# Patient Record
Sex: FEMALE | Race: BLACK OR AFRICAN AMERICAN | NOT HISPANIC OR LATINO
[De-identification: names, ages, dates, MRNs, and addresses within clinical notes are randomized per-mention and may not be internally consistent; named-entity substitution may affect disease eponyms.]

---

## 2017-06-27 ENCOUNTER — RESULT REVIEW (OUTPATIENT)
Age: 57
End: 2017-06-27

## 2018-10-24 ENCOUNTER — RESULT REVIEW (OUTPATIENT)
Age: 58
End: 2018-10-24

## 2018-12-16 ENCOUNTER — EMERGENCY (EMERGENCY)
Facility: HOSPITAL | Age: 58
LOS: 1 days | Discharge: ANOTHER HOSPITAL | End: 2018-12-16
Attending: EMERGENCY MEDICINE | Admitting: EMERGENCY MEDICINE
Payer: COMMERCIAL

## 2018-12-16 ENCOUNTER — TRANSCRIPTION ENCOUNTER (OUTPATIENT)
Age: 58
End: 2018-12-16

## 2018-12-16 ENCOUNTER — INPATIENT (INPATIENT)
Facility: HOSPITAL | Age: 58
LOS: 39 days | Discharge: NURSING, REHAB OR LONG-TERM CARE | DRG: 3 | End: 2019-01-25
Attending: INTERNAL MEDICINE | Admitting: NEUROLOGICAL SURGERY
Payer: COMMERCIAL

## 2018-12-16 VITALS
OXYGEN SATURATION: 100 % | TEMPERATURE: 98 F | DIASTOLIC BLOOD PRESSURE: 85 MMHG | HEART RATE: 56 BPM | RESPIRATION RATE: 16 BRPM | SYSTOLIC BLOOD PRESSURE: 143 MMHG

## 2018-12-16 VITALS
SYSTOLIC BLOOD PRESSURE: 116 MMHG | HEART RATE: 60 BPM | RESPIRATION RATE: 15 BRPM | DIASTOLIC BLOOD PRESSURE: 66 MMHG | OXYGEN SATURATION: 100 % | TEMPERATURE: 98 F

## 2018-12-16 VITALS
HEIGHT: 63 IN | RESPIRATION RATE: 18 BRPM | SYSTOLIC BLOOD PRESSURE: 108 MMHG | HEART RATE: 85 BPM | OXYGEN SATURATION: 98 % | DIASTOLIC BLOOD PRESSURE: 73 MMHG | WEIGHT: 175.93 LBS

## 2018-12-16 DIAGNOSIS — I61.5 NONTRAUMATIC INTRACEREBRAL HEMORRHAGE, INTRAVENTRICULAR: ICD-10-CM

## 2018-12-16 DIAGNOSIS — I60.9 NONTRAUMATIC SUBARACHNOID HEMORRHAGE, UNSPECIFIED: ICD-10-CM

## 2018-12-16 LAB
ALBUMIN SERPL ELPH-MCNC: 4 G/DL — SIGNIFICANT CHANGE UP (ref 3.3–5)
ALP SERPL-CCNC: 88 U/L — SIGNIFICANT CHANGE UP (ref 40–120)
ALT FLD-CCNC: 21 U/L — SIGNIFICANT CHANGE UP (ref 4–33)
APTT BLD: 25.3 SEC — LOW (ref 27.5–36.3)
AST SERPL-CCNC: 24 U/L — SIGNIFICANT CHANGE UP (ref 4–32)
BASOPHILS # BLD AUTO: 0.03 K/UL — SIGNIFICANT CHANGE UP (ref 0–0.2)
BASOPHILS NFR BLD AUTO: 0.3 % — SIGNIFICANT CHANGE UP (ref 0–2)
BILIRUB SERPL-MCNC: 0.3 MG/DL — SIGNIFICANT CHANGE UP (ref 0.2–1.2)
BLD GP AB SCN SERPL QL: NEGATIVE — SIGNIFICANT CHANGE UP
BUN SERPL-MCNC: 11 MG/DL — SIGNIFICANT CHANGE UP (ref 7–23)
CALCIUM SERPL-MCNC: 9.1 MG/DL — SIGNIFICANT CHANGE UP (ref 8.4–10.5)
CHLORIDE SERPL-SCNC: 102 MMOL/L — SIGNIFICANT CHANGE UP (ref 98–107)
CO2 SERPL-SCNC: 24 MMOL/L — SIGNIFICANT CHANGE UP (ref 22–31)
CREAT SERPL-MCNC: 0.89 MG/DL — SIGNIFICANT CHANGE UP (ref 0.5–1.3)
EOSINOPHIL # BLD AUTO: 0.01 K/UL — SIGNIFICANT CHANGE UP (ref 0–0.5)
EOSINOPHIL NFR BLD AUTO: 0.1 % — SIGNIFICANT CHANGE UP (ref 0–6)
GLUCOSE SERPL-MCNC: 135 MG/DL — HIGH (ref 70–99)
HCT VFR BLD CALC: 38.3 % — SIGNIFICANT CHANGE UP (ref 34.5–45)
HGB BLD-MCNC: 13.3 G/DL — SIGNIFICANT CHANGE UP (ref 11.5–15.5)
IMM GRANULOCYTES # BLD AUTO: 0.12 # — SIGNIFICANT CHANGE UP
IMM GRANULOCYTES NFR BLD AUTO: 1 % — SIGNIFICANT CHANGE UP (ref 0–1.5)
INR BLD: 1.07 — SIGNIFICANT CHANGE UP (ref 0.88–1.17)
LYMPHOCYTES # BLD AUTO: 1.39 K/UL — SIGNIFICANT CHANGE UP (ref 1–3.3)
LYMPHOCYTES # BLD AUTO: 12 % — LOW (ref 13–44)
MCHC RBC-ENTMCNC: 28.9 PG — SIGNIFICANT CHANGE UP (ref 27–34)
MCHC RBC-ENTMCNC: 34.7 % — SIGNIFICANT CHANGE UP (ref 32–36)
MCV RBC AUTO: 83.1 FL — SIGNIFICANT CHANGE UP (ref 80–100)
MONOCYTES # BLD AUTO: 0.77 K/UL — SIGNIFICANT CHANGE UP (ref 0–0.9)
MONOCYTES NFR BLD AUTO: 6.6 % — SIGNIFICANT CHANGE UP (ref 2–14)
NEUTROPHILS # BLD AUTO: 9.27 K/UL — HIGH (ref 1.8–7.4)
NEUTROPHILS NFR BLD AUTO: 80 % — HIGH (ref 43–77)
NRBC # FLD: 0 — SIGNIFICANT CHANGE UP
PLATELET # BLD AUTO: 204 K/UL — SIGNIFICANT CHANGE UP (ref 150–400)
PMV BLD: 10.9 FL — SIGNIFICANT CHANGE UP (ref 7–13)
POTASSIUM SERPL-MCNC: 3.5 MMOL/L — SIGNIFICANT CHANGE UP (ref 3.5–5.3)
POTASSIUM SERPL-SCNC: 3.5 MMOL/L — SIGNIFICANT CHANGE UP (ref 3.5–5.3)
PROT SERPL-MCNC: 7.9 G/DL — SIGNIFICANT CHANGE UP (ref 6–8.3)
PROTHROM AB SERPL-ACNC: 11.9 SEC — SIGNIFICANT CHANGE UP (ref 9.8–13.1)
RBC # BLD: 4.61 M/UL — SIGNIFICANT CHANGE UP (ref 3.8–5.2)
RBC # FLD: 13.9 % — SIGNIFICANT CHANGE UP (ref 10.3–14.5)
RH IG SCN BLD-IMP: POSITIVE — SIGNIFICANT CHANGE UP
SODIUM SERPL-SCNC: 142 MMOL/L — SIGNIFICANT CHANGE UP (ref 135–145)
WBC # BLD: 11.59 K/UL — HIGH (ref 3.8–10.5)
WBC # FLD AUTO: 11.59 K/UL — HIGH (ref 3.8–10.5)

## 2018-12-16 PROCEDURE — 99285 EMERGENCY DEPT VISIT HI MDM: CPT | Mod: 25

## 2018-12-16 PROCEDURE — 70450 CT HEAD/BRAIN W/O DYE: CPT | Mod: 26,59

## 2018-12-16 PROCEDURE — 70496 CT ANGIOGRAPHY HEAD: CPT | Mod: 26

## 2018-12-16 PROCEDURE — 70498 CT ANGIOGRAPHY NECK: CPT | Mod: 26

## 2018-12-16 PROCEDURE — 71045 X-RAY EXAM CHEST 1 VIEW: CPT | Mod: 26

## 2018-12-16 PROCEDURE — 99291 CRITICAL CARE FIRST HOUR: CPT | Mod: 25

## 2018-12-16 PROCEDURE — 31500 INSERT EMERGENCY AIRWAY: CPT

## 2018-12-16 RX ORDER — METOCLOPRAMIDE HCL 10 MG
10 TABLET ORAL ONCE
Qty: 0 | Refills: 0 | Status: DISCONTINUED | OUTPATIENT
Start: 2018-12-16 | End: 2018-12-16

## 2018-12-16 RX ORDER — ONDANSETRON 8 MG/1
4 TABLET, FILM COATED ORAL ONCE
Qty: 0 | Refills: 0 | Status: COMPLETED | OUTPATIENT
Start: 2018-12-16 | End: 2018-12-16

## 2018-12-16 RX ORDER — ETOMIDATE 2 MG/ML
30 INJECTION INTRAVENOUS ONCE
Qty: 0 | Refills: 0 | Status: DISCONTINUED | OUTPATIENT
Start: 2018-12-16 | End: 2018-12-20

## 2018-12-16 RX ORDER — FENTANYL CITRATE 50 UG/ML
5 INJECTION INTRAVENOUS
Qty: 5000 | Refills: 0 | Status: DISCONTINUED | OUTPATIENT
Start: 2018-12-16 | End: 2018-12-16

## 2018-12-16 RX ORDER — SUCCINYLCHOLINE CHLORIDE 100 MG/5ML
100 SYRINGE (ML) INTRAVENOUS ONCE
Qty: 0 | Refills: 0 | Status: DISCONTINUED | OUTPATIENT
Start: 2018-12-16 | End: 2018-12-20

## 2018-12-16 RX ORDER — ACETAMINOPHEN 500 MG
1000 TABLET ORAL ONCE
Qty: 0 | Refills: 0 | Status: COMPLETED | OUTPATIENT
Start: 2018-12-16 | End: 2018-12-16

## 2018-12-16 RX ORDER — FENTANYL CITRATE 50 UG/ML
5 INJECTION INTRAVENOUS
Qty: 2500 | Refills: 0 | Status: DISCONTINUED | OUTPATIENT
Start: 2018-12-16 | End: 2018-12-17

## 2018-12-16 RX ORDER — FENTANYL CITRATE 50 UG/ML
5 INJECTION INTRAVENOUS
Qty: 2500 | Refills: 0 | Status: DISCONTINUED | OUTPATIENT
Start: 2018-12-16 | End: 2018-12-16

## 2018-12-16 RX ORDER — SODIUM CHLORIDE 9 MG/ML
1000 INJECTION INTRAMUSCULAR; INTRAVENOUS; SUBCUTANEOUS ONCE
Qty: 0 | Refills: 0 | Status: DISCONTINUED | OUTPATIENT
Start: 2018-12-16 | End: 2018-12-16

## 2018-12-16 RX ORDER — FENTANYL CITRATE 50 UG/ML
100 INJECTION INTRAVENOUS ONCE
Qty: 0 | Refills: 0 | Status: DISCONTINUED | OUTPATIENT
Start: 2018-12-16 | End: 2018-12-16

## 2018-12-16 RX ORDER — FENTANYL CITRATE 50 UG/ML
0.5 INJECTION INTRAVENOUS
Qty: 2500 | Refills: 0 | Status: DISCONTINUED | OUTPATIENT
Start: 2018-12-16 | End: 2018-12-16

## 2018-12-16 RX ADMIN — FENTANYL CITRATE 100 MICROGRAM(S): 50 INJECTION INTRAVENOUS at 21:20

## 2018-12-16 RX ADMIN — Medication 400 MILLIGRAM(S): at 21:15

## 2018-12-16 RX ADMIN — FENTANYL CITRATE 4 MICROGRAM(S)/KG/HR: 50 INJECTION INTRAVENOUS at 21:50

## 2018-12-16 RX ADMIN — ONDANSETRON 4 MILLIGRAM(S): 8 TABLET, FILM COATED ORAL at 21:15

## 2018-12-16 NOTE — ED PROVIDER NOTE - ATTENDING CONTRIBUTION TO CARE
I have seen and evaluated this patient with the resident.   I agree with the findings  unless other wise stated.  I have made appropriate changes in documentations where needed, After my face to face bedside evaluation, I am further  notinyo F transferred from [facility] with AMS and a large ICH with intraventricular extension requiring a ventriculostomy and and an endotracheal intubation at [facility] On high dose of fentanyl for pain control and sedation endotracheal tube in place,  Lungs clear on mechanical vent, heart regular will admit to NICU --Garrett

## 2018-12-16 NOTE — PROCEDURE NOTE - GENERAL PROCEDURE DETAILS
Right frontal area prepped and draped in sterile fashion. Right frontal incision made, twist drill charito hole placed, and ventricular catheter advanced resulting in the free flow of bloody CSF under high pressure. The catheter was tunnelled posertomedially, secured and attached to external drain, incision closed with staples, and a sterile dressing applied

## 2018-12-16 NOTE — ED PROVIDER NOTE - PMH
No pertinent past medical history Systemic lupus erythematosus, unspecified SLE type, unspecified organ involvement status

## 2018-12-16 NOTE — CONSULT NOTE ADULT - PROBLEM SELECTOR RECOMMENDATION 9
Intubate for airway control  Place ventriculostomy  Head and neck CTA  Transfer to [facility] for further treatment

## 2018-12-16 NOTE — ED PROVIDER NOTE - CRITICAL CARE PROVIDED
conducted a detailed discussion of DNR status/interpretation of diagnostic studies/additional history taking/documentation/consultation with other physicians/consult w/ pt's family directly relating to pts condition/direct patient care (not related to procedure)

## 2018-12-16 NOTE — ED ADULT TRIAGE NOTE - CHIEF COMPLAINT QUOTE
pt comes to ED for HA x 1 day by EMS pt denies taking anything for the HA all day. pt has hx of lupus. VSS

## 2018-12-16 NOTE — ED PROVIDER NOTE - OBJECTIVE STATEMENT
58yF w/pmhx Lupus presenting with headache. Pt states the headache started suddenly while decorating. Pt described the headache as severe, constant, throbbing between the eyes. Pts family at bedside 58yF w/pmhx Lupus presenting with headache. Pt states the headache started suddenly while decorating. Pt described the headache as severe, constant, throbbing between the eyes. Pts family at bedside    No AC or ASA. Takes NSAIDS 58yF w/pmhx Lupus presenting with headache. Pt states the headache started suddenly while decorating. Pt described the headache as severe, constant, throbbing between the eyes. Pts family at bedside states they received a call from a relative that their mother had been talking with on the phone who called them and said to check in on her as she was describing a severe headache on the phone. Pt endorses nausea, vomiting, chills. Pt denies numbness/tingling, abd pain, fever, back pain, neck pain. Pt traveled to \[facility]\"" last month.     No AC or ASA. Takes NSAIDS 58yF w/pmhx Lupus presenting with headache. Pt states the headache started suddenly while decorating at 630pm. Pt described the headache as severe, constant, throbbing between the eyes. Pts family at bedside states they received a call from a relative that their mother had been talking with on the phone who called them and said to check in on her as she was describing a severe headache on the phone. Pt endorses nausea, vomiting, chills. Pt denies numbness/tingling, abd pain, fever, back pain, neck pain. Pt traveled to [facility] last month.     No AC or ASA. Takes NSAIDS

## 2018-12-16 NOTE — CONSULT NOTE ADULT - SUBJECTIVE AND OBJECTIVE BOX
59 YO right handed female with PMH of Lupus, on no anticoagulants or antiplatelet agents developed sudden onset of WHOL this afternoon.  On arrival to ED patient C/O severe headache and neck pain, nausea, and became drowsy    WDWN female, in moderate distress due to pain and nausea  Vital Signs Last 24 Hrs  T(C): 36.4 (16 Dec 2018 20:05), Max: 36.4 (16 Dec 2018 20:05)  T(F): 97.6 (16 Dec 2018 20:05), Max: 97.6 (16 Dec 2018 20:05)  HR: 63 (16 Dec 2018 22:20) (56 - 83)  BP: 120/68 (16 Dec 2018 22:20) (108/61 - 147/79)  BP(mean): --  RR: 15 (16 Dec 2018 22:20) (15 - 18)  SpO2: 100% (16 Dec 2018 22:20) (99% - 100%)    Drowsy, arousable, oriented X 3  PERRLA, EOMI  CN 2-12 grossly intact  SANCHEZ strength 5/5    +Nucchal rigidity  +Meningismus    < from: CT Head No Cont (12.16.18 @ 21:04) >  FINDINGS:  Intraparenchymal hemorrhage with surrounding vasogenic edema   within the left cerebellum measuringapproximately 2.5 x 2.2 cm. There is   evidence of some subadjacent subarachnoid hemorrhage with   intraventricular extension. Hemorrhage is seen layering within the   posterior horn of the right lateral ventricle as well as within the   fourth ventricle. Hemorrhage is noted within the basal cisterns.   Hemorrhage layers the bilateral tentorii.    There is mass effect on the cerebral aqueduct and fourth ventricle with   secondary hydrocephalus.    Paranasal sinuses and mastoid air cells are clear. Calvarium is intact.     IMPRESSION:     There is a 2.5 cm intraparenchymal hemorrhage within the left cerebellar   hemisphere with subadjacent subarachnoid hemorrhage with intraventricular   extension with secondary hydrocephalus as described.    < end of copied text >

## 2018-12-16 NOTE — ED PROVIDER NOTE - ATTENDING CONTRIBUTION TO CARE
Dr. Albright: I performed a face to face bedside interview with patient regarding history of present illness, review of symptoms and past medical history. I completed an independent physical exam.  I have discussed patient's plan of care with PA.   I agree with note as stated above, having amended the EMR as needed to reflect my findings.   This includes HISTORY OF PRESENT ILLNESS, HIV, PAST MEDICAL/SURGICAL/FAMILY/SOCIAL HISTORY, ALLERGIES AND HOME MEDICATIONS, REVIEW OF SYSTEMS, PHYSICAL EXAM, and any PROGRESS NOTES during the time I functioned as the attending physician for this patient.      58F with pmh of SLE here with severe headache that started at around 6pm. No head trauma, focal numbness/weakness. +vomiting.  Pt in severe pain and not able to provide much history.    On exam very uncomfortable  neck supple  NC/AT Dr. Albright: I performed a face to face bedside interview with patient regarding history of present illness, review of symptoms and past medical history. I completed an independent physical exam.  I have discussed patient's plan of care with PA.   I agree with note as stated above, having amended the EMR as needed to reflect my findings.   This includes HISTORY OF PRESENT ILLNESS, HIV, PAST MEDICAL/SURGICAL/FAMILY/SOCIAL HISTORY, ALLERGIES AND HOME MEDICATIONS, REVIEW OF SYSTEMS, PHYSICAL EXAM, and any PROGRESS NOTES during the time I functioned as the attending physician for this patient.      58F with pmh of SLE here with severe headache that started at around 6pm. No head trauma, focal numbness/weakness. +vomiting.  Pt in severe pain and not able to provide much history.    On exam very uncomfortable  BP 140s, afebrile  protecting airway  neck supple  NC/AT  CTa b/l  moving all extremities    concerning for SAH/ICH. vs intracranial mass vs migraine headache. Pt taken emergently to CT head. will provide pain control, monitor BP closely. Do not suspect CNS infection.

## 2018-12-16 NOTE — ED PROVIDER NOTE - OBJECTIVE STATEMENT
59yo F transferred from [facility] for Neurosurgical services after initially presenting with headache and findings of intraventricular hemorrhage on CT scan. Pt. was intubated, sedated and ventriculostomy was performed prior to arrival.

## 2018-12-16 NOTE — ED PROVIDER NOTE - PROGRESS NOTE DETAILS
large SAH on CT on wet read. Pt moved to main in ED, NS called, signed out to Dr. Gaona JUSTICE Parker: Called pts pharmacy [facility] to confirm meds, [phone]. Plaquenil, ventolin, baclofen?. Family with pictures of bottles for cyclobenzaprine and ibuprofen Zvi Dubin, MD (pgy-[id number]) note: pt intubated without incident.  NSURG at bedside placing drain.  TXRF center to be called now.  CT CTA ordered as per NSURG. Vidya:  Received sign out from Intake.  pt diagnosed with SAH and is becoming less responsive.  Neurosurgery was at bedside of patient when I received her in the critical care area.  Pt was intubated by ED team.  Dr. Pritchett from neurosurgery was contacted and requested transfer to [facility] for continuing care.  Before transfer, neurosurgery team here will place intraventricular drain and pt will get CTA head and neck.  Currently BP is 113/71 and pt is intubated on fentanyl drip.      Transfer center was called.  Spoke with Dr. Garrett in the ED who accepted the patient for transfer. Jose Ramon: large SAH on CT on wet read. Pt moved to main in ED, NS called, signed out to Dr. Dasilva.

## 2018-12-16 NOTE — ED ADULT NURSE NOTE - NS ED NURSE TRANSPORT WITH
drains/IV pump/oxygen/ventriculostomy/Cardiac Monitor/Defib/ACLS/Rescue Kit/O2/BVM/pulse ox/ventilator

## 2018-12-16 NOTE — ED PROVIDER NOTE - MEDICAL DECISION MAKING DETAILS
57yo F transferred from [facility] for spontaneous ICH - will contact neurosurg for recs.

## 2018-12-16 NOTE — ED ADULT NURSE NOTE - OBJECTIVE STATEMENT
57 y/o female presented to the ED via EMS, intubated prior to arrival 7.6 catheter used. 26 at the lip line, pt is vented. Respiratory next to bedside. reported by EMS pt was hanging annette headaches and began to have severe headache with nausea and vomiting. pt went to [facility], was found to have intraparenchymal hemorrhage, with edema and SAH. pt was intubated and had ventriculostomy placed. ems stated 30ml were removed, in drainage bag 40cc noted. pt is on Fetanyl drip from receiving hospital at 5mcg/kg. was given bolus prior to arrival for some agitation. pt has fuentes in place and 2 large bore IV from prior facility. pt VSS. cardiac monitor in place. Dr. PATEL next to bedside. pt has no agitation. Nephew next to bedside. awaiting bed on Neuro Sx floor.

## 2018-12-16 NOTE — ED PROCEDURE NOTE - CPROC ED TRACHE INTUB DETAIL1
Patient was pre-oxygenated. An endotracheal tube (ETT) was placed through the vocal cords into the trachea.  ETT position was confirmed by auscultation of bilateral breath sounds to all lung fields. ETCO2 level was appropriate./During intubation, applied gentle pressure to the cricoid cartilage./Patient connected to ventilator with settings as ordered.

## 2018-12-16 NOTE — ED PROVIDER NOTE - PHYSICAL EXAMINATION
pt appears to be in distress due to pain, pt clutching head and stating she has a pounding headache  Neuro: A&Ox3, EOMs intact, PERRL  unable to obtain complete neuro exam due to pt discomfort

## 2018-12-16 NOTE — ED PROVIDER NOTE - MEDICAL DECISION MAKING DETAILS
58yF w/pmhx Lupus presenting with severe headache, appears uncomfortable on exam. Will bring to CT urgently, labs, pain control 58yF w/pmhx Lupus presenting with severe headache, appears uncomfortable on exam, unable to perform complete neuro exam. Will bring pt directly to CT scan, labs, pain control. Concern for ICH.

## 2018-12-16 NOTE — ED ADULT NURSE REASSESSMENT NOTE - NS ED NURSE REASSESS COMMENT FT1
pt VS as noted, report given to Transfer Center RN Radha pt awaiting EMS for transfer to [facility] ED. Neuro Surgery at bedside for ventriculostomy placement. Facilitator RN remains at bedside. will continue to monitor pt.

## 2018-12-16 NOTE — ED ADULT NURSE NOTE - OBJECTIVE STATEMENT
pt brought to Int5, A&ox3, amb, skin w/d/i, c/o sudden onset HA @ approx 630, described as "severe", constant, throbbing between the eyes, as per family @ bedside family states they received a call from a relative that their mother had been talking with on the phone who called them and said to check in on her as she was describing a severe headache, pt c/o nausea, vomiting, chills, no neuro deficits noted, pt unable to open eyes 2/2 pain @ this time, SL x 2 placed, pt taken directly to CT for exam.  (fac)

## 2018-12-17 ENCOUNTER — TRANSCRIPTION ENCOUNTER (OUTPATIENT)
Age: 58
End: 2018-12-17

## 2018-12-17 ENCOUNTER — RESULT REVIEW (OUTPATIENT)
Age: 58
End: 2018-12-17

## 2018-12-17 ENCOUNTER — APPOINTMENT (OUTPATIENT)
Dept: NEUROSURGERY | Facility: HOSPITAL | Age: 58
End: 2018-12-17
Payer: COMMERCIAL

## 2018-12-17 ENCOUNTER — APPOINTMENT (OUTPATIENT)
Age: 58
End: 2018-12-17
Payer: COMMERCIAL

## 2018-12-17 LAB
ALBUMIN SERPL ELPH-MCNC: 3.9 G/DL — SIGNIFICANT CHANGE UP (ref 3.3–5)
ALP SERPL-CCNC: 83 U/L — SIGNIFICANT CHANGE UP (ref 40–120)
ALT FLD-CCNC: 35 U/L — SIGNIFICANT CHANGE UP (ref 10–45)
ANION GAP SERPL CALC-SCNC: 14 MMOL/L — SIGNIFICANT CHANGE UP (ref 5–17)
APTT BLD: 23.1 SEC — LOW (ref 27.5–36.3)
AST SERPL-CCNC: 52 U/L — HIGH (ref 10–40)
BASE EXCESS BLDA CALC-SCNC: 1 MMOL/L — SIGNIFICANT CHANGE UP (ref -2–2)
BILIRUB DIRECT SERPL-MCNC: <0.1 MG/DL — SIGNIFICANT CHANGE UP (ref 0–0.2)
BILIRUB INDIRECT FLD-MCNC: >0.2 MG/DL — SIGNIFICANT CHANGE UP (ref 0.2–1)
BILIRUB SERPL-MCNC: 0.3 MG/DL — SIGNIFICANT CHANGE UP (ref 0.2–1.2)
BLD GP AB SCN SERPL QL: NEGATIVE — SIGNIFICANT CHANGE UP
BUN SERPL-MCNC: 8 MG/DL — SIGNIFICANT CHANGE UP (ref 7–23)
CALCIUM SERPL-MCNC: 8.6 MG/DL — SIGNIFICANT CHANGE UP (ref 8.4–10.5)
CHLORIDE SERPL-SCNC: 102 MMOL/L — SIGNIFICANT CHANGE UP (ref 96–108)
CHOLEST SERPL-MCNC: 143 MG/DL — SIGNIFICANT CHANGE UP (ref 10–199)
CO2 BLDA-SCNC: 28 MMOL/L — SIGNIFICANT CHANGE UP (ref 22–30)
CO2 SERPL-SCNC: 21 MMOL/L — LOW (ref 22–31)
CREAT SERPL-MCNC: 0.62 MG/DL — SIGNIFICANT CHANGE UP (ref 0.5–1.3)
GAS PNL BLDA: SIGNIFICANT CHANGE UP
GLUCOSE SERPL-MCNC: 158 MG/DL — HIGH (ref 70–99)
HBA1C BLD-MCNC: 5.4 % — SIGNIFICANT CHANGE UP (ref 4–5.6)
HCO3 BLDA-SCNC: 27 MMOL/L — SIGNIFICANT CHANGE UP (ref 21–29)
HCT VFR BLD CALC: 37.4 % — SIGNIFICANT CHANGE UP (ref 34.5–45)
HDLC SERPL-MCNC: 49 MG/DL — LOW
HGB BLD-MCNC: 13.3 G/DL — SIGNIFICANT CHANGE UP (ref 11.5–15.5)
HOROWITZ INDEX BLDA+IHG-RTO: 40 — SIGNIFICANT CHANGE UP
INR BLD: 1.04 RATIO — SIGNIFICANT CHANGE UP (ref 0.88–1.16)
LIPID PNL WITH DIRECT LDL SERPL: 80 MG/DL — SIGNIFICANT CHANGE UP
MAGNESIUM SERPL-MCNC: 1.6 MG/DL — SIGNIFICANT CHANGE UP (ref 1.6–2.6)
MCHC RBC-ENTMCNC: 30.3 PG — SIGNIFICANT CHANGE UP (ref 27–34)
MCHC RBC-ENTMCNC: 35.7 GM/DL — SIGNIFICANT CHANGE UP (ref 32–36)
MCV RBC AUTO: 84.9 FL — SIGNIFICANT CHANGE UP (ref 80–100)
PCO2 BLDA: 51 MMHG — HIGH (ref 32–46)
PH BLDA: 7.34 — LOW (ref 7.35–7.45)
PHOSPHATE SERPL-MCNC: 2.5 MG/DL — SIGNIFICANT CHANGE UP (ref 2.5–4.5)
PLATELET # BLD AUTO: 194 K/UL — SIGNIFICANT CHANGE UP (ref 150–400)
PO2 BLDA: 174 MMHG — HIGH (ref 74–108)
POTASSIUM SERPL-MCNC: 4 MMOL/L — SIGNIFICANT CHANGE UP (ref 3.5–5.3)
POTASSIUM SERPL-SCNC: 4 MMOL/L — SIGNIFICANT CHANGE UP (ref 3.5–5.3)
PROT SERPL-MCNC: 7.4 G/DL — SIGNIFICANT CHANGE UP (ref 6–8.3)
PROTHROM AB SERPL-ACNC: 12 SEC — SIGNIFICANT CHANGE UP (ref 10–12.9)
RBC # BLD: 4.4 M/UL — SIGNIFICANT CHANGE UP (ref 3.8–5.2)
RBC # FLD: 13.4 % — SIGNIFICANT CHANGE UP (ref 10.3–14.5)
RH IG SCN BLD-IMP: POSITIVE — SIGNIFICANT CHANGE UP
RH IG SCN BLD-IMP: POSITIVE — SIGNIFICANT CHANGE UP
SAO2 % BLDA: 99 % — HIGH (ref 92–96)
SODIUM SERPL-SCNC: 137 MMOL/L — SIGNIFICANT CHANGE UP (ref 135–145)
T3 SERPL-MCNC: 101 NG/DL — SIGNIFICANT CHANGE UP (ref 80–200)
T4 AB SER-ACNC: 6.6 UG/DL — SIGNIFICANT CHANGE UP (ref 4.6–12)
TOTAL CHOLESTEROL/HDL RATIO MEASUREMENT: 2.9 RATIO — LOW (ref 3.3–7.1)
TRIGL SERPL-MCNC: 72 MG/DL — SIGNIFICANT CHANGE UP (ref 10–149)
TSH SERPL-MCNC: 2.2 UIU/ML — SIGNIFICANT CHANGE UP (ref 0.27–4.2)
WBC # BLD: 9.2 K/UL — SIGNIFICANT CHANGE UP (ref 3.8–10.5)
WBC # FLD AUTO: 9.2 K/UL — SIGNIFICANT CHANGE UP (ref 3.8–10.5)

## 2018-12-17 PROCEDURE — 99291 CRITICAL CARE FIRST HOUR: CPT | Mod: 24

## 2018-12-17 PROCEDURE — 36226 PLACE CATH VERTEBRAL ART: CPT | Mod: LT

## 2018-12-17 PROCEDURE — 88307 TISSUE EXAM BY PATHOLOGIST: CPT | Mod: 26

## 2018-12-17 PROCEDURE — 61781 SCAN PROC CRANIAL INTRA: CPT

## 2018-12-17 PROCEDURE — 99292 CRITICAL CARE ADDL 30 MIN: CPT

## 2018-12-17 PROCEDURE — 75894 X-RAYS TRANSCATH THERAPY: CPT | Mod: 26

## 2018-12-17 PROCEDURE — 70450 CT HEAD/BRAIN W/O DYE: CPT | Mod: 26

## 2018-12-17 PROCEDURE — 71045 X-RAY EXAM CHEST 1 VIEW: CPT | Mod: 26

## 2018-12-17 PROCEDURE — 70496 CT ANGIOGRAPHY HEAD: CPT | Mod: 26

## 2018-12-17 PROCEDURE — 69990 MICROSURGERY ADD-ON: CPT | Mod: 59

## 2018-12-17 PROCEDURE — 36228 PLACE CATH INTRACRANIAL ART: CPT | Mod: 59,LT

## 2018-12-17 PROCEDURE — 36224 PLACE CATH CAROTD ART: CPT | Mod: 50

## 2018-12-17 PROCEDURE — 75898 FOLLOW-UP ANGIOGRAPHY: CPT | Mod: 26

## 2018-12-17 PROCEDURE — 61624 TCAT PERM OCCLS/EMBOLJ CNS: CPT

## 2018-12-17 PROCEDURE — 36227 PLACE CATH XTRNL CAROTID: CPT | Mod: 50

## 2018-12-17 PROCEDURE — 99223 1ST HOSP IP/OBS HIGH 75: CPT | Mod: 57

## 2018-12-17 PROCEDURE — 36226 PLACE CATH VERTEBRAL ART: CPT | Mod: 50

## 2018-12-17 PROCEDURE — 61686 INTRACRANIAL VESSEL SURGERY: CPT | Mod: 22

## 2018-12-17 PROCEDURE — 70450 CT HEAD/BRAIN W/O DYE: CPT | Mod: 26,77

## 2018-12-17 RX ORDER — PROPOFOL 10 MG/ML
75 INJECTION, EMULSION INTRAVENOUS
Qty: 1000 | Refills: 0 | Status: DISCONTINUED | OUTPATIENT
Start: 2018-12-17 | End: 2018-12-18

## 2018-12-17 RX ORDER — ONDANSETRON 8 MG/1
4 TABLET, FILM COATED ORAL EVERY 6 HOURS
Qty: 0 | Refills: 0 | Status: DISCONTINUED | OUTPATIENT
Start: 2018-12-17 | End: 2018-12-17

## 2018-12-17 RX ORDER — FENTANYL CITRATE 50 UG/ML
25 INJECTION INTRAVENOUS ONCE
Qty: 0 | Refills: 0 | Status: DISCONTINUED | OUTPATIENT
Start: 2018-12-17 | End: 2018-12-17

## 2018-12-17 RX ORDER — PANTOPRAZOLE SODIUM 20 MG/1
40 TABLET, DELAYED RELEASE ORAL DAILY
Qty: 0 | Refills: 0 | Status: DISCONTINUED | OUTPATIENT
Start: 2018-12-17 | End: 2018-12-18

## 2018-12-17 RX ORDER — SODIUM CHLORIDE 9 MG/ML
1000 INJECTION INTRAMUSCULAR; INTRAVENOUS; SUBCUTANEOUS
Qty: 0 | Refills: 0 | Status: DISCONTINUED | OUTPATIENT
Start: 2018-12-17 | End: 2018-12-17

## 2018-12-17 RX ORDER — ONDANSETRON 8 MG/1
4 TABLET, FILM COATED ORAL EVERY 6 HOURS
Qty: 0 | Refills: 0 | Status: DISCONTINUED | OUTPATIENT
Start: 2018-12-17 | End: 2018-12-18

## 2018-12-17 RX ORDER — PROPOFOL 10 MG/ML
10 INJECTION, EMULSION INTRAVENOUS
Qty: 1000 | Refills: 0 | Status: DISCONTINUED | OUTPATIENT
Start: 2018-12-17 | End: 2018-12-17

## 2018-12-17 RX ORDER — DEXMEDETOMIDINE HYDROCHLORIDE IN 0.9% SODIUM CHLORIDE 4 UG/ML
0.2 INJECTION INTRAVENOUS
Qty: 200 | Refills: 0 | Status: DISCONTINUED | OUTPATIENT
Start: 2018-12-17 | End: 2018-12-17

## 2018-12-17 RX ORDER — PANTOPRAZOLE SODIUM 20 MG/1
40 TABLET, DELAYED RELEASE ORAL DAILY
Qty: 0 | Refills: 0 | Status: DISCONTINUED | OUTPATIENT
Start: 2018-12-17 | End: 2018-12-17

## 2018-12-17 RX ORDER — NICARDIPINE HYDROCHLORIDE 30 MG/1
2.4 CAPSULE, EXTENDED RELEASE ORAL
Qty: 40 | Refills: 0 | Status: DISCONTINUED | OUTPATIENT
Start: 2018-12-17 | End: 2018-12-17

## 2018-12-17 RX ORDER — CHLORHEXIDINE GLUCONATE 213 G/1000ML
15 SOLUTION TOPICAL
Qty: 0 | Refills: 0 | Status: DISCONTINUED | OUTPATIENT
Start: 2018-12-17 | End: 2018-12-17

## 2018-12-17 RX ORDER — DOCUSATE SODIUM 100 MG
100 CAPSULE ORAL
Qty: 0 | Refills: 0 | Status: DISCONTINUED | OUTPATIENT
Start: 2018-12-17 | End: 2018-12-17

## 2018-12-17 RX ORDER — NICARDIPINE HYDROCHLORIDE 30 MG/1
5 CAPSULE, EXTENDED RELEASE ORAL
Qty: 40 | Refills: 0 | Status: DISCONTINUED | OUTPATIENT
Start: 2018-12-17 | End: 2018-12-17

## 2018-12-17 RX ORDER — SENNA PLUS 8.6 MG/1
5 TABLET ORAL AT BEDTIME
Qty: 0 | Refills: 0 | Status: DISCONTINUED | OUTPATIENT
Start: 2018-12-17 | End: 2018-12-17

## 2018-12-17 RX ORDER — PROPOFOL 10 MG/ML
75 INJECTION, EMULSION INTRAVENOUS
Qty: 1000 | Refills: 0 | Status: DISCONTINUED | OUTPATIENT
Start: 2018-12-17 | End: 2018-12-17

## 2018-12-17 RX ORDER — DOCUSATE SODIUM 100 MG
100 CAPSULE ORAL
Qty: 0 | Refills: 0 | Status: DISCONTINUED | OUTPATIENT
Start: 2018-12-17 | End: 2018-12-18

## 2018-12-17 RX ORDER — NICARDIPINE HYDROCHLORIDE 30 MG/1
2.4 CAPSULE, EXTENDED RELEASE ORAL
Qty: 40 | Refills: 0 | Status: DISCONTINUED | OUTPATIENT
Start: 2018-12-17 | End: 2018-12-18

## 2018-12-17 RX ORDER — SENNA PLUS 8.6 MG/1
5 TABLET ORAL AT BEDTIME
Qty: 0 | Refills: 0 | Status: DISCONTINUED | OUTPATIENT
Start: 2018-12-17 | End: 2018-12-18

## 2018-12-17 RX ORDER — DEXTROSE MONOHYDRATE, SODIUM CHLORIDE, AND POTASSIUM CHLORIDE 50; .745; 4.5 G/1000ML; G/1000ML; G/1000ML
1000 INJECTION, SOLUTION INTRAVENOUS
Qty: 0 | Refills: 0 | Status: DISCONTINUED | OUTPATIENT
Start: 2018-12-17 | End: 2018-12-18

## 2018-12-17 RX ORDER — DEXMEDETOMIDINE HYDROCHLORIDE IN 0.9% SODIUM CHLORIDE 4 UG/ML
0.2 INJECTION INTRAVENOUS
Qty: 200 | Refills: 0 | Status: DISCONTINUED | OUTPATIENT
Start: 2018-12-17 | End: 2018-12-18

## 2018-12-17 RX ADMIN — ONDANSETRON 4 MILLIGRAM(S): 8 TABLET, FILM COATED ORAL at 02:25

## 2018-12-17 RX ADMIN — FENTANYL CITRATE 25 MICROGRAM(S): 50 INJECTION INTRAVENOUS at 07:30

## 2018-12-17 RX ADMIN — PANTOPRAZOLE SODIUM 40 MILLIGRAM(S): 20 TABLET, DELAYED RELEASE ORAL at 12:04

## 2018-12-17 RX ADMIN — NICARDIPINE HYDROCHLORIDE 12 MG/HR: 30 CAPSULE, EXTENDED RELEASE ORAL at 12:24

## 2018-12-17 RX ADMIN — CHLORHEXIDINE GLUCONATE 15 MILLILITER(S): 213 SOLUTION TOPICAL at 05:16

## 2018-12-17 RX ADMIN — FENTANYL CITRATE 7.98 MICROGRAM(S)/KG/HR: 50 INJECTION INTRAVENOUS at 00:19

## 2018-12-17 RX ADMIN — SODIUM CHLORIDE 60 MILLILITER(S): 9 INJECTION INTRAMUSCULAR; INTRAVENOUS; SUBCUTANEOUS at 12:24

## 2018-12-17 RX ADMIN — PROPOFOL 37.84 MICROGRAM(S)/KG/MIN: 10 INJECTION, EMULSION INTRAVENOUS at 12:24

## 2018-12-17 RX ADMIN — FENTANYL CITRATE 25 MICROGRAM(S): 50 INJECTION INTRAVENOUS at 07:45

## 2018-12-17 NOTE — DISCHARGE NOTE ADULT - CARE PROVIDERS DIRECT ADDRESSES
,[email or web address]

## 2018-12-17 NOTE — DISCHARGE NOTE ADULT - NS AS DC FOLLOWUP STROKE INST
Stroke (includes: TIA/SAH/ICH/Ischemic Stroke) Influenza vaccination (VIS Pub Date: August 7, 2015)/Stroke (includes: TIA/SAH/ICH/Ischemic Stroke)

## 2018-12-17 NOTE — H&P ADULT - HISTORY OF PRESENT ILLNESS
59yo woman PMH lupus p/w sudden onset severe HA a/w nausea and vomiting. 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 Patient was intact on presentation however intubated for airway protection and EVD placed. Transferred to [facility] for further care.

## 2018-12-17 NOTE — DISCHARGE NOTE ADULT - SECONDARY DIAGNOSIS.
Acute respiratory failure with hypoxia Autonomic hyperactivity Anemia Fever, unspecified fever cause

## 2018-12-17 NOTE — CHART NOTE - NSCHARTNOTEFT_GEN_A_CORE
Interventional Neuro Radiology  Pre-Procedure Note PA-C    This is a 58y ____ hand dominant Female      HPI:  57yo woman PMH lupus p/w sudden onset severe HA a/w nausea and vomiting. 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 Patient was intact on presentation however intubated for airway protection and EVD placed. Transferred to [facility] for further care. (17 Dec 2018 01:18)      Allergies: No Known Allergies      PAST MEDICAL & SURGICAL HISTORY:  Systemic lupus erythematosus, unspecified SLE type, unspecified organ involvement status  No significant past surgical history      Social History:   FAMILY HISTORY: no pertinent family history in first degree relatives      Current Medications: chlorhexidine 0.12% Liquid 15 milliLiter(s) Oral Mucosa two times a day  dexmedetomidine Infusion 0.2 MICROgram(s)/kG/Hr IV  docusate sodium Liquid 100 milliGRAM(s) Oral two times a day  fentaNYL    Injectable 25 MICROGram(s) IV Push once  ondansetron Injectable 4 milliGRAM(s) IV Push every 6 hours PRN  pantoprazole  Injectable 40 milliGRAM(s) IV Push daily  senna Syrup 5 milliLiter(s) Oral at bedtime  sodium chloride 0.9%. 1000 milliLiter(s) IV      CBC                        13.3   9.2   )-----------( 194                  37.4     BMP    137  |  102  |  8   ----------------------------<  158  4.0   |  21<L>  |  0.62      Blood Bank: O positive      Assessment/Plan:   This is a 58 year old right hand dominant female   Procedure, goals, risks, benefits and alternatives were discussed with patient and patient's family.  All questions were answered.  Risks include but are not limited to stroke, vessel injury, hemorrhage, and or right  groin hematoma.  Patient demonstrates understanding  of all risks involved with this procedure and wishes to continue.  Appropriate content was obtained from patient and consent is in the patient's chart. Interventional Neuro Radiology  Pre-Procedure Note PA-C    This is a 58 year old right hand dominant female woman with a PMH significant for lupus p/w sudden onset severe headache with nausea and vomiting. 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 Patient was intact on presentation however intubated for airway protection and EVD placed. Patient is transported to Neuro IR for a selective cerebral angiography    Allergies: No Known Allergies  PMHX: systemic lupus erythematosus   PSHX: no significant past surgical history  Social History:   FAMILY HISTORY: no pertinent family history in first degree relatives    Current Medications:   chlorhexidine 0.12% Liquid 15 milliLiter(s) Oral Mucosa two times a day  dexmedetomidine Infusion 0.2 MICROgram(s)/kG/Hr IV  docusate sodium Liquid 100 milliGRAM(s) Oral two times a day  fentaNYL    Injectable 25 MICROGram(s) IV Push once  ondansetron Injectable 4 milliGRAM(s) IV Push every 6 hours PRN  pantoprazole  Injectable 40 milliGRAM(s) IV Push   senna Syrup 5 milliLiter(s) Oral at bedtime  sodium chloride 0.9%. 1000 milliLiter(s) IV    CBC                        13.3   9.2   )-----------( 194                  37.4     BMP    137  |  102  |  8   ----------------------------<  158  4.0   |  21<L>  |  0.62      Blood Bank: O positive available     Assessment/Plan:   This is a 58 year old right hand dominant female with a cerebellar hemorrhage. Patient is transported to Neuro IR intubated, for a selective cerebral angiography. Procedure, goals, risks, benefits and alternatives were discussed with patient's sister, via phone. All questions were answered. Risks include but are not limited to stroke, vessel injury, hemorrhage, and or right groin hematoma. Patient's sister demonstrates understanding of all risks involved with this procedure and wishes to continue. Appropriate content was obtained from patient's sister and consent is in the patient's chart.

## 2018-12-17 NOTE — DISCHARGE NOTE ADULT - MEDICATION SUMMARY - MEDICATIONS TO TAKE
I will START or STAY ON the medications listed below when I get home from the hospital:    acetaminophen 160 mg/5 mL oral suspension  -- 20.31 milliliter(s) by gastrostomy tube every 6 hours, As Needed - 3)  -- Indication: For Pain / Fever    fentaNYL 12 mcg/hr transdermal film, extended release  -- 1 patch by transdermal patch every 48 hours  -- Indication: For Autonomic hyperactivity    enoxaparin  -- 40 milligram(s) subcutaneous once a day  -- Indication: For DVT Prophylaxsis     gabapentin 250 mg/5 mL oral solution  -- 6 milliliter(s) by gastrostomy tube every 8 hours  -- Indication: For Autonomic hyperactivity    clonazePAM 1 mg oral tablet  -- 1 tab(s) by gastrostomy tube every 12 hours  -- Indication: For Myoclonus / Autonomic Hyperactivity     clonazePAM 0.25 mg oral tablet  -- 0.25 milligram(s) by gastrostomy tube every 8 hours, As Needed to be given for breakthrough myoclonus not treated by standing Clonazepam   -- Indication: For Myoclonus / Autonomic Hyperactivity     labetalol  -- 50 milligram(s) by gastrostomy tube every 12 hours Hold for SBP< 100 or   HR< 60   -- Indication: For Autonomic hyperactivity    ipratropium-albuterol 0.5 mg-2.5 mg/3 mLinhalation solution  -- 3 milliliter(s) inhaled every 6 hours, As needed, Shortness of Breath and/or Wheezing  -- Indication: For Respiratory failure    docusate sodium 10 mg/mL oral liquid  -- 10 milliliter(s) by gastrostomy tube 2 times a day  -- Indication: For Constipation     senna oral tablet  -- 1 tab(s) by gastrostomy tube once a day (at bedtime)  -- Indication: For Constipation     potassium phosphate-sodium phosphate 250 mg-280 mg-160 mg oral powder for reconstitution  -- 1 packet(s) by gastrostomy tube 4 times a day  -- Indication: For Nutritional Supplement     pantoprazole 40 mg oral granule, delayed release  -- 40 milligram(s) by gastrostomy tube once a day  -- Indication: For GERD I will START or STAY ON the medications listed below when I get home from the hospital:    acetaminophen 160 mg/5 mL oral suspension  -- 20.31 milliliter(s) by gastrostomy tube every 6 hours, As Needed - 3)  -- Indication: For Pain / Low grade Fever     fentaNYL 12 mcg/hr transdermal film, extended release  -- 1 patch by transdermal patch every 48 hours  -- Indication: For Autonomic hyperactivity    enoxaparin  -- 40 milligram(s) subcutaneous once a day  -- Indication: For DVT Prophylaxsis    gabapentin 250 mg/5 mL oral solution  -- 6 milliliter(s) by gastrostomy tube every 8 hours  -- Indication: For Autonomic hyperactivity    clonazePAM 1 mg oral tablet  -- 1 tab(s) by gastrostomy tube every 12 hours  -- Indication: For Autonomic hyperactivity / Myoclonus     clonazePAM 0.25 mg oral tablet  -- 0.25 milligram(s) by gastrostomy tube every 8 hours, As Needed to be given for breakthrough myoclonus not treated by standing Clonazepam   -- Indication: For Autonomic hyperactivity / Myoclonus     labetalol  -- 50 milligram(s) by gastrostomy tube every 12 hours Hold for SBP< 100 or   HR< 60   -- Indication: For Autonomic hyperactivity    ipratropium-albuterol 0.5 mg-2.5 mg/3 mLinhalation solution  -- 3 milliliter(s) inhaled every 6 hours, As needed, Shortness of Breath and/or Wheezing  -- Indication: For Respiratory failure    docusate sodium 10 mg/mL oral liquid  -- 10 milliliter(s) by gastrostomy tube 2 times a day  -- Indication: For constipation     senna oral tablet  -- 1 tab(s) by gastrostomy tube once a day (at bedtime)  -- Indication: For constipation     potassium phosphate-sodium phosphate 250 mg-280 mg-160 mg oral powder for reconstitution  -- 1 packet(s) by gastrostomy tube 4 times a day  -- Indication: For Nutritional supplement     pantoprazole 40 mg oral granule, delayed release  -- 40 milligram(s) by gastrostomy tube once a day  -- Indication: For Prophylactic measure

## 2018-12-17 NOTE — PROGRESS NOTE ADULT - ASSESSMENT
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

## 2018-12-17 NOTE — H&P ADULT - ASSESSMENT
58F here with vermina hemorrhage likely 2/2 cerebellar AVM s/p palcement of EVD 2/2 symptomatic hydrocephalus  - Preop for angio in AM  - Q1hr neurochecks  - SBP <140  - EVD at 10

## 2018-12-17 NOTE — DISCHARGE NOTE ADULT - PLAN OF CARE
Supportive Care / Physical Therapy / Occupational Therapy 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  Patient S/p EVD-> Failed Clamping Trial -> S/p VPS 1/7  Head CT 1/16: Unchanged Pericatheter edema , No Hydrocephalus, No Midline shift , No new intracranial hemorrhage or infarct   Continue Physical therapy / OT Maintain Spo2 > 92% Patient S/p Tracheostomy on 12/24 ( # 8 Cuffed Ember ), FIO2 21%  Patient tolerating TC ATC since 1/13  Trach downsized to # 7 Cuffless Portex on 1/16   Continue Chest PT / Nebulizers and Suctioning PRN  Attempt PMV when deemed medically appropriate Supportive Care Patient with Autonomic Storming and Myoclonus   Klonopin changed to 1 mg q 12 hrs with breakthrough dose of 0.25 mg q 8 hrs prn   breakthrough myoclonus   Continue Neurontin, Low dose Fentanyl patch  Low dose Labetalol 50 mg q 12 hr added prior to discharge Patient required PRBCS during admission, cbc stable prior to dc   Hgb / Hct on 1/22: 10.8/ 33.0 Continue supportive care Patients with intermittent low grade fevers likely central in nature as per ID   Patient previously txd with Vanco and Cefepime for MRSA / E.Coli PNA  Bld CX 1/19: No growth , UA 1/8: Negative, CXR 1/8: Clear Lungs   Continue to monitor off abx at present time. Patients with intermittent low grade fevers likely central in nature as per ID   Patient previously treated with Vanco and Cefepime for MRSA / E.Coli PNA  Bld CX 1/19: No growth , UA 1/8: Negative, CXR 1/8: Clear Lungs   Continue to monitor off abx at present time.

## 2018-12-17 NOTE — PROGRESS NOTE ADULT - ASSESSMENT
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

## 2018-12-17 NOTE — DISCHARGE NOTE ADULT - PATIENT PORTAL LINK FT
You can access the [facility] Patient Portal, offered by [facility], by registering with the following website: [email or web address]

## 2018-12-17 NOTE — PROGRESS NOTE ADULT - SUBJECTIVE AND OBJECTIVE BOX
night attending note    59yo woman PMH lupus p/w sudden onset severe HA a/w nausea and vomiting. 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 Patient was intact on presentation however intubated for airway protection and EVD placed. Transferred to [facility] for further care.     admission ICH score 2    EVENTS: ANGIO today - embolization with [place] of bilateral SCAs    VITALS:  Reviewed     LABS:  Reviewed    MEDICATIONS: Reviewed    IMAGING:  Recent imaging studies were reviewed.    EXAM:  intubated  EO to noxious  PERRL  2 fingers on RUE, wiggles toes b/l  LUE antigravity/localizes  rrr no m/r/g  clear lungs  soft abd/nd  wwp ext

## 2018-12-17 NOTE — DISCHARGE NOTE ADULT - CARE PROVIDER_API CALL
Delia Murdock), Neurosurgery  General  [address]  [address] [place] [address]  Phone: [phone]  Fax: [phone]    [place]), Otolaryngology Facial Plastics  [address] [place] [id number]  Phone: [phone]  Fax: [phone]

## 2018-12-17 NOTE — PROGRESS NOTE ADULT - SUBJECTIVE AND OBJECTIVE BOX
night attending note    59yo woman PMH lupus p/w sudden onset severe HA a/w nausea and vomiting. 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 Patient was intact on presentation however intubated for airway protection and EVD placed. Transferred to [facility] for further care.     vitals/labs/meds/imaging reviewed  intubated  EO to noxious  PERRL  2 fingers on RUE, wiggles toes b/l  LUE antigravity/localizes  rrr no m/r/g  clear lungs  soft abd/nd  wwp ext night attending note    57yo woman PMH lupus p/w sudden onset severe HA a/w nausea and vomiting. 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 Patient was intact on presentation however intubated for airway protection and EVD placed. Transferred to [facility] for further care.     admission ICH score 2    vitals/labs/meds/imaging reviewed  intubated  EO to noxious  PERRL  2 fingers on RUE, wiggles toes b/l  LUE antigravity/localizes  rrr no m/r/g  clear lungs  soft abd/nd  wwp ext

## 2018-12-17 NOTE — PROGRESS NOTE ADULT - SUBJECTIVE AND OBJECTIVE BOX
58 year-old woman with lupus presented with left cerebellar hemorrhage secondary to AVM status post angio/embo with [place] and OR for resection today.    Examination: 58 year-old woman with lupus presented with left cerebellar hemorrhage secondary to AVM status post angio/embo with [place] and OR for resection today. Intra-op angio without residual AVM.    Examination: on propofol 50  PERRL, withdraws arms, moves legs, no groin hematoma, sheath in place

## 2018-12-17 NOTE — PROGRESS NOTE ADULT - ASSESSMENT
Cerebellar AVM status post embo and resection  - Wean sedation to obtain exam; switch to dexmedetomidine once groin precautions lifted  - Sheath being removed by NSGY resident  - -120mmHg, nicardipine as needed  - CT Head in AM  - Wean vent to extubation as tolerated    45 minutes critical care time

## 2018-12-17 NOTE — CHART NOTE - NSCHARTNOTEFT_GEN_A_CORE
Interventional Neuro- Radiology   Procedure Note SHANTA    Procedure: Selective Cerebral Angiography   Pre- Procedure Diagnosis:  Post- Procedure Diagnosis:    : Dr Cory Valderrama    Physician Assistant: Radha Simeon PA-C    RN:    Anesthesia: (MAC)   (general anesthesia)    Sheath:    I/Os:  Estimated blood loss less than 10cc  IV fluids:     cc     Urine output     cc        Contrast Omnipaque 240      cc         Antibiotics:    Vitals: BP         HR      Spo2      Preliminary Report:    Using a 4 Fr short/long sheath to the right groin under MAC sedation via   left vertebral artery,  left common carotid artery, left external carotid artey, right vertebral arter,  right common carotid artery, right external carotid artery  a selective cerebral angiography was performed and  demonstrates ________. ( Official note to follow).  Patient tolerated procedure well, hemodynamically stable, no change in neurological status compared to baseline.  Results discussed with neurosurgery, patient and patient's  family.  Groin sheath was removed,  manual compression held to hemostasis  for  21 minutes, no active bleeding, no hematoma, avitene applied,  quick clot and safeguard balloon dressing applied at _____h. Interventional Neuro- Radiology   Procedure Note PA-C    Procedure: Selective Cerebral Angiography   Pre- Procedure Diagnosis:  Post- Procedure Diagnosis:    : Dr Cory Valderrama  Fellow:    Dr Elizabeth Mariscal  Resident: Dr Peter Quintero   Physician Assistant: Radha Simeon PA-C    Nurse:                      Pineda Mcdonald   Radiologic Tech:      Catherine [place] LRT     Anesthesiologist:      Dr Domenico Aaron   Sheath:    I/Os: EBL less than 10cc  IV fluids:     cc   Urine output     cc   Contrast Omnipaque 240              Antibiotics:    Vitals: BP         HR      Spo2      Preliminary Report:  Using a 6 Hungarian sheath to the right groin under MAC sedation via left vertebral artery,  left internal carotid artery, left external carotid artery, right vertebral artery, right internal carotid artery, right external carotid artery a selective cerebral angiography was performed and  demonstrated   Patient tolerated procedure well, hemodynamically stable, no change in neurological status compared to baseline.  Results discussed with neurosurgery, patient and patient's  family.  Groin sheath was removed,  manual compression held to hemostasis  for  21 minutes, no active bleeding, no hematoma, quick clot and safeguard balloon dressing applied at _____h. Interventional Neuro- Radiology   Procedure Note PA-C    Procedure: Selective Cerebral Angiography   Pre- Procedure Diagnosis: AVM  Post- Procedure Diagnosis: vermian AVM     : Dr Cory Valderrama  Fellow:    Dr Elizabeth Mariscal  Resident: Dr Peter Quintero   Physician Assistant: Radha Simeon PA-C    Nurse:                      Pineda Mcdonald   Radiologic Tech:      Catherine [place] LRT     Anesthesiologist:      Dr Domenico Aaron   Sheath:                    6 Hebrew arrow     I/Os: EBL less than 10cc  IV fluids:     cc   Urine output     cc   Contrast Omnipaque 240        baseline           Antibiotics: Ancef 2 grams    Vitals: /67        HR 70     Spo2      Preliminary Report:  Using a 6 Hebrew sheath to the right groin under MAC sedation via left vertebral artery, left internal carotid artery, left external carotid artery, right vertebral artery, right internal carotid artery, right external carotid artery a selective cerebral angiography was performed and demonstrated   Patient tolerated procedure well, hemodynamically stable, no change in neurological status compared to baseline.  Results discussed with neuro ICU, patient and patient's sister. Right groin sheath was removed, manual compression held to hemostasis for  20 minutes, no active bleeding, no hematoma, quick clot and safeguard balloon dressing applied at _____h. Interventional Neuro- Radiology   Procedure Note PA-C    Procedure: Selective Cerebral Angiography   Pre- Procedure Diagnosis: AVM  Post- Procedure Diagnosis: vermian AVM     : Dr Cory Valderrama  Fellow:    Dr Elizabeth Mariscal  Resident: Dr Peter Quintero   Physician Assistant: Radha Simeon PA-C    Nurse:                      Pineda Mcdonald   Radiologic Tech:      Catherien [place] LRT     Anesthesiologist:      Dr Domenico Aaron   Sheath:                    6 Tanzanian arrow     I/Os: EBL less than 10cc  IV fluids:     cc   Urine output     cc   Contrast Omnipaque 240        baseline           Antibiotics: Ancef 2 grams    Vitals: /67        HR 70     Spo2 100%    Preliminary Report:  Using a 6 Tanzanian sheath to the right groin under general sedation via left vertebral artery, left internal carotid artery, left external carotid artery, right vertebral artery, right internal carotid artery, right external carotid artery a selective cerebral angiography was performed and demonstrated   Patient tolerated procedure well, hemodynamically stable, no change in neurological status compared to baseline.  Results discussed with neuro ICU, patient and patient's sister. Right groin sheath was removed, manual compression held to hemostasis for  20 minutes, no active bleeding, no hematoma, quick clot and safeguard balloon dressing applied at _____h. Interventional Neuro- Radiology   Procedure Note PA-C    Procedure: Selective Cerebral Angiography and embolization of AVM with [place] #18  Pre- Procedure Diagnosis: AVM  Post- Procedure Diagnosis: vermian AVM     : Dr Cory Valderrama  Fellow:    Dr Elizabeth Mariscal  Resident: Dr Peter Quintero   Physician Assistant: Radha Simeon PA-C    Nurse:                      Pineda Mcdonald   Radiologic Tech:      Catherine Colorado LRT     Anesthesiologist:      Dr Domenico Aaron   Sheath:                    6 Turkmen arrow     I/Os: EBL less than 10cc  IV fluids:     cc  Urine output     cc   Contrast Omnipaque 240        baseline           Antibiotics: Ancef 2 grams           Nicardipine 3mg left vertebral 0934am     Jermaine#18 1.0cc 1st pedicle           Jermaine#18 1.0cc 2nd pedicle     Vitals: /67        HR 70        Spo2 100%    Preliminary Report:  Using a 6 Turkmen sheath to the right groin under general sedation via left vertebral artery, left internal carotid artery, left external carotid artery, right vertebral artery, right internal carotid artery, right external carotid artery a selective cerebral angiography was performed and demonstrated a vermian AVM.    Patient tolerated procedure well, hemodynamically stable, no change in neurological status compared to baseline. Results discussed with neuro ICU, patient and patient's sister. Right groin sheath was exchanged for 5 Turkmen arrow 45cm sheath, which was sutured in right groin. No active bleeding, no hematoma, quick clot and safeguard balloon dressing applied at 10:30am. Interventional Neuro- Radiology   Procedure Note PA-C    Procedure: Selective Cerebral Angiography and embolization of AVM with [place] #18  Pre- Procedure Diagnosis: AVM  Post- Procedure Diagnosis: vermian AVM     : Dr Cory Valderrama  Fellow:    Dr Elizabeth Mariscal  Resident: Dr Peter Quintero   Physician Assistant: JUSTICE Medel-C    Nurse:                      Pineda Mcdonald   Radiologic Tech:      Catherine Colorado LRT     Anesthesiologist:      Dr Domenico Aaron   Sheath:                    6 Belizean arrow     I/Os: EBL less than 10cc  IV fluids:900cc  Urine output 1300cc   Contrast 240 120cc   EVD 25cc     baseline           Antibiotics: Ancef 2 grams           Nicardipine 3mg left vertebral 0934am     Jermaine#18 1.0cc 1st pedicle           [place]#18 1.0cc 2nd pedicle     Vitals: /67        HR 70         Spo2 100%    Preliminary Report:  Using a 6 Belizean sheath to the right groin under general sedation via left vertebral artery, left internal carotid artery, left external carotid artery, right vertebral artery, right internal carotid artery, right external carotid artery a selective cerebral angiography was performed and demonstrated a vermian AVM. Successful embolization with [place], patient will go to OR today.  Patient tolerated procedure well, hemodynamically stable, no change in neurological status compared to baseline. Results discussed with neuro ICU, patient and patient's sister. Right groin sheath was exchanged for 5 Belizean arrow 45cm sheath, which was sutured in right groin. No active bleeding, no hematoma, quick clot and safeguard balloon dressing applied at 10:30am. Interventional Neuro- Radiology   Procedure Note PA-C    Procedure: Selective Cerebral Angiography and embolization of AVM with [place] #18  Pre- Procedure Diagnosis: AVM  Post- Procedure Diagnosis: vermian AVM     : Dr Cory Valderrama  Fellow:    Dr Elizabeth Mariscal  Resident: Dr Peter Quintero   Physician Assistant: Radha Simeon PA-C    Nurse:                      Pineda Mcdonald   Radiologic Tech:      Catherine Colorado LRT     Anesthesiologist:      Dr Domenico Aaron   Sheath:                    6 Zimbabwean arrow     I/Os: EBL less than 10cc  IV fluids:900cc  Urine output 1300cc  Contrast 240 120cc   EVD 25cc     baseline                         Final           Antibiotics: Ancef 2 grams           Nicardipine 3mg left vertebral 0934am     [place]#18 1.0cc 1st pedicle           Jermaine#18 1.0cc 2nd pedicle     Vitals: /67        HR 70         Spo2 100%    Preliminary Report:  Using a 6 Zimbabwean sheath to the right groin under general sedation via left vertebral artery, left internal carotid artery, left external carotid artery, right vertebral artery, right internal carotid artery, right external carotid artery a selective cerebral angiography was performed and demonstrated a vermian AVM. Successful embolization with [place], patient will go to OR today, and an intra-op angiography is planned.  Patient tolerated procedure well, hemodynamically stable, no change in neurological status compared to baseline. Results discussed with neuro ICU PA, patient and patient's sister. Right groin sheath was exchanged for 5 Zimbabwean arrow 45cm sheath, which was sutured in right groin. No active bleeding, no hematoma, quick clot and safeguard balloon dressing applied at 10:30am. Interventional Neuro- Radiology   Procedure Note PA-C    Procedure: Selective Cerebral Angiography and embolization of AVM with [place] #18  Pre- Procedure Diagnosis: AVM  Post- Procedure Diagnosis: near total embolization of vermian AVM     : Dr Cory Valderrama  Fellow:    Dr Elizabeth Mariscal  Resident: Dr Peter Quintero   Physician Assistant: WEI MedelC    Nurse:                      Pineda Mcdonald   Radiologic Tech:      Catherine [place] LRT     Anesthesiologist:      Dr Domenico Aaron   Sheath:                    6 Mongolian arrow     I/Os: EBL less than 10cc  IV fluids:900cc  Urine output 1300cc  Contrast 240 120cc   EVD 25cc     baseline                         Final           Antibiotics: Ancef 2 grams           Nicardipine 3mg left vertebral 0934am     [place]#18 1.0cc 1st pedicle           [place]#18 1.0cc 2nd pedicle     Vitals: /67        HR 70         Spo2 100%    Preliminary Report:  Using a 6 Mongolian sheath to the right groin under general sedation via left vertebral artery, left internal carotid artery, left external carotid artery, right vertebral artery, right internal carotid artery, right external carotid artery a selective cerebral angiography was performed and demonstrated a vermian AVM. Successful embolization with [place], patient will go to OR today, and an intra-op angiography is planned.  Patient tolerated procedure well, hemodynamically stable, no change in neurological status compared to baseline. Results discussed with neuro ICU PA, patient and patient's sister. Right groin sheath was exchanged for 5 Mongolian arrow 45cm sheath, which was sutured in right groin. No active bleeding, no hematoma, quick clot and safeguard balloon dressing applied at 10:30am. Interventional Neuro- Radiology   Procedure Note PA-C    Procedure: Selective Cerebral Angiography and embolization of AVM with [place] #18  Pre- Procedure Diagnosis: AVM  Post- Procedure Diagnosis: near total embolization of vermian AVM     : Dr Cory Valderrama  Fellow:    Dr Elizabeth Mariscal  Resident: Dr Peter Quintero   Physician Assistant: Radha Simeon PA-C    Nurse:                      Pineda Mcdonald   Radiologic Tech:      Catherine [place] LRT     Anesthesiologist:      Dr Domenico Aaron   Sheath:                    6 Bulgarian arrow     I/Os: EBL less than 10cc  IV fluids:900cc  Urine output 1300cc  Contrast 240 120cc   EVD 25cc     baseline                         Final           Antibiotics: Ancef 2 grams           Nicardipine 3mg left vertebral 0934am     [place]#18 1.0cc 1st pedicle           [place]#18 1.0cc 2nd pedicle     Vitals: /67        HR 70         Spo2 100%    Preliminary Report:  Using a 6 Bulgarian sheath to the right groin under general sedation via left vertebral artery, left internal carotid artery, left external carotid artery, right vertebral artery, right internal carotid artery, right external carotid artery a selective cerebral angiography was performed and demonstrated a vermian AVM. Successful embolization with [place] of bilateral SCAs, patient will go to OR today, and an intra-op angiography is planned.  Patient tolerated procedure well, hemodynamically stable, no change in neurological status compared to baseline. Results discussed with neuro ICU PA, patient and patient's sister. Right groin sheath was exchanged for 5 Bulgarian arrow 45cm sheath, which was sutured in right groin. No active bleeding, no hematoma, quick clot and safeguard balloon dressing applied at 10:30am.

## 2018-12-17 NOTE — BRIEF OPERATIVE NOTE - PROCEDURE
<<-----Click on this checkbox to enter Procedure Craniotomy for AVM  12/17/2018    Active  [id number]

## 2018-12-17 NOTE — DISCHARGE NOTE ADULT - CARE PLAN
Principal Discharge DX:	AVM (arteriovenous malformation) brain  Goal:	Supportive Care / Physical Therapy / Occupational Therapy  Assessment and plan of treatment:	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  Patient S/p EVD-> Failed Clamping Trial -> S/p VPS 1/7  Head CT 1/16: Unchanged Pericatheter edema , No Hydrocephalus, No Midline shift , No new intracranial hemorrhage or infarct   Continue Physical therapy / OT  Secondary Diagnosis:	Acute respiratory failure with hypoxia  Goal:	Maintain Spo2 > 92%  Assessment and plan of treatment:	Patient S/p Tracheostomy on 12/24 ( # 8 Cuffed Shiley ), FIO2 21%  Patient tolerating TC ATC since 1/13  Trach downsized to # 7 Cuffless Portex on 1/16   Continue Chest PT / Nebulizers and Suctioning PRN  Attempt PMV when deemed medically appropriate  Secondary Diagnosis:	Autonomic hyperactivity  Secondary Diagnosis:	Anemia Principal Discharge DX:	AVM (arteriovenous malformation) brain  Goal:	Supportive Care / Physical Therapy / Occupational Therapy  Assessment and plan of treatment:	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  Patient S/p EVD-> Failed Clamping Trial -> S/p VPS 1/7  Head CT 1/16: Unchanged Pericatheter edema , No Hydrocephalus, No Midline shift , No new intracranial hemorrhage or infarct   Continue Physical therapy / OT  Secondary Diagnosis:	Acute respiratory failure with hypoxia  Goal:	Maintain Spo2 > 92%  Assessment and plan of treatment:	Patient S/p Tracheostomy on 12/24 ( # 8 Cuffed [place] ), FIO2 21%  Patient tolerating TC ATC since 1/13  Trach downsized to # 7 Cuffless Portex on 1/16   Continue Chest PT / Nebulizers and Suctioning PRN  Attempt PMV when deemed medically appropriate  Secondary Diagnosis:	Autonomic hyperactivity  Goal:	Supportive Care  Assessment and plan of treatment:	Patient with Autonomic Storming and Myoclonus   Klonopin changed to 1 mg q 12 hrs with breakthrough dose of 0.25 mg q 8 hrs prn   breakthrough myoclonus   Continue Neurontin, Low dose Fentanyl patch  Low dose Labetalol 50 mg q 12 hr added prior to discharge  Secondary Diagnosis:	Anemia  Assessment and plan of treatment:	Patient required PRBCS during admission, cbc stable prior to dc   Hgb / Hct on 1/22: 10.8/ 33.0  Secondary Diagnosis:	Fever, unspecified fever cause  Goal:	Continue supportive care  Assessment and plan of treatment:	Patients with intermittent low grade fevers likely central in nature as per ID   Patient previously txd with Vanco and Cefepime for MRSA / E.Coli PNA  Bld CX 1/19: No growth , UA 1/8: Negative, CXR 1/8: Clear Lungs   Continue to monitor off abx at present time. Principal Discharge DX:	AVM (arteriovenous malformation) brain  Goal:	Supportive Care / Physical Therapy / Occupational Therapy  Assessment and plan of treatment:	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  Patient S/p EVD-> Failed Clamping Trial -> S/p VPS 1/7  Head CT 1/16: Unchanged Pericatheter edema , No Hydrocephalus, No Midline shift , No new intracranial hemorrhage or infarct   Continue Physical therapy / OT  Secondary Diagnosis:	Acute respiratory failure with hypoxia  Goal:	Maintain Spo2 > 92%  Assessment and plan of treatment:	Patient S/p Tracheostomy on 12/24 ( # 8 Cuffed [place] ), FIO2 21%  Patient tolerating TC ATC since 1/13  Trach downsized to # 7 Cuffless Portex on 1/16   Continue Chest PT / Nebulizers and Suctioning PRN  Attempt PMV when deemed medically appropriate  Secondary Diagnosis:	Autonomic hyperactivity  Goal:	Supportive Care  Assessment and plan of treatment:	Patient with Autonomic Storming and Myoclonus   Klonopin changed to 1 mg q 12 hrs with breakthrough dose of 0.25 mg q 8 hrs prn   breakthrough myoclonus   Continue Neurontin, Low dose Fentanyl patch  Low dose Labetalol 50 mg q 12 hr added prior to discharge  Secondary Diagnosis:	Anemia  Assessment and plan of treatment:	Patient required PRBCS during admission, cbc stable prior to dc   Hgb / Hct on 1/22: 10.8/ 33.0  Secondary Diagnosis:	Fever, unspecified fever cause  Goal:	Continue supportive care  Assessment and plan of treatment:	Patients with intermittent low grade fevers likely central in nature as per ID   Patient previously treated with Vanco and Cefepime for MRSA / E.Coli PNA  Bld CX 1/19: No growth , UA 1/8: Negative, CXR 1/8: Clear Lungs   Continue to monitor off abx at present time.

## 2018-12-17 NOTE — DISCHARGE NOTE ADULT - HOSPITAL COURSE
58 Year Old woman PMH Lupus Presented with Sudden onset of Severe Headache with associated nausea and vomiting. 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 Patient was intact on presentation however intubated for airway protection and EVD placed for hydrocephalus. Patient was Transferred to [facility] for selective IR cerebral angiography. 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 Patient returned to the OR on 12/18 for sub occipital decompressive craniectomy. Patient was initially on Vimpat for suspected Seizure like activity, but VEEG while in the NSCU, without evidence of Seizures. Vimpat was discontinued, patient placed on Clonazepam for myoclonus. Patient was noted to have moderate diffuse encephalopathy and dysfxn in frontal regions bilaterally on VEEG. Patient underwent Trach Placement on 12/24 ( # 8 Cuffed Shiley), and PEG Placement on 12/26. While in the NSCU Patient with Persistent Fevers and Autonomic Storming, Patient treated with medical management and Artic Sun Protocol. Patient found to MRSA/ E.coli growing in the sputum and was treated with Vanco and Cefepime. 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 Patient remained with persistent fevers despite course of broad spectrum abx lD felt fevers Central in nature. Patient required blood transfusion while in NSCU. Patient failed clamping trial and required VPS, which was Placed on 1/7. Patient weaned to TC ATC, Trach downsized to # 7 Cuffless Portex on 1/16. 58 Year Old woman PMH Lupus Presented with Sudden onset of Severe Headache with associated nausea and vomiting. 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 Patient was intact on presentation however intubated for airway protection and EVD placed for hydrocephalus. Patient was Transferred to [facility] for selective IR cerebral angiography. 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 Patient returned to the OR on 12/18 for sub occipital decompressive craniectomy. Patient was initially on Vimpat for suspected Seizure like activity, but VEEG while in the NSCU, without evidence of Seizures. Vimpat was discontinued, patient placed on Clonazepam for myoclonus. Patient was noted to have moderate diffuse encephalopathy and dysfxn in frontal regions bilaterally on VEEG. Patient underwent Trach Placement on 12/24 ( # 8 Cuffed Shiley), and PEG Placement on 12/26. While in the NSCU Patient with Persistent Fevers and Autonomic Storming, Patient treated with medical management and Artic Sun Protocol. Patient found to MRSA/ E.coli growing in the sputum and was treated with Vanco and Cefepime. 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 Patient remained with persistent fevers despite course of broad spectrum abx lD felt fevers Central in nature. Patient required blood transfusion while in NSCU. Patient failed clamping trial and required VPS, which was Placed on 1/7. Patient weaned to TC ATC, Trach downsized to # 7 Cuffless Portex on 1/16.  She remains medically stable for discharge to rehab facility.

## 2018-12-18 LAB
ANION GAP SERPL CALC-SCNC: 13 MMOL/L — SIGNIFICANT CHANGE UP (ref 5–17)
ANION GAP SERPL CALC-SCNC: 14 MMOL/L — SIGNIFICANT CHANGE UP (ref 5–17)
ANION GAP SERPL CALC-SCNC: 16 MMOL/L — SIGNIFICANT CHANGE UP (ref 5–17)
APPEARANCE CSF: ABNORMAL
APPEARANCE UR: CLEAR — SIGNIFICANT CHANGE UP
BASE EXCESS BLDA CALC-SCNC: -1.2 MMOL/L — SIGNIFICANT CHANGE UP (ref -2–2)
BILIRUB UR-MCNC: NEGATIVE — SIGNIFICANT CHANGE UP
BUN SERPL-MCNC: 12 MG/DL — SIGNIFICANT CHANGE UP (ref 7–23)
BUN SERPL-MCNC: 7 MG/DL — SIGNIFICANT CHANGE UP (ref 7–23)
BUN SERPL-MCNC: 8 MG/DL — SIGNIFICANT CHANGE UP (ref 7–23)
CALCIUM SERPL-MCNC: 8 MG/DL — LOW (ref 8.4–10.5)
CALCIUM SERPL-MCNC: 8.2 MG/DL — LOW (ref 8.4–10.5)
CALCIUM SERPL-MCNC: 8.6 MG/DL — SIGNIFICANT CHANGE UP (ref 8.4–10.5)
CHLORIDE SERPL-SCNC: 115 MMOL/L — HIGH (ref 96–108)
CHLORIDE SERPL-SCNC: 115 MMOL/L — HIGH (ref 96–108)
CHLORIDE SERPL-SCNC: 117 MMOL/L — HIGH (ref 96–108)
CO2 BLDA-SCNC: 23 MMOL/L — SIGNIFICANT CHANGE UP (ref 22–30)
CO2 SERPL-SCNC: 19 MMOL/L — LOW (ref 22–31)
CO2 SERPL-SCNC: 20 MMOL/L — LOW (ref 22–31)
CO2 SERPL-SCNC: 21 MMOL/L — LOW (ref 22–31)
COLOR CSF: ABNORMAL
COLOR SPEC: SIGNIFICANT CHANGE UP
CREAT SERPL-MCNC: 0.78 MG/DL — SIGNIFICANT CHANGE UP (ref 0.5–1.3)
CREAT SERPL-MCNC: 0.78 MG/DL — SIGNIFICANT CHANGE UP (ref 0.5–1.3)
CREAT SERPL-MCNC: 0.85 MG/DL — SIGNIFICANT CHANGE UP (ref 0.5–1.3)
DIFF PNL FLD: ABNORMAL
GAS PNL BLDA: SIGNIFICANT CHANGE UP
GLUCOSE CSF-MCNC: 99 MG/DL — HIGH (ref 40–70)
GLUCOSE SERPL-MCNC: 174 MG/DL — HIGH (ref 70–99)
GLUCOSE SERPL-MCNC: 180 MG/DL — HIGH (ref 70–99)
GLUCOSE SERPL-MCNC: 190 MG/DL — HIGH (ref 70–99)
GLUCOSE UR QL: ABNORMAL
GRAM STN FLD: SIGNIFICANT CHANGE UP
HCO3 BLDA-SCNC: 22 MMOL/L — SIGNIFICANT CHANGE UP (ref 21–29)
HCT VFR BLD CALC: 39.1 % — SIGNIFICANT CHANGE UP (ref 34.5–45)
HGB BLD-MCNC: 14 G/DL — SIGNIFICANT CHANGE UP (ref 11.5–15.5)
HOROWITZ INDEX BLDA+IHG-RTO: 40 — SIGNIFICANT CHANGE UP
KETONES UR-MCNC: NEGATIVE — SIGNIFICANT CHANGE UP
LACTATE CSF-MCNC: 5.5 MMOL/L — HIGH (ref 1.1–2.4)
LEUKOCYTE ESTERASE UR-ACNC: NEGATIVE — SIGNIFICANT CHANGE UP
LYMPHOCYTES # CSF: 15 % — LOW (ref 40–80)
MAGNESIUM SERPL-MCNC: 2 MG/DL — SIGNIFICANT CHANGE UP (ref 1.6–2.6)
MAGNESIUM SERPL-MCNC: 2 MG/DL — SIGNIFICANT CHANGE UP (ref 1.6–2.6)
MCHC RBC-ENTMCNC: 30.3 PG — SIGNIFICANT CHANGE UP (ref 27–34)
MCHC RBC-ENTMCNC: 35.8 GM/DL — SIGNIFICANT CHANGE UP (ref 32–36)
MCV RBC AUTO: 84.7 FL — SIGNIFICANT CHANGE UP (ref 80–100)
NEUTROPHILS # CSF: 85 % — HIGH (ref 0–6)
NITRITE UR-MCNC: NEGATIVE — SIGNIFICANT CHANGE UP
NRBC NFR CSF: 97 /UL — HIGH (ref 0–5)
PCO2 BLDA: 34 MMHG — SIGNIFICANT CHANGE UP (ref 32–46)
PH BLDA: 7.42 — SIGNIFICANT CHANGE UP (ref 7.35–7.45)
PH UR: 5.5 — SIGNIFICANT CHANGE UP (ref 5–8)
PHOSPHATE SERPL-MCNC: 1.9 MG/DL — LOW (ref 2.5–4.5)
PHOSPHATE SERPL-MCNC: 2.2 MG/DL — LOW (ref 2.5–4.5)
PLATELET # BLD AUTO: 185 K/UL — SIGNIFICANT CHANGE UP (ref 150–400)
PO2 BLDA: 135 MMHG — HIGH (ref 74–108)
POTASSIUM SERPL-MCNC: 3.2 MMOL/L — LOW (ref 3.5–5.3)
POTASSIUM SERPL-MCNC: 3.3 MMOL/L — LOW (ref 3.5–5.3)
POTASSIUM SERPL-MCNC: 3.4 MMOL/L — LOW (ref 3.5–5.3)
POTASSIUM SERPL-SCNC: 3.2 MMOL/L — LOW (ref 3.5–5.3)
POTASSIUM SERPL-SCNC: 3.3 MMOL/L — LOW (ref 3.5–5.3)
POTASSIUM SERPL-SCNC: 3.4 MMOL/L — LOW (ref 3.5–5.3)
PROT CSF-MCNC: 155 MG/DL — HIGH (ref 15–45)
PROT UR-MCNC: SIGNIFICANT CHANGE UP
RBC # BLD: 4.62 M/UL — SIGNIFICANT CHANGE UP (ref 3.8–5.2)
RBC # CSF: HIGH /UL (ref 0–0)
RBC # FLD: 12.8 % — SIGNIFICANT CHANGE UP (ref 10.3–14.5)
SAO2 % BLDA: 99 % — HIGH (ref 92–96)
SODIUM SERPL-SCNC: 148 MMOL/L — HIGH (ref 135–145)
SODIUM SERPL-SCNC: 150 MMOL/L — HIGH (ref 135–145)
SODIUM SERPL-SCNC: 152 MMOL/L — HIGH (ref 135–145)
SP GR SPEC: 1.03 — HIGH (ref 1.01–1.02)
SPECIMEN SOURCE: SIGNIFICANT CHANGE UP
TUBE TYPE: SIGNIFICANT CHANGE UP
UROBILINOGEN FLD QL: NEGATIVE — SIGNIFICANT CHANGE UP
WBC # BLD: 10.6 K/UL — HIGH (ref 3.8–10.5)
WBC # FLD AUTO: 10.6 K/UL — HIGH (ref 3.8–10.5)

## 2018-12-18 PROCEDURE — 61070 BRAIN CANAL SHUNT PROCEDURE: CPT

## 2018-12-18 PROCEDURE — 71045 X-RAY EXAM CHEST 1 VIEW: CPT | Mod: 26

## 2018-12-18 PROCEDURE — 70450 CT HEAD/BRAIN W/O DYE: CPT | Mod: 26,77

## 2018-12-18 PROCEDURE — 99292 CRITICAL CARE ADDL 30 MIN: CPT

## 2018-12-18 PROCEDURE — 95951: CPT | Mod: 26,52

## 2018-12-18 PROCEDURE — 99291 CRITICAL CARE FIRST HOUR: CPT

## 2018-12-18 PROCEDURE — 70450 CT HEAD/BRAIN W/O DYE: CPT | Mod: 26

## 2018-12-18 PROCEDURE — 95819 EEG AWAKE AND ASLEEP: CPT | Mod: 26

## 2018-12-18 PROCEDURE — 61345 OTH CRANIAL DCMPRN PST FOSSA: CPT | Mod: 79

## 2018-12-18 RX ORDER — CHLORHEXIDINE GLUCONATE 213 G/1000ML
1 SOLUTION TOPICAL
Qty: 0 | Refills: 0 | Status: DISCONTINUED | OUTPATIENT
Start: 2018-12-18 | End: 2018-12-18

## 2018-12-18 RX ORDER — POTASSIUM CHLORIDE 20 MEQ
10 PACKET (EA) ORAL
Qty: 0 | Refills: 0 | Status: COMPLETED | OUTPATIENT
Start: 2018-12-18 | End: 2018-12-18

## 2018-12-18 RX ORDER — DEXAMETHASONE 0.5 MG/5ML
4 ELIXIR ORAL EVERY 6 HOURS
Qty: 0 | Refills: 0 | Status: DISCONTINUED | OUTPATIENT
Start: 2018-12-18 | End: 2018-12-21

## 2018-12-18 RX ORDER — ACETAMINOPHEN 500 MG
1000 TABLET ORAL ONCE
Qty: 0 | Refills: 0 | Status: COMPLETED | OUTPATIENT
Start: 2018-12-18 | End: 2018-12-18

## 2018-12-18 RX ORDER — NICARDIPINE HYDROCHLORIDE 30 MG/1
5 CAPSULE, EXTENDED RELEASE ORAL
Qty: 40 | Refills: 0 | Status: DISCONTINUED | OUTPATIENT
Start: 2018-12-18 | End: 2018-12-20

## 2018-12-18 RX ORDER — LACOSAMIDE 50 MG/1
100 TABLET ORAL EVERY 12 HOURS
Qty: 0 | Refills: 0 | Status: DISCONTINUED | OUTPATIENT
Start: 2018-12-18 | End: 2018-12-21

## 2018-12-18 RX ORDER — SODIUM CHLORIDE 5 G/100ML
1000 INJECTION, SOLUTION INTRAVENOUS
Qty: 0 | Refills: 0 | Status: DISCONTINUED | OUTPATIENT
Start: 2018-12-18 | End: 2018-12-18

## 2018-12-18 RX ORDER — SENNA PLUS 8.6 MG/1
5 TABLET ORAL AT BEDTIME
Qty: 0 | Refills: 0 | Status: DISCONTINUED | OUTPATIENT
Start: 2018-12-18 | End: 2019-01-03

## 2018-12-18 RX ORDER — PROPOFOL 10 MG/ML
5 INJECTION, EMULSION INTRAVENOUS
Qty: 500 | Refills: 0 | Status: DISCONTINUED | OUTPATIENT
Start: 2018-12-18 | End: 2018-12-18

## 2018-12-18 RX ORDER — POTASSIUM CHLORIDE 20 MEQ
10 PACKET (EA) ORAL
Qty: 0 | Refills: 0 | Status: DISCONTINUED | OUTPATIENT
Start: 2018-12-18 | End: 2018-12-18

## 2018-12-18 RX ORDER — PANTOPRAZOLE SODIUM 20 MG/1
40 TABLET, DELAYED RELEASE ORAL DAILY
Qty: 0 | Refills: 0 | Status: DISCONTINUED | OUTPATIENT
Start: 2018-12-18 | End: 2018-12-21

## 2018-12-18 RX ORDER — DEXAMETHASONE 0.5 MG/5ML
4 ELIXIR ORAL EVERY 6 HOURS
Qty: 0 | Refills: 0 | Status: DISCONTINUED | OUTPATIENT
Start: 2018-12-18 | End: 2018-12-18

## 2018-12-18 RX ORDER — PROPOFOL 10 MG/ML
5 INJECTION, EMULSION INTRAVENOUS
Qty: 500 | Refills: 0 | Status: DISCONTINUED | OUTPATIENT
Start: 2018-12-18 | End: 2018-12-19

## 2018-12-18 RX ORDER — CEFAZOLIN SODIUM 1 G
2000 VIAL (EA) INJECTION EVERY 8 HOURS
Qty: 0 | Refills: 0 | Status: COMPLETED | OUTPATIENT
Start: 2018-12-18 | End: 2018-12-19

## 2018-12-18 RX ORDER — DEXTROSE MONOHYDRATE, SODIUM CHLORIDE, AND POTASSIUM CHLORIDE 50; .745; 4.5 G/1000ML; G/1000ML; G/1000ML
1000 INJECTION, SOLUTION INTRAVENOUS
Qty: 0 | Refills: 0 | Status: DISCONTINUED | OUTPATIENT
Start: 2018-12-18 | End: 2018-12-20

## 2018-12-18 RX ORDER — LACOSAMIDE 50 MG/1
100 TABLET ORAL
Qty: 0 | Refills: 0 | Status: DISCONTINUED | OUTPATIENT
Start: 2018-12-18 | End: 2018-12-18

## 2018-12-18 RX ORDER — POTASSIUM PHOSPHATE, MONOBASIC POTASSIUM PHOSPHATE, DIBASIC 236; 224 MG/ML; MG/ML
15 INJECTION, SOLUTION INTRAVENOUS ONCE
Qty: 0 | Refills: 0 | Status: COMPLETED | OUTPATIENT
Start: 2018-12-18 | End: 2018-12-18

## 2018-12-18 RX ORDER — ENOXAPARIN SODIUM 100 MG/ML
40 INJECTION SUBCUTANEOUS
Qty: 0 | Refills: 0 | Status: DISCONTINUED | OUTPATIENT
Start: 2018-12-18 | End: 2018-12-18

## 2018-12-18 RX ORDER — ACETAMINOPHEN 500 MG
650 TABLET ORAL EVERY 6 HOURS
Qty: 0 | Refills: 0 | Status: DISCONTINUED | OUTPATIENT
Start: 2018-12-18 | End: 2018-12-18

## 2018-12-18 RX ORDER — LACOSAMIDE 50 MG/1
100 TABLET ORAL EVERY 12 HOURS
Qty: 0 | Refills: 0 | Status: DISCONTINUED | OUTPATIENT
Start: 2018-12-18 | End: 2018-12-18

## 2018-12-18 RX ORDER — CHLORHEXIDINE GLUCONATE 213 G/1000ML
15 SOLUTION TOPICAL
Qty: 0 | Refills: 0 | Status: DISCONTINUED | OUTPATIENT
Start: 2018-12-18 | End: 2018-12-18

## 2018-12-18 RX ORDER — ACETAMINOPHEN 500 MG
650 TABLET ORAL EVERY 6 HOURS
Qty: 0 | Refills: 0 | Status: DISCONTINUED | OUTPATIENT
Start: 2018-12-18 | End: 2018-12-20

## 2018-12-18 RX ORDER — SODIUM CHLORIDE 9 MG/ML
1000 INJECTION, SOLUTION INTRAVENOUS
Qty: 0 | Refills: 0 | Status: DISCONTINUED | OUTPATIENT
Start: 2018-12-18 | End: 2018-12-18

## 2018-12-18 RX ORDER — POTASSIUM CHLORIDE 20 MEQ
40 PACKET (EA) ORAL ONCE
Qty: 0 | Refills: 0 | Status: COMPLETED | OUTPATIENT
Start: 2018-12-18 | End: 2018-12-18

## 2018-12-18 RX ADMIN — Medication 100 MILLIGRAM(S): at 17:42

## 2018-12-18 RX ADMIN — LACOSAMIDE 120 MILLIGRAM(S): 50 TABLET ORAL at 11:52

## 2018-12-18 RX ADMIN — Medication 2 MILLIGRAM(S): at 11:28

## 2018-12-18 RX ADMIN — Medication 650 MILLIGRAM(S): at 17:00

## 2018-12-18 RX ADMIN — Medication 40 MILLIEQUIVALENT(S): at 17:47

## 2018-12-18 RX ADMIN — POTASSIUM PHOSPHATE, MONOBASIC POTASSIUM PHOSPHATE, DIBASIC 62.5 MILLIMOLE(S): 236; 224 INJECTION, SOLUTION INTRAVENOUS at 04:23

## 2018-12-18 RX ADMIN — Medication 650 MILLIGRAM(S): at 16:45

## 2018-12-18 RX ADMIN — SODIUM CHLORIDE 75 MILLILITER(S): 5 INJECTION, SOLUTION INTRAVENOUS at 11:05

## 2018-12-18 RX ADMIN — Medication 1000 MILLIGRAM(S): at 13:46

## 2018-12-18 RX ADMIN — POTASSIUM PHOSPHATE, MONOBASIC POTASSIUM PHOSPHATE, DIBASIC 62.5 MILLIMOLE(S): 236; 224 INJECTION, SOLUTION INTRAVENOUS at 17:47

## 2018-12-18 RX ADMIN — Medication 100 MILLIEQUIVALENT(S): at 07:12

## 2018-12-18 RX ADMIN — FENTANYL CITRATE 25 MICROGRAM(S): 50 INJECTION INTRAVENOUS at 21:40

## 2018-12-18 RX ADMIN — Medication 100 MILLIEQUIVALENT(S): at 08:00

## 2018-12-18 RX ADMIN — PANTOPRAZOLE SODIUM 40 MILLIGRAM(S): 20 TABLET, DELAYED RELEASE ORAL at 11:09

## 2018-12-18 RX ADMIN — Medication 100 MILLIEQUIVALENT(S): at 06:00

## 2018-12-18 RX ADMIN — FENTANYL CITRATE 25 MICROGRAM(S): 50 INJECTION INTRAVENOUS at 22:10

## 2018-12-18 RX ADMIN — NICARDIPINE HYDROCHLORIDE 12 MG/HR: 30 CAPSULE, EXTENDED RELEASE ORAL at 07:00

## 2018-12-18 RX ADMIN — ENOXAPARIN SODIUM 40 MILLIGRAM(S): 100 INJECTION SUBCUTANEOUS at 17:42

## 2018-12-18 RX ADMIN — Medication 400 MILLIGRAM(S): at 13:31

## 2018-12-18 RX ADMIN — Medication 4 MILLIGRAM(S): at 18:08

## 2018-12-18 NOTE — DIETITIAN INITIAL EVALUATION ADULT. - OTHER INFO
Pt seen for length of stay on NSCU. Per sister, unsure of UBW; however, no drastic changes in weight/appearance noted. Pt seen for length of stay on NSCU. Per sister, unsure of UBW; however, no drastic changes in weight/appearance noted. Noted admit weight 185.4 pounds. Per rounds, plan to initiate EN (see recommendations below). No BM thus far. No GI distress of note. Per sister, no history of chewing/swallowing difficulties PTA

## 2018-12-18 NOTE — BRIEF OPERATIVE NOTE - PROCEDURE
<<-----Click on this checkbox to enter Procedure Cranial decompression of posterior fossa  12/18/2018    Active  [id number]

## 2018-12-18 NOTE — PROGRESS NOTE ADULT - SUBJECTIVE AND OBJECTIVE BOX
No command following throughout day. CT during day revealed posterior fossa edema so started on hypertonic saline. CT this evening with tight posterior fossa, so plan for OR for SOC craniectomy.    INtubated, eye opening with noxious stimulus, no command following, No command following throughout day. CT during day revealed posterior fossa edema so started on hypertonic saline. CT this evening with tight posterior fossa, so plan for OR for SOC craniectomy.    Intubated, eye opening with noxious stimulus, no command following, trace movement in all limbs to noxious stimulus.

## 2018-12-18 NOTE — PROGRESS NOTE ADULT - SUBJECTIVE AND OBJECTIVE BOX
Visit Summary: Patient seen and evaluated at bedside.    Overnight Events: none    Exam:  T(C): 38.6 (12-18-18 @ 03:00), Max: 38.6 (12-18-18 @ 03:00)  HR: 90 (12-18-18 @ 04:10) (52 - 103)  BP: 129/74 (12-17-18 @ 07:00) (124/77 - 130/73)  RR: 23 (12-18-18 @ 04:00) (7 - 24)  SpO2: 100% (12-18-18 @ 04:10) (94% - 100%)  Wt(kg): --    Intuabted, on prop, weaning prop  PERRL, +corneals / cough / gag  Trace movemetn x4                        14.0   10.6  )-----------( 185      ( 18 Dec 2018 01:58 )             39.1     12-18    150<H>  |  115<H>  |  7   ----------------------------<  174<H>  3.3<L>   |  19<L>  |  0.78    Ca    8.6      18 Dec 2018 01:58  Phos  1.9     12-18  Mg     2.0     12-18    TPro  7.4  /  Alb  3.9  /  TBili  0.3  /  DBili  <0.1  /  AST  52<H>  /  ALT  35  /  AlkPhos  83  12-17  PT/INR - ( 17 Dec 2018 01:07 )   PT: 12.0 sec;   INR: 1.04 ratio         PTT - ( 17 Dec 2018 01:07 )  PTT:23.1 sec

## 2018-12-18 NOTE — PROGRESS NOTE ADULT - ASSESSMENT
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

## 2018-12-18 NOTE — DIETITIAN INITIAL EVALUATION ADULT. - PT NOT SOURCE
intubated/Sister at bedside able to provide some subjective information; however, sister lives in [place] and sees pt infrequently

## 2018-12-18 NOTE — DIETITIAN INITIAL EVALUATION ADULT. - PERTINENT LABORATORY DATA
(12/18) Na 148H, K 3.2L (S/P repletion), Cl 115H, CO2 20L, BG 190H, Ca 8.0L, Phos 1.8L;(12/17) HgbA1c 5.4%, HDL 49L

## 2018-12-18 NOTE — DIETITIAN INITIAL EVALUATION ADULT. - ADHERENCE
Per sister, pt was trying to lose weight PTA but unable to confirm any dietary changes. Reports NKFA. Unaware of micronutrient supplementation PTA, none noted per chart PTA

## 2018-12-18 NOTE — PROGRESS NOTE ADULT - SUBJECTIVE AND OBJECTIVE BOX
57yo woman PMH lupus p/w sudden onset severe HA a/w nausea and vomiting. 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 Patient was intact on presentation however intubated for airway protection and EVD placed. Transferred to [facility] for further care.     Admission ICH score 2    EVENTS: possible siezure - given 2 mg Ativan     VITALS:  Reviewed   LABS:  Reviewed  MEDICATIONS: Reviewed  IMAGING:  Recent imaging studies were reviewed.    EXAM:  intubated  EO to noxious  PERRL  2 fingers on RUE, wiggles toes b/l  LUE antigravity/localizes  rrr no m/r/g  clear lungs  soft abd/nd  wwp ext

## 2018-12-18 NOTE — DIETITIAN INITIAL EVALUATION ADULT. - NS AS NUTRI INTERV ENTERAL NUTRITION
If patient should require alternate means of nutrition support, recommend Pivot 1.5 @ 70 cc/hr x 18 hrs to provide 1260cc fluid, 1890 hank/d (22 hank/Kg), and 118 gm prot/d (1.4 gm prot/Kg) based upon dosing weight 84.1 Kg

## 2018-12-18 NOTE — PROGRESS NOTE ADULT - ASSESSMENT
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 - s/p successful ANGIO/EMBO  ICH score 2    Neuro:  EVD at [id number]  CT Head today   s/p 2 mg Ativan, start Vimpat and VEEG monitoring   Safe guard - remove after 6 hrs   Na 140-150  sedation for RASS 0 to -2 on precedex  bedrest    Cards:   -160, Cardene PRN   TTE    Pulm:  PRVC    GI: start TF     Renal:  IVF  Na 140-150    Endo:  goal euglycemia    Heme:  SCDs  start DVT ppx tonight     ID:  monitor for fevers    ICU  full code    at risk for deterioration and death due to AVM rebleed, hydrocephalus, cerebral edema, brain compression  critical care time 45min

## 2018-12-18 NOTE — PROGRESS NOTE ADULT - ASSESSMENT
58F here with cerebellar hemorrhage found to have cerebellar AVM s.p angio embolization and resection  - wean sedation, q1hr neurochecks  - CTH in AM  - EVD at 10 Normal for race

## 2018-12-18 NOTE — DIETITIAN INITIAL EVALUATION ADULT. - ENERGY NEEDS
Ht 63 inches Wt 185.4 pounds BMI 32.8 Kg/m^2   pounds +/- 10%; 161% IBW  Edema: none Skin: no pressure injuries per nursing flow sheet    Other pertinent information: 59 yo female with PMH of lupus admitted with headache, N/V and found with cerebellar IPH with hydrocephalus S/P EVD placement. Now S/P angio + embolization and S/P craniotomy for resection of AVM on 12/17. Currently intubated, concern for seizures per rounds

## 2018-12-19 LAB
ALBUMIN SERPL ELPH-MCNC: 3.4 G/DL — SIGNIFICANT CHANGE UP (ref 3.3–5)
ALP SERPL-CCNC: 66 U/L — SIGNIFICANT CHANGE UP (ref 40–120)
ALT FLD-CCNC: 26 U/L — SIGNIFICANT CHANGE UP (ref 10–45)
ANION GAP SERPL CALC-SCNC: 11 MMOL/L — SIGNIFICANT CHANGE UP (ref 5–17)
ANION GAP SERPL CALC-SCNC: 12 MMOL/L — SIGNIFICANT CHANGE UP (ref 5–17)
ANION GAP SERPL CALC-SCNC: 12 MMOL/L — SIGNIFICANT CHANGE UP (ref 5–17)
AST SERPL-CCNC: 30 U/L — SIGNIFICANT CHANGE UP (ref 10–40)
BASE EXCESS BLDA CALC-SCNC: -0.9 MMOL/L — SIGNIFICANT CHANGE UP (ref -2–2)
BASE EXCESS BLDA CALC-SCNC: 3.5 MMOL/L — HIGH (ref -2–2)
BILIRUB SERPL-MCNC: 0.4 MG/DL — SIGNIFICANT CHANGE UP (ref 0.2–1.2)
BUN SERPL-MCNC: 11 MG/DL — SIGNIFICANT CHANGE UP (ref 7–23)
BUN SERPL-MCNC: 13 MG/DL — SIGNIFICANT CHANGE UP (ref 7–23)
BUN SERPL-MCNC: 9 MG/DL — SIGNIFICANT CHANGE UP (ref 7–23)
CALCIUM SERPL-MCNC: 7.8 MG/DL — LOW (ref 8.4–10.5)
CALCIUM SERPL-MCNC: 8.3 MG/DL — LOW (ref 8.4–10.5)
CALCIUM SERPL-MCNC: 8.7 MG/DL — SIGNIFICANT CHANGE UP (ref 8.4–10.5)
CHLORIDE SERPL-SCNC: 120 MMOL/L — HIGH (ref 96–108)
CO2 BLDA-SCNC: 26 MMOL/L — SIGNIFICANT CHANGE UP (ref 22–30)
CO2 BLDA-SCNC: 28 MMOL/L — SIGNIFICANT CHANGE UP (ref 22–30)
CO2 SERPL-SCNC: 21 MMOL/L — LOW (ref 22–31)
CO2 SERPL-SCNC: 23 MMOL/L — SIGNIFICANT CHANGE UP (ref 22–31)
CO2 SERPL-SCNC: 24 MMOL/L — SIGNIFICANT CHANGE UP (ref 22–31)
CREAT SERPL-MCNC: 0.62 MG/DL — SIGNIFICANT CHANGE UP (ref 0.5–1.3)
CREAT SERPL-MCNC: 0.64 MG/DL — SIGNIFICANT CHANGE UP (ref 0.5–1.3)
CREAT SERPL-MCNC: 0.66 MG/DL — SIGNIFICANT CHANGE UP (ref 0.5–1.3)
GAS PNL BLDA: SIGNIFICANT CHANGE UP
GAS PNL BLDA: SIGNIFICANT CHANGE UP
GLUCOSE BLDC GLUCOMTR-MCNC: 134 MG/DL — HIGH (ref 70–99)
GLUCOSE BLDC GLUCOMTR-MCNC: 142 MG/DL — HIGH (ref 70–99)
GLUCOSE BLDC GLUCOMTR-MCNC: 148 MG/DL — HIGH (ref 70–99)
GLUCOSE SERPL-MCNC: 156 MG/DL — HIGH (ref 70–99)
GLUCOSE SERPL-MCNC: 159 MG/DL — HIGH (ref 70–99)
GLUCOSE SERPL-MCNC: 206 MG/DL — HIGH (ref 70–99)
HCO3 BLDA-SCNC: 24 MMOL/L — SIGNIFICANT CHANGE UP (ref 21–29)
HCO3 BLDA-SCNC: 27 MMOL/L — SIGNIFICANT CHANGE UP (ref 21–29)
HCT VFR BLD CALC: 30.7 % — LOW (ref 34.5–45)
HCT VFR BLD CALC: 31.5 % — LOW (ref 34.5–45)
HCT VFR BLD CALC: 34 % — LOW (ref 34.5–45)
HGB BLD-MCNC: 11.1 G/DL — LOW (ref 11.5–15.5)
HGB BLD-MCNC: 11.3 G/DL — LOW (ref 11.5–15.5)
HGB BLD-MCNC: 11.6 G/DL — SIGNIFICANT CHANGE UP (ref 11.5–15.5)
HOROWITZ INDEX BLDA+IHG-RTO: 40 — SIGNIFICANT CHANGE UP
HOROWITZ INDEX BLDA+IHG-RTO: 40 — SIGNIFICANT CHANGE UP
MAGNESIUM SERPL-MCNC: 2.2 MG/DL — SIGNIFICANT CHANGE UP (ref 1.6–2.6)
MAGNESIUM SERPL-MCNC: 2.3 MG/DL — SIGNIFICANT CHANGE UP (ref 1.6–2.6)
MAGNESIUM SERPL-MCNC: 2.4 MG/DL — SIGNIFICANT CHANGE UP (ref 1.6–2.6)
MCHC RBC-ENTMCNC: 29.4 PG — SIGNIFICANT CHANGE UP (ref 27–34)
MCHC RBC-ENTMCNC: 30.8 PG — SIGNIFICANT CHANGE UP (ref 27–34)
MCHC RBC-ENTMCNC: 30.9 PG — SIGNIFICANT CHANGE UP (ref 27–34)
MCHC RBC-ENTMCNC: 34.2 GM/DL — SIGNIFICANT CHANGE UP (ref 32–36)
MCHC RBC-ENTMCNC: 35.9 GM/DL — SIGNIFICANT CHANGE UP (ref 32–36)
MCHC RBC-ENTMCNC: 36 GM/DL — SIGNIFICANT CHANGE UP (ref 32–36)
MCV RBC AUTO: 85.6 FL — SIGNIFICANT CHANGE UP (ref 80–100)
MCV RBC AUTO: 85.9 FL — SIGNIFICANT CHANGE UP (ref 80–100)
MCV RBC AUTO: 86.1 FL — SIGNIFICANT CHANGE UP (ref 80–100)
PCO2 BLDA: 39 MMHG — SIGNIFICANT CHANGE UP (ref 32–46)
PCO2 BLDA: 46 MMHG — SIGNIFICANT CHANGE UP (ref 32–46)
PH BLDA: 7.34 — LOW (ref 7.35–7.45)
PH BLDA: 7.46 — HIGH (ref 7.35–7.45)
PHOSPHATE SERPL-MCNC: 2.1 MG/DL — LOW (ref 2.5–4.5)
PHOSPHATE SERPL-MCNC: 2.2 MG/DL — LOW (ref 2.5–4.5)
PHOSPHATE SERPL-MCNC: 2.8 MG/DL — SIGNIFICANT CHANGE UP (ref 2.5–4.5)
PLATELET # BLD AUTO: 156 K/UL — SIGNIFICANT CHANGE UP (ref 150–400)
PLATELET # BLD AUTO: 156 K/UL — SIGNIFICANT CHANGE UP (ref 150–400)
PLATELET # BLD AUTO: 169 K/UL — SIGNIFICANT CHANGE UP (ref 150–400)
PO2 BLDA: 101 MMHG — SIGNIFICANT CHANGE UP (ref 74–108)
PO2 BLDA: 157 MMHG — HIGH (ref 74–108)
POTASSIUM SERPL-MCNC: 3.6 MMOL/L — SIGNIFICANT CHANGE UP (ref 3.5–5.3)
POTASSIUM SERPL-MCNC: 3.6 MMOL/L — SIGNIFICANT CHANGE UP (ref 3.5–5.3)
POTASSIUM SERPL-MCNC: 3.9 MMOL/L — SIGNIFICANT CHANGE UP (ref 3.5–5.3)
POTASSIUM SERPL-SCNC: 3.6 MMOL/L — SIGNIFICANT CHANGE UP (ref 3.5–5.3)
POTASSIUM SERPL-SCNC: 3.6 MMOL/L — SIGNIFICANT CHANGE UP (ref 3.5–5.3)
POTASSIUM SERPL-SCNC: 3.9 MMOL/L — SIGNIFICANT CHANGE UP (ref 3.5–5.3)
PROT SERPL-MCNC: 7 G/DL — SIGNIFICANT CHANGE UP (ref 6–8.3)
RBC # BLD: 3.59 M/UL — LOW (ref 3.8–5.2)
RBC # BLD: 3.66 M/UL — LOW (ref 3.8–5.2)
RBC # BLD: 3.96 M/UL — SIGNIFICANT CHANGE UP (ref 3.8–5.2)
RBC # FLD: 12.9 % — SIGNIFICANT CHANGE UP (ref 10.3–14.5)
RBC # FLD: 13 % — SIGNIFICANT CHANGE UP (ref 10.3–14.5)
RBC # FLD: 13.2 % — SIGNIFICANT CHANGE UP (ref 10.3–14.5)
SAO2 % BLDA: 98 % — HIGH (ref 92–96)
SAO2 % BLDA: 99 % — HIGH (ref 92–96)
SODIUM SERPL-SCNC: 153 MMOL/L — HIGH (ref 135–145)
SODIUM SERPL-SCNC: 154 MMOL/L — HIGH (ref 135–145)
SODIUM SERPL-SCNC: 156 MMOL/L — HIGH (ref 135–145)
SURGICAL PATHOLOGY STUDY: SIGNIFICANT CHANGE UP
WBC # BLD: 12.6 K/UL — HIGH (ref 3.8–10.5)
WBC # BLD: 12.9 K/UL — HIGH (ref 3.8–10.5)
WBC # BLD: 17.3 K/UL — HIGH (ref 3.8–10.5)
WBC # FLD AUTO: 12.6 K/UL — HIGH (ref 3.8–10.5)
WBC # FLD AUTO: 12.9 K/UL — HIGH (ref 3.8–10.5)
WBC # FLD AUTO: 17.3 K/UL — HIGH (ref 3.8–10.5)

## 2018-12-19 PROCEDURE — 95951: CPT | Mod: 26

## 2018-12-19 PROCEDURE — 70450 CT HEAD/BRAIN W/O DYE: CPT | Mod: 26

## 2018-12-19 PROCEDURE — 71045 X-RAY EXAM CHEST 1 VIEW: CPT | Mod: 26

## 2018-12-19 PROCEDURE — 99291 CRITICAL CARE FIRST HOUR: CPT

## 2018-12-19 PROCEDURE — 99292 CRITICAL CARE ADDL 30 MIN: CPT

## 2018-12-19 RX ORDER — FENTANYL CITRATE 50 UG/ML
25 INJECTION INTRAVENOUS ONCE
Qty: 0 | Refills: 0 | Status: DISCONTINUED | OUTPATIENT
Start: 2018-12-19 | End: 2018-12-18

## 2018-12-19 RX ORDER — PROPOFOL 10 MG/ML
25 INJECTION, EMULSION INTRAVENOUS
Qty: 500 | Refills: 0 | Status: DISCONTINUED | OUTPATIENT
Start: 2018-12-19 | End: 2018-12-19

## 2018-12-19 RX ORDER — FENTANYL CITRATE 50 UG/ML
50 INJECTION INTRAVENOUS ONCE
Qty: 0 | Refills: 0 | Status: DISCONTINUED | OUTPATIENT
Start: 2018-12-19 | End: 2018-12-19

## 2018-12-19 RX ORDER — AMLODIPINE BESYLATE 2.5 MG/1
5 TABLET ORAL DAILY
Qty: 0 | Refills: 0 | Status: DISCONTINUED | OUTPATIENT
Start: 2018-12-19 | End: 2018-12-20

## 2018-12-19 RX ORDER — CHLORHEXIDINE GLUCONATE 213 G/1000ML
1 SOLUTION TOPICAL
Qty: 0 | Refills: 0 | Status: DISCONTINUED | OUTPATIENT
Start: 2018-12-19 | End: 2019-01-02

## 2018-12-19 RX ORDER — FENTANYL CITRATE 50 UG/ML
50 INJECTION INTRAVENOUS
Qty: 0 | Refills: 0 | Status: DISCONTINUED | OUTPATIENT
Start: 2018-12-19 | End: 2018-12-24

## 2018-12-19 RX ORDER — ACETAMINOPHEN 500 MG
1000 TABLET ORAL ONCE
Qty: 0 | Refills: 0 | Status: COMPLETED | OUTPATIENT
Start: 2018-12-19 | End: 2018-12-19

## 2018-12-19 RX ORDER — INSULIN LISPRO 100/ML
VIAL (ML) SUBCUTANEOUS EVERY 6 HOURS
Qty: 0 | Refills: 0 | Status: DISCONTINUED | OUTPATIENT
Start: 2018-12-19 | End: 2018-12-27

## 2018-12-19 RX ORDER — FENTANYL CITRATE 50 UG/ML
25 INJECTION INTRAVENOUS
Qty: 0 | Refills: 0 | Status: DISCONTINUED | OUTPATIENT
Start: 2018-12-19 | End: 2018-12-24

## 2018-12-19 RX ORDER — POTASSIUM CHLORIDE 20 MEQ
20 PACKET (EA) ORAL
Qty: 0 | Refills: 0 | Status: COMPLETED | OUTPATIENT
Start: 2018-12-19 | End: 2018-12-19

## 2018-12-19 RX ORDER — DEXMEDETOMIDINE HYDROCHLORIDE IN 0.9% SODIUM CHLORIDE 4 UG/ML
0.2 INJECTION INTRAVENOUS
Qty: 200 | Refills: 0 | Status: DISCONTINUED | OUTPATIENT
Start: 2018-12-19 | End: 2018-12-20

## 2018-12-19 RX ADMIN — LACOSAMIDE 120 MILLIGRAM(S): 50 TABLET ORAL at 17:22

## 2018-12-19 RX ADMIN — Medication 100 MILLIGRAM(S): at 13:51

## 2018-12-19 RX ADMIN — PANTOPRAZOLE SODIUM 40 MILLIGRAM(S): 20 TABLET, DELAYED RELEASE ORAL at 11:00

## 2018-12-19 RX ADMIN — Medication 4 MILLIGRAM(S): at 00:49

## 2018-12-19 RX ADMIN — Medication 1000 MILLIGRAM(S): at 13:00

## 2018-12-19 RX ADMIN — CHLORHEXIDINE GLUCONATE 1 APPLICATION(S): 213 SOLUTION TOPICAL at 22:27

## 2018-12-19 RX ADMIN — Medication 100 MILLIGRAM(S): at 05:28

## 2018-12-19 RX ADMIN — Medication 100 MILLIEQUIVALENT(S): at 06:07

## 2018-12-19 RX ADMIN — LACOSAMIDE 120 MILLIGRAM(S): 50 TABLET ORAL at 06:08

## 2018-12-19 RX ADMIN — Medication 100 MILLIEQUIVALENT(S): at 04:09

## 2018-12-19 RX ADMIN — Medication 4 MILLIGRAM(S): at 05:28

## 2018-12-19 RX ADMIN — FENTANYL CITRATE 25 MICROGRAM(S): 50 INJECTION INTRAVENOUS at 11:15

## 2018-12-19 RX ADMIN — Medication 4 MILLIGRAM(S): at 11:00

## 2018-12-19 RX ADMIN — Medication 4 MILLIGRAM(S): at 17:22

## 2018-12-19 RX ADMIN — Medication 1000 MILLIGRAM(S): at 12:00

## 2018-12-19 RX ADMIN — LACOSAMIDE 120 MILLIGRAM(S): 50 TABLET ORAL at 00:37

## 2018-12-19 RX ADMIN — Medication 4 MILLIGRAM(S): at 23:46

## 2018-12-19 RX ADMIN — FENTANYL CITRATE 25 MICROGRAM(S): 50 INJECTION INTRAVENOUS at 10:59

## 2018-12-19 RX ADMIN — Medication 62.5 MILLIMOLE(S): at 04:04

## 2018-12-19 RX ADMIN — Medication 100 MILLIGRAM(S): at 22:27

## 2018-12-19 RX ADMIN — AMLODIPINE BESYLATE 5 MILLIGRAM(S): 2.5 TABLET ORAL at 23:46

## 2018-12-19 NOTE — PROGRESS NOTE ADULT - SUBJECTIVE AND OBJECTIVE BOX
s/p RTOR for SOC, febrile today, on propofol for shivering with cooling blanket    Intubated, no EO, pupils 2mm sluggish, BUE ext, BLE TF

## 2018-12-19 NOTE — PROGRESS NOTE ADULT - ASSESSMENT
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 - s/p successful ANGIO/EMBO  ICH score 2    Neuro:  EVD at [id number]  CT Head today - stable   VEEG monitoring for possible seizure   Safe guard - remove after 6 hrs   Na 140-150  sedation for RASS 0 to -2 on precedex  bedrest    Cards:   -160, Cardene PRN   TTE    Pulm:  PRVC    GI: start TF     Renal:  IVF  Na 140-150    Endo:  goal euglycemia    Heme:  SCDs  start DVT ppx tonight     ID:  monitor for fevers    ICU  full code    at risk for deterioration and death due to AVM rebleed, hydrocephalus, cerebral edema, brain compression  critical care time 45min

## 2018-12-19 NOTE — PROGRESS NOTE ADULT - ASSESSMENT
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

## 2018-12-19 NOTE — PROGRESS NOTE ADULT - SUBJECTIVE AND OBJECTIVE BOX
59yo woman PMH lupus p/w sudden onset severe HA a/w nausea and vomiting. 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 Patient was intact on presentation however intubated for airway protection and EVD placed. Transferred to [facility] for further care.     Admission ICH score 2    EVENTS: s/p SOC decompressive craniectomy     VITALS:  Reviewed   LABS:  Reviewed  MEDICATIONS: Reviewed  IMAGING:  Recent imaging studies were reviewed.    EXAM:  intubated  EO to noxious  PERRL  2 fingers on RUE, wiggles toes b/l  LUE antigravity/localizes  rrr no m/r/g  clear lungs  soft abd/nd  wwp ext

## 2018-12-19 NOTE — PROGRESS NOTE ADULT - SUBJECTIVE AND OBJECTIVE BOX
Visit Summary: Patient seen and evaluated at bedside.    Overnight Events: Back to OR overnight for SOC    Exam:  T(C): 36.5 (12-18-18 @ 23:35), Max: 38.6 (12-18-18 @ 03:00)  HR: 105 (12-18-18 @ 23:50) (88 - 125)  BP: --  RR: 15 (12-18-18 @ 23:50) (7 - 27)  SpO2: 100% (12-18-18 @ 23:50) (100% - 100%)  Wt(kg): --    Pupils 2mm and sluggish                          14.0   10.6  )-----------( 185      ( 18 Dec 2018 01:58 )             39.1     12-18    152<H>  |  117<H>  |  12  ----------------------------<  180<H>  3.4<L>   |  21<L>  |  0.78    Ca    8.2<L>      18 Dec 2018 16:28  Phos  2.2     12-18  Mg     2.0     12-18    PT/INR - ( 17 Dec 2018 01:07 )   PT: 12.0 sec;   INR: 1.04 ratio         PTT - ( 17 Dec 2018 01:07 )  PTT:23.1 sec

## 2018-12-19 NOTE — PROGRESS NOTE ADULT - ASSESSMENT
58F here with cerebellar hemorrhage found to have cerebellar AVM s.p angio embolization and resection s/p return to OR today to subocc craniectomy  - pupil checks  - CTH in AM  - EVD at 10  - vent management per ICU

## 2018-12-19 NOTE — EEG REPORT - NS EEG TEXT BOX
[facility]   COMPREHENSIVE EPILEPSY CENTER   REPORT OF ROUTINE VIDEO EEG     [facility]: [address] [place] [address] , [address], [place] [address], Ph#: [phone]  [facility]: [id number], [place] [address], Ph#: [phone]  Office: [address] [place] [address], [facility] [address], [place] [address] Ph#: [phone]    Patient Name: BRIANNA AGUILA  Age and : 58y (64)  MRN #: [id number]  Location: [place] [address]  Referring Physician: Delia Murdock    Study Date: 18    _____________________________________________________________  TECHNICAL INFORMATION    Placement and Labeling of Electrodes:  The EEG was performed utilizing 20 channels referential EEG connections (coronal over temporal over parasagittal montage) using all standard 10-20 electrode placements with EKG.  Recording was at a sampling rate of 256 samples per second per channel.  Time synchronized digital video recording was done simultaneously with EEG recording.  A low light infrared camera was used for low light recording.  Dakota and seizure detection algorithms were utilized.    _____________________________________________________________  HISTORY    Patient is a 58y old  Female who presents with a chief complaint of Cerebellar IPH (19 Dec 2018 01:06)      PERTINENT MEDICATION:  MEDICATIONS  (STANDING):  ceFAZolin   IVPB 2000 milliGRAM(s) IV Intermittent every 8 hours  chlorhexidine 4% Liquid 1 Application(s) Topical <User Schedule>  dexamethasone  Injectable 4 milliGRAM(s) IV Push every 6 hours  insulin lispro (HumaLOG) corrective regimen sliding scale   SubCutaneous every 6 hours  lacosamide IVPB 100 milliGRAM(s) IV Intermittent every 12 hours  niCARdipine Infusion 5 mG/Hr (25 mL/Hr) IV Continuous <Continuous>  pantoprazole  Injectable 40 milliGRAM(s) IV Push daily  senna Syrup 5 milliLiter(s) Oral at bedtime  sodium chloride 0.9% with potassium chloride 20 mEq/L 1000 milliLiter(s) (75 mL/Hr) IV Continuous <Continuous>    _____________________________________________________________  STUDY INTERPRETATION    Findings: The background was continuous, delta and theta activity, reactive, with higher amplitude over the right hemisphere.  No PDR was seen.    Generalized Slowing:  -Continuous Slowing, Generalized (delta and theta)  -Intermittent Rhythmic Slowing, Regional, Bi-Frontal (delta)    Focal Slowing:   None was present.    Sleep Background:  Stage II sleep transients were not recorded.  Drowsiness and stage II sleep transients were not recorded.    Other Non-Epileptiform Findings:  -Asymmetry, Increased Background Amplitude, Right hemisphere    Interictal Epileptiform Activity:   None were present.    Events:  Clinical events: None recorded.  Seizures: None recorded.    Activation Procedures:   Hyperventilation was not performed.    Photic stimulation was not performed.     Artifacts:  Intermittent myogenic and movement artifacts were noted.    ECG:  The heart rate on single channel ECG was predominantly between 60-80 BPM.    _____________________________________________________________  EEG SUMMARY/CLASSIFICATION  Abnormal EEG in an encephalopathic patient.  -Asymmetry, Increased Background Amplitude, Right hemisphere  -Continuous Slowing, Generalized (delta and theta)  -Intermittent Rhythmic Slowing, Regional, Bi-Frontal (delta)  _____________________________________________________________  EEG IMPRESSION/CLINICAL CORRELATE  There is evidence of a moderate diffuse encephalopathy, with dysfunction in the frontal regions bilaterally.  There is also evidence of a skull defect on the right.  No epileptiform discharges or EEG seizures were recorded.    Francisco Gibson MD  EEG / Epilepsy Attending Physician

## 2018-12-20 PROBLEM — L93.0 DISCOID LUPUS ERYTHEMATOSUS: Chronic | Status: ACTIVE | Noted: 2018-12-16

## 2018-12-20 PROBLEM — M32.9 SYSTEMIC LUPUS ERYTHEMATOSUS, UNSPECIFIED: Chronic | Status: ACTIVE | Noted: 2018-12-17

## 2018-12-20 LAB
ANION GAP SERPL CALC-SCNC: 7 MMOL/L — SIGNIFICANT CHANGE UP (ref 5–17)
APPEARANCE CSF: ABNORMAL
APPEARANCE SPUN FLD: SIGNIFICANT CHANGE UP
BUN SERPL-MCNC: 20 MG/DL — SIGNIFICANT CHANGE UP (ref 7–23)
CALCIUM SERPL-MCNC: 7.9 MG/DL — LOW (ref 8.4–10.5)
CHLORIDE SERPL-SCNC: 117 MMOL/L — HIGH (ref 96–108)
CO2 SERPL-SCNC: 28 MMOL/L — SIGNIFICANT CHANGE UP (ref 22–31)
COLOR CSF: ABNORMAL
CREAT SERPL-MCNC: 0.69 MG/DL — SIGNIFICANT CHANGE UP (ref 0.5–1.3)
GLUCOSE BLDC GLUCOMTR-MCNC: 139 MG/DL — HIGH (ref 70–99)
GLUCOSE BLDC GLUCOMTR-MCNC: 149 MG/DL — HIGH (ref 70–99)
GLUCOSE BLDC GLUCOMTR-MCNC: 156 MG/DL — HIGH (ref 70–99)
GLUCOSE BLDC GLUCOMTR-MCNC: 157 MG/DL — HIGH (ref 70–99)
GLUCOSE CSF-MCNC: 103 MG/DL — HIGH (ref 40–70)
GLUCOSE SERPL-MCNC: 184 MG/DL — HIGH (ref 70–99)
GRAM STN FLD: SIGNIFICANT CHANGE UP
LACTATE CSF-MCNC: 3.1 MMOL/L — HIGH (ref 1.1–2.4)
LYMPHOCYTES # CSF: 3 % — LOW (ref 40–80)
MAGNESIUM SERPL-MCNC: 2.4 MG/DL — SIGNIFICANT CHANGE UP (ref 1.6–2.6)
MONOS+MACROS NFR CSF: 44 % — SIGNIFICANT CHANGE UP (ref 15–45)
NEUTROPHILS # CSF: 53 % — HIGH (ref 0–6)
NRBC NFR CSF: 16 /UL — HIGH (ref 0–5)
PHOSPHATE SERPL-MCNC: 2 MG/DL — LOW (ref 2.5–4.5)
POTASSIUM SERPL-MCNC: 3.6 MMOL/L — SIGNIFICANT CHANGE UP (ref 3.5–5.3)
POTASSIUM SERPL-SCNC: 3.6 MMOL/L — SIGNIFICANT CHANGE UP (ref 3.5–5.3)
PROT CSF-MCNC: 16 MG/DL — SIGNIFICANT CHANGE UP (ref 15–45)
RBC # CSF: HIGH /UL (ref 0–0)
SODIUM SERPL-SCNC: 152 MMOL/L — HIGH (ref 135–145)
SPECIMEN SOURCE: SIGNIFICANT CHANGE UP
TUBE TYPE: SIGNIFICANT CHANGE UP

## 2018-12-20 PROCEDURE — 95951: CPT | Mod: 26

## 2018-12-20 PROCEDURE — 70551 MRI BRAIN STEM W/O DYE: CPT | Mod: 26

## 2018-12-20 PROCEDURE — 99292 CRITICAL CARE ADDL 30 MIN: CPT

## 2018-12-20 PROCEDURE — 99291 CRITICAL CARE FIRST HOUR: CPT

## 2018-12-20 PROCEDURE — 71045 X-RAY EXAM CHEST 1 VIEW: CPT | Mod: 26

## 2018-12-20 PROCEDURE — 61070 BRAIN CANAL SHUNT PROCEDURE: CPT | Mod: 78

## 2018-12-20 RX ORDER — ACETAMINOPHEN 500 MG
975 TABLET ORAL EVERY 6 HOURS
Qty: 0 | Refills: 0 | Status: DISCONTINUED | OUTPATIENT
Start: 2018-12-20 | End: 2018-12-23

## 2018-12-20 RX ORDER — POLYETHYLENE GLYCOL 3350 17 G/17G
17 POWDER, FOR SOLUTION ORAL
Qty: 0 | Refills: 0 | Status: DISCONTINUED | OUTPATIENT
Start: 2018-12-20 | End: 2018-12-29

## 2018-12-20 RX ORDER — PROPOFOL 10 MG/ML
10 INJECTION, EMULSION INTRAVENOUS
Qty: 500 | Refills: 0 | Status: DISCONTINUED | OUTPATIENT
Start: 2018-12-20 | End: 2018-12-21

## 2018-12-20 RX ORDER — ACETAMINOPHEN 500 MG
1000 TABLET ORAL ONCE
Qty: 0 | Refills: 0 | Status: COMPLETED | OUTPATIENT
Start: 2018-12-20 | End: 2018-12-20

## 2018-12-20 RX ORDER — CALCIUM GLUCONATE 100 MG/ML
2 VIAL (ML) INTRAVENOUS ONCE
Qty: 0 | Refills: 0 | Status: COMPLETED | OUTPATIENT
Start: 2018-12-20 | End: 2018-12-20

## 2018-12-20 RX ORDER — CALCIUM GLUCONATE 100 MG/ML
1 VIAL (ML) INTRAVENOUS ONCE
Qty: 0 | Refills: 0 | Status: COMPLETED | OUTPATIENT
Start: 2018-12-20 | End: 2018-12-20

## 2018-12-20 RX ORDER — ENOXAPARIN SODIUM 100 MG/ML
40 INJECTION SUBCUTANEOUS
Qty: 0 | Refills: 0 | Status: DISCONTINUED | OUTPATIENT
Start: 2018-12-20 | End: 2018-12-30

## 2018-12-20 RX ORDER — SODIUM CHLORIDE 9 MG/ML
1000 INJECTION, SOLUTION INTRAVENOUS ONCE
Qty: 0 | Refills: 0 | Status: COMPLETED | OUTPATIENT
Start: 2018-12-20 | End: 2018-12-20

## 2018-12-20 RX ORDER — POTASSIUM CHLORIDE 20 MEQ
30 PACKET (EA) ORAL ONCE
Qty: 0 | Refills: 0 | Status: COMPLETED | OUTPATIENT
Start: 2018-12-20 | End: 2018-12-20

## 2018-12-20 RX ORDER — POTASSIUM PHOSPHATE, MONOBASIC POTASSIUM PHOSPHATE, DIBASIC 236; 224 MG/ML; MG/ML
15 INJECTION, SOLUTION INTRAVENOUS ONCE
Qty: 0 | Refills: 0 | Status: COMPLETED | OUTPATIENT
Start: 2018-12-20 | End: 2018-12-20

## 2018-12-20 RX ORDER — DOCUSATE SODIUM 100 MG
100 CAPSULE ORAL THREE TIMES A DAY
Qty: 0 | Refills: 0 | Status: DISCONTINUED | OUTPATIENT
Start: 2018-12-20 | End: 2019-01-03

## 2018-12-20 RX ORDER — POTASSIUM PHOSPHATE, MONOBASIC POTASSIUM PHOSPHATE, DIBASIC 236; 224 MG/ML; MG/ML
15 INJECTION, SOLUTION INTRAVENOUS ONCE
Qty: 0 | Refills: 0 | Status: COMPLETED | OUTPATIENT
Start: 2018-12-20 | End: 2018-12-21

## 2018-12-20 RX ORDER — LACTULOSE 10 G/15ML
20 SOLUTION ORAL ONCE
Qty: 0 | Refills: 0 | Status: COMPLETED | OUTPATIENT
Start: 2018-12-20 | End: 2018-12-20

## 2018-12-20 RX ADMIN — PANTOPRAZOLE SODIUM 40 MILLIGRAM(S): 20 TABLET, DELAYED RELEASE ORAL at 12:50

## 2018-12-20 RX ADMIN — POLYETHYLENE GLYCOL 3350 17 GRAM(S): 17 POWDER, FOR SOLUTION ORAL at 18:37

## 2018-12-20 RX ADMIN — LACOSAMIDE 120 MILLIGRAM(S): 50 TABLET ORAL at 06:35

## 2018-12-20 RX ADMIN — POTASSIUM PHOSPHATE, MONOBASIC POTASSIUM PHOSPHATE, DIBASIC 62.5 MILLIMOLE(S): 236; 224 INJECTION, SOLUTION INTRAVENOUS at 03:00

## 2018-12-20 RX ADMIN — FENTANYL CITRATE 25 MICROGRAM(S): 50 INJECTION INTRAVENOUS at 03:00

## 2018-12-20 RX ADMIN — FENTANYL CITRATE 50 MICROGRAM(S): 50 INJECTION INTRAVENOUS at 22:45

## 2018-12-20 RX ADMIN — FENTANYL CITRATE 25 MICROGRAM(S): 50 INJECTION INTRAVENOUS at 03:30

## 2018-12-20 RX ADMIN — Medication 4 MILLIGRAM(S): at 23:48

## 2018-12-20 RX ADMIN — Medication 4 MILLIGRAM(S): at 18:37

## 2018-12-20 RX ADMIN — FENTANYL CITRATE 50 MICROGRAM(S): 50 INJECTION INTRAVENOUS at 22:30

## 2018-12-20 RX ADMIN — Medication 975 MILLIGRAM(S): at 12:50

## 2018-12-20 RX ADMIN — SENNA PLUS 5 MILLILITER(S): 8.6 TABLET ORAL at 21:16

## 2018-12-20 RX ADMIN — PROPOFOL 5.05 MICROGRAM(S)/KG/MIN: 10 INJECTION, EMULSION INTRAVENOUS at 12:49

## 2018-12-20 RX ADMIN — ENOXAPARIN SODIUM 40 MILLIGRAM(S): 100 INJECTION SUBCUTANEOUS at 18:37

## 2018-12-20 RX ADMIN — CHLORHEXIDINE GLUCONATE 1 APPLICATION(S): 213 SOLUTION TOPICAL at 21:16

## 2018-12-20 RX ADMIN — SODIUM CHLORIDE 1000 MILLILITER(S): 9 INJECTION, SOLUTION INTRAVENOUS at 10:00

## 2018-12-20 RX ADMIN — FENTANYL CITRATE 50 MICROGRAM(S): 50 INJECTION INTRAVENOUS at 09:00

## 2018-12-20 RX ADMIN — FENTANYL CITRATE 50 MICROGRAM(S): 50 INJECTION INTRAVENOUS at 14:00

## 2018-12-20 RX ADMIN — Medication 4 MILLIGRAM(S): at 12:50

## 2018-12-20 RX ADMIN — Medication 100 MILLIGRAM(S): at 21:16

## 2018-12-20 RX ADMIN — Medication 400 MILLIGRAM(S): at 07:00

## 2018-12-20 RX ADMIN — Medication 975 MILLIGRAM(S): at 18:38

## 2018-12-20 RX ADMIN — DEXTROSE MONOHYDRATE, SODIUM CHLORIDE, AND POTASSIUM CHLORIDE 75 MILLILITER(S): 50; .745; 4.5 INJECTION, SOLUTION INTRAVENOUS at 10:21

## 2018-12-20 RX ADMIN — Medication 4 MILLIGRAM(S): at 06:00

## 2018-12-20 RX ADMIN — Medication 1: at 23:48

## 2018-12-20 RX ADMIN — LACOSAMIDE 120 MILLIGRAM(S): 50 TABLET ORAL at 18:37

## 2018-12-20 RX ADMIN — Medication 200 GRAM(S): at 02:55

## 2018-12-20 RX ADMIN — Medication 30 MILLIGRAM(S): at 13:51

## 2018-12-20 RX ADMIN — Medication 200 GRAM(S): at 23:47

## 2018-12-20 RX ADMIN — Medication 1000 MILLIGRAM(S): at 08:30

## 2018-12-20 RX ADMIN — LACTULOSE 20 GRAM(S): 10 SOLUTION ORAL at 18:39

## 2018-12-20 RX ADMIN — Medication 975 MILLIGRAM(S): at 23:47

## 2018-12-20 RX ADMIN — Medication 1: at 06:26

## 2018-12-20 RX ADMIN — Medication 30 MILLIGRAM(S): at 21:16

## 2018-12-20 RX ADMIN — Medication 30 MILLIEQUIVALENT(S): at 23:47

## 2018-12-20 NOTE — PROGRESS NOTE ADULT - SUBJECTIVE AND OBJECTIVE BOX
59yo woman PMH lupus p/w sudden onset severe HA a/w nausea and vomiting. 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 Patient was intact on presentation however intubated for airway protection and EVD placed. Transferred to [facility] for further care.     Admission ICH score 2    EVENTS: s/p SOC decompressive craniectomy     VITALS:  Reviewed   LABS:  Reviewed  MEDICATIONS: Reviewed  IMAGING:  Recent imaging studies were reviewed.    EXAM:  intubated  EO to noxious  PERRL  2 fingers on RUE, wiggles toes b/l  LUE antigravity/localizes  rrr no m/r/g  clear lungs  soft abd/nd  wwp ext

## 2018-12-20 NOTE — CHART NOTE - NSCHARTNOTEFT_GEN_A_CORE
Using sterile technique, Rt. External Ventricular device was accessed for collection of CSF sample.  EVD was clamped distally and approximately 1cc of CSF was sterilely withdrawn and discarded.  Another 3cc of CSF was withdrawn and sent to lab in 2 separate vials for culture and analysis.  Access port was closed with disinfecting cap then EVD was unclamped.  ICPs monitored and patient tolerated procedure well. No complications.

## 2018-12-20 NOTE — PROGRESS NOTE ADULT - ASSESSMENT
58F here with cerebellar hemorrhage found to have cerebellar AVM s.p angio embolization and resection s/p return to OR today to subocc craniectomy  - wean sedation  - CTH in AM  - EVD at 10  - vent management per ICU  - MRI when able

## 2018-12-20 NOTE — CHART NOTE - NSCHARTNOTEFT_GEN_A_CORE
Nutrition Follow Up Note  Patient seen for: nutrition follow up    59 yo female with PMH of lupus admitted with headache, N/V and found with cerebellar IPH with hydrocephalus S/P EVD placement. Now S/P angio + embolization and S/P craniotomy for resection of AVM on . RTOR on  for craniectomy. Remains intubated, plan for MRI today    Source: medical record; rounds    Diet : NPO with tube feeds  Enteral Nutrition: Pivot 1.5 @ 70 cc/hr x 18 hrs to provide 1260cc fluid, 1890 hank/d (22 hank/Kg), and 118 gm prot/d (1.4 gm prot/Kg) based upon dosing weight 84.1 Kg  Pt seen during rounds. Feeds started  and began to titrate up as per 18-hr feed schedule; however, only reached 40cc/hr. Total intake x24 hrs: 200ml. Per rounds, plan to make pt NPO for MRI today (in flat position). No BM thus far (on senna), discussed with team      Daily Weight in k.2 (-), Weight in k.1 (-)  Weight Change: weight appears stable    Pertinent Medications: MEDICATIONS  (STANDING):  acetaminophen   Tablet .. 975 milliGRAM(s) Oral every 6 hours  busPIRone 30 milliGRAM(s) Oral every 8 hours  chlorhexidine 4% Liquid 1 Application(s) Topical <User Schedule>  dexamethasone  Injectable 4 milliGRAM(s) IV Push every 6 hours  enoxaparin Injectable 40 milliGRAM(s) SubCutaneous <User Schedule>  insulin lispro (HumaLOG) corrective regimen sliding scale   SubCutaneous every 6 hours  lacosamide IVPB 100 milliGRAM(s) IV Intermittent every 12 hours  pantoprazole  Injectable 40 milliGRAM(s) IV Push daily  propofol Infusion 10 MICROgram(s)/kG/Min (5.046 mL/Hr) IV Continuous <Continuous>  senna Syrup 5 milliLiter(s) Oral at bedtime  sodium chloride 0.9% with potassium chloride 20 mEq/L 1000 milliLiter(s) (75 mL/Hr) IV Continuous <Continuous>    MEDICATIONS  (PRN):  fentaNYL    Injectable 25 MICROGram(s) IV Push every 2 hours PRN Moderate Pain (4 - 6)  fentaNYL    Injectable 50 MICROGram(s) IV Push every 2 hours PRN Severe Pain (7 - 10)    Pertinent Labs: () POCT ; () POCT -148, Na 156H, Cl 120H, BG 156H, Ca 8.3L, Phos 2.1L (Ca+Phos repleted)    Skin per nursing documentation: no pressure injuries  Edema: none    Estimated Needs:   [x] no change since previous assessment  [ ] recalculated:     Previous Nutrition Diagnosis: Increased Nutrient Needs  Nutrition Diagnosis is: ongoing, address with provision of nutrition via tolerated route as feasible    New Nutrition Diagnosis: N/A    Recommend    1) As medically appropriate, resume EN of Pivot 1.5 @ 70 cc/hr x 18 hrs to provide 1260cc fluid, 1890 hank/d (22 hank/Kg), and 118 gm prot/d (1.4 gm prot/Kg) based upon dosing weight 84.1 Kg  2) Defer total fluids to medical team  3) Suggest additional bowel regimen to promote BM    Monitoring and Evaluation:     Continue to monitor Nutritional intake, Tolerance to diet prescription, weights, labs, skin integrity    RD remains available upon request and will follow up per protocol  Hanh Pompa RD CDN Pager #[phone]

## 2018-12-20 NOTE — PROGRESS NOTE ADULT - SUBJECTIVE AND OBJECTIVE BOX
Visit Summary: Patient seen and evaluated at bedside.    Overnight Events: none    Exam:  T(C): 37 (12-20-18 @ 01:00), Max: 38.7 (12-19-18 @ 14:00)  HR: 82 (12-20-18 @ 01:30) (82 - 138)  BP: --  RR: 19 (12-20-18 @ 01:30) (14 - 29)  SpO2: 100% (12-20-18 @ 01:30) (99% - 100%)  Wt(kg): --    PERRL, +corneals / cough / gag  NothingBUE  BLE TF                        11.1   12.9  )-----------( 156      ( 19 Dec 2018 21:49 )             30.7     12-19    156<H>  |  120<H>  |  13  ----------------------------<  156<H>  3.6   |  24  |  0.64    Ca    8.3<L>      19 Dec 2018 21:49  Phos  2.1     12-19  Mg     2.3     12-19    TPro  7.0  /  Alb  3.4  /  TBili  0.4  /  DBili  x   /  AST  30  /  ALT  26  /  AlkPhos  66  12-19

## 2018-12-20 NOTE — PROGRESS NOTE ADULT - ASSESSMENT
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 - s/p successful ANGIO/EMBO  ICH score 2    Neuro:  EVD at [id number]  MRI to evaluate for ischemia of brainstem/cerebellum   VEEG monitoring negative seizure   Na 140-150  Fentanyl PRN   bedrest    Cards:   -160, Cardene PRN   TTE    Pulm:  PRVC    GI: TF     Renal:  IVF  Na 140-150    Endo:  goal euglycemia    Heme:  SCDs  start DVT ppx tonight     ID:  monitor for fevers    ICU  full code    at risk for deterioration and death due to AVM rebleed, hydrocephalus, cerebral edema, brain compression  critical care time 45min

## 2018-12-21 DIAGNOSIS — J96.90 RESPIRATORY FAILURE, UNSPECIFIED, UNSPECIFIED WHETHER WITH HYPOXIA OR HYPERCAPNIA: ICD-10-CM

## 2018-12-21 LAB
ALBUMIN SERPL ELPH-MCNC: 2.7 G/DL — LOW (ref 3.3–5)
ALBUMIN SERPL ELPH-MCNC: 3.3 G/DL — SIGNIFICANT CHANGE UP (ref 3.3–5)
ALP SERPL-CCNC: 54 U/L — SIGNIFICANT CHANGE UP (ref 40–120)
ALP SERPL-CCNC: 57 U/L — SIGNIFICANT CHANGE UP (ref 40–120)
ALT FLD-CCNC: 29 U/L — SIGNIFICANT CHANGE UP (ref 10–45)
ALT FLD-CCNC: 34 U/L — SIGNIFICANT CHANGE UP (ref 10–45)
ANION GAP SERPL CALC-SCNC: 9 MMOL/L — SIGNIFICANT CHANGE UP (ref 5–17)
APPEARANCE UR: CLEAR — SIGNIFICANT CHANGE UP
AST SERPL-CCNC: 36 U/L — SIGNIFICANT CHANGE UP (ref 10–40)
AST SERPL-CCNC: 47 U/L — HIGH (ref 10–40)
BACTERIA # UR AUTO: NEGATIVE — SIGNIFICANT CHANGE UP
BILIRUB DIRECT SERPL-MCNC: <0.1 MG/DL — SIGNIFICANT CHANGE UP (ref 0–0.2)
BILIRUB DIRECT SERPL-MCNC: <0.1 MG/DL — SIGNIFICANT CHANGE UP (ref 0–0.2)
BILIRUB INDIRECT FLD-MCNC: >0.2 MG/DL — SIGNIFICANT CHANGE UP (ref 0.2–1)
BILIRUB INDIRECT FLD-MCNC: >0.3 MG/DL — SIGNIFICANT CHANGE UP (ref 0.2–1)
BILIRUB SERPL-MCNC: 0.3 MG/DL — SIGNIFICANT CHANGE UP (ref 0.2–1.2)
BILIRUB SERPL-MCNC: 0.4 MG/DL — SIGNIFICANT CHANGE UP (ref 0.2–1.2)
BILIRUB UR-MCNC: NEGATIVE — SIGNIFICANT CHANGE UP
BUN SERPL-MCNC: 25 MG/DL — HIGH (ref 7–23)
CALCIUM SERPL-MCNC: 8.4 MG/DL — SIGNIFICANT CHANGE UP (ref 8.4–10.5)
CHLORIDE SERPL-SCNC: 109 MMOL/L — HIGH (ref 96–108)
CO2 SERPL-SCNC: 29 MMOL/L — SIGNIFICANT CHANGE UP (ref 22–31)
COLOR SPEC: SIGNIFICANT CHANGE UP
CREAT SERPL-MCNC: 0.57 MG/DL — SIGNIFICANT CHANGE UP (ref 0.5–1.3)
DIFF PNL FLD: ABNORMAL
EPI CELLS # UR: 1 /HPF — SIGNIFICANT CHANGE UP
GLUCOSE BLDC GLUCOMTR-MCNC: 120 MG/DL — HIGH (ref 70–99)
GLUCOSE BLDC GLUCOMTR-MCNC: 134 MG/DL — HIGH (ref 70–99)
GLUCOSE BLDC GLUCOMTR-MCNC: 139 MG/DL — HIGH (ref 70–99)
GLUCOSE BLDC GLUCOMTR-MCNC: 177 MG/DL — HIGH (ref 70–99)
GLUCOSE SERPL-MCNC: 189 MG/DL — HIGH (ref 70–99)
GLUCOSE UR QL: ABNORMAL
GRAM STN FLD: SIGNIFICANT CHANGE UP
HYALINE CASTS # UR AUTO: 0 /LPF — SIGNIFICANT CHANGE UP (ref 0–2)
KETONES UR-MCNC: NEGATIVE — SIGNIFICANT CHANGE UP
LEUKOCYTE ESTERASE UR-ACNC: NEGATIVE — SIGNIFICANT CHANGE UP
MAGNESIUM SERPL-MCNC: 2.3 MG/DL — SIGNIFICANT CHANGE UP (ref 1.6–2.6)
NITRITE UR-MCNC: NEGATIVE — SIGNIFICANT CHANGE UP
PH UR: 6.5 — SIGNIFICANT CHANGE UP (ref 5–8)
PHOSPHATE SERPL-MCNC: 2.1 MG/DL — LOW (ref 2.5–4.5)
POTASSIUM SERPL-MCNC: 4 MMOL/L — SIGNIFICANT CHANGE UP (ref 3.5–5.3)
POTASSIUM SERPL-SCNC: 4 MMOL/L — SIGNIFICANT CHANGE UP (ref 3.5–5.3)
PROT SERPL-MCNC: 5.9 G/DL — LOW (ref 6–8.3)
PROT SERPL-MCNC: 6.5 G/DL — SIGNIFICANT CHANGE UP (ref 6–8.3)
PROT UR-MCNC: SIGNIFICANT CHANGE UP
RBC CASTS # UR COMP ASSIST: 5 /HPF — HIGH (ref 0–4)
SODIUM SERPL-SCNC: 147 MMOL/L — HIGH (ref 135–145)
SP GR SPEC: 1.02 — SIGNIFICANT CHANGE UP (ref 1.01–1.02)
SPECIMEN SOURCE: SIGNIFICANT CHANGE UP
UROBILINOGEN FLD QL: NEGATIVE — SIGNIFICANT CHANGE UP
WBC UR QL: 1 /HPF — SIGNIFICANT CHANGE UP (ref 0–5)

## 2018-12-21 PROCEDURE — 71045 X-RAY EXAM CHEST 1 VIEW: CPT | Mod: 26

## 2018-12-21 PROCEDURE — 99292 CRITICAL CARE ADDL 30 MIN: CPT

## 2018-12-21 PROCEDURE — 99222 1ST HOSP IP/OBS MODERATE 55: CPT

## 2018-12-21 PROCEDURE — 99291 CRITICAL CARE FIRST HOUR: CPT

## 2018-12-21 RX ORDER — DEXAMETHASONE 0.5 MG/5ML
4 ELIXIR ORAL EVERY 12 HOURS
Qty: 0 | Refills: 0 | Status: DISCONTINUED | OUTPATIENT
Start: 2018-12-21 | End: 2018-12-22

## 2018-12-21 RX ORDER — PANTOPRAZOLE SODIUM 20 MG/1
40 TABLET, DELAYED RELEASE ORAL DAILY
Qty: 0 | Refills: 0 | Status: DISCONTINUED | OUTPATIENT
Start: 2018-12-21 | End: 2018-12-22

## 2018-12-21 RX ORDER — LABETALOL HCL 100 MG
10 TABLET ORAL ONCE
Qty: 0 | Refills: 0 | Status: COMPLETED | OUTPATIENT
Start: 2018-12-21 | End: 2018-12-21

## 2018-12-21 RX ORDER — DEXMEDETOMIDINE HYDROCHLORIDE IN 0.9% SODIUM CHLORIDE 4 UG/ML
0.24 INJECTION INTRAVENOUS
Qty: 200 | Refills: 0 | Status: DISCONTINUED | OUTPATIENT
Start: 2018-12-21 | End: 2018-12-21

## 2018-12-21 RX ORDER — LACOSAMIDE 50 MG/1
100 TABLET ORAL EVERY 12 HOURS
Qty: 0 | Refills: 0 | Status: DISCONTINUED | OUTPATIENT
Start: 2018-12-21 | End: 2018-12-24

## 2018-12-21 RX ADMIN — Medication 30 MILLIGRAM(S): at 05:46

## 2018-12-21 RX ADMIN — POLYETHYLENE GLYCOL 3350 17 GRAM(S): 17 POWDER, FOR SOLUTION ORAL at 05:46

## 2018-12-21 RX ADMIN — Medication 975 MILLIGRAM(S): at 17:31

## 2018-12-21 RX ADMIN — Medication 975 MILLIGRAM(S): at 05:46

## 2018-12-21 RX ADMIN — Medication 100 MILLIGRAM(S): at 21:10

## 2018-12-21 RX ADMIN — FENTANYL CITRATE 50 MICROGRAM(S): 50 INJECTION INTRAVENOUS at 14:50

## 2018-12-21 RX ADMIN — CHLORHEXIDINE GLUCONATE 1 APPLICATION(S): 213 SOLUTION TOPICAL at 21:10

## 2018-12-21 RX ADMIN — Medication 0.1 MILLIGRAM(S): at 10:53

## 2018-12-21 RX ADMIN — LACOSAMIDE 120 MILLIGRAM(S): 50 TABLET ORAL at 18:01

## 2018-12-21 RX ADMIN — FENTANYL CITRATE 50 MICROGRAM(S): 50 INJECTION INTRAVENOUS at 01:30

## 2018-12-21 RX ADMIN — PANTOPRAZOLE SODIUM 40 MILLIGRAM(S): 20 TABLET, DELAYED RELEASE ORAL at 17:48

## 2018-12-21 RX ADMIN — Medication 975 MILLIGRAM(S): at 13:25

## 2018-12-21 RX ADMIN — Medication 30 MILLIGRAM(S): at 14:34

## 2018-12-21 RX ADMIN — Medication 30 MILLIGRAM(S): at 21:11

## 2018-12-21 RX ADMIN — Medication 4 MILLIGRAM(S): at 17:30

## 2018-12-21 RX ADMIN — Medication 100 MILLIGRAM(S): at 05:46

## 2018-12-21 RX ADMIN — PROPOFOL 5.05 MICROGRAM(S)/KG/MIN: 10 INJECTION, EMULSION INTRAVENOUS at 06:57

## 2018-12-21 RX ADMIN — SENNA PLUS 5 MILLILITER(S): 8.6 TABLET ORAL at 21:10

## 2018-12-21 RX ADMIN — FENTANYL CITRATE 50 MICROGRAM(S): 50 INJECTION INTRAVENOUS at 15:10

## 2018-12-21 RX ADMIN — FENTANYL CITRATE 50 MICROGRAM(S): 50 INJECTION INTRAVENOUS at 10:10

## 2018-12-21 RX ADMIN — Medication 10 MILLIGRAM(S): at 19:48

## 2018-12-21 RX ADMIN — PANTOPRAZOLE SODIUM 40 MILLIGRAM(S): 20 TABLET, DELAYED RELEASE ORAL at 12:24

## 2018-12-21 RX ADMIN — Medication 0.1 MILLIGRAM(S): at 21:11

## 2018-12-21 RX ADMIN — Medication 975 MILLIGRAM(S): at 18:00

## 2018-12-21 RX ADMIN — LACOSAMIDE 120 MILLIGRAM(S): 50 TABLET ORAL at 05:54

## 2018-12-21 RX ADMIN — FENTANYL CITRATE 50 MICROGRAM(S): 50 INJECTION INTRAVENOUS at 10:25

## 2018-12-21 RX ADMIN — Medication 4 MILLIGRAM(S): at 05:46

## 2018-12-21 RX ADMIN — FENTANYL CITRATE 50 MICROGRAM(S): 50 INJECTION INTRAVENOUS at 01:05

## 2018-12-21 RX ADMIN — POTASSIUM PHOSPHATE, MONOBASIC POTASSIUM PHOSPHATE, DIBASIC 41.67 MILLIMOLE(S): 236; 224 INJECTION, SOLUTION INTRAVENOUS at 02:52

## 2018-12-21 RX ADMIN — ENOXAPARIN SODIUM 40 MILLIGRAM(S): 100 INJECTION SUBCUTANEOUS at 17:32

## 2018-12-21 RX ADMIN — Medication 0.1 MILLIGRAM(S): at 14:34

## 2018-12-21 RX ADMIN — Medication 975 MILLIGRAM(S): at 12:25

## 2018-12-21 NOTE — PROGRESS NOTE ADULT - ASSESSMENT
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

## 2018-12-21 NOTE — PROGRESS NOTE ADULT - ASSESSMENT
58F here with cerebellar hemorrhage found to have cerebellar AVM s.p angio embolization and resection s/p return to OR for subocc craniectomy  - wean sedation  - CTH in AM  - EVD at 10  - vent management per ICU  - MRI when able

## 2018-12-21 NOTE — PROGRESS NOTE ADULT - SUBJECTIVE AND OBJECTIVE BOX
Febrile on cooling blanket, requiring propofol for shivering bradycardic/hypotensive on precedex    Intubated, no EO, pupils 2mm brisk, no R corneal, +L corneal, BUE ext, BLE TF

## 2018-12-21 NOTE — PROGRESS NOTE ADULT - SUBJECTIVE AND OBJECTIVE BOX
NSCU ATTENDING -- ADDITIONAL PROGRESS NOTE    Nighttime rounds were performed -- please refer to earlier Progress Note for HPI details.    T(C): 38.1 (12-21-18 @ 19:00), Max: 38.4 (12-21-18 @ 07:00)  HR: 54 (12-21-18 @ 21:14) (52 - 137)  BP: 147/73 (12-21-18 @ 21:00) (113/68 - 156/65)  RR: 15 (12-21-18 @ 21:00) (12 - 23)  SpO2: 100% (12-21-18 @ 21:14) (100% - 100%)  Wt(kg): --    Relevant labwork and imaging reviewed.    Patient remains critically ill.    Still not following commands and remains intubated.  Family will likely opt for trach/PEG per discussion with day team.  On cooling blanket currently, temp hovering below febrile.  Maintain current vent settings.  EVD open @ 10, ICPs within normal limits, drain patent.    Additional 30 minutes of critical care time.

## 2018-12-21 NOTE — CONSULT NOTE ADULT - SUBJECTIVE AND OBJECTIVE BOX
CC: Trach    HPI: 57yo woman PMH lupus p/w sudden onset severe HA a/w nausea and vomiting. 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 Patient was intact on presentation however intubated for airway protection and EVD placed. Transferred to [facility] for further care. ENT was consulted for tracheostomy. As per NSCU, pt is not a candidate for extubation. No modifying factors. No fevrs.        PAST MEDICAL & SURGICAL HISTORY:  Systemic lupus erythematosus, unspecified SLE type, unspecified organ involvement status  Lupus  Back pain, chronic  Childhood asthma  Ovarian cyst  DVT (deep venous thrombosis), bilateral: 1993( treated with Heparin and coumadin)  RA (rheumatoid arthritis)  No significant past surgical history  No significant past surgical history  Plantar fasciitis of right foot: h/o surgery  History of laparoscopic cholecystectomy: 1993    Allergies    No Known Allergies    Intolerances      MEDICATIONS  (STANDING):  acetaminophen   Tablet .. 975 milliGRAM(s) Oral every 6 hours  busPIRone 30 milliGRAM(s) Oral every 8 hours  chlorhexidine 4% Liquid 1 Application(s) Topical <User Schedule>  cloNIDine 0.1 milliGRAM(s) Oral every 8 hours  dexamethasone  Injectable 4 milliGRAM(s) IV Push every 12 hours  dexmedetomidine Infusion 0.238 MICROgram(s)/kG/Hr (5 mL/Hr) IV Continuous <Continuous>  docusate sodium Liquid 100 milliGRAM(s) Oral three times a day  enoxaparin Injectable 40 milliGRAM(s) SubCutaneous <User Schedule>  insulin lispro (HumaLOG) corrective regimen sliding scale   SubCutaneous every 6 hours  lacosamide IVPB 100 milliGRAM(s) IV Intermittent every 12 hours  pantoprazole   Suspension 40 milliGRAM(s) Oral daily  polyethylene glycol 3350 17 Gram(s) Oral two times a day  senna Syrup 5 milliLiter(s) Oral at bedtime    MEDICATIONS  (PRN):  fentaNYL    Injectable 25 MICROGram(s) IV Push every 2 hours PRN Moderate Pain (4 - 6)  fentaNYL    Injectable 50 MICROGram(s) IV Push every 2 hours PRN Severe Pain (7 - 10)      Social History: no tobacco use    Family history: non contributory    ROS:   ENT: all negative except as noted in HPI   CV: denies palpitations  Pulm: denies SOB, cough, hemoptysis  GI: denies change in apetite, indigestion, n/v  : denies pertinent urinary symptoms, urgency  Neuro: denies numbness/tingling, loss of sensation  Psych: denies anxiety  MS: denies muscle weakness, instability  Heme: denies easy bruising or bleeding  Endo: denies heat/cold intolerance, excessive sweating  Vascular: denies LE edema    Vital Signs Last 24 Hrs  T(C): 38.1 (21 Dec 2018 15:00), Max: 38.4 (21 Dec 2018 07:00)  T(F): 100.6 (21 Dec 2018 15:00), Max: 101.1 (21 Dec 2018 07:00)  HR: 76 (21 Dec 2018 17:00) (52 - 137)  BP: 135/82 (21 Dec 2018 17:00) (113/68 - 156/65)  BP(mean): 98 (21 Dec 2018 17:00) (81 - 98)  RR: 17 (21 Dec 2018 17:00) (12 - 23)  SpO2: 100% (21 Dec 2018 17:00) (100% - 100%)                          11.1   12.9  )-----------( 156      ( 19 Dec 2018 21:49 )             30.7    12-20    152<H>  |  117<H>  |  20  ----------------------------<  184<H>  3.6   |  28  |  0.69    Ca    7.9<L>      20 Dec 2018 19:57  Phos  2.0     12-20  Mg     2.4     12-20    TPro  5.9<L>  /  Alb  2.7<L>  /  TBili  0.3  /  DBili  <0.1  /  AST  47<H>  /  ALT  29  /  AlkPhos  54  12-20       PHYSICAL EXAM:  Gen: NAD  Skin: No rashes, bruises, or lesions  Head: Normocephalic, Atraumatic  Face: no edema, erythema, or fluctuance. Parotid glands soft without mass  Eyes: no scleral injection  Nose: Nares bilaterally patent, no discharge  Mouth: Et tube in place, no tongue swelling, No Stridor / Drooling / Trismus.  Mucosa moist, tongue/uvula midline, oropharynx clear  Neck: Flat, supple, no lymphadenopathy, trachea midline, no masses  Lymphatic: No lymphadenopathy  Resp:  no stridor  CV: no peripheral edema/cyanosis  GI: nondistended   Peripheral vascular: no JVD or edema  Neuro: facial nerve intact, no facial droop

## 2018-12-21 NOTE — PROGRESS NOTE ADULT - SUBJECTIVE AND OBJECTIVE BOX
57yo woman PMH lupus p/w sudden onset severe HA a/w nausea and vomiting. 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 Patient was intact on presentation however intubated for airway protection and EVD placed. Transferred to [facility] for further care.     Admission ICH score 2    EVENTS: s/p SOC decompressive craniectomy and EVD    VITALS:  Reviewed   LABS:  Reviewed  MEDICATIONS: Reviewed  IMAGING:  Recent imaging studies were reviewed.    EXAM:  intubated  EO to noxious  PERRL  2 fingers on RUE, wiggles toes b/l  LUE antigravity/localizes  rrr no m/r/g  clear lungs  soft abd/nd  wwp ext

## 2018-12-21 NOTE — EEG REPORT - NS EEG TEXT BOX
[facility]   COMPREHENSIVE EPILEPSY CENTER   REPORT OF CONTINUOUS VIDEO EEG     [facility]: [address] [place] [address] , [address], [place] [address], Ph#: [phone]  [facility]: [id number], [place] [address], Ph#: [phone]  Office: [address] [place] [address], [facility] [address], [place] [address] Ph#: [phone]    Patient Name: BRIANNA AGUILA  Age and : 58y ([birth year])  MRN #: [id number]  Location: [place] [address]  Referring Physician: Delia Murdock    Study Date: 18 (9 hour 51 minute study)    _____________________________________________________________  TECHNICAL INFORMATION    Placement and Labeling of Electrodes:  The EEG was performed utilizing 20 channels referential EEG connections (coronal over temporal over parasagittal montage) using all standard 10-20 electrode placements with EKG.  Recording was at a sampling rate of 256 samples per second per channel.  Time synchronized digital video recording was done simultaneously with EEG recording.  A low light infrared camera was used for low light recording.  Dakota and seizure detection algorithms were utilized.    _____________________________________________________________  HISTORY    Patient is a 58y old  Female who presents with a chief complaint of Cerebellar IPH (19 Dec 2018 01:06)      PERTINENT MEDICATION:  MEDICATIONS  (STANDING):  ceFAZolin   IVPB 2000 milliGRAM(s) IV Intermittent every 8 hours  chlorhexidine 4% Liquid 1 Application(s) Topical <User Schedule>  dexamethasone  Injectable 4 milliGRAM(s) IV Push every 6 hours  insulin lispro (HumaLOG) corrective regimen sliding scale   SubCutaneous every 6 hours  lacosamide IVPB 100 milliGRAM(s) IV Intermittent every 12 hours  niCARdipine Infusion 5 mG/Hr (25 mL/Hr) IV Continuous <Continuous>  pantoprazole  Injectable 40 milliGRAM(s) IV Push daily  senna Syrup 5 milliLiter(s) Oral at bedtime  sodium chloride 0.9% with potassium chloride 20 mEq/L 1000 milliLiter(s) (75 mL/Hr) IV Continuous <Continuous>    _____________________________________________________________  STUDY INTERPRETATION    Findings: The background was continuous, delta and theta activity, reactive, with higher amplitude over the right hemisphere.  No PDR was seen.    Generalized Slowing:  -Continuous Slowing, Generalized (delta and theta)  -Intermittent Rhythmic Slowing, Regional, Bi-Frontal (delta)    Focal Slowing:   None was present.    Sleep Background:  Stage II sleep transients were not recorded.  Drowsiness and stage II sleep transients were not recorded.    Other Non-Epileptiform Findings:  -Asymmetry, Increased Background Amplitude, Right hemisphere    Interictal Epileptiform Activity:   None were present.    Events:  Clinical events: None recorded.  Seizures: None recorded.    Activation Procedures:   Hyperventilation was not performed.    Photic stimulation was not performed.     Artifacts:  Intermittent myogenic and movement artifacts were noted.    ECG:  The heart rate on single channel ECG was predominantly between 60-80 BPM.    _____________________________________________________________  EEG SUMMARY/CLASSIFICATION  Abnormal EEG in an encephalopathic patient.  -Asymmetry, Increased Background Amplitude, Right hemisphere  -Continuous Slowing, Generalized (delta and theta)  -Intermittent Rhythmic Slowing, Regional, Bi-Frontal (delta)  _____________________________________________________________  EEG IMPRESSION/CLINICAL CORRELATE  There is evidence of a moderate diffuse encephalopathy, with dysfunction in the frontal regions bilaterally.  There is also evidence of a skull defect on the right.  No epileptiform discharges or EEG seizures were recorded.    Francisco Gibson MD  EEG / Epilepsy Attending Physician

## 2018-12-21 NOTE — PROGRESS NOTE ADULT - SUBJECTIVE AND OBJECTIVE BOX
Visit Summary: Patient seen and evaluated at bedside.    Overnight Events: none    Exam:  T(C): 37.7 (12-21-18 @ 03:00), Max: 38.2 (12-20-18 @ 18:15)  HR: 85 (12-21-18 @ 06:00) (53 - 145)  BP: --  RR: 15 (12-21-18 @ 06:00) (12 - 20)  SpO2: 100% (12-21-18 @ 06:00) (100% - 100%)  Wt(kg): --    Intubated, no EO, pupils 2mm brisk, no R corneal, +L corneal, BUE ext, BLE TF                          11.1   12.9  )-----------( 156      ( 19 Dec 2018 21:49 )             30.7     12-20    152<H>  |  117<H>  |  20  ----------------------------<  184<H>  3.6   |  28  |  0.69    Ca    7.9<L>      20 Dec 2018 19:57  Phos  2.0     12-20  Mg     2.4     12-20    TPro  5.9<L>  /  Alb  2.7<L>  /  TBili  0.3  /  DBili  <0.1  /  AST  47<H>  /  ALT  29  /  AlkPhos  54  12-20

## 2018-12-21 NOTE — PROGRESS NOTE ADULT - ASSESSMENT
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

## 2018-12-22 LAB
ALBUMIN SERPL ELPH-MCNC: 3.5 G/DL — SIGNIFICANT CHANGE UP (ref 3.3–5)
ALP SERPL-CCNC: 62 U/L — SIGNIFICANT CHANGE UP (ref 40–120)
ALT FLD-CCNC: 33 U/L — SIGNIFICANT CHANGE UP (ref 10–45)
ANION GAP SERPL CALC-SCNC: 10 MMOL/L — SIGNIFICANT CHANGE UP (ref 5–17)
AST SERPL-CCNC: 33 U/L — SIGNIFICANT CHANGE UP (ref 10–40)
BILIRUB DIRECT SERPL-MCNC: <0.1 MG/DL — SIGNIFICANT CHANGE UP (ref 0–0.2)
BILIRUB INDIRECT FLD-MCNC: >0.2 MG/DL — SIGNIFICANT CHANGE UP (ref 0.2–1)
BILIRUB SERPL-MCNC: 0.3 MG/DL — SIGNIFICANT CHANGE UP (ref 0.2–1.2)
BUN SERPL-MCNC: 21 MG/DL — SIGNIFICANT CHANGE UP (ref 7–23)
CALCIUM SERPL-MCNC: 8.8 MG/DL — SIGNIFICANT CHANGE UP (ref 8.4–10.5)
CHLORIDE SERPL-SCNC: 105 MMOL/L — SIGNIFICANT CHANGE UP (ref 96–108)
CO2 SERPL-SCNC: 31 MMOL/L — SIGNIFICANT CHANGE UP (ref 22–31)
CREAT SERPL-MCNC: 0.46 MG/DL — LOW (ref 0.5–1.3)
GLUCOSE BLDC GLUCOMTR-MCNC: 117 MG/DL — HIGH (ref 70–99)
GLUCOSE BLDC GLUCOMTR-MCNC: 135 MG/DL — HIGH (ref 70–99)
GLUCOSE BLDC GLUCOMTR-MCNC: 164 MG/DL — HIGH (ref 70–99)
GLUCOSE BLDC GLUCOMTR-MCNC: 174 MG/DL — HIGH (ref 70–99)
GLUCOSE SERPL-MCNC: 164 MG/DL — HIGH (ref 70–99)
MAGNESIUM SERPL-MCNC: 2 MG/DL — SIGNIFICANT CHANGE UP (ref 1.6–2.6)
PHOSPHATE SERPL-MCNC: 2.5 MG/DL — SIGNIFICANT CHANGE UP (ref 2.5–4.5)
POTASSIUM SERPL-MCNC: 3.5 MMOL/L — SIGNIFICANT CHANGE UP (ref 3.5–5.3)
POTASSIUM SERPL-SCNC: 3.5 MMOL/L — SIGNIFICANT CHANGE UP (ref 3.5–5.3)
PROT SERPL-MCNC: 6.9 G/DL — SIGNIFICANT CHANGE UP (ref 6–8.3)
SODIUM SERPL-SCNC: 146 MMOL/L — HIGH (ref 135–145)

## 2018-12-22 PROCEDURE — 99291 CRITICAL CARE FIRST HOUR: CPT | Mod: 24

## 2018-12-22 PROCEDURE — 99291 CRITICAL CARE FIRST HOUR: CPT

## 2018-12-22 PROCEDURE — 71045 X-RAY EXAM CHEST 1 VIEW: CPT | Mod: 26

## 2018-12-22 PROCEDURE — 99292 CRITICAL CARE ADDL 30 MIN: CPT

## 2018-12-22 RX ORDER — LACTULOSE 10 G/15ML
20 SOLUTION ORAL EVERY 8 HOURS
Qty: 0 | Refills: 0 | Status: COMPLETED | OUTPATIENT
Start: 2018-12-22 | End: 2018-12-23

## 2018-12-22 RX ORDER — IPRATROPIUM/ALBUTEROL SULFATE 18-103MCG
3 AEROSOL WITH ADAPTER (GRAM) INHALATION EVERY 6 HOURS
Qty: 0 | Refills: 0 | Status: DISCONTINUED | OUTPATIENT
Start: 2018-12-22 | End: 2019-01-07

## 2018-12-22 RX ORDER — VANCOMYCIN HCL 1 G
1000 VIAL (EA) INTRAVENOUS EVERY 12 HOURS
Qty: 0 | Refills: 0 | Status: DISCONTINUED | OUTPATIENT
Start: 2018-12-23 | End: 2018-12-24

## 2018-12-22 RX ORDER — VANCOMYCIN HCL 1 G
1000 VIAL (EA) INTRAVENOUS ONCE
Qty: 0 | Refills: 0 | Status: COMPLETED | OUTPATIENT
Start: 2018-12-22 | End: 2018-12-22

## 2018-12-22 RX ORDER — VANCOMYCIN HCL 1 G
VIAL (EA) INTRAVENOUS
Qty: 0 | Refills: 0 | Status: DISCONTINUED | OUTPATIENT
Start: 2018-12-22 | End: 2018-12-24

## 2018-12-22 RX ORDER — DEXAMETHASONE 0.5 MG/5ML
2 ELIXIR ORAL EVERY 12 HOURS
Qty: 0 | Refills: 0 | Status: DISCONTINUED | OUTPATIENT
Start: 2018-12-22 | End: 2018-12-24

## 2018-12-22 RX ORDER — POTASSIUM CHLORIDE 20 MEQ
40 PACKET (EA) ORAL ONCE
Qty: 0 | Refills: 0 | Status: COMPLETED | OUTPATIENT
Start: 2018-12-22 | End: 2018-12-22

## 2018-12-22 RX ORDER — POTASSIUM PHOSPHATE, MONOBASIC POTASSIUM PHOSPHATE, DIBASIC 236; 224 MG/ML; MG/ML
15 INJECTION, SOLUTION INTRAVENOUS ONCE
Qty: 0 | Refills: 0 | Status: COMPLETED | OUTPATIENT
Start: 2018-12-22 | End: 2018-12-22

## 2018-12-22 RX ADMIN — Medication 0.1 MILLIGRAM(S): at 05:24

## 2018-12-22 RX ADMIN — SENNA PLUS 5 MILLILITER(S): 8.6 TABLET ORAL at 22:04

## 2018-12-22 RX ADMIN — Medication 30 MILLIGRAM(S): at 05:22

## 2018-12-22 RX ADMIN — FENTANYL CITRATE 50 MICROGRAM(S): 50 INJECTION INTRAVENOUS at 19:15

## 2018-12-22 RX ADMIN — ENOXAPARIN SODIUM 40 MILLIGRAM(S): 100 INJECTION SUBCUTANEOUS at 17:32

## 2018-12-22 RX ADMIN — POLYETHYLENE GLYCOL 3350 17 GRAM(S): 17 POWDER, FOR SOLUTION ORAL at 17:33

## 2018-12-22 RX ADMIN — Medication 4 MILLIGRAM(S): at 05:22

## 2018-12-22 RX ADMIN — Medication 100 MILLIGRAM(S): at 14:07

## 2018-12-22 RX ADMIN — FENTANYL CITRATE 50 MICROGRAM(S): 50 INJECTION INTRAVENOUS at 19:30

## 2018-12-22 RX ADMIN — Medication 0.1 MILLIGRAM(S): at 22:04

## 2018-12-22 RX ADMIN — FENTANYL CITRATE 50 MICROGRAM(S): 50 INJECTION INTRAVENOUS at 22:50

## 2018-12-22 RX ADMIN — Medication 975 MILLIGRAM(S): at 23:49

## 2018-12-22 RX ADMIN — Medication 40 MILLIEQUIVALENT(S): at 23:48

## 2018-12-22 RX ADMIN — Medication 0.1 MILLIGRAM(S): at 13:58

## 2018-12-22 RX ADMIN — LACOSAMIDE 120 MILLIGRAM(S): 50 TABLET ORAL at 18:01

## 2018-12-22 RX ADMIN — CHLORHEXIDINE GLUCONATE 1 APPLICATION(S): 213 SOLUTION TOPICAL at 22:05

## 2018-12-22 RX ADMIN — LACOSAMIDE 120 MILLIGRAM(S): 50 TABLET ORAL at 05:23

## 2018-12-22 RX ADMIN — Medication 975 MILLIGRAM(S): at 11:32

## 2018-12-22 RX ADMIN — Medication 2 MILLIGRAM(S): at 17:33

## 2018-12-22 RX ADMIN — Medication 250 MILLIGRAM(S): at 19:46

## 2018-12-22 RX ADMIN — Medication 30 MILLIGRAM(S): at 14:07

## 2018-12-22 RX ADMIN — Medication 3 MILLILITER(S): at 23:42

## 2018-12-22 RX ADMIN — Medication 62.5 MILLIMOLE(S): at 00:04

## 2018-12-22 RX ADMIN — Medication 1: at 00:02

## 2018-12-22 RX ADMIN — Medication 975 MILLIGRAM(S): at 05:55

## 2018-12-22 RX ADMIN — POLYETHYLENE GLYCOL 3350 17 GRAM(S): 17 POWDER, FOR SOLUTION ORAL at 05:22

## 2018-12-22 RX ADMIN — Medication 975 MILLIGRAM(S): at 05:22

## 2018-12-22 RX ADMIN — LACTULOSE 20 GRAM(S): 10 SOLUTION ORAL at 22:04

## 2018-12-22 RX ADMIN — POTASSIUM PHOSPHATE, MONOBASIC POTASSIUM PHOSPHATE, DIBASIC 62.5 MILLIMOLE(S): 236; 224 INJECTION, SOLUTION INTRAVENOUS at 23:48

## 2018-12-22 RX ADMIN — Medication 975 MILLIGRAM(S): at 00:01

## 2018-12-22 RX ADMIN — FENTANYL CITRATE 50 MICROGRAM(S): 50 INJECTION INTRAVENOUS at 22:17

## 2018-12-22 RX ADMIN — Medication 30 MILLIGRAM(S): at 22:04

## 2018-12-22 RX ADMIN — Medication 1: at 17:32

## 2018-12-22 RX ADMIN — Medication 1: at 11:32

## 2018-12-22 RX ADMIN — Medication 100 MILLIGRAM(S): at 05:22

## 2018-12-22 RX ADMIN — Medication 100 MILLIGRAM(S): at 22:04

## 2018-12-22 RX ADMIN — LACTULOSE 20 GRAM(S): 10 SOLUTION ORAL at 14:07

## 2018-12-22 RX ADMIN — PANTOPRAZOLE SODIUM 40 MILLIGRAM(S): 20 TABLET, DELAYED RELEASE ORAL at 11:32

## 2018-12-22 RX ADMIN — Medication 975 MILLIGRAM(S): at 17:33

## 2018-12-22 NOTE — PROGRESS NOTE ADULT - ASSESSMENT
58F here with cerebellar hemorrhage found to have cerebellar AVM s.p angio embolization and resection s/p return to OR for subocc craniectomy    - CTH in AM  - EVD at 10  - vent management per ICU  - MRI when able

## 2018-12-22 NOTE — PROGRESS NOTE ADULT - SUBJECTIVE AND OBJECTIVE BOX
59yo woman PMH lupus p/w sudden onset severe HA a/w nausea and vomiting. 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 Patient was intact on presentation however intubated for airway protection and EVD placed. Transferred to [facility] for further care.     Admission ICH score 2    EVENTS: s/p SOC decompressive craniectomy and EVD    VITALS:  Reviewed   LABS:  Reviewed  MEDICATIONS: Reviewed  IMAGING:  Recent imaging studies were reviewed.    EXAM:  intubated  EO to noxious  PERRL  2 fingers on RUE, wiggles toes b/l  LUE antigravity/localizes  rrr no m/r/g  clear lungs  soft abd/nd  wwp ext 57yo woman PMH lupus p/w sudden onset severe HA a/w nausea and vomiting. 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 Patient was intact on presentation however intubated for airway protection and EVD placed. Transferred to [facility] for further care.     Admission ICH score 2    EVENTS: s/p SOC decompressive craniectomy and EVD    VITALS:  Reviewed   LABS:  Reviewed  MEDICATIONS: Reviewed  IMAGING:  Recent imaging studies were reviewed.    EXAM:  intubated  EO to stimuli, regards to threat, intermittent FC  PERRL  LUE spont, RUE nothing, BLE wiggles toes  rrr no m/r/g  scattered ronchi  soft abd/nd  wwp ext

## 2018-12-22 NOTE — PROGRESS NOTE ADULT - SUBJECTIVE AND OBJECTIVE BOX
NSCU ATTENDING -- ADDITIONAL PROGRESS NOTE    Nighttime rounds were performed -- please refer to earlier Progress Note for HPI details.    T(C): 38.1 (12-22-18 @ 20:00), Max: 38.4 (12-22-18 @ 19:00)  HR: 74 (12-22-18 @ 21:00) (58 - 111)  BP: 150/88 (12-22-18 @ 21:00) (101/57 - 183/86)  RR: 17 (12-22-18 @ 21:00) (13 - 22)  SpO2: 100% (12-22-18 @ 21:00) (97% - 100%)  Wt(kg): --    Relevant labwork and imaging reviewed.    Patient remains critically ill.    Now more consistently following commands in the lower extremities.  CT in AM per NSG.  Still scheduled for trach on Monday.  Started on vancomycin today.  D/c protonix (on full enteric feeds, no GI bleed, not a home med).    Additional 30 minutes of critical care time.

## 2018-12-22 NOTE — PROGRESS NOTE ADULT - SUBJECTIVE AND OBJECTIVE BOX
Visit Summary: Patient seen and evaluated at bedside.    Overnight Events: none    Exam:  T(C): 37.8 (12-21-18 @ 23:00), Max: 38.4 (12-21-18 @ 07:00)  HR: 85 (12-22-18 @ 00:00) (52 - 137)  BP: 129/75 (12-22-18 @ 00:00) (113/68 - 156/65)  RR: 13 (12-22-18 @ 00:00) (12 - 23)  SpO2: 100% (12-22-18 @ 00:00) (100% - 100%)  Wt(kg): --    Intubated, no EO, pupils 2mm brisk, no R corneal, +L corneal, BUE ext, BLE TF    12-21    147<H>  |  109<H>  |  25<H>  ----------------------------<  189<H>  4.0   |  29  |  0.57    Ca    8.4      21 Dec 2018 20:47  Phos  2.1     12-21  Mg     2.3     12-21    TPro  6.5  /  Alb  3.3  /  TBili  0.4  /  DBili  <0.1  /  AST  36  /  ALT  34  /  AlkPhos  57  12-21

## 2018-12-22 NOTE — PROGRESS NOTE ADULT - ASSESSMENT
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 - s/p successful ANGIO/EMBO  ICH score 2    Neuro:  EVD at 10 cmH2O  MRI to evaluate for ischemia of brainstem/cerebellum   VEEG monitoring negative seizure   Na 140-150  Fentanyl PRN   bedrest    Cards:   -160, Cardene PRN   TTE    Pulm:  PRVC, consulted ENT for TRACH     GI: TF     Renal:  IVL  Na 140-150    Endo:  goal euglycemia    Heme:  SCDs  DVT ppx Lovenox     ID:  monitor for fevers    ICU  full code    at risk for deterioration and death due to AVM rebleed, hydrocephalus, cerebral edema, brain compression  critical care time 45min 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

## 2018-12-23 ENCOUNTER — TRANSCRIPTION ENCOUNTER (OUTPATIENT)
Age: 58
End: 2018-12-23

## 2018-12-23 LAB
-  AMPICILLIN/SULBACTAM: SIGNIFICANT CHANGE UP
-  CEFAZOLIN: SIGNIFICANT CHANGE UP
-  CLINDAMYCIN: SIGNIFICANT CHANGE UP
-  ERYTHROMYCIN: SIGNIFICANT CHANGE UP
-  GENTAMICIN: SIGNIFICANT CHANGE UP
-  LINEZOLID: SIGNIFICANT CHANGE UP
-  OXACILLIN: SIGNIFICANT CHANGE UP
-  PENICILLIN: SIGNIFICANT CHANGE UP
-  RIFAMPIN: SIGNIFICANT CHANGE UP
-  TETRACYCLINE: SIGNIFICANT CHANGE UP
-  TRIMETHOPRIM/SULFAMETHOXAZOLE: SIGNIFICANT CHANGE UP
-  VANCOMYCIN: SIGNIFICANT CHANGE UP
APPEARANCE UR: CLEAR — SIGNIFICANT CHANGE UP
BACTERIA # UR AUTO: NEGATIVE — SIGNIFICANT CHANGE UP
BILIRUB UR-MCNC: NEGATIVE — SIGNIFICANT CHANGE UP
COLOR SPEC: SIGNIFICANT CHANGE UP
CULTURE RESULTS: SIGNIFICANT CHANGE UP
DIFF PNL FLD: ABNORMAL
EPI CELLS # UR: 1 /HPF — SIGNIFICANT CHANGE UP
GLUCOSE BLDC GLUCOMTR-MCNC: 115 MG/DL — HIGH (ref 70–99)
GLUCOSE BLDC GLUCOMTR-MCNC: 118 MG/DL — HIGH (ref 70–99)
GLUCOSE BLDC GLUCOMTR-MCNC: 128 MG/DL — HIGH (ref 70–99)
GLUCOSE UR QL: ABNORMAL
GRAM STN FLD: SIGNIFICANT CHANGE UP
HYALINE CASTS # UR AUTO: 0 /LPF — SIGNIFICANT CHANGE UP (ref 0–2)
KETONES UR-MCNC: NEGATIVE — SIGNIFICANT CHANGE UP
LEUKOCYTE ESTERASE UR-ACNC: NEGATIVE — SIGNIFICANT CHANGE UP
METHOD TYPE: SIGNIFICANT CHANGE UP
NITRITE UR-MCNC: NEGATIVE — SIGNIFICANT CHANGE UP
ORGANISM # SPEC MICROSCOPIC CNT: SIGNIFICANT CHANGE UP
ORGANISM # SPEC MICROSCOPIC CNT: SIGNIFICANT CHANGE UP
PH UR: 6.5 — SIGNIFICANT CHANGE UP (ref 5–8)
PROT UR-MCNC: ABNORMAL
RBC CASTS # UR COMP ASSIST: 28 /HPF — HIGH (ref 0–4)
SP GR SPEC: 1.03 — HIGH (ref 1.01–1.02)
SPECIMEN SOURCE: SIGNIFICANT CHANGE UP
UROBILINOGEN FLD QL: ABNORMAL
WBC UR QL: 1 /HPF — SIGNIFICANT CHANGE UP (ref 0–5)

## 2018-12-23 PROCEDURE — 99292 CRITICAL CARE ADDL 30 MIN: CPT

## 2018-12-23 PROCEDURE — 93010 ELECTROCARDIOGRAM REPORT: CPT

## 2018-12-23 PROCEDURE — 70450 CT HEAD/BRAIN W/O DYE: CPT | Mod: 26

## 2018-12-23 PROCEDURE — 71045 X-RAY EXAM CHEST 1 VIEW: CPT | Mod: 26,76

## 2018-12-23 PROCEDURE — 99291 CRITICAL CARE FIRST HOUR: CPT

## 2018-12-23 PROCEDURE — 99291 CRITICAL CARE FIRST HOUR: CPT | Mod: 24

## 2018-12-23 RX ORDER — ACETAMINOPHEN 500 MG
1000 TABLET ORAL ONCE
Qty: 0 | Refills: 0 | Status: COMPLETED | OUTPATIENT
Start: 2018-12-23 | End: 2018-12-23

## 2018-12-23 RX ORDER — FENTANYL CITRATE 50 UG/ML
12.5 INJECTION INTRAVENOUS ONCE
Qty: 0 | Refills: 0 | Status: DISCONTINUED | OUTPATIENT
Start: 2018-12-23 | End: 2018-12-23

## 2018-12-23 RX ORDER — SODIUM CHLORIDE 9 MG/ML
1000 INJECTION INTRAMUSCULAR; INTRAVENOUS; SUBCUTANEOUS
Qty: 0 | Refills: 0 | Status: DISCONTINUED | OUTPATIENT
Start: 2018-12-23 | End: 2018-12-25

## 2018-12-23 RX ORDER — CHLORHEXIDINE GLUCONATE 213 G/1000ML
15 SOLUTION TOPICAL
Qty: 0 | Refills: 0 | Status: DISCONTINUED | OUTPATIENT
Start: 2018-12-23 | End: 2019-01-07

## 2018-12-23 RX ORDER — AMLODIPINE BESYLATE 2.5 MG/1
10 TABLET ORAL DAILY
Qty: 0 | Refills: 0 | Status: DISCONTINUED | OUTPATIENT
Start: 2018-12-23 | End: 2018-12-30

## 2018-12-23 RX ORDER — LACTULOSE 10 G/15ML
20 SOLUTION ORAL EVERY 6 HOURS
Qty: 0 | Refills: 0 | Status: DISCONTINUED | OUTPATIENT
Start: 2018-12-23 | End: 2018-12-24

## 2018-12-23 RX ADMIN — FENTANYL CITRATE 50 MICROGRAM(S): 50 INJECTION INTRAVENOUS at 22:15

## 2018-12-23 RX ADMIN — Medication 100 MILLIGRAM(S): at 23:03

## 2018-12-23 RX ADMIN — Medication 100 MILLIGRAM(S): at 13:03

## 2018-12-23 RX ADMIN — CHLORHEXIDINE GLUCONATE 15 MILLILITER(S): 213 SOLUTION TOPICAL at 17:09

## 2018-12-23 RX ADMIN — FENTANYL CITRATE 50 MICROGRAM(S): 50 INJECTION INTRAVENOUS at 04:00

## 2018-12-23 RX ADMIN — Medication 2 MILLIGRAM(S): at 05:32

## 2018-12-23 RX ADMIN — FENTANYL CITRATE 50 MICROGRAM(S): 50 INJECTION INTRAVENOUS at 10:30

## 2018-12-23 RX ADMIN — LACOSAMIDE 120 MILLIGRAM(S): 50 TABLET ORAL at 05:00

## 2018-12-23 RX ADMIN — LACTULOSE 20 GRAM(S): 10 SOLUTION ORAL at 10:33

## 2018-12-23 RX ADMIN — FENTANYL CITRATE 12.5 MICROGRAM(S): 50 INJECTION INTRAVENOUS at 20:45

## 2018-12-23 RX ADMIN — FENTANYL CITRATE 50 MICROGRAM(S): 50 INJECTION INTRAVENOUS at 10:45

## 2018-12-23 RX ADMIN — LACTULOSE 20 GRAM(S): 10 SOLUTION ORAL at 13:03

## 2018-12-23 RX ADMIN — Medication 1000 MILLIGRAM(S): at 16:00

## 2018-12-23 RX ADMIN — Medication 400 MILLIGRAM(S): at 20:30

## 2018-12-23 RX ADMIN — FENTANYL CITRATE 50 MICROGRAM(S): 50 INJECTION INTRAVENOUS at 03:20

## 2018-12-23 RX ADMIN — CHLORHEXIDINE GLUCONATE 15 MILLILITER(S): 213 SOLUTION TOPICAL at 05:34

## 2018-12-23 RX ADMIN — POLYETHYLENE GLYCOL 3350 17 GRAM(S): 17 POWDER, FOR SOLUTION ORAL at 05:31

## 2018-12-23 RX ADMIN — Medication 975 MILLIGRAM(S): at 05:33

## 2018-12-23 RX ADMIN — FENTANYL CITRATE 50 MICROGRAM(S): 50 INJECTION INTRAVENOUS at 22:00

## 2018-12-23 RX ADMIN — Medication 2 MILLIGRAM(S): at 17:10

## 2018-12-23 RX ADMIN — Medication 250 MILLIGRAM(S): at 05:31

## 2018-12-23 RX ADMIN — Medication 30 MILLIGRAM(S): at 05:32

## 2018-12-23 RX ADMIN — Medication 250 MILLIGRAM(S): at 17:16

## 2018-12-23 RX ADMIN — Medication 3 MILLILITER(S): at 11:47

## 2018-12-23 RX ADMIN — FENTANYL CITRATE 50 MICROGRAM(S): 50 INJECTION INTRAVENOUS at 01:20

## 2018-12-23 RX ADMIN — Medication 3 MILLILITER(S): at 06:03

## 2018-12-23 RX ADMIN — POLYETHYLENE GLYCOL 3350 17 GRAM(S): 17 POWDER, FOR SOLUTION ORAL at 17:09

## 2018-12-23 RX ADMIN — Medication 3 MILLILITER(S): at 17:14

## 2018-12-23 RX ADMIN — LACTULOSE 20 GRAM(S): 10 SOLUTION ORAL at 05:32

## 2018-12-23 RX ADMIN — Medication 100 MILLIGRAM(S): at 05:32

## 2018-12-23 RX ADMIN — FENTANYL CITRATE 12.5 MICROGRAM(S): 50 INJECTION INTRAVENOUS at 21:00

## 2018-12-23 RX ADMIN — Medication 0.1 MILLIGRAM(S): at 23:04

## 2018-12-23 RX ADMIN — LACOSAMIDE 120 MILLIGRAM(S): 50 TABLET ORAL at 17:11

## 2018-12-23 RX ADMIN — Medication 1000 MILLIGRAM(S): at 20:45

## 2018-12-23 RX ADMIN — Medication 0.1 MILLIGRAM(S): at 05:33

## 2018-12-23 RX ADMIN — Medication 975 MILLIGRAM(S): at 12:07

## 2018-12-23 RX ADMIN — LACTULOSE 20 GRAM(S): 10 SOLUTION ORAL at 23:03

## 2018-12-23 RX ADMIN — Medication 400 MILLIGRAM(S): at 15:26

## 2018-12-23 RX ADMIN — CHLORHEXIDINE GLUCONATE 1 APPLICATION(S): 213 SOLUTION TOPICAL at 23:06

## 2018-12-23 RX ADMIN — Medication 0.1 MILLIGRAM(S): at 13:03

## 2018-12-23 RX ADMIN — SENNA PLUS 5 MILLILITER(S): 8.6 TABLET ORAL at 23:04

## 2018-12-23 RX ADMIN — FENTANYL CITRATE 50 MICROGRAM(S): 50 INJECTION INTRAVENOUS at 01:50

## 2018-12-23 RX ADMIN — AMLODIPINE BESYLATE 10 MILLIGRAM(S): 2.5 TABLET ORAL at 13:03

## 2018-12-23 RX ADMIN — ENOXAPARIN SODIUM 40 MILLIGRAM(S): 100 INJECTION SUBCUTANEOUS at 17:09

## 2018-12-23 NOTE — PROGRESS NOTE ADULT - ASSESSMENT
58F here with cerebellar hemorrhage found to have cerebellar AVM s.p angio embolization and resection s/p return to OR for subocc craniectomy    - CTH in AM  - continue EVD at 10  - vent management per ICU  - MRI when able

## 2018-12-23 NOTE — PROGRESS NOTE ADULT - SUBJECTIVE AND OBJECTIVE BOX
NSCU ATTENDING -- ADDITIONAL PROGRESS NOTE    Nighttime rounds were performed -- please refer to earlier Progress Note for HPI details.    T(C): 38 (12-23-18 @ 21:00), Max: 38.6 (12-23-18 @ 15:00)  HR: 81 (12-23-18 @ 21:00) (63 - 136)  BP: 113/66 (12-23-18 @ 21:00) (113/66 - 170/90)  RR: 16 (12-23-18 @ 21:00) (14 - 27)  SpO2: 100% (12-23-18 @ 21:00) (100% - 100%)  Wt(kg): --    Relevant labwork and imaging reviewed.    Patient remains critically ill.    NPO after midnight/pre-op for trach in AM.  Nighttime labs pending.  UA sent this afternoon, no evidence of infection.  EVD raised to 20, ICPs within normal limits, drain output 5-10 cc/hr but more bright red blood.  Repeat CT this AM stable compared to prior.    Additional 30 minutes of critical care time.

## 2018-12-23 NOTE — PROGRESS NOTE ADULT - SUBJECTIVE AND OBJECTIVE BOX
Preop Dx: Respiratory Failure	  Surgeon: Jaya   Procedure: Tracheostomy     Vital Signs Last 24 Hrs  T(C): 37.9 (23 Dec 2018 22:00), Max: 38.6 (23 Dec 2018 15:00)  T(F): 100.2 (23 Dec 2018 22:00), Max: 101.5 (23 Dec 2018 15:00)  HR: 118 (23 Dec 2018 22:00) (66 - 136)  BP: 113/66 (23 Dec 2018 21:00) (113/66 - 169/88)  BP(mean): 80 (23 Dec 2018 21:00) (80 - 117)  RR: 16 (23 Dec 2018 22:00) (14 - 27)  SpO2: 100% (23 Dec 2018 22:00) (100% - 100%)    12-22    146<H>  |  105  |  21  ----------------------------<  164<H>  3.5   |  31  |  0.46<L>    Ca    8.8      22 Dec 2018 22:30  Phos  2.5     12-22  Mg     2.0     12-22    TPro  6.9  /  Alb  3.5  /  TBili  0.3  /  DBili  <0.1  /  AST  33  /  ALT  33  /  AlkPhos  62  12-22      Daily     Daily     EKG: PENDING spoke with Maddison RENDON         CXR:       EXAM:  XR CHEST PORTABLE URGENT 1V                            PROCEDURE DATE:  12/23/2018            INTERPRETATION:  A single chest x-ray was obtained on December 23, 2018.    Indication: PICC line placement.    Impression:    The heart is normal in size. The lungs are clear. A PICC line was placed   on the right and the tip is in the superior vena cava. No pneumothorax.   Endotracheal tube and NG tube are in good position.    Type and Screen: PENDING- spoke with Maddison RENDON     Plan:  - OR 12/24/18 for tracheostomy with Dr. Lake   - NPO after midnight except meds  - IVF while NPO  - Consent done/pending  - Medical clearance for OR

## 2018-12-23 NOTE — PROGRESS NOTE ADULT - SUBJECTIVE AND OBJECTIVE BOX
Visit Summary: Patient seen and evaluated at bedside.    Overnight Events: none    ICU Vital Signs Last 24 Hrs  T(C): 37.8 (22 Dec 2018 23:00), Max: 38.4 (22 Dec 2018 19:00)  T(F): 100 (22 Dec 2018 23:00), Max: 101.1 (22 Dec 2018 19:00)  HR: 69 (23 Dec 2018 00:17) (58 - 111)  BP: 161/76 (22 Dec 2018 23:00) (101/57 - 183/86)  BP(mean): 99 (22 Dec 2018 23:00) (70 - 122)  ABP: --  ABP(mean): --  RR: 17 (22 Dec 2018 23:00) (14 - 22)  SpO2: 100% (23 Dec 2018 00:17) (97% - 100%)      Intubated, no EO, pupils 2mm brisk, no R corneal, +L corneal, BUE ext, BLE TF    12-21    147<H>  |  109<H>  |  25<H>  ----------------------------<  189<H>  4.0   |  29  |  0.57    Ca    8.4      21 Dec 2018 20:47  Phos  2.1     12-21  Mg     2.3     12-21    TPro  6.5  /  Alb  3.3  /  TBili  0.4  /  DBili  <0.1  /  AST  36  /  ALT  34  /  AlkPhos  57  12-21

## 2018-12-23 NOTE — PROGRESS NOTE ADULT - ASSESSMENT
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

## 2018-12-23 NOTE — PROGRESS NOTE ADULT - SUBJECTIVE AND OBJECTIVE BOX
59yo woman PMH lupus p/w sudden onset severe HA a/w nausea and vomiting. 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 Patient was intact on presentation however intubated for airway protection and EVD placed. Transferred to [facility] for further care.     Admission ICH score 2    s/p SOC decompressive craniectomy and EVD    VITALS:  Reviewed   LABS:  Reviewed  MEDICATIONS: Reviewed  IMAGING:  Recent imaging studies were reviewed.    EXAM:  intubated  EO to stimuli, regards to threat, intermittent FC  PERRL  LUE spont, RUE nothing, BLE wiggles toes  rrr no m/r/g  scattered ronchi  soft abd/nd  wwp ext

## 2018-12-24 LAB
ALBUMIN SERPL ELPH-MCNC: 3 G/DL — LOW (ref 3.3–5)
ALP SERPL-CCNC: 56 U/L — SIGNIFICANT CHANGE UP (ref 40–120)
ALT FLD-CCNC: 34 U/L — SIGNIFICANT CHANGE UP (ref 10–45)
ANION GAP SERPL CALC-SCNC: 10 MMOL/L — SIGNIFICANT CHANGE UP (ref 5–17)
ANION GAP SERPL CALC-SCNC: 11 MMOL/L — SIGNIFICANT CHANGE UP (ref 5–17)
APTT BLD: 25.3 SEC — LOW (ref 27.5–36.3)
AST SERPL-CCNC: 33 U/L — SIGNIFICANT CHANGE UP (ref 10–40)
BILIRUB DIRECT SERPL-MCNC: <0.1 MG/DL — SIGNIFICANT CHANGE UP (ref 0–0.2)
BILIRUB INDIRECT FLD-MCNC: >0.3 MG/DL — SIGNIFICANT CHANGE UP (ref 0.2–1)
BILIRUB SERPL-MCNC: 0.4 MG/DL — SIGNIFICANT CHANGE UP (ref 0.2–1.2)
BLD GP AB SCN SERPL QL: NEGATIVE — SIGNIFICANT CHANGE UP
BUN SERPL-MCNC: 13 MG/DL — SIGNIFICANT CHANGE UP (ref 7–23)
BUN SERPL-MCNC: 16 MG/DL — SIGNIFICANT CHANGE UP (ref 7–23)
CALCIUM SERPL-MCNC: 8.2 MG/DL — LOW (ref 8.4–10.5)
CALCIUM SERPL-MCNC: 8.9 MG/DL — SIGNIFICANT CHANGE UP (ref 8.4–10.5)
CHLORIDE SERPL-SCNC: 101 MMOL/L — SIGNIFICANT CHANGE UP (ref 96–108)
CHLORIDE SERPL-SCNC: 102 MMOL/L — SIGNIFICANT CHANGE UP (ref 96–108)
CO2 SERPL-SCNC: 27 MMOL/L — SIGNIFICANT CHANGE UP (ref 22–31)
CO2 SERPL-SCNC: 29 MMOL/L — SIGNIFICANT CHANGE UP (ref 22–31)
CREAT SERPL-MCNC: 0.39 MG/DL — LOW (ref 0.5–1.3)
CREAT SERPL-MCNC: 0.5 MG/DL — SIGNIFICANT CHANGE UP (ref 0.5–1.3)
GAS PNL BLDA: SIGNIFICANT CHANGE UP
GLUCOSE BLDC GLUCOMTR-MCNC: 130 MG/DL — HIGH (ref 70–99)
GLUCOSE BLDC GLUCOMTR-MCNC: 131 MG/DL — HIGH (ref 70–99)
GLUCOSE BLDC GLUCOMTR-MCNC: 136 MG/DL — HIGH (ref 70–99)
GLUCOSE BLDC GLUCOMTR-MCNC: 146 MG/DL — HIGH (ref 70–99)
GLUCOSE SERPL-MCNC: 128 MG/DL — HIGH (ref 70–99)
GLUCOSE SERPL-MCNC: 143 MG/DL — HIGH (ref 70–99)
HCT VFR BLD CALC: 27.1 % — LOW (ref 34.5–45)
HCT VFR BLD CALC: 30.8 % — LOW (ref 34.5–45)
HGB BLD-MCNC: 10.9 G/DL — LOW (ref 11.5–15.5)
HGB BLD-MCNC: 9.9 G/DL — LOW (ref 11.5–15.5)
INR BLD: 1.09 RATIO — SIGNIFICANT CHANGE UP (ref 0.88–1.16)
MAGNESIUM SERPL-MCNC: 2 MG/DL — SIGNIFICANT CHANGE UP (ref 1.6–2.6)
MAGNESIUM SERPL-MCNC: 2.1 MG/DL — SIGNIFICANT CHANGE UP (ref 1.6–2.6)
MCHC RBC-ENTMCNC: 30.5 PG — SIGNIFICANT CHANGE UP (ref 27–34)
MCHC RBC-ENTMCNC: 31.3 PG — SIGNIFICANT CHANGE UP (ref 27–34)
MCHC RBC-ENTMCNC: 35.3 GM/DL — SIGNIFICANT CHANGE UP (ref 32–36)
MCHC RBC-ENTMCNC: 36.4 GM/DL — HIGH (ref 32–36)
MCV RBC AUTO: 85.9 FL — SIGNIFICANT CHANGE UP (ref 80–100)
MCV RBC AUTO: 86.4 FL — SIGNIFICANT CHANGE UP (ref 80–100)
PHOSPHATE SERPL-MCNC: 2.6 MG/DL — SIGNIFICANT CHANGE UP (ref 2.5–4.5)
PHOSPHATE SERPL-MCNC: 3.2 MG/DL — SIGNIFICANT CHANGE UP (ref 2.5–4.5)
PLATELET # BLD AUTO: 188 K/UL — SIGNIFICANT CHANGE UP (ref 150–400)
PLATELET # BLD AUTO: 198 K/UL — SIGNIFICANT CHANGE UP (ref 150–400)
POTASSIUM SERPL-MCNC: 3.7 MMOL/L — SIGNIFICANT CHANGE UP (ref 3.5–5.3)
POTASSIUM SERPL-MCNC: 3.9 MMOL/L — SIGNIFICANT CHANGE UP (ref 3.5–5.3)
POTASSIUM SERPL-SCNC: 3.7 MMOL/L — SIGNIFICANT CHANGE UP (ref 3.5–5.3)
POTASSIUM SERPL-SCNC: 3.9 MMOL/L — SIGNIFICANT CHANGE UP (ref 3.5–5.3)
PROT SERPL-MCNC: 6.4 G/DL — SIGNIFICANT CHANGE UP (ref 6–8.3)
PROTHROM AB SERPL-ACNC: 12.5 SEC — SIGNIFICANT CHANGE UP (ref 10–12.9)
RBC # BLD: 3.16 M/UL — LOW (ref 3.8–5.2)
RBC # BLD: 3.56 M/UL — LOW (ref 3.8–5.2)
RBC # FLD: 12.9 % — SIGNIFICANT CHANGE UP (ref 10.3–14.5)
RBC # FLD: 13.3 % — SIGNIFICANT CHANGE UP (ref 10.3–14.5)
RH IG SCN BLD-IMP: POSITIVE — SIGNIFICANT CHANGE UP
SODIUM SERPL-SCNC: 140 MMOL/L — SIGNIFICANT CHANGE UP (ref 135–145)
SODIUM SERPL-SCNC: 140 MMOL/L — SIGNIFICANT CHANGE UP (ref 135–145)
VANCOMYCIN TROUGH SERPL-MCNC: <4 UG/ML — LOW (ref 10–20)
WBC # BLD: 14.5 K/UL — HIGH (ref 3.8–10.5)
WBC # BLD: 14.8 K/UL — HIGH (ref 3.8–10.5)
WBC # FLD AUTO: 14.5 K/UL — HIGH (ref 3.8–10.5)
WBC # FLD AUTO: 14.8 K/UL — HIGH (ref 3.8–10.5)

## 2018-12-24 PROCEDURE — 71045 X-RAY EXAM CHEST 1 VIEW: CPT | Mod: 26

## 2018-12-24 PROCEDURE — 99291 CRITICAL CARE FIRST HOUR: CPT

## 2018-12-24 PROCEDURE — 31600 PLANNED TRACHEOSTOMY: CPT

## 2018-12-24 RX ORDER — VANCOMYCIN HCL 1 G
1250 VIAL (EA) INTRAVENOUS ONCE
Qty: 0 | Refills: 0 | Status: COMPLETED | OUTPATIENT
Start: 2018-12-24 | End: 2018-12-24

## 2018-12-24 RX ORDER — ACETAMINOPHEN 500 MG
1000 TABLET ORAL ONCE
Qty: 0 | Refills: 0 | Status: COMPLETED | OUTPATIENT
Start: 2018-12-24 | End: 2018-12-24

## 2018-12-24 RX ORDER — VANCOMYCIN HCL 1 G
1000 VIAL (EA) INTRAVENOUS EVERY 8 HOURS
Qty: 0 | Refills: 0 | Status: DISCONTINUED | OUTPATIENT
Start: 2018-12-24 | End: 2018-12-25

## 2018-12-24 RX ORDER — LEVOTHYROXINE SODIUM 125 MCG
75 TABLET ORAL DAILY
Qty: 0 | Refills: 0 | Status: DISCONTINUED | OUTPATIENT
Start: 2018-12-24 | End: 2018-12-24

## 2018-12-24 RX ORDER — OXYCODONE HYDROCHLORIDE 5 MG/1
5 TABLET ORAL EVERY 4 HOURS
Qty: 0 | Refills: 0 | Status: DISCONTINUED | OUTPATIENT
Start: 2018-12-24 | End: 2018-12-28

## 2018-12-24 RX ORDER — FENTANYL CITRATE 50 UG/ML
25 INJECTION INTRAVENOUS ONCE
Qty: 0 | Refills: 0 | Status: DISCONTINUED | OUTPATIENT
Start: 2018-12-24 | End: 2018-12-25

## 2018-12-24 RX ORDER — VANCOMYCIN HCL 1 G
1250 VIAL (EA) INTRAVENOUS EVERY 12 HOURS
Qty: 0 | Refills: 0 | Status: DISCONTINUED | OUTPATIENT
Start: 2018-12-24 | End: 2018-12-24

## 2018-12-24 RX ORDER — POTASSIUM CHLORIDE 20 MEQ
40 PACKET (EA) ORAL ONCE
Qty: 0 | Refills: 0 | Status: COMPLETED | OUTPATIENT
Start: 2018-12-24 | End: 2018-12-24

## 2018-12-24 RX ORDER — VANCOMYCIN HCL 1 G
1000 VIAL (EA) INTRAVENOUS ONCE
Qty: 0 | Refills: 0 | Status: COMPLETED | OUTPATIENT
Start: 2018-12-24 | End: 2018-12-24

## 2018-12-24 RX ORDER — LACOSAMIDE 50 MG/1
100 TABLET ORAL
Qty: 0 | Refills: 0 | Status: DISCONTINUED | OUTPATIENT
Start: 2018-12-24 | End: 2018-12-30

## 2018-12-24 RX ORDER — AMLODIPINE BESYLATE 2.5 MG/1
10 TABLET ORAL DAILY
Qty: 0 | Refills: 0 | Status: DISCONTINUED | OUTPATIENT
Start: 2018-12-24 | End: 2018-12-24

## 2018-12-24 RX ORDER — ACETAMINOPHEN 500 MG
650 TABLET ORAL EVERY 6 HOURS
Qty: 0 | Refills: 0 | Status: DISCONTINUED | OUTPATIENT
Start: 2018-12-24 | End: 2018-12-26

## 2018-12-24 RX ORDER — VANCOMYCIN HCL 1 G
VIAL (EA) INTRAVENOUS
Qty: 0 | Refills: 0 | Status: DISCONTINUED | OUTPATIENT
Start: 2018-12-24 | End: 2018-12-24

## 2018-12-24 RX ORDER — DEXAMETHASONE 0.5 MG/5ML
2 ELIXIR ORAL EVERY 24 HOURS
Qty: 0 | Refills: 0 | Status: COMPLETED | OUTPATIENT
Start: 2018-12-24 | End: 2018-12-25

## 2018-12-24 RX ADMIN — LACTULOSE 20 GRAM(S): 10 SOLUTION ORAL at 06:18

## 2018-12-24 RX ADMIN — LACOSAMIDE 100 MILLIGRAM(S): 50 TABLET ORAL at 17:05

## 2018-12-24 RX ADMIN — Medication 100 MILLIGRAM(S): at 06:20

## 2018-12-24 RX ADMIN — Medication 250 MILLIGRAM(S): at 10:00

## 2018-12-24 RX ADMIN — Medication 400 MILLIGRAM(S): at 06:00

## 2018-12-24 RX ADMIN — Medication 2 MILLIGRAM(S): at 09:46

## 2018-12-24 RX ADMIN — FENTANYL CITRATE 50 MICROGRAM(S): 50 INJECTION INTRAVENOUS at 06:00

## 2018-12-24 RX ADMIN — AMLODIPINE BESYLATE 10 MILLIGRAM(S): 2.5 TABLET ORAL at 06:18

## 2018-12-24 RX ADMIN — Medication 3 MILLILITER(S): at 12:46

## 2018-12-24 RX ADMIN — Medication 0.1 MILLIGRAM(S): at 06:18

## 2018-12-24 RX ADMIN — FENTANYL CITRATE 50 MICROGRAM(S): 50 INJECTION INTRAVENOUS at 02:45

## 2018-12-24 RX ADMIN — Medication 3 MILLILITER(S): at 23:24

## 2018-12-24 RX ADMIN — Medication 0.1 MILLIGRAM(S): at 15:00

## 2018-12-24 RX ADMIN — Medication 3 MILLILITER(S): at 00:21

## 2018-12-24 RX ADMIN — CHLORHEXIDINE GLUCONATE 15 MILLILITER(S): 213 SOLUTION TOPICAL at 06:17

## 2018-12-24 RX ADMIN — Medication 0.1 MILLIGRAM(S): at 22:01

## 2018-12-24 RX ADMIN — Medication 2 MILLIGRAM(S): at 06:18

## 2018-12-24 RX ADMIN — Medication 40 MILLIEQUIVALENT(S): at 02:37

## 2018-12-24 RX ADMIN — Medication 250 MILLIGRAM(S): at 15:01

## 2018-12-24 RX ADMIN — FENTANYL CITRATE 50 MICROGRAM(S): 50 INJECTION INTRAVENOUS at 02:30

## 2018-12-24 RX ADMIN — FENTANYL CITRATE 50 MICROGRAM(S): 50 INJECTION INTRAVENOUS at 06:15

## 2018-12-24 RX ADMIN — Medication 1000 MILLIGRAM(S): at 06:30

## 2018-12-24 RX ADMIN — Medication 166.67 MILLIGRAM(S): at 08:33

## 2018-12-24 RX ADMIN — Medication 3 MILLILITER(S): at 05:14

## 2018-12-24 RX ADMIN — Medication 3 MILLILITER(S): at 17:48

## 2018-12-24 RX ADMIN — Medication 250 MILLIGRAM(S): at 22:01

## 2018-12-24 RX ADMIN — Medication 650 MILLIGRAM(S): at 20:00

## 2018-12-24 RX ADMIN — LACOSAMIDE 120 MILLIGRAM(S): 50 TABLET ORAL at 06:17

## 2018-12-24 RX ADMIN — ENOXAPARIN SODIUM 40 MILLIGRAM(S): 100 INJECTION SUBCUTANEOUS at 17:05

## 2018-12-24 RX ADMIN — Medication 650 MILLIGRAM(S): at 19:25

## 2018-12-24 RX ADMIN — POLYETHYLENE GLYCOL 3350 17 GRAM(S): 17 POWDER, FOR SOLUTION ORAL at 06:18

## 2018-12-24 RX ADMIN — CHLORHEXIDINE GLUCONATE 15 MILLILITER(S): 213 SOLUTION TOPICAL at 17:05

## 2018-12-24 NOTE — PROGRESS NOTE ADULT - SUBJECTIVE AND OBJECTIVE BOX
POD/STATUS POST/ENT ISSUE: POD #0 for trach     INTERVAL HPI: 57 y/o POD #0 for tracheostomy #8 shiley cuffed with surgicel in place 2/2 respiratory failure. Pt is doing well on the vent. no complaints or issues.     PHYSICAL EXAM:  Gen: NAD, well-developed  Head: Normocephalic, Atraumatic  Face: no edema/erythema/fluctuance  Eyes:  no scleral injection  Nose: Nares bilaterally patent, no discharge  Mouth: No Stridor / Drooling / Trismus.  Mucosa moist, tongue/uvula midline, oropharynx clear  Neck: #8 shiley inflated cuff and surgicel in place, with minimal serosanguinous oozing from stoma, no active bleeding, sutures x4 and umbilical tie in place.  No lymphadenopathy, trachea midline, no masses  Resp: ventilating well, satting   CV: no peripheral edema/cyanosis      57 y/o POD #0 for tracheostomy 2/2 respiratory failure. Pt is doing well, cuffed with surgicel in place, on the vent, with minimal serosanguinous oozing from stoma, no active bleed. sutures x4 and umbilical tie in place.       - continue vent   - aggressive suction prn   - continue with trach site care, keep dry and clean   - call ENT with any issues

## 2018-12-24 NOTE — PROGRESS NOTE ADULT - ASSESSMENT
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

## 2018-12-24 NOTE — CONSULT NOTE ADULT - SUBJECTIVE AND OBJECTIVE BOX
Patient is a 58y old  Female who presents with a chief complaint of Cerebellar IPH (24 Dec 2018 07:29)      HPI:  59yo woman PMH lupus p/w sudden onset severe HA a/w nausea and vomiting. 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 Patient was intact on presentation however intubated for airway protection and EVD placed.   Pt s/p craniotomy and external ventricular drain placement.  Pt now s/p trach.  Asked to evaluate for PEG placement.  No melena or BRBPR.    PAST MEDICAL & SURGICAL HISTORY:  Systemic lupus erythematosus, unspecified SLE type, unspecified organ involvement status  Lupus  Back pain, chronic  Childhood asthma  Ovarian cyst  DVT (deep venous thrombosis), bilateral: ( treated with Heparin and coumadin)  RA (rheumatoid arthritis)  No significant past surgical history  No significant past surgical history  Plantar fasciitis of right foot: h/o surgery  History of laparoscopic cholecystectomy:       Allergies    No Known Allergies    Intolerances      MEDICATIONS  (STANDING):  ALBUTerol/ipratropium for Nebulization 3 milliLiter(s) Nebulizer every 6 hours  amLODIPine   Tablet 10 milliGRAM(s) Oral daily  chlorhexidine 0.12% Liquid 15 milliLiter(s) Swish and Spit two times a day  chlorhexidine 4% Liquid 1 Application(s) Topical <User Schedule>  cloNIDine 0.1 milliGRAM(s) Oral every 8 hours  dexamethasone     Tablet 2 milliGRAM(s) Oral every 24 hours  docusate sodium Liquid 100 milliGRAM(s) Oral three times a day  enoxaparin Injectable 40 milliGRAM(s) SubCutaneous <User Schedule>  insulin lispro (HumaLOG) corrective regimen sliding scale   SubCutaneous every 6 hours  lacosamide IVPB 100 milliGRAM(s) IV Intermittent every 12 hours  lactulose Syrup 20 Gram(s) Oral every 6 hours  polyethylene glycol 3350 17 Gram(s) Oral two times a day  senna Syrup 5 milliLiter(s) Oral at bedtime  sodium chloride 0.9%. 1000 milliLiter(s) (75 mL/Hr) IV Continuous <Continuous>  vancomycin  IVPB 1000 milliGRAM(s) IV Intermittent every 8 hours    MEDICATIONS  (PRN):  oxyCODONE    Solution 5 milliGRAM(s) Oral every 4 hours PRN Moderate Pain (4 - 6)      SOCIAL HX:  Substance Use (street drugs): (  ) never used  (  ) other:  Tobacco Usage:  (   ) never smoked   (   ) former smoker   (   ) current smoker    Alcohol Usage:    FAMILY HISTORY:  No pertinent family history in first degree relatives  Family history of kidney disease in father (Father)  Family history of heart disease (Father)  Family history of osteoarthritis (Mother)  Family history of hypertension in mother (Mother)      Review of Systems:    unable to obtain    PHYSICAL EXAM:  Vital Signs Last 24 Hrs  T(C): 38.1 (24 Dec 2018 11:33), Max: 38.6 (23 Dec 2018 15:00)  T(F): 100.6 (24 Dec 2018 10:00), Max: 101.5 (23 Dec 2018 15:00)  HR: 93 (24 Dec 2018 11:33) (71 - 136)  BP: 135/72 (24 Dec 2018 11:33) (112/69 - 164/96)  BP(mean): 88 (24 Dec 2018 11:33) (80 - 118)  RR: 16 (24 Dec 2018 11:33) (15 - 25)  SpO2: 100% (24 Dec 2018 11:33) (99% - 100%)    Constitutional: NAD, s/p trach  Neck: No LAD, supple  Respiratory: CTA and P  Cardiovascular: S1 and S2, RRR, no M  Gastrointestinal: BS+, soft, NT/ND, neg HSM,  Extremities: No peripheral edema,  cyanosis  Vascular: 2+ peripheral pulses  Skin: No rashes      LABS:                        10.9   14.8  )-----------( 188      ( 24 Dec 2018 00:28 )             30.8     12-24    140  |  101  |  16  ----------------------------<  143<H>  3.7   |  29  |  0.39<L>    Ca    8.9      24 Dec 2018 00:28  Phos  3.2     12-24  Mg     2.1     12-24    TPro  6.4  /  Alb  3.0<L>  /  TBili  0.4  /  DBili  <0.1  /  AST  33  /  ALT  34  /  AlkPhos  56  12-24    PT/INR - ( 24 Dec 2018 00:28 )   PT: 12.5 sec;   INR: 1.09 ratio         PTT - ( 24 Dec 2018 00:28 )  PTT:25.3 sec  Urinalysis Basic - ( 23 Dec 2018 17:23 )    Color: Light Yellow / Appearance: Clear / S.027 / pH: x  Gluc: x / Ketone: Negative  / Bili: Negative / Urobili: 2 mg/dL   Blood: x / Protein: 30 mg/dL / Nitrite: Negative   Leuk Esterase: Negative / RBC: 28 /hpf / WBC 1 /hpf   Sq Epi: x / Non Sq Epi: 1 /hpf / Bacteria: Negative      LIVER FUNCTIONS - ( 24 Dec 2018 00:28 )  Alb: 3.0 g/dL / Pro: 6.4 g/dL / ALK PHOS: 56 U/L / ALT: 34 U/L / AST: 33 U/L / GGT: x             RADIOLOGY & ADDITIONAL TESTS:

## 2018-12-24 NOTE — CHART NOTE - NSCHARTNOTEFT_GEN_A_CORE
Nutrition Follow Up Note  Patient seen for: nutrition follow up    59 yo female with PMH of lupus admitted with headache, N/V and found with cerebellar IPH with hydrocephalus S/P EVD placement. Now S/P angio + embolization and S/P craniotomy for resection of AVM on . RTOR on  for craniectomy. Chart reviewed, EVD currently clamped & plan for tracheostomy today     Source: medical record; rounds    Diet : NPO After Midnight; NPO with tube feeds + Danactive 2x daily  Enteral Nutrition: Pivot 1.5 @ 80cc/hr x 18 hrs to provide 1440cc fluid, 2160cal/d (26cal/Kg), and 135gm prot/d (1.6gm prot/Kg) based upon dosing wt 84.1Kg  Pt seen during rounds. EN currently held for tracheostomy today. Total EN intake x24 hrs: 1280cc fluid (89% of goal)- held at midnight as per order. No BM thus far- team aware & pt receiving lactulose (1 dose yesterday, plan for additional dose today per rounds).      Daily Weight in k.2 (12-19), Weight in k.1 (-)  Weight Change: weights appear stable, continue to monitor/trend    Pertinent Medications: MEDICATIONS  (STANDING):  ALBUTerol/ipratropium for Nebulization 3 milliLiter(s) Nebulizer every 6 hours  amLODIPine   Tablet 10 milliGRAM(s) Oral daily  chlorhexidine 0.12% Liquid 15 milliLiter(s) Swish and Spit two times a day  chlorhexidine 4% Liquid 1 Application(s) Topical <User Schedule>  cloNIDine 0.1 milliGRAM(s) Oral every 8 hours  dexamethasone     Tablet 2 milliGRAM(s) Oral every 24 hours  docusate sodium Liquid 100 milliGRAM(s) Oral three times a day  enoxaparin Injectable 40 milliGRAM(s) SubCutaneous <User Schedule>  insulin lispro (HumaLOG) corrective regimen sliding scale   SubCutaneous every 6 hours  lacosamide IVPB 100 milliGRAM(s) IV Intermittent every 12 hours  lactulose Syrup 20 Gram(s) Oral every 6 hours  polyethylene glycol 3350 17 Gram(s) Oral two times a day  senna Syrup 5 milliLiter(s) Oral at bedtime  sodium chloride 0.9%. 1000 milliLiter(s) (75 mL/Hr) IV Continuous <Continuous>  vancomycin  IVPB 1000 milliGRAM(s) IV Intermittent every 8 hours    MEDICATIONS  (PRN):  oxyCODONE    Solution 5 milliGRAM(s) Oral every 4 hours PRN Moderate Pain (4 - 6)    Pertinent Labs: () POCT , K 3.0L; () POCT -128      Skin per nursing documentation: no pressure injuries  Edema:  1+ generalized edema    Estimated Needs:   [x] no change since previous assessment  [ ] recalculated:     Previous Nutrition Diagnosis: Increased Nutrient Needs  Nutrition Diagnosis is: ongoing, plan to address via EN as medically feasible    New Nutrition Diagnosis: N/A      Recommend    1) As medically feasible, resume Pivot 1.5 @ 80cc/hr x 18 hrs to provide 1440cc fluid, 2160cal/d (26cal/Kg), and 135gm prot/d (1.6gm prot/Kg) based upon dosing wt 84.1Kg  2) As medically feasible, resume Danactive 2x daily as per NSCU protocol  3) Defer total fluids to medical team    Monitoring and Evaluation:     Continue to monitor Nutritional intake, Tolerance to diet prescription, weights, labs, skin integrity    RD remains available upon request and will follow up per protocol  Hanh Pompa RD CDN Pager #[phone]

## 2018-12-24 NOTE — PROGRESS NOTE ADULT - SUBJECTIVE AND OBJECTIVE BOX
Visit Summary: Patient seen and evaluated at bedside.    Overnight Events: none    Exam:  T(C): 37.9 (12-23-18 @ 22:00), Max: 38.6 (12-23-18 @ 15:00)  HR: 118 (12-23-18 @ 22:00) (66 - 136)  BP: 113/66 (12-23-18 @ 21:00) (113/66 - 169/88)  RR: 16 (12-23-18 @ 22:00) (14 - 27)  SpO2: 100% (12-23-18 @ 22:00) (100% - 100%)  Wt(kg): --    EO to stim, regards when EO, intermittenly FC although not tonight, PERRL  LUE spont  RUE no movement   BLE moves toes spont      12-22    146<H>  |  105  |  21  ----------------------------<  164<H>  3.5   |  31  |  0.46<L>    Ca    8.8      22 Dec 2018 22:30  Phos  2.5     12-22  Mg     2.0     12-22    TPro  6.9  /  Alb  3.5  /  TBili  0.3  /  DBili  <0.1  /  AST  33  /  ALT  33  /  AlkPhos  62  12-22

## 2018-12-24 NOTE — PROGRESS NOTE ADULT - ASSESSMENT
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

## 2018-12-24 NOTE — CHART NOTE - NSCHARTNOTEFT_GEN_A_CORE
Patient was lying flat and Left subclavian central line was removed under standard fashion. Direct pressure was applied to stop bleeding and tegaderm dressing was applied after hemostasis achieved. Patient tolerated procedure well. Vitals stable during the procedure. Patient's RN was instructed to check dressing frequently. No complications. CXR was ordered to confirm Rt PICC line remains in good position after removal of Lt subclavian TLC.

## 2018-12-24 NOTE — PRE-ANESTHESIA EVALUATION ADULT - NSANTHPMHFT_GEN_ALL_CORE
s/p AVM embolization and crani on 12/17 w deteriorating neurologic exam
s/p cerebellat IPH, min responsivve, PNA post intubation

## 2018-12-25 LAB
-  AMPICILLIN/SULBACTAM: SIGNIFICANT CHANGE UP
-  CEFAZOLIN: SIGNIFICANT CHANGE UP
-  CLINDAMYCIN: SIGNIFICANT CHANGE UP
-  ERYTHROMYCIN: SIGNIFICANT CHANGE UP
-  GENTAMICIN: SIGNIFICANT CHANGE UP
-  LINEZOLID: SIGNIFICANT CHANGE UP
-  OXACILLIN: SIGNIFICANT CHANGE UP
-  PENICILLIN: SIGNIFICANT CHANGE UP
-  RIFAMPIN: SIGNIFICANT CHANGE UP
-  TETRACYCLINE: SIGNIFICANT CHANGE UP
-  TRIMETHOPRIM/SULFAMETHOXAZOLE: SIGNIFICANT CHANGE UP
-  VANCOMYCIN: SIGNIFICANT CHANGE UP
ANION GAP SERPL CALC-SCNC: 11 MMOL/L — SIGNIFICANT CHANGE UP (ref 5–17)
ANION GAP SERPL CALC-SCNC: 9 MMOL/L — SIGNIFICANT CHANGE UP (ref 5–17)
APTT BLD: 23 SEC — LOW (ref 27.5–36.3)
BUN SERPL-MCNC: 13 MG/DL — SIGNIFICANT CHANGE UP (ref 7–23)
BUN SERPL-MCNC: 15 MG/DL — SIGNIFICANT CHANGE UP (ref 7–23)
CALCIUM SERPL-MCNC: 8.3 MG/DL — LOW (ref 8.4–10.5)
CALCIUM SERPL-MCNC: 8.5 MG/DL — SIGNIFICANT CHANGE UP (ref 8.4–10.5)
CHLORIDE SERPL-SCNC: 100 MMOL/L — SIGNIFICANT CHANGE UP (ref 96–108)
CHLORIDE SERPL-SCNC: 99 MMOL/L — SIGNIFICANT CHANGE UP (ref 96–108)
CO2 SERPL-SCNC: 26 MMOL/L — SIGNIFICANT CHANGE UP (ref 22–31)
CO2 SERPL-SCNC: 29 MMOL/L — SIGNIFICANT CHANGE UP (ref 22–31)
CREAT SERPL-MCNC: 0.41 MG/DL — LOW (ref 0.5–1.3)
CREAT SERPL-MCNC: 0.5 MG/DL — SIGNIFICANT CHANGE UP (ref 0.5–1.3)
CULTURE RESULTS: NO GROWTH — SIGNIFICANT CHANGE UP
CULTURE RESULTS: SIGNIFICANT CHANGE UP
GAS PNL BLDA: SIGNIFICANT CHANGE UP
GAS PNL BLDA: SIGNIFICANT CHANGE UP
GLUCOSE BLDC GLUCOMTR-MCNC: 103 MG/DL — HIGH (ref 70–99)
GLUCOSE BLDC GLUCOMTR-MCNC: 145 MG/DL — HIGH (ref 70–99)
GLUCOSE BLDC GLUCOMTR-MCNC: 200 MG/DL — HIGH (ref 70–99)
GLUCOSE SERPL-MCNC: 142 MG/DL — HIGH (ref 70–99)
GLUCOSE SERPL-MCNC: 173 MG/DL — HIGH (ref 70–99)
HCT VFR BLD CALC: 25.7 % — LOW (ref 34.5–45)
HGB BLD-MCNC: 9.3 G/DL — LOW (ref 11.5–15.5)
INR BLD: 1.13 RATIO — SIGNIFICANT CHANGE UP (ref 0.88–1.16)
LDH SERPL L TO P-CCNC: 337 U/L — HIGH (ref 50–242)
MAGNESIUM SERPL-MCNC: 2 MG/DL — SIGNIFICANT CHANGE UP (ref 1.6–2.6)
MCHC RBC-ENTMCNC: 31.1 PG — SIGNIFICANT CHANGE UP (ref 27–34)
MCHC RBC-ENTMCNC: 36.1 GM/DL — HIGH (ref 32–36)
MCV RBC AUTO: 86.1 FL — SIGNIFICANT CHANGE UP (ref 80–100)
METHOD TYPE: SIGNIFICANT CHANGE UP
ORGANISM # SPEC MICROSCOPIC CNT: SIGNIFICANT CHANGE UP
ORGANISM # SPEC MICROSCOPIC CNT: SIGNIFICANT CHANGE UP
PHOSPHATE SERPL-MCNC: 2.7 MG/DL — SIGNIFICANT CHANGE UP (ref 2.5–4.5)
PLATELET # BLD AUTO: 190 K/UL — SIGNIFICANT CHANGE UP (ref 150–400)
POTASSIUM SERPL-MCNC: 3.8 MMOL/L — SIGNIFICANT CHANGE UP (ref 3.5–5.3)
POTASSIUM SERPL-MCNC: 3.9 MMOL/L — SIGNIFICANT CHANGE UP (ref 3.5–5.3)
POTASSIUM SERPL-SCNC: 3.8 MMOL/L — SIGNIFICANT CHANGE UP (ref 3.5–5.3)
POTASSIUM SERPL-SCNC: 3.9 MMOL/L — SIGNIFICANT CHANGE UP (ref 3.5–5.3)
PROTHROM AB SERPL-ACNC: 12.9 SEC — SIGNIFICANT CHANGE UP (ref 10–12.9)
RBC # BLD: 2.98 M/UL — LOW (ref 3.8–5.2)
RBC # FLD: 13.4 % — SIGNIFICANT CHANGE UP (ref 10.3–14.5)
SODIUM SERPL-SCNC: 136 MMOL/L — SIGNIFICANT CHANGE UP (ref 135–145)
SODIUM SERPL-SCNC: 138 MMOL/L — SIGNIFICANT CHANGE UP (ref 135–145)
SPECIMEN SOURCE: SIGNIFICANT CHANGE UP
TROPONIN T, HIGH SENSITIVITY RESULT: 16 NG/L — SIGNIFICANT CHANGE UP (ref 0–51)
VANCOMYCIN TROUGH SERPL-MCNC: 7.6 UG/ML — LOW (ref 10–20)
WBC # BLD: 15.9 K/UL — HIGH (ref 3.8–10.5)
WBC # FLD AUTO: 15.9 K/UL — HIGH (ref 3.8–10.5)

## 2018-12-25 PROCEDURE — 93010 ELECTROCARDIOGRAM REPORT: CPT

## 2018-12-25 PROCEDURE — 71045 X-RAY EXAM CHEST 1 VIEW: CPT | Mod: 26,76

## 2018-12-25 PROCEDURE — 99291 CRITICAL CARE FIRST HOUR: CPT

## 2018-12-25 PROCEDURE — 71045 X-RAY EXAM CHEST 1 VIEW: CPT | Mod: 26

## 2018-12-25 RX ORDER — POTASSIUM CHLORIDE 20 MEQ
10 PACKET (EA) ORAL ONCE
Qty: 0 | Refills: 0 | Status: COMPLETED | OUTPATIENT
Start: 2018-12-25 | End: 2018-12-25

## 2018-12-25 RX ORDER — VANCOMYCIN HCL 1 G
1000 VIAL (EA) INTRAVENOUS ONCE
Qty: 0 | Refills: 0 | Status: COMPLETED | OUTPATIENT
Start: 2018-12-25 | End: 2018-12-25

## 2018-12-25 RX ORDER — POTASSIUM CHLORIDE 20 MEQ
20 PACKET (EA) ORAL ONCE
Qty: 0 | Refills: 0 | Status: COMPLETED | OUTPATIENT
Start: 2018-12-25 | End: 2018-12-25

## 2018-12-25 RX ORDER — FENTANYL CITRATE 50 UG/ML
25 INJECTION INTRAVENOUS
Qty: 0 | Refills: 0 | Status: DISCONTINUED | OUTPATIENT
Start: 2018-12-25 | End: 2018-12-30

## 2018-12-25 RX ORDER — DEXTROSE MONOHYDRATE, SODIUM CHLORIDE, AND POTASSIUM CHLORIDE 50; .745; 4.5 G/1000ML; G/1000ML; G/1000ML
1000 INJECTION, SOLUTION INTRAVENOUS
Qty: 0 | Refills: 0 | Status: DISCONTINUED | OUTPATIENT
Start: 2018-12-25 | End: 2018-12-28

## 2018-12-25 RX ORDER — POTASSIUM CHLORIDE 20 MEQ
20 PACKET (EA) ORAL ONCE
Qty: 0 | Refills: 0 | Status: DISCONTINUED | OUTPATIENT
Start: 2018-12-25 | End: 2018-12-25

## 2018-12-25 RX ORDER — ACETAMINOPHEN 500 MG
1000 TABLET ORAL ONCE
Qty: 0 | Refills: 0 | Status: COMPLETED | OUTPATIENT
Start: 2018-12-25 | End: 2018-12-25

## 2018-12-25 RX ORDER — VANCOMYCIN HCL 1 G
1500 VIAL (EA) INTRAVENOUS EVERY 8 HOURS
Qty: 0 | Refills: 0 | Status: DISCONTINUED | OUTPATIENT
Start: 2018-12-25 | End: 2018-12-26

## 2018-12-25 RX ADMIN — Medication 100 MILLIGRAM(S): at 14:44

## 2018-12-25 RX ADMIN — CHLORHEXIDINE GLUCONATE 1 APPLICATION(S): 213 SOLUTION TOPICAL at 02:14

## 2018-12-25 RX ADMIN — Medication 100 MILLIGRAM(S): at 05:04

## 2018-12-25 RX ADMIN — SODIUM CHLORIDE 75 MILLILITER(S): 9 INJECTION INTRAMUSCULAR; INTRAVENOUS; SUBCUTANEOUS at 08:07

## 2018-12-25 RX ADMIN — CHLORHEXIDINE GLUCONATE 15 MILLILITER(S): 213 SOLUTION TOPICAL at 05:04

## 2018-12-25 RX ADMIN — Medication 400 MILLIGRAM(S): at 17:30

## 2018-12-25 RX ADMIN — FENTANYL CITRATE 25 MICROGRAM(S): 50 INJECTION INTRAVENOUS at 10:15

## 2018-12-25 RX ADMIN — Medication 0.1 MILLIGRAM(S): at 22:08

## 2018-12-25 RX ADMIN — Medication 2 MILLIGRAM(S): at 09:26

## 2018-12-25 RX ADMIN — ENOXAPARIN SODIUM 40 MILLIGRAM(S): 100 INJECTION SUBCUTANEOUS at 17:37

## 2018-12-25 RX ADMIN — Medication 1: at 14:01

## 2018-12-25 RX ADMIN — LACOSAMIDE 100 MILLIGRAM(S): 50 TABLET ORAL at 05:05

## 2018-12-25 RX ADMIN — CHLORHEXIDINE GLUCONATE 15 MILLILITER(S): 213 SOLUTION TOPICAL at 17:37

## 2018-12-25 RX ADMIN — Medication 650 MILLIGRAM(S): at 07:00

## 2018-12-25 RX ADMIN — Medication 10 MILLIEQUIVALENT(S): at 05:04

## 2018-12-25 RX ADMIN — Medication 250 MILLIGRAM(S): at 14:42

## 2018-12-25 RX ADMIN — Medication 3 MILLILITER(S): at 11:52

## 2018-12-25 RX ADMIN — Medication 20 MILLIEQUIVALENT(S): at 22:26

## 2018-12-25 RX ADMIN — LACOSAMIDE 100 MILLIGRAM(S): 50 TABLET ORAL at 17:37

## 2018-12-25 RX ADMIN — Medication 0.1 MILLIGRAM(S): at 13:41

## 2018-12-25 RX ADMIN — Medication 650 MILLIGRAM(S): at 01:25

## 2018-12-25 RX ADMIN — AMLODIPINE BESYLATE 10 MILLIGRAM(S): 2.5 TABLET ORAL at 05:04

## 2018-12-25 RX ADMIN — FENTANYL CITRATE 25 MICROGRAM(S): 50 INJECTION INTRAVENOUS at 10:00

## 2018-12-25 RX ADMIN — Medication 0.1 MILLIGRAM(S): at 05:05

## 2018-12-25 RX ADMIN — Medication 3 MILLILITER(S): at 06:10

## 2018-12-25 RX ADMIN — CHLORHEXIDINE GLUCONATE 1 APPLICATION(S): 213 SOLUTION TOPICAL at 22:26

## 2018-12-25 RX ADMIN — Medication 1: at 17:49

## 2018-12-25 RX ADMIN — Medication 3 MILLILITER(S): at 17:19

## 2018-12-25 RX ADMIN — Medication 650 MILLIGRAM(S): at 02:00

## 2018-12-25 RX ADMIN — Medication 300 MILLIGRAM(S): at 22:08

## 2018-12-25 RX ADMIN — Medication 250 MILLIGRAM(S): at 05:05

## 2018-12-25 NOTE — OCCUPATIONAL THERAPY INITIAL EVALUATION ADULT - PERTINENT HX OF CURRENT PROBLEM, REHAB EVAL
57yo woman PMH lupus p/w sudden onset severe HA a/w nausea and vomiting. 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 Patient was intact on presentation however intubated for airway protection and EVD placed. Transferred to [facility] for further care.

## 2018-12-25 NOTE — PROGRESS NOTE ADULT - ASSESSMENT
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

## 2018-12-25 NOTE — OCCUPATIONAL THERAPY INITIAL EVALUATION ADULT - GENERAL OBSERVATIONS, REHAB EVAL
Pt received semi supine position +trach, +vent, +EVD, + IV, +tele, +BP cuff, +continuous pulse ox, +warming blanket.

## 2018-12-25 NOTE — PROGRESS NOTE ADULT - SUBJECTIVE AND OBJECTIVE BOX
Visit Summary: Patient seen and evaluated at bedside.    Overnight Events: none    Exam:  T(C): 38.2 (12-24-18 @ 23:00), Max: 38.5 (12-24-18 @ 06:00)  HR: 77 (12-24-18 @ 23:27) (75 - 126)  BP: 119/66 (12-24-18 @ 23:00) (112/69 - 156/100)  RR: 16 (12-24-18 @ 23:00) (13 - 40)  SpO2: 100% (12-24-18 @ 23:27) (97% - 100%)  Wt(kg): --    Trach, EOS, PERRL  LUE Loc  RUE WD  BLE wiggles toes to commands                        9.9    14.5  )-----------( 198      ( 24 Dec 2018 22:21 )             27.1     12-24    140  |  102  |  13  ----------------------------<  128<H>  3.9   |  27  |  0.50    Ca    8.2<L>      24 Dec 2018 22:21  Phos  2.6     12-24  Mg     2.0     12-24    TPro  6.4  /  Alb  3.0<L>  /  TBili  0.4  /  DBili  <0.1  /  AST  33  /  ALT  34  /  AlkPhos  56  12-24  PT/INR - ( 24 Dec 2018 00:28 )   PT: 12.5 sec;   INR: 1.09 ratio         PTT - ( 24 Dec 2018 00:28 )  PTT:25.3 sec

## 2018-12-25 NOTE — OCCUPATIONAL THERAPY INITIAL EVALUATION ADULT - BALANCE DISTURBANCE, IDENTIFIED IMPAIRMENT CONTRIBUTE, REHAB EVAL
decreased strength/impaired motor control/decreased sensation/impaired postural control/decreased ROM

## 2018-12-25 NOTE — OCCUPATIONAL THERAPY INITIAL EVALUATION ADULT - PLANNED THERAPY INTERVENTIONS, OT EVAL
ADL retraining/bed mobility training/transfer training/strengthening/balance training/cognitive, visual perceptual/neuromuscular re-education

## 2018-12-25 NOTE — PROGRESS NOTE ADULT - SUBJECTIVE AND OBJECTIVE BOX
HPI:  59yo woman PMH lupus p/w sudden onset severe HA a/w nausea and vomiting. 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 Patient was intact on presentation however intubated for airway protection and EVD placed. Transferred to [facility] for further care. (17 Dec 2018 01:18)    12/17: s/p angio/embo of EVM, resection of AVM  12/18: RTOR for posterior fossa decompression expansion  12/24 s/p trach, EVD clamped    OVERNIGHT EVENTS:   No acute events overnight.    VITALS:  T(C): , Max: 38.4 (12-25-18 @ 01:00)  HR:  (76 - 127)  BP:  (93/72 - 157/79)  ABP: --  RR:  (13 - 40)  SpO2:  (97% - 100%)  Wt(kg): --  Device: Avea, Mode: AC/ CMV (Assist Control/ Continuous Mandatory Ventilation), RR (machine): 16, TV (machine): 450, FiO2: 30, PEEP: 5, ITime: 1, MAP: 9, PIP: 19    12-24-18 @ 07:01  -  12-25-18 @ 07:00  --------------------------------------------------------  IN: 2885 mL / OUT: 2557 mL / NET: 328 mL    12-25-18 @ 07:01  -  12-25-18 @ 11:44  --------------------------------------------------------  IN: 395 mL / OUT: 0 mL / NET: 395 mL      LABS:  Na: 140 (12-24 @ 22:21), 140 (12-24 @ 00:28), 146 (12-22 @ 22:30)  K: 3.9 (12-24 @ 22:21), 3.7 (12-24 @ 00:28), 3.5 (12-22 @ 22:30)  Cl: 102 (12-24 @ 22:21), 101 (12-24 @ 00:28), 105 (12-22 @ 22:30)  CO2: 27 (12-24 @ 22:21), 29 (12-24 @ 00:28), 31 (12-22 @ 22:30)  BUN: 13 (12-24 @ 22:21), 16 (12-24 @ 00:28), 21 (12-22 @ 22:30)  Cr: 0.50 (12-24 @ 22:21), 0.39 (12-24 @ 00:28), 0.46 (12-22 @ 22:30)  Glu: 128(12-24 @ 22:21), 143(12-24 @ 00:28), 164(12-22 @ 22:30)    Hgb: 9.9 (12-24 @ 22:21), 10.9 (12-24 @ 00:28)  Hct: 27.1 (12-24 @ 22:21), 30.8 (12-24 @ 00:28)  WBC: 14.5 (12-24 @ 22:21), 14.8 (12-24 @ 00:28)  Plt: 198 (12-24 @ 22:21), 188 (12-24 @ 00:28)    INR: 1.09 12-24-18 @ 00:28  PTT: 25.3 12-24-18 @ 00:28    IMAGING:   Recent imaging studies were reviewed.    MEDICATIONS:  acetaminophen    Suspension .. 650 milliGRAM(s) Oral every 6 hours PRN  ALBUTerol/ipratropium for Nebulization 3 milliLiter(s) Nebulizer every 6 hours  amLODIPine   Tablet 10 milliGRAM(s) Oral daily  chlorhexidine 0.12% Liquid 15 milliLiter(s) Swish and Spit two times a day  chlorhexidine 4% Liquid 1 Application(s) Topical <User Schedule>  cloNIDine 0.1 milliGRAM(s) Oral every 8 hours  docusate sodium Liquid 100 milliGRAM(s) Oral three times a day  enoxaparin Injectable 40 milliGRAM(s) SubCutaneous <User Schedule>  insulin lispro (HumaLOG) corrective regimen sliding scale   SubCutaneous every 6 hours  lacosamide 100 milliGRAM(s) Oral two times a day  oxyCODONE    Solution 5 milliGRAM(s) Oral every 4 hours PRN  polyethylene glycol 3350 17 Gram(s) Oral two times a day  senna Syrup 5 milliLiter(s) Oral at bedtime  vancomycin  IVPB 1000 milliGRAM(s) IV Intermittent every 8 hours    EXAMINATION:  General:  calm  HEENT:  trach'd  Neuro:  opens eyes, follows commands on b/l LE (wiggles toes), does not mouth words, LUE localizes, RUE withdraws  Cards:  RRR  Respiratory:  no respiratory distress  Adomen:  soft  Extremities:  no edema  Skin:  warm/dry

## 2018-12-25 NOTE — OCCUPATIONAL THERAPY INITIAL EVALUATION ADULT - BALANCE TRAINING, PT EVAL
GOAL: Pt will increase s/d s/s balance by half a grade to facilitate performance in ADLs and functional mobility tasks within 2 weeks

## 2018-12-25 NOTE — OCCUPATIONAL THERAPY INITIAL EVALUATION ADULT - DIAGNOSIS, OT EVAL
Pt p/w decreased AROM, strength, balance, sensation, cognition, coordination, and endurance impacting her ability to perform ADLs and functional mobility tasks.

## 2018-12-25 NOTE — OCCUPATIONAL THERAPY INITIAL EVALUATION ADULT - ADDITIONAL COMMENTS
CT Head 12/23:Postop changes compatible suboccipital craniectomy/craniotomy is again seen. Aneurysm clip is well as a embolic material is again seen in posterior fossa region. Abnormal areas of high and low attenuation involving the bilateral cerebellar hemispheres are again identified. Extra-axial collection in the postop region is again seen and slightly more conspicuous. This finding measures approximately 1.0 cm in widest diameter and previously measured approximately 0.8 cm widest diameter. 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 Intraventricular hemorrhage is again seen. The overall size and configuration of the ventricles appear unchanged. Right frontal shunt catheter is again seen and unchanged in position. Small left axillary polyp versus retention cyst is seen. Extracalvarial soft tissue swelling and staples are seen involving the high right frontal and lateral left parietal occipital region.

## 2018-12-25 NOTE — CHART NOTE - NSCHARTNOTEFT_GEN_A_CORE
responded to vent alarm for profound desaturation down to 28.  vent circuit intact.  patient cyanotic.  immediately bagged patient with resistance.  saturation improved.  b/l breath sounds and b/l chest excursion noted.  sat improved with persistent manual ventilation.    multiple rounds of saline lavaging and manual ventilation done.  large mucus plug expectorated from trach.  persistent plugging resolved with more suctioning.   cxr no ptx.  abg pending.  sat post event 100.

## 2018-12-25 NOTE — OCCUPATIONAL THERAPY INITIAL EVALUATION ADULT - IMPAIRMENTS CONTRIBUTING IMPAIRED BED MOBILITY, REHAB EVAL
impaired balance/decreased strength/decreased ROM/decreased sensation/impaired motor control/impaired postural control

## 2018-12-26 DIAGNOSIS — Z93.0 TRACHEOSTOMY STATUS: ICD-10-CM

## 2018-12-26 LAB
ALBUMIN SERPL ELPH-MCNC: 2.3 G/DL — LOW (ref 3.3–5)
ALP SERPL-CCNC: 49 U/L — SIGNIFICANT CHANGE UP (ref 40–120)
ALT FLD-CCNC: 38 U/L — SIGNIFICANT CHANGE UP (ref 10–45)
ANION GAP SERPL CALC-SCNC: 8 MMOL/L — SIGNIFICANT CHANGE UP (ref 5–17)
APPEARANCE CSF: ABNORMAL
APPEARANCE UR: ABNORMAL
AST SERPL-CCNC: 41 U/L — HIGH (ref 10–40)
BILIRUB DIRECT SERPL-MCNC: 0.1 MG/DL — SIGNIFICANT CHANGE UP (ref 0–0.2)
BILIRUB INDIRECT FLD-MCNC: 0.2 MG/DL — SIGNIFICANT CHANGE UP (ref 0.2–1)
BILIRUB SERPL-MCNC: 0.3 MG/DL — SIGNIFICANT CHANGE UP (ref 0.2–1.2)
BILIRUB UR-MCNC: NEGATIVE — SIGNIFICANT CHANGE UP
BUN SERPL-MCNC: 13 MG/DL — SIGNIFICANT CHANGE UP (ref 7–23)
CALCIUM SERPL-MCNC: 8.1 MG/DL — LOW (ref 8.4–10.5)
CHLORIDE SERPL-SCNC: 103 MMOL/L — SIGNIFICANT CHANGE UP (ref 96–108)
CO2 SERPL-SCNC: 27 MMOL/L — SIGNIFICANT CHANGE UP (ref 22–31)
COLOR CSF: ABNORMAL
COLOR SPEC: SIGNIFICANT CHANGE UP
CREAT SERPL-MCNC: 0.48 MG/DL — LOW (ref 0.5–1.3)
DIFF PNL FLD: ABNORMAL
GLUCOSE BLDC GLUCOMTR-MCNC: 120 MG/DL — HIGH (ref 70–99)
GLUCOSE BLDC GLUCOMTR-MCNC: 124 MG/DL — HIGH (ref 70–99)
GLUCOSE BLDC GLUCOMTR-MCNC: 98 MG/DL — SIGNIFICANT CHANGE UP (ref 70–99)
GLUCOSE CSF-MCNC: 83 MG/DL — HIGH (ref 40–70)
GLUCOSE SERPL-MCNC: 101 MG/DL — HIGH (ref 70–99)
GLUCOSE UR QL: ABNORMAL
GRAM STN FLD: SIGNIFICANT CHANGE UP
GRAM STN FLD: SIGNIFICANT CHANGE UP
KETONES UR-MCNC: NEGATIVE — SIGNIFICANT CHANGE UP
LACTATE CSF-MCNC: 2 MMOL/L — SIGNIFICANT CHANGE UP (ref 1.1–2.4)
LEUKOCYTE ESTERASE UR-ACNC: NEGATIVE — SIGNIFICANT CHANGE UP
LYMPHOCYTES # CSF: 50 % — SIGNIFICANT CHANGE UP (ref 40–80)
MAGNESIUM SERPL-MCNC: 1.9 MG/DL — SIGNIFICANT CHANGE UP (ref 1.6–2.6)
NEUTROPHILS # CSF: 50 % — HIGH (ref 0–6)
NITRITE UR-MCNC: NEGATIVE — SIGNIFICANT CHANGE UP
NRBC NFR CSF: 1 /UL — SIGNIFICANT CHANGE UP (ref 0–5)
PH UR: 6.5 — SIGNIFICANT CHANGE UP (ref 5–8)
PHOSPHATE SERPL-MCNC: 2.8 MG/DL — SIGNIFICANT CHANGE UP (ref 2.5–4.5)
POTASSIUM SERPL-MCNC: 4.1 MMOL/L — SIGNIFICANT CHANGE UP (ref 3.5–5.3)
POTASSIUM SERPL-SCNC: 4.1 MMOL/L — SIGNIFICANT CHANGE UP (ref 3.5–5.3)
PROT CSF-MCNC: 7 MG/DL — LOW (ref 15–45)
PROT SERPL-MCNC: 5.4 G/DL — LOW (ref 6–8.3)
PROT UR-MCNC: ABNORMAL
RBC # CSF: 8750 /UL — HIGH (ref 0–0)
SODIUM SERPL-SCNC: 138 MMOL/L — SIGNIFICANT CHANGE UP (ref 135–145)
SP GR SPEC: 1.02 — SIGNIFICANT CHANGE UP (ref 1.01–1.02)
SPECIMEN SOURCE: SIGNIFICANT CHANGE UP
SPECIMEN SOURCE: SIGNIFICANT CHANGE UP
TUBE TYPE: SIGNIFICANT CHANGE UP
UROBILINOGEN FLD QL: NEGATIVE — SIGNIFICANT CHANGE UP
VANCOMYCIN TROUGH SERPL-MCNC: 8.5 UG/ML — LOW (ref 10–20)

## 2018-12-26 PROCEDURE — 93306 TTE W/DOPPLER COMPLETE: CPT | Mod: 26

## 2018-12-26 PROCEDURE — 70450 CT HEAD/BRAIN W/O DYE: CPT | Mod: 26

## 2018-12-26 PROCEDURE — 99291 CRITICAL CARE FIRST HOUR: CPT

## 2018-12-26 PROCEDURE — 99231 SBSQ HOSP IP/OBS SF/LOW 25: CPT

## 2018-12-26 PROCEDURE — 99292 CRITICAL CARE ADDL 30 MIN: CPT

## 2018-12-26 RX ORDER — ACETAMINOPHEN 500 MG
1000 TABLET ORAL THREE TIMES A DAY
Qty: 0 | Refills: 0 | Status: DISCONTINUED | OUTPATIENT
Start: 2018-12-26 | End: 2018-12-26

## 2018-12-26 RX ORDER — PROPOFOL 10 MG/ML
19.82 INJECTION, EMULSION INTRAVENOUS
Qty: 1000 | Refills: 0 | Status: DISCONTINUED | OUTPATIENT
Start: 2018-12-26 | End: 2018-12-27

## 2018-12-26 RX ORDER — FENTANYL CITRATE 50 UG/ML
25 INJECTION INTRAVENOUS ONCE
Qty: 0 | Refills: 0 | Status: DISCONTINUED | OUTPATIENT
Start: 2018-12-26 | End: 2018-12-26

## 2018-12-26 RX ORDER — MIDAZOLAM HYDROCHLORIDE 1 MG/ML
2 INJECTION, SOLUTION INTRAMUSCULAR; INTRAVENOUS ONCE
Qty: 0 | Refills: 0 | Status: DISCONTINUED | OUTPATIENT
Start: 2018-12-26 | End: 2018-12-26

## 2018-12-26 RX ORDER — MIDAZOLAM HYDROCHLORIDE 1 MG/ML
4 INJECTION, SOLUTION INTRAMUSCULAR; INTRAVENOUS ONCE
Qty: 0 | Refills: 0 | Status: DISCONTINUED | OUTPATIENT
Start: 2018-12-26 | End: 2018-12-26

## 2018-12-26 RX ORDER — FENTANYL CITRATE 50 UG/ML
50 INJECTION INTRAVENOUS ONCE
Qty: 0 | Refills: 0 | Status: DISCONTINUED | OUTPATIENT
Start: 2018-12-26 | End: 2018-12-26

## 2018-12-26 RX ORDER — IBUPROFEN 200 MG
800 TABLET ORAL ONCE
Qty: 0 | Refills: 0 | Status: COMPLETED | OUTPATIENT
Start: 2018-12-26 | End: 2018-12-26

## 2018-12-26 RX ORDER — CALCIUM GLUCONATE 100 MG/ML
1 VIAL (ML) INTRAVENOUS ONCE
Qty: 0 | Refills: 0 | Status: COMPLETED | OUTPATIENT
Start: 2018-12-26 | End: 2018-12-27

## 2018-12-26 RX ORDER — FENTANYL CITRATE 50 UG/ML
100 INJECTION INTRAVENOUS ONCE
Qty: 0 | Refills: 0 | Status: DISCONTINUED | OUTPATIENT
Start: 2018-12-26 | End: 2018-12-26

## 2018-12-26 RX ORDER — VANCOMYCIN HCL 1 G
1750 VIAL (EA) INTRAVENOUS EVERY 8 HOURS
Qty: 0 | Refills: 0 | Status: DISCONTINUED | OUTPATIENT
Start: 2018-12-26 | End: 2018-12-29

## 2018-12-26 RX ORDER — MEPERIDINE HYDROCHLORIDE 50 MG/ML
25 INJECTION INTRAMUSCULAR; INTRAVENOUS; SUBCUTANEOUS ONCE
Qty: 0 | Refills: 0 | Status: DISCONTINUED | OUTPATIENT
Start: 2018-12-26 | End: 2018-12-26

## 2018-12-26 RX ORDER — MEPERIDINE HYDROCHLORIDE 50 MG/ML
50 INJECTION INTRAMUSCULAR; INTRAVENOUS; SUBCUTANEOUS EVERY 4 HOURS
Qty: 0 | Refills: 0 | Status: DISCONTINUED | OUTPATIENT
Start: 2018-12-26 | End: 2018-12-30

## 2018-12-26 RX ORDER — ACETAMINOPHEN 500 MG
1000 TABLET ORAL EVERY 8 HOURS
Qty: 0 | Refills: 0 | Status: COMPLETED | OUTPATIENT
Start: 2018-12-26 | End: 2018-12-28

## 2018-12-26 RX ORDER — SODIUM CHLORIDE 9 MG/ML
1000 INJECTION INTRAMUSCULAR; INTRAVENOUS; SUBCUTANEOUS ONCE
Qty: 0 | Refills: 0 | Status: COMPLETED | OUTPATIENT
Start: 2018-12-26 | End: 2018-12-26

## 2018-12-26 RX ADMIN — Medication 300 MILLIGRAM(S): at 05:02

## 2018-12-26 RX ADMIN — MEPERIDINE HYDROCHLORIDE 25 MILLIGRAM(S): 50 INJECTION INTRAMUSCULAR; INTRAVENOUS; SUBCUTANEOUS at 15:30

## 2018-12-26 RX ADMIN — CHLORHEXIDINE GLUCONATE 15 MILLILITER(S): 213 SOLUTION TOPICAL at 05:02

## 2018-12-26 RX ADMIN — Medication 3 MILLILITER(S): at 17:19

## 2018-12-26 RX ADMIN — Medication 1000 MILLIGRAM(S): at 21:33

## 2018-12-26 RX ADMIN — Medication 300 MILLIGRAM(S): at 11:59

## 2018-12-26 RX ADMIN — AMLODIPINE BESYLATE 10 MILLIGRAM(S): 2.5 TABLET ORAL at 05:01

## 2018-12-26 RX ADMIN — Medication 800 MILLIGRAM(S): at 15:59

## 2018-12-26 RX ADMIN — MIDAZOLAM HYDROCHLORIDE 2 MILLIGRAM(S): 1 INJECTION, SOLUTION INTRAMUSCULAR; INTRAVENOUS at 12:10

## 2018-12-26 RX ADMIN — FENTANYL CITRATE 25 MICROGRAM(S): 50 INJECTION INTRAVENOUS at 00:18

## 2018-12-26 RX ADMIN — Medication 1000 MILLIGRAM(S): at 09:34

## 2018-12-26 RX ADMIN — Medication 1000 MILLIGRAM(S): at 10:30

## 2018-12-26 RX ADMIN — Medication 3 MILLILITER(S): at 11:41

## 2018-12-26 RX ADMIN — Medication 3 MILLILITER(S): at 00:15

## 2018-12-26 RX ADMIN — FENTANYL CITRATE 25 MICROGRAM(S): 50 INJECTION INTRAVENOUS at 14:50

## 2018-12-26 RX ADMIN — Medication 3 MILLILITER(S): at 05:15

## 2018-12-26 RX ADMIN — FENTANYL CITRATE 50 MICROGRAM(S): 50 INJECTION INTRAVENOUS at 12:10

## 2018-12-26 RX ADMIN — FENTANYL CITRATE 25 MICROGRAM(S): 50 INJECTION INTRAVENOUS at 15:05

## 2018-12-26 RX ADMIN — LACOSAMIDE 100 MILLIGRAM(S): 50 TABLET ORAL at 17:15

## 2018-12-26 RX ADMIN — CHLORHEXIDINE GLUCONATE 1 APPLICATION(S): 213 SOLUTION TOPICAL at 21:33

## 2018-12-26 RX ADMIN — PROPOFOL 10 MICROGRAM(S)/KG/MIN: 10 INJECTION, EMULSION INTRAVENOUS at 17:20

## 2018-12-26 RX ADMIN — CHLORHEXIDINE GLUCONATE 15 MILLILITER(S): 213 SOLUTION TOPICAL at 17:15

## 2018-12-26 RX ADMIN — MEPERIDINE HYDROCHLORIDE 25 MILLIGRAM(S): 50 INJECTION INTRAMUSCULAR; INTRAVENOUS; SUBCUTANEOUS at 15:52

## 2018-12-26 RX ADMIN — Medication 10 MILLIGRAM(S): at 13:38

## 2018-12-26 RX ADMIN — SODIUM CHLORIDE 4000 MILLILITER(S): 9 INJECTION INTRAMUSCULAR; INTRAVENOUS; SUBCUTANEOUS at 09:27

## 2018-12-26 RX ADMIN — Medication 0.1 MILLIGRAM(S): at 05:01

## 2018-12-26 RX ADMIN — Medication 100 MILLIGRAM(S): at 21:33

## 2018-12-26 RX ADMIN — Medication 3 MILLILITER(S): at 23:32

## 2018-12-26 RX ADMIN — SENNA PLUS 5 MILLILITER(S): 8.6 TABLET ORAL at 21:38

## 2018-12-26 RX ADMIN — PROPOFOL 10 MICROGRAM(S)/KG/MIN: 10 INJECTION, EMULSION INTRAVENOUS at 21:35

## 2018-12-26 RX ADMIN — Medication 10 MILLIGRAM(S): at 21:33

## 2018-12-26 RX ADMIN — FENTANYL CITRATE 25 MICROGRAM(S): 50 INJECTION INTRAVENOUS at 00:45

## 2018-12-26 RX ADMIN — LACOSAMIDE 100 MILLIGRAM(S): 50 TABLET ORAL at 05:01

## 2018-12-26 RX ADMIN — Medication 0.1 MILLIGRAM(S): at 09:34

## 2018-12-26 RX ADMIN — Medication 250 MILLIGRAM(S): at 22:28

## 2018-12-26 RX ADMIN — Medication 800 MILLIGRAM(S): at 16:00

## 2018-12-26 RX ADMIN — Medication 650 MILLIGRAM(S): at 00:24

## 2018-12-26 RX ADMIN — FENTANYL CITRATE 25 MICROGRAM(S): 50 INJECTION INTRAVENOUS at 08:10

## 2018-12-26 RX ADMIN — ENOXAPARIN SODIUM 40 MILLIGRAM(S): 100 INJECTION SUBCUTANEOUS at 17:15

## 2018-12-26 RX ADMIN — FENTANYL CITRATE 25 MICROGRAM(S): 50 INJECTION INTRAVENOUS at 02:55

## 2018-12-26 RX ADMIN — FENTANYL CITRATE 25 MICROGRAM(S): 50 INJECTION INTRAVENOUS at 08:25

## 2018-12-26 RX ADMIN — FENTANYL CITRATE 25 MICROGRAM(S): 50 INJECTION INTRAVENOUS at 02:40

## 2018-12-26 RX ADMIN — Medication 0.1 MILLIGRAM(S): at 21:33

## 2018-12-26 NOTE — PHYSICAL THERAPY INITIAL EVALUATION ADULT - PERTINENT HX OF CURRENT PROBLEM, REHAB EVAL
Pt is a 57yo woman PMH lupus p/w sudden onset severe HA a/w nausea and vomiting. 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 Patient was intact on presentation however intubated for airway protection and EVD placed. 12/17 s/p angio/embo of EVM, resection of AVM. 12/18 RTOR for posterior fossa decompression expansion. 12/24 s/p trach, EVD clamped. As per neurosx, EVD to remain unclamped for pre op VPS.

## 2018-12-26 NOTE — PHYSICAL THERAPY INITIAL EVALUATION ADULT - MANUAL MUSCLE TESTING RESULTS, REHAB EVAL
grossly BUE 2/5 except B hands 1-2/5 inconsistently, BLE 2+/5; pt's strength is inconsistent, uable to determine differences R vs L at this time

## 2018-12-26 NOTE — PHYSICAL THERAPY INITIAL EVALUATION ADULT - IMPAIRMENTS FOUND, PT EVAL
aerobic capacity/endurance/cognitive impairment/fine motor/arousal, attention, and cognition/gait, locomotion, and balance/decreased midline orientation/muscle strength

## 2018-12-26 NOTE — PROGRESS NOTE ADULT - PROBLEM SELECTOR PLAN 1
continue vent   - aggressive suction prn   - continue with trach site care, keep dry and clean   - call ENT with any issues.

## 2018-12-26 NOTE — PROGRESS NOTE ADULT - SUBJECTIVE AND OBJECTIVE BOX
ENT ISSUE/POD: POD #2 for trach    HPI: 59 y/o POD #2 for tracheostomy #8 shiley cuffed with surgicel in place 2/2 respiratory failure. Pt is doing well on the vent. no complaints or issues.         PAST MEDICAL & SURGICAL HISTORY:  Systemic lupus erythematosus, unspecified SLE type, unspecified organ involvement status  Lupus  Back pain, chronic  Childhood asthma  Ovarian cyst  DVT (deep venous thrombosis), bilateral: 1993( treated with Heparin and coumadin)  RA (rheumatoid arthritis)  No significant past surgical history  No significant past surgical history  Plantar fasciitis of right foot: h/o surgery  History of laparoscopic cholecystectomy: 1993    Allergies    No Known Allergies    Intolerances      MEDICATIONS  (STANDING):  ALBUTerol/ipratropium for Nebulization 3 milliLiter(s) Nebulizer every 6 hours  amLODIPine   Tablet 10 milliGRAM(s) Oral daily  chlorhexidine 0.12% Liquid 15 milliLiter(s) Swish and Spit two times a day  chlorhexidine 4% Liquid 1 Application(s) Topical <User Schedule>  docusate sodium Liquid 100 milliGRAM(s) Oral three times a day  enoxaparin Injectable 40 milliGRAM(s) SubCutaneous <User Schedule>  insulin lispro (HumaLOG) corrective regimen sliding scale   SubCutaneous every 6 hours  lacosamide 100 milliGRAM(s) Oral two times a day  polyethylene glycol 3350 17 Gram(s) Oral two times a day  senna Syrup 5 milliLiter(s) Oral at bedtime  sodium chloride 0.9% with potassium chloride 20 mEq/L 1000 milliLiter(s) (75 mL/Hr) IV Continuous <Continuous>  vancomycin  IVPB 1500 milliGRAM(s) IV Intermittent every 8 hours    MEDICATIONS  (PRN):  acetaminophen    Suspension .. 650 milliGRAM(s) Oral every 6 hours PRN Temp greater or equal to 38C (100.4F), Mild Pain (1 - 3)  fentaNYL    Injectable 25 MICROGram(s) IV Push every 2 hours PRN agitation/severe pain  oxyCODONE    Solution 5 milliGRAM(s) Oral every 4 hours PRN Moderate Pain (4 - 6)      Social History: see consult    Family history: see consult    ROS:   ENT: all negative except as noted in HPI   Pulm: denies SOB, cough, hemoptysis  Neuro: denies numbness/tingling, loss of sensation  Endo: denies heat/cold intolerance, excessive sweating      Vital Signs Last 24 Hrs  T(C): 38.1 (26 Dec 2018 06:00), Max: 39 (25 Dec 2018 17:00)  T(F): 100.6 (26 Dec 2018 06:00), Max: 102.2 (25 Dec 2018 17:00)  HR: 99 (26 Dec 2018 06:00) (78 - 131)  BP: 100/61 (26 Dec 2018 06:00) (100/61 - 157/79)  BP(mean): 71 (26 Dec 2018 06:00) (71 - 112)  RR: 16 (26 Dec 2018 06:00) (15 - 25)  SpO2: 100% (26 Dec 2018 06:00) (99% - 100%)                          9.3    15.9  )-----------( 190      ( 25 Dec 2018 19:59 )             25.7    12-25    138  |  100  |  15  ----------------------------<  142<H>  3.8   |  29  |  0.50    Ca    8.5      25 Dec 2018 19:59  Phos  2.7     12-25  Mg     2.0     12-25     PT/INR - ( 25 Dec 2018 19:59 )   PT: 12.9 sec;   INR: 1.13 ratio         PTT - ( 25 Dec 2018 19:59 )  PTT:23.0 sec    PHYSICAL EXAM:  Gen: NAD, well-developed  Head: Normocephalic, Atraumatic  Face: no edema/erythema/fluctuance  Eyes:  no scleral injection  Nose: Nares bilaterally patent, no discharge  Mouth: No Stridor / Drooling / Trismus.  Mucosa moist, tongue/uvula midline, oropharynx clear  Neck: #8 shiley inflated cuff and surgicel removed, with minimal serosanguinous oozing from stoma, no active bleeding, sutures x4 and umbilical tie in place.  No lymphadenopathy, trachea midline, no masses  Resp: ventilating well, satting   CV: no peripheral edema/cyanosis

## 2018-12-26 NOTE — PROGRESS NOTE ADULT - ASSESSMENT
57 y/o POD #3 for tracheostomy 2/2 respiratory failure. Pt is doing well, cuffed with surgicel in place, on the vent, with minimal serosanguinous oozing from stoma, no active bleed. sutures x4 and umbilical tie in place

## 2018-12-26 NOTE — PROGRESS NOTE ADULT - SUBJECTIVE AND OBJECTIVE BOX
ANESTHESIA POSTOP CHECK    58y Female POSTOP DAY 1 S/P avm repair    Vital Signs Last 24 Hrs  T(C): 38.1 (26 Dec 2018 06:00), Max: 39 (25 Dec 2018 17:00)  T(F): 100.6 (26 Dec 2018 06:00), Max: 102.2 (25 Dec 2018 17:00)  HR: 99 (26 Dec 2018 06:00) (78 - 131)  BP: 100/61 (26 Dec 2018 06:00) (100/61 - 157/79)  BP(mean): 71 (26 Dec 2018 06:00) (71 - 112)  RR: 16 (26 Dec 2018 06:00) (15 - 25)  SpO2: 100% (26 Dec 2018 06:00) (99% - 100%)  I&O's Summary    25 Dec 2018 07:01  -  26 Dec 2018 07:00  --------------------------------------------------------  IN: 2715 mL / OUT: 1375 mL / NET: 1340 mL        [x ] NO APPARENT ANESTHESIA COMPLICATIONS      Comments: stable

## 2018-12-26 NOTE — PROGRESS NOTE ADULT - ASSESSMENT
58F here with cerebellar hemorrhage found to have cerebellar AVM s.p angio embolization and resection s/p return to OR for subocc craniectomy    - CTH in AM  - continue EVD clamp trial  - vent management per ICU  - Fever w/u and abx per ICU

## 2018-12-26 NOTE — PROGRESS NOTE ADULT - SUBJECTIVE AND OBJECTIVE BOX
Patient is a 58y old  Female who presented with a chief complaint of Cerebellar IPH (26 Dec 2018 07:51)      INTERVAL HPI/OVERNIGHT EVENTS:  continues to have fevers intermittently  no new events  planned PEG today  EVD functioning  on Rx for MRSA PNA    MEDICATIONS  (STANDING):  acetaminophen   Tablet .. 1000 milliGRAM(s) Oral every 8 hours  ALBUTerol/ipratropium for Nebulization 3 milliLiter(s) Nebulizer every 6 hours  amLODIPine   Tablet 10 milliGRAM(s) Oral daily  busPIRone 10 milliGRAM(s) Oral every 8 hours  chlorhexidine 0.12% Liquid 15 milliLiter(s) Swish and Spit two times a day  chlorhexidine 4% Liquid 1 Application(s) Topical <User Schedule>  cloNIDine 0.1 milliGRAM(s) Oral every 8 hours  docusate sodium Liquid 100 milliGRAM(s) Oral three times a day  enoxaparin Injectable 40 milliGRAM(s) SubCutaneous <User Schedule>  insulin lispro (HumaLOG) corrective regimen sliding scale   SubCutaneous every 6 hours  lacosamide 100 milliGRAM(s) Oral two times a day  polyethylene glycol 3350 17 Gram(s) Oral two times a day  senna Syrup 5 milliLiter(s) Oral at bedtime  sodium chloride 0.9% with potassium chloride 20 mEq/L 1000 milliLiter(s) (75 mL/Hr) IV Continuous <Continuous>  vancomycin  IVPB 1500 milliGRAM(s) IV Intermittent every 8 hours    MEDICATIONS  (PRN):  fentaNYL    Injectable 25 MICROGram(s) IV Push every 2 hours PRN agitation/severe pain  oxyCODONE    Solution 5 milliGRAM(s) Oral every 4 hours PRN Moderate Pain (4 - 6)    Allergies  No Known Allergies    Review of Systems:  unable to obtain    Vital Signs Last 24 Hrs  T(C): 38 (26 Dec 2018 09:00), Max: 39 (25 Dec 2018 17:00)  T(F): 100.4 (26 Dec 2018 09:00), Max: 102.2 (25 Dec 2018 17:00)  HR: 128 (26 Dec 2018 09:00) (80 - 131)  BP: 131/97 (26 Dec 2018 09:00) (100/61 - 151/93)  BP(mean): 108 (26 Dec 2018 09:00) (71 - 112)  RR: 21 (26 Dec 2018 09:00) (15 - 25)  SpO2: 100% (26 Dec 2018 09:00) (99% - 100%)    PHYSICAL EXAM:  Constitutional: on vent +EVD - bloody +NGT under cooling blanket  Neck: trach site clean  Respiratory: +rhonchi b/l  Cardiovascular: S1 and S2, tachy  Gastrointestinal: BS+, soft, NT/ND, neg HSM,  Extremities: No peripheral edema,  cyanosis  Vascular: 2+ peripheral pulses  Skin: No rashes    LABS:                        9.3    15.9  )-----------( 190      ( 25 Dec 2018 19:59 )             25.7     12-25    138  |  100  |  15  ----------------------------<  142<H>  3.8   |  29  |  0.50    Ca    8.5      25 Dec 2018 19:59  Phos  2.7     12  Mg     2.0     12-      PT/INR - ( 25 Dec 2018 19:59 )   PT: 12.9 sec;   INR: 1.13 ratio         PTT - ( 25 Dec 2018 19:59 )  PTT:23.0 sec  Urinalysis Basic - ( 26 Dec 2018 00:05 )    Color: Light Yellow / Appearance: Slightly Turbid / S.017 / pH: x  Gluc: x / Ketone: Negative  / Bili: Negative / Urobili: Negative   Blood: x / Protein: Trace / Nitrite: Negative   Leuk Esterase: Negative / RBC: 10 /hpf / WBC 7 /hpf   Sq Epi: x / Non Sq Epi: 2 /hpf / Bacteria: Negative    Culture - Bronchial (18 @ 21:32)    Culture Results:   Numerous Methicillin resistant Staphylococcus aureus  Normal Respiratory Tori present    Organism Identification: Methicillin resistant Staphylococcus aureus    Organism: Methicillin resistant Staphylococcus aureus    Method Type: AKIKO        Culture - Blood (18 @ 17:17)    Specimen Source: .Blood Blood-Peripheral    Culture Results:   No growth to date.    Culture - Blood (18 @ 17:17)    Specimen Source: .Blood Blood-Peripheral    Culture Results:   No growth to date.    Culture - Blood (18 @ 22:15)    Specimen Source: .Blood Blood    Culture Results:   No growth at 5 days.    Culture - Blood (18 @ 22:15)    Specimen Source: .Blood Blood    Culture Results:   No growth at 5 days.        RADIOLOGY & ADDITIONAL TESTS:

## 2018-12-26 NOTE — PHYSICAL THERAPY INITIAL EVALUATION ADULT - ADDITIONAL COMMENTS
As per SW note, pt lives alone in pvt home, 5 steps to enter, no steps inside, fully independent without AD.

## 2018-12-26 NOTE — PRE-OP CHECKLIST - SELECT TESTS ORDERED
CBC/INR/PT/PTT/Hepatic Function/Type and Screen/BMP/CXR
Type and Screen/BMP/PT/PTT/Urinalysis/EKG/CBC/INR
CBC/POCT Blood Glucose/Hepatic Function/Type and Screen/PT/PTT/BMP/INR/EKG

## 2018-12-26 NOTE — PROGRESS NOTE ADULT - SUBJECTIVE AND OBJECTIVE BOX
Visit Summary: Patient seen and evaluated at bedside.    Overnight Events: none    Exam:  T(C): 37.8 (12-26-18 @ 00:00), Max: 39 (12-25-18 @ 17:00)  HR: 100 (12-26-18 @ 00:00) (76 - 131)  BP: 137/81 (12-26-18 @ 00:00) (93/72 - 157/79)  RR: 20 (12-26-18 @ 00:00) (15 - 24)  SpO2: 100% (12-26-18 @ 00:00) (100% - 100%)  Wt(kg): --    EOS, FC by wiggles toes BLE,   LUE localizes2/5  RUE WD                          9.3    15.9  )-----------( 190      ( 25 Dec 2018 19:59 )             25.7     12-25    138  |  100  |  15  ----------------------------<  142<H>  3.8   |  29  |  0.50    Ca    8.5      25 Dec 2018 19:59  Phos  2.7     12-25  Mg     2.0     12-25    TPro  6.4  /  Alb  3.0<L>  /  TBili  0.4  /  DBili  <0.1  /  AST  33  /  ALT  34  /  AlkPhos  56  12-24  PT/INR - ( 25 Dec 2018 19:59 )   PT: 12.9 sec;   INR: 1.13 ratio         PTT - ( 25 Dec 2018 19:59 )  PTT:23.0 sec

## 2018-12-26 NOTE — PROGRESS NOTE ADULT - SUBJECTIVE AND OBJECTIVE BOX
ENT ISSUE/POD:  POD #3 for trach    HPI: 57 y/o POD #3 for tracheostomy #8 shiley cuffed with surgicel in place 2/2 respiratory failure. Pt is doing well on the vent. no complaints or issues.         PAST MEDICAL & SURGICAL HISTORY:  Systemic lupus erythematosus, unspecified SLE type, unspecified organ involvement status  Lupus  Back pain, chronic  Childhood asthma  Ovarian cyst  DVT (deep venous thrombosis), bilateral: 1993( treated with Heparin and coumadin)  RA (rheumatoid arthritis)  No significant past surgical history  No significant past surgical history  Plantar fasciitis of right foot: h/o surgery  History of laparoscopic cholecystectomy: 1993    Allergies    No Known Allergies    Intolerances      MEDICATIONS  (STANDING):  acetaminophen   Tablet .. 1000 milliGRAM(s) Oral every 8 hours  ALBUTerol/ipratropium for Nebulization 3 milliLiter(s) Nebulizer every 6 hours  amLODIPine   Tablet 10 milliGRAM(s) Oral daily  busPIRone 10 milliGRAM(s) Oral every 8 hours  chlorhexidine 0.12% Liquid 15 milliLiter(s) Swish and Spit two times a day  chlorhexidine 4% Liquid 1 Application(s) Topical <User Schedule>  cloNIDine 0.1 milliGRAM(s) Oral every 8 hours  docusate sodium Liquid 100 milliGRAM(s) Oral three times a day  enoxaparin Injectable 40 milliGRAM(s) SubCutaneous <User Schedule>  insulin lispro (HumaLOG) corrective regimen sliding scale   SubCutaneous every 6 hours  lacosamide 100 milliGRAM(s) Oral two times a day  polyethylene glycol 3350 17 Gram(s) Oral two times a day  senna Syrup 5 milliLiter(s) Oral at bedtime  sodium chloride 0.9% with potassium chloride 20 mEq/L 1000 milliLiter(s) (75 mL/Hr) IV Continuous <Continuous>  vancomycin  IVPB 1500 milliGRAM(s) IV Intermittent every 8 hours    MEDICATIONS  (PRN):  fentaNYL    Injectable 25 MICROGram(s) IV Push every 2 hours PRN agitation/severe pain  oxyCODONE    Solution 5 milliGRAM(s) Oral every 4 hours PRN Moderate Pain (4 - 6)      Social History: see consult    Family history: see consult    ROS:   ENT: all negative except as noted in HPI   Pulm: denies SOB, cough, hemoptysis  Neuro: denies numbness/tingling, loss of sensation  Endo: denies heat/cold intolerance, excessive sweating      Vital Signs Last 24 Hrs  T(C): 38 (26 Dec 2018 09:00), Max: 39 (25 Dec 2018 17:00)  T(F): 100.4 (26 Dec 2018 09:00), Max: 102.2 (25 Dec 2018 17:00)  HR: 127 (26 Dec 2018 10:07) (80 - 131)  BP: 131/97 (26 Dec 2018 09:00) (100/61 - 151/93)  BP(mean): 108 (26 Dec 2018 09:00) (71 - 112)  RR: 21 (26 Dec 2018 09:00) (15 - 25)  SpO2: 100% (26 Dec 2018 10:07) (99% - 100%)                          9.3    15.9  )-----------( 190      ( 25 Dec 2018 19:59 )             25.7    12-25    138  |  100  |  15  ----------------------------<  142<H>  3.8   |  29  |  0.50    Ca    8.5      25 Dec 2018 19:59  Phos  2.7     12-25  Mg     2.0     12-25     PT/INR - ( 25 Dec 2018 19:59 )   PT: 12.9 sec;   INR: 1.13 ratio         PTT - ( 25 Dec 2018 19:59 )  PTT:23.0 sec    PHYSICAL EXAM:  Gen: NAD, well-developed  Head: Normocephalic, Atraumatic  Face: no edema/erythema/fluctuance  Eyes:  no scleral injection  Nose: Nares bilaterally patent, no discharge  Mouth: No Stridor / Drooling / Trismus.  Mucosa moist, tongue/uvula midline, oropharynx clear  Neck: #8 shiley inflated cuff and surgicel removed, with minimal serosanguinous oozing from stoma, no active bleeding, sutures x4 and umbilical tie in place.  No lymphadenopathy, trachea midline, no masses  Resp: ventilating well, satting   CV: no peripheral edema/cyanosis

## 2018-12-26 NOTE — PHYSICAL THERAPY INITIAL EVALUATION ADULT - DIAGNOSIS, PT EVAL
Decreased functional mobility d/t impaired cognition, motor control, coordination, balance, strength and endurance.

## 2018-12-26 NOTE — PROGRESS NOTE ADULT - ASSESSMENT
59 yo female with hx of lupus s/p ICH s/p craniotomy with respiratory failure and s/p trach.    MRSA Pneumonia  Fever - per discussion with team, likely due to PNA as well as ICH; all blood cultures negative to date  On IV VAnco for MRSA PNA    PLAN    -planned PEG today  -NSCU care  -Abx as per NSCU    Discussed with NSCU team    Jesus Lang PA-C    [facility]  [phone]

## 2018-12-26 NOTE — PROGRESS NOTE ADULT - ASSESSMENT
57 y/o POD #2 for tracheostomy 2/2 respiratory failure. Pt is doing well, cuffed with surgicel in place, on the vent, with minimal serosanguinous oozing from stoma, no active bleed. sutures x4 and umbilical tie in place.

## 2018-12-26 NOTE — PROGRESS NOTE ADULT - SUBJECTIVE AND OBJECTIVE BOX
NSCU ATTENDING -- ADDITIONAL PROGRESS NOTE    Nighttime rounds were performed -- please refer to earlier Progress Note for HPI details.    T(C): 36.5 (12-26-18 @ 21:00), Max: 38.9 (12-26-18 @ 16:00)  HR: 76 (12-26-18 @ 21:00) (76 - 130)  BP: 104/81 (12-26-18 @ 21:00) (100/61 - 157/85)  RR: 18 (12-26-18 @ 21:00) (15 - 29)  SpO2: 100% (12-26-18 @ 21:00) (97% - 100%)  Wt(kg): --    Relevant labwork and imaging reviewed.    Patient remains critically ill.    S/p PEG today.  Repeat vancomycin trough pending, sputum culture still + for GPC.  Remains febrile, arctic sun started this evening.  No significant apparent mucous plugging or hypoxia today.  EVD unclamped today; plan to re-clamp 12/28; currently EVD open @ 10, ICPs normal, patent, draining <15 cc/hr.    Additional 30 minutes of critical care time.

## 2018-12-26 NOTE — PROGRESS NOTE ADULT - SUBJECTIVE AND OBJECTIVE BOX
HPI:  59yo woman PMH lupus p/w sudden onset severe HA a/w nausea and vomiting. 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 Patient was intact on presentation however intubated for airway protection and EVD placed. Transferred to [facility] for further care. (17 Dec 2018 01:18)    12/17: s/p angio/embo of EVM, resection of AVM  12/18: RTOR for posterior fossa decompression expansion  12/24 s/p trach, EVD clamped    Tolerating EVD clamp trial  Vitals/labs/meds/imaging reviewed    EXAMINATION:  General:  calm  HEENT:  trach'd  Neuro:  opens eyes, follows commands on b/l LE (wiggles toes), does not mouth words, LUE localizes, RUE withdraws  Cards:  RRR  Respiratory:  no respiratory distress  Adomen:  soft  Extremities:  no edema  Skin:  warm/dry HPI:  57yo woman PMH lupus p/w sudden onset severe HA a/w nausea and vomiting. 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 Patient was intact on presentation however intubated for airway protection and EVD placed. Transferred to [facility] for further care. (17 Dec 2018 01:18)    12/17: s/p angio/embo of EVM, resection of AVM  12/18: RTOR for posterior fossa decompression expansion  12/24 s/p trach, EVD clamped    Per neurosurgery EVD unclamped, pre op for VPS    Vitals/labs/meds/imaging reviewed    EXAMINATION:  General:  calm  HEENT:  trach'd  Neuro:  opens eyes, follows commands on b/l LE (wiggles toes), does not mouth words, LUE localizes, RUE withdraws  Cards:  RRR  Respiratory:  no respiratory distress  Adomen:  soft  Extremities:  no edema  Skin:  warm/dry HPI:  59yo woman PMH lupus p/w sudden onset severe HA a/w nausea and vomiting. 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 Patient was intact on presentation however intubated for airway protection and EVD placed. Transferred to [facility] for further care. (17 Dec 2018 01:18)    12/17: s/p angio/embo of EVM, resection of AVM  12/18: RTOR for posterior fossa decompression expansion  12/24 s/p trach, EVD clamped    Per neurosurgery EVD unclamped, pre op for VPS  Febrile overnight    Vitals/labs/meds/imaging reviewed    EXAMINATION:  General:  calm  HEENT:  trach'd  Neuro:  opens eyes, follows commands on b/l LE (wiggles toes), does not mouth words, LUE localizes, RUE withdraws  Cards:  RRR  Respiratory:  no respiratory distress  Adomen:  soft  Extremities:  no edema  Skin:  warm/dry HPI:  59yo woman PMH lupus p/w sudden onset severe HA a/w nausea and vomiting. 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 Patient was intact on presentation however intubated for airway protection and EVD placed. Transferred to [facility] for further care. (17 Dec 2018 01:18)    12/17: s/p angio/embo of EVM, resection of AVM  12/18: RTOR for posterior fossa decompression expansion  12/24 s/p trach, EVD clamped    Per neurosurgery EVD unclamped, pre op for VPS  Febrile overnight    Vitals/labs/meds/imaging reviewed    EXAMINATION:  General:  calm  HEENT:  trach'd  Neuro:  opens eyes, FC BLE wiggles toes and bends kneew, does not mouth words, LUE prox 2/5 dist 0/5, RUE prox 3/5 dis 0/5  Cards:  RRR  Respiratory:  no respiratory distress  Adomen:  soft  Extremities:  no edema  Skin:  warm/dry Never smoker

## 2018-12-26 NOTE — PROGRESS NOTE ADULT - ASSESSMENT
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 - s/p successful ANGIO/EMBO, posterior fossa decompression  ICH score 2    Neuro:  EVD clamped-->CTH this am, possible D/C  Na 140-150  lacosamide for seizure  decadron taper  Fentanyl PRN   OT for splint    Cards:   -160 on norvasc  TTE    Pulm:  s/p trach, wean vent as tolerated  RCU consult    GI: TF   bowel regimen, add lactulose  Awaiting PEG placement    Renal:  IVL  Na 140-150    Endo:  goal euglycemia    Heme:  SCDs  DVT ppx Lovenox     ID:  monitor for fevers    ICU  full code    at risk for deterioration and death due to AVM rebleed, hydrocephalus, cerebral edema, brain compression  critical care time 45min 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 - s/p successful ANGIO/EMBO, posterior fossa decompression  ICH score 2    Neuro:  EVD clamped-->CTH this am, possible D/C  Na 140-150  lacosamide for seizure  decadron taper  Fentanyl PRN   OT for splint    Cards:   -160 on norvasc  TTE    Pulm:  s/p trach, wean vent as tolerated  RCU consult    GI: TF   bowel regimen, add lactulose  Awaiting PEG placement    Renal:  IVL    Endo:  goal euglycemia    Heme:  SCDs  DVT ppx Lovenox     ID:  monitor for fevers    ICU  full code    at risk for deterioration and death due to AVM rebleed, hydrocephalus, cerebral edema, brain compression  critical care time 45min 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 - s/p successful ANGIO/EMBO, posterior fossa decompression  ICH score 2    Neuro:  EVD unclamped--pre op for VPS  Na 140-150  lacosamide for seizure  decadron taper  Fentanyl PRN   OT for splint    Cards:   -160 on norvasc  TTE    Pulm:  s/p trach, wean vent as tolerated  RCU consult    GI: TF   bowel regimen, add lactulose  Awaiting PEG placement    Renal:  IVL    Endo:  goal euglycemia    Heme:  SCDs  DVT ppx Lovenox     ID:  monitor for fevers    ICU  full code    at risk for deterioration and death due to AVM rebleed, hydrocephalus, cerebral edema, brain compression  critical care time 45min 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 - s/p successful ANGIO/EMBO, posterior fossa decompression  ICH score 2    Neuro:  EVD unclamped--pre op for VPS  Na 140-150  lacosamide for seizure  decadron taper  Fentanyl PRN   OT for splint    Cards:   -160 on norvasc  TTE    Pulm:  s/p trach, wean vent as tolerated  RCU consult    GI: TF   bowel regimen, add lactulose  Awaiting PEG placement    Renal:  IVL    Endo:  goal euglycemia    Heme:  SCDs  DVT ppx Lovenox     ID:  Febrile  MRSA in bronch cx  subtherapeutic vanc level    ICU  full code    at risk for deterioration and death due to AVM rebleed, hydrocephalus, cerebral edema, brain compression  critical care time 45min 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 - s/p successful ANGIO/EMBO, posterior fossa decompression  ICH score 2    Neuro:  EVD unclamped--pre op for VPS  Na 140-150  lacosamide for seizure  Fentanyl PRN   buspiron   OT for splint    Cards:   -160 on norvasc/clonidine  TTE    Pulm:  s/p trach, wean vent as tolerated  RCU consult    GI: TF   bowel regimen, add lactulose  Awaiting PEG placement    Renal:  IVL    Endo:  goal euglycemia    Heme:  SCDs  DVT ppx Lovenox     ID:  Febrile  MRSA in bronch cx  subtherapeutic vanc level    ICU  full code    at risk for deterioration and death due to AVM rebleed, hydrocephalus, cerebral edema, brain compression  critical care time 45min

## 2018-12-26 NOTE — PROGRESS NOTE ADULT - SUBJECTIVE AND OBJECTIVE BOX
ANESTHESIA POSTOP CHECK    58y Female POSTOP DAY 1 S/P trach    Vital Signs Last 24 Hrs  T(C): 38.1 (26 Dec 2018 06:00), Max: 39 (25 Dec 2018 17:00)  T(F): 100.6 (26 Dec 2018 06:00), Max: 102.2 (25 Dec 2018 17:00)  HR: 99 (26 Dec 2018 06:00) (78 - 131)  BP: 100/61 (26 Dec 2018 06:00) (100/61 - 157/79)  BP(mean): 71 (26 Dec 2018 06:00) (71 - 112)  RR: 16 (26 Dec 2018 06:00) (15 - 25)  SpO2: 100% (26 Dec 2018 06:00) (99% - 100%)  I&O's Summary    25 Dec 2018 07:01  -  26 Dec 2018 07:00  --------------------------------------------------------  IN: 2715 mL / OUT: 1375 mL / NET: 1340 mL        [x ] NO APPARENT ANESTHESIA COMPLICATIONS      Comments: extubated, stable, in CT

## 2018-12-26 NOTE — PROGRESS NOTE ADULT - PROBLEM SELECTOR PLAN 1
- continue vent   - aggressive suction prn   - continue with trach site care, keep dry and clean   - call ENT with any issues

## 2018-12-26 NOTE — PHYSICAL THERAPY INITIAL EVALUATION ADULT - IMPAIRMENTS CONTRIBUTING IMPAIRED BED MOBILITY, REHAB EVAL
cognition/impaired motor control/impaired postural control/decreased strength/impaired coordination/impaired balance

## 2018-12-27 LAB
ANION GAP SERPL CALC-SCNC: 11 MMOL/L — SIGNIFICANT CHANGE UP (ref 5–17)
ANION GAP SERPL CALC-SCNC: 7 MMOL/L — SIGNIFICANT CHANGE UP (ref 5–17)
BUN SERPL-MCNC: 12 MG/DL — SIGNIFICANT CHANGE UP (ref 7–23)
BUN SERPL-MCNC: 14 MG/DL — SIGNIFICANT CHANGE UP (ref 7–23)
CALCIUM SERPL-MCNC: 7.7 MG/DL — LOW (ref 8.4–10.5)
CALCIUM SERPL-MCNC: 8.4 MG/DL — SIGNIFICANT CHANGE UP (ref 8.4–10.5)
CHLORIDE SERPL-SCNC: 103 MMOL/L — SIGNIFICANT CHANGE UP (ref 96–108)
CHLORIDE SERPL-SCNC: 108 MMOL/L — SIGNIFICANT CHANGE UP (ref 96–108)
CO2 SERPL-SCNC: 24 MMOL/L — SIGNIFICANT CHANGE UP (ref 22–31)
CO2 SERPL-SCNC: 24 MMOL/L — SIGNIFICANT CHANGE UP (ref 22–31)
CREAT SERPL-MCNC: 0.37 MG/DL — LOW (ref 0.5–1.3)
CREAT SERPL-MCNC: 0.39 MG/DL — LOW (ref 0.5–1.3)
GLUCOSE BLDC GLUCOMTR-MCNC: 88 MG/DL — SIGNIFICANT CHANGE UP (ref 70–99)
GLUCOSE BLDC GLUCOMTR-MCNC: 88 MG/DL — SIGNIFICANT CHANGE UP (ref 70–99)
GLUCOSE BLDC GLUCOMTR-MCNC: 98 MG/DL — SIGNIFICANT CHANGE UP (ref 70–99)
GLUCOSE BLDC GLUCOMTR-MCNC: 98 MG/DL — SIGNIFICANT CHANGE UP (ref 70–99)
GLUCOSE SERPL-MCNC: 100 MG/DL — HIGH (ref 70–99)
GLUCOSE SERPL-MCNC: 106 MG/DL — HIGH (ref 70–99)
MAGNESIUM SERPL-MCNC: 2.1 MG/DL — SIGNIFICANT CHANGE UP (ref 1.6–2.6)
MAGNESIUM SERPL-MCNC: 2.3 MG/DL — SIGNIFICANT CHANGE UP (ref 1.6–2.6)
PHOSPHATE SERPL-MCNC: 2.8 MG/DL — SIGNIFICANT CHANGE UP (ref 2.5–4.5)
PHOSPHATE SERPL-MCNC: 3 MG/DL — SIGNIFICANT CHANGE UP (ref 2.5–4.5)
POTASSIUM SERPL-MCNC: 4 MMOL/L — SIGNIFICANT CHANGE UP (ref 3.5–5.3)
POTASSIUM SERPL-MCNC: 5.1 MMOL/L — SIGNIFICANT CHANGE UP (ref 3.5–5.3)
POTASSIUM SERPL-SCNC: 4 MMOL/L — SIGNIFICANT CHANGE UP (ref 3.5–5.3)
POTASSIUM SERPL-SCNC: 5.1 MMOL/L — SIGNIFICANT CHANGE UP (ref 3.5–5.3)
SODIUM SERPL-SCNC: 138 MMOL/L — SIGNIFICANT CHANGE UP (ref 135–145)
SODIUM SERPL-SCNC: 139 MMOL/L — SIGNIFICANT CHANGE UP (ref 135–145)
VANCOMYCIN TROUGH SERPL-MCNC: 10.5 UG/ML — SIGNIFICANT CHANGE UP (ref 10–20)

## 2018-12-27 PROCEDURE — 99231 SBSQ HOSP IP/OBS SF/LOW 25: CPT

## 2018-12-27 PROCEDURE — 71045 X-RAY EXAM CHEST 1 VIEW: CPT | Mod: 26

## 2018-12-27 PROCEDURE — 99232 SBSQ HOSP IP/OBS MODERATE 35: CPT

## 2018-12-27 PROCEDURE — 99292 CRITICAL CARE ADDL 30 MIN: CPT

## 2018-12-27 PROCEDURE — 99291 CRITICAL CARE FIRST HOUR: CPT

## 2018-12-27 RX ORDER — PROPOFOL 10 MG/ML
10 INJECTION, EMULSION INTRAVENOUS
Qty: 1000 | Refills: 0 | Status: DISCONTINUED | OUTPATIENT
Start: 2018-12-27 | End: 2018-12-30

## 2018-12-27 RX ORDER — SODIUM CHLORIDE 9 MG/ML
500 INJECTION INTRAMUSCULAR; INTRAVENOUS; SUBCUTANEOUS ONCE
Qty: 0 | Refills: 0 | Status: COMPLETED | OUTPATIENT
Start: 2018-12-27 | End: 2018-12-27

## 2018-12-27 RX ORDER — GABAPENTIN 400 MG/1
300 CAPSULE ORAL EVERY 8 HOURS
Qty: 0 | Refills: 0 | Status: DISCONTINUED | OUTPATIENT
Start: 2018-12-27 | End: 2018-12-27

## 2018-12-27 RX ORDER — GABAPENTIN 400 MG/1
300 CAPSULE ORAL EVERY 8 HOURS
Qty: 0 | Refills: 0 | Status: DISCONTINUED | OUTPATIENT
Start: 2018-12-27 | End: 2019-01-07

## 2018-12-27 RX ORDER — MAGNESIUM SULFATE 500 MG/ML
0.4 VIAL (ML) INJECTION
Qty: 20 | Refills: 0 | Status: DISCONTINUED | OUTPATIENT
Start: 2018-12-27 | End: 2018-12-27

## 2018-12-27 RX ORDER — MAGNESIUM SULFATE 500 MG/ML
0.2 VIAL (ML) INJECTION
Qty: 20 | Refills: 0 | Status: DISCONTINUED | OUTPATIENT
Start: 2018-12-27 | End: 2018-12-27

## 2018-12-27 RX ORDER — MAGNESIUM SULFATE 500 MG/ML
0.2 VIAL (ML) INJECTION
Qty: 20 | Refills: 0 | Status: DISCONTINUED | OUTPATIENT
Start: 2018-12-27 | End: 2018-12-28

## 2018-12-27 RX ADMIN — Medication 5 GM/HR: at 11:23

## 2018-12-27 RX ADMIN — SODIUM CHLORIDE 2000 MILLILITER(S): 9 INJECTION INTRAMUSCULAR; INTRAVENOUS; SUBCUTANEOUS at 01:00

## 2018-12-27 RX ADMIN — CHLORHEXIDINE GLUCONATE 15 MILLILITER(S): 213 SOLUTION TOPICAL at 17:23

## 2018-12-27 RX ADMIN — MEPERIDINE HYDROCHLORIDE 50 MILLIGRAM(S): 50 INJECTION INTRAMUSCULAR; INTRAVENOUS; SUBCUTANEOUS at 10:14

## 2018-12-27 RX ADMIN — AMLODIPINE BESYLATE 10 MILLIGRAM(S): 2.5 TABLET ORAL at 06:25

## 2018-12-27 RX ADMIN — SENNA PLUS 5 MILLILITER(S): 8.6 TABLET ORAL at 21:54

## 2018-12-27 RX ADMIN — Medication 1000 MILLIGRAM(S): at 13:14

## 2018-12-27 RX ADMIN — Medication 3 MILLILITER(S): at 23:38

## 2018-12-27 RX ADMIN — Medication 3 MILLILITER(S): at 17:45

## 2018-12-27 RX ADMIN — Medication 250 MILLIGRAM(S): at 06:24

## 2018-12-27 RX ADMIN — ENOXAPARIN SODIUM 40 MILLIGRAM(S): 100 INJECTION SUBCUTANEOUS at 17:22

## 2018-12-27 RX ADMIN — FENTANYL CITRATE 25 MICROGRAM(S): 50 INJECTION INTRAVENOUS at 12:42

## 2018-12-27 RX ADMIN — DEXTROSE MONOHYDRATE, SODIUM CHLORIDE, AND POTASSIUM CHLORIDE 75 MILLILITER(S): 50; .745; 4.5 INJECTION, SOLUTION INTRAVENOUS at 17:23

## 2018-12-27 RX ADMIN — Medication 1000 MILLIGRAM(S): at 06:26

## 2018-12-27 RX ADMIN — FENTANYL CITRATE 25 MICROGRAM(S): 50 INJECTION INTRAVENOUS at 13:12

## 2018-12-27 RX ADMIN — MEPERIDINE HYDROCHLORIDE 50 MILLIGRAM(S): 50 INJECTION INTRAMUSCULAR; INTRAVENOUS; SUBCUTANEOUS at 17:36

## 2018-12-27 RX ADMIN — Medication 10 MILLIGRAM(S): at 13:26

## 2018-12-27 RX ADMIN — Medication 250 MILLIGRAM(S): at 13:14

## 2018-12-27 RX ADMIN — LACOSAMIDE 100 MILLIGRAM(S): 50 TABLET ORAL at 17:22

## 2018-12-27 RX ADMIN — Medication 0.1 MILLIGRAM(S): at 21:56

## 2018-12-27 RX ADMIN — Medication 3 MILLILITER(S): at 11:16

## 2018-12-27 RX ADMIN — CHLORHEXIDINE GLUCONATE 1 APPLICATION(S): 213 SOLUTION TOPICAL at 21:56

## 2018-12-27 RX ADMIN — LACOSAMIDE 100 MILLIGRAM(S): 50 TABLET ORAL at 06:25

## 2018-12-27 RX ADMIN — Medication 1000 MILLIGRAM(S): at 21:55

## 2018-12-27 RX ADMIN — Medication 10 MILLIGRAM(S): at 06:25

## 2018-12-27 RX ADMIN — GABAPENTIN 300 MILLIGRAM(S): 400 CAPSULE ORAL at 14:59

## 2018-12-27 RX ADMIN — Medication 3 MILLILITER(S): at 05:01

## 2018-12-27 RX ADMIN — Medication 250 MILLIGRAM(S): at 23:57

## 2018-12-27 RX ADMIN — PROPOFOL 10 MICROGRAM(S)/KG/MIN: 10 INJECTION, EMULSION INTRAVENOUS at 17:23

## 2018-12-27 RX ADMIN — FENTANYL CITRATE 25 MICROGRAM(S): 50 INJECTION INTRAVENOUS at 18:55

## 2018-12-27 RX ADMIN — Medication 200 GRAM(S): at 02:04

## 2018-12-27 RX ADMIN — GABAPENTIN 300 MILLIGRAM(S): 400 CAPSULE ORAL at 21:55

## 2018-12-27 RX ADMIN — OXYCODONE HYDROCHLORIDE 5 MILLIGRAM(S): 5 TABLET ORAL at 22:33

## 2018-12-27 RX ADMIN — FENTANYL CITRATE 25 MICROGRAM(S): 50 INJECTION INTRAVENOUS at 18:25

## 2018-12-27 RX ADMIN — Medication 0.1 MILLIGRAM(S): at 06:26

## 2018-12-27 RX ADMIN — Medication 10 MILLIGRAM(S): at 21:56

## 2018-12-27 RX ADMIN — MEPERIDINE HYDROCHLORIDE 50 MILLIGRAM(S): 50 INJECTION INTRAMUSCULAR; INTRAVENOUS; SUBCUTANEOUS at 22:15

## 2018-12-27 RX ADMIN — CHLORHEXIDINE GLUCONATE 15 MILLILITER(S): 213 SOLUTION TOPICAL at 06:24

## 2018-12-27 RX ADMIN — Medication 100 MILLIGRAM(S): at 21:55

## 2018-12-27 RX ADMIN — MEPERIDINE HYDROCHLORIDE 50 MILLIGRAM(S): 50 INJECTION INTRAMUSCULAR; INTRAVENOUS; SUBCUTANEOUS at 21:56

## 2018-12-27 RX ADMIN — OXYCODONE HYDROCHLORIDE 5 MILLIGRAM(S): 5 TABLET ORAL at 22:00

## 2018-12-27 RX ADMIN — Medication 100 MILLIGRAM(S): at 06:26

## 2018-12-27 RX ADMIN — Medication 0.1 MILLIGRAM(S): at 13:14

## 2018-12-27 NOTE — PROGRESS NOTE ADULT - SUBJECTIVE AND OBJECTIVE BOX
Patient is a 58y old  Female who presented with a chief complaint of Cerebellar IPH (27 Dec 2018 06:47)      INTERVAL HPI/OVERNIGHT EVENTS:  no overnight GI events reported  started PEG feeds this morning  still with fevers, under Arctic Sun and warming blanket  last BM 12/25/18    MEDICATIONS  (STANDING):  acetaminophen   Tablet .. 1000 milliGRAM(s) Oral every 8 hours  ALBUTerol/ipratropium for Nebulization 3 milliLiter(s) Nebulizer every 6 hours  amLODIPine   Tablet 10 milliGRAM(s) Oral daily  busPIRone 10 milliGRAM(s) Oral every 8 hours  chlorhexidine 0.12% Liquid 15 milliLiter(s) Swish and Spit two times a day  chlorhexidine 4% Liquid 1 Application(s) Topical <User Schedule>  cloNIDine 0.1 milliGRAM(s) Oral every 8 hours  docusate sodium Liquid 100 milliGRAM(s) Oral three times a day  enoxaparin Injectable 40 milliGRAM(s) SubCutaneous <User Schedule>  gabapentin 300 milliGRAM(s) Oral every 8 hours  insulin lispro (HumaLOG) corrective regimen sliding scale   SubCutaneous every 6 hours  lacosamide 100 milliGRAM(s) Oral two times a day  magnesium sulfate Infusion 0.2 Gm/Hr (5 mL/Hr) IV Continuous <Continuous>  polyethylene glycol 3350 17 Gram(s) Oral two times a day  propofol Infusion 19.818 MICROgram(s)/kG/Min (10 mL/Hr) IV Continuous <Continuous>  senna Syrup 5 milliLiter(s) Oral at bedtime  sodium chloride 0.9% with potassium chloride 20 mEq/L 1000 milliLiter(s) (75 mL/Hr) IV Continuous <Continuous>  vancomycin  IVPB 1750 milliGRAM(s) IV Intermittent every 8 hours    MEDICATIONS  (PRN):  fentaNYL    Injectable 25 MICROGram(s) IV Push every 2 hours PRN agitation/severe pain  meperidine     Injectable 50 milliGRAM(s) IV Push every 4 hours PRN shivering  oxyCODONE    Solution 5 milliGRAM(s) Oral every 4 hours PRN Moderate Pain (4 - 6)      Allergies  No Known Allergies      Review of Systems:  unable to obtain from pt    Vital Signs Last 24 Hrs  T(C): 37.4 (27 Dec 2018 12:00), Max: 38.9 (26 Dec 2018 16:00)  T(F): 99.3 (27 Dec 2018 12:00), Max: 102 (26 Dec 2018 16:00)  HR: 100 (27 Dec 2018 12:00) (76 - 117)  BP: 113/89 (27 Dec 2018 12:00) (79/47 - 157/85)  BP(mean): 97 (27 Dec 2018 12:00) (56 - 105)  RR: 21 (27 Dec 2018 12:00) (13 - 30)  SpO2: 100% (27 Dec 2018 12:00) (100% - 100%)    PHYSICAL EXAM:  Constitutional: on vent +EVD - bloody  Neck: trach site clean  Respiratory: b/l air entry  Cardiovascular: S1 and S2, tachy  Gastrointestinal: BS+, soft, NT/ND, neg HSM, PEg site clean and dry bumper at 4cm spins freely 360 degrees  Extremities: No peripheral edema,  cyanosis  Vascular: 2+ peripheral pulses  Skin: No rashes    LABS:                        9.3    15.9  )-----------( 190      ( 25 Dec 2018 19:59 )             25.7     12-26    138  |  103  |  13  ----------------------------<  101<H>  4.1   |  27  |  0.48<L>    Ca    8.1<L>      26 Dec 2018 21:36  Phos  2.8     12-26  Mg     1.9     12-26    TPro  5.4<L>  /  Alb  2.3<L>  /  TBili  0.3  /  DBili  0.1  /  AST  41<H>  /  ALT  38  /  AlkPhos  49  12-26    PT/INR - ( 25 Dec 2018 19:59 )   PT: 12.9 sec;   INR: 1.13 ratio    PTT - ( 25 Dec 2018 19:59 )  PTT:23.0 sec      RADIOLOGY & ADDITIONAL TESTS:

## 2018-12-27 NOTE — PROGRESS NOTE ADULT - ASSESSMENT
59 y/o POD #4 for tracheostomy 2/2 respiratory failure. Pt is doing well, on the vent, no active bleed. sutures x4 and umbilical tie in place

## 2018-12-27 NOTE — PROGRESS NOTE ADULT - SUBJECTIVE AND OBJECTIVE BOX
NSCU ATTENDING -- ADDITIONAL PROGRESS NOTE    Nighttime rounds were performed -- please refer to earlier Progress Note for HPI details.    T(C): 37.3 (12-27-18 @ 21:00), Max: 37.7 (12-27-18 @ 08:00)  HR: 104 (12-27-18 @ 21:00) (76 - 117)  BP: 149/104 (12-27-18 @ 20:00) (79/47 - 149/104)  RR: 23 (12-27-18 @ 21:00) (13 - 30)  SpO2: 100% (12-27-18 @ 21:00) (96% - 100%)  Wt(kg): --    Relevant labwork and imaging reviewed.    Patient remains critically ill.    Still on arctic sun.  Agitated off of propofol, shivering, tachypneic.  S/p trach, PEG, pending EVD re-clamp tomorrow, scan on 10/29, remove EVD vs plan for VPS.  Repeat vanco trough now.  EVD open @ 10, ICPs within normal limits, drain patent, 5-10 cc/hr.    Additional 30 minutes of critical care time.

## 2018-12-27 NOTE — PROGRESS NOTE ADULT - SUBJECTIVE AND OBJECTIVE BOX
ENT ISSUE/POD:  POD #4 for trach    HPI: 57 y/o POD #4 for tracheostomy #8 shiley cuffed 2/2 respiratory failure. Pt is doing well on the vent. no complaints or issues.           PAST MEDICAL & SURGICAL HISTORY:  Systemic lupus erythematosus, unspecified SLE type, unspecified organ involvement status  Lupus  Back pain, chronic  Childhood asthma  Ovarian cyst  DVT (deep venous thrombosis), bilateral: 1993( treated with Heparin and coumadin)  RA (rheumatoid arthritis)  No significant past surgical history  No significant past surgical history  Plantar fasciitis of right foot: h/o surgery  History of laparoscopic cholecystectomy: 1993    Allergies    No Known Allergies    Intolerances      MEDICATIONS  (STANDING):  acetaminophen   Tablet .. 1000 milliGRAM(s) Oral every 8 hours  ALBUTerol/ipratropium for Nebulization 3 milliLiter(s) Nebulizer every 6 hours  amLODIPine   Tablet 10 milliGRAM(s) Oral daily  busPIRone 10 milliGRAM(s) Oral every 8 hours  chlorhexidine 0.12% Liquid 15 milliLiter(s) Swish and Spit two times a day  chlorhexidine 4% Liquid 1 Application(s) Topical <User Schedule>  cloNIDine 0.1 milliGRAM(s) Oral every 8 hours  docusate sodium Liquid 100 milliGRAM(s) Oral three times a day  enoxaparin Injectable 40 milliGRAM(s) SubCutaneous <User Schedule>  insulin lispro (HumaLOG) corrective regimen sliding scale   SubCutaneous every 6 hours  lacosamide 100 milliGRAM(s) Oral two times a day  polyethylene glycol 3350 17 Gram(s) Oral two times a day  propofol Infusion 19.818 MICROgram(s)/kG/Min (10 mL/Hr) IV Continuous <Continuous>  senna Syrup 5 milliLiter(s) Oral at bedtime  sodium chloride 0.9% with potassium chloride 20 mEq/L 1000 milliLiter(s) (75 mL/Hr) IV Continuous <Continuous>  vancomycin  IVPB 1750 milliGRAM(s) IV Intermittent every 8 hours    MEDICATIONS  (PRN):  fentaNYL    Injectable 25 MICROGram(s) IV Push every 2 hours PRN agitation/severe pain  meperidine     Injectable 50 milliGRAM(s) IV Push every 4 hours PRN shivering  oxyCODONE    Solution 5 milliGRAM(s) Oral every 4 hours PRN Moderate Pain (4 - 6)      Social History: see consult    Family history: see consult    ROS:   ENT: all negative except as noted in HPI   Pulm: denies SOB, cough, hemoptysis  Neuro: denies numbness/tingling, loss of sensation  Endo: denies heat/cold intolerance, excessive sweating      Vital Signs Last 24 Hrs  T(C): 37.1 (27 Dec 2018 04:00), Max: 38.9 (26 Dec 2018 16:00)  T(F): 98.8 (27 Dec 2018 04:00), Max: 102 (26 Dec 2018 16:00)  HR: 83 (27 Dec 2018 05:02) (76 - 130)  BP: 121/69 (27 Dec 2018 04:00) (79/47 - 157/85)  BP(mean): 84 (27 Dec 2018 04:00) (56 - 108)  RR: 16 (27 Dec 2018 04:00) (15 - 29)  SpO2: 100% (27 Dec 2018 05:02) (97% - 100%)                          9.3    15.9  )-----------( 190      ( 25 Dec 2018 19:59 )             25.7    12-26    138  |  103  |  13  ----------------------------<  101<H>  4.1   |  27  |  0.48<L>    Ca    8.1<L>      26 Dec 2018 21:36  Phos  2.8     12-26  Mg     1.9     12-26    TPro  5.4<L>  /  Alb  2.3<L>  /  TBili  0.3  /  DBili  0.1  /  AST  41<H>  /  ALT  38  /  AlkPhos  49  12-26   PT/INR - ( 25 Dec 2018 19:59 )   PT: 12.9 sec;   INR: 1.13 ratio         PTT - ( 25 Dec 2018 19:59 )  PTT:23.0 sec    PHYSICAL EXAM:  Gen: NAD, well-developed  Head: Normocephalic, Atraumatic  Face: no edema/erythema/fluctuance  Eyes:  no scleral injection  Nose: Nares bilaterally patent, no discharge  Mouth: No Stridor / Drooling / Trismus.  Mucosa moist, tongue/uvula midline, oropharynx clear  Neck: #8 shiley inflated cuff and surgicel removed, with minimal serosanguinous oozing from stoma, no active bleeding, sutures x4 and umbilical tie in place.  No lymphadenopathy, trachea midline, no masses  Resp: ventilating well, satting   CV: no peripheral edema/cyanosis

## 2018-12-27 NOTE — PROGRESS NOTE ADULT - ASSESSMENT
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 Pt s/p Trach and peg    - CTH in AM  - EVD open at 10 will re try clamp trial  - vent management per ICU  - Fever w/u and abx per ICU

## 2018-12-27 NOTE — PROGRESS NOTE ADULT - SUBJECTIVE AND OBJECTIVE BOX
HPI:  59yo woman PMH lupus p/w sudden onset severe HA a/w nausea and vomiting. 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 Patient was intact on presentation however intubated for airway protection and EVD placed. Transferred to [facility] for further care. (17 Dec 2018 01:18)    12/17: s/p angio/embo of EVM, resection of AVM  12/18: RTOR for posterior fossa decompression expansion  12/24 s/p trach, EVD clamped    Per neurosurgery EVD remains open  Febrile overnight    Vitals/labs/meds/imaging reviewed    EXAMINATION:  General:  calm  HEENT:  trach'd  Neuro:  opens eyes, FC BLE wiggles toes and bends kneew, does not mouth words, LUE prox 2/5 dist 0/5, RUE prox 3/5 dis 0/5  Cards:  RRR  Respiratory:  no respiratory distress  Adomen:  soft  Extremities:  no edema  Skin:  warm/dry

## 2018-12-27 NOTE — PROGRESS NOTE ADULT - ASSESSMENT
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 - s/p successful ANGIO/EMBO, posterior fossa decompression  ICH score 2    Neuro:  EVD unclamped- 24 hour clamp trial on Friday, VPS if needed   Na 140-150  lacosamide for seizure  Fentanyl PRN   buspiron   OT for splint    Cards:   -160 on norvasc/clonidine  TTE    Pulm:  s/p trach, wean vent as tolerated  RCU consult    GI: TF via PEG   bowel regimen, add lactulose    Renal:  IVL    Endo:  goal euglycemia    Heme:  SCDs  DVT ppx Lovenox     ID:  Febrile  MRSA in bronch cx  subtherapeutic vanc level    ICU  full code    at risk for deterioration and death due to AVM rebleed, hydrocephalus, cerebral edema, brain compression  critical care time 45min

## 2018-12-27 NOTE — PROGRESS NOTE ADULT - ASSESSMENT
57 yo female with hx of lupus s/p ICH s/p craniotomy with respiratory failure and s/p trach.    MRSA Pneumonia  Fever - per discussion with team, likely due to PNA as well as ICH; all blood cultures negative to date  On IV VAnco for MRSA PNA  s/p PEG 12/26/18    PLAN  -Local PEG care  -Titrate feeds to goal, monitor tolerance  -bowel regimen  -NSCU care  -Abx as per NSCU    Discussed with NSCU team    Jesus Lang PA-C    [facility]  [phone]

## 2018-12-27 NOTE — PROGRESS NOTE ADULT - ATTENDING COMMENTS
s/p peg, peg site clean, fever thought due to PNA    Plan monitor peg site           increase peg feeds.

## 2018-12-27 NOTE — PROGRESS NOTE ADULT - PROBLEM SELECTOR PLAN 1
continue vent   - aggressive suction prn   - continue with trach site care, keep dry and clean   - Reconsult PRN  - call ENT with any issues. continue vent   - aggressive suction prn   - continue with trach site care, keep dry and clean   - call ENT with any issues.

## 2018-12-27 NOTE — PROGRESS NOTE ADULT - SUBJECTIVE AND OBJECTIVE BOX
Visit Summary: Patient seen and evaluated at bedside.    Overnight Events: none    Exam:  T(C): 36.8 (12-27-18 @ 00:00), Max: 38.9 (12-26-18 @ 16:00)  HR: 83 (12-27-18 @ 00:00) (76 - 130)  BP: 95/52 (12-27-18 @ 00:00) (95/52 - 157/85)  RR: 17 (12-27-18 @ 00:00) (15 - 29)  SpO2: 100% (12-27-18 @ 00:00) (97% - 100%)  Wt(kg): --    opens eyes, FC BLE wiggles toes and bends knee,, LUE prox 2/5 dist 0/5, RUE prox 3/5 dis 0/5                        9.3    15.9  )-----------( 190      ( 25 Dec 2018 19:59 )             25.7     12-26    138  |  103  |  13  ----------------------------<  101<H>  4.1   |  27  |  0.48<L>    Ca    8.1<L>      26 Dec 2018 21:36  Phos  2.8     12-26  Mg     1.9     12-26    TPro  5.4<L>  /  Alb  2.3<L>  /  TBili  0.3  /  DBili  0.1  /  AST  41<H>  /  ALT  38  /  AlkPhos  49  12-26  PT/INR - ( 25 Dec 2018 19:59 )   PT: 12.9 sec;   INR: 1.13 ratio         PTT - ( 25 Dec 2018 19:59 )  PTT:23.0 sec

## 2018-12-28 LAB
-  AMPICILLIN/SULBACTAM: SIGNIFICANT CHANGE UP
-  CEFAZOLIN: SIGNIFICANT CHANGE UP
-  CLINDAMYCIN: SIGNIFICANT CHANGE UP
-  ERYTHROMYCIN: SIGNIFICANT CHANGE UP
-  GENTAMICIN: SIGNIFICANT CHANGE UP
-  LINEZOLID: SIGNIFICANT CHANGE UP
-  OXACILLIN: SIGNIFICANT CHANGE UP
-  PENICILLIN: SIGNIFICANT CHANGE UP
-  RIFAMPIN: SIGNIFICANT CHANGE UP
-  TETRACYCLINE: SIGNIFICANT CHANGE UP
-  TRIMETHOPRIM/SULFAMETHOXAZOLE: SIGNIFICANT CHANGE UP
-  VANCOMYCIN: SIGNIFICANT CHANGE UP
ANION GAP SERPL CALC-SCNC: 8 MMOL/L — SIGNIFICANT CHANGE UP (ref 5–17)
ANION GAP SERPL CALC-SCNC: 8 MMOL/L — SIGNIFICANT CHANGE UP (ref 5–17)
APPEARANCE UR: CLEAR — SIGNIFICANT CHANGE UP
BILIRUB UR-MCNC: NEGATIVE — SIGNIFICANT CHANGE UP
BUN SERPL-MCNC: 12 MG/DL — SIGNIFICANT CHANGE UP (ref 7–23)
BUN SERPL-MCNC: 13 MG/DL — SIGNIFICANT CHANGE UP (ref 7–23)
CALCIUM SERPL-MCNC: 7.8 MG/DL — LOW (ref 8.4–10.5)
CALCIUM SERPL-MCNC: 8 MG/DL — LOW (ref 8.4–10.5)
CHLORIDE SERPL-SCNC: 105 MMOL/L — SIGNIFICANT CHANGE UP (ref 96–108)
CHLORIDE SERPL-SCNC: 105 MMOL/L — SIGNIFICANT CHANGE UP (ref 96–108)
CO2 SERPL-SCNC: 25 MMOL/L — SIGNIFICANT CHANGE UP (ref 22–31)
CO2 SERPL-SCNC: 26 MMOL/L — SIGNIFICANT CHANGE UP (ref 22–31)
COLOR SPEC: YELLOW — SIGNIFICANT CHANGE UP
CREAT SERPL-MCNC: 0.37 MG/DL — LOW (ref 0.5–1.3)
CREAT SERPL-MCNC: 0.37 MG/DL — LOW (ref 0.5–1.3)
CULTURE RESULTS: SIGNIFICANT CHANGE UP
DIFF PNL FLD: NEGATIVE — SIGNIFICANT CHANGE UP
GLUCOSE SERPL-MCNC: 132 MG/DL — HIGH (ref 70–99)
GLUCOSE SERPL-MCNC: 144 MG/DL — HIGH (ref 70–99)
GLUCOSE UR QL: NEGATIVE — SIGNIFICANT CHANGE UP
KETONES UR-MCNC: SIGNIFICANT CHANGE UP
LEUKOCYTE ESTERASE UR-ACNC: NEGATIVE — SIGNIFICANT CHANGE UP
MAGNESIUM SERPL-MCNC: 2.4 MG/DL — SIGNIFICANT CHANGE UP (ref 1.6–2.6)
MAGNESIUM SERPL-MCNC: 2.4 MG/DL — SIGNIFICANT CHANGE UP (ref 1.6–2.6)
METHOD TYPE: SIGNIFICANT CHANGE UP
NITRITE UR-MCNC: NEGATIVE — SIGNIFICANT CHANGE UP
ORGANISM # SPEC MICROSCOPIC CNT: SIGNIFICANT CHANGE UP
ORGANISM # SPEC MICROSCOPIC CNT: SIGNIFICANT CHANGE UP
PH UR: 5.5 — SIGNIFICANT CHANGE UP (ref 5–8)
PHOSPHATE SERPL-MCNC: 2.4 MG/DL — LOW (ref 2.5–4.5)
PHOSPHATE SERPL-MCNC: 2.7 MG/DL — SIGNIFICANT CHANGE UP (ref 2.5–4.5)
POTASSIUM SERPL-MCNC: 3.6 MMOL/L — SIGNIFICANT CHANGE UP (ref 3.5–5.3)
POTASSIUM SERPL-MCNC: 4.3 MMOL/L — SIGNIFICANT CHANGE UP (ref 3.5–5.3)
POTASSIUM SERPL-SCNC: 3.6 MMOL/L — SIGNIFICANT CHANGE UP (ref 3.5–5.3)
POTASSIUM SERPL-SCNC: 4.3 MMOL/L — SIGNIFICANT CHANGE UP (ref 3.5–5.3)
PROT UR-MCNC: ABNORMAL
SODIUM SERPL-SCNC: 138 MMOL/L — SIGNIFICANT CHANGE UP (ref 135–145)
SODIUM SERPL-SCNC: 139 MMOL/L — SIGNIFICANT CHANGE UP (ref 135–145)
SP GR SPEC: 1.03 — HIGH (ref 1.01–1.02)
SPECIMEN SOURCE: SIGNIFICANT CHANGE UP
UROBILINOGEN FLD QL: NEGATIVE — SIGNIFICANT CHANGE UP

## 2018-12-28 PROCEDURE — 99231 SBSQ HOSP IP/OBS SF/LOW 25: CPT

## 2018-12-28 PROCEDURE — 71045 X-RAY EXAM CHEST 1 VIEW: CPT | Mod: 26

## 2018-12-28 PROCEDURE — 99291 CRITICAL CARE FIRST HOUR: CPT

## 2018-12-28 PROCEDURE — 70450 CT HEAD/BRAIN W/O DYE: CPT | Mod: 26

## 2018-12-28 PROCEDURE — 99292 CRITICAL CARE ADDL 30 MIN: CPT

## 2018-12-28 RX ORDER — POTASSIUM PHOSPHATE, MONOBASIC POTASSIUM PHOSPHATE, DIBASIC 236; 224 MG/ML; MG/ML
15 INJECTION, SOLUTION INTRAVENOUS ONCE
Qty: 0 | Refills: 0 | Status: COMPLETED | OUTPATIENT
Start: 2018-12-28 | End: 2018-12-28

## 2018-12-28 RX ORDER — POTASSIUM CHLORIDE 20 MEQ
40 PACKET (EA) ORAL ONCE
Qty: 0 | Refills: 0 | Status: COMPLETED | OUTPATIENT
Start: 2018-12-28 | End: 2018-12-28

## 2018-12-28 RX ORDER — ACETAMINOPHEN 500 MG
1000 TABLET ORAL THREE TIMES A DAY
Qty: 0 | Refills: 0 | Status: COMPLETED | OUTPATIENT
Start: 2018-12-28 | End: 2018-12-30

## 2018-12-28 RX ADMIN — SENNA PLUS 5 MILLILITER(S): 8.6 TABLET ORAL at 22:45

## 2018-12-28 RX ADMIN — Medication 0.1 MILLIGRAM(S): at 13:16

## 2018-12-28 RX ADMIN — LACOSAMIDE 100 MILLIGRAM(S): 50 TABLET ORAL at 17:08

## 2018-12-28 RX ADMIN — OXYCODONE HYDROCHLORIDE 5 MILLIGRAM(S): 5 TABLET ORAL at 11:09

## 2018-12-28 RX ADMIN — Medication 1000 MILLIGRAM(S): at 05:37

## 2018-12-28 RX ADMIN — POLYETHYLENE GLYCOL 3350 17 GRAM(S): 17 POWDER, FOR SOLUTION ORAL at 05:28

## 2018-12-28 RX ADMIN — CHLORHEXIDINE GLUCONATE 15 MILLILITER(S): 213 SOLUTION TOPICAL at 17:08

## 2018-12-28 RX ADMIN — MEPERIDINE HYDROCHLORIDE 50 MILLIGRAM(S): 50 INJECTION INTRAMUSCULAR; INTRAVENOUS; SUBCUTANEOUS at 13:36

## 2018-12-28 RX ADMIN — Medication 100 MILLIGRAM(S): at 05:26

## 2018-12-28 RX ADMIN — FENTANYL CITRATE 25 MICROGRAM(S): 50 INJECTION INTRAVENOUS at 17:35

## 2018-12-28 RX ADMIN — POLYETHYLENE GLYCOL 3350 17 GRAM(S): 17 POWDER, FOR SOLUTION ORAL at 17:08

## 2018-12-28 RX ADMIN — Medication 100 MILLIGRAM(S): at 14:52

## 2018-12-28 RX ADMIN — Medication 3 MILLILITER(S): at 17:26

## 2018-12-28 RX ADMIN — MEPERIDINE HYDROCHLORIDE 50 MILLIGRAM(S): 50 INJECTION INTRAMUSCULAR; INTRAVENOUS; SUBCUTANEOUS at 09:17

## 2018-12-28 RX ADMIN — GABAPENTIN 300 MILLIGRAM(S): 400 CAPSULE ORAL at 05:28

## 2018-12-28 RX ADMIN — CHLORHEXIDINE GLUCONATE 15 MILLILITER(S): 213 SOLUTION TOPICAL at 05:26

## 2018-12-28 RX ADMIN — ENOXAPARIN SODIUM 40 MILLIGRAM(S): 100 INJECTION SUBCUTANEOUS at 17:08

## 2018-12-28 RX ADMIN — Medication 0.1 MILLIGRAM(S): at 05:27

## 2018-12-28 RX ADMIN — FENTANYL CITRATE 25 MICROGRAM(S): 50 INJECTION INTRAVENOUS at 17:05

## 2018-12-28 RX ADMIN — Medication 1000 MILLIGRAM(S): at 21:48

## 2018-12-28 RX ADMIN — Medication 10 MILLIGRAM(S): at 05:27

## 2018-12-28 RX ADMIN — CHLORHEXIDINE GLUCONATE 1 APPLICATION(S): 213 SOLUTION TOPICAL at 21:48

## 2018-12-28 RX ADMIN — MEPERIDINE HYDROCHLORIDE 50 MILLIGRAM(S): 50 INJECTION INTRAMUSCULAR; INTRAVENOUS; SUBCUTANEOUS at 19:45

## 2018-12-28 RX ADMIN — PROPOFOL 5.05 MICROGRAM(S)/KG/MIN: 10 INJECTION, EMULSION INTRAVENOUS at 05:27

## 2018-12-28 RX ADMIN — GABAPENTIN 300 MILLIGRAM(S): 400 CAPSULE ORAL at 22:46

## 2018-12-28 RX ADMIN — AMLODIPINE BESYLATE 10 MILLIGRAM(S): 2.5 TABLET ORAL at 13:16

## 2018-12-28 RX ADMIN — Medication 40 MILLIEQUIVALENT(S): at 10:09

## 2018-12-28 RX ADMIN — Medication 10 MILLIGRAM(S): at 21:50

## 2018-12-28 RX ADMIN — GABAPENTIN 300 MILLIGRAM(S): 400 CAPSULE ORAL at 13:18

## 2018-12-28 RX ADMIN — Medication 10 MILLIGRAM(S): at 13:16

## 2018-12-28 RX ADMIN — Medication 250 MILLIGRAM(S): at 13:16

## 2018-12-28 RX ADMIN — OXYCODONE HYDROCHLORIDE 5 MILLIGRAM(S): 5 TABLET ORAL at 11:39

## 2018-12-28 RX ADMIN — Medication 3 MILLILITER(S): at 05:07

## 2018-12-28 RX ADMIN — POTASSIUM PHOSPHATE, MONOBASIC POTASSIUM PHOSPHATE, DIBASIC 62.5 MILLIMOLE(S): 236; 224 INJECTION, SOLUTION INTRAVENOUS at 08:51

## 2018-12-28 RX ADMIN — Medication 3 MILLILITER(S): at 13:10

## 2018-12-28 RX ADMIN — Medication 250 MILLIGRAM(S): at 21:50

## 2018-12-28 RX ADMIN — PROPOFOL 5.05 MICROGRAM(S)/KG/MIN: 10 INJECTION, EMULSION INTRAVENOUS at 17:08

## 2018-12-28 RX ADMIN — LACOSAMIDE 100 MILLIGRAM(S): 50 TABLET ORAL at 05:27

## 2018-12-28 RX ADMIN — Medication 250 MILLIGRAM(S): at 06:21

## 2018-12-28 RX ADMIN — Medication 1000 MILLIGRAM(S): at 13:16

## 2018-12-28 RX ADMIN — Medication 0.1 MILLIGRAM(S): at 21:48

## 2018-12-28 NOTE — PROGRESS NOTE ADULT - ASSESSMENT
57 y/o POD#5 for tracheostomy 2/2 respiratory failure. Pt doing well, on the vent, no active bleed. Sutures x4 and umbilical tie in place

## 2018-12-28 NOTE — PROGRESS NOTE ADULT - ASSESSMENT
57 yo female with hx of lupus s/p ICH s/p craniotomy with respiratory failure and s/p trach.    MRSA Pneumonia  Fever - per discussion with team, likely due to PNA as well as ICH; all blood cultures negative to date  On IV VAnco for MRSA PNA  s/p PEG 12/26/18    PLAN  -Local PEG care  -Titrate feeds to goal, monitor tolerance  -bowel regimen  -NSCU care  -Abx as per NSCU    Discussed with NSCU team  Please call over weekend prn with GI concerns    Jesus Lang PA-C    [facility]  [phone]

## 2018-12-28 NOTE — PROGRESS NOTE ADULT - SUBJECTIVE AND OBJECTIVE BOX
Patient is a 58y old  Female who presented with a chief complaint of Cerebellar IPH (28 Dec 2018 11:40)      INTERVAL HPI/OVERNIGHT EVENTS:  tolerating PEG feeds  no GI events  +fevers persist    MEDICATIONS  (STANDING):  acetaminophen    Suspension .. 1000 milliGRAM(s) Oral three times a day  ALBUTerol/ipratropium for Nebulization 3 milliLiter(s) Nebulizer every 6 hours  amLODIPine   Tablet 10 milliGRAM(s) Oral daily  busPIRone 10 milliGRAM(s) Oral every 8 hours  chlorhexidine 0.12% Liquid 15 milliLiter(s) Swish and Spit two times a day  chlorhexidine 4% Liquid 1 Application(s) Topical <User Schedule>  cloNIDine 0.1 milliGRAM(s) Oral every 8 hours  docusate sodium Liquid 100 milliGRAM(s) Oral three times a day  enoxaparin Injectable 40 milliGRAM(s) SubCutaneous <User Schedule>  gabapentin   Solution 300 milliGRAM(s) Oral every 8 hours  lacosamide 100 milliGRAM(s) Oral two times a day  magnesium sulfate Infusion 0.2 Gm/Hr (5 mL/Hr) IV Continuous <Continuous>  polyethylene glycol 3350 17 Gram(s) Oral two times a day  propofol Infusion 10 MICROgram(s)/kG/Min (5.046 mL/Hr) IV Continuous <Continuous>  senna Syrup 5 milliLiter(s) Oral at bedtime  sodium chloride 0.9% with potassium chloride 20 mEq/L 1000 milliLiter(s) (75 mL/Hr) IV Continuous <Continuous>  vancomycin  IVPB 1750 milliGRAM(s) IV Intermittent every 8 hours    MEDICATIONS  (PRN):  fentaNYL    Injectable 25 MICROGram(s) IV Push every 2 hours PRN agitation/severe pain  meperidine     Injectable 50 milliGRAM(s) IV Push every 4 hours PRN shivering  oxyCODONE    Solution 5 milliGRAM(s) Oral every 4 hours PRN Moderate Pain (4 - 6)      Allergies  No Known Allergies    Review of Systems:  unable to obtain from pt    Vital Signs Last 24 Hrs  T(C): 37.2 (28 Dec 2018 14:00), Max: 37.7 (28 Dec 2018 13:00)  T(F): 99 (28 Dec 2018 14:00), Max: 99.9 (28 Dec 2018 13:00)  HR: 93 (28 Dec 2018 14:00) (78 - 117)  BP: 111/69 (28 Dec 2018 14:00) (104/60 - 149/104)  BP(mean): 81 (28 Dec 2018 14:00) (73 - 111)  RR: 19 (28 Dec 2018 14:00) (16 - 30)  SpO2: 100% (28 Dec 2018 14:00) (96% - 100%)    PHYSICAL EXAM:  Constitutional: on vent +EVD - blood tinged, under Arctic sun and warming blanket  Neck: trach site clean  Respiratory: b/l air entry  Cardiovascular: S1 and S2, tachy  Gastrointestinal: BS+, soft, NT/ND, neg HSM, PEG site clean and dry bumper at 4cm spins freely 360 degrees  Extremities: No peripheral edema,  cyanosis  Vascular: 2+ peripheral pulses  Skin: No rashes    LABS:    12-28    138  |  105  |  13  ----------------------------<  144<H>  3.6   |  25  |  0.37<L>    Ca    8.0<L>      28 Dec 2018 07:14  Phos  2.4     12-28  Mg     2.4     12-28    TPro  5.4<L>  /  Alb  2.3<L>  /  TBili  0.3  /  DBili  0.1  /  AST  41<H>  /  ALT  38  /  AlkPhos  49  12-26        RADIOLOGY & ADDITIONAL TESTS:

## 2018-12-28 NOTE — PROGRESS NOTE ADULT - SUBJECTIVE AND OBJECTIVE BOX
Visit Summary: Patient seen and evaluated at bedside.    Overnight Events: none    Exam:  T(C): 36.3 (12-28-18 @ 01:00), Max: 37.7 (12-27-18 @ 08:00)  HR: 82 (12-28-18 @ 01:00) (76 - 117)  BP: 111/65 (12-28-18 @ 01:00) (104/60 - 149/104)  RR: 17 (12-28-18 @ 01:00) (13 - 30)  SpO2: 100% (12-28-18 @ 01:00) (96% - 100%)  Wt(kg): --  opens eyes, FC BLE wiggles toes and bends knee,, LUE prox 2/5 dist 0/5, RUE prox 3/5 dis 0/5      12-27    138  |  103  |  14  ----------------------------<  106<H>  4.0   |  24  |  0.39<L>    Ca    8.4      27 Dec 2018 22:40  Phos  3.0     12-27  Mg     2.3     12-27    TPro  5.4<L>  /  Alb  2.3<L>  /  TBili  0.3  /  DBili  0.1  /  AST  41<H>  /  ALT  38  /  AlkPhos  49  12-26

## 2018-12-28 NOTE — PROGRESS NOTE ADULT - SUBJECTIVE AND OBJECTIVE BOX
HPI:  57yo woman PMH lupus p/w sudden onset severe HA a/w nausea and vomiting. 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 Patient was intact on presentation however intubated for airway protection and EVD placed. Transferred to [facility] for further care. (17 Dec 2018 01:18)    12/17: s/p angio/embo of EVM, resection of AVM  12/18: RTOR for posterior fossa decompression expansion  12/24 s/p trach, EVD reopened per NSGY  12/28:  CT head and clamp EVD     Vitals/labs/meds/imaging reviewed    EXAMINATION:  General:  calm  HEENT:  trach'd  Neuro:  opens eyes, FC BLE wiggles toes, does not mouth words, LUE prox 2/5 dist 0/5, RUE prox 3/5 dis 0/5  Cards:  RRR  Respiratory:  no respiratory distress  Adomen:  soft  Extremities:  no edema  Skin:  warm/dry

## 2018-12-28 NOTE — PROGRESS NOTE ADULT - SUBJECTIVE AND OBJECTIVE BOX
NSCU ATTENDING -- ADDITIONAL PROGRESS NOTE    Nighttime rounds were performed -- please refer to earlier Progress Note for HPI details.    T(C): 37.8 (12-28-18 @ 19:00), Max: 37.8 (12-28-18 @ 19:00)  HR: 100 (12-28-18 @ 20:19) (80 - 103)  BP: 127/66 (12-28-18 @ 20:00) (104/60 - 145/72)  RR: 13 (12-28-18 @ 20:00) (13 - 30)  SpO2: 100% (12-28-18 @ 20:19) (95% - 100%)  Wt(kg): --    Relevant labwork and imaging reviewed.    Patient remains critically ill.    Still febrile, remains on arctic sun though will try to switch to cooling blanket to alleviate shivering somewhat.  Cultured today.  Recalcitrant low vanco levels -- will repeat trough tonight with BMP, consider ID consult in AM for persistent fevers and apparent vancomycin resistance (last + culture with MRSA in sputum on 12/26).  EVD clamped, scan in AM and either d/c or plan on VPS.  ICPs in the teens.    Additional 30 minutes of critical care time.

## 2018-12-28 NOTE — PROGRESS NOTE ADULT - SUBJECTIVE AND OBJECTIVE BOX
ENT ISSUE/POD: POD#5 for trach    HPI:   59 y/o POD#5 for tracheostomy #8 shiley cuffed 2/2 respiratory failure. Pt seen and examined, doing well on the vent. No complaints or issues.     PAST MEDICAL & SURGICAL HISTORY:  Systemic lupus erythematosus, unspecified SLE type, unspecified organ involvement status  Lupus  Back pain, chronic  Childhood asthma  Ovarian cyst  DVT (deep venous thrombosis), bilateral: 1993( treated with Heparin and coumadin)  RA (rheumatoid arthritis)  No significant past surgical history  No significant past surgical history  Plantar fasciitis of right foot: h/o surgery  History of laparoscopic cholecystectomy: 1993    Allergies    No Known Allergies    Intolerances      MEDICATIONS  (STANDING):  ALBUTerol/ipratropium for Nebulization 3 milliLiter(s) Nebulizer every 6 hours  amLODIPine   Tablet 10 milliGRAM(s) Oral daily  busPIRone 10 milliGRAM(s) Oral every 8 hours  chlorhexidine 0.12% Liquid 15 milliLiter(s) Swish and Spit two times a day  chlorhexidine 4% Liquid 1 Application(s) Topical <User Schedule>  cloNIDine 0.1 milliGRAM(s) Oral every 8 hours  docusate sodium Liquid 100 milliGRAM(s) Oral three times a day  enoxaparin Injectable 40 milliGRAM(s) SubCutaneous <User Schedule>  gabapentin   Solution 300 milliGRAM(s) Oral every 8 hours  lacosamide 100 milliGRAM(s) Oral two times a day  magnesium sulfate Infusion 0.2 Gm/Hr (5 mL/Hr) IV Continuous <Continuous>  polyethylene glycol 3350 17 Gram(s) Oral two times a day  propofol Infusion 10 MICROgram(s)/kG/Min (5.046 mL/Hr) IV Continuous <Continuous>  senna Syrup 5 milliLiter(s) Oral at bedtime  sodium chloride 0.9% with potassium chloride 20 mEq/L 1000 milliLiter(s) (75 mL/Hr) IV Continuous <Continuous>  vancomycin  IVPB 1750 milliGRAM(s) IV Intermittent every 8 hours    MEDICATIONS  (PRN):  fentaNYL    Injectable 25 MICROGram(s) IV Push every 2 hours PRN agitation/severe pain  meperidine     Injectable 50 milliGRAM(s) IV Push every 4 hours PRN shivering  oxyCODONE    Solution 5 milliGRAM(s) Oral every 4 hours PRN Moderate Pain (4 - 6)      Social History: see consult     Family history: see consult     ROS:   ENT: all negative except as noted in HPI   Pulm: denies SOB, cough, hemoptysis  Neuro: denies numbness/tingling, loss of sensation  Endo: denies heat/cold intolerance, excessive sweating      Vital Signs Last 24 Hrs  T(C): 36.9 (28 Dec 2018 06:00), Max: 37.7 (27 Dec 2018 08:00)  T(F): 98.4 (28 Dec 2018 06:00), Max: 99.9 (27 Dec 2018 08:00)  HR: 103 (28 Dec 2018 06:00) (76 - 117)  BP: 142/87 (28 Dec 2018 06:00) (104/60 - 149/104)  BP(mean): 97 (28 Dec 2018 06:00) (73 - 111)  RR: 30 (28 Dec 2018 06:00) (13 - 30)  SpO2: 100% (28 Dec 2018 06:00) (96% - 100%)     12-27    138  |  103  |  14  ----------------------------<  106<H>  4.0   |  24  |  0.39<L>    Ca    8.4      27 Dec 2018 22:40  Phos  3.0     12-27  Mg     2.3     12-27    TPro  5.4<L>  /  Alb  2.3<L>  /  TBili  0.3  /  DBili  0.1  /  AST  41<H>  /  ALT  38  /  AlkPhos  49  12-26       PHYSICAL EXAM:  Gen: NAD  Head: Normocephalic, Atraumatic  Face: no edema/erythema/fluctuance  Eyes:  no scleral injection  Nose: Nares bilaterally patent, no discharge  Mouth: No Stridor / Drooling / Trismus.  Mucosa moist, tongue/uvula midline, oropharynx clear  Neck: #8 shiley inflated cuff and surgicel removed, no active bleeding, sutures x4 and umbilical tie in place.  No lymphadenopathy, trachea midline, no masses  Resp: ventilating well, satting   CV: no peripheral edema/cyanosis

## 2018-12-28 NOTE — PROGRESS NOTE ADULT - ASSESSMENT
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 - s/p successful ANGIO/EMBO, posterior fossa decompression  ICH score 2    Neuro:  Hydro: EVD clamped- 24 hour clamp trial repeat CT head tomorrow , VPS if needed   lacosamide for seizure  Fentanyl PRN, come off propfol as tolerated   Stroming: c/w Gabapentin, clonidine, buspirone, Mg    OT for splint    Cards:   -160 on norvasc/clonidine  TTE    Pulm:  s/p trach, wean vent as tolerated  RCU consult    GI: TF via PEG   bowel regimen, add lactulose    Renal:  IVL    Endo:  goal euglycemia    Heme:  SCDs  DVT ppx Lovenox     ID:  Febrile  MRSA in bronch cx  subtherapeutic vanc level    ICU  full code    at risk for deterioration and death due to AVM rebleed, hydrocephalus, cerebral edema, brain compression  critical care time 45min

## 2018-12-29 LAB
ANION GAP SERPL CALC-SCNC: 10 MMOL/L — SIGNIFICANT CHANGE UP (ref 5–17)
ANION GAP SERPL CALC-SCNC: 8 MMOL/L — SIGNIFICANT CHANGE UP (ref 5–17)
APPEARANCE CSF: ABNORMAL
APPEARANCE SPUN FLD: SIGNIFICANT CHANGE UP
BUN SERPL-MCNC: 13 MG/DL — SIGNIFICANT CHANGE UP (ref 7–23)
BUN SERPL-MCNC: 14 MG/DL — SIGNIFICANT CHANGE UP (ref 7–23)
CALCIUM SERPL-MCNC: 7.7 MG/DL — LOW (ref 8.4–10.5)
CALCIUM SERPL-MCNC: 8.1 MG/DL — LOW (ref 8.4–10.5)
CHLORIDE SERPL-SCNC: 101 MMOL/L — SIGNIFICANT CHANGE UP (ref 96–108)
CHLORIDE SERPL-SCNC: 102 MMOL/L — SIGNIFICANT CHANGE UP (ref 96–108)
CO2 SERPL-SCNC: 25 MMOL/L — SIGNIFICANT CHANGE UP (ref 22–31)
CO2 SERPL-SCNC: 29 MMOL/L — SIGNIFICANT CHANGE UP (ref 22–31)
COLOR CSF: ABNORMAL
CREAT SERPL-MCNC: 0.38 MG/DL — LOW (ref 0.5–1.3)
CREAT SERPL-MCNC: 0.39 MG/DL — LOW (ref 0.5–1.3)
GLUCOSE CSF-MCNC: 85 MG/DL — HIGH (ref 40–70)
GLUCOSE SERPL-MCNC: 138 MG/DL — HIGH (ref 70–99)
GLUCOSE SERPL-MCNC: 153 MG/DL — HIGH (ref 70–99)
GRAM STN FLD: SIGNIFICANT CHANGE UP
GRAM STN FLD: SIGNIFICANT CHANGE UP
HCT VFR BLD CALC: 21.7 % — LOW (ref 34.5–45)
HGB BLD-MCNC: 7.8 G/DL — LOW (ref 11.5–15.5)
LACTATE CSF-MCNC: 1.9 MMOL/L — SIGNIFICANT CHANGE UP (ref 1.1–2.4)
LYMPHOCYTES # CSF: 12 % — LOW (ref 40–80)
MAGNESIUM SERPL-MCNC: 2 MG/DL — SIGNIFICANT CHANGE UP (ref 1.6–2.6)
MAGNESIUM SERPL-MCNC: 2.1 MG/DL — SIGNIFICANT CHANGE UP (ref 1.6–2.6)
MCHC RBC-ENTMCNC: 31.5 PG — SIGNIFICANT CHANGE UP (ref 27–34)
MCHC RBC-ENTMCNC: 36.1 GM/DL — HIGH (ref 32–36)
MCV RBC AUTO: 87.1 FL — SIGNIFICANT CHANGE UP (ref 80–100)
MONOS+MACROS NFR CSF: 23 % — SIGNIFICANT CHANGE UP (ref 15–45)
NEUTROPHILS # CSF: 65 % — HIGH (ref 0–6)
NRBC NFR CSF: 2 /UL — SIGNIFICANT CHANGE UP (ref 0–5)
PHOSPHATE SERPL-MCNC: 2.2 MG/DL — LOW (ref 2.5–4.5)
PHOSPHATE SERPL-MCNC: 2.8 MG/DL — SIGNIFICANT CHANGE UP (ref 2.5–4.5)
PLATELET # BLD AUTO: 260 K/UL — SIGNIFICANT CHANGE UP (ref 150–400)
POTASSIUM SERPL-MCNC: 4.1 MMOL/L — SIGNIFICANT CHANGE UP (ref 3.5–5.3)
POTASSIUM SERPL-MCNC: 4.3 MMOL/L — SIGNIFICANT CHANGE UP (ref 3.5–5.3)
POTASSIUM SERPL-SCNC: 4.1 MMOL/L — SIGNIFICANT CHANGE UP (ref 3.5–5.3)
POTASSIUM SERPL-SCNC: 4.3 MMOL/L — SIGNIFICANT CHANGE UP (ref 3.5–5.3)
PROT CSF-MCNC: <6 MG/DL — LOW (ref 15–45)
RBC # BLD: 2.5 M/UL — LOW (ref 3.8–5.2)
RBC # CSF: HIGH /UL (ref 0–0)
RBC # FLD: 14.1 % — SIGNIFICANT CHANGE UP (ref 10.3–14.5)
SODIUM SERPL-SCNC: 136 MMOL/L — SIGNIFICANT CHANGE UP (ref 135–145)
SODIUM SERPL-SCNC: 139 MMOL/L — SIGNIFICANT CHANGE UP (ref 135–145)
SPECIMEN SOURCE: SIGNIFICANT CHANGE UP
SPECIMEN SOURCE: SIGNIFICANT CHANGE UP
TUBE TYPE: SIGNIFICANT CHANGE UP
VANCOMYCIN TROUGH SERPL-MCNC: 11.3 UG/ML — SIGNIFICANT CHANGE UP (ref 10–20)
WBC # BLD: 11.4 K/UL — HIGH (ref 3.8–10.5)
WBC # FLD AUTO: 11.4 K/UL — HIGH (ref 3.8–10.5)

## 2018-12-29 PROCEDURE — 70450 CT HEAD/BRAIN W/O DYE: CPT | Mod: 26

## 2018-12-29 PROCEDURE — 61070 BRAIN CANAL SHUNT PROCEDURE: CPT | Mod: 78

## 2018-12-29 PROCEDURE — 99291 CRITICAL CARE FIRST HOUR: CPT

## 2018-12-29 PROCEDURE — 99292 CRITICAL CARE ADDL 30 MIN: CPT

## 2018-12-29 RX ORDER — MULTIVIT WITH MIN/MFOLATE/K2 340-15/3 G
200 POWDER (GRAM) ORAL ONCE
Qty: 0 | Refills: 0 | Status: COMPLETED | OUTPATIENT
Start: 2018-12-29 | End: 2018-12-30

## 2018-12-29 RX ORDER — VANCOMYCIN HCL 1 G
1750 VIAL (EA) INTRAVENOUS EVERY 8 HOURS
Qty: 0 | Refills: 0 | Status: DISCONTINUED | OUTPATIENT
Start: 2018-12-29 | End: 2018-12-29

## 2018-12-29 RX ORDER — VANCOMYCIN HCL 1 G
1750 VIAL (EA) INTRAVENOUS EVERY 8 HOURS
Qty: 0 | Refills: 0 | Status: COMPLETED | OUTPATIENT
Start: 2018-12-29 | End: 2019-01-03

## 2018-12-29 RX ORDER — CEFEPIME 1 G/1
2000 INJECTION, POWDER, FOR SOLUTION INTRAMUSCULAR; INTRAVENOUS EVERY 8 HOURS
Qty: 0 | Refills: 0 | Status: DISCONTINUED | OUTPATIENT
Start: 2018-12-30 | End: 2019-01-03

## 2018-12-29 RX ORDER — CEFEPIME 1 G/1
2000 INJECTION, POWDER, FOR SOLUTION INTRAMUSCULAR; INTRAVENOUS ONCE
Qty: 0 | Refills: 0 | Status: COMPLETED | OUTPATIENT
Start: 2018-12-29 | End: 2018-12-29

## 2018-12-29 RX ORDER — CEFEPIME 1 G/1
INJECTION, POWDER, FOR SOLUTION INTRAMUSCULAR; INTRAVENOUS
Qty: 0 | Refills: 0 | Status: DISCONTINUED | OUTPATIENT
Start: 2018-12-29 | End: 2019-01-03

## 2018-12-29 RX ORDER — POLYETHYLENE GLYCOL 3350 17 G/17G
17 POWDER, FOR SOLUTION ORAL
Qty: 0 | Refills: 0 | Status: DISCONTINUED | OUTPATIENT
Start: 2018-12-29 | End: 2019-01-03

## 2018-12-29 RX ORDER — BROMOCRIPTINE MESYLATE 5 MG/1
5 CAPSULE ORAL EVERY 8 HOURS
Qty: 0 | Refills: 0 | Status: DISCONTINUED | OUTPATIENT
Start: 2018-12-29 | End: 2018-12-30

## 2018-12-29 RX ADMIN — BROMOCRIPTINE MESYLATE 5 MILLIGRAM(S): 5 CAPSULE ORAL at 10:43

## 2018-12-29 RX ADMIN — CHLORHEXIDINE GLUCONATE 15 MILLILITER(S): 213 SOLUTION TOPICAL at 17:08

## 2018-12-29 RX ADMIN — POLYETHYLENE GLYCOL 3350 17 GRAM(S): 17 POWDER, FOR SOLUTION ORAL at 05:47

## 2018-12-29 RX ADMIN — Medication 0.1 MILLIGRAM(S): at 22:11

## 2018-12-29 RX ADMIN — Medication 250 MILLIGRAM(S): at 05:49

## 2018-12-29 RX ADMIN — LACOSAMIDE 100 MILLIGRAM(S): 50 TABLET ORAL at 05:49

## 2018-12-29 RX ADMIN — Medication 100 MILLIGRAM(S): at 22:11

## 2018-12-29 RX ADMIN — Medication 10 MILLIGRAM(S): at 13:00

## 2018-12-29 RX ADMIN — Medication 62.5 MILLIMOLE(S): at 03:35

## 2018-12-29 RX ADMIN — GABAPENTIN 300 MILLIGRAM(S): 400 CAPSULE ORAL at 22:11

## 2018-12-29 RX ADMIN — BROMOCRIPTINE MESYLATE 5 MILLIGRAM(S): 5 CAPSULE ORAL at 22:11

## 2018-12-29 RX ADMIN — Medication 3 MILLILITER(S): at 05:04

## 2018-12-29 RX ADMIN — Medication 1000 MILLIGRAM(S): at 22:11

## 2018-12-29 RX ADMIN — SENNA PLUS 5 MILLILITER(S): 8.6 TABLET ORAL at 22:11

## 2018-12-29 RX ADMIN — CEFEPIME 100 MILLIGRAM(S): 1 INJECTION, POWDER, FOR SOLUTION INTRAMUSCULAR; INTRAVENOUS at 22:24

## 2018-12-29 RX ADMIN — CHLORHEXIDINE GLUCONATE 1 APPLICATION(S): 213 SOLUTION TOPICAL at 22:12

## 2018-12-29 RX ADMIN — MEPERIDINE HYDROCHLORIDE 50 MILLIGRAM(S): 50 INJECTION INTRAMUSCULAR; INTRAVENOUS; SUBCUTANEOUS at 04:00

## 2018-12-29 RX ADMIN — BROMOCRIPTINE MESYLATE 5 MILLIGRAM(S): 5 CAPSULE ORAL at 13:00

## 2018-12-29 RX ADMIN — ENOXAPARIN SODIUM 40 MILLIGRAM(S): 100 INJECTION SUBCUTANEOUS at 17:09

## 2018-12-29 RX ADMIN — CHLORHEXIDINE GLUCONATE 15 MILLILITER(S): 213 SOLUTION TOPICAL at 05:50

## 2018-12-29 RX ADMIN — Medication 3 MILLILITER(S): at 11:48

## 2018-12-29 RX ADMIN — Medication 10 MILLIGRAM(S): at 22:11

## 2018-12-29 RX ADMIN — POLYETHYLENE GLYCOL 3350 17 GRAM(S): 17 POWDER, FOR SOLUTION ORAL at 17:07

## 2018-12-29 RX ADMIN — Medication 1000 MILLIGRAM(S): at 14:00

## 2018-12-29 RX ADMIN — Medication 0.1 MILLIGRAM(S): at 05:48

## 2018-12-29 RX ADMIN — LACOSAMIDE 100 MILLIGRAM(S): 50 TABLET ORAL at 17:08

## 2018-12-29 RX ADMIN — Medication 250 MILLIGRAM(S): at 14:31

## 2018-12-29 RX ADMIN — PROPOFOL 5.05 MICROGRAM(S)/KG/MIN: 10 INJECTION, EMULSION INTRAVENOUS at 07:04

## 2018-12-29 RX ADMIN — Medication 10 MILLIGRAM(S): at 05:52

## 2018-12-29 RX ADMIN — FENTANYL CITRATE 25 MICROGRAM(S): 50 INJECTION INTRAVENOUS at 03:30

## 2018-12-29 RX ADMIN — Medication 1000 MILLIGRAM(S): at 05:47

## 2018-12-29 RX ADMIN — Medication 250 MILLIGRAM(S): at 22:12

## 2018-12-29 RX ADMIN — Medication 1000 MILLIGRAM(S): at 13:00

## 2018-12-29 RX ADMIN — GABAPENTIN 300 MILLIGRAM(S): 400 CAPSULE ORAL at 13:01

## 2018-12-29 RX ADMIN — Medication 3 MILLILITER(S): at 23:20

## 2018-12-29 RX ADMIN — FENTANYL CITRATE 25 MICROGRAM(S): 50 INJECTION INTRAVENOUS at 03:45

## 2018-12-29 RX ADMIN — Medication 3 MILLILITER(S): at 17:25

## 2018-12-29 RX ADMIN — GABAPENTIN 300 MILLIGRAM(S): 400 CAPSULE ORAL at 05:49

## 2018-12-29 RX ADMIN — Medication 100 MILLIGRAM(S): at 14:35

## 2018-12-29 RX ADMIN — Medication 3 MILLILITER(S): at 00:23

## 2018-12-29 NOTE — PROGRESS NOTE ADULT - ASSESSMENT
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 Pt s/p Trach and peg    - CTH in AM  - EVD clamped  - vent management per ICU  - Fever w/u and abx per ICU, consider ID eval

## 2018-12-29 NOTE — PROGRESS NOTE ADULT - SUBJECTIVE AND OBJECTIVE BOX
Visit Summary: Patient seen and evaluated at bedside.    Overnight Events: none    Exam:  T(C): 39 (12-28-18 @ 22:00), Max: 39 (12-28-18 @ 22:00)  HR: 92 (12-29-18 @ 00:22) (82 - 119)  BP: 131/71 (12-28-18 @ 21:00) (111/69 - 145/72)  RR: 29 (12-28-18 @ 22:00) (13 - 30)  SpO2: 100% (12-29-18 @ 00:22) (95% - 100%)  Wt(kg): --    opens eyes, FC BLE wiggles toes and bends knee,, LUE prox 2/5 dist 0/5, RUE prox 3/5 dis 0/5    12-28    136  |  101  |  13  ----------------------------<  153<H>  4.3   |  25  |  0.38<L>    Ca    7.7<L>      28 Dec 2018 23:39  Phos  2.2     12-28  Mg     2.1     12-28

## 2018-12-29 NOTE — PROGRESS NOTE ADULT - SUBJECTIVE AND OBJECTIVE BOX
ENT ISSUE/POD:    HPI:   **Include at least 4 modifiers (Onset? Duration? Quality? Radiation? Severity? Laterality? What makes it better or worse?)**      PAST MEDICAL & SURGICAL HISTORY:  Systemic lupus erythematosus, unspecified SLE type, unspecified organ involvement status  Lupus  Back pain, chronic  Childhood asthma  Ovarian cyst  DVT (deep venous thrombosis), bilateral: 1993( treated with Heparin and coumadin)  RA (rheumatoid arthritis)  No significant past surgical history  No significant past surgical history  Plantar fasciitis of right foot: h/o surgery  History of laparoscopic cholecystectomy: 1993    Allergies    No Known Allergies    Intolerances      MEDICATIONS  (STANDING):  acetaminophen    Suspension .. 1000 milliGRAM(s) Oral three times a day  ALBUTerol/ipratropium for Nebulization 3 milliLiter(s) Nebulizer every 6 hours  amLODIPine   Tablet 10 milliGRAM(s) Oral daily  bromocriptine Capsule 5 milliGRAM(s) Oral every 8 hours  busPIRone 10 milliGRAM(s) Oral every 8 hours  chlorhexidine 0.12% Liquid 15 milliLiter(s) Swish and Spit two times a day  chlorhexidine 4% Liquid 1 Application(s) Topical <User Schedule>  cloNIDine 0.1 milliGRAM(s) Oral every 8 hours  docusate sodium Liquid 100 milliGRAM(s) Oral three times a day  enoxaparin Injectable 40 milliGRAM(s) SubCutaneous <User Schedule>  gabapentin   Solution 300 milliGRAM(s) Oral every 8 hours  lacosamide 100 milliGRAM(s) Oral two times a day  polyethylene glycol 3350 17 Gram(s) Oral two times a day  propofol Infusion 10 MICROgram(s)/kG/Min (5.046 mL/Hr) IV Continuous <Continuous>  senna Syrup 5 milliLiter(s) Oral at bedtime  vancomycin  IVPB 1750 milliGRAM(s) IV Intermittent every 8 hours    MEDICATIONS  (PRN):  fentaNYL    Injectable 25 MICROGram(s) IV Push every 2 hours PRN agitation/severe pain  meperidine     Injectable 50 milliGRAM(s) IV Push every 4 hours PRN shivering  oxyCODONE    Solution 5 milliGRAM(s) Oral every 4 hours PRN Moderate Pain (4 - 6)      Social History: **??**    Family history: **??**    ROS:   ENT: all negative except as noted in HPI   Pulm: denies SOB, cough, hemoptysis  Neuro: denies numbness/tingling, loss of sensation  Endo: denies heat/cold intolerance, excessive sweating      Vital Signs Last 24 Hrs  T(C): 37.8 (29 Dec 2018 14:00), Max: 39 (28 Dec 2018 22:00)  T(F): 100 (29 Dec 2018 14:00), Max: 102.2 (28 Dec 2018 22:00)  HR: 88 (29 Dec 2018 14:00) (81 - 119)  BP: 101/83 (29 Dec 2018 14:00) (82/61 - 150/67)  BP(mean): 91 (29 Dec 2018 14:00) (69 - 131)  RR: 17 (29 Dec 2018 14:00) (13 - 29)  SpO2: 100% (29 Dec 2018 14:00) (95% - 100%)     12-28    136  |  101  |  13  ----------------------------<  153<H>  4.3   |  25  |  0.38<L>    Ca    7.7<L>      28 Dec 2018 23:39  Phos  2.2     12-28  Mg     2.1     12-28         PHYSICAL EXAM:  Gen: NAD  Skin: No rashes, bruises, or lesions  Head: Normocephalic, Atraumatic  Face: no edema, erythema, or fluctuance. Parotid glands soft without mass  Eyes: no scleral injection  Ears: Right - ear canal clear, TM intact without effusion or erythema. No evidence of any fluid drainage. No mastoid tenderness, erythema, or ear bulging            Left - ear canal clear, TM intact without effusion or erythema. No evidence of any fluid drainage. No mastoid tenderness, erythema, or ear bulging  Nose: Nares bilaterally patent, no discharge  Mouth: No Stridor / Drooling / Trismus.  Mucosa moist, tongue/uvula midline, oropharynx clear  Neck: Flat, supple, no lymphadenopathy, trachea midline, no masses  Lymphatic: No lymphadenopathy  Resp: breathing easily, no stridor  Neuro: facial nerve intact, no facial droop ENT ISSUE/POD: POD#6 for trach    HPI: 57 y/o POD#6 for tracheostomy #8 shiley cuffed 2/2 respiratory failure. Pt seen and examined, doing well on the vent. No complaints or issues.         PAST MEDICAL & SURGICAL HISTORY:  Systemic lupus erythematosus, unspecified SLE type, unspecified organ involvement status  Lupus  Back pain, chronic  Childhood asthma  Ovarian cyst  DVT (deep venous thrombosis), bilateral: 1993( treated with Heparin and coumadin)  RA (rheumatoid arthritis)  No significant past surgical history  No significant past surgical history  Plantar fasciitis of right foot: h/o surgery  History of laparoscopic cholecystectomy: 1993    Allergies    No Known Allergies    Intolerances      MEDICATIONS  (STANDING):  acetaminophen    Suspension .. 1000 milliGRAM(s) Oral three times a day  ALBUTerol/ipratropium for Nebulization 3 milliLiter(s) Nebulizer every 6 hours  amLODIPine   Tablet 10 milliGRAM(s) Oral daily  bromocriptine Capsule 5 milliGRAM(s) Oral every 8 hours  busPIRone 10 milliGRAM(s) Oral every 8 hours  chlorhexidine 0.12% Liquid 15 milliLiter(s) Swish and Spit two times a day  chlorhexidine 4% Liquid 1 Application(s) Topical <User Schedule>  cloNIDine 0.1 milliGRAM(s) Oral every 8 hours  docusate sodium Liquid 100 milliGRAM(s) Oral three times a day  enoxaparin Injectable 40 milliGRAM(s) SubCutaneous <User Schedule>  gabapentin   Solution 300 milliGRAM(s) Oral every 8 hours  lacosamide 100 milliGRAM(s) Oral two times a day  polyethylene glycol 3350 17 Gram(s) Oral two times a day  propofol Infusion 10 MICROgram(s)/kG/Min (5.046 mL/Hr) IV Continuous <Continuous>  senna Syrup 5 milliLiter(s) Oral at bedtime  vancomycin  IVPB 1750 milliGRAM(s) IV Intermittent every 8 hours    MEDICATIONS  (PRN):  fentaNYL    Injectable 25 MICROGram(s) IV Push every 2 hours PRN agitation/severe pain  meperidine     Injectable 50 milliGRAM(s) IV Push every 4 hours PRN shivering  oxyCODONE    Solution 5 milliGRAM(s) Oral every 4 hours PRN Moderate Pain (4 - 6)      Social History: see consult    Family history: see consult    ROS:   ENT: all negative except as noted in HPI   Pulm: denies SOB, cough, hemoptysis  Neuro: denies numbness/tingling, loss of sensation  Endo: denies heat/cold intolerance, excessive sweating      Vital Signs Last 24 Hrs  T(C): 37.8 (29 Dec 2018 14:00), Max: 39 (28 Dec 2018 22:00)  T(F): 100 (29 Dec 2018 14:00), Max: 102.2 (28 Dec 2018 22:00)  HR: 88 (29 Dec 2018 14:00) (81 - 119)  BP: 101/83 (29 Dec 2018 14:00) (82/61 - 150/67)  BP(mean): 91 (29 Dec 2018 14:00) (69 - 131)  RR: 17 (29 Dec 2018 14:00) (13 - 29)  SpO2: 100% (29 Dec 2018 14:00) (95% - 100%)     12-28    136  |  101  |  13  ----------------------------<  153<H>  4.3   |  25  |  0.38<L>    Ca    7.7<L>      28 Dec 2018 23:39  Phos  2.2     12-28  Mg     2.1     12-28         PHYSICAL EXAM:  Gen: NAD  Head: Normocephalic, Atraumatic  Face: no edema/erythema/fluctuance  Eyes:  no scleral injection  Nose: Nares bilaterally patent, no discharge  Mouth: No Stridor / Drooling / Trismus.  Mucosa moist, tongue/uvula midline, oropharynx clear  Neck: #8 shiley inflated cuff and surgicel removed, no active bleeding, sutures x4 and umbilical tie in place.  No lymphadenopathy, trachea midline, no masses  Resp: ventilating well, satting   CV: no peripheral edema/cyanosis

## 2018-12-29 NOTE — PROGRESS NOTE ADULT - PROBLEM SELECTOR PLAN 1
ontinue vent   - Aggressive suction PRN  - Continue with trach site care, keep dry and clean   - Call ENT with any issues.

## 2018-12-29 NOTE — PROGRESS NOTE ADULT - ASSESSMENT
59 y/o POD#6 for tracheostomy 2/2 respiratory failure. Pt doing well, on the vent, no active bleed. Sutures x4 and umbilical tie in place

## 2018-12-29 NOTE — PROGRESS NOTE ADULT - SUBJECTIVE AND OBJECTIVE BOX
HPI:  59yo woman PMH lupus p/w sudden onset severe HA a/w nausea and vomiting. 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 Patient was intact on presentation however intubated for airway protection and EVD placed. Transferred to [facility] for further care. (17 Dec 2018 01:18)    12/17: s/p angio/embo of EVM, resection of AVM  12/18: RTOR for posterior fossa decompression expansion  12/24 s/p trach, EVD reopened per NSGY  12/28:  CT head and EVD clamped    Vitals/labs/meds/imaging reviewed    EXAMINATION:  General:  calm  HEENT:  trach'd  Neuro:  opens eyes, FC BLE wiggles toes, does not mouth words, LUE prox 2/5 dist 0/5, RUE prox 3/5 dis 0/5  Cards:  RRR  Respiratory:  no respiratory distress  Adomen:  soft  Extremities:  no edema  Skin:  warm/dry SUMMARY:  57yo woman PMH lupus p/w sudden onset severe HA a/w nausea and vomiting. 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 Patient was intact on presentation however intubated for airway protection and EVD placed. Transferred to [facility] for further care.    24 HOUR EVENTS:  Spiked fever.    HOSPITAL COURSE: Fevers, hydrocephalus, autonomic storm  12/17: s/p angio/embo of EVM, resection of AVM  12/18: RTOR for posterior fossa decompression expansion  12/24 s/p trach, EVD reopened per NSGY  12/26: MRSA in sputum  12/28:  CT head and EVD clamped.    Vitals/labs/meds/imaging: reviewed    EXAMINATION:  General:  calm  HEENT:  trach'd, PERRL  Neuro:  opens eyes to command, BLE wiggles toes to command, no commands in arms, does not mouth words, LUE prox 2/5 dist 0/5, RUE 0/5, toe movement but no lifting of legs  Cards:  RRR  Respiratory:  no respiratory distress  Abdomen:  soft  Extremities:  no edema  Skin:  warm/dry

## 2018-12-29 NOTE — CONSULT NOTE ADULT - SUBJECTIVE AND OBJECTIVE BOX
HPI:   Patient is a 58y female with a past history of SLE who was admitted  with an acute cerebellar bleed.She was electively intubated, s/p cerebellar embolization and resection of AVM on , RTOR on  for posterior fossa decompression via Occipital craniotomy.She is s/p trach on  and peg and has been on vanco since   for MRSA in sputum.She has intermittent fever, is on cooling  blanket, and has had an EVD since  for hydrocephalus.She is tolerating enteral feeds, has minimal ET secretions.She is sedated on vent and is not on any pressers.    REVIEW OF SYSTEMS:  All other review of systems negative (Comprehensive ROS): limited by status    PAST MEDICAL & SURGICAL HISTORY:  Systemic lupus erythematosus, unspecified SLE type, unspecified organ involvement status  Lupus  Back pain, chronic  Childhood asthma  Ovarian cyst  DVT (deep venous thrombosis), bilateral: ( treated with Heparin and coumadin)  RA (rheumatoid arthritis)  No significant past surgical history  No significant past surgical history  Plantar fasciitis of right foot: h/o surgery  History of laparoscopic cholecystectomy:       Allergies    No Known Allergies    Intolerances        Antimicrobials Day #  :day 6  vancomycin  IVPB 1750 milliGRAM(s) IV Intermittent every 8 hours    Other Medications:  acetaminophen    Suspension .. 1000 milliGRAM(s) Oral three times a day  ALBUTerol/ipratropium for Nebulization 3 milliLiter(s) Nebulizer every 6 hours  amLODIPine   Tablet 10 milliGRAM(s) Oral daily  bromocriptine Capsule 5 milliGRAM(s) Oral every 8 hours  busPIRone 10 milliGRAM(s) Oral every 8 hours  chlorhexidine 0.12% Liquid 15 milliLiter(s) Swish and Spit two times a day  chlorhexidine 4% Liquid 1 Application(s) Topical <User Schedule>  cloNIDine 0.1 milliGRAM(s) Oral every 8 hours  docusate sodium Liquid 100 milliGRAM(s) Oral three times a day  enoxaparin Injectable 40 milliGRAM(s) SubCutaneous <User Schedule>  fentaNYL    Injectable 25 MICROGram(s) IV Push every 2 hours PRN  gabapentin   Solution 300 milliGRAM(s) Oral every 8 hours  lacosamide 100 milliGRAM(s) Oral two times a day  meperidine     Injectable 50 milliGRAM(s) IV Push every 4 hours PRN  oxyCODONE    Solution 5 milliGRAM(s) Oral every 4 hours PRN  polyethylene glycol 3350 17 Gram(s) Oral two times a day  propofol Infusion 10 MICROgram(s)/kG/Min IV Continuous <Continuous>  senna Syrup 5 milliLiter(s) Oral at bedtime      FAMILY HISTORY:  No pertinent family history in first degree relatives  Family history of kidney disease in father (Father)  Family history of heart disease (Father)  Family history of osteoarthritis (Mother)  Family history of hypertension in mother (Mother)      SOCIAL HISTORY:  Smoking:     ETOH:     Drug Use:     Single     T(F): 100 (18 @ 14:00), Max: 102.2 (18 @ 22:00)  HR: 88 (18 @ 14:00)  BP: 101/83 (18 @ 14:00)  RR: 17 (18 @ 14:00)  SpO2: 100% (18 @ 14:00)  Wt(kg): --    PHYSICAL EXAM:  General: alert, no acute distress  Eyes:  anicteric, no conjunctival injection, no discharge  Oropharynx: no lesions or injection 	  Neck: supple, without adenopathy  Lungs: clear to auscultation  Heart: regular rate and rhythm; no murmur, rubs or gallops  Abdomen: soft, nondistended, nontender, without mass or organomegaly  Skin: no lesions  Extremities: no clubbing, cyanosis, or edema  Neurologic: alert, oriented, moves all extremities    LAB RESULTS:        136  |  101  |  13  ----------------------------<  153<H>  4.3   |  25  |  0.38<L>    Ca    7.7<L>      28 Dec 2018 23:39  Phos  2.2       Mg     2.1             Urinalysis Basic - ( 28 Dec 2018 15:53 )    Color: Yellow / Appearance: Clear / S.029 / pH: x  Gluc: x / Ketone: Trace  / Bili: Negative / Urobili: Negative   Blood: x / Protein: Trace / Nitrite: Negative   Leuk Esterase: Negative / RBC: 3 /hpf / WBC 2 /hpf   Sq Epi: x / Non Sq Epi: 3 /hpf / Bacteria: Negative        MICROBIOLOGY:  RECENT CULTURES:   @ 07:31 .Sputum Sputum       Numerous polymorphonuclear leukocytes per low power field  Rare Squamous epithelial cells per low power field  Few Gram positive cocci in pairs per oil power field  Few Gram Negative Rods per oil power field     @ 09:52 .CSF CSF - shunt     No growth    No polymorphonuclear leukocytes seen per low power field  No organisms seen per oil power field  by cytocentrifuge     @ 08:13 .Sputum Sputum Methicillin resistant Staphylococcus aureus    Numerous Methicillin resistant Staphylococcus aureus  Normal Respiratory Tori absent    Numerous polymorphonuclear leukocytes per low power field  No Squamous epithelial cells per low power field  Moderate Gram Positive Cocci in Pairs and Chains per oil power field     @ 00:56 .Blood Blood     No growth to date.       @ 00:55 .Blood Blood     No growth to date.            RADIOLOGY REVIEWED:  < from: CT Head No Cont (18 @ 09:10) >    EXAM:  CT BRAIN                            PROCEDURE DATE:  2018            INTERPRETATION:  History: Intracranial hemorrhage    Multiple axial sections were performed base of skull to vertex without   contrast enhancement.    This exam is compared with prior noncontrast head CT performed on   2018.    Postop changes compatible with a suboccipital craniectomy is again   identified.    Right frontal shunt catheter is again seen and unchanged in position.    There is evidence of aneurysm clip and coil material identified in the   posterior fossa region which appears unchanged.    There is increase in size of the lateral and third ventricle when   compared the prior exam. Intraventricular hemorrhage is again seen.    Evaluation of the osseous structures with appropriate window setting   postop changes appear unremarkable    Extracalvarial soft tissue swelling staples seen involving the high right   frontal region.    The visualized paranasal sinuses mastoid and middle ear regions appear   clear.    Impression: Increase in size of the lateral and third ventricles seen   when compared with the prior exam.    < end of copied text >  < from: Xray Chest 1 View- PORTABLE-Routine (18 @ 05:30) >  INTERPRETATION:  A single chest x-ray was obtained on 2018.    Indication: Intracranial hemorrhage.    Impression:    The heart is normal in size. The lungs are clear. A tracheostomy tube is   in good position. A PICC line is seen on the right and the tip is in   superior vena cava. No pneumothorax.

## 2018-12-29 NOTE — PROGRESS NOTE ADULT - ASSESSMENT
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 - s/p successful ANGIO/EMBO, posterior fossa decompression  ICH score 2    Neuro:  Hydro: EVD clamped for 24 hour; CT head today, VPS if needed   lacosamide for seizure ppx   Fentanyl PRN, come off propofol as tolerated   Storming c/w Gabapentin, clonidine, buspirone, Mg    OT for splint    Cards:   -160 on norvasc/clonidine    Pulm:  s/p trach, wean vent as tolerated  RCU consult    GI: TF via PEG     Renal: IVL    Endo:  goal euglycemia    Heme:  SCDs  DVT ppx Lovenox     ID:  afebrile, send CSF before d/c-ing EVD   consult ID as Vanco trough just therapeutic yesterday     ICU  full code    at risk for deterioration and death due to AVM rebleed, hydrocephalus, cerebral edema, brain compression  critical care time 45min 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 - s/p successful ANGIO/EMBO, posterior fossa decompression  ICH score 2    Neuro:  Hydro: EVD clamped for 24 hour; CT head today, VPS if needed   lacosamide for seizure ppx   Fentanyl PRN, come off propofol as tolerated   Storming c/w Gabapentin, clonidine, add bromocriptine; consider fentanyl patch   OT for splint    Cards:   HTN: -160 on norvasc/clonidine    Pulm:  s/p trach, wean vent as tolerated  RCU consult    GI: TF via PEG     Renal: IVL    Endo:  goal euglycemia    Heme:  SCDs  DVT ppx Lovenox     ID:  afebrile, send CSF before d/c-ing EVD   MRSA PNA on Vancomycin  consult ID as difficulty getting Vancomycin therapeutic    ICU  full code    at risk for deterioration and death due to hydrocephalus, cerebral edema, brain compression  critical care time 45min 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 - s/p successful ANGIO/EMBO, posterior fossa decompression  ICH score 2    Neuro:  Hydro: EVD clamped for 24 hour; CT head today with increased ventricular sie, may need VPS   lacosamide for seizure ppx   Fentanyl PRN, come off propofol as tolerated   Storming c/w Gabapentin, clonidine, add bromocriptine; consider fentanyl patch   OT for splint    Cards:   HTN: -160 on norvasc/clonidine    Pulm:  s/p trach, wean vent as tolerated  RCU consult    GI: TF via PEG     Renal: IVL    Endo:  goal euglycemia    Heme:  SCDs  DVT ppx Lovenox     ID:  afebrile, send CSF before d/c-ing EVD   MRSA PNA on Vancomycin  Obtain CSF cultures  consult ID as difficulty getting Vancomycin therapeutic    ICU  full code    at risk for deterioration and death due to hydrocephalus, cerebral edema, brain compression  critical care time 45min

## 2018-12-29 NOTE — PROGRESS NOTE ADULT - SUBJECTIVE AND OBJECTIVE BOX
O: remains febrile   failed clamp trial due to increase in vent size     T(C): 37.8 (12-29-18 @ 23:00), Max: 38.3 (12-29-18 @ 13:00)  HR: 103 (12-29-18 @ 23:00) (81 - 104)  BP: 111/54 (12-29-18 @ 22:00) (82/61 - 150/67)  RR: 23 (12-29-18 @ 23:00) (13 - 27)  SpO2: 100% (12-29-18 @ 23:00) (100% - 100%)  12-28-18 @ 07:01  -  12-29-18 @ 07:00  --------------------------------------------------------  IN: 4731 mL / OUT: 2111 mL / NET: 2620 mL    12-29-18 @ 07:01  -  12-29-18 @ 23:19  --------------------------------------------------------  IN: 2512.4 mL / OUT: 803 mL / NET: 1709.4 mL    acetaminophen    Suspension .. 1000 milliGRAM(s) Oral three times a day  ALBUTerol/ipratropium for Nebulization 3 milliLiter(s) Nebulizer every 6 hours  amLODIPine   Tablet 10 milliGRAM(s) Oral daily  bromocriptine Capsule 5 milliGRAM(s) Oral every 8 hours  busPIRone 10 milliGRAM(s) Oral every 8 hours  cefepime   IVPB      chlorhexidine 0.12% Liquid 15 milliLiter(s) Swish and Spit two times a day  chlorhexidine 4% Liquid 1 Application(s) Topical <User Schedule>  cloNIDine 0.1 milliGRAM(s) Oral every 8 hours  docusate sodium Liquid 100 milliGRAM(s) Oral three times a day  enoxaparin Injectable 40 milliGRAM(s) SubCutaneous <User Schedule>  fentaNYL    Injectable 25 MICROGram(s) IV Push every 2 hours PRN  gabapentin   Solution 300 milliGRAM(s) Oral every 8 hours  lacosamide 100 milliGRAM(s) Oral two times a day  meperidine     Injectable 50 milliGRAM(s) IV Push every 4 hours PRN  oxyCODONE    Solution 5 milliGRAM(s) Oral every 4 hours PRN  polyethylene glycol 3350 17 Gram(s) Oral two times a day  propofol Infusion 10 MICROgram(s)/kG/Min IV Continuous <Continuous>  senna Syrup 5 milliLiter(s) Oral at bedtime  vancomycin  IVPB 1750 milliGRAM(s) IV Intermittent every 8 hours    Culture - CSF with Gram Stain (collected 12-29-18 @ 20:25)  Source: .CSF CSF  Gram Stain (12-29-18 @ 20:47):    No polymorphonuclear cells seen per low power field    No organisms seen per oil power field    Culture - Sputum (collected 12-29-18 @ 07:31)  Source: .Sputum Sputum  Gram Stain (12-29-18 @ 13:50):    Numerous polymorphonuclear leukocytes per low power field    Rare Squamous epithelial cells per low power field    Few Gram positive cocci in pairs per oil power field    Few Gram Negative Rods per oil power field    Mode: AC/ CMV (Assist Control/ Continuous Mandatory Ventilation), RR (machine): 16, TV (machine): 450, FiO2: 30, PEEP: 5, ITime: 1, MAP: 13, PIP: 27  EXAMINATION:  General:  calm  HEENT:  trach'd, PERRL  Neuro:  opens eyes to command, BLE wiggles toes to command, no commands in arms, does not mouth words, LUE prox 2/5 dist 0/5, RUE 0/5, toe movement but no lifting of legs  Cards:  RRR  Respiratory:  no respiratory distress  Abdomen:  soft  Extremities:  no edema  Skin:  warm/dry      LABS:  Na: 136 (12-28 @ 23:39), 139 (12-28 @ 15:37), 138 (12-28 @ 07:14), 138 (12-27 @ 22:40), 139 (12-27 @ 17:40)  K: 4.3 (12-28 @ 23:39), 4.3 (12-28 @ 15:37), 3.6 (12-28 @ 07:14), 4.0 (12-27 @ 22:40), 5.1 (12-27 @ 17:40)  Cl: 101 (12-28 @ 23:39), 105 (12-28 @ 15:37), 105 (12-28 @ 07:14), 103 (12-27 @ 22:40), 108 (12-27 @ 17:40)  CO2: 25 (12-28 @ 23:39), 26 (12-28 @ 15:37), 25 (12-28 @ 07:14), 24 (12-27 @ 22:40), 24 (12-27 @ 17:40)  BUN: 13 (12-28 @ 23:39), 12 (12-28 @ 15:37), 13 (12-28 @ 07:14), 14 (12-27 @ 22:40), 12 (12-27 @ 17:40)  Cr: 0.38 (12-28 @ 23:39), 0.37 (12-28 @ 15:37), 0.37 (12-28 @ 07:14), 0.39 (12-27 @ 22:40), 0.37 (12-27 @ 17:40)  Glu: 153(12-28 @ 23:39), 132(12-28 @ 15:37), 144(12-28 @ 07:14), 106(12-27 @ 22:40), 100(12-27 @ 17:40)    Hgb: 7.8 (12-29 @ 23:03)  Hct: 21.7 (12-29 @ 23:03)  WBC: 11.4 (12-29 @ 23:03)  Plt: 260 (12-29 @ 23:03)    INR:   PTT:       · Assessment	  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 - s/p successful ANGIO/EMBO, posterior fossa decompression  ICH score 2    Neuro:  Hydro: EVD clamped for 24 hour, however CT head showed worsening hydrocephalus, so it was unclamped and open at 15 cm water   lacosamide for seizure ppx   Fentanyl PRN, come off propofol as tolerated   Storming c/w Gabapentin, clonidine, consider fentanyl patch   OT for splint    Cards:   HTN: -160 on norvasc/clonidine    Pulm:  respiratory failure   s/p trach, wean vent as tolerated  RCU consult    GI: TF via PEG     Renal: IVL    Endo:  goal euglycemia    Heme:  SCDs  DVT ppx Lovenox     ID:  febrile, on cooling blanket, on vancomycin, she is growing gram neg rods in sputum, will start cefepime   MRSA PNA on Vancomycin  CSF gram stain neg  consult on ID    hem: hgb dropped, will repeat it, and get TXM, if < 7, will transfuse    ICU  she is at increased risk of neurologic deterioration including septic shock, ICH, seizures   full code O: remains febrile   failed clamp trial due to increase in vent size     T(C): 37.8 (12-29-18 @ 23:00), Max: 38.3 (12-29-18 @ 13:00)  HR: 103 (12-29-18 @ 23:00) (81 - 104)  BP: 111/54 (12-29-18 @ 22:00) (82/61 - 150/67)  RR: 23 (12-29-18 @ 23:00) (13 - 27)  SpO2: 100% (12-29-18 @ 23:00) (100% - 100%)  12-28-18 @ 07:01  -  12-29-18 @ 07:00  --------------------------------------------------------  IN: 4731 mL / OUT: 2111 mL / NET: 2620 mL    12-29-18 @ 07:01  -  12-29-18 @ 23:19  --------------------------------------------------------  IN: 2512.4 mL / OUT: 803 mL / NET: 1709.4 mL    acetaminophen    Suspension .. 1000 milliGRAM(s) Oral three times a day  ALBUTerol/ipratropium for Nebulization 3 milliLiter(s) Nebulizer every 6 hours  amLODIPine   Tablet 10 milliGRAM(s) Oral daily  bromocriptine Capsule 5 milliGRAM(s) Oral every 8 hours  busPIRone 10 milliGRAM(s) Oral every 8 hours  cefepime   IVPB      chlorhexidine 0.12% Liquid 15 milliLiter(s) Swish and Spit two times a day  chlorhexidine 4% Liquid 1 Application(s) Topical <User Schedule>  cloNIDine 0.1 milliGRAM(s) Oral every 8 hours  docusate sodium Liquid 100 milliGRAM(s) Oral three times a day  enoxaparin Injectable 40 milliGRAM(s) SubCutaneous <User Schedule>  fentaNYL    Injectable 25 MICROGram(s) IV Push every 2 hours PRN  gabapentin   Solution 300 milliGRAM(s) Oral every 8 hours  lacosamide 100 milliGRAM(s) Oral two times a day  meperidine     Injectable 50 milliGRAM(s) IV Push every 4 hours PRN  oxyCODONE    Solution 5 milliGRAM(s) Oral every 4 hours PRN  polyethylene glycol 3350 17 Gram(s) Oral two times a day  propofol Infusion 10 MICROgram(s)/kG/Min IV Continuous <Continuous>  senna Syrup 5 milliLiter(s) Oral at bedtime  vancomycin  IVPB 1750 milliGRAM(s) IV Intermittent every 8 hours    Culture - CSF with Gram Stain (collected 12-29-18 @ 20:25)  Source: .CSF CSF  Gram Stain (12-29-18 @ 20:47):    No polymorphonuclear cells seen per low power field    No organisms seen per oil power field    Culture - Sputum (collected 12-29-18 @ 07:31)  Source: .Sputum Sputum  Gram Stain (12-29-18 @ 13:50):    Numerous polymorphonuclear leukocytes per low power field    Rare Squamous epithelial cells per low power field    Few Gram positive cocci in pairs per oil power field    Few Gram Negative Rods per oil power field    Mode: AC/ CMV (Assist Control/ Continuous Mandatory Ventilation), RR (machine): 16, TV (machine): 450, FiO2: 30, PEEP: 5, ITime: 1, MAP: 13, PIP: 27  EXAMINATION:  General:  calm  HEENT:  trach'd, PERRL  Neuro:  opens eyes to command, BLE wiggles toes to command, no commands in arms, does not mouth words, LUE prox 2/5 dist 0/5, RUE 0/5, but withdraws to pain, toe movement but no lifting of legs  Cards:  RRR  Respiratory:  no respiratory distress  Abdomen:  soft  Extremities:  no edema  Skin:  warm/dry      LABS:  Na: 136 (12-28 @ 23:39), 139 (12-28 @ 15:37), 138 (12-28 @ 07:14), 138 (12-27 @ 22:40), 139 (12-27 @ 17:40)  K: 4.3 (12-28 @ 23:39), 4.3 (12-28 @ 15:37), 3.6 (12-28 @ 07:14), 4.0 (12-27 @ 22:40), 5.1 (12-27 @ 17:40)  Cl: 101 (12-28 @ 23:39), 105 (12-28 @ 15:37), 105 (12-28 @ 07:14), 103 (12-27 @ 22:40), 108 (12-27 @ 17:40)  CO2: 25 (12-28 @ 23:39), 26 (12-28 @ 15:37), 25 (12-28 @ 07:14), 24 (12-27 @ 22:40), 24 (12-27 @ 17:40)  BUN: 13 (12-28 @ 23:39), 12 (12-28 @ 15:37), 13 (12-28 @ 07:14), 14 (12-27 @ 22:40), 12 (12-27 @ 17:40)  Cr: 0.38 (12-28 @ 23:39), 0.37 (12-28 @ 15:37), 0.37 (12-28 @ 07:14), 0.39 (12-27 @ 22:40), 0.37 (12-27 @ 17:40)  Glu: 153(12-28 @ 23:39), 132(12-28 @ 15:37), 144(12-28 @ 07:14), 106(12-27 @ 22:40), 100(12-27 @ 17:40)    Hgb: 7.8 (12-29 @ 23:03)  Hct: 21.7 (12-29 @ 23:03)  WBC: 11.4 (12-29 @ 23:03)  Plt: 260 (12-29 @ 23:03)    INR:   PTT:       · Assessment	  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 - s/p successful ANGIO/EMBO, posterior fossa decompression  ICH score 2    Neuro:  Hydro: EVD clamped for 24 hour, however CT head showed worsening hydrocephalus, so it was unclamped and open at 15 cm water   lacosamide for seizure ppx   Fentanyl PRN, come off propofol as tolerated   Storming c/w Gabapentin, clonidine, consider fentanyl patch  will try precedex, instead of propofol     Cards:   HTN: -160 on norvasc/clonidine  Pulm:  respiratory failure   s/p trach, wean vent as tolerated  RCU consult    GI: TF via PEG     Renal: IVL    Endo:  goal euglycemia    Heme:  SCDs  DVT ppx Lovenox     ID:  febrile, on cooling blanket, on vancomycin, she is growing gram neg rods in sputum, will start cefepime   MRSA PNA on Vancomycin  CSF gram stain neg  consult on ID    hem: hgb dropped, will repeat it, and get TXM, if < 7, will transfuse and get stool occult blood     30 min critical care time   she is at increased risk of neurologic deterioration including septic shock, ICH, seizures   full code O: remains febrile   failed clamp trial due to increase in vent size     T(C): 37.8 (12-29-18 @ 23:00), Max: 38.3 (12-29-18 @ 13:00)  HR: 103 (12-29-18 @ 23:00) (81 - 104)  BP: 111/54 (12-29-18 @ 22:00) (82/61 - 150/67)  RR: 23 (12-29-18 @ 23:00) (13 - 27)  SpO2: 100% (12-29-18 @ 23:00) (100% - 100%)  12-28-18 @ 07:01  -  12-29-18 @ 07:00  --------------------------------------------------------  IN: 4731 mL / OUT: 2111 mL / NET: 2620 mL    12-29-18 @ 07:01  -  12-29-18 @ 23:19  --------------------------------------------------------  IN: 2512.4 mL / OUT: 803 mL / NET: 1709.4 mL    acetaminophen    Suspension .. 1000 milliGRAM(s) Oral three times a day  ALBUTerol/ipratropium for Nebulization 3 milliLiter(s) Nebulizer every 6 hours  amLODIPine   Tablet 10 milliGRAM(s) Oral daily  bromocriptine Capsule 5 milliGRAM(s) Oral every 8 hours  busPIRone 10 milliGRAM(s) Oral every 8 hours  cefepime   IVPB      chlorhexidine 0.12% Liquid 15 milliLiter(s) Swish and Spit two times a day  chlorhexidine 4% Liquid 1 Application(s) Topical <User Schedule>  cloNIDine 0.1 milliGRAM(s) Oral every 8 hours  docusate sodium Liquid 100 milliGRAM(s) Oral three times a day  enoxaparin Injectable 40 milliGRAM(s) SubCutaneous <User Schedule>  fentaNYL    Injectable 25 MICROGram(s) IV Push every 2 hours PRN  gabapentin   Solution 300 milliGRAM(s) Oral every 8 hours  lacosamide 100 milliGRAM(s) Oral two times a day  meperidine     Injectable 50 milliGRAM(s) IV Push every 4 hours PRN  oxyCODONE    Solution 5 milliGRAM(s) Oral every 4 hours PRN  polyethylene glycol 3350 17 Gram(s) Oral two times a day  propofol Infusion 10 MICROgram(s)/kG/Min IV Continuous <Continuous>  senna Syrup 5 milliLiter(s) Oral at bedtime  vancomycin  IVPB 1750 milliGRAM(s) IV Intermittent every 8 hours    Culture - CSF with Gram Stain (collected 12-29-18 @ 20:25)  Source: .CSF CSF  Gram Stain (12-29-18 @ 20:47):    No polymorphonuclear cells seen per low power field    No organisms seen per oil power field    Culture - Sputum (collected 12-29-18 @ 07:31)  Source: .Sputum Sputum  Gram Stain (12-29-18 @ 13:50):    Numerous polymorphonuclear leukocytes per low power field    Rare Squamous epithelial cells per low power field    Few Gram positive cocci in pairs per oil power field    Few Gram Negative Rods per oil power field    Mode: AC/ CMV (Assist Control/ Continuous Mandatory Ventilation), RR (machine): 16, TV (machine): 450, FiO2: 30, PEEP: 5, ITime: 1, MAP: 13, PIP: 27  EXAMINATION:  General:  calm  HEENT:  trach'd, PERRL  Neuro:  opens eyes to command, BLE wiggles toes to command, no commands in arms, does not mouth words, LUE prox 2/5 dist 0/5, RUE 0/5, but withdraws to pain, toe movement but no lifting of legs  Cards:  RRR  Respiratory:  no respiratory distress  Abdomen:  soft  Extremities:  no edema  Skin:  warm/dry      LABS:  Na: 136 (12-28 @ 23:39), 139 (12-28 @ 15:37), 138 (12-28 @ 07:14), 138 (12-27 @ 22:40), 139 (12-27 @ 17:40)  K: 4.3 (12-28 @ 23:39), 4.3 (12-28 @ 15:37), 3.6 (12-28 @ 07:14), 4.0 (12-27 @ 22:40), 5.1 (12-27 @ 17:40)  Cl: 101 (12-28 @ 23:39), 105 (12-28 @ 15:37), 105 (12-28 @ 07:14), 103 (12-27 @ 22:40), 108 (12-27 @ 17:40)  CO2: 25 (12-28 @ 23:39), 26 (12-28 @ 15:37), 25 (12-28 @ 07:14), 24 (12-27 @ 22:40), 24 (12-27 @ 17:40)  BUN: 13 (12-28 @ 23:39), 12 (12-28 @ 15:37), 13 (12-28 @ 07:14), 14 (12-27 @ 22:40), 12 (12-27 @ 17:40)  Cr: 0.38 (12-28 @ 23:39), 0.37 (12-28 @ 15:37), 0.37 (12-28 @ 07:14), 0.39 (12-27 @ 22:40), 0.37 (12-27 @ 17:40)  Glu: 153(12-28 @ 23:39), 132(12-28 @ 15:37), 144(12-28 @ 07:14), 106(12-27 @ 22:40), 100(12-27 @ 17:40)    Hgb: 7.8 (12-29 @ 23:03)  Hct: 21.7 (12-29 @ 23:03)  WBC: 11.4 (12-29 @ 23:03)  Plt: 260 (12-29 @ 23:03)    INR:   PTT:       · Assessment	  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 - s/p successful ANGIO/EMBO, posterior fossa decompression  ICH score 2    Neuro:  Hydro: EVD clamped for 24 hour, however CT head showed worsening hydrocephalus, so it was unclamped and open at 15 cm water   lacosamide for seizure ppx   Fentanyl PRN, come off propofol as tolerated   Storming c/w Gabapentin, clonidine, consider fentanyl patch  will try precedex, instead of propofol     Cards:   HTN: -160 on norvasc/clonidine  Pulm:  respiratory failure   s/p trach, wean vent as tolerated  RCU consult    GI: TF via PEG     Renal: IVL    Endo:  goal euglycemia    Heme:  SCDs  DVT ppx Lovenox     ID:  febrile, on cooling blanket, on vancomycin, she is growing gram neg rods in sputum, will start cefepime   sepsis: MRSA PNA on Vancomycin  CSF gram stain neg  consult on ID    hem: hgb dropped, will repeat it, and get TXM, if < 7, will transfuse and get stool occult blood     30 min critical care time   she is at increased risk of neurologic deterioration including septic shock, ICH, seizures   full code

## 2018-12-30 LAB
ANION GAP SERPL CALC-SCNC: 8 MMOL/L — SIGNIFICANT CHANGE UP (ref 5–17)
APPEARANCE CSF: ABNORMAL
APPEARANCE SPUN FLD: SIGNIFICANT CHANGE UP
APPEARANCE UR: ABNORMAL
APTT BLD: 24.2 SEC — LOW (ref 27.5–36.3)
BASOPHILS # BLD AUTO: 0.1 K/UL — SIGNIFICANT CHANGE UP (ref 0–0.2)
BASOPHILS NFR BLD AUTO: 0.6 % — SIGNIFICANT CHANGE UP (ref 0–2)
BILIRUB UR-MCNC: NEGATIVE — SIGNIFICANT CHANGE UP
BLD GP AB SCN SERPL QL: NEGATIVE — SIGNIFICANT CHANGE UP
BUN SERPL-MCNC: 14 MG/DL — SIGNIFICANT CHANGE UP (ref 7–23)
CALCIUM SERPL-MCNC: 7.6 MG/DL — LOW (ref 8.4–10.5)
CHLORIDE SERPL-SCNC: 104 MMOL/L — SIGNIFICANT CHANGE UP (ref 96–108)
CO2 SERPL-SCNC: 26 MMOL/L — SIGNIFICANT CHANGE UP (ref 22–31)
COLOR CSF: ABNORMAL
COLOR SPEC: YELLOW — SIGNIFICANT CHANGE UP
CREAT SERPL-MCNC: 0.42 MG/DL — LOW (ref 0.5–1.3)
D DIMER BLD IA.RAPID-MCNC: 2142 NG/ML DDU — HIGH
DIFF PNL FLD: ABNORMAL
EOSINOPHIL # BLD AUTO: 0.1 K/UL — SIGNIFICANT CHANGE UP (ref 0–0.5)
EOSINOPHIL NFR BLD AUTO: 0.6 % — SIGNIFICANT CHANGE UP (ref 0–6)
FIBRINOGEN PPP-MCNC: 1132 MG/DL — HIGH (ref 350–510)
FOLATE SERPL-MCNC: 19.7 NG/ML — SIGNIFICANT CHANGE UP
GAS PNL BLDA: SIGNIFICANT CHANGE UP
GLUCOSE CSF-MCNC: 70 MG/DL — SIGNIFICANT CHANGE UP (ref 40–70)
GLUCOSE SERPL-MCNC: 130 MG/DL — HIGH (ref 70–99)
GLUCOSE UR QL: NEGATIVE — SIGNIFICANT CHANGE UP
GRAM STN FLD: SIGNIFICANT CHANGE UP
HCT VFR BLD CALC: 19.4 % — CRITICAL LOW (ref 34.5–45)
HCT VFR BLD CALC: 21.7 % — LOW (ref 34.5–45)
HGB BLD-MCNC: 7.1 G/DL — LOW (ref 11.5–15.5)
HGB BLD-MCNC: 7.7 G/DL — LOW (ref 11.5–15.5)
INR BLD: 1.05 RATIO — SIGNIFICANT CHANGE UP (ref 0.88–1.16)
KETONES UR-MCNC: NEGATIVE — SIGNIFICANT CHANGE UP
LACTATE CSF-MCNC: 2.8 MMOL/L — HIGH (ref 1.1–2.4)
LDH SERPL L TO P-CCNC: 317 U/L — HIGH (ref 50–242)
LEUKOCYTE ESTERASE UR-ACNC: NEGATIVE — SIGNIFICANT CHANGE UP
LYMPHOCYTES # BLD AUTO: 1.2 K/UL — SIGNIFICANT CHANGE UP (ref 1–3.3)
LYMPHOCYTES # BLD AUTO: 12.2 % — LOW (ref 13–44)
LYMPHOCYTES # CSF: 13 % — LOW (ref 40–80)
MAGNESIUM SERPL-MCNC: 2.1 MG/DL — SIGNIFICANT CHANGE UP (ref 1.6–2.6)
MCHC RBC-ENTMCNC: 31.1 PG — SIGNIFICANT CHANGE UP (ref 27–34)
MCHC RBC-ENTMCNC: 31.7 PG — SIGNIFICANT CHANGE UP (ref 27–34)
MCHC RBC-ENTMCNC: 35.7 GM/DL — SIGNIFICANT CHANGE UP (ref 32–36)
MCHC RBC-ENTMCNC: 36.7 GM/DL — HIGH (ref 32–36)
MCV RBC AUTO: 86.4 FL — SIGNIFICANT CHANGE UP (ref 80–100)
MCV RBC AUTO: 87.2 FL — SIGNIFICANT CHANGE UP (ref 80–100)
MONOCYTES # BLD AUTO: 0.6 K/UL — SIGNIFICANT CHANGE UP (ref 0–0.9)
MONOCYTES NFR BLD AUTO: 5.6 % — SIGNIFICANT CHANGE UP (ref 2–14)
MONOS+MACROS NFR CSF: 26 % — SIGNIFICANT CHANGE UP (ref 15–45)
NEUTROPHILS # BLD AUTO: 8 K/UL — HIGH (ref 1.8–7.4)
NEUTROPHILS # CSF: 61 % — HIGH (ref 0–6)
NEUTROPHILS NFR BLD AUTO: 81 % — HIGH (ref 43–77)
NITRITE UR-MCNC: NEGATIVE — SIGNIFICANT CHANGE UP
NRBC NFR CSF: 2 /UL — SIGNIFICANT CHANGE UP (ref 0–5)
PH UR: 6 — SIGNIFICANT CHANGE UP (ref 5–8)
PHOSPHATE SERPL-MCNC: 2.2 MG/DL — LOW (ref 2.5–4.5)
PLATELET # BLD AUTO: 248 K/UL — SIGNIFICANT CHANGE UP (ref 150–400)
PLATELET # BLD AUTO: 260 K/UL — SIGNIFICANT CHANGE UP (ref 150–400)
POTASSIUM SERPL-MCNC: 3.6 MMOL/L — SIGNIFICANT CHANGE UP (ref 3.5–5.3)
POTASSIUM SERPL-SCNC: 3.6 MMOL/L — SIGNIFICANT CHANGE UP (ref 3.5–5.3)
PROT CSF-MCNC: 16 MG/DL — SIGNIFICANT CHANGE UP (ref 15–45)
PROT UR-MCNC: ABNORMAL
PROTHROM AB SERPL-ACNC: 12.1 SEC — SIGNIFICANT CHANGE UP (ref 10–12.9)
RBC # BLD: 2.24 M/UL — LOW (ref 3.8–5.2)
RBC # BLD: 2.48 M/UL — LOW (ref 3.8–5.2)
RBC # CSF: HIGH /UL (ref 0–0)
RBC # FLD: 13.9 % — SIGNIFICANT CHANGE UP (ref 10.3–14.5)
RBC # FLD: 13.9 % — SIGNIFICANT CHANGE UP (ref 10.3–14.5)
RH IG SCN BLD-IMP: POSITIVE — SIGNIFICANT CHANGE UP
SODIUM SERPL-SCNC: 138 MMOL/L — SIGNIFICANT CHANGE UP (ref 135–145)
SP GR SPEC: 1.03 — HIGH (ref 1.01–1.02)
SPECIMEN SOURCE: SIGNIFICANT CHANGE UP
TUBE TYPE: SIGNIFICANT CHANGE UP
UROBILINOGEN FLD QL: NEGATIVE — SIGNIFICANT CHANGE UP
VIT B12 SERPL-MCNC: 838 PG/ML — SIGNIFICANT CHANGE UP (ref 232–1245)
WBC # BLD: 11 K/UL — HIGH (ref 3.8–10.5)
WBC # BLD: 9.9 K/UL — SIGNIFICANT CHANGE UP (ref 3.8–10.5)
WBC # FLD AUTO: 11 K/UL — HIGH (ref 3.8–10.5)
WBC # FLD AUTO: 9.9 K/UL — SIGNIFICANT CHANGE UP (ref 3.8–10.5)

## 2018-12-30 PROCEDURE — 99291 CRITICAL CARE FIRST HOUR: CPT | Mod: 25

## 2018-12-30 PROCEDURE — 99292 CRITICAL CARE ADDL 30 MIN: CPT | Mod: 25

## 2018-12-30 PROCEDURE — 36620 INSERTION CATHETER ARTERY: CPT

## 2018-12-30 RX ORDER — BROMOCRIPTINE MESYLATE 5 MG/1
10 CAPSULE ORAL EVERY 8 HOURS
Qty: 0 | Refills: 0 | Status: DISCONTINUED | OUTPATIENT
Start: 2018-12-30 | End: 2019-01-06

## 2018-12-30 RX ORDER — POTASSIUM PHOSPHATE, MONOBASIC POTASSIUM PHOSPHATE, DIBASIC 236; 224 MG/ML; MG/ML
15 INJECTION, SOLUTION INTRAVENOUS ONCE
Qty: 0 | Refills: 0 | Status: COMPLETED | OUTPATIENT
Start: 2018-12-30 | End: 2018-12-31

## 2018-12-30 RX ORDER — IBUPROFEN 200 MG
800 TABLET ORAL ONCE
Qty: 0 | Refills: 0 | Status: COMPLETED | OUTPATIENT
Start: 2018-12-30 | End: 2018-12-30

## 2018-12-30 RX ORDER — NOREPINEPHRINE BITARTRATE/D5W 8 MG/250ML
0.5 PLASTIC BAG, INJECTION (ML) INTRAVENOUS
Qty: 8 | Refills: 0 | Status: DISCONTINUED | OUTPATIENT
Start: 2018-12-30 | End: 2018-12-31

## 2018-12-30 RX ORDER — POTASSIUM CHLORIDE 20 MEQ
40 PACKET (EA) ORAL ONCE
Qty: 0 | Refills: 0 | Status: COMPLETED | OUTPATIENT
Start: 2018-12-30 | End: 2018-12-31

## 2018-12-30 RX ORDER — SODIUM CHLORIDE 9 MG/ML
500 INJECTION INTRAMUSCULAR; INTRAVENOUS; SUBCUTANEOUS ONCE
Qty: 0 | Refills: 0 | Status: COMPLETED | OUTPATIENT
Start: 2018-12-30 | End: 2018-12-30

## 2018-12-30 RX ORDER — FENTANYL CITRATE 50 UG/ML
25 INJECTION INTRAVENOUS ONCE
Qty: 0 | Refills: 0 | Status: DISCONTINUED | OUTPATIENT
Start: 2018-12-30 | End: 2018-12-30

## 2018-12-30 RX ORDER — ACETAMINOPHEN 500 MG
650 TABLET ORAL EVERY 6 HOURS
Qty: 0 | Refills: 0 | Status: DISCONTINUED | OUTPATIENT
Start: 2018-12-30 | End: 2019-01-07

## 2018-12-30 RX ORDER — CALCIUM GLUCONATE 100 MG/ML
2 VIAL (ML) INTRAVENOUS ONCE
Qty: 0 | Refills: 0 | Status: COMPLETED | OUTPATIENT
Start: 2018-12-30 | End: 2018-12-30

## 2018-12-30 RX ORDER — SODIUM CHLORIDE 9 MG/ML
1000 INJECTION INTRAMUSCULAR; INTRAVENOUS; SUBCUTANEOUS
Qty: 0 | Refills: 0 | Status: DISCONTINUED | OUTPATIENT
Start: 2018-12-30 | End: 2019-01-01

## 2018-12-30 RX ORDER — FENTANYL CITRATE 50 UG/ML
1 INJECTION INTRAVENOUS
Qty: 0 | Refills: 0 | Status: DISCONTINUED | OUTPATIENT
Start: 2018-12-30 | End: 2019-01-05

## 2018-12-30 RX ORDER — CLONAZEPAM 1 MG
1 TABLET ORAL EVERY 8 HOURS
Qty: 0 | Refills: 0 | Status: DISCONTINUED | OUTPATIENT
Start: 2018-12-30 | End: 2019-01-06

## 2018-12-30 RX ORDER — HYDRALAZINE HCL 50 MG
10 TABLET ORAL ONCE
Qty: 0 | Refills: 0 | Status: COMPLETED | OUTPATIENT
Start: 2018-12-30 | End: 2018-12-30

## 2018-12-30 RX ORDER — SODIUM CHLORIDE 9 MG/ML
1000 INJECTION INTRAMUSCULAR; INTRAVENOUS; SUBCUTANEOUS ONCE
Qty: 0 | Refills: 0 | Status: COMPLETED | OUTPATIENT
Start: 2018-12-30 | End: 2018-12-30

## 2018-12-30 RX ADMIN — SODIUM CHLORIDE 500 MILLILITER(S): 9 INJECTION INTRAMUSCULAR; INTRAVENOUS; SUBCUTANEOUS at 14:00

## 2018-12-30 RX ADMIN — ENOXAPARIN SODIUM 40 MILLIGRAM(S): 100 INJECTION SUBCUTANEOUS at 17:25

## 2018-12-30 RX ADMIN — CHLORHEXIDINE GLUCONATE 15 MILLILITER(S): 213 SOLUTION TOPICAL at 17:25

## 2018-12-30 RX ADMIN — GABAPENTIN 300 MILLIGRAM(S): 400 CAPSULE ORAL at 05:08

## 2018-12-30 RX ADMIN — BROMOCRIPTINE MESYLATE 10 MILLIGRAM(S): 5 CAPSULE ORAL at 21:56

## 2018-12-30 RX ADMIN — Medication 3 MILLILITER(S): at 13:05

## 2018-12-30 RX ADMIN — POLYETHYLENE GLYCOL 3350 17 GRAM(S): 17 POWDER, FOR SOLUTION ORAL at 17:25

## 2018-12-30 RX ADMIN — Medication 100 MILLIGRAM(S): at 05:04

## 2018-12-30 RX ADMIN — BROMOCRIPTINE MESYLATE 5 MILLIGRAM(S): 5 CAPSULE ORAL at 14:15

## 2018-12-30 RX ADMIN — Medication 10 MILLIGRAM(S): at 02:45

## 2018-12-30 RX ADMIN — FENTANYL CITRATE 25 MICROGRAM(S): 50 INJECTION INTRAVENOUS at 07:30

## 2018-12-30 RX ADMIN — Medication 3 MILLILITER(S): at 17:48

## 2018-12-30 RX ADMIN — Medication 250 MILLIGRAM(S): at 05:09

## 2018-12-30 RX ADMIN — CEFEPIME 100 MILLIGRAM(S): 1 INJECTION, POWDER, FOR SOLUTION INTRAMUSCULAR; INTRAVENOUS at 06:00

## 2018-12-30 RX ADMIN — Medication 1 MILLIGRAM(S): at 10:44

## 2018-12-30 RX ADMIN — FENTANYL CITRATE 25 MICROGRAM(S): 50 INJECTION INTRAVENOUS at 07:45

## 2018-12-30 RX ADMIN — Medication 0.1 MILLIGRAM(S): at 05:04

## 2018-12-30 RX ADMIN — FENTANYL CITRATE 1 PATCH: 50 INJECTION INTRAVENOUS at 15:14

## 2018-12-30 RX ADMIN — CEFEPIME 100 MILLIGRAM(S): 1 INJECTION, POWDER, FOR SOLUTION INTRAMUSCULAR; INTRAVENOUS at 14:11

## 2018-12-30 RX ADMIN — AMLODIPINE BESYLATE 10 MILLIGRAM(S): 2.5 TABLET ORAL at 12:24

## 2018-12-30 RX ADMIN — Medication 800 MILLIGRAM(S): at 15:14

## 2018-12-30 RX ADMIN — Medication 800 MILLIGRAM(S): at 16:00

## 2018-12-30 RX ADMIN — FENTANYL CITRATE 25 MICROGRAM(S): 50 INJECTION INTRAVENOUS at 02:45

## 2018-12-30 RX ADMIN — Medication 1 MILLIGRAM(S): at 21:56

## 2018-12-30 RX ADMIN — Medication 0.1 MILLIGRAM(S): at 14:15

## 2018-12-30 RX ADMIN — FENTANYL CITRATE 25 MICROGRAM(S): 50 INJECTION INTRAVENOUS at 09:59

## 2018-12-30 RX ADMIN — Medication 3 MILLILITER(S): at 05:12

## 2018-12-30 RX ADMIN — Medication 200 GRAM(S): at 21:55

## 2018-12-30 RX ADMIN — Medication 10 MILLIGRAM(S): at 05:08

## 2018-12-30 RX ADMIN — POLYETHYLENE GLYCOL 3350 17 GRAM(S): 17 POWDER, FOR SOLUTION ORAL at 05:03

## 2018-12-30 RX ADMIN — Medication 250 MILLIGRAM(S): at 14:15

## 2018-12-30 RX ADMIN — SODIUM CHLORIDE 1000 MILLILITER(S): 9 INJECTION INTRAMUSCULAR; INTRAVENOUS; SUBCUTANEOUS at 16:05

## 2018-12-30 RX ADMIN — Medication 200 MILLILITER(S): at 04:00

## 2018-12-30 RX ADMIN — CHLORHEXIDINE GLUCONATE 15 MILLILITER(S): 213 SOLUTION TOPICAL at 05:04

## 2018-12-30 RX ADMIN — Medication 100 MILLIGRAM(S): at 14:15

## 2018-12-30 RX ADMIN — CHLORHEXIDINE GLUCONATE 1 APPLICATION(S): 213 SOLUTION TOPICAL at 21:56

## 2018-12-30 RX ADMIN — Medication 650 MILLIGRAM(S): at 17:59

## 2018-12-30 RX ADMIN — FENTANYL CITRATE 1 PATCH: 50 INJECTION INTRAVENOUS at 19:00

## 2018-12-30 RX ADMIN — GABAPENTIN 300 MILLIGRAM(S): 400 CAPSULE ORAL at 21:55

## 2018-12-30 RX ADMIN — Medication 650 MILLIGRAM(S): at 10:52

## 2018-12-30 RX ADMIN — GABAPENTIN 300 MILLIGRAM(S): 400 CAPSULE ORAL at 14:15

## 2018-12-30 RX ADMIN — FENTANYL CITRATE 25 MICROGRAM(S): 50 INJECTION INTRAVENOUS at 08:30

## 2018-12-30 RX ADMIN — Medication 1000 MILLIGRAM(S): at 05:03

## 2018-12-30 RX ADMIN — FENTANYL CITRATE 25 MICROGRAM(S): 50 INJECTION INTRAVENOUS at 09:40

## 2018-12-30 RX ADMIN — Medication 10 MILLIGRAM(S): at 22:02

## 2018-12-30 RX ADMIN — FENTANYL CITRATE 25 MICROGRAM(S): 50 INJECTION INTRAVENOUS at 09:00

## 2018-12-30 RX ADMIN — Medication 250 MILLIGRAM(S): at 21:55

## 2018-12-30 RX ADMIN — SENNA PLUS 5 MILLILITER(S): 8.6 TABLET ORAL at 21:56

## 2018-12-30 RX ADMIN — LACOSAMIDE 100 MILLIGRAM(S): 50 TABLET ORAL at 05:03

## 2018-12-30 RX ADMIN — BROMOCRIPTINE MESYLATE 5 MILLIGRAM(S): 5 CAPSULE ORAL at 07:32

## 2018-12-30 RX ADMIN — CEFEPIME 100 MILLIGRAM(S): 1 INJECTION, POWDER, FOR SOLUTION INTRAMUSCULAR; INTRAVENOUS at 21:55

## 2018-12-30 RX ADMIN — Medication 650 MILLIGRAM(S): at 12:00

## 2018-12-30 RX ADMIN — Medication 100 MILLIGRAM(S): at 21:56

## 2018-12-30 RX ADMIN — Medication 650 MILLIGRAM(S): at 18:30

## 2018-12-30 RX ADMIN — FENTANYL CITRATE 25 MICROGRAM(S): 50 INJECTION INTRAVENOUS at 02:30

## 2018-12-30 NOTE — PROGRESS NOTE ADULT - ASSESSMENT
59 yo female with SLE admitted 12/16 with cerebellar AVM and bleed.  She was electively intubated on 12/16, has an EVD for hydrocephalus.  S/P cerebellar embolization on 12/17 with AVM resection, s/p return to OR on 12/18 for posterior fossa  decompression.  Colonized with MRSA in sputum, no radiographic evidence of pneumonia.d  Fever can be central secondary to ICH, always a diagnosis of exclusion.I do not know details of SLE history but this can also be a cause of fever  With foreign body in airways such as trach or ETT , MRSA can be difficult to eradicate even with IV antibiotics.  Fever may be on a central basis, she appears reasonably stable.  All her blood and CSF cultures have been negative.  CSF submitted in past 24 hours with lactate of 1.9, radha count not suggestive of infection  Suggest:  1. continue vanco for now, perhaps 7-10 day course  2.Periodic blood and CSF cultures as you are doing  3.Follow exam, wbc, renal function, and hemodynamics  4.While I think fever may be on a central basis, I would have a low threshold for adding cefepime for GNR coverage if she deteriorates.This was started and will await outstanding cultures abd observe for a response.With stable BP and stable wbc count fever may still be non infectious.

## 2018-12-30 NOTE — PROGRESS NOTE ADULT - SUBJECTIVE AND OBJECTIVE BOX
ENT ISSUE/POD: POD#7 for trach    HPI: 57 y/o POD#7 for tracheostomy #8 shiley cuffed 2/2 respiratory failure. Pt seen and examined, doing well on the vent. No complaints or issues.       PAST MEDICAL & SURGICAL HISTORY:  Systemic lupus erythematosus, unspecified SLE type, unspecified organ involvement status  Lupus  Back pain, chronic  Childhood asthma  Ovarian cyst  DVT (deep venous thrombosis), bilateral: 1993( treated with Heparin and coumadin)  RA (rheumatoid arthritis)  No significant past surgical history  No significant past surgical history  Plantar fasciitis of right foot: h/o surgery  History of laparoscopic cholecystectomy: 1993    Allergies    No Known Allergies    Intolerances      MEDICATIONS  (STANDING):  ALBUTerol/ipratropium for Nebulization 3 milliLiter(s) Nebulizer every 6 hours  amLODIPine   Tablet 10 milliGRAM(s) Oral daily  bromocriptine Capsule 5 milliGRAM(s) Oral every 8 hours  busPIRone 10 milliGRAM(s) Oral every 8 hours  cefepime   IVPB      cefepime   IVPB 2000 milliGRAM(s) IV Intermittent every 8 hours  chlorhexidine 0.12% Liquid 15 milliLiter(s) Swish and Spit two times a day  chlorhexidine 4% Liquid 1 Application(s) Topical <User Schedule>  cloNIDine 0.1 milliGRAM(s) Oral every 8 hours  docusate sodium Liquid 100 milliGRAM(s) Oral three times a day  enoxaparin Injectable 40 milliGRAM(s) SubCutaneous <User Schedule>  gabapentin   Solution 300 milliGRAM(s) Oral every 8 hours  lacosamide 100 milliGRAM(s) Oral two times a day  polyethylene glycol 3350 17 Gram(s) Oral two times a day  propofol Infusion 10 MICROgram(s)/kG/Min (5.046 mL/Hr) IV Continuous <Continuous>  senna Syrup 5 milliLiter(s) Oral at bedtime  vancomycin  IVPB 1750 milliGRAM(s) IV Intermittent every 8 hours    MEDICATIONS  (PRN):  fentaNYL    Injectable 25 MICROGram(s) IV Push every 2 hours PRN agitation/severe pain  meperidine     Injectable 50 milliGRAM(s) IV Push every 4 hours PRN shivering  oxyCODONE    Solution 5 milliGRAM(s) Oral every 4 hours PRN Moderate Pain (4 - 6)        Vital Signs Last 24 Hrs  T(C): 37.3 (30 Dec 2018 07:00), Max: 38.3 (29 Dec 2018 13:00)  T(F): 99.1 (30 Dec 2018 07:00), Max: 100.9 (29 Dec 2018 13:00)  HR: 112 (30 Dec 2018 07:00) (77 - 112)  BP: 155/90 (30 Dec 2018 07:00) (82/61 - 201/164)  BP(mean): 107 (30 Dec 2018 07:00) (62 - 177)  RR: 20 (30 Dec 2018 07:00) (13 - 28)  SpO2: 100% (30 Dec 2018 07:00) (100% - 100%)                          7.7    11.0  )-----------( 260      ( 30 Dec 2018 05:07 )             21.7    12-29    139  |  102  |  14  ----------------------------<  138<H>  4.1   |  29  |  0.39<L>    Ca    8.1<L>      29 Dec 2018 23:03  Phos  2.8     12-29  Mg     2.0     12-29

## 2018-12-30 NOTE — PROGRESS NOTE ADULT - ASSESSMENT
59 y/o POD#7 for tracheostomy 2/2 respiratory failure. Pt doing well, on the vent, no active bleed. Sutures x4 and umbilical tie in place

## 2018-12-30 NOTE — PROGRESS NOTE ADULT - PROBLEM SELECTOR PLAN 1
Continue vent / remove sutures tomorrow   - Aggressive suction PRN  - Continue with trach site care, keep dry and clean   - Call ENT with any issues.

## 2018-12-30 NOTE — PROGRESS NOTE ADULT - SUBJECTIVE AND OBJECTIVE BOX
Patient seen and examined at bedside.    T(C): 37 (12-30-18 @ 03:00), Max: 38.3 (12-29-18 @ 13:00)  HR: 95 (12-30-18 @ 05:14) (77 - 104)  BP: 101/53 (12-30-18 @ 03:00) (82/61 - 195/161)  RR: 20 (12-30-18 @ 03:00) (13 - 27)  SpO2: 100% (12-30-18 @ 05:14) (100% - 100%)  Wt(kg): --    EXAM:    Trached, EO to voice, pupils 2mm reac, b/l UE trace WDs, B/l LE wiggles toes to command and bends knees b/l to command

## 2018-12-30 NOTE — PROGRESS NOTE ADULT - ASSESSMENT
58F here with cerebellar hemorrhage found to have cerebellar AVM s/p angio embolization and resection s/p return to OR for subocc craniectomy    - Pre-op for  shunt this week  - vent management per ICU  - Fever w/u and abx per ICU, consider ID eval

## 2018-12-30 NOTE — PROGRESS NOTE ADULT - ASSESSMENT
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 - s/p successful ANGIO/EMBO, posterior fossa decompression  ICH score 2    Neuro:  Hydro: EVD clamped for 24 hour; CT head today with increased ventricular sie, may need VPS   lacosamide for seizure ppx   Fentanyl PRN, come off propofol as tolerated   Storming c/w Gabapentin, clonidine, add bromocriptine; consider fentanyl patch   OT for splint    Cards:   HTN: -160 on norvasc/clonidine    Pulm:  s/p trach, wean vent as tolerated  RCU consult    GI: TF via PEG     Renal: IVL    Endo:  goal euglycemia    Heme:  SCDs  DVT ppx Lovenox     ID:  afebrile, send CSF before d/c-ing EVD   MRSA PNA on Vancomycin  Obtain CSF cultures  consult ID as difficulty getting Vancomycin therapeutic    ICU  full code    at risk for deterioration and death due to hydrocephalus, cerebral edema, brain compression  critical care time 45min 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 - s/p successful ANGIO/EMBO, posterior fossa decompression  ICH score 2    Neuro:  Hydro: EVD in place, awaiting VPS  EEG negative, d/c lacosamide  Fentanyl PRN, come off propofol as tolerated   Storming c/w Gabapentin, clonidine, added bromocriptine, will add clonazepam; consider fentanyl patch   OT for splint    Cards:   HTN: -160mmHg on norvasc/clonidine    Pulm:  s/p trach, wean vent as tolerated  RCU consult once status post VPS    GI: TF via PEG     Renal: IVL    Endo:  goal euglycemia    Heme:  SCDs  DVT ppx Lovenox     ID:  Febrile, possibly central but continue antibiotics for now per ID    ICU  full code    at risk for deterioration and death due to hydrocephalus, cerebral edema, brain compression  critical care time 45min

## 2018-12-30 NOTE — PROGRESS NOTE ADULT - SUBJECTIVE AND OBJECTIVE BOX
EVENTS:  hypotensive requiring NE   drop in hgb     T(C): 38.6 (12-30-18 @ 15:00), Max: 38.7 (12-30-18 @ 14:00)  HR: 101 (12-30-18 @ 17:48) (77 - 124)  BP: 104/54 (12-30-18 @ 15:00) (94/53 - 201/164)  RR: 16 (12-30-18 @ 15:00) (13 - 28)  SpO2: 100% (12-30-18 @ 17:48) (98% - 100%)  12-29-18 @ 07:01  -  12-30-18 @ 07:00  --------------------------------------------------------  IN: 3367.4 mL / OUT: 1712 mL / NET: 1655.4 mL    12-30-18 @ 07:01  -  12-30-18 @ 18:25  --------------------------------------------------------  IN: 1630.2 mL / OUT: 79 mL / NET: 1551.2 mL    acetaminophen    Suspension .. 650 milliGRAM(s) Oral every 6 hours PRN  ALBUTerol/ipratropium for Nebulization 3 milliLiter(s) Nebulizer every 6 hours  amLODIPine   Tablet 10 milliGRAM(s) Oral daily  bromocriptine Capsule 10 milliGRAM(s) Oral every 8 hours  cefepime   IVPB      cefepime   IVPB 2000 milliGRAM(s) IV Intermittent every 8 hours  chlorhexidine 0.12% Liquid 15 milliLiter(s) Swish and Spit two times a day  chlorhexidine 4% Liquid 1 Application(s) Topical <User Schedule>  clonazePAM Tablet 1 milliGRAM(s) Oral every 8 hours  cloNIDine 0.1 milliGRAM(s) Oral every 8 hours  docusate sodium Liquid 100 milliGRAM(s) Oral three times a day  enoxaparin Injectable 40 milliGRAM(s) SubCutaneous <User Schedule>  fentaNYL    Injectable 25 MICROGram(s) IV Push every 2 hours PRN  fentaNYL   Patch  50 MICROgram(s)/Hr 1 Patch Transdermal every 72 hours  gabapentin   Solution 300 milliGRAM(s) Oral every 8 hours  norepinephrine Infusion 0.5 MICROgram(s)/kG/Min IV Continuous <Continuous>  oxyCODONE    Solution 5 milliGRAM(s) Oral every 4 hours PRN  polyethylene glycol 3350 17 Gram(s) Oral two times a day  senna Syrup 5 milliLiter(s) Oral at bedtime  vancomycin  IVPB 1750 milliGRAM(s) IV Intermittent every 8 hours    Culture - CSF with Gram Stain (collected 12-29-18 @ 20:25)  Source: .CSF CSF  Gram Stain (12-29-18 @ 20:47):    No polymorphonuclear cells seen per low power field    No organisms seen per oil power field  Preliminary Report (12-30-18 @ 16:48):    No growth      EXAMINATION:  General:  calm  HEENT:  trach'd, PERRL  Neuro:  opens eyes to command, BLE wiggles toes to command, no commands in arms, does not mouth words, LUE prox 2/5 dist 0/5, RUE 0/5, toe movement but no lifting of legs  Cards:  RRR  Respiratory:  no respiratory distress  Abdomen:  soft  Extremities:  no edema  Skin:  warm/dry      LABS:  Na: 139 (12-29 @ 23:03), 136 (12-28 @ 23:39), 139 (12-28 @ 15:37), 138 (12-28 @ 07:14), 138 (12-27 @ 22:40)  K: 4.1 (12-29 @ 23:03), 4.3 (12-28 @ 23:39), 4.3 (12-28 @ 15:37), 3.6 (12-28 @ 07:14), 4.0 (12-27 @ 22:40)  Cl: 102 (12-29 @ 23:03), 101 (12-28 @ 23:39), 105 (12-28 @ 15:37), 105 (12-28 @ 07:14), 103 (12-27 @ 22:40)  CO2: 29 (12-29 @ 23:03), 25 (12-28 @ 23:39), 26 (12-28 @ 15:37), 25 (12-28 @ 07:14), 24 (12-27 @ 22:40)  BUN: 14 (12-29 @ 23:03), 13 (12-28 @ 23:39), 12 (12-28 @ 15:37), 13 (12-28 @ 07:14), 14 (12-27 @ 22:40)  Cr: 0.39 (12-29 @ 23:03), 0.38 (12-28 @ 23:39), 0.37 (12-28 @ 15:37), 0.37 (12-28 @ 07:14), 0.39 (12-27 @ 22:40)  Glu: 138(12-29 @ 23:03), 153(12-28 @ 23:39), 132(12-28 @ 15:37), 144(12-28 @ 07:14), 106(12-27 @ 22:40)    Hgb: 7.7 (12-30 @ 05:07), 7.8 (12-29 @ 23:03)  Hct: 21.7 (12-30 @ 05:07), 21.7 (12-29 @ 23:03)  WBC: 11.0 (12-30 @ 05:07), 11.4 (12-29 @ 23:03)  Plt: 260 (12-30 @ 05:07), 260 (12-29 @ 23:03)    INR:   PTT:         Assessment and Plan:   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 - s/p successful ANGIO/EMBO, posterior fossa decompression  ICH score 2    Hydro: EVD in place, awaiting VPS  Storming c/w Gabapentin, clonidine,  bromocriptine,  clonazepam;   hypotensive, most likely septic shock, she has E-coli and MRSA in sputum and requiring NE, on cefepime and vancomycin   hold anti-HTN   hgb dropped, will get DIC profile, and hemolytic anemia, FOB, TXM   s/p trach, wean vent as tolerated  TF via PEG   IV NS 75 ml/hr   goal euglycemia  SCDs  DVT ppx hold Lovenox and resume if pm hgb is stable      ICU  full code    at risk for deterioration and death due to hydrocephalus, cerebral edema, brain compression  critical care time 35min EVENTS:  hypotensive requiring NE   drop in hgb     T(C): 38.6 (12-30-18 @ 15:00), Max: 38.7 (12-30-18 @ 14:00)  HR: 101 (12-30-18 @ 17:48) (77 - 124)  BP: 104/54 (12-30-18 @ 15:00) (94/53 - 201/164)  RR: 16 (12-30-18 @ 15:00) (13 - 28)  SpO2: 100% (12-30-18 @ 17:48) (98% - 100%)  12-29-18 @ 07:01  -  12-30-18 @ 07:00  --------------------------------------------------------  IN: 3367.4 mL / OUT: 1712 mL / NET: 1655.4 mL    12-30-18 @ 07:01  -  12-30-18 @ 18:25  --------------------------------------------------------  IN: 1630.2 mL / OUT: 79 mL / NET: 1551.2 mL    acetaminophen    Suspension .. 650 milliGRAM(s) Oral every 6 hours PRN  ALBUTerol/ipratropium for Nebulization 3 milliLiter(s) Nebulizer every 6 hours  amLODIPine   Tablet 10 milliGRAM(s) Oral daily  bromocriptine Capsule 10 milliGRAM(s) Oral every 8 hours  cefepime   IVPB      cefepime   IVPB 2000 milliGRAM(s) IV Intermittent every 8 hours  chlorhexidine 0.12% Liquid 15 milliLiter(s) Swish and Spit two times a day  chlorhexidine 4% Liquid 1 Application(s) Topical <User Schedule>  clonazePAM Tablet 1 milliGRAM(s) Oral every 8 hours  cloNIDine 0.1 milliGRAM(s) Oral every 8 hours  docusate sodium Liquid 100 milliGRAM(s) Oral three times a day  enoxaparin Injectable 40 milliGRAM(s) SubCutaneous <User Schedule>  fentaNYL    Injectable 25 MICROGram(s) IV Push every 2 hours PRN  fentaNYL   Patch  50 MICROgram(s)/Hr 1 Patch Transdermal every 72 hours  gabapentin   Solution 300 milliGRAM(s) Oral every 8 hours  norepinephrine Infusion 0.5 MICROgram(s)/kG/Min IV Continuous <Continuous>  oxyCODONE    Solution 5 milliGRAM(s) Oral every 4 hours PRN  polyethylene glycol 3350 17 Gram(s) Oral two times a day  senna Syrup 5 milliLiter(s) Oral at bedtime  vancomycin  IVPB 1750 milliGRAM(s) IV Intermittent every 8 hours    Culture - CSF with Gram Stain (collected 12-29-18 @ 20:25)  Source: .CSF CSF  Gram Stain (12-29-18 @ 20:47):    No polymorphonuclear cells seen per low power field    No organisms seen per oil power field  Preliminary Report (12-30-18 @ 16:48):    No growth      EXAMINATION:  General:  calm  HEENT:  trach'd, PERRL  Neuro:  opens eyes to command, BLE wiggles toes to command, no commands in arms, does not mouth words, LUE prox 2/5 dist 0/5, RUE 0/5, toe movement but no lifting of legs  Cards:  RRR  Respiratory:  no respiratory distress  Abdomen:  soft, no hematoma   Extremities:  no edema  Skin:  warm/dry      LABS:  Na: 139 (12-29 @ 23:03), 136 (12-28 @ 23:39), 139 (12-28 @ 15:37), 138 (12-28 @ 07:14), 138 (12-27 @ 22:40)  K: 4.1 (12-29 @ 23:03), 4.3 (12-28 @ 23:39), 4.3 (12-28 @ 15:37), 3.6 (12-28 @ 07:14), 4.0 (12-27 @ 22:40)  Cl: 102 (12-29 @ 23:03), 101 (12-28 @ 23:39), 105 (12-28 @ 15:37), 105 (12-28 @ 07:14), 103 (12-27 @ 22:40)  CO2: 29 (12-29 @ 23:03), 25 (12-28 @ 23:39), 26 (12-28 @ 15:37), 25 (12-28 @ 07:14), 24 (12-27 @ 22:40)  BUN: 14 (12-29 @ 23:03), 13 (12-28 @ 23:39), 12 (12-28 @ 15:37), 13 (12-28 @ 07:14), 14 (12-27 @ 22:40)  Cr: 0.39 (12-29 @ 23:03), 0.38 (12-28 @ 23:39), 0.37 (12-28 @ 15:37), 0.37 (12-28 @ 07:14), 0.39 (12-27 @ 22:40)  Glu: 138(12-29 @ 23:03), 153(12-28 @ 23:39), 132(12-28 @ 15:37), 144(12-28 @ 07:14), 106(12-27 @ 22:40)    Hgb: 7.7 (12-30 @ 05:07), 7.8 (12-29 @ 23:03)  Hct: 21.7 (12-30 @ 05:07), 21.7 (12-29 @ 23:03)  WBC: 11.0 (12-30 @ 05:07), 11.4 (12-29 @ 23:03)  Plt: 260 (12-30 @ 05:07), 260 (12-29 @ 23:03)    INR:   PTT:         Assessment and Plan:   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 - s/p successful ANGIO/EMBO, posterior fossa decompression  ICH score 2    Hydro: EVD in place, awaiting VPS  Storming c/w Gabapentin, clonidine,  bromocriptine,  clonazepam; fentanyl PRN for pain   hypotensive, most likely septic shock, she has E-coli and MRSA in sputum and requiring NE, on cefepime and vancomycin   will give NS bolus and try to wean NE, propofol was discontinued   hold anti-HTN   hgb dropped, will get DIC profile, and hemolytic anemia, FOB, TXM, will transfuse if hgb<7   s/p trach, wean vent as tolerated  TF via PEG   IV NS 75 ml/hr   goal euglycemia  SCDs  DVT ppx hold Lovenox and resume if pm hgb is stable      ICU  full code    at risk for deterioration and death due to hydrocephalus, cerebral edema, brain compression  critical care time 35min

## 2018-12-30 NOTE — PROGRESS NOTE ADULT - SUBJECTIVE AND OBJECTIVE BOX
SUMMARY:  59yo woman PMH lupus p/w sudden onset severe HA a/w nausea and vomiting. 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 Patient was intact on presentation however intubated for airway protection and EVD placed. Transferred to [facility] for further care.    24 HOUR EVENTS:  Spiked fever.    HOSPITAL COURSE: Fevers, hydrocephalus, autonomic storm  12/17: s/p angio/embo of EVM, resection of AVM  12/18: RTOR for posterior fossa decompression expansion  12/24 s/p trach, EVD reopened per NSGY  12/26: MRSA in sputum  12/28:  CT head and EVD clamped.    Vitals/labs/meds/imaging: reviewed    EXAMINATION:  General:  calm  HEENT:  trach'd, PERRL  Neuro:  opens eyes to command, BLE wiggles toes to command, no commands in arms, does not mouth words, LUE prox 2/5 dist 0/5, RUE 0/5, toe movement but no lifting of legs  Cards:  RRR  Respiratory:  no respiratory distress  Abdomen:  soft  Extremities:  no edema  Skin:  warm/dry SUMMARY:  59yo woman PMH lupus p/w sudden onset severe HA a/w nausea and vomiting. 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 Patient was intact on presentation however intubated for airway protection and EVD placed. Transferred to [facility] for further care.    24 HOUR EVENTS:  EVD unclamped due to high ICPs yesterday. Shivering/shaking. Low grade fever. Cefepime added.     HOSPITAL COURSE: Fevers, hydrocephalus, autonomic storm  12/17: s/p angio/embo of AVM, resection of AVM  12/18: RTOR for posterior fossa decompression expansion  12/24 s/p trach, EVD reopened per NSGY  12/26: MRSA in sputum  12/28:  CT head and EVD clamped.  12/29: EVD unclamped yesterday due to high ICPs.     Vitals/labs/meds/imaging: reviewed    EXAMINATION:  General:  calm  HEENT:  trach'd, PERRL  Neuro:  opens eyes to command, BLE wiggles toes to command, no commands in arms, does not mouth words, LUE prox 2/5 dist 0/5, RUE 0/5, toe movement but no lifting of legs  Cards:  RRR  Respiratory:  no respiratory distress  Abdomen:  soft  Extremities:  no edema  Skin:  warm/dry

## 2018-12-30 NOTE — PROGRESS NOTE ADULT - SUBJECTIVE AND OBJECTIVE BOX
CC: f/u for fever    Patient reports: she is poorly interactive on vent, off and on cooling blanket    REVIEW OF SYSTEMS:  All other review of systems negative (Comprehensive ROS): moderate trach secretions,tolerating enteral feeds    Antimicrobials Day #  :day 1 and 7,  cefepime   IVPB      cefepime   IVPB 2000 milliGRAM(s) IV Intermittent every 8 hours  vancomycin  IVPB 1750 milliGRAM(s) IV Intermittent every 8 hours    Other Medications Reviewed    T(F): 99 (18 @ 06:00), Max: 100.9 (18 @ 13:00)  HR: 108 (18 @ 06:00)  BP: 112/96 (18 @ 06:00)  RR: 27 (18 @ 06:00)  SpO2: 100% (18 @ 06:00)  Wt(kg): --    PHYSICAL EXAM:  General: sedated, no acute distress, EVD in place  Eyes:  anicteric, no conjunctival injection, no discharge  Oropharynx: no lesions or injection 	  Neck: trach  Lungs: scattered ronchi  Heart: regular rate and rhythm; no murmur, rubs or gallops  Abdomen: soft, nondistended, nontender, without mass or organomegaly  Skin: no lesions  Extremities: no clubbing, cyanosis, + edema  Neurologic: sedated on vent    LAB RESULTS:                        7.7    11.0  )-----------( 260      ( 30 Dec 2018 05:07 )             21.7         139  |  102  |  14  ----------------------------<  138<H>  4.1   |  29  |  0.39<L>    Ca    8.1<L>      29 Dec 2018 23:03  Phos  2.8       Mg     2.0             Urinalysis Basic - ( 28 Dec 2018 15:53 )    Color: Yellow / Appearance: Clear / S.029 / pH: x  Gluc: x / Ketone: Trace  / Bili: Negative / Urobili: Negative   Blood: x / Protein: Trace / Nitrite: Negative   Leuk Esterase: Negative / RBC: 3 /hpf / WBC 2 /hpf   Sq Epi: x / Non Sq Epi: 3 /hpf / Bacteria: Negative      MICROBIOLOGY:  RECENT CULTURES:   @ 20:25 .CSF CSF       No polymorphonuclear cells seen per low power field  No organisms seen per oil power field     @ 07:31 .Sputum Sputum       Numerous polymorphonuclear leukocytes per low power field  Rare Squamous epithelial cells per low power field  Few Gram positive cocci in pairs per oil power field  Few Gram Negative Rods per oil power field     @ 20:03 .Blood Blood     No growth to date.       @ 09:52 .CSF CSF - shunt     No growth    No polymorphonuclear leukocytes seen per low power field  No organisms seen per oil power field  by cytocentrifuge     @ 08:13 .Sputum Sputum Methicillin resistant Staphylococcus aureus    Numerous Methicillin resistant Staphylococcus aureus  Normal Respiratory Tori absent    Numerous polymorphonuclear leukocytes per low power field  No Squamous epithelial cells per low power field  Moderate Gram Positive Cocci in Pairs and Chains per oil power field     @ 00:56 .Blood Blood     No growth to date.       @ 00:55 .Blood Blood     No growth to date.          RADIOLOGY REVIEWED:  < from: CT Head No Cont (18 @ 09:10) >  INTERPRETATION:  History: Intracranial hemorrhage    Multiple axial sections were performed base of skull to vertex without   contrast enhancement.    This exam is compared with prior noncontrast head CT performed on   2018.    Postop changes compatible with a suboccipital craniectomy is again   identified.    Right frontal shunt catheter is again seen and unchanged in position.    There is evidence of aneurysm clip and coil material identified in the   posterior fossa region which appears unchanged.    There is increase in size of the lateral and third ventricle when   compared the prior exam. Intraventricular hemorrhage is again seen.    Evaluation of the osseous structures with appropriate window setting   postop changes appear unremarkable    Extracalvarial soft tissue swelling staples seen involving the high right   frontal region.    The visualized paranasal sinuses mastoid and middle ear regions appear   clear.    Impression: Increase in size of the lateral and third ventricles seen   when compared with the prior exam.    < end of copied text >

## 2018-12-31 LAB
-  AMIKACIN: SIGNIFICANT CHANGE UP
-  AMOXICILLIN/CLAVULANIC ACID: SIGNIFICANT CHANGE UP
-  AMPICILLIN/SULBACTAM: SIGNIFICANT CHANGE UP
-  AMPICILLIN/SULBACTAM: SIGNIFICANT CHANGE UP
-  AMPICILLIN: SIGNIFICANT CHANGE UP
-  AZTREONAM: SIGNIFICANT CHANGE UP
-  CEFAZOLIN: SIGNIFICANT CHANGE UP
-  CEFAZOLIN: SIGNIFICANT CHANGE UP
-  CEFEPIME: SIGNIFICANT CHANGE UP
-  CEFOXITIN: SIGNIFICANT CHANGE UP
-  CEFTRIAXONE: SIGNIFICANT CHANGE UP
-  CIPROFLOXACIN: SIGNIFICANT CHANGE UP
-  CLINDAMYCIN: SIGNIFICANT CHANGE UP
-  ERTAPENEM: SIGNIFICANT CHANGE UP
-  ERYTHROMYCIN: SIGNIFICANT CHANGE UP
-  GENTAMICIN: SIGNIFICANT CHANGE UP
-  GENTAMICIN: SIGNIFICANT CHANGE UP
-  IMIPENEM: SIGNIFICANT CHANGE UP
-  LEVOFLOXACIN: SIGNIFICANT CHANGE UP
-  LINEZOLID: SIGNIFICANT CHANGE UP
-  MEROPENEM: SIGNIFICANT CHANGE UP
-  OXACILLIN: SIGNIFICANT CHANGE UP
-  PENICILLIN: SIGNIFICANT CHANGE UP
-  PIPERACILLIN/TAZOBACTAM: SIGNIFICANT CHANGE UP
-  RIFAMPIN: SIGNIFICANT CHANGE UP
-  TETRACYCLINE: SIGNIFICANT CHANGE UP
-  TOBRAMYCIN: SIGNIFICANT CHANGE UP
-  TRIMETHOPRIM/SULFAMETHOXAZOLE: SIGNIFICANT CHANGE UP
-  TRIMETHOPRIM/SULFAMETHOXAZOLE: SIGNIFICANT CHANGE UP
-  VANCOMYCIN: SIGNIFICANT CHANGE UP
ANION GAP SERPL CALC-SCNC: 7 MMOL/L — SIGNIFICANT CHANGE UP (ref 5–17)
BILIRUB DIRECT SERPL-MCNC: <0.1 MG/DL — SIGNIFICANT CHANGE UP (ref 0–0.2)
BILIRUB INDIRECT FLD-MCNC: >0.1 MG/DL — LOW (ref 0.2–1)
BILIRUB SERPL-MCNC: 0.2 MG/DL — SIGNIFICANT CHANGE UP (ref 0.2–1.2)
CHLORIDE SERPL-SCNC: 104 MMOL/L — SIGNIFICANT CHANGE UP (ref 96–108)
CO2 SERPL-SCNC: 28 MMOL/L — SIGNIFICANT CHANGE UP (ref 22–31)
CULTURE RESULTS: SIGNIFICANT CHANGE UP
GAS PNL BLDA: SIGNIFICANT CHANGE UP
HCT VFR BLD CALC: 21.6 % — LOW (ref 34.5–45)
HCT VFR BLD CALC: 24 % — LOW (ref 34.5–45)
HGB BLD-MCNC: 7.9 G/DL — LOW (ref 11.5–15.5)
HGB BLD-MCNC: 8.7 G/DL — LOW (ref 11.5–15.5)
MAGNESIUM SERPL-MCNC: 1.8 MG/DL — SIGNIFICANT CHANGE UP (ref 1.6–2.6)
MCHC RBC-ENTMCNC: 31.2 PG — SIGNIFICANT CHANGE UP (ref 27–34)
MCHC RBC-ENTMCNC: 31.5 PG — SIGNIFICANT CHANGE UP (ref 27–34)
MCHC RBC-ENTMCNC: 36.3 GM/DL — HIGH (ref 32–36)
MCHC RBC-ENTMCNC: 36.5 GM/DL — HIGH (ref 32–36)
MCV RBC AUTO: 85.8 FL — SIGNIFICANT CHANGE UP (ref 80–100)
MCV RBC AUTO: 86.5 FL — SIGNIFICANT CHANGE UP (ref 80–100)
METHOD TYPE: SIGNIFICANT CHANGE UP
METHOD TYPE: SIGNIFICANT CHANGE UP
OB PNL STL: NEGATIVE — SIGNIFICANT CHANGE UP
ORGANISM # SPEC MICROSCOPIC CNT: SIGNIFICANT CHANGE UP
PHOSPHATE SERPL-MCNC: 2 MG/DL — LOW (ref 2.5–4.5)
PLATELET # BLD AUTO: 243 K/UL — SIGNIFICANT CHANGE UP (ref 150–400)
PLATELET # BLD AUTO: 256 K/UL — SIGNIFICANT CHANGE UP (ref 150–400)
POTASSIUM SERPL-MCNC: 3.7 MMOL/L — SIGNIFICANT CHANGE UP (ref 3.5–5.3)
POTASSIUM SERPL-SCNC: 3.7 MMOL/L — SIGNIFICANT CHANGE UP (ref 3.5–5.3)
RBC # BLD: 2.49 M/UL — LOW (ref 3.8–5.2)
RBC # BLD: 2.49 M/UL — LOW (ref 3.8–5.2)
RBC # BLD: 2.8 M/UL — LOW (ref 3.8–5.2)
RBC # FLD: 13.9 % — SIGNIFICANT CHANGE UP (ref 10.3–14.5)
RBC # FLD: 14.1 % — SIGNIFICANT CHANGE UP (ref 10.3–14.5)
RETICS #: 87.6 K/UL — SIGNIFICANT CHANGE UP (ref 25–125)
RETICS/RBC NFR: 3.5 % — HIGH (ref 0.5–2.5)
SODIUM SERPL-SCNC: 139 MMOL/L — SIGNIFICANT CHANGE UP (ref 135–145)
SPECIMEN SOURCE: SIGNIFICANT CHANGE UP
WBC # BLD: 8.8 K/UL — SIGNIFICANT CHANGE UP (ref 3.8–10.5)
WBC # BLD: 9.8 K/UL — SIGNIFICANT CHANGE UP (ref 3.8–10.5)
WBC # FLD AUTO: 8.8 K/UL — SIGNIFICANT CHANGE UP (ref 3.8–10.5)
WBC # FLD AUTO: 9.8 K/UL — SIGNIFICANT CHANGE UP (ref 3.8–10.5)

## 2018-12-31 PROCEDURE — 99292 CRITICAL CARE ADDL 30 MIN: CPT

## 2018-12-31 PROCEDURE — 99291 CRITICAL CARE FIRST HOUR: CPT

## 2018-12-31 PROCEDURE — 99232 SBSQ HOSP IP/OBS MODERATE 35: CPT

## 2018-12-31 RX ORDER — SODIUM CHLORIDE 9 MG/ML
500 INJECTION INTRAMUSCULAR; INTRAVENOUS; SUBCUTANEOUS ONCE
Qty: 0 | Refills: 0 | Status: COMPLETED | OUTPATIENT
Start: 2018-12-31 | End: 2018-12-31

## 2018-12-31 RX ORDER — ENOXAPARIN SODIUM 100 MG/ML
40 INJECTION SUBCUTANEOUS
Qty: 0 | Refills: 0 | Status: DISCONTINUED | OUTPATIENT
Start: 2018-12-31 | End: 2019-01-06

## 2018-12-31 RX ADMIN — CHLORHEXIDINE GLUCONATE 15 MILLILITER(S): 213 SOLUTION TOPICAL at 05:28

## 2018-12-31 RX ADMIN — CHLORHEXIDINE GLUCONATE 15 MILLILITER(S): 213 SOLUTION TOPICAL at 17:08

## 2018-12-31 RX ADMIN — Medication 650 MILLIGRAM(S): at 17:30

## 2018-12-31 RX ADMIN — Medication 1 MILLIGRAM(S): at 14:30

## 2018-12-31 RX ADMIN — CEFEPIME 100 MILLIGRAM(S): 1 INJECTION, POWDER, FOR SOLUTION INTRAMUSCULAR; INTRAVENOUS at 14:27

## 2018-12-31 RX ADMIN — Medication 1 MILLIGRAM(S): at 05:28

## 2018-12-31 RX ADMIN — Medication 1 ENEMA: at 00:04

## 2018-12-31 RX ADMIN — Medication 3 MILLILITER(S): at 17:49

## 2018-12-31 RX ADMIN — FENTANYL CITRATE 1 PATCH: 50 INJECTION INTRAVENOUS at 19:00

## 2018-12-31 RX ADMIN — BROMOCRIPTINE MESYLATE 10 MILLIGRAM(S): 5 CAPSULE ORAL at 21:52

## 2018-12-31 RX ADMIN — Medication 650 MILLIGRAM(S): at 05:31

## 2018-12-31 RX ADMIN — Medication 650 MILLIGRAM(S): at 12:30

## 2018-12-31 RX ADMIN — Medication 100 MILLIGRAM(S): at 05:28

## 2018-12-31 RX ADMIN — GABAPENTIN 300 MILLIGRAM(S): 400 CAPSULE ORAL at 16:24

## 2018-12-31 RX ADMIN — Medication 250 MILLIGRAM(S): at 05:28

## 2018-12-31 RX ADMIN — BROMOCRIPTINE MESYLATE 10 MILLIGRAM(S): 5 CAPSULE ORAL at 05:28

## 2018-12-31 RX ADMIN — GABAPENTIN 300 MILLIGRAM(S): 400 CAPSULE ORAL at 05:28

## 2018-12-31 RX ADMIN — Medication 250 MILLIGRAM(S): at 21:53

## 2018-12-31 RX ADMIN — GABAPENTIN 300 MILLIGRAM(S): 400 CAPSULE ORAL at 21:53

## 2018-12-31 RX ADMIN — CEFEPIME 100 MILLIGRAM(S): 1 INJECTION, POWDER, FOR SOLUTION INTRAMUSCULAR; INTRAVENOUS at 21:53

## 2018-12-31 RX ADMIN — Medication 3 MILLILITER(S): at 05:16

## 2018-12-31 RX ADMIN — FENTANYL CITRATE 1 PATCH: 50 INJECTION INTRAVENOUS at 07:00

## 2018-12-31 RX ADMIN — BROMOCRIPTINE MESYLATE 10 MILLIGRAM(S): 5 CAPSULE ORAL at 14:30

## 2018-12-31 RX ADMIN — Medication 3 MILLILITER(S): at 23:42

## 2018-12-31 RX ADMIN — CHLORHEXIDINE GLUCONATE 1 APPLICATION(S): 213 SOLUTION TOPICAL at 22:00

## 2018-12-31 RX ADMIN — Medication 3 MILLILITER(S): at 12:07

## 2018-12-31 RX ADMIN — Medication 1 MILLIGRAM(S): at 21:52

## 2018-12-31 RX ADMIN — ENOXAPARIN SODIUM 40 MILLIGRAM(S): 100 INJECTION SUBCUTANEOUS at 17:08

## 2018-12-31 RX ADMIN — Medication 3 MILLILITER(S): at 00:11

## 2018-12-31 RX ADMIN — SODIUM CHLORIDE 75 MILLILITER(S): 9 INJECTION INTRAMUSCULAR; INTRAVENOUS; SUBCUTANEOUS at 05:29

## 2018-12-31 RX ADMIN — Medication 250 MILLIGRAM(S): at 14:27

## 2018-12-31 RX ADMIN — POLYETHYLENE GLYCOL 3350 17 GRAM(S): 17 POWDER, FOR SOLUTION ORAL at 05:28

## 2018-12-31 RX ADMIN — POTASSIUM PHOSPHATE, MONOBASIC POTASSIUM PHOSPHATE, DIBASIC 62.5 MILLIMOLE(S): 236; 224 INJECTION, SOLUTION INTRAVENOUS at 00:04

## 2018-12-31 RX ADMIN — SODIUM CHLORIDE 500 MILLILITER(S): 9 INJECTION INTRAMUSCULAR; INTRAVENOUS; SUBCUTANEOUS at 15:20

## 2018-12-31 RX ADMIN — Medication 40 MILLIEQUIVALENT(S): at 00:04

## 2018-12-31 RX ADMIN — Medication 650 MILLIGRAM(S): at 11:30

## 2018-12-31 RX ADMIN — CEFEPIME 100 MILLIGRAM(S): 1 INJECTION, POWDER, FOR SOLUTION INTRAMUSCULAR; INTRAVENOUS at 05:28

## 2018-12-31 NOTE — PROGRESS NOTE ADULT - ASSESSMENT
58F here with cerebellar hemorrhage found to have cerebellar AVM s/p angio embolization and resection s/p return to OR for subocc craniectomy    - Pre-op for  shunt this week  - vent management per ICU  - Fever w/u and abx per ICU

## 2018-12-31 NOTE — PROGRESS NOTE ADULT - ASSESSMENT
59 yo female with SLE admitted 12/16 with cerebellar AVM and bleed.  She was electively intubated on 12/16, has an EVD for hydrocephalus.  S/P cerebellar embolization on 12/17 with AVM resection, s/p return to OR on 12/18 for posterior fossa  decompression.  Colonized with MRSA in sputum, no radiographic evidence of pneumonia.  Fever can be central secondary to ICH, always a diagnosis of exclusion.I do not know details of SLE history but this can also be a cause of fever  With foreign body in airways such as trach or ETT , MRSA can be difficult to eradicate even with IV antibiotics.  Fever may be on a central basis, she appears reasonably stable.  All her blood and CSF cultures have been negative.  CSF submitted in past 24 hours with lactate of 1.9, radha count not suggestive of infection  Sputum with MRSA and E coli, likely colonizers  Suggest:  1. continue vanco for now, perhaps 7-10 day course  2.Periodic blood and CSF cultures as you are doing  3.Follow exam, wbc, renal function, and hemodynamics  4.While I think fever may be on a central basis, I would have a low threshold for adding cefepime for GNR coverage if she deteriorates.This was started and will await outstanding cultures and  observe for a response.With stable BP and stable wbc count fever may still be non infectious.  5.I see no absolute ID contraindications for shunt internalization.I would consider limiting antibiotics to 24-48 hours

## 2018-12-31 NOTE — PROGRESS NOTE ADULT - ASSESSMENT
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 - s/p successful ANGIO/EMBO, posterior fossa decompression  ICH score 2    Neuro:  Hydro: EVD in place, awaiting VPS  EEG negative, d/c lacosamide  Fentanyl PRN, come off propofol as tolerated   Storming c/w Gabapentin, clonidine, bromocriptine, clonazepam; consider fentanyl patch   OT for splint    Cards:   HTN: -160mmHg on norvasc/clonidine    Pulm:  s/p trach, wean vent as tolerated  RCU consult once status post VPS    GI: TF via PEG     Renal: IVL    Endo:  goal euglycemia    Heme:  SCDs  DVT ppx Lovenox     ID:  Febrile, possibly central but continue antibiotics for now per ID    ICU  full code    at risk for deterioration and death due to hydrocephalus, cerebral edema, brain compression  critical care time 45min 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 - s/p successful ANGIO/EMBO, posterior fossa decompression  ICH score 2    Neuro:  Hydro: EVD in place, awaiting VPS  EEG negative, d/c lacosamide  Fentanyl PRN  Storming c/w Gabapentin, bromocriptine, clonazepam, fentanyl patch   OT for splint    Cards:   HTN: -160mmHg  Relative hypotension, decrease BP meds    Pulm:  s/p trach, wean vent as tolerated  RCU consult once status post VPS    GI: TF via PEG     Renal: IVF to ensure euvolemia    Endo:  goal euglycemia    Heme:  SCDs  Anemia work-up  DVT ppx Lovenox     ID:  Febrile, possibly central but antibiotics for now per ID    ICU  full code    at risk for deterioration and death due to hydrocephalus, cerebral edema, brain compression  critical care time 45min

## 2018-12-31 NOTE — PROGRESS NOTE ADULT - SUBJECTIVE AND OBJECTIVE BOX
Visit Summary: Patient seen and evaluated at bedside.    Overnight Events: none    Exam:  T(C): 37.6 (12-30-18 @ 23:00), Max: 38.7 (12-30-18 @ 14:00)  HR: 96 (12-30-18 @ 23:30) (77 - 124)  BP: 134/72 (12-30-18 @ 17:15) (74/42 - 201/164)  RR: 15 (12-30-18 @ 23:30) (14 - 31)  SpO2: 100% (12-30-18 @ 23:30) (98% - 100%)  Wt(kg): --    opens eyes to command, BLE wiggles toes to command, BUE not FC                        7.1    9.9   )-----------( 248      ( 30 Dec 2018 20:02 )             19.4     12-30    138  |  104  |  14  ----------------------------<  130<H>  3.6   |  26  |  0.42<L>    Ca    7.6<L>      30 Dec 2018 20:03  Phos  2.2     12-30  Mg     2.1     12-30    PT/INR - ( 30 Dec 2018 20:02 )   PT: 12.1 sec;   INR: 1.05 ratio         PTT - ( 30 Dec 2018 20:02 )  PTT:24.2 sec

## 2018-12-31 NOTE — PROGRESS NOTE ADULT - SUBJECTIVE AND OBJECTIVE BOX
EVENTS:  Received 1 unit PRBC. Hypotensive requiring transient pressor.   remains febrile    T(C): 38.2 (12-31-18 @ 15:00), Max: 38.3 (12-31-18 @ 11:00)  HR: 97 (12-31-18 @ 18:00) (85 - 110)  BP: --  RR: 16 (12-31-18 @ 18:00) (14 - 31)  SpO2: 100% (12-31-18 @ 18:00) (99% - 100%)  12-30-18 @ 07:01  -  12-31-18 @ 07:00  --------------------------------------------------------  IN: 6778.8 mL / OUT: 1091 mL / NET: 5687.8 mL    12-31-18 @ 07:01  -  12-31-18 @ 19:02  --------------------------------------------------------  IN: 3121 mL / OUT: 1097 mL / NET: 2024 mL    acetaminophen    Suspension .. 650 milliGRAM(s) Oral every 6 hours PRN  ALBUTerol/ipratropium for Nebulization 3 milliLiter(s) Nebulizer every 6 hours  bisacodyl Suppository 10 milliGRAM(s) Rectal at bedtime  bromocriptine Capsule 10 milliGRAM(s) Oral every 8 hours  cefepime   IVPB      cefepime   IVPB 2000 milliGRAM(s) IV Intermittent every 8 hours  chlorhexidine 0.12% Liquid 15 milliLiter(s) Swish and Spit two times a day  chlorhexidine 4% Liquid 1 Application(s) Topical <User Schedule>  clonazePAM Tablet 1 milliGRAM(s) Oral every 8 hours  docusate sodium Liquid 100 milliGRAM(s) Oral three times a day  enoxaparin Injectable 40 milliGRAM(s) SubCutaneous <User Schedule>  fentaNYL    Injectable 25 MICROGram(s) IV Push every 2 hours PRN  fentaNYL   Patch  50 MICROgram(s)/Hr 1 Patch Transdermal every 72 hours  gabapentin   Solution 300 milliGRAM(s) Oral every 8 hours  norepinephrine Infusion 0.5 MICROgram(s)/kG/Min IV Continuous <Continuous>  oxyCODONE    Solution 5 milliGRAM(s) Oral every 4 hours PRN  polyethylene glycol 3350 17 Gram(s) Oral two times a day  senna Syrup 5 milliLiter(s) Oral at bedtime  sodium chloride 0.9%. 1000 milliLiter(s) IV Continuous <Continuous>  vancomycin  IVPB 1750 milliGRAM(s) IV Intermittent every 8 hours    Culture - CSF with Gram Stain (collected 12-30-18 @ 19:35)  Source: .CSF CSF, shunt  Gram Stain (12-30-18 @ 20:18):    No polymorphonuclear cells seen    No organisms seen    by cytocentrifuge    EXAMINATION:  General:  calm  HEENT:  trach'd, PERRL  Neuro:  opens eyes to command, BLE wiggles toes to command, no commands in arms, does not mouth words, LUE prox 2/5 dist 0/5, RUE 0/5, toe movement but no lifting of legs  Cards:  RRR  Respiratory:  no respiratory distress  Abdomen:  soft  Extremities:  no edema  Skin:  warm/dry      LABS:  Na: 138 (12-30 @ 20:03), 139 (12-29 @ 23:03), 136 (12-28 @ 23:39)  K: 3.6 (12-30 @ 20:03), 4.1 (12-29 @ 23:03), 4.3 (12-28 @ 23:39)  Cl: 104 (12-30 @ 20:03), 102 (12-29 @ 23:03), 101 (12-28 @ 23:39)  CO2: 26 (12-30 @ 20:03), 29 (12-29 @ 23:03), 25 (12-28 @ 23:39)  BUN: 14 (12-30 @ 20:03), 14 (12-29 @ 23:03), 13 (12-28 @ 23:39)  Cr: 0.42 (12-30 @ 20:03), 0.39 (12-29 @ 23:03), 0.38 (12-28 @ 23:39)  Glu: 130(12-30 @ 20:03), 138(12-29 @ 23:03), 153(12-28 @ 23:39)    Hgb: 8.7 (12-31 @ 05:33), 7.1 (12-30 @ 20:02), 7.7 (12-30 @ 05:07), 7.8 (12-29 @ 23:03)  Hct: 24.0 (12-31 @ 05:33), 19.4 (12-30 @ 20:02), 21.7 (12-30 @ 05:07), 21.7 (12-29 @ 23:03)  WBC: 9.8 (12-31 @ 05:33), 9.9 (12-30 @ 20:02), 11.0 (12-30 @ 05:07), 11.4 (12-29 @ 23:03)  Plt: 256 (12-31 @ 05:33), 248 (12-30 @ 20:02), 260 (12-30 @ 05:07), 260 (12-29 @ 23:03)    INR: 1.05 12-30-18 @ 20:02  PTT: 24.2 12-30-18 @ 20:02    Assessment	  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 - s/p successful ANGIO/EMBO, posterior fossa decompression  ICH score 2    Hydro: EVD in place, awaiting VPS  Fentanyl PRN  Storming c/w Gabapentin, bromocriptine, clonazepam, fentanyl patch   OT for splint    Cards:   HTN: -160mmHg  Relative hypotension, decrease BP meds    Pulm:  s/p trach, wean vent as tolerated  ABG and adjust vent setting accordinly     GI: TF via PEG     Renal: IVF to ensure euvolemia    Endo:  goal euglycemia    Heme:  SCDs  Anemia work-up  DVT ppx Lovenox     ID:  MRSA and E-coli in sputum, sepsis on vancomycin and cefepime  shock resolved with fluids      ICU  full code    at risk for deterioration and death due to hydrocephalus, cerebral edema, brain compression  critical care time 30 min EVENTS:  Received 1 unit PRBC. Hypotensive requiring transient pressor.   remains febrile  sympathetic storming     T(C): 38.2 (12-31-18 @ 15:00), Max: 38.3 (12-31-18 @ 11:00)  HR: 97 (12-31-18 @ 18:00) (85 - 110)  BP: --  RR: 16 (12-31-18 @ 18:00) (14 - 31)  SpO2: 100% (12-31-18 @ 18:00) (99% - 100%)  12-30-18 @ 07:01  -  12-31-18 @ 07:00  --------------------------------------------------------  IN: 6778.8 mL / OUT: 1091 mL / NET: 5687.8 mL    12-31-18 @ 07:01  -  12-31-18 @ 19:02  --------------------------------------------------------  IN: 3121 mL / OUT: 1097 mL / NET: 2024 mL    acetaminophen    Suspension .. 650 milliGRAM(s) Oral every 6 hours PRN  ALBUTerol/ipratropium for Nebulization 3 milliLiter(s) Nebulizer every 6 hours  bisacodyl Suppository 10 milliGRAM(s) Rectal at bedtime  bromocriptine Capsule 10 milliGRAM(s) Oral every 8 hours  cefepime   IVPB      cefepime   IVPB 2000 milliGRAM(s) IV Intermittent every 8 hours  chlorhexidine 0.12% Liquid 15 milliLiter(s) Swish and Spit two times a day  chlorhexidine 4% Liquid 1 Application(s) Topical <User Schedule>  clonazePAM Tablet 1 milliGRAM(s) Oral every 8 hours  docusate sodium Liquid 100 milliGRAM(s) Oral three times a day  enoxaparin Injectable 40 milliGRAM(s) SubCutaneous <User Schedule>  fentaNYL    Injectable 25 MICROGram(s) IV Push every 2 hours PRN  fentaNYL   Patch  50 MICROgram(s)/Hr 1 Patch Transdermal every 72 hours  gabapentin   Solution 300 milliGRAM(s) Oral every 8 hours  norepinephrine Infusion 0.5 MICROgram(s)/kG/Min IV Continuous <Continuous>  oxyCODONE    Solution 5 milliGRAM(s) Oral every 4 hours PRN  polyethylene glycol 3350 17 Gram(s) Oral two times a day  senna Syrup 5 milliLiter(s) Oral at bedtime  sodium chloride 0.9%. 1000 milliLiter(s) IV Continuous <Continuous>  vancomycin  IVPB 1750 milliGRAM(s) IV Intermittent every 8 hours    Culture - CSF with Gram Stain (collected 12-30-18 @ 19:35)  Source: .CSF CSF, shunt  Gram Stain (12-30-18 @ 20:18):    No polymorphonuclear cells seen    No organisms seen    by cytocentrifuge    EXAMINATION:  General:  calm  HEENT:  trach'd, PERRL  Neuro:  opens eyes to command, BLE wiggles toes to command, no commands in arms, does not mouth words, LUE prox 2/5 dist 0/5, RUE 0/5, toe movement but no lifting of legs  Cards:  RRR  Respiratory:  no respiratory distress  Abdomen:  soft  Extremities:  no edema  Skin:  warm/dry      LABS:  Na: 138 (12-30 @ 20:03), 139 (12-29 @ 23:03), 136 (12-28 @ 23:39)  K: 3.6 (12-30 @ 20:03), 4.1 (12-29 @ 23:03), 4.3 (12-28 @ 23:39)  Cl: 104 (12-30 @ 20:03), 102 (12-29 @ 23:03), 101 (12-28 @ 23:39)  CO2: 26 (12-30 @ 20:03), 29 (12-29 @ 23:03), 25 (12-28 @ 23:39)  BUN: 14 (12-30 @ 20:03), 14 (12-29 @ 23:03), 13 (12-28 @ 23:39)  Cr: 0.42 (12-30 @ 20:03), 0.39 (12-29 @ 23:03), 0.38 (12-28 @ 23:39)  Glu: 130(12-30 @ 20:03), 138(12-29 @ 23:03), 153(12-28 @ 23:39)    Hgb: 8.7 (12-31 @ 05:33), 7.1 (12-30 @ 20:02), 7.7 (12-30 @ 05:07), 7.8 (12-29 @ 23:03)  Hct: 24.0 (12-31 @ 05:33), 19.4 (12-30 @ 20:02), 21.7 (12-30 @ 05:07), 21.7 (12-29 @ 23:03)  WBC: 9.8 (12-31 @ 05:33), 9.9 (12-30 @ 20:02), 11.0 (12-30 @ 05:07), 11.4 (12-29 @ 23:03)  Plt: 256 (12-31 @ 05:33), 248 (12-30 @ 20:02), 260 (12-30 @ 05:07), 260 (12-29 @ 23:03)    INR: 1.05 12-30-18 @ 20:02  PTT: 24.2 12-30-18 @ 20:02    Assessment	  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 - s/p successful ANGIO/EMBO, posterior fossa decompression  ICH score 2    Hydro: EVD in place, awaiting VPS  Fentanyl PRN  Storming, has myoclonic jerks on Gabapentin, bromocriptine, clonazepam, fentanyl patch  will extended EEG as she has myoclonic jerks to r/o epileptic in origin      OT for splint  fentanyl patch       Cards:   HTN: -160mmHg  hypotension improved with blood transfusions and fluids     Pulm:  s/p trach, wean vent as tolerated  ABG and adjust vent setting accordingly     GI: TF via PEG, last BM 12/31/18     Renal: IVF to ensure euvolemia    Endo:  goal euglycemia    Heme:  SCDs  Anemia work-up, received 1 packed RBC yesterday, will repeat cbc tonight  retic count pending  FOB    DVT ppx Lovenox     ID:  remains febrile, tylenol and cooling blanket   MRSA and E-coli in sputum, sepsis on vancomycin and cefepime  shock resolved with fluids      ICU  full code    at risk for deterioration and death due to hydrocephalus, cerebral edema, brain compression  critical care time 30 min EVENTS:  Received 1 unit PRBC. Hypotensive requiring transient pressor.   remains febrile  sympathetic storming     T(C): 38.2 (12-31-18 @ 15:00), Max: 38.3 (12-31-18 @ 11:00)  HR: 97 (12-31-18 @ 18:00) (85 - 110)  BP: --  RR: 16 (12-31-18 @ 18:00) (14 - 31)  SpO2: 100% (12-31-18 @ 18:00) (99% - 100%)  12-30-18 @ 07:01  -  12-31-18 @ 07:00  --------------------------------------------------------  IN: 6778.8 mL / OUT: 1091 mL / NET: 5687.8 mL    12-31-18 @ 07:01  -  12-31-18 @ 19:02  --------------------------------------------------------  IN: 3121 mL / OUT: 1097 mL / NET: 2024 mL    acetaminophen    Suspension .. 650 milliGRAM(s) Oral every 6 hours PRN  ALBUTerol/ipratropium for Nebulization 3 milliLiter(s) Nebulizer every 6 hours  bisacodyl Suppository 10 milliGRAM(s) Rectal at bedtime  bromocriptine Capsule 10 milliGRAM(s) Oral every 8 hours  cefepime   IVPB      cefepime   IVPB 2000 milliGRAM(s) IV Intermittent every 8 hours  chlorhexidine 0.12% Liquid 15 milliLiter(s) Swish and Spit two times a day  chlorhexidine 4% Liquid 1 Application(s) Topical <User Schedule>  clonazePAM Tablet 1 milliGRAM(s) Oral every 8 hours  docusate sodium Liquid 100 milliGRAM(s) Oral three times a day  enoxaparin Injectable 40 milliGRAM(s) SubCutaneous <User Schedule>  fentaNYL    Injectable 25 MICROGram(s) IV Push every 2 hours PRN  fentaNYL   Patch  50 MICROgram(s)/Hr 1 Patch Transdermal every 72 hours  gabapentin   Solution 300 milliGRAM(s) Oral every 8 hours  norepinephrine Infusion 0.5 MICROgram(s)/kG/Min IV Continuous <Continuous>  oxyCODONE    Solution 5 milliGRAM(s) Oral every 4 hours PRN  polyethylene glycol 3350 17 Gram(s) Oral two times a day  senna Syrup 5 milliLiter(s) Oral at bedtime  sodium chloride 0.9%. 1000 milliLiter(s) IV Continuous <Continuous>  vancomycin  IVPB 1750 milliGRAM(s) IV Intermittent every 8 hours    Culture - CSF with Gram Stain (collected 12-30-18 @ 19:35)  Source: .CSF CSF, shunt  Gram Stain (12-30-18 @ 20:18):    No polymorphonuclear cells seen    No organisms seen    by cytocentrifuge    EXAMINATION:  General:  calm  HEENT:  trach'd, PERRL  Neuro:  opens eyes to command, BLE wiggles toes to command, no commands in arms, does not mouth words, LUE prox 2/5 dist 0/5, RUE 0/5, toe movement but no lifting of legs  Cards:  RRR  Respiratory:  no respiratory distress  Abdomen:  soft  Extremities:  no edema  Skin:  warm/dry      LABS:  Na: 138 (12-30 @ 20:03), 139 (12-29 @ 23:03), 136 (12-28 @ 23:39)  K: 3.6 (12-30 @ 20:03), 4.1 (12-29 @ 23:03), 4.3 (12-28 @ 23:39)  Cl: 104 (12-30 @ 20:03), 102 (12-29 @ 23:03), 101 (12-28 @ 23:39)  CO2: 26 (12-30 @ 20:03), 29 (12-29 @ 23:03), 25 (12-28 @ 23:39)  BUN: 14 (12-30 @ 20:03), 14 (12-29 @ 23:03), 13 (12-28 @ 23:39)  Cr: 0.42 (12-30 @ 20:03), 0.39 (12-29 @ 23:03), 0.38 (12-28 @ 23:39)  Glu: 130(12-30 @ 20:03), 138(12-29 @ 23:03), 153(12-28 @ 23:39)    Hgb: 8.7 (12-31 @ 05:33), 7.1 (12-30 @ 20:02), 7.7 (12-30 @ 05:07), 7.8 (12-29 @ 23:03)  Hct: 24.0 (12-31 @ 05:33), 19.4 (12-30 @ 20:02), 21.7 (12-30 @ 05:07), 21.7 (12-29 @ 23:03)  WBC: 9.8 (12-31 @ 05:33), 9.9 (12-30 @ 20:02), 11.0 (12-30 @ 05:07), 11.4 (12-29 @ 23:03)  Plt: 256 (12-31 @ 05:33), 248 (12-30 @ 20:02), 260 (12-30 @ 05:07), 260 (12-29 @ 23:03)    INR: 1.05 12-30-18 @ 20:02  PTT: 24.2 12-30-18 @ 20:02    Assessment	  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 - s/p successful ANGIO/EMBO, posterior fossa decompression  ICH score 2    Hydro: EVD in place, awaiting VPS  Fentanyl PRN  Storming, has myoclonic jerks on Gabapentin, bromocriptine, clonazepam, fentanyl patch  will extended EEG as she has myoclonic jerks to r/o epileptic in origin      OT for splint  fentanyl patch       Cards:   HTN: -160mmHg  hypotension improved with blood transfusions and fluids     Pulm:  respiratory failure s/p trach, wean vent as tolerated  ABG and adjust vent setting accordingly     GI: TF via PEG, last BM 12/31/18     Renal: IVF to ensure euvolemia    Endo:  goal euglycemia    Heme:  SCDs  Anemia work-up, received 1 packed RBC yesterday, will repeat cbc tonight  retic count pending  FOB    DVT ppx Lovenox     ID:  remains febrile, tylenol and cooling blanket   MRSA and E-coli in sputum, sepsis on vancomycin and cefepime  shock resolved with fluids      ICU  full code    at risk for deterioration and death due to hydrocephalus, cerebral edema, brain compression  critical care time 30 min

## 2018-12-31 NOTE — PROGRESS NOTE ADULT - SUBJECTIVE AND OBJECTIVE BOX
SUMMARY:  57yo woman PMH lupus p/w sudden onset severe HA a/w nausea and vomiting. 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 Patient was intact on presentation however intubated for airway protection and EVD placed. Transferred to [facility] for further care.    24 HOUR EVENTS:  received 1 unit PRBC     HOSPITAL COURSE: Fevers, hydrocephalus, autonomic storm  12/17: s/p angio/embo of AVM, resection of AVM  12/18: RTOR for posterior fossa decompression expansion  12/24 s/p trach, EVD reopened per NSGY  12/26: MRSA in sputum  12/28:  CT head and EVD clamped.  12/29: EVD unclamped yesterday due to high ICPs.   12/30: Cefepime added     Vitals/labs/meds/imaging: reviewed    EXAMINATION:  General:  calm  HEENT:  trach'd, PERRL  Neuro:  opens eyes to command, BLE wiggles toes to command, no commands in arms, does not mouth words, LUE prox 2/5 dist 0/5, RUE 0/5, toe movement but no lifting of legs  Cards:  RRR  Respiratory:  no respiratory distress  Abdomen:  soft  Extremities:  no edema  Skin:  warm/dry SUMMARY:  57yo woman PMH lupus p/w sudden onset severe HA a/w nausea and vomiting. 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 Patient was intact on presentation however intubated for airway protection and EVD placed. Transferred to [facility] for further care.    24 HOUR EVENTS:  Received 1 unit PRBC. Hypotensive requiring transient pressor.     HOSPITAL COURSE: Fevers, hydrocephalus, autonomic storm  12/17: s/p angio/embo of AVM, resection of AVM  12/18: RTOR for posterior fossa decompression expansion  12/24 s/p trach, EVD reopened per NSGY  12/26: MRSA in sputum  12/28:  CT head and EVD clamped.  12/29: EVD unclamped due to high ICPs.   12/30: Cefepime added     Vitals/labs/meds/imaging: reviewed    EXAMINATION:  General:  calm  HEENT:  trach'd, PERRL  Neuro:  opens eyes to command, BLE wiggles toes to command, no commands in arms, does not mouth words, LUE prox 2/5 dist 0/5, RUE 0/5, toe movement but no lifting of legs  Cards:  RRR  Respiratory:  no respiratory distress  Abdomen:  soft  Extremities:  no edema  Skin:  warm/dry

## 2018-12-31 NOTE — PROGRESS NOTE ADULT - SUBJECTIVE AND OBJECTIVE BOX
POD #7 s/p trach    INTERVAL HPI: Patient seen and examined. Low grade temp to 38 overnight.     Vital Signs Last 24 Hrs  T(C): 38 (31 Dec 2018 07:00), Max: 38.7 (30 Dec 2018 14:00)  T(F): 100.4 (31 Dec 2018 07:00), Max: 101.7 (30 Dec 2018 14:00)  HR: 99 (31 Dec 2018 08:32) (85 - 124)  BP: 134/72 (30 Dec 2018 17:15) (74/42 - 151/79)  BP(mean): 90 (30 Dec 2018 17:15) (50 - 99)  RR: 15 (31 Dec 2018 07:00) (14 - 31)  SpO2: 100% (31 Dec 2018 08:32) (98% - 100%)    PHYSICAL EXAM:  Gen: NAD, well-developed  Head: Normocephalic, Atraumatic  Eyes: no scleral injection  Nose: Nares bilaterally patent, no discharge  Mouth: Mucosa moist, tongue/uvula midline, oropharynx clear  Neck: Flat, supple. #8 shiley inflated cuff and surgicel removed, no active bleeding, sutures and umbilical tie in place.  No lymphadenopathy, trachea midline, no masses  Resp: on vent  CV: tachycardic    LABS:                        8.7    9.8   )-----------( 256      ( 31 Dec 2018 05:33 )             24.0

## 2018-12-31 NOTE — CHART NOTE - NSCHARTNOTEFT_GEN_A_CORE
Nutrition Follow Up Note  Patient seen for:    Source:     Diet :     Patient reports:     PO intake :     Source for PO intake:     Enteral /Parenteral Nutrition:       Daily   % Weight Change    Pertinent Medications: MEDICATIONS  (STANDING):  ALBUTerol/ipratropium for Nebulization 3 milliLiter(s) Nebulizer every 6 hours  bisacodyl Suppository 10 milliGRAM(s) Rectal at bedtime  bromocriptine Capsule 10 milliGRAM(s) Oral every 8 hours  cefepime   IVPB      cefepime   IVPB 2000 milliGRAM(s) IV Intermittent every 8 hours  chlorhexidine 0.12% Liquid 15 milliLiter(s) Swish and Spit two times a day  chlorhexidine 4% Liquid 1 Application(s) Topical <User Schedule>  clonazePAM Tablet 1 milliGRAM(s) Oral every 8 hours  docusate sodium Liquid 100 milliGRAM(s) Oral three times a day  fentaNYL   Patch  50 MICROgram(s)/Hr 1 Patch Transdermal every 72 hours  gabapentin   Solution 300 milliGRAM(s) Oral every 8 hours  norepinephrine Infusion 0.5 MICROgram(s)/kG/Min (78.844 mL/Hr) IV Continuous <Continuous>  polyethylene glycol 3350 17 Gram(s) Oral two times a day  senna Syrup 5 milliLiter(s) Oral at bedtime  sodium chloride 0.9%. 1000 milliLiter(s) (75 mL/Hr) IV Continuous <Continuous>  vancomycin  IVPB 1750 milliGRAM(s) IV Intermittent every 8 hours    MEDICATIONS  (PRN):  acetaminophen    Suspension .. 650 milliGRAM(s) Oral every 6 hours PRN Temp greater or equal to 38.5C (101.3F), Mild Pain (1 - 3)  fentaNYL    Injectable 25 MICROGram(s) IV Push every 2 hours PRN agitation/severe pain  oxyCODONE    Solution 5 milliGRAM(s) Oral every 4 hours PRN Moderate Pain (4 - 6)    Pertinent Labs: (12/30) Cr 0.42L, BG 130H, Ca 7.6L, Phos 2.2L      Skin per nursing documentation:   Edema:    Estimated Needs:   [ ] no change since previous assessment  [ ] recalculated:     Previous Nutrition Diagnosis:   Nutrition Diagnosis is:    New Nutrition Diagnosis:  Related to:    As evidenced by:      Interventions:     Recommend  1)    Monitoring and Evaluation:     Continue to monitor Nutritional intake, Tolerance to diet prescription, weights, labs, skin integrity    RD remains available upon request and will follow up per protocol Nutrition Follow Up Note  Patient seen for: nutrition follow up    59 yo female with PMH of lupus admitted with headache, N/V and found with cerebellar IPH with hydrocephalus S/P EVD placement. Now S/P angio + embolization and S/P craniotomy for resection of AVM on 12/17. RTOR on 12/18 for craniectomy. Chart reviewed, S/P tracheostomy 12/24, S/P PEG 12/26, failed EVD clamp trail pending possible VPS this week, +MRSA in sputum.     Source: medical record; rounds    Diet : NPO with tube feeds + Danactive 2x daily  Enteral Nutrition: Pivot 1.5 @ 80cc/hr x 18 hrs to provide 1440cc fluid, 2160cal/d (26cal/Kg), and 135gm prot/d (1.6gm prot/Kg) based upon dosing wt 84.1Kg  Pt seen during rounds, tolerating EN with no GI distress of note. Per RN, large BM this morning 12/31 & on bowel regimen    Daily Weight: no new weight to address  % Weight Change: N/A    Pertinent Medications: MEDICATIONS  (STANDING):  ALBUTerol/ipratropium for Nebulization 3 milliLiter(s) Nebulizer every 6 hours  bisacodyl Suppository 10 milliGRAM(s) Rectal at bedtime  bromocriptine Capsule 10 milliGRAM(s) Oral every 8 hours  cefepime   IVPB      cefepime   IVPB 2000 milliGRAM(s) IV Intermittent every 8 hours  chlorhexidine 0.12% Liquid 15 milliLiter(s) Swish and Spit two times a day  chlorhexidine 4% Liquid 1 Application(s) Topical <User Schedule>  clonazePAM Tablet 1 milliGRAM(s) Oral every 8 hours  docusate sodium Liquid 100 milliGRAM(s) Oral three times a day  fentaNYL   Patch  50 MICROgram(s)/Hr 1 Patch Transdermal every 72 hours  gabapentin   Solution 300 milliGRAM(s) Oral every 8 hours  norepinephrine Infusion 0.5 MICROgram(s)/kG/Min (78.844 mL/Hr) IV Continuous <Continuous>  polyethylene glycol 3350 17 Gram(s) Oral two times a day  senna Syrup 5 milliLiter(s) Oral at bedtime  sodium chloride 0.9%. 1000 milliLiter(s) (75 mL/Hr) IV Continuous <Continuous>  vancomycin  IVPB 1750 milliGRAM(s) IV Intermittent every 8 hours    MEDICATIONS  (PRN):  acetaminophen    Suspension .. 650 milliGRAM(s) Oral every 6 hours PRN Temp greater or equal to 38.5C (101.3F), Mild Pain (1 - 3)  fentaNYL    Injectable 25 MICROGram(s) IV Push every 2 hours PRN agitation/severe pain  oxyCODONE    Solution 5 milliGRAM(s) Oral every 4 hours PRN Moderate Pain (4 - 6)    Pertinent Labs: (12/30) Cr 0.42L, BG 130H, Ca 7.6L, Phos 2.2L (Ca + Phos repleted)      Skin per nursing documentation: no pressure injuries  Edema: 3+ bilateral arm/hand/leg    Estimated Needs:   [x] no change since previous assessment  [ ] recalculated:     Previous Nutrition Diagnosis: Increased Nutrient Needs  Nutrition Diagnosis is: ongoing, addressed via provision of EN     New Nutrition Diagnosis: N/A      Recommend    1) Continue Pivot 1.5 @ 80cc/hr x 18 hrs to provide 1440cc fluid, 2160cal/d (26cal/Kg), and 135gm prot/d (1.6gm prot/Kg) based upon dosing wt 84.1Kg  2) Continue Danactive 2x daily as per NSCU protocol  3) Defer total fluids to medical team    Monitoring and Evaluation:     Continue to monitor Nutritional intake, Tolerance to diet prescription, weights, labs, skin integrity    RD remains available upon request and will follow up per protocol  Hanh Pompa RD CDN Pager #[phone]

## 2018-12-31 NOTE — PROGRESS NOTE ADULT - SUBJECTIVE AND OBJECTIVE BOX
CC: f/u for fever    Patient reports; she is sedated on vent, not able to follow commands    REVIEW OF SYSTEMS:  All other review of systems negative (Comprehensive ROS); limited by condition    Antimicrobials Day #  :  cefepime   IVPB    day 2  cefepime   IVPB 2000 milliGRAM(s) IV Intermittent every 8 hours  vancomycin  IVPB 1750 milliGRAM(s) IV Intermittent every 8 hours day 8    Other Medications Reviewed    T(F): 100.9 (18 @ 11:00), Max: 100.9 (18 @ 11:00)  HR: 96 (18 @ 12:25)  BP: 134/72 (18 @ 17:15)  RR: 16 (18 @ 12:00)  SpO2: 100% (18 @ 12:25)  Wt(kg): --    PHYSICAL EXAM:  General: sedated, no acute distress, EVD site dressed  Eyes:  anicteric, no conjunctival injection, no discharge  Oropharynx: unable to visualize 	  Neck: with travh  Lungs: coarse BS  Heart: regular rate and rhythm; s  Abdomen: soft, nondistended, nontender, peg  Skin: no lesions  Extremities: no clubbing, cyanosis, + edema  Neurologic: poorly interactive    LAB RESULTS:                        8.7    9.8   )-----------( 256      ( 31 Dec 2018 05:33 )             24.0         138  |  104  |  14  ----------------------------<  130<H>  3.6   |  26  |  0.42<L>    Ca    7.6<L>      30 Dec 2018 20:03  Phos  2.2       Mg     2.1             Urinalysis Basic - ( 30 Dec 2018 18:51 )    Color: Yellow / Appearance: Slightly Turbid / S.031 / pH: x  Gluc: x / Ketone: Negative  / Bili: Negative / Urobili: Negative   Blood: x / Protein: 30 mg/dL / Nitrite: Negative   Leuk Esterase: Negative / RBC: 21 /hpf / WBC 4 /hpf   Sq Epi: x / Non Sq Epi: 6 /hpf / Bacteria: Negative      MICROBIOLOGY:  RECENT CULTURES:   @ 19:35 .CSF CSF, shunt       No polymorphonuclear cells seen  No organisms seen  by cytocentrifuge     @ 13:15 .Blood Blood-Peripheral     No growth to date.       @ 20:25 .CSF CSF     No growth    No polymorphonuclear cells seen per low power field  No organisms seen per oil power field     @ 07:31 .Sputum Sputum Escherichia coli  Methicillin resistant Staphylococcus aureus    Moderate Escherichia coli  Moderate Methicillin resistant Staphylococcus aureus  Normal Respiratory Tori absent    Numerous polymorphonuclear leukocytes per low power field  Rare Squamous epithelial cells per low power field  Few Gram positive cocci in pairs per oil power field  Few Gram Negative Rods per oil power field     @ 20:03 .Blood Blood     No growth to date.          RADIOLOGY REVIEWED:  < from: CT Head No Cont (18 @ 09:10) >  INTERPRETATION:  History: Intracranial hemorrhage    Multiple axial sections were performed base of skull to vertex without   contrast enhancement.    This exam is compared with prior noncontrast head CT performed on   2018.    Postop changes compatible with a suboccipital craniectomy is again   identified.    Right frontal shunt catheter is again seen and unchanged in position.    There is evidence of aneurysm clip and coil material identified in the   posterior fossa region which appears unchanged.    There is increase in size of the lateral and third ventricle when   compared the prior exam. Intraventricular hemorrhage is again seen.    Evaluation of the osseous structures with appropriate window setting   postop changes appear unremarkable    Extracalvarial soft tissue swelling staples seen involving the high right   frontal region.    The visualized paranasal sinuses mastoid and middle ear regions appear   clear.    Impression: Increase in size of the lateral and third ventricles seen   when compared with the prior exam.    < end of copied text >

## 2018-12-31 NOTE — PROGRESS NOTE ADULT - ASSESSMENT
59 yo female with hx of lupus s/p ICH s/p craniotomy with respiratory failure and s/p trach.    MRSA Pneumonia  Fever - per discussion with team, likely due to PNA as well as ICH; all blood cultures negative to date  On IV VAnco for MRSA PNA  s/p PEG 12/26/18    PLAN  -Local PEG care  -Monitor TF tolerance  -bowel regimen  -NSCU care  -Abx as per ID    Stable GI issues at this time  Will Sign off care. Please recall prn for GI concerns.  Thank You.    Jesus Lang PA-C    [facility]  [phone]

## 2018-12-31 NOTE — PROGRESS NOTE ADULT - SUBJECTIVE AND OBJECTIVE BOX
Patient is a 58y old  Female who presented with a chief complaint of Cerebellar IPH (31 Dec 2018 08:52)      INTERVAL HPI/OVERNIGHT EVENTS:  tolerating PEG feeds  +BMs - no diarrhea  no rectal bleed or melena    MEDICATIONS  (STANDING):  ALBUTerol/ipratropium for Nebulization 3 milliLiter(s) Nebulizer every 6 hours  bisacodyl Suppository 10 milliGRAM(s) Rectal at bedtime  bromocriptine Capsule 10 milliGRAM(s) Oral every 8 hours  cefepime   IVPB      cefepime   IVPB 2000 milliGRAM(s) IV Intermittent every 8 hours  chlorhexidine 0.12% Liquid 15 milliLiter(s) Swish and Spit two times a day  chlorhexidine 4% Liquid 1 Application(s) Topical <User Schedule>  clonazePAM Tablet 1 milliGRAM(s) Oral every 8 hours  docusate sodium Liquid 100 milliGRAM(s) Oral three times a day  enoxaparin Injectable 40 milliGRAM(s) SubCutaneous <User Schedule>  fentaNYL   Patch  50 MICROgram(s)/Hr 1 Patch Transdermal every 72 hours  gabapentin   Solution 300 milliGRAM(s) Oral every 8 hours  norepinephrine Infusion 0.5 MICROgram(s)/kG/Min (78.844 mL/Hr) IV Continuous <Continuous>  polyethylene glycol 3350 17 Gram(s) Oral two times a day  senna Syrup 5 milliLiter(s) Oral at bedtime  sodium chloride 0.9%. 1000 milliLiter(s) (75 mL/Hr) IV Continuous <Continuous>  vancomycin  IVPB 1750 milliGRAM(s) IV Intermittent every 8 hours    MEDICATIONS  (PRN):  acetaminophen    Suspension .. 650 milliGRAM(s) Oral every 6 hours PRN Temp greater or equal to 38.5C (101.3F), Mild Pain (1 - 3)  fentaNYL    Injectable 25 MICROGram(s) IV Push every 2 hours PRN agitation/severe pain  oxyCODONE    Solution 5 milliGRAM(s) Oral every 4 hours PRN Moderate Pain (4 - 6)      Allergies  No Known Allergies      Review of Systems:  unable to obtain    Vital Signs Last 24 Hrs  T(C): 38.3 (31 Dec 2018 11:00), Max: 38.7 (30 Dec 2018 14:00)  T(F): 100.9 (31 Dec 2018 11:00), Max: 101.7 (30 Dec 2018 14:00)  HR: 96 (31 Dec 2018 12:25) (85 - 120)  BP: 134/72 (30 Dec 2018 17:15) (74/42 - 134/72)  BP(mean): 90 (30 Dec 2018 17:15) (50 - 90)  RR: 16 (31 Dec 2018 12:00) (14 - 31)  SpO2: 100% (31 Dec 2018 12:25) (100% - 100%)    PHYSICAL EXAM:  Constitutional: on vent +EVD - blood tinged, opens eyes to voice  Neck: trach site clean  Respiratory: b/l air entry  Cardiovascular: S1 and S2, tachy  Gastrointestinal: BS+, soft, NT/ND, neg HSM, PEG site clean and dry bumper at 4cm spins freely 360 degrees  Extremities: No peripheral edema,  cyanosis  Vascular: 2+ peripheral pulses  Skin: No rashes    LABS:                        8.7    9.8   )-----------( 256      ( 31 Dec 2018 05:33 )             24.0     12-30    138  |  104  |  14  ----------------------------<  130<H>  3.6   |  26  |  0.42<L>    Ca    7.6<L>      30 Dec 2018 20:03  Phos  2.2     12-30  Mg     2.1     12-30      PT/INR - ( 30 Dec 2018 20:02 )   PT: 12.1 sec;   INR: 1.05 ratio         PTT - ( 30 Dec 2018 20:02 )  PTT:24.2 sec      RADIOLOGY & ADDITIONAL TESTS:

## 2018-12-31 NOTE — PROGRESS NOTE ADULT - ASSESSMENT
57 y/o M POD#7 s/p tracheostomy 2/2 respiratory failure. Pt doing well, on the vent, no active bleed. Sutures and umbilical tie in place.     -cont trach care  -suction prn  -will remove trach sutures today  -cont care per [facility]    *[phone]

## 2019-01-01 LAB
ALBUMIN SERPL ELPH-MCNC: 2.2 G/DL — LOW (ref 3.3–5)
ALP SERPL-CCNC: 65 U/L — SIGNIFICANT CHANGE UP (ref 40–120)
ALT FLD-CCNC: 49 U/L — HIGH (ref 10–45)
ANION GAP SERPL CALC-SCNC: 10 MMOL/L — SIGNIFICANT CHANGE UP (ref 5–17)
APPEARANCE CSF: CLEAR — SIGNIFICANT CHANGE UP
APPEARANCE SPUN FLD: COLORLESS — SIGNIFICANT CHANGE UP
APPEARANCE UR: CLEAR — SIGNIFICANT CHANGE UP
AST SERPL-CCNC: 45 U/L — HIGH (ref 10–40)
BILIRUB UR-MCNC: NEGATIVE — SIGNIFICANT CHANGE UP
BUN SERPL-MCNC: 12 MG/DL — SIGNIFICANT CHANGE UP (ref 7–23)
BUN SERPL-MCNC: 14 MG/DL — SIGNIFICANT CHANGE UP (ref 7–23)
CALCIUM SERPL-MCNC: 7.9 MG/DL — LOW (ref 8.4–10.5)
CALCIUM SERPL-MCNC: 8 MG/DL — LOW (ref 8.4–10.5)
CHLORIDE SERPL-SCNC: 97 MMOL/L — SIGNIFICANT CHANGE UP (ref 96–108)
CO2 SERPL-SCNC: 26 MMOL/L — SIGNIFICANT CHANGE UP (ref 22–31)
COLOR CSF: SIGNIFICANT CHANGE UP
COLOR SPEC: SIGNIFICANT CHANGE UP
CREAT SERPL-MCNC: 0.41 MG/DL — LOW (ref 0.5–1.3)
CREAT SERPL-MCNC: 0.45 MG/DL — LOW (ref 0.5–1.3)
CULTURE RESULTS: SIGNIFICANT CHANGE UP
DIFF PNL FLD: NEGATIVE — SIGNIFICANT CHANGE UP
GAS PNL BLDA: SIGNIFICANT CHANGE UP
GLUCOSE CSF-MCNC: 75 MG/DL — HIGH (ref 40–70)
GLUCOSE SERPL-MCNC: 126 MG/DL — HIGH (ref 70–99)
GLUCOSE SERPL-MCNC: 143 MG/DL — HIGH (ref 70–99)
GLUCOSE UR QL: NEGATIVE — SIGNIFICANT CHANGE UP
GRAM STN FLD: SIGNIFICANT CHANGE UP
HCT VFR BLD CALC: 21.5 % — LOW (ref 34.5–45)
HCT VFR BLD CALC: 21.8 % — LOW (ref 34.5–45)
HGB BLD-MCNC: 7.7 G/DL — LOW (ref 11.5–15.5)
HGB BLD-MCNC: 8 G/DL — LOW (ref 11.5–15.5)
IRON SATN MFR SERPL: 16 % — SIGNIFICANT CHANGE UP (ref 14–50)
IRON SATN MFR SERPL: 31 UG/DL — SIGNIFICANT CHANGE UP (ref 30–160)
KETONES UR-MCNC: NEGATIVE — SIGNIFICANT CHANGE UP
LACTATE CSF-MCNC: 1.5 MMOL/L — SIGNIFICANT CHANGE UP (ref 1.1–2.4)
LEUKOCYTE ESTERASE UR-ACNC: NEGATIVE — SIGNIFICANT CHANGE UP
LYMPHOCYTES # CSF: 14 % — LOW (ref 40–80)
MAGNESIUM SERPL-MCNC: 2 MG/DL — SIGNIFICANT CHANGE UP (ref 1.6–2.6)
MCHC RBC-ENTMCNC: 30.9 PG — SIGNIFICANT CHANGE UP (ref 27–34)
MCHC RBC-ENTMCNC: 31.5 PG — SIGNIFICANT CHANGE UP (ref 27–34)
MCHC RBC-ENTMCNC: 35.7 GM/DL — SIGNIFICANT CHANGE UP (ref 32–36)
MCHC RBC-ENTMCNC: 36.6 GM/DL — HIGH (ref 32–36)
MCV RBC AUTO: 85.9 FL — SIGNIFICANT CHANGE UP (ref 80–100)
MCV RBC AUTO: 86.6 FL — SIGNIFICANT CHANGE UP (ref 80–100)
MONOS+MACROS NFR CSF: 81 % — HIGH (ref 15–45)
NEUTROPHILS # CSF: 5 % — SIGNIFICANT CHANGE UP (ref 0–6)
NITRITE UR-MCNC: NEGATIVE — SIGNIFICANT CHANGE UP
NRBC NFR CSF: 1 /UL — SIGNIFICANT CHANGE UP (ref 0–5)
PH UR: 6.5 — SIGNIFICANT CHANGE UP (ref 5–8)
PHOSPHATE SERPL-MCNC: 2.7 MG/DL — SIGNIFICANT CHANGE UP (ref 2.5–4.5)
PLATELET # BLD AUTO: 231 K/UL — SIGNIFICANT CHANGE UP (ref 150–400)
PLATELET # BLD AUTO: 231 K/UL — SIGNIFICANT CHANGE UP (ref 150–400)
POTASSIUM SERPL-MCNC: 4.1 MMOL/L — SIGNIFICANT CHANGE UP (ref 3.5–5.3)
POTASSIUM SERPL-SCNC: 4.1 MMOL/L — SIGNIFICANT CHANGE UP (ref 3.5–5.3)
PROT CSF-MCNC: <6 MG/DL — LOW (ref 15–45)
PROT SERPL-MCNC: 5.1 G/DL — LOW (ref 6–8.3)
PROT UR-MCNC: NEGATIVE — SIGNIFICANT CHANGE UP
RBC # BLD: 2.49 M/UL — LOW (ref 3.8–5.2)
RBC # BLD: 2.54 M/UL — LOW (ref 3.8–5.2)
RBC # CSF: 520 /UL — HIGH (ref 0–0)
RBC # FLD: 14.3 % — SIGNIFICANT CHANGE UP (ref 10.3–14.5)
RBC # FLD: 14.3 % — SIGNIFICANT CHANGE UP (ref 10.3–14.5)
SODIUM SERPL-SCNC: 133 MMOL/L — LOW (ref 135–145)
SP GR SPEC: 1.01 — SIGNIFICANT CHANGE UP (ref 1.01–1.02)
SPECIMEN SOURCE: SIGNIFICANT CHANGE UP
SPECIMEN SOURCE: SIGNIFICANT CHANGE UP
TIBC SERPL-MCNC: 191 UG/DL — LOW (ref 220–430)
TUBE TYPE: SIGNIFICANT CHANGE UP
UIBC SERPL-MCNC: 160 UG/DL — SIGNIFICANT CHANGE UP (ref 110–370)
UROBILINOGEN FLD QL: NEGATIVE — SIGNIFICANT CHANGE UP
VANCOMYCIN TROUGH SERPL-MCNC: 17.6 UG/ML — SIGNIFICANT CHANGE UP (ref 10–20)
WBC # BLD: 7.9 K/UL — SIGNIFICANT CHANGE UP (ref 3.8–10.5)
WBC # BLD: 8.1 K/UL — SIGNIFICANT CHANGE UP (ref 3.8–10.5)
WBC # FLD AUTO: 7.9 K/UL — SIGNIFICANT CHANGE UP (ref 3.8–10.5)
WBC # FLD AUTO: 8.1 K/UL — SIGNIFICANT CHANGE UP (ref 3.8–10.5)

## 2019-01-01 PROCEDURE — 99291 CRITICAL CARE FIRST HOUR: CPT

## 2019-01-01 PROCEDURE — 99292 CRITICAL CARE ADDL 30 MIN: CPT

## 2019-01-01 RX ORDER — CALCIUM GLUCONATE 100 MG/ML
1 VIAL (ML) INTRAVENOUS ONCE
Qty: 0 | Refills: 0 | Status: COMPLETED | OUTPATIENT
Start: 2019-01-01 | End: 2019-01-02

## 2019-01-01 RX ORDER — SODIUM CHLORIDE 5 G/100ML
1000 INJECTION, SOLUTION INTRAVENOUS
Qty: 0 | Refills: 0 | Status: DISCONTINUED | OUTPATIENT
Start: 2019-01-01 | End: 2019-01-01

## 2019-01-01 RX ORDER — CALCIUM GLUCONATE 100 MG/ML
2 VIAL (ML) INTRAVENOUS ONCE
Qty: 0 | Refills: 0 | Status: COMPLETED | OUTPATIENT
Start: 2019-01-01 | End: 2019-01-01

## 2019-01-01 RX ORDER — POTASSIUM CHLORIDE 20 MEQ
40 PACKET (EA) ORAL ONCE
Qty: 0 | Refills: 0 | Status: COMPLETED | OUTPATIENT
Start: 2019-01-01 | End: 2019-01-01

## 2019-01-01 RX ORDER — POTASSIUM PHOSPHATE, MONOBASIC POTASSIUM PHOSPHATE, DIBASIC 236; 224 MG/ML; MG/ML
30 INJECTION, SOLUTION INTRAVENOUS ONCE
Qty: 0 | Refills: 0 | Status: COMPLETED | OUTPATIENT
Start: 2019-01-01 | End: 2019-01-01

## 2019-01-01 RX ORDER — PANTOPRAZOLE SODIUM 20 MG/1
40 TABLET, DELAYED RELEASE ORAL DAILY
Qty: 0 | Refills: 0 | Status: DISCONTINUED | OUTPATIENT
Start: 2019-01-01 | End: 2019-01-03

## 2019-01-01 RX ORDER — MAGNESIUM SULFATE 500 MG/ML
1 VIAL (ML) INJECTION ONCE
Qty: 0 | Refills: 0 | Status: COMPLETED | OUTPATIENT
Start: 2019-01-01 | End: 2019-01-01

## 2019-01-01 RX ADMIN — GABAPENTIN 300 MILLIGRAM(S): 400 CAPSULE ORAL at 22:15

## 2019-01-01 RX ADMIN — Medication 100 MILLIGRAM(S): at 21:38

## 2019-01-01 RX ADMIN — Medication 3 MILLILITER(S): at 23:44

## 2019-01-01 RX ADMIN — Medication 1 MILLIGRAM(S): at 21:38

## 2019-01-01 RX ADMIN — CEFEPIME 100 MILLIGRAM(S): 1 INJECTION, POWDER, FOR SOLUTION INTRAMUSCULAR; INTRAVENOUS at 05:09

## 2019-01-01 RX ADMIN — BROMOCRIPTINE MESYLATE 10 MILLIGRAM(S): 5 CAPSULE ORAL at 13:10

## 2019-01-01 RX ADMIN — Medication 250 MILLIGRAM(S): at 21:36

## 2019-01-01 RX ADMIN — SENNA PLUS 5 MILLILITER(S): 8.6 TABLET ORAL at 21:38

## 2019-01-01 RX ADMIN — CHLORHEXIDINE GLUCONATE 1 APPLICATION(S): 213 SOLUTION TOPICAL at 21:37

## 2019-01-01 RX ADMIN — Medication 3 MILLILITER(S): at 17:17

## 2019-01-01 RX ADMIN — Medication 250 MILLIGRAM(S): at 14:00

## 2019-01-01 RX ADMIN — Medication 250 MILLIGRAM(S): at 05:09

## 2019-01-01 RX ADMIN — Medication 3 MILLILITER(S): at 12:10

## 2019-01-01 RX ADMIN — Medication 10 MILLIGRAM(S): at 21:37

## 2019-01-01 RX ADMIN — Medication 40 MILLIEQUIVALENT(S): at 00:43

## 2019-01-01 RX ADMIN — Medication 650 MILLIGRAM(S): at 15:00

## 2019-01-01 RX ADMIN — Medication 1 MILLIGRAM(S): at 05:08

## 2019-01-01 RX ADMIN — Medication 650 MILLIGRAM(S): at 23:35

## 2019-01-01 RX ADMIN — SODIUM CHLORIDE 75 MILLILITER(S): 9 INJECTION INTRAMUSCULAR; INTRAVENOUS; SUBCUTANEOUS at 05:08

## 2019-01-01 RX ADMIN — CHLORHEXIDINE GLUCONATE 15 MILLILITER(S): 213 SOLUTION TOPICAL at 05:08

## 2019-01-01 RX ADMIN — BROMOCRIPTINE MESYLATE 10 MILLIGRAM(S): 5 CAPSULE ORAL at 05:08

## 2019-01-01 RX ADMIN — FENTANYL CITRATE 1 PATCH: 50 INJECTION INTRAVENOUS at 20:04

## 2019-01-01 RX ADMIN — Medication 650 MILLIGRAM(S): at 00:43

## 2019-01-01 RX ADMIN — CEFEPIME 100 MILLIGRAM(S): 1 INJECTION, POWDER, FOR SOLUTION INTRAMUSCULAR; INTRAVENOUS at 13:09

## 2019-01-01 RX ADMIN — GABAPENTIN 300 MILLIGRAM(S): 400 CAPSULE ORAL at 05:08

## 2019-01-01 RX ADMIN — POLYETHYLENE GLYCOL 3350 17 GRAM(S): 17 POWDER, FOR SOLUTION ORAL at 17:33

## 2019-01-01 RX ADMIN — ENOXAPARIN SODIUM 40 MILLIGRAM(S): 100 INJECTION SUBCUTANEOUS at 17:31

## 2019-01-01 RX ADMIN — GABAPENTIN 300 MILLIGRAM(S): 400 CAPSULE ORAL at 13:10

## 2019-01-01 RX ADMIN — Medication 3 MILLILITER(S): at 05:27

## 2019-01-01 RX ADMIN — CHLORHEXIDINE GLUCONATE 15 MILLILITER(S): 213 SOLUTION TOPICAL at 17:33

## 2019-01-01 RX ADMIN — Medication 100 GRAM(S): at 00:43

## 2019-01-01 RX ADMIN — Medication 100 MILLIGRAM(S): at 13:10

## 2019-01-01 RX ADMIN — PANTOPRAZOLE SODIUM 40 MILLIGRAM(S): 20 TABLET, DELAYED RELEASE ORAL at 12:15

## 2019-01-01 RX ADMIN — POTASSIUM PHOSPHATE, MONOBASIC POTASSIUM PHOSPHATE, DIBASIC 83.33 MILLIMOLE(S): 236; 224 INJECTION, SOLUTION INTRAVENOUS at 01:50

## 2019-01-01 RX ADMIN — Medication 200 GRAM(S): at 01:49

## 2019-01-01 RX ADMIN — Medication 1 MILLIGRAM(S): at 13:10

## 2019-01-01 RX ADMIN — BROMOCRIPTINE MESYLATE 10 MILLIGRAM(S): 5 CAPSULE ORAL at 21:39

## 2019-01-01 RX ADMIN — CEFEPIME 100 MILLIGRAM(S): 1 INJECTION, POWDER, FOR SOLUTION INTRAMUSCULAR; INTRAVENOUS at 21:36

## 2019-01-01 RX ADMIN — FENTANYL CITRATE 1 PATCH: 50 INJECTION INTRAVENOUS at 07:41

## 2019-01-01 NOTE — PROGRESS NOTE ADULT - SUBJECTIVE AND OBJECTIVE BOX
CC: f/u for fever    Patient reports: she is sedated on vent.she is not communicative    REVIEW OF SYSTEMS:  All other review of systems negative (Comprehensive ROS)    Antimicrobials Day #  :  cefepime   IVPB    day 3  cefepime   IVPB 2000 milliGRAM(s) IV Intermittent every 8 hours  vancomycin  IVPB 1750 milliGRAM(s) IV Intermittent every 8 hours day 9    Other Medications Reviewed    T(F): 100 (19 @ 07:00), Max: 101.3 (18 @ 19:00)  HR: 85 (19 @ 09:00)  BP: --  RR: 16 (19 @ 09:00)  SpO2: 100% (19 @ 09:00)  Wt(kg): --    PHYSICAL EXAM:  General: sedated on vent, no acute distress,EVD in place  Eyes:  anicteric, no conjunctival injection, no discharge  Oropharynx: no lesions or injection 	  Neck: trach  Lungs: coarse BS  Heart: regular rate and rhythm; no murmur, rubs or gallops  Abdomen: soft, nondistended, nontender, without mass or organomegaly  Skin: no lesions  Extremities: no clubbing, cyanosis, + edema  Neurologic: poorly interactive on vent    LAB RESULTS:                        7.7    8.1   )-----------( 231      ( 2019 05:04 )             21.5         139  |  104  |  14  ----------------------------<  143<H>  3.7   |  28  |  0.45<L>    Ca    7.9<L>      31 Dec 2018 22:40  Phos  2.0       Mg     1.8         TPro  5.1<L>  /  Alb  2.2<L>  /  TBili  0.2  /  DBili  <0.1  /  AST  45<H>  /  ALT  49<H>  /  AlkPhos  65      LIVER FUNCTIONS - ( 31 Dec 2018 22:40 )  Alb: 2.2 g/dL / Pro: 5.1 g/dL / ALK PHOS: 65 U/L / ALT: 49 U/L / AST: 45 U/L / GGT: x           Urinalysis Basic - ( 30 Dec 2018 18:51 )    Color: Yellow / Appearance: Slightly Turbid / S.031 / pH: x  Gluc: x / Ketone: Negative  / Bili: Negative / Urobili: Negative   Blood: x / Protein: 30 mg/dL / Nitrite: Negative   Leuk Esterase: Negative / RBC: 21 /hpf / WBC 4 /hpf   Sq Epi: x / Non Sq Epi: 6 /hpf / Bacteria: Negative      MICROBIOLOGY:  RECENT CULTURES:   @ 19:35 .CSF CSF, shunt     No growth    No polymorphonuclear cells seen  No organisms seen  by cytocentrifuge     @ 13:15 .Blood Blood-Peripheral     No growth to date.       @ 20:25 .CSF CSF     No growth    No polymorphonuclear cells seen per low power field  No organisms seen per oil power field     @ 07:31 .Sputum Sputum Escherichia coli  Methicillin resistant Staphylococcus aureus    Moderate Escherichia coli  Moderate Methicillin resistant Staphylococcus aureus  Normal Respiratory Tori absent    Numerous polymorphonuclear leukocytes per low power field  Rare Squamous epithelial cells per low power field  Few Gram positive cocci in pairs per oil power field  Few Gram Negative Rods per oil power field     @ 20:03 .Blood Blood     No growth to date.          RADIOLOGY REVIEWED:  < from: CT Head No Cont (18 @ 09:10) >  INTERPRETATION:  History: Intracranial hemorrhage    Multiple axial sections were performed base of skull to vertex without   contrast enhancement.    This exam is compared with prior noncontrast head CT performed on   2018.    Postop changes compatible with a suboccipital craniectomy is again   identified.    Right frontal shunt catheter is again seen and unchanged in position.    There is evidence of aneurysm clip and coil material identified in the   posterior fossa region which appears unchanged.    There is increase in size of the lateral and third ventricle when   compared the prior exam. Intraventricular hemorrhage is again seen.    Evaluation of the osseous structures with appropriate window setting   postop changes appear unremarkable    Extracalvarial soft tissue swelling staples seen involving the high right   frontal region.    The visualized paranasal sinuses mastoid and middle ear regions appear   clear.    Impression: Increase in size of the lateral and third ventricles seen   when compared with the prior exam.    < end of copied text >

## 2019-01-01 NOTE — PROGRESS NOTE ADULT - ASSESSMENT
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 - s/p successful ANGIO/EMBO, posterior fossa decompression  ICH score 2    Neuro:  Hydro: EVD in place, awaiting VPS  EEG negative, d/c lacosamide  Fentanyl PRN  Storming c/w Gabapentin, bromocriptine, clonazepam, fentanyl patch   OT for splint    Cards:   HTN: -160mmHg  Relative hypotension, decrease BP meds    Pulm:  s/p trach, wean vent as tolerated  RCU consult once status post VPS    GI: TF via PEG     Renal: IVF to ensure euvolemia    Endo:  goal euglycemia    Heme:  SCDs  Anemia work-up  DVT ppx Lovenox     ID:  Febrile, possibly central but antibiotics for now per ID    ICU  full code    at risk for deterioration and death due to hydrocephalus, cerebral edema, brain compression  critical care time 45min 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 - s/p successful ANGIO/EMBO, posterior fossa decompression  ICH score 2    Neuro:  Hydro: EVD in place, awaiting VPS  Fentanyl PRN  Autonomic storming: c/w Gabapentin, bromocriptine, clonazepam, fentanyl patch   OT for splint    Cards:   HTN: SBP 90-160mmHg  Off pressors now off clonidine    Pulm:  s/p trach, wean vent as tolerated  RCU consult once status post VPS    GI: TF via PEG     Renal: IVL    Endo:  goal euglycemia    Heme:  SCDs  DVT ppx Lovenox     ID:  Febrile, possibly central but antibiotics for now per ID (total 7 days planned)    ICU  full code    at risk for deterioration and death due to hydrocephalus, cerebral edema, brain compression  critical care time 45min

## 2019-01-01 NOTE — PROGRESS NOTE ADULT - SUBJECTIVE AND OBJECTIVE BOX
NSCU ATTENDING -- ADDITIONAL PROGRESS NOTE    Nighttime rounds were performed -- please refer to earlier Progress Note for HPI details.    T(C): 37.6 (01-01-19 @ 19:00), Max: 38.5 (12-31-18 @ 23:00)  HR: 85 (01-01-19 @ 22:00) (81 - 102)  BP: --  RR: 16 (01-01-19 @ 18:00) (7 - 17)  SpO2: 100% (01-01-19 @ 22:00) (98% - 100%)  Wt(kg): --    Relevant labwork and imaging reviewed.    Patient remains critically ill.    S/p trach, PEG.  Pending VPS.  EVD open @ 15, ICPs within normal limits, drain patent, output <10 cc/hr.  Persistently febrile, followed by ID, felt to be central fevers.  Pivot @ goal.    Additional 30 minutes of critical care time.

## 2019-01-01 NOTE — PROGRESS NOTE ADULT - ASSESSMENT
57 yo female with SLE admitted 12/16 with cerebellar AVM and bleed.  She was electively intubated on 12/16, has an EVD for hydrocephalus.  S/P cerebellar embolization on 12/17 with AVM resection, s/p return to OR on 12/18 for posterior fossa  decompression.  Colonized with MRSA in sputum, no radiographic evidence of pneumonia.  Fever can be central secondary to ICH, always a diagnosis of exclusion.I do not know details of SLE history but this can also be a cause of fever  With foreign body in airways such as trach or ETT , MRSA can be difficult to eradicate even with IV antibiotics.  Fever may be on a central basis, she appears reasonably stable.  All her blood and CSF cultures have been negative.  CSF submitted in past 24 hours with lactate of 1.9, radha count not suggestive of infection  Sputum with MRSA and E coli, likely colonizers  Suggest:  1. continue vanco for now, perhaps 7-10 day course. Cefepime use somewhat empiric, likewise I would consider d/c soon  2.Periodic blood and CSF cultures as you are doing  3.Follow exam, wbc, renal function, and hemodynamics  4.I see no absolute ID contraindications for shunt internalization.I would consider limiting antibiotics to 24-48 hours

## 2019-01-01 NOTE — PROGRESS NOTE ADULT - SUBJECTIVE AND OBJECTIVE BOX
SUMMARY:  57yo woman PMH lupus p/w sudden onset severe HA a/w nausea and vomiting. 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 Patient was intact on presentation however intubated for airway protection and EVD placed. Transferred to [facility] for further care.    24 HOUR EVENTS:  Received 1 unit PRBC. Hypotensive requiring transient pressor.     HOSPITAL COURSE: Fevers, hydrocephalus, autonomic storm  12/17: s/p angio/embo of AVM, resection of AVM  12/18: RTOR for posterior fossa decompression expansion  12/24 s/p trach, EVD reopened per NSGY  12/26: MRSA in sputum  12/28:  CT head and EVD clamped.  12/29: EVD unclamped due to high ICPs.   12/30: Cefepime added     Vitals/labs/meds/imaging: reviewed    EXAMINATION:  General:  calm  HEENT:  trach'd, PERRL  Neuro:  opens eyes to command, BLE wiggles toes to command, no commands in arms, does not mouth words, LUE prox 2/5 dist 0/5, RUE 0/5, toe movement but no lifting of legs  Cards:  RRR  Respiratory:  no respiratory distress  Abdomen:  soft  Extremities:  no edema  Skin:  warm/dry SUMMARY:  57yo woman PMH lupus p/w sudden onset severe HA a/w nausea and vomiting. 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 Patient was intact on presentation however intubated for airway protection and EVD placed. Transferred to [facility] for further care.    24 HOUR EVENTS:  Weaned off pressors. Febrile 38.5.    HOSPITAL COURSE: Fevers, hydrocephalus, autonomic storm  12/17: s/p angio/embo of AVM, resection of AVM  12/18: RTOR for posterior fossa decompression expansion  12/24 s/p trach, EVD reopened per NSGY  12/26: MRSA in sputum  12/28:  CT head and EVD clamped.  12/29: EVD unclamped due to high ICPs.   12/30: Cefepime added   12/31: Received 1 unit PRBC overnight. Hypotensive requiring transient pressor.     VITALS/DATA/ORDERS: [x] Reviewed    EXAMINATION:  General:  calm  HEENT:  trach'd, PERRL  Neuro:  opens eyes to command, BLE wiggles toes to command, no commands in arms, does not mouth words, LUE prox 2/5 dist 0/5, RUE 0/5, toe movement but no lifting of legs  Cards:  RRR  Respiratory:  no respiratory distress  Abdomen:  soft  Extremities:  no edema  Skin:  warm/dry

## 2019-01-01 NOTE — PROGRESS NOTE ADULT - SUBJECTIVE AND OBJECTIVE BOX
Visit Summary: Patient seen and evaluated at bedside.    Overnight Events: none    Exam:  T(C): 38.5 (12-31-18 @ 19:00), Max: 38.5 (12-31-18 @ 19:00)  HR: 93 (12-31-18 @ 23:41) (93 - 110)  BP: --  RR: 16 (12-31-18 @ 23:00) (15 - 25)  SpO2: 100% (12-31-18 @ 23:41) (99% - 100%)  Wt(kg): --    opens eyes to command, BLE wiggles toes to command, BUE not FC                        7.9    8.8   )-----------( 243      ( 31 Dec 2018 22:40 )             21.6     12-31    139  |  104  |  14  ----------------------------<  143<H>  3.7   |  28  |  0.45<L>    Ca    7.9<L>      31 Dec 2018 22:40  Phos  2.0     12-31  Mg     1.8     12-31    TPro  5.1<L>  /  Alb  2.2<L>  /  TBili  0.2  /  DBili  <0.1  /  AST  45<H>  /  ALT  49<H>  /  AlkPhos  65  12-31  PT/INR - ( 30 Dec 2018 20:02 )   PT: 12.1 sec;   INR: 1.05 ratio         PTT - ( 30 Dec 2018 20:02 )  PTT:24.2 sec

## 2019-01-02 DIAGNOSIS — R13.12 DYSPHAGIA, OROPHARYNGEAL PHASE: ICD-10-CM

## 2019-01-02 DIAGNOSIS — R50.9 FEVER, UNSPECIFIED: ICD-10-CM

## 2019-01-02 DIAGNOSIS — G91.9 HYDROCEPHALUS, UNSPECIFIED: ICD-10-CM

## 2019-01-02 DIAGNOSIS — J96.01 ACUTE RESPIRATORY FAILURE WITH HYPOXIA: ICD-10-CM

## 2019-01-02 DIAGNOSIS — D50.0 IRON DEFICIENCY ANEMIA SECONDARY TO BLOOD LOSS (CHRONIC): ICD-10-CM

## 2019-01-02 DIAGNOSIS — I61.5 NONTRAUMATIC INTRACEREBRAL HEMORRHAGE, INTRAVENTRICULAR: ICD-10-CM

## 2019-01-02 DIAGNOSIS — Z86.718 PERSONAL HISTORY OF OTHER VENOUS THROMBOSIS AND EMBOLISM: ICD-10-CM

## 2019-01-02 DIAGNOSIS — Q28.2 ARTERIOVENOUS MALFORMATION OF CEREBRAL VESSELS: ICD-10-CM

## 2019-01-02 DIAGNOSIS — Z29.9 ENCOUNTER FOR PROPHYLACTIC MEASURES, UNSPECIFIED: ICD-10-CM

## 2019-01-02 LAB
ANION GAP SERPL CALC-SCNC: 10 MMOL/L — SIGNIFICANT CHANGE UP (ref 5–17)
ANION GAP SERPL CALC-SCNC: 6 MMOL/L — SIGNIFICANT CHANGE UP (ref 5–17)
ANION GAP SERPL CALC-SCNC: 8 MMOL/L — SIGNIFICANT CHANGE UP (ref 5–17)
BUN SERPL-MCNC: 13 MG/DL — SIGNIFICANT CHANGE UP (ref 7–23)
BUN SERPL-MCNC: 14 MG/DL — SIGNIFICANT CHANGE UP (ref 7–23)
BUN SERPL-MCNC: 14 MG/DL — SIGNIFICANT CHANGE UP (ref 7–23)
CALCIUM SERPL-MCNC: 7.8 MG/DL — LOW (ref 8.4–10.5)
CALCIUM SERPL-MCNC: 8 MG/DL — LOW (ref 8.4–10.5)
CALCIUM SERPL-MCNC: 8.1 MG/DL — LOW (ref 8.4–10.5)
CHLORIDE SERPL-SCNC: 95 MMOL/L — LOW (ref 96–108)
CHLORIDE SERPL-SCNC: 98 MMOL/L — SIGNIFICANT CHANGE UP (ref 96–108)
CHLORIDE SERPL-SCNC: 99 MMOL/L — SIGNIFICANT CHANGE UP (ref 96–108)
CO2 SERPL-SCNC: 27 MMOL/L — SIGNIFICANT CHANGE UP (ref 22–31)
CO2 SERPL-SCNC: 27 MMOL/L — SIGNIFICANT CHANGE UP (ref 22–31)
CO2 SERPL-SCNC: 29 MMOL/L — SIGNIFICANT CHANGE UP (ref 22–31)
CREAT SERPL-MCNC: 0.39 MG/DL — LOW (ref 0.5–1.3)
CREAT SERPL-MCNC: 0.41 MG/DL — LOW (ref 0.5–1.3)
CREAT SERPL-MCNC: 0.42 MG/DL — LOW (ref 0.5–1.3)
CULTURE RESULTS: SIGNIFICANT CHANGE UP
GLUCOSE SERPL-MCNC: 131 MG/DL — HIGH (ref 70–99)
GLUCOSE SERPL-MCNC: 134 MG/DL — HIGH (ref 70–99)
GLUCOSE SERPL-MCNC: 136 MG/DL — HIGH (ref 70–99)
GRAM STN FLD: SIGNIFICANT CHANGE UP
HCT VFR BLD CALC: 24.4 % — LOW (ref 34.5–45)
HGB BLD-MCNC: 8.8 G/DL — LOW (ref 11.5–15.5)
MAGNESIUM SERPL-MCNC: 2 MG/DL — SIGNIFICANT CHANGE UP (ref 1.6–2.6)
MAGNESIUM SERPL-MCNC: 2.1 MG/DL — SIGNIFICANT CHANGE UP (ref 1.6–2.6)
MAGNESIUM SERPL-MCNC: 2.1 MG/DL — SIGNIFICANT CHANGE UP (ref 1.6–2.6)
MCHC RBC-ENTMCNC: 31.1 PG — SIGNIFICANT CHANGE UP (ref 27–34)
MCHC RBC-ENTMCNC: 35.8 GM/DL — SIGNIFICANT CHANGE UP (ref 32–36)
MCV RBC AUTO: 86.9 FL — SIGNIFICANT CHANGE UP (ref 80–100)
PHOSPHATE SERPL-MCNC: 2.3 MG/DL — LOW (ref 2.5–4.5)
PHOSPHATE SERPL-MCNC: 2.7 MG/DL — SIGNIFICANT CHANGE UP (ref 2.5–4.5)
PHOSPHATE SERPL-MCNC: 2.7 MG/DL — SIGNIFICANT CHANGE UP (ref 2.5–4.5)
PLATELET # BLD AUTO: 273 K/UL — SIGNIFICANT CHANGE UP (ref 150–400)
POTASSIUM SERPL-MCNC: 3.9 MMOL/L — SIGNIFICANT CHANGE UP (ref 3.5–5.3)
POTASSIUM SERPL-MCNC: 4.1 MMOL/L — SIGNIFICANT CHANGE UP (ref 3.5–5.3)
POTASSIUM SERPL-MCNC: 4.4 MMOL/L — SIGNIFICANT CHANGE UP (ref 3.5–5.3)
POTASSIUM SERPL-SCNC: 3.9 MMOL/L — SIGNIFICANT CHANGE UP (ref 3.5–5.3)
POTASSIUM SERPL-SCNC: 4.1 MMOL/L — SIGNIFICANT CHANGE UP (ref 3.5–5.3)
POTASSIUM SERPL-SCNC: 4.4 MMOL/L — SIGNIFICANT CHANGE UP (ref 3.5–5.3)
RBC # BLD: 2.81 M/UL — LOW (ref 3.8–5.2)
RBC # FLD: 14.8 % — HIGH (ref 10.3–14.5)
SODIUM SERPL-SCNC: 130 MMOL/L — LOW (ref 135–145)
SODIUM SERPL-SCNC: 133 MMOL/L — LOW (ref 135–145)
SODIUM SERPL-SCNC: 136 MMOL/L — SIGNIFICANT CHANGE UP (ref 135–145)
SPECIMEN SOURCE: SIGNIFICANT CHANGE UP
SPECIMEN SOURCE: SIGNIFICANT CHANGE UP
VANCOMYCIN TROUGH SERPL-MCNC: 17 UG/ML — SIGNIFICANT CHANGE UP (ref 10–20)
WBC # BLD: 8.2 K/UL — SIGNIFICANT CHANGE UP (ref 3.8–10.5)
WBC # FLD AUTO: 8.2 K/UL — SIGNIFICANT CHANGE UP (ref 3.8–10.5)

## 2019-01-02 PROCEDURE — 99292 CRITICAL CARE ADDL 30 MIN: CPT

## 2019-01-02 PROCEDURE — 99223 1ST HOSP IP/OBS HIGH 75: CPT

## 2019-01-02 PROCEDURE — 99291 CRITICAL CARE FIRST HOUR: CPT

## 2019-01-02 RX ORDER — CALCIUM GLUCONATE 100 MG/ML
2 VIAL (ML) INTRAVENOUS ONCE
Qty: 0 | Refills: 0 | Status: COMPLETED | OUTPATIENT
Start: 2019-01-02 | End: 2019-01-03

## 2019-01-02 RX ORDER — POTASSIUM PHOSPHATE, MONOBASIC POTASSIUM PHOSPHATE, DIBASIC 236; 224 MG/ML; MG/ML
15 INJECTION, SOLUTION INTRAVENOUS ONCE
Qty: 0 | Refills: 0 | Status: COMPLETED | OUTPATIENT
Start: 2019-01-02 | End: 2019-01-02

## 2019-01-02 RX ORDER — CHLORHEXIDINE GLUCONATE 213 G/1000ML
1 SOLUTION TOPICAL
Qty: 0 | Refills: 0 | Status: DISCONTINUED | OUTPATIENT
Start: 2019-01-02 | End: 2019-01-07

## 2019-01-02 RX ORDER — SODIUM CHLORIDE 5 G/100ML
1000 INJECTION, SOLUTION INTRAVENOUS
Qty: 0 | Refills: 0 | Status: DISCONTINUED | OUTPATIENT
Start: 2019-01-02 | End: 2019-01-04

## 2019-01-02 RX ADMIN — Medication 100 MILLIGRAM(S): at 05:22

## 2019-01-02 RX ADMIN — FENTANYL CITRATE 1 PATCH: 50 INJECTION INTRAVENOUS at 17:12

## 2019-01-02 RX ADMIN — BROMOCRIPTINE MESYLATE 10 MILLIGRAM(S): 5 CAPSULE ORAL at 05:23

## 2019-01-02 RX ADMIN — POLYETHYLENE GLYCOL 3350 17 GRAM(S): 17 POWDER, FOR SOLUTION ORAL at 17:12

## 2019-01-02 RX ADMIN — SODIUM CHLORIDE 50 MILLILITER(S): 5 INJECTION, SOLUTION INTRAVENOUS at 10:02

## 2019-01-02 RX ADMIN — CHLORHEXIDINE GLUCONATE 1 APPLICATION(S): 213 SOLUTION TOPICAL at 21:54

## 2019-01-02 RX ADMIN — Medication 250 MILLIGRAM(S): at 05:07

## 2019-01-02 RX ADMIN — POLYETHYLENE GLYCOL 3350 17 GRAM(S): 17 POWDER, FOR SOLUTION ORAL at 05:22

## 2019-01-02 RX ADMIN — ENOXAPARIN SODIUM 40 MILLIGRAM(S): 100 INJECTION SUBCUTANEOUS at 17:26

## 2019-01-02 RX ADMIN — GABAPENTIN 300 MILLIGRAM(S): 400 CAPSULE ORAL at 13:03

## 2019-01-02 RX ADMIN — BROMOCRIPTINE MESYLATE 10 MILLIGRAM(S): 5 CAPSULE ORAL at 13:02

## 2019-01-02 RX ADMIN — Medication 3 MILLILITER(S): at 05:19

## 2019-01-02 RX ADMIN — Medication 650 MILLIGRAM(S): at 07:16

## 2019-01-02 RX ADMIN — Medication 3 MILLILITER(S): at 11:55

## 2019-01-02 RX ADMIN — Medication 650 MILLIGRAM(S): at 06:16

## 2019-01-02 RX ADMIN — Medication 1 MILLIGRAM(S): at 13:03

## 2019-01-02 RX ADMIN — Medication 1 MILLIGRAM(S): at 21:54

## 2019-01-02 RX ADMIN — SENNA PLUS 5 MILLILITER(S): 8.6 TABLET ORAL at 21:55

## 2019-01-02 RX ADMIN — Medication 250 MILLIGRAM(S): at 22:46

## 2019-01-02 RX ADMIN — BROMOCRIPTINE MESYLATE 10 MILLIGRAM(S): 5 CAPSULE ORAL at 21:54

## 2019-01-02 RX ADMIN — Medication 10 MILLIGRAM(S): at 21:54

## 2019-01-02 RX ADMIN — Medication 1 MILLIGRAM(S): at 05:23

## 2019-01-02 RX ADMIN — POTASSIUM PHOSPHATE, MONOBASIC POTASSIUM PHOSPHATE, DIBASIC 62.5 MILLIMOLE(S): 236; 224 INJECTION, SOLUTION INTRAVENOUS at 17:16

## 2019-01-02 RX ADMIN — PANTOPRAZOLE SODIUM 40 MILLIGRAM(S): 20 TABLET, DELAYED RELEASE ORAL at 11:16

## 2019-01-02 RX ADMIN — GABAPENTIN 300 MILLIGRAM(S): 400 CAPSULE ORAL at 05:22

## 2019-01-02 RX ADMIN — CEFEPIME 100 MILLIGRAM(S): 1 INJECTION, POWDER, FOR SOLUTION INTRAMUSCULAR; INTRAVENOUS at 21:54

## 2019-01-02 RX ADMIN — CEFEPIME 100 MILLIGRAM(S): 1 INJECTION, POWDER, FOR SOLUTION INTRAMUSCULAR; INTRAVENOUS at 05:07

## 2019-01-02 RX ADMIN — Medication 250 MILLIGRAM(S): at 15:49

## 2019-01-02 RX ADMIN — CHLORHEXIDINE GLUCONATE 15 MILLILITER(S): 213 SOLUTION TOPICAL at 17:16

## 2019-01-02 RX ADMIN — CHLORHEXIDINE GLUCONATE 15 MILLILITER(S): 213 SOLUTION TOPICAL at 05:23

## 2019-01-02 RX ADMIN — Medication 650 MILLIGRAM(S): at 00:35

## 2019-01-02 RX ADMIN — Medication 3 MILLILITER(S): at 17:43

## 2019-01-02 RX ADMIN — Medication 650 MILLIGRAM(S): at 01:40

## 2019-01-02 RX ADMIN — Medication 100 MILLIGRAM(S): at 21:55

## 2019-01-02 RX ADMIN — FENTANYL CITRATE 1 PATCH: 50 INJECTION INTRAVENOUS at 20:11

## 2019-01-02 RX ADMIN — Medication 200 GRAM(S): at 00:26

## 2019-01-02 RX ADMIN — GABAPENTIN 300 MILLIGRAM(S): 400 CAPSULE ORAL at 21:55

## 2019-01-02 RX ADMIN — Medication 650 MILLIGRAM(S): at 13:02

## 2019-01-02 RX ADMIN — CEFEPIME 100 MILLIGRAM(S): 1 INJECTION, POWDER, FOR SOLUTION INTRAMUSCULAR; INTRAVENOUS at 13:19

## 2019-01-02 NOTE — PROGRESS NOTE ADULT - ASSESSMENT
58F here with cerebellar hemorrhage found to have cerebellar AVM s/p angio embolization and resection s/p return to OR for subocc craniectomy    - Pre-op for  shunt this week  - vent management per ICU  - Fever w/u and abx per ICU, concern for central fevers per OD

## 2019-01-02 NOTE — PROGRESS NOTE ADULT - SUBJECTIVE AND OBJECTIVE BOX
Visit Summary: Patient seen and evaluated at bedside.    Overnight Events: none    Exam:  T(C): 38.5 (01-02-19 @ 00:00), Max: 38.5 (01-02-19 @ 00:00)  HR: 93 (01-02-19 @ 00:00) (81 - 102)  BP: --  RR: 16 (01-01-19 @ 18:00) (7 - 17)  SpO2: 100% (01-02-19 @ 00:00) (98% - 100%)  Wt(kg): --    EOS, BLE wiggles toes to commands, not FC in BUE  PERRL  Trace spont movement BLE                        8.0    7.9   )-----------( 231      ( 01 Jan 2019 21:18 )             21.8     01-01    133<L>  |  97  |  12  ----------------------------<  126<H>  4.1   |  26  |  0.41<L>    Ca    8.0<L>      01 Jan 2019 21:18  Phos  2.7     01-01  Mg     2.0     01-01    TPro  5.1<L>  /  Alb  2.2<L>  /  TBili  0.2  /  DBili  <0.1  /  AST  45<H>  /  ALT  49<H>  /  AlkPhos  65  12-31

## 2019-01-02 NOTE — PROGRESS NOTE ADULT - SUBJECTIVE AND OBJECTIVE BOX
CC: f/u for fever    Patient remans sedated on Vent- AC 12, tube feeds 80/hr    REVIEW OF SYSTEMS:  Not provided on Vent    Antimicrobials:  cefepime   IVPB 2000 milliGRAM(s) IV Intermittent every 8 hours- Day 4  vancomycin  IVPB 1750 milliGRAM(s) IV Intermittent every 8 hours- Day 10    Other Medications Reviewed    Vital Signs Last 24 Hrs  T(F): 100 (2019 15:00), Max: 101.8 (2019 13:00)  HR: 101 (2019 18:00) (82 - 105)  BP: --  BP(mean): --  RR: 16 (2019 18:00) (16 - 31)  SpO2: 100% (2019 18:00) (100% - 100%)    PHYSICAL EXAM:  General: no acute distress  EVD in place  Eyes:  anicteric, no conjunctival injection  Oropharynx: no lesions  	  Neck: trach  Lungs: coarse BSs  Heart: regular rate and rhythm; no murmur, rubs or gallops  Abdomen: soft, nondistended, nontender, G tube site clean  Skin: no lesions  Extremities: dependent edema.  R PICC site clean  Neurologic: poorly interactive on Vent    LAB RESULTS:                        8.0    7.9   )-----------( 231      ( 2019 21:18 )             21.8   01-02    133<L>  |  98  |  14  ----------------------------<  134<H>  3.9   |  29  |  0.42<L>    Ca    8.1<L>      2019 15:55  Phos  2.3     -  Mg     2.0     -02    TPro  5.1<L>  /  Alb  2.2<L>  /  TBili  0.2  /  DBili  <0.1  /  AST  45<H>  /  ALT  49<H>  /  AlkPhos  65  12-31    Urinalysis Basic - ( 30 Dec 2018 18:51 )    Color: Yellow / Appearance: Slightly Turbid / S.031 / pH: x  Gluc: x / Ketone: Negative  / Bili: Negative / Urobili: Negative   Blood: x / Protein: 30 mg/dL / Nitrite: Negative   Leuk Esterase: Negative / RBC: 21 /hpf / WBC 4 /hpf   Sq Epi: x / Non Sq Epi: 6 /hpf / Bacteria: Negative      MICROBIOLOGY:  RECENT CULTURES:  Culture - Sputum . (19 @ 01:18)    Gram Stain:   Few polymorphonuclear leukocytes per low power field  Few Squamous epithelial cells per low power field  Few Gram Positive Rods seen per oil power field  Few Gram Variable Cocci seen per oil power field  Numerous Gram Negative Rods seen per oil power field    Specimen Source: .Sputum Sputum    Culture - CSF with Gram Stain . (19 @ 21:15)    Gram Stain:   No polymorphonuclear cells seen  No organisms seen by cytocentrifuge    Specimen Source: .CSF CSF, shunt    Culture Results:   No growth     @ 19:35 .CSF CSF, shunt     No growth     @ 13:15 .Blood Blood-Peripheral     No growth to date.     @ 20:25 .CSF CSF     No growth     @ 07:31 .Sputum Sputum Escherichia coli  Methicillin resistant Staphylococcus aureus    Moderate Escherichia coli  Moderate Methicillin resistant Staphylococcus aureus  Normal Respiratory Tori absent    RADIOLOGY REVIEWED:  CT Head No Cont (18 @ 09:10) >  Increase in size of the lateral and third ventricles seen   when compared with the prior exam.

## 2019-01-02 NOTE — CONSULT NOTE ADULT - PROBLEM SELECTOR RECOMMENDATION 5
- ID followup  - continue broad spectrum antibiotics for now  - found to have MRSA in sputum - and on contact precautions  - ? Central fevers

## 2019-01-02 NOTE — PROGRESS NOTE ADULT - ASSESSMENT
59 yo female with SLE admitted 12/16 with cerebellar AVM and bleed, electively intubated, EVD placed  S/P cerebellar [id number] with AVM resection, s/p return to OR on 12/18 for posterior fossa decompression.  Colonized with MRSA in sputum, no radiographic evidence of pneumonia.  Ongoing fever likely central secondary to ICH directly  All her blood and CSF cultures have been negative.  Sputum with MRSA and E coli, likely colonizers, but covered with Vanco (trough acceptable at 17) and Cefepime- still febrile  WBC normal  As noted, awaiting VPS    Suggest:  With continued fever, no new findings, would like to limit all abx to another 24 hrs  Follow temps and CBC/diff   No contraindication to VPS

## 2019-01-02 NOTE — PROGRESS NOTE ADULT - ASSESSMENT
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 - s/p successful ANGIO/EMBO, posterior fossa decompression  ICH score 2    Neuro:  Hydro: EVD in place, awaiting VPS per NSGY   Fentanyl PRN  Autonomic storming: c/w Gabapentin, bromocriptine, clonazepam, fentanyl patch   OT for splint    Cards:   HTN: SBP 90-160mmHg  Off pressors now off clonidine    Pulm:  s/p trach, wean vent as tolerated  RCU consult once status post VPS    GI: TF via PEG     Renal: IVL    Endo:  goal euglycemia    Heme:  SCDs  DVT ppx Lovenox     ID:  Febrile, possibly central but antibiotics for now per ID (total 7 days planned)    ICU  full code    at risk for deterioration and death due to hydrocephalus, cerebral edema, brain compression  critical care time 45min 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 - s/p successful ANGIO/EMBO, posterior fossa decompression  ICH score 2    Neuro:  Hydro: EVD in place, VPS planned for today  Fentanyl PRN  Autonomic storming: c/w Gabapentin, bromocriptine, clonazepam, fentanyl patch   OT for splint    Cards:   HTN: SBP 90-160mmHg  Off pressors now off clonidine    Pulm:  s/p trach, wean vent as tolerated  RCU consult once status post VPS  Chest PT    GI: TF via PEG     Renal: IVL    Endo:  goal euglycemia    Heme:  SCDs  DVT ppx Lovenox     ID:  Fever, possibly central but antibiotics for now per ID (total 7 days planned)    ICU  full code    at risk for deterioration and death due to hydrocephalus, cerebral edema, brain compression  critical care time 45min 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 - s/p successful ANGIO/EMBO, posterior fossa decompression  ICH score 2    Neuro:  Hydro: EVD in place, VPS planned for today  Fentanyl PRN  Autonomic storming: c/w Gabapentin, bromocriptine, clonazepam, fentanyl patch   OT for splint    Cards:   HTN: SBP 90-160mmHg  Off pressors now off clonidine    Pulm:  s/p trach, wean vent as tolerated  RCU consult once status post VPS  Chest PT    GI: TF via PEG     Renal: IVL  Hyponatremia: on 2%NaCl, goal eunatremia    Endo:  goal euglycemia    Heme:  SCDs  DVT ppx Lovenox     ID:  Fever, possibly central but antibiotics for now per ID (total 7 days planned)    ICU  full code    at risk for deterioration and death due to hydrocephalus, cerebral edema, brain compression  critical care time 45min

## 2019-01-02 NOTE — CONSULT NOTE ADULT - PROBLEM SELECTOR RECOMMENDATION 3
- s/p tracheostomy  - continue mechanical ventilation and wean as tolerated  - her poor mental status may limit her ability to wean but by report she has no underlying lung disease

## 2019-01-02 NOTE — CONSULT NOTE ADULT - SUBJECTIVE AND OBJECTIVE BOX
[facility] DIVISION OF PULMONARY, CRITICAL CARE AND SLEEP MEDICINE  PULMONARY CONSULTATION NOTE  PHONE NUMBER [phone] MONDAY-FRIDAY 8A-5P or [phone] on NIGHTS/WEEKENDS/HOLIDAYS    NAME: BRIANNA AGUILA  MEDICAL RECORD NUMBER: [id number]    CHIEF COMPLAINT: Patient is a 58y old  Female who presents with a chief complaint of Cerebellar IPH (2019 06:47)      HISTORY OF PRESENT ILLNESS:       ====================BACKGROUND INFORMATION============================    FAMILY HISTORY: FAMILY HISTORY:  No pertinent family history in first degree relatives  Family history of kidney disease in father (Father)  Family history of heart disease (Father)  Family history of osteoarthritis (Mother)  Family history of hypertension in mother (Mother)      PAST MEDICAL AND SURGICAL HISTORY: PAST MEDICAL & SURGICAL HISTORY:  Systemic lupus erythematosus, unspecified SLE type, unspecified organ involvement status  Lupus  Back pain, chronic  Childhood asthma  Ovarian cyst  DVT (deep venous thrombosis), bilateral: ( treated with Heparin and coumadin)  RA (rheumatoid arthritis)  No significant past surgical history  No significant past surgical history  Plantar fasciitis of right foot: h/o surgery  History of laparoscopic cholecystectomy:       ALLERGIES:Allergies    No Known Allergies    Intolerances        HOME MEDICATIONS:     OUTPATIENT PULMONARY DOCTOR:     SOCIAL HISTORY:  TOBACCO USE:  ALCOHOL:  DRUGS:  MARITAL STATUS:  EMPLOYMENT:  EXPOSURES:  RECENT TRAVELS:  PETS:  CODE STATUS:    ====================REVIEW OF SYSTEMS=====================================  CONSTITUTIONAL:  CARDIOVASCULAR:  PULMONARY:  GASTROINTESTINAL:  [] ALL OTHER REVIEW OF SYSTEMS ARE NEGATIVE   [] UNABLE TO OBTAIN REVIEW OF SYSTEMS DUE TO ______________      ====================PHYSICAL EXAM=========================================    VITALS: ICU Vital Signs Last 24 Hrs  T(C): 38.2 (2019 11:00), Max: 38.5 (2019 00:00)  T(F): 100.8 (2019 11:00), Max: 101.3 (2019 00:00)  HR: 94 (2019 11:00) (81 - 100)  BP: --  BP(mean): --  ABP: 137/71 (2019 11:00) (98/57 - 164/86)  ABP(mean): 96 (2019 11:00) (73 - 116)  RR: 16 (2019 09:00) (16 - 16)  SpO2: 100% (2019 11:00) (98% - 100%)      INTAKE and OUTPUT: I&O's Summary    2019 07:01  -  2019 07:00  --------------------------------------------------------  IN: 4415 mL / OUT: 4350 mL / NET: 65 mL    2019 07:01  -  2019 11:59  --------------------------------------------------------  IN: 443 mL / OUT: 1621 mL / NET: -1178 mL        WEIGHT: Daily     Daily Weight in k.4 (2019 08:00)    GLUCOSE: CAPILLARY BLOOD GLUCOSE          VENTILATOR SETTINGS: Mode: AC/ CMV (Assist Control/ Continuous Mandatory Ventilation)  RR (machine): 16  TV (machine): 450  FiO2: 30  PEEP: 5  ITime: 1  MAP: 11  PIP: 25      GENERAL: no response, NAD, EVD in place   HEENT: NC, EVD in place, PERRL, MMM, nares clear  NECK: supple, no JVD  LYMPH NODES: no palpable supraclavicular LAD  CARDIOVASCULAR: RRR, S1S2  PULMONARY:  ABDOMEN:  SKIN:  EXTREMITIES:  NEUROLOGIC:  PSYCHIATRIC:    ====================MEDICATIONS===========================================  MEDICATIONS  (STANDING):  ALBUTerol/ipratropium for Nebulization 3 milliLiter(s) Nebulizer every 6 hours  bisacodyl Suppository 10 milliGRAM(s) Rectal at bedtime  bromocriptine Capsule 10 milliGRAM(s) Oral every 8 hours  cefepime   IVPB      cefepime   IVPB 2000 milliGRAM(s) IV Intermittent every 8 hours  chlorhexidine 0.12% Liquid 15 milliLiter(s) Swish and Spit two times a day  chlorhexidine 4% Liquid 1 Application(s) Topical <User Schedule>  clonazePAM Tablet 1 milliGRAM(s) Oral every 8 hours  docusate sodium Liquid 100 milliGRAM(s) Oral three times a day  enoxaparin Injectable 40 milliGRAM(s) SubCutaneous <User Schedule>  fentaNYL   Patch  50 MICROgram(s)/Hr 1 Patch Transdermal every 72 hours  gabapentin   Solution 300 milliGRAM(s) Oral every 8 hours  pantoprazole  Injectable 40 milliGRAM(s) IV Push daily  polyethylene glycol 3350 17 Gram(s) Oral two times a day  senna Syrup 5 milliLiter(s) Oral at bedtime  sodium chloride 2% . 1000 milliLiter(s) (50 mL/Hr) IV Continuous <Continuous>  vancomycin  IVPB 1750 milliGRAM(s) IV Intermittent every 8 hours      MEDICATIONS  (PRN):  acetaminophen    Suspension .. 650 milliGRAM(s) Oral every 6 hours PRN Temp greater or equal to 38.5C (101.3F), Mild Pain (1 - 3)      ====================LABORATORY RESULTS====================================  CBC Full  -  ( 2019 21:18 )  WBC Count : 7.9 K/uL  Hemoglobin : 8.0 g/dL  Hematocrit : 21.8 %  Platelet Count - Automated : 231 K/uL  Mean Cell Volume : 85.9 fl  Mean Cell Hemoglobin : 31.5 pg  Mean Cell Hemoglobin Concentration : 36.6 gm/dL                                          130<L>  |  95<L>  |  13  ----------------------------<  131<H>  4.1   |  27  |  0.39<L>    Ca    8.0<L>      2019 06:32  Phos  2.7       Mg     2.1         TPro  5.1<L>  /  Alb  2.2<L>  /  TBili  0.2  /  DBili  <0.1  /  AST  45<H>  /  ALT  49<H>  /  AlkPhos  65        Creatinine Trend: 0.39<--, 0.41<--, 0.45<--, 0.42<--, 0.39<--, 0.38<--    ABG - ( 2019 21:11 )  pH, Arterial: 7.48  pH, Blood: x     /  pCO2: 42    /  pO2: 141   / HCO3: 31    / Base Excess: 7.4   /  SaO2: 99        ====================RADIOLOGY and ECHOCARDIOGRAPHY=======================    CXR:    CT ANGIO HEAD: < from: CT Angio Head w/ IV Cont (16.18 @ 22:46) >  Again noted is a intraparenchymal hemorrhage within the left cerebellar hemisphere with surrounding vasogenic edema with intraventricular and   subarachnoid extension. Patient is now status post right frontal approach ventriculostomy catheter with tip in the left frontal lobe. Interval   improvement of hydrocephalus status post ventriculostomy catheter placement. Pneumocephalus and subcutaneous emphysema secondary to  instrumentation is noted. Continued mass effect on cerebral aqueduct and fourth ventricle.      CTA BRAIN    INTERNAL CAROTID ARTERIES:  The intracranial segments of the ICA are patent without hemodynamically significant stenosis, occlusion, or  aneurysm. The ICA bifurcations are unremarkable.    ANTERIOR CEREBRAL ARTERIES: No flow-limiting stenosis or occlusion. Anterior communicating artery is unremarkable without aneurysm.     MIDDLE CEREBRAL ARTERIES: No flow-limiting stenosis or occlusion. MCA bifurcations are unremarkable without aneurysm.     POSTERIOR CEREBRAL ARTERIES: No flow-limiting stenosis or occlusion. Posterior communicating arteries are not well seen bilaterally.     VERTEBROBASILAR SYSTEM: No flow-limiting stenosis or occlusion. Basilar tip is unremarkable.     Vascular nidus encompassing an area of approximately 1.3 x 1.5 x 1.7 cm centered in the cerebellar vermis supplied from primarily branches of the   superior cerebellar artery with drainage into the vein of Demetrio and straight sinus consistent with an AVM.       CTA NECK    Endotracheal tube terminates above the kavya.    RIGHT CAROTID SYSTEM: Normal in course and caliber without flow-limiting   stenosis or occlusion.     LEFT CAROTID SYSTEM: Normal in course and caliber without flow-limiting   stenosis or occlusion.     VERTEBRAL SYSTEM:  Normal in course and caliber  without flow-limiting stenosis or occlusion. Origin of the vertebral arteries are unremarkable.     AORTIC ARCH: Originof the great vessels are unremarkable.     IMPRESSION:    Noncontrast CT: Intracranial hemorrhage as on prior CT. Interval improvement of hydrocephalus status post ventriculostomy catheter   placement. New right frontal approach ventriculostomy catheter with tip in the left frontal lobe. Clinical correlation for correct placement is   suggested.    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 This is consistent with a Spetzler-Dave grade of 2.    CTA NECK: Patent cervical vasculature. No flow limiting stenosis or occlusion.     < end of copied text >      ECHOCARDIOGRAPHY: < from: Transthoracic Echocardiogram (18 @ 06:07) >  Conclusions:  Technically difficult/limited study.  1. Normal left ventricular internal dimensions.  2. Hyperdynamic left ventricular systolic function with an  estimated ejection fraction of 70-75%.  3. Normal right ventricular size and systolic function.  4. Cardiac valves not well visualized.  *** No previous Echo exam.    < end of copied text > [facility] DIVISION OF PULMONARY, CRITICAL CARE AND SLEEP MEDICINE  PULMONARY CONSULTATION NOTE  PHONE NUMBER [phone] MONDAY-FRIDAY 8A-5P or [phone] on NIGHTS/WEEKENDS/HOLIDAYS    NAME: BRIANNA AGUILA  MEDICAL RECORD NUMBER: [id number]    CHIEF COMPLAINT: Patient is a 58y old  Female who presents with a chief complaint of Cerebellar IPH (2019 06:47)      HISTORY OF PRESENT ILLNESS: 58F SLE/RA who initially presented with severe onset HA with nausea and vomiting. She was found to have cerebellar IPH with hydrocephalus and transferred to [facility] for further care. She shorty initially intact but then was intubatd for airway protection.    She initially had an EVD placed and has not done well with clmapingtrials. She is now planned for a VPS.  On further workup of her cerebellar IPH with IVH and was found to have a Vermian AVM n CA and underwent successful angiogram and embolization with  She has had autonomic storm requiring treatment with gabapentin/bromocriptine/benzos/fentanyl patch.    While in the NSCU she has required trach and PEG placement. She is now pending a VPS placement later today.    She required PRBC transfusion  and was transiently on vasopressors.      ====================BACKGROUND INFORMATION============================    FAMILY HISTORY: FAMILY HISTORY:  Family history of kidney disease in father (Father)  Family history of heart disease (Father)  Family history of osteoarthritis (Mother)  Family history of hypertension in mother (Mother)      PAST MEDICAL AND SURGICAL HISTORY: PAST MEDICAL & SURGICAL HISTORY:  Systemic lupus erythematosus, unspecified SLE type, unspecified organ involvement status  Lupus  Back pain, chronic  Childhood asthma  Ovarian cyst  DVT (deep venous thrombosis), bilateral: ( treated with Heparin and coumadin)  RA (rheumatoid arthritis)  No significant past surgical history  No significant past surgical history  Plantar fasciitis of right foot: h/o surgery  History of laparoscopic cholecystectomy:       ALLERGIES:Allergies: No Known Allergies    HOME MEDICATIONS: Reviewed      OUTPATIENT PULMONARY DOCTOR: None     SOCIAL HISTORY: Unable to obtain as patient nonverbal and unable to provide history  TOBACCO USE:  ALCOHOL:  DRUGS:  MARITAL STATUS:  EMPLOYMENT:  EXPOSURES:  RECENT TRAVELS:  PETS:  CODE STATUS: Full Code    ====================REVIEW OF SYSTEMS=====================================  CONSTITUTIONAL:  CARDIOVASCULAR:  PULMONARY:  GASTROINTESTINAL:  [] ALL OTHER REVIEW OF SYSTEMS ARE NEGATIVE   [x] UNABLE TO OBTAIN REVIEW OF SYSTEMS DUE TO lack of mental status      ====================PHYSICAL EXAM=========================================    VITALS: ICU Vital Signs Last 24 Hrs  T(C): 38.2 (2019 11:00), Max: 38.5 (2019 00:00)  T(F): 100.8 (2019 11:00), Max: 101.3 (2019 00:00)  HR: 94 (2019 11:) (81 - 100)  ABP: 137/71 (2019 11:00) (98/57 - 164/86)  ABP(mean): 96 (2019 11:) (73 - 116)  RR: 16 (2019 09:00) (16 - 16)  SpO2: 100% (2019 11:00) (98% - 100%)      INTAKE and OUTPUT: I&O's Summary    2019 07:  -  2019 07:00  --------------------------------------------------------  IN: 4415 mL / OUT: 4350 mL / NET: 65 mL    2019 07:  -  2019 11:59  --------------------------------------------------------  IN: 443 mL / OUT: 1621 mL / NET: -1178 mL        WEIGHT: Daily     Daily Weight in k.4 (2019 08:00)    GLUCOSE: CAPILLARY BLOOD GLUCOSE      VENTILATOR SETTINGS: Mode: AC/ CMV (Assist Control/ Continuous Mandatory Ventilation)  RR (machine): 16  TV (machine): 450  FiO2: 30  PEEP: 5  ITime: 1  MAP: 11  PIP: 25      GENERAL: no response, NAD, EVD in place   HEENT: NC, EVD in place, PERRL, MMM, nares clear  NECK: supple, no JVD  LYMPH NODES: no palpable supraclavicular LAD  CARDIOVASCULAR: RRR, S1S2  PULMONARY: coarse BS, no wheeze or rhonchi  ABDOMEN: soft, NT, ND, +BS  SKIN: warm and dry   EXTREMITIES: no clubbing or cyanosis  NEUROLOGIC: patient wiggles toes, no other response to noxious stimuli  PSYCHIATRIC: unable to assess    ====================MEDICATIONS===========================================  MEDICATIONS  (STANDING):  ALBUTerol/ipratropium for Nebulization 3 milliLiter(s) Nebulizer every 6 hours  bisacodyl Suppository 10 milliGRAM(s) Rectal at bedtime  bromocriptine Capsule 10 milliGRAM(s) Oral every 8 hours  cefepime   IVPB      cefepime   IVPB 2000 milliGRAM(s) IV Intermittent every 8 hours  chlorhexidine 0.12% Liquid 15 milliLiter(s) Swish and Spit two times a day  chlorhexidine 4% Liquid 1 Application(s) Topical <User Schedule>  clonazePAM Tablet 1 milliGRAM(s) Oral every 8 hours  docusate sodium Liquid 100 milliGRAM(s) Oral three times a day  enoxaparin Injectable 40 milliGRAM(s) SubCutaneous <User Schedule>  fentaNYL   Patch  50 MICROgram(s)/Hr 1 Patch Transdermal every 72 hours  gabapentin   Solution 300 milliGRAM(s) Oral every 8 hours  pantoprazole  Injectable 40 milliGRAM(s) IV Push daily  polyethylene glycol 3350 17 Gram(s) Oral two times a day  senna Syrup 5 milliLiter(s) Oral at bedtime  sodium chloride 2% . 1000 milliLiter(s) (50 mL/Hr) IV Continuous <Continuous>  vancomycin  IVPB 1750 milliGRAM(s) IV Intermittent every 8 hours      MEDICATIONS  (PRN):  acetaminophen    Suspension .. 650 milliGRAM(s) Oral every 6 hours PRN Temp greater or equal to 38.5C (101.3F), Mild Pain (1 - 3)      ====================LABORATORY RESULTS====================================  CBC Full  -  ( 2019 21:18 )  WBC Count : 7.9 K/uL  Hemoglobin : 8.0 g/dL  Hematocrit : 21.8 %  Platelet Count - Automated : 231 K/uL  Mean Cell Volume : 85.9 fl  Mean Cell Hemoglobin : 31.5 pg  Mean Cell Hemoglobin Concentration : 36.6 gm/dL                                          130<L>  |  95<L>  |  13  ----------------------------<  131<H>  4.1   |  27  |  0.39<L>    Ca    8.0<L>      2019 06:32  Phos  2.7       Mg     2.1         TPro  5.1<L>  /  Alb  2.2<L>  /  TBili  0.2  /  DBili  <0.1  /  AST  45<H>  /  ALT  49<H>  /  AlkPhos  65  12-31      Creatinine Trend: 0.39<--, 0.41<--, 0.45<--, 0.42<--, 0.39<--, 0.38<--    ABG - ( 2019 21:11 )  pH, Arterial: 7.48  pH, Blood: x     /  pCO2: 42    /  pO2: 141   / HCO3: 31    / Base Excess: 7.4   /  SaO2: 99        ====================RADIOLOGY and ECHOCARDIOGRAPHY=======================    CXR:    CT ANGIO HEAD: < from: CT Angio Head w/ IV Cont (.18 @ 22:46) >  Again noted is a intraparenchymal hemorrhage within the left cerebellar hemisphere with surrounding vasogenic edema with intraventricular and   subarachnoid extension. Patient is now status post right frontal approach ventriculostomy catheter with tip in the left frontal lobe. Interval   improvement of hydrocephalus status post ventriculostomy catheter placement. Pneumocephalus and subcutaneous emphysema secondary to  instrumentation is noted. Continued mass effect on cerebral aqueduct and fourth ventricle.      CTA BRAIN    INTERNAL CAROTID ARTERIES:  The intracranial segments of the ICA are patent without hemodynamically significant stenosis, occlusion, or  aneurysm. The ICA bifurcations are unremarkable.    ANTERIOR CEREBRAL ARTERIES: No flow-limiting stenosis or occlusion. Anterior communicating artery is unremarkable without aneurysm.     MIDDLE CEREBRAL ARTERIES: No flow-limiting stenosis or occlusion. MCA bifurcations are unremarkable without aneurysm.     POSTERIOR CEREBRAL ARTERIES: No flow-limiting stenosis or occlusion. Posterior communicating arteries are not well seen bilaterally.     VERTEBROBASILAR SYSTEM: No flow-limiting stenosis or occlusion. Basilar tip is unremarkable.     Vascular nidus encompassing an area of approximately 1.3 x 1.5 x 1.7 cm centered in the cerebellar vermis supplied from primarily branches of the   superior cerebellar artery with drainage into the vein of Demetrio and straight sinus consistent with an AVM.       CTA NECK    Endotracheal tube terminates above the kavya.    RIGHT CAROTID SYSTEM: Normal in course and caliber without flow-limiting   stenosis or occlusion.     LEFT CAROTID SYSTEM: Normal in course and caliber without flow-limiting   stenosis or occlusion.     VERTEBRAL SYSTEM:  Normal in course and caliber  without flow-limiting stenosis or occlusion. Origin of the vertebral arteries are unremarkable.     AORTIC ARCH: Originof the great vessels are unremarkable.     IMPRESSION:    Noncontrast CT: Intracranial hemorrhage as on prior CT. Interval improvement of hydrocephalus status post ventriculostomy catheter   placement. New right frontal approach ventriculostomy catheter with tip in the left frontal lobe. Clinical correlation for correct placement is   suggested.    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 This is consistent with a Spetzler-Dave grade of 2.    CTA NECK: Patent cervical vasculature. No flow limiting stenosis or occlusion.     < end of copied text >      ECHOCARDIOGRAPHY: < from: Transthoracic Echocardiogram (18 @ 06:07) >  Conclusions:  Technically difficult/limited study.  1. Normal left ventricular internal dimensions.  2. Hyperdynamic left ventricular systolic function with an  estimated ejection fraction of 70-75%.  3. Normal right ventricular size and systolic function.  4. Cardiac valves not well visualized.  *** No previous Echo exam.    < end of copied text >

## 2019-01-02 NOTE — PROGRESS NOTE ADULT - SUBJECTIVE AND OBJECTIVE BOX
SUMMARY:  59yo woman PMH lupus p/w sudden onset severe HA a/w nausea and vomiting. 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 Patient was intact on presentation however intubated for airway protection and EVD placed. Transferred to [facility] for further care.    24 HOUR EVENTS:  Weaned off pressors. Febrile 38.5.    HOSPITAL COURSE: Fevers, hydrocephalus, autonomic storm  12/17: s/p angio/embo of AVM, resection of AVM  12/18: RTOR for posterior fossa decompression expansion  12/24 s/p trach, EVD reopened per NSGY  12/26: MRSA in sputum  12/28:  CT head and EVD clamped.  12/29: EVD unclamped due to high ICPs.   12/30: Cefepime added   12/31: Received 1 unit PRBC overnight. Hypotensive requiring transient pressor.     VITALS/DATA/ORDERS: [x] Reviewed    EXAMINATION:  General:  calm  HEENT:  trach'd, PERRL  Neuro:  opens eyes to command, BLE wiggles toes to command, no commands in arms, does not mouth words, LUE prox 2/5 dist 0/5, RUE 0/5, toe movement but no lifting of legs  Cards:  RRR  Respiratory:  no respiratory distress  Abdomen:  soft  Extremities:  no edema  Skin:  warm/dry SUMMARY:  57yo woman PMH lupus p/w sudden onset severe HA a/w nausea and vomiting. 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 Patient was intact on presentation however intubated for airway protection and EVD placed. Transferred to [facility] for further care.    24 HOUR EVENTS:  Cleared for VPS by ID.     HOSPITAL COURSE: Fevers, hydrocephalus, autonomic storm  12/17: s/p angio/embo of AVM, resection of AVM  12/18: RTOR for posterior fossa decompression expansion  12/24 s/p trach, EVD reopened per NSGY  12/26: MRSA in sputum  12/28:  CT head and EVD clamped.  12/29: EVD unclamped due to high ICPs.   12/30: Cefepime added   12/31: Received 1 unit PRBC overnight. Hypotensive requiring transient pressor.     VITALS/DATA/ORDERS: [x] Reviewed    EXAMINATION:  General:  calm  HEENT:  trach'd, PERRL  Neuro:  opens eyes to command, BLE wiggles toes to command, no commands in arms, does not mouth words, LUE prox 2/5 dist 0/5, RUE 0/5, toe movement but no lifting of legs  Cards:  RRR  Respiratory:  no respiratory distress  Abdomen:  soft  Extremities:  no edema  Skin:  warm/dry

## 2019-01-02 NOTE — PROGRESS NOTE ADULT - SUBJECTIVE AND OBJECTIVE BOX
NSCU ATTENDING -- ADDITIONAL PROGRESS NOTE    Nighttime rounds were performed -- please refer to earlier Progress Note for HPI details.    T(C): 38.2 (01-02-19 @ 19:00), Max: 38.8 (01-02-19 @ 13:00)  HR: 98 (01-02-19 @ 21:09) (85 - 105)  BP: --  RR: 16 (01-02-19 @ 18:00) (16 - 31)  SpO2: 99% (01-02-19 @ 21:09) (99% - 100%)  Wt(kg): --    Relevant labwork and imaging reviewed.    Patient remains critically ill.    S/p trach, PEG.  Still awaiting definitive plan for VPS.  EVD open @ 15, ICPs within normal limits, drain patent, output 4-6 cc/hr.      Additional 30 minutes of critical care time.

## 2019-01-02 NOTE — CONSULT NOTE ADULT - ATTENDING COMMENTS
NOTE IS INCOMPLETE Patient is reasonable for transfer to RCU 24 hours after VPS placement and with stable post op CTH shunt. If she is stable without need for IV sedation or IV vasopressors and has a stable CTH tomorrow (1/3) then will plan for transfer to RCU. Her A-line will need to be removed prior to RCU transfer.

## 2019-01-02 NOTE — CONSULT NOTE ADULT - PROBLEM SELECTOR RECOMMENDATION 9
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

## 2019-01-03 LAB
ALBUMIN SERPL ELPH-MCNC: 2.4 G/DL — LOW (ref 3.3–5)
ALP SERPL-CCNC: 79 U/L — SIGNIFICANT CHANGE UP (ref 40–120)
ALT FLD-CCNC: 68 U/L — HIGH (ref 10–45)
ANION GAP SERPL CALC-SCNC: 7 MMOL/L — SIGNIFICANT CHANGE UP (ref 5–17)
ANION GAP SERPL CALC-SCNC: 7 MMOL/L — SIGNIFICANT CHANGE UP (ref 5–17)
ANION GAP SERPL CALC-SCNC: 8 MMOL/L — SIGNIFICANT CHANGE UP (ref 5–17)
AST SERPL-CCNC: 41 U/L — HIGH (ref 10–40)
BILIRUB DIRECT SERPL-MCNC: <0.1 MG/DL — SIGNIFICANT CHANGE UP (ref 0–0.2)
BILIRUB INDIRECT FLD-MCNC: >0.1 MG/DL — LOW (ref 0.2–1)
BILIRUB SERPL-MCNC: 0.2 MG/DL — SIGNIFICANT CHANGE UP (ref 0.2–1.2)
BUN SERPL-MCNC: 15 MG/DL — SIGNIFICANT CHANGE UP (ref 7–23)
CALCIUM SERPL-MCNC: 7.8 MG/DL — LOW (ref 8.4–10.5)
CALCIUM SERPL-MCNC: 8 MG/DL — LOW (ref 8.4–10.5)
CALCIUM SERPL-MCNC: 8.4 MG/DL — SIGNIFICANT CHANGE UP (ref 8.4–10.5)
CHLORIDE SERPL-SCNC: 100 MMOL/L — SIGNIFICANT CHANGE UP (ref 96–108)
CHLORIDE SERPL-SCNC: 102 MMOL/L — SIGNIFICANT CHANGE UP (ref 96–108)
CHLORIDE SERPL-SCNC: 103 MMOL/L — SIGNIFICANT CHANGE UP (ref 96–108)
CO2 SERPL-SCNC: 27 MMOL/L — SIGNIFICANT CHANGE UP (ref 22–31)
CO2 SERPL-SCNC: 28 MMOL/L — SIGNIFICANT CHANGE UP (ref 22–31)
CO2 SERPL-SCNC: 28 MMOL/L — SIGNIFICANT CHANGE UP (ref 22–31)
CREAT SERPL-MCNC: 0.4 MG/DL — LOW (ref 0.5–1.3)
CREAT SERPL-MCNC: 0.4 MG/DL — LOW (ref 0.5–1.3)
CREAT SERPL-MCNC: 0.41 MG/DL — LOW (ref 0.5–1.3)
CULTURE RESULTS: SIGNIFICANT CHANGE UP
CULTURE RESULTS: SIGNIFICANT CHANGE UP
GAS PNL BLDA: SIGNIFICANT CHANGE UP
GLUCOSE SERPL-MCNC: 128 MG/DL — HIGH (ref 70–99)
GLUCOSE SERPL-MCNC: 130 MG/DL — HIGH (ref 70–99)
GLUCOSE SERPL-MCNC: 136 MG/DL — HIGH (ref 70–99)
HCT VFR BLD CALC: 23.2 % — LOW (ref 34.5–45)
HGB BLD-MCNC: 8.3 G/DL — LOW (ref 11.5–15.5)
MAGNESIUM SERPL-MCNC: 2 MG/DL — SIGNIFICANT CHANGE UP (ref 1.6–2.6)
MAGNESIUM SERPL-MCNC: 2.1 MG/DL — SIGNIFICANT CHANGE UP (ref 1.6–2.6)
MCHC RBC-ENTMCNC: 31.6 PG — SIGNIFICANT CHANGE UP (ref 27–34)
MCHC RBC-ENTMCNC: 35.8 GM/DL — SIGNIFICANT CHANGE UP (ref 32–36)
MCV RBC AUTO: 88.4 FL — SIGNIFICANT CHANGE UP (ref 80–100)
PHOSPHATE SERPL-MCNC: 1.7 MG/DL — LOW (ref 2.5–4.5)
PHOSPHATE SERPL-MCNC: 3 MG/DL — SIGNIFICANT CHANGE UP (ref 2.5–4.5)
PLATELET # BLD AUTO: 273 K/UL — SIGNIFICANT CHANGE UP (ref 150–400)
POTASSIUM SERPL-MCNC: 3.6 MMOL/L — SIGNIFICANT CHANGE UP (ref 3.5–5.3)
POTASSIUM SERPL-MCNC: 3.9 MMOL/L — SIGNIFICANT CHANGE UP (ref 3.5–5.3)
POTASSIUM SERPL-MCNC: 4.5 MMOL/L — SIGNIFICANT CHANGE UP (ref 3.5–5.3)
POTASSIUM SERPL-SCNC: 3.6 MMOL/L — SIGNIFICANT CHANGE UP (ref 3.5–5.3)
POTASSIUM SERPL-SCNC: 3.9 MMOL/L — SIGNIFICANT CHANGE UP (ref 3.5–5.3)
POTASSIUM SERPL-SCNC: 4.5 MMOL/L — SIGNIFICANT CHANGE UP (ref 3.5–5.3)
PROT SERPL-MCNC: 5.9 G/DL — LOW (ref 6–8.3)
RBC # BLD: 2.63 M/UL — LOW (ref 3.8–5.2)
RBC # FLD: 16 % — HIGH (ref 10.3–14.5)
SODIUM SERPL-SCNC: 135 MMOL/L — SIGNIFICANT CHANGE UP (ref 135–145)
SODIUM SERPL-SCNC: 137 MMOL/L — SIGNIFICANT CHANGE UP (ref 135–145)
SODIUM SERPL-SCNC: 138 MMOL/L — SIGNIFICANT CHANGE UP (ref 135–145)
SPECIMEN SOURCE: SIGNIFICANT CHANGE UP
WBC # BLD: 7.7 K/UL — SIGNIFICANT CHANGE UP (ref 3.8–10.5)
WBC # FLD AUTO: 7.7 K/UL — SIGNIFICANT CHANGE UP (ref 3.8–10.5)

## 2019-01-03 PROCEDURE — 99233 SBSQ HOSP IP/OBS HIGH 50: CPT

## 2019-01-03 PROCEDURE — 99291 CRITICAL CARE FIRST HOUR: CPT

## 2019-01-03 PROCEDURE — 99292 CRITICAL CARE ADDL 30 MIN: CPT

## 2019-01-03 RX ORDER — SODIUM CHLORIDE 9 MG/ML
2 INJECTION INTRAMUSCULAR; INTRAVENOUS; SUBCUTANEOUS EVERY 6 HOURS
Qty: 0 | Refills: 0 | Status: DISCONTINUED | OUTPATIENT
Start: 2019-01-03 | End: 2019-01-07

## 2019-01-03 RX ORDER — PANTOPRAZOLE SODIUM 20 MG/1
40 TABLET, DELAYED RELEASE ORAL DAILY
Qty: 0 | Refills: 0 | Status: DISCONTINUED | OUTPATIENT
Start: 2019-01-03 | End: 2019-01-07

## 2019-01-03 RX ORDER — SODIUM CHLORIDE 9 MG/ML
500 INJECTION INTRAMUSCULAR; INTRAVENOUS; SUBCUTANEOUS ONCE
Qty: 0 | Refills: 0 | Status: COMPLETED | OUTPATIENT
Start: 2019-01-03 | End: 2019-01-03

## 2019-01-03 RX ORDER — POTASSIUM PHOSPHATE, MONOBASIC POTASSIUM PHOSPHATE, DIBASIC 236; 224 MG/ML; MG/ML
30 INJECTION, SOLUTION INTRAVENOUS ONCE
Qty: 0 | Refills: 0 | Status: COMPLETED | OUTPATIENT
Start: 2019-01-03 | End: 2019-01-03

## 2019-01-03 RX ORDER — POTASSIUM PHOSPHATE, MONOBASIC POTASSIUM PHOSPHATE, DIBASIC 236; 224 MG/ML; MG/ML
15 INJECTION, SOLUTION INTRAVENOUS ONCE
Qty: 0 | Refills: 0 | Status: DISCONTINUED | OUTPATIENT
Start: 2019-01-03 | End: 2019-01-03

## 2019-01-03 RX ORDER — ACETAMINOPHEN 500 MG
1000 TABLET ORAL ONCE
Qty: 0 | Refills: 0 | Status: COMPLETED | OUTPATIENT
Start: 2019-01-03 | End: 2019-01-03

## 2019-01-03 RX ORDER — POTASSIUM CHLORIDE 20 MEQ
40 PACKET (EA) ORAL ONCE
Qty: 0 | Refills: 0 | Status: COMPLETED | OUTPATIENT
Start: 2019-01-03 | End: 2019-01-03

## 2019-01-03 RX ORDER — SODIUM,POTASSIUM PHOSPHATES 278-250MG
1 POWDER IN PACKET (EA) ORAL
Qty: 0 | Refills: 0 | Status: DISCONTINUED | OUTPATIENT
Start: 2019-01-03 | End: 2019-01-07

## 2019-01-03 RX ADMIN — POTASSIUM PHOSPHATE, MONOBASIC POTASSIUM PHOSPHATE, DIBASIC 83.33 MILLIMOLE(S): 236; 224 INJECTION, SOLUTION INTRAVENOUS at 14:40

## 2019-01-03 RX ADMIN — Medication 400 MILLIGRAM(S): at 02:23

## 2019-01-03 RX ADMIN — CHLORHEXIDINE GLUCONATE 1 APPLICATION(S): 213 SOLUTION TOPICAL at 21:25

## 2019-01-03 RX ADMIN — Medication 650 MILLIGRAM(S): at 10:12

## 2019-01-03 RX ADMIN — BROMOCRIPTINE MESYLATE 10 MILLIGRAM(S): 5 CAPSULE ORAL at 21:25

## 2019-01-03 RX ADMIN — Medication 20 MILLIEQUIVALENT(S): at 14:38

## 2019-01-03 RX ADMIN — CEFEPIME 100 MILLIGRAM(S): 1 INJECTION, POWDER, FOR SOLUTION INTRAMUSCULAR; INTRAVENOUS at 05:52

## 2019-01-03 RX ADMIN — Medication 3 MILLILITER(S): at 17:09

## 2019-01-03 RX ADMIN — PANTOPRAZOLE SODIUM 40 MILLIGRAM(S): 20 TABLET, DELAYED RELEASE ORAL at 10:11

## 2019-01-03 RX ADMIN — CHLORHEXIDINE GLUCONATE 15 MILLILITER(S): 213 SOLUTION TOPICAL at 05:53

## 2019-01-03 RX ADMIN — SODIUM CHLORIDE 50 MILLILITER(S): 5 INJECTION, SOLUTION INTRAVENOUS at 01:16

## 2019-01-03 RX ADMIN — SODIUM CHLORIDE 1000 MILLILITER(S): 9 INJECTION INTRAMUSCULAR; INTRAVENOUS; SUBCUTANEOUS at 04:44

## 2019-01-03 RX ADMIN — Medication 1000 MILLIGRAM(S): at 03:00

## 2019-01-03 RX ADMIN — Medication 3 MILLILITER(S): at 00:43

## 2019-01-03 RX ADMIN — Medication 250 MILLIGRAM(S): at 06:39

## 2019-01-03 RX ADMIN — FENTANYL CITRATE 1 PATCH: 50 INJECTION INTRAVENOUS at 07:56

## 2019-01-03 RX ADMIN — Medication 1 PACKET(S): at 16:44

## 2019-01-03 RX ADMIN — FENTANYL CITRATE 1 PATCH: 50 INJECTION INTRAVENOUS at 18:13

## 2019-01-03 RX ADMIN — Medication 650 MILLIGRAM(S): at 11:12

## 2019-01-03 RX ADMIN — ENOXAPARIN SODIUM 40 MILLIGRAM(S): 100 INJECTION SUBCUTANEOUS at 17:53

## 2019-01-03 RX ADMIN — BROMOCRIPTINE MESYLATE 10 MILLIGRAM(S): 5 CAPSULE ORAL at 14:38

## 2019-01-03 RX ADMIN — Medication 1 PACKET(S): at 17:53

## 2019-01-03 RX ADMIN — GABAPENTIN 300 MILLIGRAM(S): 400 CAPSULE ORAL at 21:25

## 2019-01-03 RX ADMIN — SODIUM CHLORIDE 50 MILLILITER(S): 5 INJECTION, SOLUTION INTRAVENOUS at 10:11

## 2019-01-03 RX ADMIN — Medication 1 MILLIGRAM(S): at 14:38

## 2019-01-03 RX ADMIN — SODIUM CHLORIDE 2 GRAM(S): 9 INJECTION INTRAMUSCULAR; INTRAVENOUS; SUBCUTANEOUS at 17:53

## 2019-01-03 RX ADMIN — Medication 3 MILLILITER(S): at 11:29

## 2019-01-03 RX ADMIN — Medication 200 GRAM(S): at 01:15

## 2019-01-03 RX ADMIN — Medication 3 MILLILITER(S): at 05:46

## 2019-01-03 RX ADMIN — Medication 1 MILLIGRAM(S): at 21:25

## 2019-01-03 RX ADMIN — SODIUM CHLORIDE 2 GRAM(S): 9 INJECTION INTRAMUSCULAR; INTRAVENOUS; SUBCUTANEOUS at 12:26

## 2019-01-03 RX ADMIN — CHLORHEXIDINE GLUCONATE 15 MILLILITER(S): 213 SOLUTION TOPICAL at 17:54

## 2019-01-03 RX ADMIN — GABAPENTIN 300 MILLIGRAM(S): 400 CAPSULE ORAL at 05:53

## 2019-01-03 RX ADMIN — GABAPENTIN 300 MILLIGRAM(S): 400 CAPSULE ORAL at 15:24

## 2019-01-03 RX ADMIN — BROMOCRIPTINE MESYLATE 10 MILLIGRAM(S): 5 CAPSULE ORAL at 05:52

## 2019-01-03 NOTE — PROGRESS NOTE ADULT - SUBJECTIVE AND OBJECTIVE BOX
CC: f/u for fever    Patient remans somnolent, poorly interactive on Vent, tube feeds 80/hr    REVIEW OF SYSTEMS:  Not provided on Vent    Antimicrobials off  Medications Reviewed    Vital Signs Last 24 Hrs  T(F): 101.3 (2019 11:00), Max: 101.7 (2019 02:00)  HR: 98 (2019 12:00) (85 - 105)  BP: --  BP(mean): --  RR: 18 (2019 12:00) (16 - 18)  SpO2: 100% (2019 12:00) (98% - 100%)    PHYSICAL EXAM:  General: no acute distress  EVD in place  Eyes:  anicteric, no conjunctival injection  Oropharynx: no lesions  	  Neck: trach  Lungs: coarse BSs  Heart: regular rate and rhythm; no murmur, rubs or gallops  Abdomen: soft, nondistended, nontender, G tube site clean  Skin: no lesions  Extremities: dependent edema.  R PICC site clean  Neurologic: poorly interactive on Vent    LAB RESULTS:                        8.8    8.2   )-----------( 273      ( 2019 22:41 )             24.4       137  |  102  |  15  ----------------------------<  128<H>  3.9   |  28  |  0.40<L>    Ca    7.8<L>      2019 12:41  Phos  1.7       Mg     2.1           Urinalysis Basic - ( 30 Dec 2018 18:51 )    Color: Yellow / Appearance: Slightly Turbid / S.031 / pH: x  Gluc: x / Ketone: Negative  / Bili: Negative / Urobili: Negative   Blood: x / Protein: 30 mg/dL / Nitrite: Negative   Leuk Esterase: Negative / RBC: 21 /hpf / WBC 4 /hpf   Sq Epi: x / Non Sq Epi: 6 /hpf / Bacteria: Negative      MICROBIOLOGY:  RECENT CULTURES:  Culture - Sputum . (19 @ 01:18)  Normal Respiratory Tori present    Culture - Blood (19 @ 21:20)    Specimen Source: .Blood Blood    Culture Results:   No growth to date.    Culture - CSF with Gram Stain . (19 @ 21:15)  No growth     @ 19:35 .CSF CSF, shunt     No growth     @ 13:15 .Blood Blood-Peripheral     No growth to date.     @ 20:25 .CSF CSF     No growth     @ 07:31 .Sputum Sputum Escherichia coli  Methicillin resistant Staphylococcus aureus    Moderate Escherichia coli  Moderate Methicillin resistant Staphylococcus aureus  Normal Respiratory Tori absent    RADIOLOGY REVIEWED:  CT Head No Cont (18 @ 09:10) >  Increase in size of the lateral and third ventricles seen   when compared with the prior exam.

## 2019-01-03 NOTE — PROGRESS NOTE ADULT - PROBLEM SELECTOR PLAN 1
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

## 2019-01-03 NOTE — PROGRESS NOTE ADULT - ATTENDING COMMENTS
Please call us back once patient has had VPS placed and had stable CTH post-placement. We will re-evaluate for transfer to RCU at that time.    Mohsen Mills MD  [phone]

## 2019-01-03 NOTE — PROGRESS NOTE ADULT - ASSESSMENT
58F SLE/RA who initially presented with severe onset HA with nausea and vomiting. 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 She has required trach, PEG, and is now pending VPS placement. Pulmonary evaluation called for RCU evaluation

## 2019-01-03 NOTE — PROGRESS NOTE ADULT - PROBLEM SELECTOR PLAN 4
- s/p tracheostomy  - continue mechanical ventilation and wean as tolerated  - her poor mental status may limit her ability to wean but by report she has no underlying lung disease  - has had MRSA in sputum

## 2019-01-03 NOTE — PROGRESS NOTE ADULT - SUBJECTIVE AND OBJECTIVE BOX
SUMMARY:  59yo woman PMH lupus p/w sudden onset severe HA a/w nausea and vomiting. 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 Patient was intact on presentation however intubated for airway protection and EVD placed. Transferred to [facility] for further care.    24 HOUR EVENTS:  febrile overnight     HOSPITAL COURSE: Fevers, hydrocephalus, autonomic storm  12/17: s/p angio/embo of AVM, resection of AVM  12/18: RTOR for posterior fossa decompression expansion  12/24 s/p trach, EVD reopened per NSGY  12/26: MRSA in sputum  12/28:  CT head and EVD clamped.  12/29: EVD unclamped due to high ICPs.   12/30: Cefepime added   12/31: Received 1 unit PRBC overnight. Hypotensive requiring transient pressor.     VITALS/DATA/ORDERS: [x] Reviewed    EXAMINATION:  General:  calm  HEENT:  trach'd, PERRL  Neuro:  opens eyes to command, BLE wiggles toes to command, no commands in arms, does not mouth words, LUE prox 2/5 dist 0/5, RUE 0/5, toe movement but no lifting of legs  Cards:  RRR  Respiratory:  no respiratory distress  Abdomen:  soft  Extremities:  no edema  Skin:  warm/dry

## 2019-01-03 NOTE — PROGRESS NOTE ADULT - ASSESSMENT
57 yo female with SLE admitted 12/16 with cerebellar AVM and bleed, electively intubated, EVD placed  S/P cerebellar [id number] with AVM resection, s/p return to OR on 12/18 for posterior fossa decompression.  Colonized with MRSA in sputum, no radiographic evidence of pneumonia.  Ongoing fever likely central, secondary to ICH directly  All her blood and CSF cultures negative.  Sputum with MRSA and E coli, likely colonizers, but covered with Vanco and Cefepime- still febrile despite full course  Hemodyn stable  WBC normal    Suggest:  Agree with d/c Vanco and Cefepime  Follow temps and CBC/diff   No contraindication to VPS  D/w NSCU team

## 2019-01-03 NOTE — PROGRESS NOTE ADULT - ASSESSMENT
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 - s/p successful ANGIO/EMBO, posterior fossa decompression  ICH score 2    Neuro:  Hydro: EVD in place, VPS planned for today  Fentanyl PRN  Autonomic storming: c/w Gabapentin, bromocriptine, clonazepam, fentanyl patch   OT for splint    Cards:   HTN: SBP 90-160mmHg  Off pressors now off clonidine    Pulm:  s/p trach, wean vent as tolerated  RCU consult once status post VPS  Chest PT    GI: TF via PEG     Renal: IVL  Hyponatremia: on 2%NaCl, goal eunatremia    Endo:  goal euglycemia    Heme:  SCDs  DVT ppx Lovenox     ID:  Fever, possibly central but antibiotics for now per ID (total 7 days planned)    ICU  full code    at risk for deterioration and death due to hydrocephalus, cerebral edema, brain compression  critical care time 45min 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 - s/p successful ANGIO/EMBO, posterior fossa decompression  ICH score 2    Neuro:  Hydro: EVD in place, VPS planned for Monday  Fentanyl PRN  Autonomic storming: c/w Gabapentin, bromocriptine, clonazepam, fentanyl patch   OT for splint    Cards:   HTN: SBP 90-160mmHg  Off pressors now off clonidine    Pulm:  s/p trach, wean vent as tolerated  RCU once status post VPS  Chest PT    GI: TF via PEG     Renal: IVL  Hyponatremia: on 2%NaCl - start salt tabs and wean off fluids, goal eunatremia    Endo:  goal euglycemia    Heme:  SCDs  DVT ppx Lovenox     ID:  Fever, possibly central but antibiotics for now per ID (total 7 days planned)    ICU  full code    at risk for deterioration and death due to hydrocephalus, cerebral edema, brain compression  critical care time 45min

## 2019-01-03 NOTE — PROGRESS NOTE ADULT - SUBJECTIVE AND OBJECTIVE BOX
Visit Summary: Patient seen and evaluated at bedside.    Overnight Events: none    Exam:  T(C): 38.1 (01-02-19 @ 23:00), Max: 38.8 (01-02-19 @ 13:00)  HR: 97 (01-03-19 @ 00:37) (87 - 105)  BP: --  RR: 16 (01-02-19 @ 18:00) (16 - 31)  SpO2: 100% (01-03-19 @ 00:37) (99% - 100%)  Wt(kg): --    EOS, BLE wiggles toes to commands, not FC in BUE  PERRL  Trace spont movement BLE                                   8.8    8.2   )-----------( 273      ( 02 Jan 2019 22:41 )             24.4     01-02    136  |  99  |  14  ----------------------------<  136<H>  4.4   |  27  |  0.41<L>    Ca    7.8<L>      02 Jan 2019 22:41  Phos  2.7     01-02  Mg     2.1     01-02

## 2019-01-03 NOTE — PROGRESS NOTE ADULT - SUBJECTIVE AND OBJECTIVE BOX
[facility] DIVISION OF PULMONARY, CRITICAL CARE and SLEEP MEDICINE  PULMONARY PROGRESS NOTE  FOR ANY QUESTIONS PLEASE CALL [phone] MONDAY - FRIDAY 8a-5p or [phone] on NIGHTS/WEEKENDS/HOLIDAYS    PATIENT INFORMATION:  NAME: BRIANNA AGUILA:  MRN: [id number]    CHIEF COMPLAINT: Patient is a 58y old  Female who presents with a chief complaint of Cerebellar IPH (03 Jan 2019 06:44)      [x] INITIAL CONSULT, H&P, FAMILY HISTORY and PAST MEDICAL AND SURGICAL HISTORY REVIEWED    OVERNIGHT EVENTS or CHANGES TO HPI:  Shunt not yet placed - EVD still in place - and no clear date for VPS placement yet.  Patient still with fevers     ========================REVIEW OF SYSTEMS========================  CONSTITUTIONAL:  CARDIOVASCULAR:  PULMONARY:  [] REMAINING REVIEW OF SYSTEMS NEGATIVE  [x] UNABLE TO OBTAIN REVIEW OF SYSTEMS DUE TO lack of mental status    ========================MEDICATIONS=============================  MEDICATIONS  (STANDING):  ALBUTerol/ipratropium for Nebulization 3 milliLiter(s) Nebulizer every 6 hours  bromocriptine Capsule 10 milliGRAM(s) Oral every 8 hours  chlorhexidine 0.12% Liquid 15 milliLiter(s) Swish and Spit two times a day  chlorhexidine 4% Liquid 1 Application(s) Topical <User Schedule>  clonazePAM Tablet 1 milliGRAM(s) Oral every 8 hours  enoxaparin Injectable 40 milliGRAM(s) SubCutaneous <User Schedule>  fentaNYL   Patch  50 MICROgram(s)/Hr 1 Patch Transdermal every 72 hours  gabapentin   Solution 300 milliGRAM(s) Oral every 8 hours  pantoprazole   Suspension 40 milliGRAM(s) Oral daily  sodium chloride 2 Gram(s) Oral every 6 hours  sodium chloride 2% . 1000 milliLiter(s) (50 mL/Hr) IV Continuous <Continuous>    MEDICATIONS  (PRN):  acetaminophen    Suspension .. 650 milliGRAM(s) Oral every 6 hours PRN Temp greater or equal to 38.5C (101.3F), Mild Pain (1 - 3)      ========================PHYSICAL EXAM============================    VITALS: ICU Vital Signs Last 24 Hrs  T(C): 37.6 (03 Jan 2019 07:00), Max: 38.8 (02 Jan 2019 13:00)  T(F): 99.7 (03 Jan 2019 07:00), Max: 101.8 (02 Jan 2019 13:00)  HR: 97 (03 Jan 2019 09:00) (85 - 105)  ABP: 144/78 (03 Jan 2019 09:00) (90/50 - 171/87)  ABP(mean): 103 (03 Jan 2019 09:00) (66 - 119)  RR: 18 (03 Jan 2019 09:00) (16 - 31)  SpO2: 98% (03 Jan 2019 09:00) (98% - 100%)      INTAKE and OUTPUT: I&O's Summary    02 Jan 2019 07:01  -  03 Jan 2019 07:00  --------------------------------------------------------  IN: 5153 mL / OUT: 4774 mL / NET: 379 mL    03 Jan 2019 07:01  -  03 Jan 2019 10:06  --------------------------------------------------------  IN: 260 mL / OUT: 8 mL / NET: 252 mL      VENTILATOR SETTINGS: Mode: AC/ CMV (Assist Control/ Continuous Mandatory Ventilation)  RR (machine): 16  TV (machine): 450  FiO2: 30  PEEP: 5  ITime: 1  MAP: 12  PIP: 27    GENERAL: no response, NAD, EVD in place   HEENT: NC, EVD in place, PERRL, MMM, nares clear  NECK: supple, no JVD  LYMPH NODES: no palpable supraclavicular LAD  CARDIOVASCULAR: RRR, S1S2  PULMONARY: coarse BS, no wheeze or rhonchi  ABDOMEN: soft, NT, ND, +BS  SKIN: warm and dry   EXTREMITIES: no clubbing or cyanosis  NEUROLOGIC: patient wiggles toes, no other response to noxious stimuli  PSYCHIATRIC: unable to assess      ========================LABORATORY RESULTS AND IMAGING=============                        8.8    8.2   )-----------( 273      ( 02 Jan 2019 22:41 )             24.4                                                    01-03    135  |  100  |  15  ----------------------------<  136<H>  3.6   |  27  |  0.41<L>    Ca    8.0<L>      03 Jan 2019 06:11  Phos  2.7     01-02  Mg     2.1     01-02      ABG - ( 01 Jan 2019 21:11 )  pH, Arterial: 7.48  pH, Blood: x     /  pCO2: 42    /  pO2: 141   / HCO3: 31    / Base Excess: 7.4   /  SaO2: 99          Creatinine Trend: 0.41<--, 0.41<--, 0.42<--, 0.39<--, 0.41<--, 0.45<--    CT CHEST:     [] RADIOLOGY REVIEWED AND INTERPRETED BY ME      THANK YOU FOR ALLOWING US TO PARTICIPATE IN THE CARE OF THIS PATIENT

## 2019-01-04 LAB
ANION GAP SERPL CALC-SCNC: 10 MMOL/L — SIGNIFICANT CHANGE UP (ref 5–17)
APPEARANCE CSF: ABNORMAL
APPEARANCE SPUN FLD: SIGNIFICANT CHANGE UP
APPEARANCE UR: CLEAR — SIGNIFICANT CHANGE UP
BILIRUB UR-MCNC: NEGATIVE — SIGNIFICANT CHANGE UP
BUN SERPL-MCNC: 14 MG/DL — SIGNIFICANT CHANGE UP (ref 7–23)
CALCIUM SERPL-MCNC: 8.2 MG/DL — LOW (ref 8.4–10.5)
CHLORIDE SERPL-SCNC: 95 MMOL/L — LOW (ref 96–108)
CO2 SERPL-SCNC: 28 MMOL/L — SIGNIFICANT CHANGE UP (ref 22–31)
COLOR CSF: ABNORMAL
COLOR SPEC: SIGNIFICANT CHANGE UP
CREAT SERPL-MCNC: 0.37 MG/DL — LOW (ref 0.5–1.3)
CULTURE RESULTS: SIGNIFICANT CHANGE UP
CULTURE RESULTS: SIGNIFICANT CHANGE UP
DIFF PNL FLD: NEGATIVE — SIGNIFICANT CHANGE UP
GAS PNL BLDA: SIGNIFICANT CHANGE UP
GLUCOSE CSF-MCNC: 77 MG/DL — HIGH (ref 40–70)
GLUCOSE SERPL-MCNC: 122 MG/DL — HIGH (ref 70–99)
GLUCOSE UR QL: NEGATIVE — SIGNIFICANT CHANGE UP
GRAM STN FLD: SIGNIFICANT CHANGE UP
HCT VFR BLD CALC: 24.5 % — LOW (ref 34.5–45)
HGB BLD-MCNC: 8.6 G/DL — LOW (ref 11.5–15.5)
KETONES UR-MCNC: NEGATIVE — SIGNIFICANT CHANGE UP
LACTATE CSF-MCNC: 1.8 MMOL/L — SIGNIFICANT CHANGE UP (ref 1.1–2.4)
LEUKOCYTE ESTERASE UR-ACNC: NEGATIVE — SIGNIFICANT CHANGE UP
LYMPHOCYTES # CSF: 31 % — LOW (ref 40–80)
MAGNESIUM SERPL-MCNC: 2.1 MG/DL — SIGNIFICANT CHANGE UP (ref 1.6–2.6)
MCHC RBC-ENTMCNC: 31.4 PG — SIGNIFICANT CHANGE UP (ref 27–34)
MCHC RBC-ENTMCNC: 35.3 GM/DL — SIGNIFICANT CHANGE UP (ref 32–36)
MCV RBC AUTO: 89 FL — SIGNIFICANT CHANGE UP (ref 80–100)
MONOS+MACROS NFR CSF: 38 % — SIGNIFICANT CHANGE UP (ref 15–45)
NEUTROPHILS # CSF: 31 % — HIGH (ref 0–6)
NITRITE UR-MCNC: NEGATIVE — SIGNIFICANT CHANGE UP
NRBC NFR CSF: 1 /UL — SIGNIFICANT CHANGE UP (ref 0–5)
PH UR: 7.5 — SIGNIFICANT CHANGE UP (ref 5–8)
PHOSPHATE SERPL-MCNC: 2.7 MG/DL — SIGNIFICANT CHANGE UP (ref 2.5–4.5)
PLATELET # BLD AUTO: 287 K/UL — SIGNIFICANT CHANGE UP (ref 150–400)
POTASSIUM SERPL-MCNC: 4.4 MMOL/L — SIGNIFICANT CHANGE UP (ref 3.5–5.3)
POTASSIUM SERPL-SCNC: 4.4 MMOL/L — SIGNIFICANT CHANGE UP (ref 3.5–5.3)
PROT CSF-MCNC: 44 MG/DL — SIGNIFICANT CHANGE UP (ref 15–45)
PROT UR-MCNC: NEGATIVE — SIGNIFICANT CHANGE UP
RBC # BLD: 2.75 M/UL — LOW (ref 3.8–5.2)
RBC # CSF: 940 /UL — HIGH (ref 0–0)
RBC # FLD: 15.8 % — HIGH (ref 10.3–14.5)
SODIUM SERPL-SCNC: 133 MMOL/L — LOW (ref 135–145)
SP GR SPEC: 1.01 — SIGNIFICANT CHANGE UP (ref 1.01–1.02)
SPECIMEN SOURCE: SIGNIFICANT CHANGE UP
TUBE TYPE: SIGNIFICANT CHANGE UP
UROBILINOGEN FLD QL: NEGATIVE — SIGNIFICANT CHANGE UP
WBC # BLD: 7.2 K/UL — SIGNIFICANT CHANGE UP (ref 3.8–10.5)
WBC # FLD AUTO: 7.2 K/UL — SIGNIFICANT CHANGE UP (ref 3.8–10.5)

## 2019-01-04 PROCEDURE — 99291 CRITICAL CARE FIRST HOUR: CPT

## 2019-01-04 PROCEDURE — 71045 X-RAY EXAM CHEST 1 VIEW: CPT | Mod: 26

## 2019-01-04 PROCEDURE — 99292 CRITICAL CARE ADDL 30 MIN: CPT

## 2019-01-04 PROCEDURE — 61026 INJECTION INTO BRAIN CANAL: CPT | Mod: 78

## 2019-01-04 RX ORDER — FENTANYL CITRATE 50 UG/ML
25 INJECTION INTRAVENOUS ONCE
Qty: 0 | Refills: 0 | Status: DISCONTINUED | OUTPATIENT
Start: 2019-01-04 | End: 2019-01-04

## 2019-01-04 RX ORDER — SODIUM CHLORIDE 5 G/100ML
1000 INJECTION, SOLUTION INTRAVENOUS
Qty: 0 | Refills: 0 | Status: DISCONTINUED | OUTPATIENT
Start: 2019-01-04 | End: 2019-01-05

## 2019-01-04 RX ORDER — VANCOMYCIN HCL 1 G
1750 VIAL (EA) INTRAVENOUS EVERY 8 HOURS
Qty: 0 | Refills: 0 | Status: DISCONTINUED | OUTPATIENT
Start: 2019-01-04 | End: 2019-01-05

## 2019-01-04 RX ORDER — CEFEPIME 1 G/1
2000 INJECTION, POWDER, FOR SOLUTION INTRAMUSCULAR; INTRAVENOUS EVERY 8 HOURS
Qty: 0 | Refills: 0 | Status: DISCONTINUED | OUTPATIENT
Start: 2019-01-04 | End: 2019-01-05

## 2019-01-04 RX ADMIN — Medication 1 PACKET(S): at 12:04

## 2019-01-04 RX ADMIN — SODIUM CHLORIDE 2 GRAM(S): 9 INJECTION INTRAMUSCULAR; INTRAVENOUS; SUBCUTANEOUS at 01:39

## 2019-01-04 RX ADMIN — Medication 1 MILLIGRAM(S): at 21:33

## 2019-01-04 RX ADMIN — FENTANYL CITRATE 25 MICROGRAM(S): 50 INJECTION INTRAVENOUS at 09:05

## 2019-01-04 RX ADMIN — Medication 1 MILLIGRAM(S): at 14:00

## 2019-01-04 RX ADMIN — SODIUM CHLORIDE 2 GRAM(S): 9 INJECTION INTRAMUSCULAR; INTRAVENOUS; SUBCUTANEOUS at 23:02

## 2019-01-04 RX ADMIN — BROMOCRIPTINE MESYLATE 10 MILLIGRAM(S): 5 CAPSULE ORAL at 14:00

## 2019-01-04 RX ADMIN — FENTANYL CITRATE 25 MICROGRAM(S): 50 INJECTION INTRAVENOUS at 09:20

## 2019-01-04 RX ADMIN — SODIUM CHLORIDE 2 GRAM(S): 9 INJECTION INTRAMUSCULAR; INTRAVENOUS; SUBCUTANEOUS at 12:04

## 2019-01-04 RX ADMIN — CEFEPIME 100 MILLIGRAM(S): 1 INJECTION, POWDER, FOR SOLUTION INTRAMUSCULAR; INTRAVENOUS at 22:52

## 2019-01-04 RX ADMIN — SODIUM CHLORIDE 2 GRAM(S): 9 INJECTION INTRAMUSCULAR; INTRAVENOUS; SUBCUTANEOUS at 17:21

## 2019-01-04 RX ADMIN — Medication 1 PACKET(S): at 01:39

## 2019-01-04 RX ADMIN — BROMOCRIPTINE MESYLATE 10 MILLIGRAM(S): 5 CAPSULE ORAL at 05:39

## 2019-01-04 RX ADMIN — CHLORHEXIDINE GLUCONATE 1 APPLICATION(S): 213 SOLUTION TOPICAL at 21:34

## 2019-01-04 RX ADMIN — Medication 650 MILLIGRAM(S): at 07:39

## 2019-01-04 RX ADMIN — GABAPENTIN 300 MILLIGRAM(S): 400 CAPSULE ORAL at 14:00

## 2019-01-04 RX ADMIN — Medication 1 PACKET(S): at 05:39

## 2019-01-04 RX ADMIN — FENTANYL CITRATE 1 PATCH: 50 INJECTION INTRAVENOUS at 19:00

## 2019-01-04 RX ADMIN — Medication 1 MILLIGRAM(S): at 05:38

## 2019-01-04 RX ADMIN — Medication 3 MILLILITER(S): at 11:52

## 2019-01-04 RX ADMIN — Medication 650 MILLIGRAM(S): at 15:13

## 2019-01-04 RX ADMIN — Medication 3 MILLILITER(S): at 00:48

## 2019-01-04 RX ADMIN — Medication 3 MILLILITER(S): at 17:05

## 2019-01-04 RX ADMIN — SODIUM CHLORIDE 2 GRAM(S): 9 INJECTION INTRAMUSCULAR; INTRAVENOUS; SUBCUTANEOUS at 05:38

## 2019-01-04 RX ADMIN — Medication 1 PACKET(S): at 23:02

## 2019-01-04 RX ADMIN — GABAPENTIN 300 MILLIGRAM(S): 400 CAPSULE ORAL at 22:52

## 2019-01-04 RX ADMIN — ENOXAPARIN SODIUM 40 MILLIGRAM(S): 100 INJECTION SUBCUTANEOUS at 17:21

## 2019-01-04 RX ADMIN — Medication 650 MILLIGRAM(S): at 15:33

## 2019-01-04 RX ADMIN — Medication 650 MILLIGRAM(S): at 06:59

## 2019-01-04 RX ADMIN — CHLORHEXIDINE GLUCONATE 15 MILLILITER(S): 213 SOLUTION TOPICAL at 17:21

## 2019-01-04 RX ADMIN — Medication 3 MILLILITER(S): at 05:23

## 2019-01-04 RX ADMIN — Medication 1 PACKET(S): at 17:21

## 2019-01-04 RX ADMIN — FENTANYL CITRATE 1 PATCH: 50 INJECTION INTRAVENOUS at 09:09

## 2019-01-04 RX ADMIN — GABAPENTIN 300 MILLIGRAM(S): 400 CAPSULE ORAL at 06:52

## 2019-01-04 RX ADMIN — CHLORHEXIDINE GLUCONATE 15 MILLILITER(S): 213 SOLUTION TOPICAL at 05:38

## 2019-01-04 RX ADMIN — Medication 250 MILLIGRAM(S): at 23:46

## 2019-01-04 RX ADMIN — PANTOPRAZOLE SODIUM 40 MILLIGRAM(S): 20 TABLET, DELAYED RELEASE ORAL at 12:05

## 2019-01-04 RX ADMIN — BROMOCRIPTINE MESYLATE 10 MILLIGRAM(S): 5 CAPSULE ORAL at 21:33

## 2019-01-04 NOTE — PROGRESS NOTE ADULT - SUBJECTIVE AND OBJECTIVE BOX
night attending note    12/30 CSF cultures growing [facility]  Febrile during the day    vitals/labs/meds reviewed  trach'ed, LLE wiggles toes to command, no movement in UE

## 2019-01-04 NOTE — PROGRESS NOTE ADULT - ASSESSMENT
57 yo female with SLE admitted 12/16 with cerebellar AVM and bleed, electively intubated, EVD placed  S/P cerebellar [id number] with AVM resection, s/p return to OR on 12/18 for posterior fossa decompression.  Colonized with MRSA in sputum, no radiographic evidence of pneumonia.  Ongoing fever likely central, secondary to ICH directly  All blood and CSF cultures negative.  Prior sputum with MRSA and E coli, likely colonizers, but covered with Vanco and Cefepime- still febrile despite full course  Repeat sputum normal caleb only  Hemodyn stable  WBC normal    Suggest:  Fever still best explained on central basis- febrile despite full course abx- continue to observe off abx for now  Follow temps and CBC/diff   Surveillance Cxs as per routine  No contraindication to VPS  D/w NSCU team

## 2019-01-04 NOTE — PROGRESS NOTE ADULT - ASSESSMENT
-EVD at [id number]  -start vanc/cefepime, and f/u with ID for +CSF cultures  -fever control  -pressure support as tolerated  -hyponatremia on restarted on hypertonics    critical care time 35min -EVD at [id number]  -start vanc/cefepime, and f/u with ID for +CSF cultures; f/u bcx  -fever control  -pressure support as tolerated  -hyponatremia on restarted on hypertonics    critical care time 35min

## 2019-01-04 NOTE — PROVIDER CONTACT NOTE (CRITICAL VALUE NOTIFICATION) - SITUATION
Hemoglobin = 6.3; Hematocrit = 20
Patient admitted for IPH
Sputum Culture: Moderate E.Coli, Moderate MRSA, normal respiratory caleb absent
Bronchi Culture positive for numerous MRSA and respiratory caleb absent
CSF drawn on 12/5, prelim growth in [employer], Gram positive cocci in clusters
Numerous MRSA in sputum culture from 12/26

## 2019-01-04 NOTE — PROGRESS NOTE ADULT - SUBJECTIVE AND OBJECTIVE BOX
CC: f/u for fever    Patient remans somnolent, febrile, poorly interactive on Vent, tube feeds 80/hr    REVIEW OF SYSTEMS:  Not provided on Vent    Antimicrobials off  Medications Reviewed    ICU Vital Signs Last 24 Hrs  T(F): 100.6 (2019 17:00), Max: 102.4 (2019 07:00)  HR: 85 (2019 17:05) (85 - 104)  BP: --  BP(mean): --  ABP: 124/68 (2019 17:00) (98/53 - 158/77)  ABP(mean): 87 (2019 17:00) (70 - 115)  RR: --  SpO2: 100% (2019 17:05) (97% - 100%)    PHYSICAL EXAM:  General: no acute distress  EVD in place  Eyes:  anicteric, no conjunctival injection  Oropharynx: no lesions  	  Neck: trach  Lungs: coarse BSs  Heart: regular rate and rhythm; no murmur, rubs or gallops  Abdomen: soft, nondistended, nontender, G tube site clean  Skin: no lesions  Extremities: dependent edema.  R PICC site clean  Neurologic: poorly interactive on Vent    LAB RESULTS:                        8.3    7.7   )-----------( 273      ( 2019 20:27 )             23.2   01-03    138  |  103  |  15  ----------------------------<  130<H>  4.5   |  28  |  0.40<L>    Ca    8.4      2019 20:27  Phos  3.0     01-03  Mg     2.0     -03    TPro  5.9<L>  /  Alb  2.4<L>  /  TBili  0.2  /  DBili  <0.1  /  AST  41<H>  /  ALT  68<H>  /  AlkPhos  79  01-03      Urinalysis Basic - ( 30 Dec 2018 18:51 )    Color: Yellow / Appearance: Slightly Turbid / S.031 / pH: x  Gluc: x / Ketone: Negative  / Bili: Negative / Urobili: Negative   Blood: x / Protein: 30 mg/dL / Nitrite: Negative   Leuk Esterase: Negative / RBC: 21 /hpf / WBC 4 /hpf   Sq Epi: x / Non Sq Epi: 6 /hpf / Bacteria: Negative      MICROBIOLOGY:  RECENT CULTURES:  Culture - CSF with Gram Stain . (19 @ 15:26)    Gram Stain:   polymorphonuclear leukocytes seen  No organisms seen  by cytocentrifuge    Specimen Source: .CSF CSF    Culture - Sputum . (19 @ 01:18)  Normal Respiratory Tori present    Culture - Blood (19 @ 21:20)    Specimen Source: .Blood Blood    Culture Results:   No growth to date.    Culture - CSF with Gram Stain . (19 @ 21:15)  No growth    12 @ 19:35 .CSF CSF, shunt     No growth     @ 13:15 .Blood Blood-Peripheral     No growth to date.     @ 20:25 .CSF CSF     No growth     @ 07:31 .Sputum Sputum Escherichia coli  Methicillin resistant Staphylococcus aureus    Moderate Escherichia coli  Moderate Methicillin resistant Staphylococcus aureus  Normal Respiratory Tori absent    RADIOLOGY REVIEWED:  CT Head No Cont (18 @ 09:10) >  Increase in size of the lateral and third ventricles seen   when compared with the prior exam.

## 2019-01-04 NOTE — PROVIDER CONTACT NOTE (CRITICAL VALUE NOTIFICATION) - RECOMMENDATIONS
1 unit PRBCs ordered
Continue antibiotics and current course of treatment.
Re-draw CBC ; continue to monitor.
Evaluate and treat.
Notify MD
Possible Abx

## 2019-01-04 NOTE — PROGRESS NOTE ADULT - ASSESSMENT
-EVD at 38myG3L  -pending VPS  -fever control  -pressure support as tolerated  -RCU consult    critical care time 35min

## 2019-01-04 NOTE — PROGRESS NOTE ADULT - SUBJECTIVE AND OBJECTIVE BOX
SUMMARY:  59yo woman PMH lupus p/w sudden onset severe HA a/w nausea and vomiting. 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 Patient was intact on presentation however intubated for airway protection and EVD placed. Transferred to [facility] for further care.    24 HOUR EVENTS:  Decreased mental status in setting of fever - return of exam when defervesces.    HOSPITAL COURSE: Fevers, hydrocephalus, autonomic storm  12/17: s/p angio/embo of AVM, resection of AVM  12/18: RTOR for posterior fossa decompression expansion  12/24 s/p trach, EVD reopened per NSGY  12/26: MRSA in sputum  12/28:  CT head and EVD clamped.  12/29: EVD unclamped due to high ICPs.   12/30: Cefepime added   12/31: Received 1 unit PRBC overnight. Hypotensive requiring transient pressor.     VITALS/DATA/ORDERS: [x] Reviewed    EXAMINATION:  General:  calm  HEENT:  trach'd, PERRL  Neuro:  opens eyes to command, BLE wiggles toes to command, no commands in arms, does not mouth words, LUE prox 2/5 dist 0/5, RUE 0/5, toe movement but no lifting of legs  Cards:  RRR  Respiratory:  no respiratory distress  Abdomen:  soft  Extremities:  no edema  Skin:  warm/dry

## 2019-01-04 NOTE — PROVIDER CONTACT NOTE (CRITICAL VALUE NOTIFICATION) - BACKGROUND
Left Cerebellar ICH
Left Cerebellar ICH
Patient H+H trending down
Cerebellar ICH, EVD in place
IPH with Hydro, EVD
s/p crani for decompression

## 2019-01-04 NOTE — PROVIDER CONTACT NOTE (CRITICAL VALUE NOTIFICATION) - ASSESSMENT
Assessment unchanged
H+H 7.1 and 19.4
Neurologically unchanged and hemodynamically stable.
Patient neurologically unchanged.
See flowsheet for information
VSS, no acute distress

## 2019-01-04 NOTE — PROGRESS NOTE ADULT - ASSESSMENT
58F here with cerebellar hemorrhage found to have cerebellar AVM s/p angio embolization and resection s/p return to OR for subocc craniectomy    - Pre-op for  shunt Monday  - vent management per ICU  - Fever w/u and abx per ICU, concern for central fevers per OD

## 2019-01-04 NOTE — PROGRESS NOTE ADULT - SUBJECTIVE AND OBJECTIVE BOX
Visit Summary: Patient seen and evaluated at bedside.    Overnight Events: none    Exam:  T(C): 38.1 (01-03-19 @ 23:00), Max: 38.7 (01-03-19 @ 02:00)  HR: 93 (01-04-19 @ 00:48) (85 - 105)  BP: --  RR: 16 (01-03-19 @ 18:00) (16 - 20)  SpO2: 100% (01-04-19 @ 00:48) (98% - 100%)  Wt(kg): --    EOS, BLE wiggles toes to commands, not FC in BUE  PERRL  Trace spont movement BLE                                   8.3    7.7   )-----------( 273      ( 03 Jan 2019 20:27 )             23.2     01-03    138  |  103  |  15  ----------------------------<  130<H>  4.5   |  28  |  0.40<L>    Ca    8.4      03 Jan 2019 20:27  Phos  3.0     01-03  Mg     2.0     01-03    TPro  5.9<L>  /  Alb  2.4<L>  /  TBili  0.2  /  DBili  <0.1  /  AST  41<H>  /  ALT  68<H>  /  AlkPhos  79  01-03

## 2019-01-04 NOTE — PROCEDURE NOTE - GENERAL PROCEDURE DETAILS
under sterile conditions, 3cc of CSF was aspirated and sent to lab
under sterile conditions, 3cc of CSF was aspirated and sent to lab

## 2019-01-04 NOTE — PROGRESS NOTE ADULT - ASSESSMENT
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 - s/p successful ANGIO/EMBO, posterior fossa decompression  ICH score 2    Neuro:  Hydro: EVD in place, VPS planned for Monday  Fentanyl PRN  Autonomic storming: c/w Gabapentin, bromocriptine, clonazepam, fentanyl patch   OT for splint    Cards:   HTN: SBP 90-160mmHg    Pulm:  s/p trach, wean vent as tolerated  RCU once status post VPS  Chest PT    GI: TF via PEG     Renal: IVL  Hyponatremia: on salt tabs    Endo:  goal euglycemia    Heme:  SCDs  DVT ppx Lovenox     ID:  Fever, possibly central but will f/u cultures    ICU  full code    at risk for deterioration and death due to hydrocephalus, cerebral edema, brain compression  critical care time 45min

## 2019-01-04 NOTE — PROVIDER CONTACT NOTE (CRITICAL VALUE NOTIFICATION) - ACTION/TREATMENT ORDERED:
1 unit PRBCs ordered
Continue antibiotics and current course of treatment.
Re-draw CBC with night labs; will continue to monitor
JUSTICE khan.
Providers aware patient placed on Isolation contact as ordered.
Vancomycin, Cefepime ordered. Will continue to monitor

## 2019-01-04 NOTE — PROGRESS NOTE ADULT - SUBJECTIVE AND OBJECTIVE BOX
night attending note    patient seen and examined during evening rounds on 1/3/19  Febrile earlier in the day    vitals/labs/meds reviewed  trach'ed, LLE wiggles toes to command, no movement in UE

## 2019-01-05 LAB
ANION GAP SERPL CALC-SCNC: 10 MMOL/L — SIGNIFICANT CHANGE UP (ref 5–17)
ANION GAP SERPL CALC-SCNC: 7 MMOL/L — SIGNIFICANT CHANGE UP (ref 5–17)
BUN SERPL-MCNC: 15 MG/DL — SIGNIFICANT CHANGE UP (ref 7–23)
BUN SERPL-MCNC: 15 MG/DL — SIGNIFICANT CHANGE UP (ref 7–23)
CALCIUM SERPL-MCNC: 7.9 MG/DL — LOW (ref 8.4–10.5)
CALCIUM SERPL-MCNC: 8.3 MG/DL — LOW (ref 8.4–10.5)
CHLORIDE SERPL-SCNC: 100 MMOL/L — SIGNIFICANT CHANGE UP (ref 96–108)
CHLORIDE SERPL-SCNC: 97 MMOL/L — SIGNIFICANT CHANGE UP (ref 96–108)
CO2 SERPL-SCNC: 27 MMOL/L — SIGNIFICANT CHANGE UP (ref 22–31)
CO2 SERPL-SCNC: 28 MMOL/L — SIGNIFICANT CHANGE UP (ref 22–31)
CREAT SERPL-MCNC: 0.35 MG/DL — LOW (ref 0.5–1.3)
CREAT SERPL-MCNC: 0.37 MG/DL — LOW (ref 0.5–1.3)
GLUCOSE SERPL-MCNC: 136 MG/DL — HIGH (ref 70–99)
GLUCOSE SERPL-MCNC: 137 MG/DL — HIGH (ref 70–99)
MAGNESIUM SERPL-MCNC: 1.9 MG/DL — SIGNIFICANT CHANGE UP (ref 1.6–2.6)
PHOSPHATE SERPL-MCNC: 2.6 MG/DL — SIGNIFICANT CHANGE UP (ref 2.5–4.5)
POTASSIUM SERPL-MCNC: 4.3 MMOL/L — SIGNIFICANT CHANGE UP (ref 3.5–5.3)
POTASSIUM SERPL-MCNC: 4.3 MMOL/L — SIGNIFICANT CHANGE UP (ref 3.5–5.3)
POTASSIUM SERPL-SCNC: 4.3 MMOL/L — SIGNIFICANT CHANGE UP (ref 3.5–5.3)
POTASSIUM SERPL-SCNC: 4.3 MMOL/L — SIGNIFICANT CHANGE UP (ref 3.5–5.3)
SODIUM SERPL-SCNC: 134 MMOL/L — LOW (ref 135–145)
SODIUM SERPL-SCNC: 135 MMOL/L — SIGNIFICANT CHANGE UP (ref 135–145)

## 2019-01-05 PROCEDURE — 99292 CRITICAL CARE ADDL 30 MIN: CPT

## 2019-01-05 PROCEDURE — 99291 CRITICAL CARE FIRST HOUR: CPT

## 2019-01-05 RX ORDER — DOCUSATE SODIUM 100 MG
100 CAPSULE ORAL
Qty: 0 | Refills: 0 | Status: DISCONTINUED | OUTPATIENT
Start: 2019-01-05 | End: 2019-01-05

## 2019-01-05 RX ORDER — POLYETHYLENE GLYCOL 3350 17 G/17G
17 POWDER, FOR SOLUTION ORAL
Qty: 0 | Refills: 0 | Status: DISCONTINUED | OUTPATIENT
Start: 2019-01-05 | End: 2019-01-07

## 2019-01-05 RX ORDER — DOCUSATE SODIUM 100 MG
100 CAPSULE ORAL
Qty: 0 | Refills: 0 | Status: DISCONTINUED | OUTPATIENT
Start: 2019-01-05 | End: 2019-01-07

## 2019-01-05 RX ORDER — SENNA PLUS 8.6 MG/1
2 TABLET ORAL AT BEDTIME
Qty: 0 | Refills: 0 | Status: DISCONTINUED | OUTPATIENT
Start: 2019-01-05 | End: 2019-01-07

## 2019-01-05 RX ORDER — SODIUM CHLORIDE 5 G/100ML
1000 INJECTION, SOLUTION INTRAVENOUS
Qty: 0 | Refills: 0 | Status: DISCONTINUED | OUTPATIENT
Start: 2019-01-05 | End: 2019-01-06

## 2019-01-05 RX ADMIN — Medication 650 MILLIGRAM(S): at 08:48

## 2019-01-05 RX ADMIN — Medication 1 MILLIGRAM(S): at 13:27

## 2019-01-05 RX ADMIN — Medication 3 MILLILITER(S): at 00:23

## 2019-01-05 RX ADMIN — SODIUM CHLORIDE 2 GRAM(S): 9 INJECTION INTRAMUSCULAR; INTRAVENOUS; SUBCUTANEOUS at 17:04

## 2019-01-05 RX ADMIN — Medication 3 MILLILITER(S): at 11:35

## 2019-01-05 RX ADMIN — GABAPENTIN 300 MILLIGRAM(S): 400 CAPSULE ORAL at 05:52

## 2019-01-05 RX ADMIN — SENNA PLUS 2 TABLET(S): 8.6 TABLET ORAL at 22:35

## 2019-01-05 RX ADMIN — Medication 1 PACKET(S): at 05:52

## 2019-01-05 RX ADMIN — BROMOCRIPTINE MESYLATE 10 MILLIGRAM(S): 5 CAPSULE ORAL at 05:52

## 2019-01-05 RX ADMIN — Medication 3 MILLILITER(S): at 18:06

## 2019-01-05 RX ADMIN — Medication 1 PACKET(S): at 12:25

## 2019-01-05 RX ADMIN — Medication 1 MILLIGRAM(S): at 05:26

## 2019-01-05 RX ADMIN — Medication 1 PACKET(S): at 23:09

## 2019-01-05 RX ADMIN — Medication 1 PACKET(S): at 17:05

## 2019-01-05 RX ADMIN — CEFEPIME 100 MILLIGRAM(S): 1 INJECTION, POWDER, FOR SOLUTION INTRAMUSCULAR; INTRAVENOUS at 05:26

## 2019-01-05 RX ADMIN — Medication 1 MILLIGRAM(S): at 22:36

## 2019-01-05 RX ADMIN — Medication 3 MILLILITER(S): at 05:21

## 2019-01-05 RX ADMIN — FENTANYL CITRATE 1 PATCH: 50 INJECTION INTRAVENOUS at 14:44

## 2019-01-05 RX ADMIN — CHLORHEXIDINE GLUCONATE 15 MILLILITER(S): 213 SOLUTION TOPICAL at 17:04

## 2019-01-05 RX ADMIN — Medication 250 MILLIGRAM(S): at 05:52

## 2019-01-05 RX ADMIN — PANTOPRAZOLE SODIUM 40 MILLIGRAM(S): 20 TABLET, DELAYED RELEASE ORAL at 12:25

## 2019-01-05 RX ADMIN — FENTANYL CITRATE 1 PATCH: 50 INJECTION INTRAVENOUS at 08:45

## 2019-01-05 RX ADMIN — Medication 100 MILLIGRAM(S): at 17:24

## 2019-01-05 RX ADMIN — SODIUM CHLORIDE 2 GRAM(S): 9 INJECTION INTRAMUSCULAR; INTRAVENOUS; SUBCUTANEOUS at 05:26

## 2019-01-05 RX ADMIN — POLYETHYLENE GLYCOL 3350 17 GRAM(S): 17 POWDER, FOR SOLUTION ORAL at 17:09

## 2019-01-05 RX ADMIN — SODIUM CHLORIDE 2 GRAM(S): 9 INJECTION INTRAMUSCULAR; INTRAVENOUS; SUBCUTANEOUS at 23:09

## 2019-01-05 RX ADMIN — SODIUM CHLORIDE 2 GRAM(S): 9 INJECTION INTRAMUSCULAR; INTRAVENOUS; SUBCUTANEOUS at 12:25

## 2019-01-05 RX ADMIN — GABAPENTIN 300 MILLIGRAM(S): 400 CAPSULE ORAL at 22:34

## 2019-01-05 RX ADMIN — ENOXAPARIN SODIUM 40 MILLIGRAM(S): 100 INJECTION SUBCUTANEOUS at 17:04

## 2019-01-05 RX ADMIN — CHLORHEXIDINE GLUCONATE 15 MILLILITER(S): 213 SOLUTION TOPICAL at 05:26

## 2019-01-05 RX ADMIN — BROMOCRIPTINE MESYLATE 10 MILLIGRAM(S): 5 CAPSULE ORAL at 22:35

## 2019-01-05 RX ADMIN — Medication 650 MILLIGRAM(S): at 09:18

## 2019-01-05 RX ADMIN — SODIUM CHLORIDE 75 MILLILITER(S): 5 INJECTION, SOLUTION INTRAVENOUS at 18:51

## 2019-01-05 RX ADMIN — BROMOCRIPTINE MESYLATE 10 MILLIGRAM(S): 5 CAPSULE ORAL at 13:27

## 2019-01-05 RX ADMIN — GABAPENTIN 300 MILLIGRAM(S): 400 CAPSULE ORAL at 13:27

## 2019-01-05 RX ADMIN — CHLORHEXIDINE GLUCONATE 1 APPLICATION(S): 213 SOLUTION TOPICAL at 22:36

## 2019-01-05 NOTE — PROGRESS NOTE ADULT - SUBJECTIVE AND OBJECTIVE BOX
Visit Summary: Patient seen and evaluated at bedside.    Overnight Events: none    Exam:  T(C): 37.8 (01-04-19 @ 23:00), Max: 39.1 (01-04-19 @ 07:00)  HR: 96 (01-05-19 @ 01:00) (83 - 103)  BP: --  RR: --  SpO2: 100% (01-05-19 @ 01:00) (97% - 100%)  Wt(kg): --    trach'ed, LLE wiggles toes to command, trace finger movement BUE                        8.6    7.2   )-----------( 287      ( 04 Jan 2019 21:54 )             24.5     01-04    133<L>  |  95<L>  |  14  ----------------------------<  122<H>  4.4   |  28  |  0.37<L>    Ca    8.2<L>      04 Jan 2019 21:54  Phos  2.7     01-04  Mg     2.1     01-04    TPro  5.9<L>  /  Alb  2.4<L>  /  TBili  0.2  /  DBili  <0.1  /  AST  41<H>  /  ALT  68<H>  /  AlkPhos  79  01-03

## 2019-01-05 NOTE — PROGRESS NOTE ADULT - ASSESSMENT
57 yo female with SLE admitted 12/16 with cerebellar AVM and bleed, electively intubated, EVD placed  S/P cerebellar [id number] with AVM resection, s/p return to OR on 12/18 for posterior fossa decompression.  Colonized with MRSA in sputum, no radiographic evidence of pneumonia.  Ongoing fever likely central, secondary to ICH directly  All blood cultures negative.  Prior sputum with MRSA and E coli, likely colonizers, but covered with Vanco and Cefepime- still febrile despite full course  Repeat sputum normal caleb only  CSF from 12/30 with GPC clusters in BMO is a contaminant; f/u CSF Cxs negative, Cell Count only 1 WBC- no need for further Vanco  Hemodyn stable  WBC normal    Suggest:  Fever still best explained on central basis- febrile despite full course abx- continue to observe off abx for now  Follow temps and CBC/diff   Surveillance Cxs as per routine  Still no contraindication to VPS  D/w NSCU team

## 2019-01-05 NOTE — PROGRESS NOTE ADULT - ASSESSMENT
-EVD at [id number]  -awaiting VPS   -ID following, d/c'ed antibiotics  -fever control  -pressure support as tolerated  -hyponatremia on restarted on hypertonics    critical care time 35min -EVD at [id number]  -awaiting VPS   -ID following, d/c'ed antibiotics  -fever control  -pressure support as tolerated  -hyponatremia on restarted on hypertonics  -D/C omar    critical care time 35min

## 2019-01-05 NOTE — PROGRESS NOTE ADULT - SUBJECTIVE AND OBJECTIVE BOX
CC: f/u for fever    Patient remans somnolent, poorly interactive febrile, on Vent- AC 16, tube feeds 80/hr    REVIEW OF SYSTEMS:  Not provided on Vent    Antimicrobials off- Vanco + Cefepime x 1 given overnight  Medications Reviewed    ICU Vital Signs Last 24 Hrs  T(F): 101.3 (05 Jan 2019 11:00), Max: 102 (04 Jan 2019 15:00)  HR: 97 (05 Jan 2019 12:00) (83 - 105)  BP: --  BP(mean): --  ABP: 115/60 (05 Jan 2019 12:00) (87/46 - 158/77)  ABP(mean): 81 (05 Jan 2019 12:00) (61 - 104)  RR: --  SpO2: 100% (05 Jan 2019 12:00) (98% - 100%)    PHYSICAL EXAM:  General: no acute distress  EVD in place  Eyes:  anicteric, no conjunctival injection  Oropharynx: no lesions  	  Neck: trach  Lungs: coarse BSs  Heart: regular rate and rhythm; no murmur, rubs or gallops  Abdomen: soft, nondistended, nontender, G tube site clean  Skin: no lesions  Extremities: dependent edema.  R PICC site clean  Neurologic: poorly interactive on Vent    LAB RESULTS:                        8.6    7.2   )-----------( 287      ( 04 Jan 2019 21:54 )             24.5   01-05    134<L>  |  97  |  15  ----------------------------<  136<H>  4.3   |  27  |  0.35<L>    Ca    7.9<L>      05 Jan 2019 05:31  Phos  2.7     01-04  Mg     2.1     01-04    TPro  5.9<L>  /  Alb  2.4<L>  /  TBili  0.2  /  DBili  <0.1  /  AST  41<H>  /  ALT  68<H>  /  AlkPhos  79  01-03    Cerebrospinal Fluid Cell Count-1 (01.04.19 @ 11:45)    Appearance Spun: See Note: STRAW    RBC Count - Spinal Fluid: 940 /uL    Total Nucleated Cell Count, CSF: 1 /uL    MICROBIOLOGY:  RECENT CULTURES:  Culture - CSF with Gram Stain . (01.04.19 @ 15:26)    Gram Stain:   polymorphonuclear leukocytes seen  No organisms seen  by cytocentrifuge    Specimen Source: .CSF CSF    Culture Results:   No growth    Culture - Sputum . (01.02.19 @ 01:18)  Normal Respiratory Tori present    Culture - Blood (01.01.19 @ 21:20)    Specimen Source: .Blood Blood    Culture Results:   No growth to date.    Culture - CSF with Gram Stain . (01.01.19 @ 21:15)  No growth    Culture - CSF with Gram Stain . (12.30.18 @ 19:35)    Gram Stain:   No polymorphonuclear cells seen  No organisms seen  by cytocentrifuge    Specimen Source: .CSF CSF, shunt    Culture Results:   Growing in broth media only Gram Positive Cocci in Clusters    12-30 @ 13:15 .Blood Blood-Peripheral     No growth to date.    12-29 @ 20:25 .CSF CSF     No growth    12-29 @ 07:31 .Sputum Sputum Escherichia coli  Methicillin resistant Staphylococcus aureus    Moderate Escherichia coli  Moderate Methicillin resistant Staphylococcus aureus  Normal Respiratory Tori absent    RADIOLOGY REVIEWED:  CT Head No Cont (12.29.18 @ 09:10) >  Increase in size of the lateral and third ventricles seen   when compared with the prior exam.

## 2019-01-05 NOTE — PROGRESS NOTE ADULT - ASSESSMENT
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

## 2019-01-05 NOTE — PROGRESS NOTE ADULT - ASSESSMENT
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 - s/p successful ANGIO/EMBO, posterior fossa decompression  ICH score 2    Neuro:  Hydro: EVD in place, VPS planned for Monday  Fentanyl PRN  Autonomic storming: c/w Gabapentin, bromocriptine, clonazepam, fentanyl patch   OT for splint    Cards:   HTN: SBP 90-160mmHg    Pulm:  s/p trach, wean vent as tolerated  RCU once status post VPS  Chest PT    GI: TF via PEG     Renal: IVL  Hyponatremia: on salt tabs    Endo:  goal euglycemia    Heme:  SCDs  DVT ppx Lovenox     ID:  CSF growing G + cocci in clusters in broth - started on Abx   d/w ID regarding VPS     ICU  full code    at risk for deterioration and death due to hydrocephalus, cerebral edema, brain compression  critical care time 45min

## 2019-01-06 ENCOUNTER — TRANSCRIPTION ENCOUNTER (OUTPATIENT)
Age: 59
End: 2019-01-06

## 2019-01-06 LAB
ANION GAP SERPL CALC-SCNC: 8 MMOL/L — SIGNIFICANT CHANGE UP (ref 5–17)
BLD GP AB SCN SERPL QL: NEGATIVE — SIGNIFICANT CHANGE UP
BUN SERPL-MCNC: 16 MG/DL — SIGNIFICANT CHANGE UP (ref 7–23)
CALCIUM SERPL-MCNC: 7.8 MG/DL — LOW (ref 8.4–10.5)
CHLORIDE SERPL-SCNC: 101 MMOL/L — SIGNIFICANT CHANGE UP (ref 96–108)
CO2 SERPL-SCNC: 27 MMOL/L — SIGNIFICANT CHANGE UP (ref 22–31)
CREAT SERPL-MCNC: 0.38 MG/DL — LOW (ref 0.5–1.3)
CULTURE RESULTS: SIGNIFICANT CHANGE UP
CULTURE RESULTS: SIGNIFICANT CHANGE UP
GLUCOSE SERPL-MCNC: 131 MG/DL — HIGH (ref 70–99)
MAGNESIUM SERPL-MCNC: 2 MG/DL — SIGNIFICANT CHANGE UP (ref 1.6–2.6)
PHOSPHATE SERPL-MCNC: 2.4 MG/DL — LOW (ref 2.5–4.5)
POTASSIUM SERPL-MCNC: 4.1 MMOL/L — SIGNIFICANT CHANGE UP (ref 3.5–5.3)
POTASSIUM SERPL-SCNC: 4.1 MMOL/L — SIGNIFICANT CHANGE UP (ref 3.5–5.3)
RH IG SCN BLD-IMP: POSITIVE — SIGNIFICANT CHANGE UP
SODIUM SERPL-SCNC: 136 MMOL/L — SIGNIFICANT CHANGE UP (ref 135–145)
SPECIMEN SOURCE: SIGNIFICANT CHANGE UP
SPECIMEN SOURCE: SIGNIFICANT CHANGE UP

## 2019-01-06 PROCEDURE — 99291 CRITICAL CARE FIRST HOUR: CPT

## 2019-01-06 PROCEDURE — 71045 X-RAY EXAM CHEST 1 VIEW: CPT | Mod: 26

## 2019-01-06 PROCEDURE — 99292 CRITICAL CARE ADDL 30 MIN: CPT

## 2019-01-06 RX ORDER — CLONAZEPAM 1 MG
1 TABLET ORAL EVERY 8 HOURS
Qty: 0 | Refills: 0 | Status: DISCONTINUED | OUTPATIENT
Start: 2019-01-06 | End: 2019-01-07

## 2019-01-06 RX ORDER — SODIUM CHLORIDE 5 G/100ML
1000 INJECTION, SOLUTION INTRAVENOUS
Qty: 0 | Refills: 0 | Status: DISCONTINUED | OUTPATIENT
Start: 2019-01-06 | End: 2019-01-07

## 2019-01-06 RX ADMIN — BROMOCRIPTINE MESYLATE 10 MILLIGRAM(S): 5 CAPSULE ORAL at 05:58

## 2019-01-06 RX ADMIN — SODIUM CHLORIDE 2 GRAM(S): 9 INJECTION INTRAMUSCULAR; INTRAVENOUS; SUBCUTANEOUS at 12:24

## 2019-01-06 RX ADMIN — SENNA PLUS 2 TABLET(S): 8.6 TABLET ORAL at 22:45

## 2019-01-06 RX ADMIN — Medication 1 PACKET(S): at 17:08

## 2019-01-06 RX ADMIN — POLYETHYLENE GLYCOL 3350 17 GRAM(S): 17 POWDER, FOR SOLUTION ORAL at 05:59

## 2019-01-06 RX ADMIN — Medication 3 MILLILITER(S): at 05:05

## 2019-01-06 RX ADMIN — Medication 650 MILLIGRAM(S): at 08:20

## 2019-01-06 RX ADMIN — Medication 1 PACKET(S): at 23:18

## 2019-01-06 RX ADMIN — Medication 3 MILLILITER(S): at 17:23

## 2019-01-06 RX ADMIN — SODIUM CHLORIDE 2 GRAM(S): 9 INJECTION INTRAMUSCULAR; INTRAVENOUS; SUBCUTANEOUS at 17:06

## 2019-01-06 RX ADMIN — POLYETHYLENE GLYCOL 3350 17 GRAM(S): 17 POWDER, FOR SOLUTION ORAL at 17:06

## 2019-01-06 RX ADMIN — CHLORHEXIDINE GLUCONATE 15 MILLILITER(S): 213 SOLUTION TOPICAL at 17:05

## 2019-01-06 RX ADMIN — Medication 100 MILLIGRAM(S): at 17:05

## 2019-01-06 RX ADMIN — ENOXAPARIN SODIUM 40 MILLIGRAM(S): 100 INJECTION SUBCUTANEOUS at 17:08

## 2019-01-06 RX ADMIN — Medication 3 MILLILITER(S): at 00:14

## 2019-01-06 RX ADMIN — Medication 1 PACKET(S): at 05:55

## 2019-01-06 RX ADMIN — FENTANYL CITRATE 1 PATCH: 50 INJECTION INTRAVENOUS at 07:00

## 2019-01-06 RX ADMIN — Medication 100 MILLIGRAM(S): at 05:59

## 2019-01-06 RX ADMIN — CHLORHEXIDINE GLUCONATE 15 MILLILITER(S): 213 SOLUTION TOPICAL at 05:57

## 2019-01-06 RX ADMIN — SODIUM CHLORIDE 2 GRAM(S): 9 INJECTION INTRAMUSCULAR; INTRAVENOUS; SUBCUTANEOUS at 23:18

## 2019-01-06 RX ADMIN — Medication 62.5 MILLIMOLE(S): at 05:57

## 2019-01-06 RX ADMIN — CHLORHEXIDINE GLUCONATE 1 APPLICATION(S): 213 SOLUTION TOPICAL at 22:45

## 2019-01-06 RX ADMIN — Medication 3 MILLILITER(S): at 11:56

## 2019-01-06 RX ADMIN — Medication 1 MILLIGRAM(S): at 05:57

## 2019-01-06 RX ADMIN — Medication 650 MILLIGRAM(S): at 07:50

## 2019-01-06 RX ADMIN — PANTOPRAZOLE SODIUM 40 MILLIGRAM(S): 20 TABLET, DELAYED RELEASE ORAL at 12:24

## 2019-01-06 RX ADMIN — GABAPENTIN 300 MILLIGRAM(S): 400 CAPSULE ORAL at 15:04

## 2019-01-06 RX ADMIN — GABAPENTIN 300 MILLIGRAM(S): 400 CAPSULE ORAL at 06:03

## 2019-01-06 RX ADMIN — GABAPENTIN 300 MILLIGRAM(S): 400 CAPSULE ORAL at 23:15

## 2019-01-06 RX ADMIN — FENTANYL CITRATE 1 PATCH: 50 INJECTION INTRAVENOUS at 05:21

## 2019-01-06 RX ADMIN — Medication 1 PACKET(S): at 12:24

## 2019-01-06 RX ADMIN — Medication 3 MILLILITER(S): at 23:17

## 2019-01-06 RX ADMIN — SODIUM CHLORIDE 2 GRAM(S): 9 INJECTION INTRAMUSCULAR; INTRAVENOUS; SUBCUTANEOUS at 05:55

## 2019-01-06 NOTE — PROGRESS NOTE ADULT - SUBJECTIVE AND OBJECTIVE BOX
CC: f/u for fever    Patient remans somnolent, poorly interactive, febrile, on Vent- no change AC 16, tube feeds 80/hr    REVIEW OF SYSTEMS:  Not provided on Vent    Antimicrobials off  Medications Reviewed    ICU Vital Signs Last 24 Hrs  T(F): 99.9 (06 Jan 2019 11:00), Max: 100.8 (06 Jan 2019 07:00)  HR: 89 (06 Jan 2019 11:55) (89 - 99)  BP: 95/63 (06 Jan 2019 11:00) (95/63 - 132/115)  BP(mean): 73 (06 Jan 2019 11:00) (73 - 122)  ABP: 111/61 (06 Jan 2019 06:00) (102/61 - 152/82)  ABP(mean): 80 (06 Jan 2019 06:00) (76 - 109)  RR: 17 (06 Jan 2019 11:00) (16 - 18)  SpO2: 100% (06 Jan 2019 11:55) (99% - 100%)    PHYSICAL EXAM:  General: no acute distress  EVD in place  Eyes:  anicteric, no conjunctival injection  Oropharynx: no lesions  	  Neck: trach  Lungs: coarse BSs  Heart: regular rate and rhythm; no murmur, rubs or gallops  Abdomen: soft, nondistended, nontender, G tube site clean  Skin: no lesions  Extremities: dependent edema.  R PICC site clean  Neurologic: poorly interactive on Vent    LAB RESULTS:                        8.6    7.2   )-----------( 287      ( 04 Jan 2019 21:54 )             24.5   01-06    136  |  101  |  16  ----------------------------<  131<H>  4.1   |  27  |  0.38<L>    Ca    7.8<L>      06 Jan 2019 03:00  Phos  2.4     01-06  Mg     2.0     01-06      Cerebrospinal Fluid Cell Count-1 (01.04.19 @ 11:45)    Appearance Spun: See Note: STRAW    RBC Count - Spinal Fluid: 940 /uL    Total Nucleated Cell Count, CSF: 1 /uL    MICROBIOLOGY:  RECENT CULTURES:  Culture - CSF with Gram Stain . (01.04.19 @ 15:26)    Gram Stain:   polymorphonuclear leukocytes seen  No organisms seen  by cytocentrifuge    Specimen Source: .CSF CSF    Culture Results:   No growth    Culture - Sputum . (01.02.19 @ 01:18)  Normal Respiratory Tori present    Culture - Blood (01.01.19 @ 21:20)    Specimen Source: .Blood Blood    Culture Results:   No growth to date.    Culture - CSF with Gram Stain . (01.01.19 @ 21:15)  No growth    Culture - CSF with Gram Stain . (12.30.18 @ 19:35)    Gram Stain:   No polymorphonuclear cells seen  No organisms seen  by cytocentrifuge    Specimen Source: .CSF CSF, shunt    Culture Results:   Growth in fluid media only Micrococcus luteus "Susceptibilities not  performed"    12-30 @ 13:15 .Blood Blood-Peripheral     No growth to date.    12-29 @ 20:25 .CSF CSF     No growth    12-29 @ 07:31 .Sputum Sputum Escherichia coli  Methicillin resistant Staphylococcus aureus    Moderate Escherichia coli  Moderate Methicillin resistant Staphylococcus aureus  Normal Respiratory Tori absent    RADIOLOGY REVIEWED:  CT Head No Cont (12.29.18 @ 09:10) >  Increase in size of the lateral and third ventricles seen   when compared with the prior exam.

## 2019-01-06 NOTE — PROGRESS NOTE ADULT - SUBJECTIVE AND OBJECTIVE BOX
Visit Summary: Patient seen and evaluated at bedside.    Overnight Events: no acute events o/n    Exam:  T(C): 37 (01-05-19 @ 23:00), Max: 38.5 (01-05-19 @ 08:47)  HR: 94 (01-06-19 @ 00:15) (89 - 105)  BP: --  RR: 16 (01-05-19 @ 23:00) (16 - 18)  SpO2: 100% (01-06-19 @ 00:15) (99% - 100%)  Wt(kg): --      trach'ed, EOS, FC  eyes move to command  LLE wiggles toes to command, trace finger movement BUE                        8.6    7.2   )-----------( 287      ( 04 Jan 2019 21:54 )             24.5     01-05    135  |  100  |  15  ----------------------------<  137<H>  4.3   |  28  |  0.37<L>    Ca    8.3<L>      05 Jan 2019 18:36  Phos  2.6     01-05  Mg     1.9     01-05

## 2019-01-06 NOTE — PROGRESS NOTE ADULT - SUBJECTIVE AND OBJECTIVE BOX
SUMMARY:  59yo woman PMH lupus p/w sudden onset severe HA a/w nausea and vomiting. 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 Patient was intact on presentation however intubated for airway protection and EVD placed. Transferred to [facility] for further care.    24 HOUR EVENTS:  No acute events overnight    HOSPITAL COURSE: Fevers, hydrocephalus, autonomic storm  12/17: s/p angio/embo of AVM, resection of AVM  12/18: RTOR for posterior fossa decompression expansion  12/24 s/p trach, EVD reopened per NSGY  12/26: MRSA in sputum  12/28:  CT head and EVD clamped.  12/29: EVD unclamped due to high ICPs.   12/30: Cefepime added   12/31: Received 1 unit PRBC overnight. Hypotensive requiring transient pressor.   1/4:  CSF growing G + cocci in clusters in broth - started on Abx   1/5:  cleared again by ID, d/c abx, likely central fevers    VITALS/DATA/ORDERS: [x] Reviewed    EXAMINATION:  General:  calm  HEENT:  trach'd, PERRL  Neuro:  opens eyes to command, BLE wiggles toes to command, no commands in arms, does not mouth words, LUE prox 2/5 dist 0/5, RUE 0/5, toe movement but no lifting of legs  Cards:  RRR  Respiratory:  no respiratory distress  Abdomen:  soft  Extremities:  no edema  Skin:  warm/dry

## 2019-01-06 NOTE — PROGRESS NOTE ADULT - SUBJECTIVE AND OBJECTIVE BOX
night attending note    12/30 CSF cultures growing GPCC--in broth only, two subsequent cultures sent for fever w/u negative to date  Afebrile    vitals/labs/meds reviewed  trach'ed, LLE wiggles toes to command, no movement in UE

## 2019-01-06 NOTE — CONSULT NOTE ADULT - SUBJECTIVE AND OBJECTIVE BOX
[facility]  [phone]  NEPHROLOGY      INITIAL CONSULT NOTE  --------------------------------------------------------------------------------  HPI:  57 yo female with SLE admitted 12/16 with cerebellar AVM and bleed, electively intubated, EVD placed /S/P cerebellar [id number] with AVM resection, s/p return to OR on 12/18 for posterior fossa decompression/Colonized with MRSA in sputum, no radiographic evidence of pneumonia./Ongoing fever likely central, secondary to ICH directly  pt also with hyponatremia placed on hypertonic saline and florinef as well. renal consult called. pt is on 2%nacl and nacl tabs       PAST HISTORY  --------------------------------------------------------------------------------  PAST MEDICAL & SURGICAL HISTORY:  Systemic lupus erythematosus, unspecified SLE type, unspecified organ involvement status  Lupus  Back pain, chronic  Childhood asthma  Ovarian cyst  DVT (deep venous thrombosis), bilateral: 1993( treated with Heparin and coumadin)  RA (rheumatoid arthritis)  No significant past surgical history  No significant past surgical history  Plantar fasciitis of right foot: h/o surgery  History of laparoscopic cholecystectomy: 1993    FAMILY HISTORY:  No pertinent family history in first degree relatives  Family history of kidney disease in father (Father)  Family history of heart disease (Father)  Family history of osteoarthritis (Mother)  Family history of hypertension in mother (Mother)    PAST SOCIAL HISTORY: intubated 4    ALLERGIES & MEDICATIONS  --------------------------------------------------------------------------------  Allergies    No Known Allergies    Intolerances      Standing Inpatient Medications  ALBUTerol/ipratropium for Nebulization 3 milliLiter(s) Nebulizer every 6 hours  chlorhexidine 0.12% Liquid 15 milliLiter(s) Swish and Spit two times a day  chlorhexidine 4% Liquid 1 Application(s) Topical <User Schedule>  docusate sodium Liquid 100 milliGRAM(s) Oral two times a day  enoxaparin Injectable 40 milliGRAM(s) SubCutaneous <User Schedule>  gabapentin   Solution 300 milliGRAM(s) Oral every 8 hours  pantoprazole   Suspension 40 milliGRAM(s) Oral daily  polyethylene glycol 3350 17 Gram(s) Oral two times a day  potassium phosphate / sodium phosphate powder 1 Packet(s) Oral four times a day  senna 2 Tablet(s) Oral at bedtime  sodium chloride 2 Gram(s) Oral every 6 hours  sodium chloride 2% . 1000 milliLiter(s) IV Continuous <Continuous>    PRN Inpatient Medications  acetaminophen    Suspension .. 650 milliGRAM(s) Oral every 6 hours PRN  clonazePAM Tablet 1 milliGRAM(s) Oral every 8 hours PRN      REVIEW OF SYSTEMS  --------------------------------------------------------------------------------  intubated   VITALS/PHYSICAL EXAM  --------------------------------------------------------------------------------  T(C): 37.7 (01-06-19 @ 11:00), Max: 38.2 (01-06-19 @ 07:00)  HR: 96 (01-06-19 @ 13:00) (89 - 99)  BP: 122/102 (01-06-19 @ 13:00) (95/63 - 132/115)  RR: 17 (01-06-19 @ 13:00) (16 - 18)  SpO2: 100% (01-06-19 @ 13:00) (99% - 100%)  Wt(kg): --        01-05-19 @ 07:01  -  01-06-19 @ 07:00  --------------------------------------------------------  IN: 3220 mL / OUT: 2842 mL / NET: 378 mL    01-06-19 @ 07:01  -  01-06-19 @ 15:24  --------------------------------------------------------  IN: 845 mL / OUT: 16 mL / NET: 829 mL      Physical Exam:  	Gen: vented   	no jvd   	Pulm: decrease bs  no rales or ronchi or wheezing  	CV: RRR, S1S2; no rub  	Abd: +BS, soft, nontender/nondistended feeding tube   	: No suprapubic tenderness  	UE: Warm, no cyanosis  no clubbing,  no edema  	LE: Warm, no cyanosis  no clubbing, no edema  	Skin: Warm, no decrease skin turgor       LABS/STUDIES  --------------------------------------------------------------------------------              8.6    7.2   >-----------<  287      [01-04-19 @ 21:54]              24.5     136  |  101  |  16  ----------------------------<  131      [01-06-19 @ 03:00]  4.1   |  27  |  0.38        Ca     7.8     [01-06-19 @ 03:00]      Mg     2.0     [01-06-19 @ 03:00]      Phos  2.4     [01-06-19 @ 03:00]            Creatinine Trend:  SCr 0.38 [01-06 @ 03:00]  SCr 0.37 [01-05 @ 18:36]  SCr 0.35 [01-05 @ 05:31]  SCr 0.37 [01-04 @ 21:54]  SCr 0.40 [01-03 @ 20:27]    Urinalysis - [01-04-19 @ 12:04]      Color Light Yellow / Appearance Clear / SG 1.015 / pH 7.5      Gluc Negative / Ketone Negative  / Bili Negative / Urobili Negative       Blood Negative / Protein Negative / Leuk Est Negative / Nitrite Negative      RBC  / WBC  / Hyaline  / Gran  / Sq Epi  / Non Sq Epi  / Bacteria       Iron 31, TIBC 191, %sat 16      [01-01-19 @ 04:34]  HbA1c 5.4      [12-17-18 @ 06:11]  TSH 2.20      [12-17-18 @ 06:11]  Lipid: chol 143, TG 72, HDL 49, LDL 80      [12-17-18 @ 06:11]

## 2019-01-06 NOTE — PROGRESS NOTE ADULT - ASSESSMENT
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 - s/p successful ANGIO/EMBO, posterior fossa decompression  ICH score 2    Neuro:  Hydro: EVD in place, VPS planned for Monday  Fentanyl PRN  Autonomic storming: c/w Gabapentin, bromocriptine, clonazepam, fentanyl patch   OT for splint    Cards:   HTN: SBP 90-160mmHg    Pulm:  s/p trach, wean vent as tolerated  RCU once status post VPS  Chest PT    GI: TF via PEG     Renal: renal consult for persistent hyponatremia; half dose of 2%, plan to d/c on 1/7, continue salt tabs, concentrate feeds    Endo:  goal euglycemia    Heme:  SCDs  DVT ppx Lovenox     ID:  central fever  d/c ABX  ID following    ICU  full code    at risk for deterioration and death due to hydrocephalus, cerebral edema, brain compression  critical care time 45min 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 - s/p successful ANGIO/EMBO, posterior fossa decompression  ICH score 2    Neuro:  Hydro: EVD in place, VPS planned for Monday  Fentanyl PRN  no longer storming -- d/c bromocriptine, standing clonazepam, fentanyl patch  OT for splint    Cards:   HTN: SBP 90-160mmHg    Pulm:  s/p trach, wean vent as tolerated  RCU once status post VPS  Chest PT    GI: TF via PEG     Renal: renal consult for persistent hyponatremia; half dose of 2%, plan to d/c on 1/7, continue salt tabs, concentrate feeds    Endo:  goal euglycemia    Heme:  SCDs  DVT ppx Lovenox     ID:  central fever  d/c ABX  ID following    ICU  full code    at risk for deterioration and death due to hydrocephalus, cerebral edema, brain compression  critical care time 45min 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 - s/p successful ANGIO/EMBO, posterior fossa decompression  ICH score 2    Neuro:  Hydro: EVD in place, VPS planned for Monday  Fentanyl PRN  no longer storming -- d/c bromocriptine, standing clonazepam, fentanyl patch; begin tapering gabapentin 1/7  OT for splint    Cards:   HTN: SBP 90-160mmHg    Pulm:  s/p trach, wean vent as tolerated  RCU once status post VPS  Chest PT    GI: TF via PEG     Renal: renal consult for persistent hyponatremia; half dose of 2%, plan to d/c on 1/7, continue salt tabs, concentrate feeds    Endo:  goal euglycemia    Heme:  SCDs  DVT ppx Lovenox     ID:  central fever  d/c ABX  ID following    ICU  full code    at risk for deterioration and death due to hydrocephalus, cerebral edema, brain compression  critical care time 45min

## 2019-01-06 NOTE — PROGRESS NOTE ADULT - ASSESSMENT
59 yo female with SLE admitted 12/16 with cerebellar AVM and bleed, electively intubated, EVD placed  S/P cerebellar [id number] with AVM resection, s/p return to OR on 12/18 for posterior fossa decompression.  Colonized with MRSA in sputum, no radiographic evidence of pneumonia.  Ongoing fever likely central, secondary to ICH directly  All blood cultures negative.  Prior sputum with MRSA and E coli, likely colonizers, but covered with Vanco and Cefepime- still febrile despite full course  Repeat sputum normal caleb only  CSF from 12/30 with Micrococcous in BMO is a contaminant; f/u CSF Cxs negative, Cell Count only 1 WBC- no need for further Vanco  Hemodyn stable  WBC normal  Temps moderating somewhat    Suggest:  Fever still best explained on central basis- observe off abx for now  Follow temps and CBC/diff   Surveillance Cxs as per routine  Still no contraindication to VPS  D/w NSCU team

## 2019-01-06 NOTE — PROGRESS NOTE ADULT - ASSESSMENT
-EVD at [id number]  -pre op for VPS in am  -fentanyl patch d/c'ed  -ID following, d/c'ed antibiotics  -fever control  -pressure support as tolerated  -hyponatremia on restarted on hypertonics  -D/C omar    critical care time 35min

## 2019-01-06 NOTE — PROGRESS NOTE ADULT - ASSESSMENT
58F POD#20 s/p SOC for decompression and resection of AVM. She is being treated for GPC ventriculitis and remains stable neurologically.    q1 neuro checks  continue abx per ID  plan for VPS per AD

## 2019-01-06 NOTE — CONSULT NOTE ADULT - ASSESSMENT
57 yo female with SLE admitted 12/16 with cerebellar AVM and bleed, electively intubated, EVD placed /S/P cerebellar [id number] with AVM resection, s/p return to OR on 12/18 for posterior fossa decompression/Colonized with MRSA in sputum. pt also with hyponatremia placed on hypertonic saline and florinef as well.  is on 2%nacl and nacl tabs     1- hyponatremia   2- hypophosphatemia   3- dysphagia   suspect siadh  na is better decrease nacl 2% slowly to 30 cc/hr now and with goal to dc   monitor na level   cont nacl tabs   change feeds to 2 hank HN in setting of siadh/hyponatremia   supplement phos with sodium phos   d/w nscu team
57 yo female with SLE admitted 12/16 with cerebellar AVM and bleed.  She was electively intubated on 12/16, has an EVD for hydrocephalus.  S/P cerebellar embolization on 12/17 with AVM resection, s/p return to OR on 12/18 for posterior fossa  decompression.  Colonized with MRSA in sputum, no radiographic evidence of pneumonia.d  Fever can be central secondary to ICH, always a diagnosis of exclusion.I do not know details of SLE history but this can also be a cause of fever  With foreign body in airways such as trach or ETT , MRSA can be difficult to eradicate even with IV antibiotics.  Fever may be on a central basis, she appears reasonably stable.  All her blood and CSF cultures have been negative  Suggest:  1. continue vanco for now, perhaps 7-10 day course  2.Periodic blood and CSF cultures as you are doing  3.Follow exam, wbc, renal function, and hemodynamics  4.While I think fever may be on a central basis, I would have a low threshold for adding cefepime for GNR coverage if she deteriorates.
59 yo female with hx of lupus s/p ICH s/p craniotomy with respiratory failure and s/p trach.  Pt will need trach placement.    1) Will plan for PEG placement on Wednesday  2) Continue management as per neurosurgery   3) call placed to patient's family.
59yo woman PMH lupus p/w sudden onset severe HA a/w nausea and vomiting. 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 Pt  now with respiratory failure and needs tracheostomy as per NSCU. Family agreed
58F SLE/RA who initially presented with severe onset HA with nausea and vomiting. 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 She has required trach, PEG, and is now pending VPS placement. Pulmonary evaluation called for RCU evaluation

## 2019-01-07 ENCOUNTER — APPOINTMENT (OUTPATIENT)
Dept: NEUROSURGERY | Facility: CLINIC | Age: 59
End: 2019-01-07

## 2019-01-07 LAB
ALBUMIN SERPL ELPH-MCNC: 2.4 G/DL — LOW (ref 3.3–5)
ALP SERPL-CCNC: 96 U/L — SIGNIFICANT CHANGE UP (ref 40–120)
ALT FLD-CCNC: 120 U/L — HIGH (ref 10–45)
ANION GAP SERPL CALC-SCNC: 10 MMOL/L — SIGNIFICANT CHANGE UP (ref 5–17)
ANION GAP SERPL CALC-SCNC: 7 MMOL/L — SIGNIFICANT CHANGE UP (ref 5–17)
ANION GAP SERPL CALC-SCNC: 8 MMOL/L — SIGNIFICANT CHANGE UP (ref 5–17)
APTT BLD: 25.3 SEC — LOW (ref 27.5–36.3)
AST SERPL-CCNC: 57 U/L — HIGH (ref 10–40)
BILIRUB DIRECT SERPL-MCNC: <0.1 MG/DL — SIGNIFICANT CHANGE UP (ref 0–0.2)
BILIRUB INDIRECT FLD-MCNC: >0.1 MG/DL — LOW (ref 0.2–1)
BILIRUB SERPL-MCNC: 0.2 MG/DL — SIGNIFICANT CHANGE UP (ref 0.2–1.2)
BUN SERPL-MCNC: 12 MG/DL — SIGNIFICANT CHANGE UP (ref 7–23)
BUN SERPL-MCNC: 14 MG/DL — SIGNIFICANT CHANGE UP (ref 7–23)
BUN SERPL-MCNC: 17 MG/DL — SIGNIFICANT CHANGE UP (ref 7–23)
CALCIUM SERPL-MCNC: 8 MG/DL — LOW (ref 8.4–10.5)
CALCIUM SERPL-MCNC: 8.3 MG/DL — LOW (ref 8.4–10.5)
CALCIUM SERPL-MCNC: 8.5 MG/DL — SIGNIFICANT CHANGE UP (ref 8.4–10.5)
CHLORIDE SERPL-SCNC: 100 MMOL/L — SIGNIFICANT CHANGE UP (ref 96–108)
CHLORIDE SERPL-SCNC: 101 MMOL/L — SIGNIFICANT CHANGE UP (ref 96–108)
CHLORIDE SERPL-SCNC: 99 MMOL/L — SIGNIFICANT CHANGE UP (ref 96–108)
CO2 SERPL-SCNC: 25 MMOL/L — SIGNIFICANT CHANGE UP (ref 22–31)
CO2 SERPL-SCNC: 29 MMOL/L — SIGNIFICANT CHANGE UP (ref 22–31)
CO2 SERPL-SCNC: 30 MMOL/L — SIGNIFICANT CHANGE UP (ref 22–31)
CREAT SERPL-MCNC: 0.39 MG/DL — LOW (ref 0.5–1.3)
CREAT SERPL-MCNC: 0.39 MG/DL — LOW (ref 0.5–1.3)
CREAT SERPL-MCNC: 0.4 MG/DL — LOW (ref 0.5–1.3)
GLUCOSE SERPL-MCNC: 108 MG/DL — HIGH (ref 70–99)
GLUCOSE SERPL-MCNC: 109 MG/DL — HIGH (ref 70–99)
GLUCOSE SERPL-MCNC: 138 MG/DL — HIGH (ref 70–99)
HCG SERPL-ACNC: <2 MIU/ML — SIGNIFICANT CHANGE UP
HCT VFR BLD CALC: 23.2 % — LOW (ref 34.5–45)
HCT VFR BLD CALC: 24.1 % — LOW (ref 34.5–45)
HGB BLD-MCNC: 8.2 G/DL — LOW (ref 11.5–15.5)
HGB BLD-MCNC: 8.4 G/DL — LOW (ref 11.5–15.5)
INR BLD: 1.06 RATIO — SIGNIFICANT CHANGE UP (ref 0.88–1.16)
MAGNESIUM SERPL-MCNC: 1.9 MG/DL — SIGNIFICANT CHANGE UP (ref 1.6–2.6)
MAGNESIUM SERPL-MCNC: 2 MG/DL — SIGNIFICANT CHANGE UP (ref 1.6–2.6)
MCHC RBC-ENTMCNC: 31.6 PG — SIGNIFICANT CHANGE UP (ref 27–34)
MCHC RBC-ENTMCNC: 31.7 PG — SIGNIFICANT CHANGE UP (ref 27–34)
MCHC RBC-ENTMCNC: 35.1 GM/DL — SIGNIFICANT CHANGE UP (ref 32–36)
MCHC RBC-ENTMCNC: 35.3 GM/DL — SIGNIFICANT CHANGE UP (ref 32–36)
MCV RBC AUTO: 89.9 FL — SIGNIFICANT CHANGE UP (ref 80–100)
MCV RBC AUTO: 90.2 FL — SIGNIFICANT CHANGE UP (ref 80–100)
PHOSPHATE SERPL-MCNC: 3 MG/DL — SIGNIFICANT CHANGE UP (ref 2.5–4.5)
PHOSPHATE SERPL-MCNC: 3.9 MG/DL — SIGNIFICANT CHANGE UP (ref 2.5–4.5)
PLATELET # BLD AUTO: 121 K/UL — LOW (ref 150–400)
PLATELET # BLD AUTO: 288 K/UL — SIGNIFICANT CHANGE UP (ref 150–400)
POTASSIUM SERPL-MCNC: 4.2 MMOL/L — SIGNIFICANT CHANGE UP (ref 3.5–5.3)
POTASSIUM SERPL-SCNC: 4.2 MMOL/L — SIGNIFICANT CHANGE UP (ref 3.5–5.3)
PROT SERPL-MCNC: 6 G/DL — SIGNIFICANT CHANGE UP (ref 6–8.3)
PROTHROM AB SERPL-ACNC: 12.2 SEC — SIGNIFICANT CHANGE UP (ref 10–12.9)
RBC # BLD: 2.58 M/UL — LOW (ref 3.8–5.2)
RBC # BLD: 2.67 M/UL — LOW (ref 3.8–5.2)
RBC # FLD: 15.9 % — HIGH (ref 10.3–14.5)
RBC # FLD: 16 % — HIGH (ref 10.3–14.5)
SODIUM SERPL-SCNC: 135 MMOL/L — SIGNIFICANT CHANGE UP (ref 135–145)
SODIUM SERPL-SCNC: 136 MMOL/L — SIGNIFICANT CHANGE UP (ref 135–145)
SODIUM SERPL-SCNC: 138 MMOL/L — SIGNIFICANT CHANGE UP (ref 135–145)
WBC # BLD: 5.4 K/UL — SIGNIFICANT CHANGE UP (ref 3.8–10.5)
WBC # BLD: 5.7 K/UL — SIGNIFICANT CHANGE UP (ref 3.8–10.5)
WBC # FLD AUTO: 5.4 K/UL — SIGNIFICANT CHANGE UP (ref 3.8–10.5)
WBC # FLD AUTO: 5.7 K/UL — SIGNIFICANT CHANGE UP (ref 3.8–10.5)

## 2019-01-07 PROCEDURE — 99291 CRITICAL CARE FIRST HOUR: CPT

## 2019-01-07 PROCEDURE — 99232 SBSQ HOSP IP/OBS MODERATE 35: CPT | Mod: 57,24

## 2019-01-07 PROCEDURE — 99292 CRITICAL CARE ADDL 30 MIN: CPT

## 2019-01-07 PROCEDURE — 62223 ESTABLISH BRAIN CAVITY SHUNT: CPT | Mod: 58

## 2019-01-07 PROCEDURE — 61781 SCAN PROC CRANIAL INTRA: CPT | Mod: 58

## 2019-01-07 PROCEDURE — 70450 CT HEAD/BRAIN W/O DYE: CPT | Mod: 26

## 2019-01-07 RX ORDER — SENNA PLUS 8.6 MG/1
2 TABLET ORAL AT BEDTIME
Qty: 0 | Refills: 0 | Status: DISCONTINUED | OUTPATIENT
Start: 2019-01-07 | End: 2019-01-08

## 2019-01-07 RX ORDER — MAGNESIUM SULFATE 500 MG/ML
1 VIAL (ML) INJECTION ONCE
Qty: 0 | Refills: 0 | Status: COMPLETED | OUTPATIENT
Start: 2019-01-07 | End: 2019-01-07

## 2019-01-07 RX ORDER — SODIUM CHLORIDE 9 MG/ML
2 INJECTION INTRAMUSCULAR; INTRAVENOUS; SUBCUTANEOUS EVERY 6 HOURS
Qty: 0 | Refills: 0 | Status: DISCONTINUED | OUTPATIENT
Start: 2019-01-07 | End: 2019-01-16

## 2019-01-07 RX ORDER — ACETAMINOPHEN 500 MG
650 TABLET ORAL EVERY 6 HOURS
Qty: 0 | Refills: 0 | Status: DISCONTINUED | OUTPATIENT
Start: 2019-01-07 | End: 2019-01-25

## 2019-01-07 RX ORDER — SODIUM CHLORIDE 5 G/100ML
1000 INJECTION, SOLUTION INTRAVENOUS
Qty: 0 | Refills: 0 | Status: DISCONTINUED | OUTPATIENT
Start: 2019-01-07 | End: 2019-01-07

## 2019-01-07 RX ORDER — POLYETHYLENE GLYCOL 3350 17 G/17G
17 POWDER, FOR SOLUTION ORAL
Qty: 0 | Refills: 0 | Status: DISCONTINUED | OUTPATIENT
Start: 2019-01-07 | End: 2019-01-08

## 2019-01-07 RX ORDER — CALCIUM GLUCONATE 100 MG/ML
1 VIAL (ML) INTRAVENOUS ONCE
Qty: 0 | Refills: 0 | Status: COMPLETED | OUTPATIENT
Start: 2019-01-07 | End: 2019-01-07

## 2019-01-07 RX ORDER — DOCUSATE SODIUM 100 MG
100 CAPSULE ORAL
Qty: 0 | Refills: 0 | Status: DISCONTINUED | OUTPATIENT
Start: 2019-01-07 | End: 2019-01-08

## 2019-01-07 RX ORDER — SODIUM,POTASSIUM PHOSPHATES 278-250MG
1 POWDER IN PACKET (EA) ORAL
Qty: 0 | Refills: 0 | Status: DISCONTINUED | OUTPATIENT
Start: 2019-01-07 | End: 2019-01-07

## 2019-01-07 RX ORDER — CLONAZEPAM 1 MG
1 TABLET ORAL EVERY 8 HOURS
Qty: 0 | Refills: 0 | Status: DISCONTINUED | OUTPATIENT
Start: 2019-01-07 | End: 2019-01-13

## 2019-01-07 RX ORDER — PANTOPRAZOLE SODIUM 20 MG/1
40 TABLET, DELAYED RELEASE ORAL DAILY
Qty: 0 | Refills: 0 | Status: DISCONTINUED | OUTPATIENT
Start: 2019-01-07 | End: 2019-01-25

## 2019-01-07 RX ORDER — GABAPENTIN 400 MG/1
300 CAPSULE ORAL EVERY 8 HOURS
Qty: 0 | Refills: 0 | Status: DISCONTINUED | OUTPATIENT
Start: 2019-01-07 | End: 2019-01-25

## 2019-01-07 RX ORDER — PANTOPRAZOLE SODIUM 20 MG/1
40 TABLET, DELAYED RELEASE ORAL DAILY
Qty: 0 | Refills: 0 | Status: DISCONTINUED | OUTPATIENT
Start: 2019-01-07 | End: 2019-01-07

## 2019-01-07 RX ORDER — IPRATROPIUM/ALBUTEROL SULFATE 18-103MCG
3 AEROSOL WITH ADAPTER (GRAM) INHALATION EVERY 6 HOURS
Qty: 0 | Refills: 0 | Status: DISCONTINUED | OUTPATIENT
Start: 2019-01-07 | End: 2019-01-21

## 2019-01-07 RX ORDER — SODIUM,POTASSIUM PHOSPHATES 278-250MG
1 POWDER IN PACKET (EA) ORAL
Qty: 0 | Refills: 0 | Status: DISCONTINUED | OUTPATIENT
Start: 2019-01-07 | End: 2019-01-25

## 2019-01-07 RX ADMIN — GABAPENTIN 300 MILLIGRAM(S): 400 CAPSULE ORAL at 05:07

## 2019-01-07 RX ADMIN — GABAPENTIN 300 MILLIGRAM(S): 400 CAPSULE ORAL at 15:31

## 2019-01-07 RX ADMIN — CHLORHEXIDINE GLUCONATE 15 MILLILITER(S): 213 SOLUTION TOPICAL at 05:07

## 2019-01-07 RX ADMIN — Medication 3 MILLILITER(S): at 11:57

## 2019-01-07 RX ADMIN — SODIUM CHLORIDE 2 GRAM(S): 9 INJECTION INTRAMUSCULAR; INTRAVENOUS; SUBCUTANEOUS at 12:47

## 2019-01-07 RX ADMIN — Medication 1 PACKET(S): at 12:47

## 2019-01-07 RX ADMIN — SODIUM CHLORIDE 2 GRAM(S): 9 INJECTION INTRAMUSCULAR; INTRAVENOUS; SUBCUTANEOUS at 05:07

## 2019-01-07 RX ADMIN — Medication 100 GRAM(S): at 02:02

## 2019-01-07 RX ADMIN — GABAPENTIN 300 MILLIGRAM(S): 400 CAPSULE ORAL at 22:57

## 2019-01-07 RX ADMIN — Medication 100 MILLIGRAM(S): at 05:07

## 2019-01-07 RX ADMIN — PANTOPRAZOLE SODIUM 40 MILLIGRAM(S): 20 TABLET, DELAYED RELEASE ORAL at 12:47

## 2019-01-07 RX ADMIN — Medication 1 PACKET(S): at 05:08

## 2019-01-07 RX ADMIN — Medication 1 PACKET(S): at 22:57

## 2019-01-07 RX ADMIN — SODIUM CHLORIDE 30 MILLILITER(S): 5 INJECTION, SOLUTION INTRAVENOUS at 05:09

## 2019-01-07 RX ADMIN — Medication 3 MILLILITER(S): at 05:07

## 2019-01-07 RX ADMIN — Medication 200 GRAM(S): at 02:02

## 2019-01-07 RX ADMIN — SODIUM CHLORIDE 2 GRAM(S): 9 INJECTION INTRAMUSCULAR; INTRAVENOUS; SUBCUTANEOUS at 22:57

## 2019-01-07 RX ADMIN — POLYETHYLENE GLYCOL 3350 17 GRAM(S): 17 POWDER, FOR SOLUTION ORAL at 05:07

## 2019-01-07 NOTE — PROGRESS NOTE ADULT - SUBJECTIVE AND OBJECTIVE BOX
SUMMARY:  59yo woman PMH lupus p/w sudden onset severe HA a/w nausea and vomiting. 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 Patient was intact on presentation however intubated for airway protection and EVD placed. Transferred to [facility] for further care.    24 HOUR EVENTS:  No acute events overnight    HOSPITAL COURSE: Fevers, hydrocephalus, autonomic storm  12/17: s/p angio/embo of AVM, resection of AVM  12/18: RTOR for posterior fossa decompression expansion  12/24 s/p trach, EVD reopened per NSGY  12/26: MRSA in sputum  12/28:  CT head and EVD clamped.  12/29: EVD unclamped due to high ICPs.   12/30: Cefepime added   12/31: Received 1 unit PRBC overnight. Hypotensive requiring transient pressor.   1/4:  CSF growing G + cocci in clusters in broth - started on Abx   1/5:  cleared again by ID, d/c abx, likely central fevers    Overnight Events:     ROS: negative [] unable to obtain as patient is comatose/intubated/aphasic []     VITALS:   T(C): 37.9 (01-07-19 @ 07:00), Max: 37.9 (01-06-19 @ 19:00)  HR: 94 (01-07-19 @ 07:00) (88 - 101)  BP: 107/67 (01-07-19 @ 07:00) (92/68 - 132/115)  RR: 17 (01-06-19 @ 17:00) (15 - 17)  SpO2: 100% (01-07-19 @ 07:00) (98% - 100%)    01-06-19 @ 07:01  -  01-07-19 @ 07:00  --------------------------------------------------------  IN: 2235 mL / OUT: 1736 mL / NET: 499 mL      LABS:                          8.2    5.7   )-----------( 288      ( 06 Jan 2019 23:46 )             23.2     01-06    138  |  101  |  17  ----------------------------<  138<H>  4.2   |  29  |  0.39<L>    Ca    8.3<L>      06 Jan 2019 23:46  Phos  3.0     01-06  Mg     1.9     01-06    TPro  6.0  /  Alb  2.4<L>  /  TBili  0.2  /  DBili  <0.1  /  AST  57<H>  /  ALT  120<H>  /  AlkPhos  96  01-06    PT/INR - ( 06 Jan 2019 23:46 )   PT: 12.2 sec;   INR: 1.06 ratio         PTT - ( 06 Jan 2019 23:46 )  PTT:25.3 sec  MEDS:  MEDICATIONS  (STANDING):  ALBUTerol/ipratropium for Nebulization 3 milliLiter(s) Nebulizer every 6 hours  chlorhexidine 0.12% Liquid 15 milliLiter(s) Swish and Spit two times a day  chlorhexidine 4% Liquid 1 Application(s) Topical <User Schedule>  docusate sodium Liquid 100 milliGRAM(s) Oral two times a day  gabapentin   Solution 300 milliGRAM(s) Oral every 8 hours  pantoprazole   Suspension 40 milliGRAM(s) Oral daily  polyethylene glycol 3350 17 Gram(s) Oral two times a day  potassium phosphate / sodium phosphate powder 1 Packet(s) Oral four times a day  senna 2 Tablet(s) Oral at bedtime  sodium chloride 2 Gram(s) Oral every 6 hours  sodium chloride 2% . 1000 milliLiter(s) (30 mL/Hr) IV Continuous <Continuous>          EXAMINATION:  General:  calm  HEENT:  trach'd, PERRL  Neuro:  opens eyes to command, BLE wiggles toes to command, no commands in arms, does not mouth words, LUE prox 2/5 dist 0/5, RUE 0/5, toe movement but no lifting of legs  Cards:  RRR  Respiratory:  no respiratory distress  Abdomen:  soft  Extremities:  no edema  Skin:  warm/dry SUMMARY:  57yo woman PMH lupus p/w sudden onset severe HA a/w nausea and vomiting. 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 Patient was intact on presentation however intubated for airway protection and EVD placed. Transferred to [facility] for further care.    24 HOUR EVENTS:  No acute events overnight    HOSPITAL COURSE: Fevers, hydrocephalus, autonomic storm  12/17: s/p angio/embo of AVM, resection of AVM  12/18: RTOR for posterior fossa decompression expansion  12/24 s/p trach, EVD reopened per NSGY  12/26: MRSA in sputum  12/28:  CT head and EVD clamped.  12/29: EVD unclamped due to high ICPs.   12/30: Cefepime added   12/31: Received 1 unit PRBC overnight. Hypotensive requiring transient pressor.   1/4:  CSF growing G + cocci in clusters in broth - started on Abx   1/5:  cleared again by ID, d/c abx, likely central fevers    Overnight Events: NPO for VPS placement.     ROS: Unable to obtain secondary to patient's mentation.     VITALS:   T(C): 37.9 (01-07-19 @ 07:00), Max: 37.9 (01-06-19 @ 19:00)  HR: 94 (01-07-19 @ 07:00) (88 - 101)  BP: 107/67 (01-07-19 @ 07:00) (92/68 - 132/115)  RR: 17 (01-06-19 @ 17:00) (15 - 17)  SpO2: 100% (01-07-19 @ 07:00) (98% - 100%)    01-06-19 @ 07:01  -  01-07-19 @ 07:00  --------------------------------------------------------  IN: 2235 mL / OUT: 1736 mL / NET: 499 mL      LABS:                          8.2    5.7   )-----------( 288      ( 06 Jan 2019 23:46 )             23.2     01-06    138  |  101  |  17  ----------------------------<  138<H>  4.2   |  29  |  0.39<L>    Ca    8.3<L>      06 Jan 2019 23:46  Phos  3.0     01-06  Mg     1.9     01-06    TPro  6.0  /  Alb  2.4<L>  /  TBili  0.2  /  DBili  <0.1  /  AST  57<H>  /  ALT  120<H>  /  AlkPhos  96  01-06    PT/INR - ( 06 Jan 2019 23:46 )   PT: 12.2 sec;   INR: 1.06 ratio         PTT - ( 06 Jan 2019 23:46 )  PTT:25.3 sec  MEDS:  MEDICATIONS  (STANDING):  ALBUTerol/ipratropium for Nebulization 3 milliLiter(s) Nebulizer every 6 hours  chlorhexidine 0.12% Liquid 15 milliLiter(s) Swish and Spit two times a day  chlorhexidine 4% Liquid 1 Application(s) Topical <User Schedule>  docusate sodium Liquid 100 milliGRAM(s) Oral two times a day  gabapentin   Solution 300 milliGRAM(s) Oral every 8 hours  pantoprazole   Suspension 40 milliGRAM(s) Oral daily  polyethylene glycol 3350 17 Gram(s) Oral two times a day  potassium phosphate / sodium phosphate powder 1 Packet(s) Oral four times a day  senna 2 Tablet(s) Oral at bedtime  sodium chloride 2 Gram(s) Oral every 6 hours  sodium chloride 2% . 1000 milliLiter(s) (30 mL/Hr) IV Continuous <Continuous>          EXAMINATION:  General:  calm  HEENT:  trach'd, PERRL  Neuro:  opens eyes to command, BLE wiggles toes to command, no commands in arms, does not mouth words, LUE prox 2/5 dist 0/5, RUE 0/5, toe movement but no lifting of legs  Cards:  RRR  Respiratory:  no respiratory distress  Abdomen:  soft  Extremities:  no edema  Skin:  warm/dry SUMMARY:  57yo woman PMH lupus p/w sudden onset severe HA a/w nausea and vomiting. 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 Patient was intact on presentation however intubated for airway protection and EVD placed. Transferred to [facility] for further care.    24 HOUR EVENTS:  No acute events overnight    HOSPITAL COURSE: Fevers, hydrocephalus, autonomic storm  12/17: s/p angio/embo of AVM, resection of AVM  12/18: RTOR for posterior fossa decompression expansion  12/24 s/p trach, EVD reopened per NSGY  12/26: MRSA in sputum  12/28:  CT head and EVD clamped.  12/29: EVD unclamped due to high ICPs.   12/30: Cefepime added   12/31: Received 1 unit PRBC overnight. Hypotensive requiring transient pressor.   1/4:  CSF growing G + cocci in clusters in broth - started on Abx   1/5:  cleared again by ID, d/c abx, likely central fevers    Overnight Events: NPO for VPS placement.     ROS: Unable to obtain secondary to patient's mentation.     VITALS:   T(C): 37.9 (01-07-19 @ 07:00), Max: 37.9 (01-06-19 @ 19:00)  HR: 94 (01-07-19 @ 07:00) (88 - 101)  BP: 107/67 (01-07-19 @ 07:00) (92/68 - 132/115)  RR: 17 (01-06-19 @ 17:00) (15 - 17)  SpO2: 100% (01-07-19 @ 07:00) (98% - 100%)    01-06-19 @ 07:01  -  01-07-19 @ 07:00  --------------------------------------------------------  IN: 2235 mL / OUT: 1736 mL / NET: 499 mL      LABS:                          8.2    5.7   )-----------( 288      ( 06 Jan 2019 23:46 )             23.2     01-06    138  |  101  |  17  ----------------------------<  138<H>  4.2   |  29  |  0.39<L>    Ca    8.3<L>      06 Jan 2019 23:46  Phos  3.0     01-06  Mg     1.9     01-06    TPro  6.0  /  Alb  2.4<L>  /  TBili  0.2  /  DBili  <0.1  /  AST  57<H>  /  ALT  120<H>  /  AlkPhos  96  01-06    PT/INR - ( 06 Jan 2019 23:46 )   PT: 12.2 sec;   INR: 1.06 ratio         PTT - ( 06 Jan 2019 23:46 )  PTT:25.3 sec  MEDS:  MEDICATIONS  (STANDING):  ALBUTerol/ipratropium for Nebulization 3 milliLiter(s) Nebulizer every 6 hours  chlorhexidine 0.12% Liquid 15 milliLiter(s) Swish and Spit two times a day  chlorhexidine 4% Liquid 1 Application(s) Topical <User Schedule>  docusate sodium Liquid 100 milliGRAM(s) Oral two times a day  gabapentin   Solution 300 milliGRAM(s) Oral every 8 hours  pantoprazole   Suspension 40 milliGRAM(s) Oral daily  polyethylene glycol 3350 17 Gram(s) Oral two times a day  potassium phosphate / sodium phosphate powder 1 Packet(s) Oral four times a day  senna 2 Tablet(s) Oral at bedtime  sodium chloride 2 Gram(s) Oral every 6 hours  sodium chloride 2% . 1000 milliLiter(s) (30 mL/Hr) IV Continuous <Continuous>          EXAMINATION:  General:  calm  HEENT:  trach'd, PERRL  Neuro:  OE to noxious stimuli.  Eyes cross midline. Attempts following commands intermittently. Attempts to blink to command. Did not stick out tongue to command.   LUE - withdrawal. RUE - trace withdrawal.   LLE - TF; RLE - trace withdrawal.   Cards:  RRR  Respiratory:  no respiratory distress  Abdomen:  soft  Extremities:  no edema  Skin:  warm/dry

## 2019-01-07 NOTE — PRE-ANESTHESIA EVALUATION ADULT - NSANTHAIRWAYFT_ENT_ALL_CORE
ETT in situ
Tracheostomy in place, unable to participate in airway/dental examination
unable to evaluate ETT in situ

## 2019-01-07 NOTE — BRIEF OPERATIVE NOTE - POST-OP DX
AVM (arteriovenous malformation) brain  12/17/2018    Active  Joel Spann  Hydrocephalus  01/07/2019    Active  Jarad Silvestre  Respiratory failure  12/24/2018    Active  Corazon Nguyen
AVM (arteriovenous malformation) brain  12/17/2018    Active  Joel Spann  Respiratory failure  12/24/2018    Active  Corazon Nguyen
AVM (arteriovenous malformation) brain  12/17/2018    Active  Joel Spann
AVM (arteriovenous malformation) brain  12/17/2018    Active  Joel Spann

## 2019-01-07 NOTE — BRIEF OPERATIVE NOTE - PROCEDURE
<<-----Click on this checkbox to enter Procedure Ventriculoperitoneal shunt  01/07/2019    Active  [email or web address]

## 2019-01-07 NOTE — PROGRESS NOTE ADULT - ASSESSMENT
intracranial hemorrhage  hydrocephalus status post VPS    - CT head in AM  - SBP 90-160mmHg  - Continue medications for autonomic storming  - Wean vent as tolerated    45 minutes additional critical care time intracranial hemorrhage  hydrocephalus status post VPS    - CT head in AM  - SBP 90-160mmHg  - Continue medications for autonomic storming  - Wean vent as tolerated  - Monitor for cerebrospinal fluid leak    45 minutes additional critical care time

## 2019-01-07 NOTE — PROGRESS NOTE ADULT - ASSESSMENT
-EVD at [id number]  -pre op for VPS in am  -ID following, d/c'ed antibiotics  -hyponatremia on restarted on hypertonics

## 2019-01-07 NOTE — BRIEF OPERATIVE NOTE - OPERATION/FINDINGS
posterior fossa decompression expansion
R frontal VPS, Certas@5
Trachea midline
soc for resection of cerebellar AVM

## 2019-01-07 NOTE — PROGRESS NOTE ADULT - ASSESSMENT
"Anesthesia Transfer of Care Note    Patient: Ken Peter    Procedure(s) Performed: Procedure(s) (LRB):  COLONOSCOPY (N/A)  ESOPHAGOGASTRODUODENOSCOPY (EGD) (N/A)    Patient location: GI    Anesthesia Type: MAC    Post pain: adequate analgesia    Post assessment: no apparent anesthetic complications    Post vital signs: stable    Level of consciousness: awake, oriented and alert    Nausea/Vomiting: no nausea/vomiting    Complications: none    Transfer of care protocol was followed      Last vitals:   Visit Vitals  /80 (BP Location: Left arm, Patient Position: Lying)   Pulse 65   Temp 36.8 °C (98.3 °F) (Oral)   Resp 18   Ht 6' 1" (1.854 m)   Wt 118.8 kg (262 lb)   SpO2 99%   BMI 34.57 kg/m²     " 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 - s/p successful ANGIO/EMBO, posterior fossa decompression  ICH score 2    Neuro:  Hydro: EVD in place, VPS planned for Monday  Fentanyl PRN  no longer storming -- d/c bromocriptine, standing clonazepam, fentanyl patch; begin tapering gabapentin 1/7  OT for splint    Cards:   HTN: SBP 90-160mmHg    Pulm:  s/p trach, wean vent as tolerated  RCU once status post VPS  Chest PT    GI: TF via PEG     Renal: renal consult for persistent hyponatremia; half dose of 2%, plan to d/c on 1/7, continue salt tabs, concentrate feeds    Endo:  goal euglycemia    Heme:  SCDs  DVT ppx Lovenox     ID:  central fever  d/c ABX  ID following    ICU  full code    at risk for deterioration and death due to hydrocephalus, cerebral edema, brain compression  critical care time 45min 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 - s/p successful ANGIO/EMBO, posterior fossa decompression  ICH score 2    Neuro:  EVD clamped for VPS placement today  Neurocheck q1H  No longer autonomic storming -- on Clonazapam 1mg prn  On gabapentin 300 Q8H (will taper off as able)  OT for splint    Cards:   HTN: SBP 90-160mmHg    Pulm:  s/p trach, wean vent as tolerated    GI: NPO for VPS placement  GI ppx: Protonix (till able to wean off vent)  Bowel regimen    Renal: renal consult for persistent hyponatremia; half dose of 2%, plan to d/c on 1/7, continue salt tabs, concentrate feeds    Endo:  goal euglycemia    Heme:  SCDs  DVT ppx Lovenox     ID:  central fever  d/c ABX  ID following    ICU  full code    at risk for deterioration and death due to hydrocephalus, cerebral edema, brain compression  critical care time 45min 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 - s/p successful ANGIO/EMBO, posterior fossa decompression  ICH score 2    Neuro:  EVD clamped for VPS placement today  Neurocheck q1H  No longer autonomic storming -- on Clonazapam 1mg prn  On gabapentin 300 Q8H (will taper off as able)  OT for splint    Cards:   HTN: SBP 90-160mmHg    Pulm:  s/p trach, wean vent as tolerated    GI: NPO for VPS placement  GI ppx: Protonix (till able to wean off vent)  Bowel regimen    Renal:   Hyponatremia - Goal Na 135-145; maintaining Na w/ 2%NS @ 30 ml/hr (continue for now; will taper off post procedure)  Continue sodium chloride 2g q6H    Endo:  goal euglycemia    Heme:  SCDs  DVT ppx Lovenox     ID:  central fever  ID following    ICU  full code    at risk for deterioration and death due to hydrocephalus, cerebral edema, brain compression  critical care time 45min

## 2019-01-07 NOTE — PROGRESS NOTE ADULT - SUBJECTIVE AND OBJECTIVE BOX
Vital Signs Last 24 Hrs  T(C): 37.9 (06 Jan 2019 19:00), Max: 38.2 (06 Jan 2019 07:00)  T(F): 100.2 (06 Jan 2019 19:00), Max: 100.8 (06 Jan 2019 07:00)  HR: 97 (06 Jan 2019 23:17) (88 - 100)  BP: 109/63 (06 Jan 2019 21:00) (92/68 - 132/115)  BP(mean): 70 (06 Jan 2019 21:00) (66 - 145)  RR: 17 (06 Jan 2019 17:00) (15 - 18)  SpO2: 98% (06 Jan 2019 23:17) (98% - 100%)    EXAM  trach'ed, LLE wiggles toes to command, trace wd UE

## 2019-01-07 NOTE — PRE-ANESTHESIA EVALUATION ADULT - NSANTHOSAYNRD_GEN_A_CORE
No. ADELFO screening performed.  STOP BANG Legend: 0-2 = LOW Risk; 3-4 = INTERMEDIATE Risk; 5-8 = HIGH Risk

## 2019-01-07 NOTE — PROGRESS NOTE ADULT - SUBJECTIVE AND OBJECTIVE BOX
CC: f/u for  fever  Patient reports  nothing not verbal  REVIEW OF SYSTEMS:  All other review of systems cannot get(Comprehensive ROS)    Antimicrobials Day #  :    Other Medications Reviewed    T(F): 100.2 (01-07-19 @ 07:00), Max: 100.2 (01-06-19 @ 19:00)  HR: 94 (01-07-19 @ 08:52)  BP: 106/67 (01-07-19 @ 08:00)  RR: 17 (01-06-19 @ 17:00)  SpO2: 100% (01-07-19 @ 08:52)  Wt(kg): --    PHYSICAL EXAM:  General: unresponsive, on vent, twitches left side, evd , posterior occiput wound dry and clean no acute distress  Eyes:  anicteric, no conjunctival injection, no discharge  Oropharynx: no lesions or injection 	  Neck: trach in place without adenopathy  Lungs: course vent sounds to auscultation  Heart: regular rate and rhythm; no murmur, rubs or gallops  Abdomen: soft, nondistended, nontender, without mass or organomegaly, peg clean  Skin: no lesions  Extremities: no clubbing, cyanosis, or edema. right arm picc ok  Neurologic: unresponsive to commands, twitches left    LAB RESULTS:                        8.2    5.7   )-----------( 288      ( 06 Jan 2019 23:46 )             23.2     01-07    136  |  99  |  14  ----------------------------<  108<H>  4.2   |  30  |  0.39<L>    Ca    8.5      07 Jan 2019 08:23  Phos  3.0     01-06  Mg     1.9     01-06    TPro  6.0  /  Alb  2.4<L>  /  TBili  0.2  /  DBili  <0.1  /  AST  57<H>  /  ALT  120<H>  /  AlkPhos  96  01-06    LIVER FUNCTIONS - ( 06 Jan 2019 23:46 )  Alb: 2.4 g/dL / Pro: 6.0 g/dL / ALK PHOS: 96 U/L / ALT: 120 U/L / AST: 57 U/L / GGT: x             MICROBIOLOGY:  RECENT CULTURES:  01-04 @ 15:26 .CSF CSF     No growth    polymorphonuclear leukocytes seen  No organisms seen  by cytocentrifuge    01-04 @ 13:13 .Blood Blood     No growth to date.          RADIOLOGY REVIEWED:  < from: CT Head No Cont (01.07.19 @ 08:33) >  Impression:    Stable ventricular size with intraventricular hemorrhage.  Evolving posterior fossa postsurgical changes.  Increasing posterior fossa extra-axial fluid collection consistent with   pseudomeningocele.  New left sphenoid sinus air-fluid level.        < end of copied text >    Assessment:  Patient with sle, cerebellar bleed, posterior craniotomy, avm embolized, evd, peg and trach, s/p tx for mrsa tracheobronchitis. Has low grade fever but stable hemodynamics, negative cultures of late. CSF with 1 sample growing micrococcus bmo, subsequent cultures negative and csf parameters not consistent with infection. Ongoing low grade fever attributed to blood in brain, for  shunt today  Plan:  monitor off abx ongoing  for vps, would give vanco as part of surgical site prophylaxis   supportive care per nscu

## 2019-01-07 NOTE — PROGRESS NOTE ADULT - SUBJECTIVE AND OBJECTIVE BOX
status post placement of Certas@3.     post-op exam: status post placement of Certas@3. Some leakage at EVD site at removal.    post-op exam:  Eyes open, regards, follows (wiggles toes to command), PERRL  No drainage from external ventricular drain site seen

## 2019-01-08 LAB
APPEARANCE UR: CLEAR — SIGNIFICANT CHANGE UP
BILIRUB UR-MCNC: NEGATIVE — SIGNIFICANT CHANGE UP
COLOR SPEC: SIGNIFICANT CHANGE UP
DIFF PNL FLD: NEGATIVE — SIGNIFICANT CHANGE UP
GLUCOSE UR QL: NEGATIVE — SIGNIFICANT CHANGE UP
KETONES UR-MCNC: NEGATIVE — SIGNIFICANT CHANGE UP
LEUKOCYTE ESTERASE UR-ACNC: NEGATIVE — SIGNIFICANT CHANGE UP
NITRITE UR-MCNC: NEGATIVE — SIGNIFICANT CHANGE UP
PH UR: 7 — SIGNIFICANT CHANGE UP (ref 5–8)
PROT UR-MCNC: NEGATIVE — SIGNIFICANT CHANGE UP
SP GR SPEC: 1.02 — SIGNIFICANT CHANGE UP (ref 1.01–1.02)
UROBILINOGEN FLD QL: ABNORMAL

## 2019-01-08 PROCEDURE — 71045 X-RAY EXAM CHEST 1 VIEW: CPT | Mod: 26

## 2019-01-08 PROCEDURE — 70450 CT HEAD/BRAIN W/O DYE: CPT | Mod: 26

## 2019-01-08 PROCEDURE — 99233 SBSQ HOSP IP/OBS HIGH 50: CPT

## 2019-01-08 RX ORDER — CHLORHEXIDINE GLUCONATE 213 G/1000ML
1 SOLUTION TOPICAL
Qty: 0 | Refills: 0 | Status: DISCONTINUED | OUTPATIENT
Start: 2019-01-08 | End: 2019-01-25

## 2019-01-08 RX ORDER — ENOXAPARIN SODIUM 100 MG/ML
40 INJECTION SUBCUTANEOUS
Qty: 0 | Refills: 0 | Status: DISCONTINUED | OUTPATIENT
Start: 2019-01-08 | End: 2019-01-25

## 2019-01-08 RX ORDER — CALCIUM GLUCONATE 100 MG/ML
1 VIAL (ML) INTRAVENOUS ONCE
Qty: 0 | Refills: 0 | Status: COMPLETED | OUTPATIENT
Start: 2019-01-08 | End: 2019-01-08

## 2019-01-08 RX ORDER — CEFAZOLIN SODIUM 1 G
2000 VIAL (EA) INJECTION ONCE
Qty: 0 | Refills: 0 | Status: COMPLETED | OUTPATIENT
Start: 2019-01-08 | End: 2019-01-08

## 2019-01-08 RX ORDER — SODIUM CHLORIDE 9 MG/ML
1000 INJECTION INTRAMUSCULAR; INTRAVENOUS; SUBCUTANEOUS
Qty: 0 | Refills: 0 | Status: DISCONTINUED | OUTPATIENT
Start: 2019-01-08 | End: 2019-01-08

## 2019-01-08 RX ORDER — CEFAZOLIN SODIUM 1 G
VIAL (EA) INJECTION
Qty: 0 | Refills: 0 | Status: COMPLETED | OUTPATIENT
Start: 2019-01-08 | End: 2019-01-08

## 2019-01-08 RX ORDER — CEFAZOLIN SODIUM 1 G
2000 VIAL (EA) INJECTION EVERY 8 HOURS
Qty: 0 | Refills: 0 | Status: COMPLETED | OUTPATIENT
Start: 2019-01-08 | End: 2019-01-08

## 2019-01-08 RX ADMIN — Medication 3 MILLILITER(S): at 17:03

## 2019-01-08 RX ADMIN — Medication 200 GRAM(S): at 00:16

## 2019-01-08 RX ADMIN — Medication 1 PACKET(S): at 11:30

## 2019-01-08 RX ADMIN — SODIUM CHLORIDE 2 GRAM(S): 9 INJECTION INTRAMUSCULAR; INTRAVENOUS; SUBCUTANEOUS at 17:31

## 2019-01-08 RX ADMIN — GABAPENTIN 300 MILLIGRAM(S): 400 CAPSULE ORAL at 14:44

## 2019-01-08 RX ADMIN — Medication 650 MILLIGRAM(S): at 21:15

## 2019-01-08 RX ADMIN — Medication 1 PACKET(S): at 17:31

## 2019-01-08 RX ADMIN — Medication 100 MILLIGRAM(S): at 13:26

## 2019-01-08 RX ADMIN — SODIUM CHLORIDE 2 GRAM(S): 9 INJECTION INTRAMUSCULAR; INTRAVENOUS; SUBCUTANEOUS at 05:05

## 2019-01-08 RX ADMIN — Medication 3 MILLILITER(S): at 11:58

## 2019-01-08 RX ADMIN — Medication 650 MILLIGRAM(S): at 20:00

## 2019-01-08 RX ADMIN — Medication 3 MILLILITER(S): at 01:42

## 2019-01-08 RX ADMIN — Medication 650 MILLIGRAM(S): at 05:55

## 2019-01-08 RX ADMIN — GABAPENTIN 300 MILLIGRAM(S): 400 CAPSULE ORAL at 05:05

## 2019-01-08 RX ADMIN — SODIUM CHLORIDE 75 MILLILITER(S): 9 INJECTION INTRAMUSCULAR; INTRAVENOUS; SUBCUTANEOUS at 05:04

## 2019-01-08 RX ADMIN — GABAPENTIN 300 MILLIGRAM(S): 400 CAPSULE ORAL at 22:00

## 2019-01-08 RX ADMIN — Medication 100 MILLIGRAM(S): at 05:55

## 2019-01-08 RX ADMIN — SODIUM CHLORIDE 2 GRAM(S): 9 INJECTION INTRAMUSCULAR; INTRAVENOUS; SUBCUTANEOUS at 11:30

## 2019-01-08 RX ADMIN — Medication 3 MILLILITER(S): at 23:20

## 2019-01-08 RX ADMIN — Medication 3 MILLILITER(S): at 05:28

## 2019-01-08 RX ADMIN — Medication 1 PACKET(S): at 05:05

## 2019-01-08 RX ADMIN — ENOXAPARIN SODIUM 40 MILLIGRAM(S): 100 INJECTION SUBCUTANEOUS at 17:31

## 2019-01-08 RX ADMIN — PANTOPRAZOLE SODIUM 40 MILLIGRAM(S): 20 TABLET, DELAYED RELEASE ORAL at 11:30

## 2019-01-08 NOTE — PROGRESS NOTE ADULT - ASSESSMENT
intracranial hemorrhage  hydrocephalus status post VPS    - CT head in AM  - SBP 90-160mmHg  - Continue medications for autonomic storming  - Wean vent as tolerated  - Monitor for cerebrospinal fluid leak

## 2019-01-08 NOTE — PROGRESS NOTE ADULT - ASSESSMENT
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 - s/p successful ANGIO/EMBO, posterior fossa decompression, s/p VPS   ICH score 2    Neuro:  VPS placed   Neurocheck q1H  No longer autonomic storming -- on Clonazapam 1mg prn  On gabapentin 300 Q8H (will taper off as able)  OT for splint    Cards:   HTN: SBP 90-160mmHg    Pulm:  s/p trach, wean vent as tolerated    GI: TF   GI ppx: Protonix (till able to wean off vent)  Bowel regimen    Renal:   Hyponatremia - Goal Na 135-145; maintaining Na w/ 2%NS @ 30 ml/hr  Continue sodium chloride 2g q6H    Endo:  goal euglycemia    Heme:  SCDs  DVT ppx Lovenox     ID:  central fever  ID following    ICU  full code    at risk for deterioration and death due to hydrocephalus, cerebral edema, brain compression  critical care time 45min 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 - s/p successful ANGIO/EMBO, posterior fossa decompression, s/p VPS   ICH score 2    Neuro:  VPS placed   Neurocheck q1H  No longer autonomic storming -- on Clonazapam 1mg prn  On gabapentin 300 Q8H (will taper off as able)  OT for splint    Cards:   HTN: SBP 90-160mmHg    Pulm:  s/p trach, wean vent as tolerated    GI: TF   GI ppx: Protonix (till able to wean off vent)  Bowel regimen    Renal:   Hyponatremia - Goal Na 135-145; maintaining Na w/ 2%NS @ 30 ml/hr  Continue sodium chloride 2g q6H    Endo:  goal euglycemia    Heme:  SCDs  DVT ppx Lovenox     ID:  central fever  ID following    RCU consult  full code

## 2019-01-08 NOTE — PROGRESS NOTE ADULT - SUBJECTIVE AND OBJECTIVE BOX
SUMMARY:  57yo woman PMH lupus p/w sudden onset severe HA a/w nausea and vomiting. 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 Patient was intact on presentation however intubated for airway protection and EVD placed. Transferred to [facility] for further care.    24 HOUR EVENTS:  No acute events overnight    HOSPITAL COURSE: Fevers, hydrocephalus, autonomic storm  12/17: s/p angio/embo of AVM, resection of AVM  12/18: RTOR for posterior fossa decompression expansion  12/24 s/p trach, EVD reopened per NSGY  12/26: MRSA in sputum  12/28:  CT head and EVD clamped.  12/29: EVD unclamped due to high ICPs.   12/30: Cefepime added   12/31: Received 1 unit PRBC overnight. Hypotensive requiring transient pressor.   1/4:  CSF growing G + cocci in clusters in broth - started on Abx   1/5:  cleared again by ID, d/c abx, likely central fevers    Overnight Events: s/p VPS placement.     VITALS:  Reviewed   LABS:  Reviewed  MEDICATIONS: Reviewed  IMAGING:  Recent imaging studies were reviewed.    EXAMINATION:  General:  calm  HEENT:  trach'd, PERRL  Neuro:  OE to noxious stimuli.  Eyes cross midline. Attempts following commands intermittently. Attempts to blink to command. Did not stick out tongue to command.   LUE - withdrawal. RUE - trace withdrawal.   LLE - TF; RLE - trace withdrawal.   Cards:  RRR  Respiratory:  no respiratory distress  Abdomen:  soft  Extremities:  no edema  Skin:  warm/dry

## 2019-01-08 NOTE — PROGRESS NOTE ADULT - SUBJECTIVE AND OBJECTIVE BOX
Vital Signs Last 24 Hrs  T(C): 37.1 (07 Jan 2019 23:00), Max: 37.9 (07 Jan 2019 07:00)  T(F): 98.8 (07 Jan 2019 23:00), Max: 100.2 (07 Jan 2019 07:00)  HR: 87 (07 Jan 2019 23:00) (78 - 98)  BP: 118/80 (07 Jan 2019 23:00) (85/66 - 152/107)  BP(mean): 92 (07 Jan 2019 23:00) (68 - 122)  RR: 12 (07 Jan 2019 21:00) (12 - 12)  SpO2: 100% (07 Jan 2019 23:00) (98% - 100%)    EXAM  Eyes open, regards, follows (wiggles toes to command), PERRL

## 2019-01-08 NOTE — PROGRESS NOTE ADULT - SUBJECTIVE AND OBJECTIVE BOX
CC: f/u for fever    Patient reports  nothing non verbal  REVIEW OF SYSTEMS:  All other review of systems cannot get (Comprehensive ROS)    Antimicrobials Day #  :    Other Medications Reviewed    T(F): 99.8 (19 @ 11:00), Max: 101.3 (19 @ 03:00)  HR: 93 (19 @ 12:00)  BP: 129/111 (19 @ 12:00)  RR: 16 (19 @ 12:00)  SpO2: 100% (19 @ 12:00)  Wt(kg): --    PHYSICAL EXAM:  General: unresponsive on vent. head wound dressed, posterior fossa area wound dry and intact no acute distress  Eyes:  anicteric, no conjunctival injection, no discharge  Oropharynx: no lesions or injection 	  Neck: supple, without adenopathy  Lungs: clear to auscultation  Heart: regular rate and rhythm; no murmur, rubs or gallops  Abdomen: soft, nondistended, nontender, without mass or organomegaly, peg, new vps wound dressed   Skin: no lesions  Extremities: no clubbing, cyanosis, or edema  Neurologic: unresponsive    LAB RESULTS:                        8.4    5.4   )-----------( 121      ( 2019 23:08 )             24.1         135  |  100  |  12  ----------------------------<  109<H>  4.2   |  25  |  0.40<L>    Ca    8.0<L>      2019 23:08  Phos  3.9       Mg     2.0         TPro  6.0  /  Alb  2.4<L>  /  TBili  0.2  /  DBili  <0.1  /  AST  57<H>  /  ALT  120<H>  /  AlkPhos  96      LIVER FUNCTIONS - ( 2019 23:46 )  Alb: 2.4 g/dL / Pro: 6.0 g/dL / ALK PHOS: 96 U/L / ALT: 120 U/L / AST: 57 U/L / GGT: x           Urinalysis Basic - ( 2019 06:24 )    Color: Light Yellow / Appearance: Clear / S.017 / pH: x  Gluc: x / Ketone: Negative  / Bili: Negative / Urobili: 3 mg/dL   Blood: x / Protein: Negative / Nitrite: Negative   Leuk Esterase: Negative / RBC: x / WBC x   Sq Epi: x / Non Sq Epi: x / Bacteria: x      MICROBIOLOGY:  RECENT CULTURES:   @ 15:26 .CSF CSF     No growth    polymorphonuclear leukocytes seen  No organisms seen  by cytocentrifuge     @ 13:13 .Blood Blood     No growth to date.          RADIOLOGY REVIEWED:    < from: CT Head No Cont (19 @ 08:28) >  IMPRESSION:    Interval revision of right frontal approach ventriculostomy catheter   which terminates at the septum pellucidum    Hemorrhage layering in the occipital horns decreased. Ventricular size is   decreased, hydrocephalus has essentially resolved.    Postsurgical changes in the posterior fossa as described.      < end of copied text >    Assessment:  Patient admitted for cerebellar bleed, s/p avm embolization and resection then rtor for posterior fossa decompression, evd now vps. Having fever from blood in brain. Peg and trach. Pos csf cx contaminant  Plan:  monitor off abx  supportive care

## 2019-01-09 DIAGNOSIS — D64.9 ANEMIA, UNSPECIFIED: ICD-10-CM

## 2019-01-09 DIAGNOSIS — G90.8 OTHER DISORDERS OF AUTONOMIC NERVOUS SYSTEM: ICD-10-CM

## 2019-01-09 LAB
ANION GAP SERPL CALC-SCNC: 10 MMOL/L — SIGNIFICANT CHANGE UP (ref 5–17)
BLD GP AB SCN SERPL QL: NEGATIVE — SIGNIFICANT CHANGE UP
BUN SERPL-MCNC: 9 MG/DL — SIGNIFICANT CHANGE UP (ref 7–23)
CALCIUM SERPL-MCNC: 7.8 MG/DL — LOW (ref 8.4–10.5)
CHLORIDE SERPL-SCNC: 99 MMOL/L — SIGNIFICANT CHANGE UP (ref 96–108)
CO2 SERPL-SCNC: 27 MMOL/L — SIGNIFICANT CHANGE UP (ref 22–31)
CREAT SERPL-MCNC: 0.41 MG/DL — LOW (ref 0.5–1.3)
CULTURE RESULTS: SIGNIFICANT CHANGE UP
GLUCOSE BLDC GLUCOMTR-MCNC: 123 MG/DL — HIGH (ref 70–99)
GLUCOSE SERPL-MCNC: 108 MG/DL — HIGH (ref 70–99)
HCT VFR BLD CALC: 21.6 % — LOW (ref 34.5–45)
HCT VFR BLD CALC: 22.9 % — LOW (ref 34.5–45)
HGB BLD-MCNC: 7.4 G/DL — LOW (ref 11.5–15.5)
HGB BLD-MCNC: 8.1 G/DL — LOW (ref 11.5–15.5)
MAGNESIUM SERPL-MCNC: 2.2 MG/DL — SIGNIFICANT CHANGE UP (ref 1.6–2.6)
MCHC RBC-ENTMCNC: 30.6 PG — SIGNIFICANT CHANGE UP (ref 27–34)
MCHC RBC-ENTMCNC: 31.6 PG — SIGNIFICANT CHANGE UP (ref 27–34)
MCHC RBC-ENTMCNC: 34.3 GM/DL — SIGNIFICANT CHANGE UP (ref 32–36)
MCHC RBC-ENTMCNC: 35.5 GM/DL — SIGNIFICANT CHANGE UP (ref 32–36)
MCV RBC AUTO: 89 FL — SIGNIFICANT CHANGE UP (ref 80–100)
MCV RBC AUTO: 89.3 FL — SIGNIFICANT CHANGE UP (ref 80–100)
OB PNL STL: NEGATIVE — SIGNIFICANT CHANGE UP
PHOSPHATE SERPL-MCNC: 2.5 MG/DL — SIGNIFICANT CHANGE UP (ref 2.5–4.5)
PLATELET # BLD AUTO: 177 K/UL — SIGNIFICANT CHANGE UP (ref 150–400)
PLATELET # BLD AUTO: 271 K/UL — SIGNIFICANT CHANGE UP (ref 150–400)
POTASSIUM SERPL-MCNC: 3.8 MMOL/L — SIGNIFICANT CHANGE UP (ref 3.5–5.3)
POTASSIUM SERPL-SCNC: 3.8 MMOL/L — SIGNIFICANT CHANGE UP (ref 3.5–5.3)
RBC # BLD: 2.42 M/UL — LOW (ref 3.8–5.2)
RBC # BLD: 2.57 M/UL — LOW (ref 3.8–5.2)
RBC # FLD: 15.7 % — HIGH (ref 10.3–14.5)
RBC # FLD: 17.4 % — HIGH (ref 10.3–14.5)
RH IG SCN BLD-IMP: POSITIVE — SIGNIFICANT CHANGE UP
SODIUM SERPL-SCNC: 136 MMOL/L — SIGNIFICANT CHANGE UP (ref 135–145)
SPECIMEN SOURCE: SIGNIFICANT CHANGE UP
WBC # BLD: 5.9 K/UL — SIGNIFICANT CHANGE UP (ref 3.8–10.5)
WBC # BLD: 6.04 K/UL — SIGNIFICANT CHANGE UP (ref 3.8–10.5)
WBC # FLD AUTO: 5.9 K/UL — SIGNIFICANT CHANGE UP (ref 3.8–10.5)
WBC # FLD AUTO: 6.04 K/UL — SIGNIFICANT CHANGE UP (ref 3.8–10.5)

## 2019-01-09 PROCEDURE — 99222 1ST HOSP IP/OBS MODERATE 55: CPT | Mod: GC

## 2019-01-09 PROCEDURE — 99233 SBSQ HOSP IP/OBS HIGH 50: CPT | Mod: GC

## 2019-01-09 RX ORDER — FENTANYL CITRATE 50 UG/ML
1 INJECTION INTRAVENOUS
Qty: 0 | Refills: 0 | Status: DISCONTINUED | OUTPATIENT
Start: 2019-01-09 | End: 2019-01-15

## 2019-01-09 RX ADMIN — Medication 1 MILLIGRAM(S): at 13:16

## 2019-01-09 RX ADMIN — CHLORHEXIDINE GLUCONATE 1 APPLICATION(S): 213 SOLUTION TOPICAL at 23:03

## 2019-01-09 RX ADMIN — Medication 1 PACKET(S): at 23:03

## 2019-01-09 RX ADMIN — Medication 1 PACKET(S): at 05:43

## 2019-01-09 RX ADMIN — GABAPENTIN 300 MILLIGRAM(S): 400 CAPSULE ORAL at 13:13

## 2019-01-09 RX ADMIN — FENTANYL CITRATE 1 PATCH: 50 INJECTION INTRAVENOUS at 19:24

## 2019-01-09 RX ADMIN — SODIUM CHLORIDE 2 GRAM(S): 9 INJECTION INTRAMUSCULAR; INTRAVENOUS; SUBCUTANEOUS at 17:02

## 2019-01-09 RX ADMIN — SODIUM CHLORIDE 2 GRAM(S): 9 INJECTION INTRAMUSCULAR; INTRAVENOUS; SUBCUTANEOUS at 00:05

## 2019-01-09 RX ADMIN — SODIUM CHLORIDE 2 GRAM(S): 9 INJECTION INTRAMUSCULAR; INTRAVENOUS; SUBCUTANEOUS at 05:43

## 2019-01-09 RX ADMIN — Medication 3 MILLILITER(S): at 05:06

## 2019-01-09 RX ADMIN — PANTOPRAZOLE SODIUM 40 MILLIGRAM(S): 20 TABLET, DELAYED RELEASE ORAL at 13:13

## 2019-01-09 RX ADMIN — FENTANYL CITRATE 1 PATCH: 50 INJECTION INTRAVENOUS at 17:02

## 2019-01-09 RX ADMIN — Medication 650 MILLIGRAM(S): at 21:25

## 2019-01-09 RX ADMIN — Medication 650 MILLIGRAM(S): at 16:46

## 2019-01-09 RX ADMIN — SODIUM CHLORIDE 2 GRAM(S): 9 INJECTION INTRAMUSCULAR; INTRAVENOUS; SUBCUTANEOUS at 13:13

## 2019-01-09 RX ADMIN — CHLORHEXIDINE GLUCONATE 1 APPLICATION(S): 213 SOLUTION TOPICAL at 05:43

## 2019-01-09 RX ADMIN — Medication 3 MILLILITER(S): at 17:55

## 2019-01-09 RX ADMIN — Medication 1 MILLIGRAM(S): at 05:55

## 2019-01-09 RX ADMIN — Medication 3 MILLILITER(S): at 11:34

## 2019-01-09 RX ADMIN — SODIUM CHLORIDE 2 GRAM(S): 9 INJECTION INTRAMUSCULAR; INTRAVENOUS; SUBCUTANEOUS at 23:03

## 2019-01-09 RX ADMIN — GABAPENTIN 300 MILLIGRAM(S): 400 CAPSULE ORAL at 05:43

## 2019-01-09 RX ADMIN — Medication 1 PACKET(S): at 00:05

## 2019-01-09 RX ADMIN — Medication 1 PACKET(S): at 13:13

## 2019-01-09 RX ADMIN — Medication 650 MILLIGRAM(S): at 05:55

## 2019-01-09 RX ADMIN — Medication 650 MILLIGRAM(S): at 20:55

## 2019-01-09 RX ADMIN — ENOXAPARIN SODIUM 40 MILLIGRAM(S): 100 INJECTION SUBCUTANEOUS at 17:02

## 2019-01-09 RX ADMIN — Medication 650 MILLIGRAM(S): at 14:26

## 2019-01-09 RX ADMIN — Medication 1 PACKET(S): at 17:05

## 2019-01-09 RX ADMIN — GABAPENTIN 300 MILLIGRAM(S): 400 CAPSULE ORAL at 23:03

## 2019-01-09 NOTE — CONSULT NOTE ADULT - ATTENDING COMMENTS
Seen and examined with Fellow. Agree with note.   Patient on Vent- if can wean from vent will need acute TBI Rehab

## 2019-01-09 NOTE — PROGRESS NOTE ADULT - ASSESSMENT
58 Year Old woman PMH Lupus Presented with Sudden onset of Severe Headache with associated nausea and vomiting. 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 Patient was intact on presentation however intubated for airway protection and EVD placed for hydrocephalus. Patient was Transferred to [facility] for selective IR cerebral angiography.       24 HOUR EVENTS:  No acute events overnight    HOSPITAL COURSE: Fevers, hydrocephalus, autonomic storm  12/17: s/p angio/embo of AVM, resection of AVM  12/18: RTOR for posterior fossa decompression expansion  12/24 s/p trach, EVD reopened per NSGY  12/26: MRSA in sputum  12/28:  CT head and EVD clamped.  12/29: EVD unclamped due to high ICPs.   12/30: Cefepime added   12/31: Received 1 unit PRBC overnight. Hypotensive requiring transient pressor.   1/4:  CSF growing G + cocci in clusters in broth - started on Abx   1/5:  cleared again by ID, d/c abx, likely central fevers    Overnight Events: s/p VPS placement. 58 Year Old woman PMH Lupus Presented with Sudden onset of Severe Headache with associated nausea and vomiting. 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 Patient was intact on presentation however intubated for airway protection and EVD placed for hydrocephalus. Patient was Transferred to [facility] for selective IR cerebral angiography. 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 Patient returned to the OR on 12/18 for sub occipital decompressive craniectomy. Patient was initially on Vimpat for suspected Seizure like activity, but VEEG while in the NSCU, without evidence of Seizures. Vimpat was discontinued. Patient was noted to have moderate diffuse encephalopathy and dysfxn in frontal regions bilaterally on VEEG. Patient underwent Trach Placement on 12/24 ( # 8 Cuffed Shiley ), and PEG Placement on 12/26. While in the NSCU Patient with Persistent Fevers and Autonomic Storming, Patient treated with medical management and [facility] Protocol. Patient found to MRSA/ E.coli growing in the sputum and was treated with Vanco and Cefepime. 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 Patient remained with persistent fevers despite course of broad spectrum abx lD felt fevers Central in nature. Patient required blood transfusion while in NSCU. Patient failed clamping trial and required VPS, which was Placed on 1/7.    1/9: Patient transferred to RCU overnight 58 Year Old woman PMH Lupus Presented with Sudden onset of Severe Headache with associated nausea and vomiting. 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 Patient was intact on presentation however intubated for airway protection and EVD placed for hydrocephalus. Patient was Transferred to [facility] for selective IR cerebral angiography. 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 Patient returned to the OR on 12/18 for sub occipital decompressive craniectomy. Patient was initially on Vimpat for suspected Seizure like activity, but VEEG while in the NSCU, without evidence of Seizures. Vimpat was discontinued. Patient was noted to have moderate diffuse encephalopathy and dysfxn in frontal regions bilaterally on VEEG. Patient underwent Trach Placement on 12/24 ( # 8 Cuffed Shiley ), and PEG Placement on 12/26. While in the NSCU Patient with Persistent Fevers and Autonomic Storming, Patient treated with medical management and [facility] [place] Protocol. Patient found to MRSA/ E.coli growing in the sputum and was treated with Vanco and Cefepime. 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 Patient remained with persistent fevers despite course of broad spectrum abx lD felt fevers Central in nature. Patient required blood transfusion while in NSCU. Patient failed clamping trial and required VPS, which was Placed on 1/7.    1/9: Patient transferred to RCU overnight , Patient remains Febrile Fevers likely Central in Nature. Patient noted to have tremor of RT upper extremity on morning physical exam as per d/w Neuro Sx tremors are not new and patient has had several VEEG which are negative. Patient previously on Fentanyl patch for Autonomic storming which auto-discontinued, will re-order lower dose fentanyl patch as patient appears to be waking up.

## 2019-01-09 NOTE — CHART NOTE - NSCHARTNOTEFT_GEN_A_CORE
Nutrition Follow Up Note  Patient seen for: Nutrition follow up  57 yo  F with PMHx of lupus, back pain and RA. Pt p/w  headache, N/V and admitted for cerebellar IPH with hydrocephalus, S/P EVD placement. S/P angio + embolization and S/P craniotomy for resection of AVM on 12/17. RTOR on 12/18 for craniectomy, sp tracheostomy 12/24, S/P PEG 12/26. S/p  shunt placement 1/7. Rectal tube placed 1/8 (500 mL output).    Source:     Diet :   NPO with tube feeds, Danactive 2x daily  TwoCal HN @ 80cc/hr x 18 hrs to provide 1440cc fluid    Patient reports:     PO intake :     Source for PO intake:     Enteral /Parenteral Nutrition: TwoCal HN @ 80cc/hr x 18 hrs to provide 1440cc fluid, 2880 hank/day based on dosing wt 84.1Kg    Daily Weights:  (12/19) 185.4 lb  (1/2): 186 lb   No new weights or significant changes    Pertinent Medications: MEDICATIONS  (STANDING):  ALBUTerol/ipratropium for Nebulization 3 milliLiter(s) Nebulizer every 6 hours  chlorhexidine 4% Liquid 1 Application(s) Topical <User Schedule>  enoxaparin Injectable 40 milliGRAM(s) SubCutaneous <User Schedule>  gabapentin   Solution 300 milliGRAM(s) Oral every 8 hours  pantoprazole   Suspension 40 milliGRAM(s) Oral daily  potassium phosphate / sodium phosphate powder 1 Packet(s) Oral four times a day  sodium chloride 2 Gram(s) Oral every 6 hours    MEDICATIONS  (PRN):  acetaminophen    Suspension .. 650 milliGRAM(s) Oral every 6 hours PRN Temp greater or equal to 38.5C (101.3F), Mild Pain (1 - 3)  clonazePAM Tablet 1 milliGRAM(s) Oral every 8 hours PRN anxiety    Pertinent Labs: 01-09 @ 06:37: Na 136, BUN 9, Cr 0.41<L>, <H>, K+ 3.8, Phos 2.5, Mg 2.2, Alk Phos --, ALT/SGPT --, AST/SGOT --, HbA1c --    Finger Sticks: None of note    Skin per nursing documentation: Intact pressure-wise, surgical incision  Edema: Generalized 2+ edema    Estimated Needs:   [ ] no change since previous assessment  [ ] recalculated:     Previous Nutrition Diagnosis:   Nutrition Diagnosis is:    New Nutrition Diagnosis:  Related to:    As evidenced by:      Interventions:     Recommend  1)    Monitoring and Evaluation:     Continue to monitor Nutritional intake, Tolerance to diet prescription, weights, labs, skin integrity    RD remains available upon request and will follow up per protocol Nutrition Follow Up Note  Patient seen for: Nutrition follow up in RCU   57 yo  F with PMHx of lupus, back pain and RA. Pt p/w  headache, N/V and admitted for cerebellar IPH with hydrocephalus, S/P EVD placement. S/P angio + embolization and S/P craniotomy for resection of AVM on 12/17. RTOR on 12/18 for craniectomy, s/p tracheostomy 12/24, S/P PEG 12/26. S/p  shunt placement 1/7. Pt febrile with central fevers today 1/9.  RT UE PICC placed 1/9.    Source: RN, EMR    Diet :   NPO with tube feeds, Danactive 2x daily  TwoCal HN @ 80cc/hr x 18 hrs to provide 1440cc fluid    Pt non-verbal. RN reports pt tolerating feeds, Rectal tube placed 1/8 with 500 mL output.     Enteral /Parenteral Nutrition: TwoCal HN @ 80cc/hr x 18 hrs to provide 1440cc fluid, 2880 hank/day (34cal/kg), 120 gm protein/day (1.4 gm/kg) based on dosing wt 84.1Kg    Daily Weights:  (12/19) 185.4 lb  (1/2): 186 lb   No new weights or significant changes    Pertinent Medications: MEDICATIONS  (STANDING):  ALBUTerol/ipratropium for Nebulization 3 milliLiter(s) Nebulizer every 6 hours  chlorhexidine 4% Liquid 1 Application(s) Topical <User Schedule>  enoxaparin Injectable 40 milliGRAM(s) SubCutaneous <User Schedule>  gabapentin   Solution 300 milliGRAM(s) Oral every 8 hours  pantoprazole   Suspension 40 milliGRAM(s) Oral daily  potassium phosphate / sodium phosphate powder 1 Packet(s) Oral four times a day  sodium chloride 2 Gram(s) Oral every 6 hours    MEDICATIONS  (PRN):  acetaminophen    Suspension .. 650 milliGRAM(s) Oral every 6 hours PRN Temp greater or equal to 38.5C (101.3F), Mild Pain (1 - 3)  clonazePAM Tablet 1 milliGRAM(s) Oral every 8 hours PRN anxiety    Pertinent Labs: 01-09 @ 06:37: Na 136, BUN 9, Cr 0.41<L>, <H>, K+ 3.8, Phos 2.5, Mg 2.2, Alk Phos --, ALT/SGPT --, AST/SGOT --, HbA1c --    Finger Sticks: None of note    Skin per nursing documentation: Intact pressure-wise, surgical incision  Edema: Generalized 2+ edema    Estimated Needs:   [ ] no change since previous assessment  [ x ] recalculated: 2880 hank/day (34cal/kg), 120 gm protein/day (1.4 gm/kg) based on dosing wt 84.1Kg    Previous Nutrition Diagnosis:  Increased Nutrient Needs  Nutrition Diagnosis is: ongoing, addressed through EN feeding    New Nutrition Diagnosis: N/A    Recommend  1) C/w TwoCal HN @ 80cc/hr x 18 hrs to provide 1440cc fluid, 2880 hank/day (34cal/kg), 120 gm protein/day (1.4 gm/kg) based on dosing wt 84.1Kg  2) Continue Danactive 2x daily  3) Add Banatrol Plus Flakes until diarrhea is resolved 2x daily  4) Monitor rectal tube output, adjust feeding accordingly if needed    Monitoring and Evaluation:     Continue to monitor Nutritional intake, Tolerance to diet prescription, weights, labs, skin integrity    RD remains available upon request and will follow up per protocol Nutrition Follow Up Note  Patient seen for: Nutrition follow up in RCU   57 yo  F with PMHx of lupus, back pain and RA. Pt p/w  headache, N/V and admitted for cerebellar IPH with hydrocephalus, S/P EVD placement. S/P angio + embolization and S/P craniotomy for resection of AVM on 12/17. RTOR on 12/18 for craniectomy, s/p tracheostomy 12/24, S/P PEG 12/26. S/p  shunt placement 1/7. Pt febrile with central fevers today 1/9.  RT UE PICC placed 1/9.    Source: RN, EMR    Diet :   NPO with tube feeds, Danactive 2x daily  TwoCal HN @ 80cc/hr x 18 hrs to provide 1440cc fluid    Pt non-verbal. RN reports pt tolerating feeds. Pt with diarrhea 2x on 1/8, rectal tube placed 1/8 with 500 mL output (7AM 1/9).     Enteral /Parenteral Nutrition: TwoCal HN @ 80cc/hr x 18 hrs to provide 1440cc fluid, 2880 hank/day (34cal/kg), 120 gm protein/day (1.4 gm/kg) based on dosing wt 84.1Kg    Daily Weights:  (12/19) 185.4 lb  (1/2): 186 lb   No new weights or significant changes    Pertinent Medications: MEDICATIONS  (STANDING):  ALBUTerol/ipratropium for Nebulization 3 milliLiter(s) Nebulizer every 6 hours  chlorhexidine 4% Liquid 1 Application(s) Topical <User Schedule>  enoxaparin Injectable 40 milliGRAM(s) SubCutaneous <User Schedule>  gabapentin   Solution 300 milliGRAM(s) Oral every 8 hours  pantoprazole   Suspension 40 milliGRAM(s) Oral daily  potassium phosphate / sodium phosphate powder 1 Packet(s) Oral four times a day  sodium chloride 2 Gram(s) Oral every 6 hours    MEDICATIONS  (PRN):  acetaminophen    Suspension .. 650 milliGRAM(s) Oral every 6 hours PRN Temp greater or equal to 38.5C (101.3F), Mild Pain (1 - 3)  clonazePAM Tablet 1 milliGRAM(s) Oral every 8 hours PRN anxiety    Pertinent Labs: 01-09 @ 06:37: Na 136, BUN 9, Cr 0.41<L>, <H>, K+ 3.8, Phos 2.5, Mg 2.2, Alk Phos --, ALT/SGPT --, AST/SGOT --, HbA1c --    Finger Sticks: None of note    Skin per nursing documentation: Intact pressure-wise, surgical incision  Edema: Generalized 2+ edema    Estimated Needs:   [ ] no change since previous assessment  [ x ] recalculated: 2880 hank/day (34cal/kg), 120 gm protein/day (1.4 gm/kg) based on dosing wt 84.1Kg    Previous Nutrition Diagnosis:  Increased Nutrient Needs  Nutrition Diagnosis is: ongoing, addressed through EN feeding    New Nutrition Diagnosis: N/A    Recommend  1) C/w TwoCal HN @ 80cc/hr x 18 hrs to provide 1440cc fluid, 2880 hank/day (34cal/kg), 120 gm protein/day (1.4 gm/kg) based on dosing wt 84.1Kg  2) Continue Danactive 2x daily  3) Add Banatrol Plus Flakes until diarrhea is resolved 2x daily  4) Monitor rectal tube output, adjust feeding accordingly if needed  5) Defer fluid needs to team     Monitoring and Evaluation:     Continue to monitor Nutritional intake, Tolerance to diet prescription, weights, labs, skin integrity    RD remains available upon request and will follow up per protocol Nutrition Follow Up Note  Patient seen for: Nutrition follow up in RCU   57 yo  F with PMHx of lupus, back pain and RA. Pt p/w  headache, N/V and admitted for cerebellar IPH with hydrocephalus, S/P EVD placement. S/P angio + embolization and S/P craniotomy for resection of AVM on 12/17. RTOR on 12/18 for craniectomy, s/p tracheostomy 12/24, S/P PEG 12/26. S/p  shunt placement 1/7. Pt febrile with central fevers today 1/9.  RT UE PICC placed 1/9.    Source: RN, EMR    Diet :   NPO with tube feeds, Danactive 2x daily  TwoCal HN @ 80cc/hr x 18 hrs to provide 1440cc fluid    Pt non-verbal. RN reports pt tolerating feeds. Pt with diarrhea 2x on 1/8, rectal tube placed 1/8 with 500 mL output (7AM 1/9).     Enteral/Parenteral Nutrition: TwoCal HN @ 80cc/hr x 18 hrs to provide 1440cc fluid, 2880 hank/day (34cal/kg), 120 gm protein/day (1.4 gm/kg) based on dosing wt 84.1Kg    Daily Weights:  (12/19) 185.4 lb  (1/2): 186 lb, 93 kg  No new weights or significant changes    Pertinent Medications: MEDICATIONS  (STANDING):  ALBUTerol/ipratropium for Nebulization 3 milliLiter(s) Nebulizer every 6 hours  chlorhexidine 4% Liquid 1 Application(s) Topical <User Schedule>  enoxaparin Injectable 40 milliGRAM(s) SubCutaneous <User Schedule>  gabapentin   Solution 300 milliGRAM(s) Oral every 8 hours  pantoprazole   Suspension 40 milliGRAM(s) Oral daily  potassium phosphate / sodium phosphate powder 1 Packet(s) Oral four times a day  sodium chloride 2 Gram(s) Oral every 6 hours    MEDICATIONS  (PRN):  acetaminophen    Suspension .. 650 milliGRAM(s) Oral every 6 hours PRN Temp greater or equal to 38.5C (101.3F), Mild Pain (1 - 3)  clonazePAM Tablet 1 milliGRAM(s) Oral every 8 hours PRN anxiety    Pertinent Labs: 01-09 @ 06:37: Na 136, BUN 9, Cr 0.41<L>, <H>, K+ 3.8, Phos 2.5, Mg 2.2, Alk Phos --, ALT/SGPT --, AST/SGOT --, HbA1c --    Finger Sticks: None of note    Skin per nursing documentation: Intact pressure-wise, surgical incision  Edema: Generalized 2+ edema    Estimated Needs:   [ ] no change since previous assessment  [ x ] recalculated: 2880 hank/day (34cal/kg), 120 gm protein/day (1.4 gm/kg) based on dosing wt 84.1Kg    Previous Nutrition Diagnosis:  Increased Nutrient Needs  Nutrition Diagnosis is: ongoing, addressed through EN feeding    New Nutrition Diagnosis: N/A    Recommend  1) Recommend TwoCal HN @ 65cc/hr x 18 hrs to provide 1170cc fluid, 2340cal/day (hank/kg), 98gm protein/day ( gm/kg) based on dosing wt 84.1Kg  2) Continue Danactive 2x daily  3) Add Banatrol Plus Flakes until diarrhea is resolved 2x daily  4) Monitor rectal tube output, adjust feeding accordingly if needed  5) Defer fluid needs to team     Monitoring and Evaluation:     Continue to monitor Nutritional intake, Tolerance to diet prescription, weights, labs, skin integrity    RD remains available upon request and will follow up per protocol Nutrition Follow Up Note  Patient seen for: Nutrition follow up in RCU   57 yo  F with PMHx of lupus, back pain and RA. Pt p/w  headache, N/V and admitted for cerebellar IPH with hydrocephalus, S/P EVD placement. S/P angio + embolization and S/P craniotomy for resection of AVM on 12/17. RTOR on 12/18 for craniectomy, s/p tracheostomy 12/24, S/P PEG 12/26. S/p  shunt placement 1/7. Pt febrile with central fevers today 1/9.  RT UE PICC placed 1/9.    Source: RN, EMR    Diet :   NPO with tube feeds, Danactive 2x daily  TwoCal HN @ 80cc/hr x 18 hrs to provide 1440cc fluid    Pt non-verbal. RN reports pt tolerating feeds. Pt with diarrhea 2x on 1/8, rectal tube placed 1/8 with 500 mL output (7AM 1/9).     Enteral/Parenteral Nutrition: TwoCal HN @ 80cc/hr x 18 hrs to provide 1440cc fluid, 2880 hank/day (34cal/kg), 120 gm protein/day (1.4 gm/kg) based on dosing wt 84.1Kg    Daily Weights:  (12/19) 185.4 lb  (1/2): 186 lb, 93 kg  No new weights or significant changes    Pertinent Medications: MEDICATIONS  (STANDING):  ALBUTerol/ipratropium for Nebulization 3 milliLiter(s) Nebulizer every 6 hours  chlorhexidine 4% Liquid 1 Application(s) Topical <User Schedule>  enoxaparin Injectable 40 milliGRAM(s) SubCutaneous <User Schedule>  gabapentin   Solution 300 milliGRAM(s) Oral every 8 hours  pantoprazole   Suspension 40 milliGRAM(s) Oral daily  potassium phosphate / sodium phosphate powder 1 Packet(s) Oral four times a day  sodium chloride 2 Gram(s) Oral every 6 hours    MEDICATIONS  (PRN):  acetaminophen    Suspension .. 650 milliGRAM(s) Oral every 6 hours PRN Temp greater or equal to 38.5C (101.3F), Mild Pain (1 - 3)  clonazePAM Tablet 1 milliGRAM(s) Oral every 8 hours PRN anxiety    Pertinent Labs: 01-09 @ 06:37: Na 136, BUN 9, Cr 0.41<L>, <H>, K+ 3.8, Phos 2.5, Mg 2.2, Alk Phos --, ALT/SGPT --, AST/SGOT --, HbA1c --    Finger Sticks: None of note    Skin per nursing documentation: Intact pressure-wise, surgical incision  Edema: Generalized 2+ edema    Estimated Needs:   [ ] no change since previous assessment  [ x ] recalculated: 2160cal/day (26cal/kg), 90gm protein/day (1.1 gm/kg) based on dosing wt 84.1Kg (+2 Pro[employer] packets/day (48 kcal, 12 gm protein))    Previous Nutrition Diagnosis:  Increased Nutrient Needs  Nutrition Diagnosis is: ongoing, addressed through EN feeding    New Nutrition Diagnosis: N/A    Recommend  1) Recommend TwoCal HN @ 60cc/hr x 18 hrs to provide 1080cc fluid, 2160cal/day (26cal/kg), 90gm protein/day (1.1 gm/kg) based on dosing wt 84.1Kg, Add two ProSource packets (48 kcal, 12 gm protein)  2) Continue Danactive 2x daily  3) Add Banatrol Plus Flakes until diarrhea is resolved 2x daily  4) Monitor rectal tube output, adjust feeding accordingly if needed  5) Defer fluid needs to team     Monitoring and Evaluation:     Continue to monitor Nutritional intake, Tolerance to diet prescription, weights, labs, skin integrity    RD remains available upon request and will follow up per protocol Nutrition Follow Up Note  Patient seen for: Nutrition follow up in RCU   57 yo  F with PMHx of lupus, back pain and RA. Pt p/w  headache, N/V and admitted for cerebellar IPH with hydrocephalus, S/P EVD placement. S/P angio + embolization and S/P craniotomy for resection of AVM on 12/17. RTOR on 12/18 for craniectomy, s/p tracheostomy 12/24, S/P PEG 12/26. S/p  shunt placement 1/7. Pt febrile with central fevers today 1/9.  RT UE PICC placed 1/9.    Source: RN, EMR    Diet :   NPO with tube feeds, Danactive 2x daily  TwoCal HN @ 80cc/hr x 18 hrs to provide 1440cc fluid    Pt non-verbal. RN reports pt tolerating feeds. Pt with diarrhea 2x on 1/8, rectal tube placed 1/8 with 500 mL output (7AM 1/9).     Enteral/Parenteral Nutrition: TwoCal HN @ 80cc/hr x 18 hrs to provide 1440cc fluid, 2880 hank/day (34cal/kg), 120 gm protein/day (1.4 gm/kg) based on dosing wt 84.1Kg    Daily Weights:  (12/19) 185.4 lb  (1/2): 186 lb, 93 kg  No new weights or significant changes    Pertinent Medications: MEDICATIONS  (STANDING):  ALBUTerol/ipratropium for Nebulization 3 milliLiter(s) Nebulizer every 6 hours  chlorhexidine 4% Liquid 1 Application(s) Topical <User Schedule>  enoxaparin Injectable 40 milliGRAM(s) SubCutaneous <User Schedule>  gabapentin   Solution 300 milliGRAM(s) Oral every 8 hours  pantoprazole   Suspension 40 milliGRAM(s) Oral daily  potassium phosphate / sodium phosphate powder 1 Packet(s) Oral four times a day  sodium chloride 2 Gram(s) Oral every 6 hours    MEDICATIONS  (PRN):  acetaminophen    Suspension .. 650 milliGRAM(s) Oral every 6 hours PRN Temp greater or equal to 38.5C (101.3F), Mild Pain (1 - 3)  clonazePAM Tablet 1 milliGRAM(s) Oral every 8 hours PRN anxiety    Pertinent Labs: 01-09 @ 06:37: Na 136, BUN 9, Cr 0.41<L>, <H>, K+ 3.8, Phos 2.5, Mg 2.2, Alk Phos --, ALT/SGPT --, AST/SGOT --, HbA1c --    Finger Sticks: None of note    Skin per nursing documentation: Intact pressure-wise, surgical incision  Edema: Generalized 2+ edema    Estimated Needs:   [ ] no change since previous assessment  [ x ] recalculated: TwoCal HN @ 55cc/hr x 18 hrs to provide 990cc fluid, 1980cal/day (23.5 hank/kg), based on dosing wt 84.1Kg  [81.7gm protein/day (1.5 gm/kg) based on IBW 52.2 kg], 693 ml free water, Add 1 Pro[employer] (for additional 15 gm protein)    Previous Nutrition Diagnosis:  Increased Nutrient Needs  Nutrition Diagnosis is: ongoing, addressed through EN feeding    New Nutrition Diagnosis: N/A    Recommend  1) Recommend TwoCal HN @ 55cc/hr x 18 hrs to provide 990cc fluid, 1980cal/day (23.5 hank/kg), based on dosing wt 84.1Kg  [81.7gm protein/day (1.5 gm/kg) based on IBW 52.2 kg], 693 ml free water, Add 1 [employer] (for additional 15 gm protein)  2) Continue Danactive 2x daily  3) Add Banatrol Plus Flakes until diarrhea is resolved 2x daily  4) Monitor rectal tube output, adjust feeding accordingly if needed  5) Defer fluid needs to team     Monitoring and Evaluation:     Continue to monitor Nutritional intake, Tolerance to diet prescription, weights, labs, skin integrity    RD remains available upon request and will follow up per protocol

## 2019-01-09 NOTE — PROGRESS NOTE ADULT - PROBLEM SELECTOR PLAN 5
Patient required PRBCS during admission   Continue to monitor CBC  Check Stool Occult blood and AM Anemia Profile

## 2019-01-09 NOTE — PROGRESS NOTE ADULT - PROBLEM SELECTOR PLAN 2
Patient S/p Tracheostomy on 12/24 ( # 8 Cuffed Ember )   Continue Mechanical Ventilation, Attempt weaning as tolerated   Continue Chest PT / Nebulizers and Suctioning PRN

## 2019-01-09 NOTE — PROGRESS NOTE ADULT - PROBLEM SELECTOR PLAN 1
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

## 2019-01-09 NOTE — PROGRESS NOTE ADULT - SUBJECTIVE AND OBJECTIVE BOX
CC: f/u for fever    Patient reports: she is not verbal, opens eyes,not following commands    REVIEW OF SYSTEMS:  All other review of systems negative (Comprehensive ROS)    Antimicrobials Day #  :off    Other Medications Reviewed  MEDICATIONS  (STANDING):  ALBUTerol/ipratropium for Nebulization 3 milliLiter(s) Nebulizer every 6 hours  chlorhexidine 4% Liquid 1 Application(s) Topical <User Schedule>  enoxaparin Injectable 40 milliGRAM(s) SubCutaneous <User Schedule>  gabapentin   Solution 300 milliGRAM(s) Oral every 8 hours  pantoprazole   Suspension 40 milliGRAM(s) Oral daily  potassium phosphate / sodium phosphate powder 1 Packet(s) Oral four times a day  sodium chloride 2 Gram(s) Oral every 6 hours    T(F): 99.1 (19 @ 08:58), Max: 102.7 (19 @ 20:00)  HR: 88 (19 @ 08:58)  BP: 106/73 (19 @ 08:58)  RR: 17 (19 @ 08:58)  SpO2: 100% (19 @ 08:58)  Wt(kg): --    PHYSICAL EXAM:  General: awake, not very interactive  Eyes:  anicteric, no conjunctival injection, no discharge  Oropharynx: no lesions or injection 	  Neck: trach  Lungs: clear to auscultation, diminished at bases  Heart: regular rate and rhythm; no murmur, rubs or gallops  Abdomen: soft, nondistended, nontender, peg present  Skin: no lesions  Extremities: no clubbing, cyanosis, + edema  Neurologic: eyes are open but not following commands    LAB RESULTS:                        7.4    6.04  )-----------( 271      ( 2019 08:06 )             21.6         136  |  99  |  9   ----------------------------<  108<H>  3.8   |  27  |  0.41<L>    Ca    7.8<L>      2019 06:37  Phos  2.5       Mg     2.2             Urinalysis Basic - ( 2019 06:24 )    Color: Light Yellow / Appearance: Clear / S.017 / pH: x  Gluc: x / Ketone: Negative  / Bili: Negative / Urobili: 3 mg/dL   Blood: x / Protein: Negative / Nitrite: Negative   Leuk Esterase: Negative / RBC: x / WBC x   Sq Epi: x / Non Sq Epi: x / Bacteria: x      MICROBIOLOGY:  RECENT CULTURES:   @ 07:18 .Blood Blood-Peripheral     No growth to date.       @ 15:26 .CSF CSF     No growth at 5 days    polymorphonuclear leukocytes seen  No organisms seen  by cytocentrifuge     @ 13:13 .Blood Blood     No growth to date.          RADIOLOGY REVIEWED:  < from: CT Head No Cont (19 @ 08:28) >  CLINICAL INDICATION:  s/p vps    TECHNIQUE : Axial CT scanning of the brain was obtained from the skull   base to thevertex without the administration of intravenous contrast.      COMPARISON: CT brain 2019    FINDINGS:      Interval revision of right frontal approach ventriculostomy catheter   which terminates at the septum pellucidum.    Redemonstration of suboccipital craniectomy and craniotomy.   Redemonstration of embolic material in the region of the cerebellar   vermis and tentorial notch.    Nonspecific low density in the cerebellar vermis and left inferior   cerebellar hemisphere is similar.    Hemorrhage layering in the occipital horns decreased. Ventricular size is   decreased, hydrocephalus has essentially resolved.    IMPRESSION:    Interval revision of right frontal approach ventriculostomy catheter   which terminates at the septum pellucidum    Hemorrhage layering in the occipital horns decreased. Ventricular size is   decreased, hydrocephalus has essentially resolved.    Postsurgical changes in the posterior fossa as described.    < end of copied text >  < from: Xray Chest 1 View- PORTABLE-Urgent (19 @ 06:01) >  IMPRESSION:     Right-sided PICC tip terminates in the right atrium, retraction is   advised.    Otherwise, the lungs are clear.

## 2019-01-09 NOTE — CONSULT NOTE ADULT - SUBJECTIVE AND OBJECTIVE BOX
HPI:  Ms. Cain is a 58 year old female with a PMHx noted below who was transferred from [facility] to [facility] on 18 with sudden onset of a severe headache with associated nausea and vomiting. She was found to have a cerebellar intraparenchymal hemorrhage with hydrocephalus. She was intubated for airway protection and EVD was placed. She was then transferred to [facility] for further care. 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 She then underwent posterior craniotomy for resection of AVM. Post-operative CTH showed posterior fossa edema and she returned to the OR on  for posterior fossa decompression expansion. VEEG was negative for seizure activity. EVD initially clamped on  which patient failed. Clamped once again on  however required VPS which was placed on . Hospital course complicated with respiratory failure requiring tracheostomy on  and dysphagia requiring PEG on . She was also noted to have episodes of hypotension requiring pressors and transfusion. Hospital course notable for fevers for which she was started on Vancomycin and Cefepime was later added. CSF did grow gram positive cocci in clusters however evaluated by ID and fevers believed to be central in nature.     REVIEW OF SYSTEM    PAST MEDICAL & SURGICAL HISTORY  Systemic lupus erythematosus  Rheumatoid arthritis  Chronic back pain  Childhood asthma  Ovarian cyst  s/p cholecystectomy    SOCIAL HISTORY  Smoking - Denied  EtOH - Denied   Drugs - Denied    FAMILY HISTORY   Reviewed and non-contributory    ALLERGIES  No Known Allergies    VITALS  T(C): 37.3 (19 @ 08:58)  T(F): 99.1 (19 @ 08:58), Max: 102.7 (19 @ 20:00)  HR: 88 (19 @ 08:58) (87 - 106)  BP: 106/73 (19 @ 08:58) (96/60 - 157/83)  RR:  (14 - 21)  SpO2:  (94% - 100%)  Wt(kg): --    FUNCTIONAL HISTORY  Lives alone in a private home with 5 [address] and no stairs inside to negotiate  Independent in ambulation, ADL's, transfers prior to hospitalization    CURRENT FUNCTIONAL STATUS    RECENT LABS/IMAGING             7.4    6.04  )-----------( 271      ( 2019 08:06 )             21.6     136  |  99  |  9   ----------------------------<  108<H>  3.8   |  27  |  0.41<L>    Ca    7.8<L>      2019 06:37  Phos  2.5       Mg     2.2         Urinalysis Basic - ( 2019 06:24 )  Color: Light Yellow / Appearance: Clear / S.017 / pH: x  Gluc: x / Ketone: Negative  / Bili: Negative / Urobili: 3 mg/dL   Blood: x / Protein: Negative / Nitrite: Negative   Leuk Esterase: Negative / RBC: x / WBC x   Sq Epi: x / Non Sq Epi: x / Bacteria: x    MEDICATIONS   MEDICATIONS  (STANDING):  ALBUTerol/ipratropium for Nebulization 3 milliLiter(s) Nebulizer every 6 hours  chlorhexidine 4% Liquid 1 Application(s) Topical <User Schedule>  enoxaparin Injectable 40 milliGRAM(s) SubCutaneous <User Schedule>  gabapentin   Solution 300 milliGRAM(s) Oral every 8 hours  pantoprazole   Suspension 40 milliGRAM(s) Oral daily  potassium phosphate / sodium phosphate powder 1 Packet(s) Oral four times a day  sodium chloride 2 Gram(s) Oral every 6 hours    MEDICATIONS  (PRN):  acetaminophen    Suspension .. 650 milliGRAM(s) Oral every 6 hours PRN Temp greater or equal to 38.5C (101.3F), Mild Pain (1 - 3)  clonazePAM Tablet 1 milliGRAM(s) Oral every 8 hours PRN anxiety    PHYSICAL EXAM    ASSESSMENT/PLAN  57 y/o female with severe headache found to have vermian AVM s/p embolization and suboccipital craniotomy for resection. Hospital course complicated with respiratory failure requiring tracheostomy, dysphagia requiring PEG, elevated intracranial pressure requiring VPS, and fevers. She is now with functional, gait, ADL, cognitive, balance, endurance, speech, swallow impairments.    Disposition -  Precautions - Falls, Cardiac, Aspiration  Weight bearing status - Full weight bearing  PT - ROM, Bed mobility, Transfers, Ambulation with assistive device  OT - ADLs, ROM  SLP - Dysphagia, cognitive, speech eval and treat  Pain control - Gabapentin, Tylenol PRN  DVT Prophylaxis - Lovenox  GI PPx - Protonix  Skin - Turn Q2hrs  Diet - NPO on continuous 2cal HN HPI:  Ms. Cain is a 58 year old female with a PMHx noted below who was transferred from [facility] to [facility] on 18 with sudden onset of a severe headache with associated nausea and vomiting. She was found to have a cerebellar intraparenchymal hemorrhage with hydrocephalus. She was intubated for airway protection and EVD was placed. She was then transferred to [facility] for further care. 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 She then underwent posterior craniotomy for resection of AVM. Post-operative CTH showed posterior fossa edema and she returned to the OR on  for posterior fossa decompression expansion. VEEG was negative for seizure activity. EVD initially clamped on  which patient failed. Clamped once again on  however required VPS which was placed on . Hospital course complicated with respiratory failure requiring tracheostomy on  and dysphagia requiring PEG on . She was also noted to have episodes of hypotension requiring pressors and transfusion. Hospital course notable for fevers for which she was started on Vancomycin and Cefepime was later added. CSF did grow gram positive cocci in clusters however evaluated by ID and fevers believed to be central in nature.     REVIEW OF SYSTEM    PAST MEDICAL & SURGICAL HISTORY  Systemic lupus erythematosus  Rheumatoid arthritis  Chronic back pain  Childhood asthma  Ovarian cyst  s/p cholecystectomy    SOCIAL HISTORY  Smoking - Denied  EtOH - Denied   Drugs - Denied    FAMILY HISTORY   Reviewed and non-contributory    ALLERGIES  No Known Allergies    VITALS  T(C): 37.3 (19 @ 08:58)  T(F): 99.1 (19 @ 08:58), Max: 102.7 (19 @ 20:00)  HR: 88 (19 @ 08:58) (87 - 106)  BP: 106/73 (19 @ 08:58) (96/60 - 157/83)  RR:  (14 - 21)  SpO2:  (94% - 100%)  Wt(kg): --    FUNCTIONAL HISTORY  Lives alone in a private home with 5 [address] and no stairs inside to negotiate  Independent in ambulation, ADL's, transfers prior to hospitalization    CURRENT FUNCTIONAL STATUS    RECENT LABS/IMAGING             7.4    6.04  )-----------( 271      ( 2019 08:06 )             21.6     136  |  99  |  9   ----------------------------<  108<H>  3.8   |  27  |  0.41<L>    Ca    7.8<L>      2019 06:37  Phos  2.5       Mg     2.2         Urinalysis Basic - ( 2019 06:24 )  Color: Light Yellow / Appearance: Clear / S.017 / pH: x  Gluc: x / Ketone: Negative  / Bili: Negative / Urobili: 3 mg/dL   Blood: x / Protein: Negative / Nitrite: Negative   Leuk Esterase: Negative / RBC: x / WBC x   Sq Epi: x / Non Sq Epi: x / Bacteria: x    MEDICATIONS   MEDICATIONS  (STANDING):  ALBUTerol/ipratropium for Nebulization 3 milliLiter(s) Nebulizer every 6 hours  chlorhexidine 4% Liquid 1 Application(s) Topical <User Schedule>  enoxaparin Injectable 40 milliGRAM(s) SubCutaneous <User Schedule>  gabapentin   Solution 300 milliGRAM(s) Oral every 8 hours  pantoprazole   Suspension 40 milliGRAM(s) Oral daily  potassium phosphate / sodium phosphate powder 1 Packet(s) Oral four times a day  sodium chloride 2 Gram(s) Oral every 6 hours    MEDICATIONS  (PRN):  acetaminophen    Suspension .. 650 milliGRAM(s) Oral every 6 hours PRN Temp greater or equal to 38.5C (101.3F), Mild Pain (1 - 3)  clonazePAM Tablet 1 milliGRAM(s) Oral every 8 hours PRN anxiety    PHYSICAL EXAM    ASSESSMENT/PLAN  57 y/o female with severe headache 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 Hospital course complicated with respiratory failure requiring tracheostomy, dysphagia requiring PEG, elevated intracranial pressure requiring VPS, and fevers. She is now with functional, gait, ADL, cognitive, balance, endurance, speech, swallow impairments.    Disposition -  Precautions - Falls, Cardiac, Aspiration  Weight bearing status - Full weight bearing  PT - ROM, Bed mobility, Transfers, Ambulation with assistive device  OT - ADLs, ROM  SLP - Dysphagia, cognitive, speech eval and treat  Pain control - Gabapentin, Tylenol PRN  DVT Prophylaxis - Lovenox  GI PPx - Protonix  Skin - Turn Q2hrs  Diet - NPO on continuous 2cal HN HPI:  Ms. Cain is a 58 year old female with a PMHx noted below who was transferred from [facility] to [facility] on 18 with sudden onset of a severe headache with associated nausea and vomiting. She was found to have a cerebellar intraparenchymal hemorrhage with hydrocephalus. She was intubated for airway protection and EVD was placed. She was then transferred to [facility] for further care. 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 She then underwent posterior craniotomy for resection of AVM. Post-operative CTH showed posterior fossa edema and she returned to the OR on  for posterior fossa decompression expansion. VEEG was negative for seizure activity. EVD initially clamped on  which patient failed. Clamped once again on  however required VPS which was placed on . Hospital course complicated with respiratory failure requiring tracheostomy on  and dysphagia requiring PEG on . She was also noted to have episodes of hypotension requiring pressors and transfusion. Hospital course notable for fevers for which she was started on Vancomycin and Cefepime was later added. CSF did grow gram positive cocci in clusters however evaluated by ID and fevers believed to be central in nature. Blood cultures negative thus far.     REVIEW OF SYSTEM  Unable to obtain secondary to arousal level and non-verbal    PAST MEDICAL & SURGICAL HISTORY (from chart)  Systemic lupus erythematosus  Rheumatoid arthritis  Chronic back pain  Childhood asthma  Ovarian cyst  s/p cholecystectomy    SOCIAL HISTORY  Unknown    FAMILY HISTORY   Unknown    ALLERGIES (from chart)  No Known Allergies    VITALS  T(C): 37.3 (19 @ 08:58)  T(F): 99.1 (19 @ 08:58), Max: 102.7 (19 @ 20:00)  HR: 88 (19 @ 08:58) (87 - 106)  BP: 106/73 (19 @ 08:58) (96/60 - 157/83)  RR:  (14 - 21)  SpO2:  (94% - 100%)  Wt(kg): --    FUNCTIONAL HISTORY (from chart)  Lives alone in a private home with [address] and no stairs inside to negotiate  Independent in ambulation, ADL's, transfers prior to hospitalization    CURRENT FUNCTIONAL STATUS  Dependent    RECENT LABS/IMAGING             7.4    6.04  )-----------( 271      ( 2019 08:06 )             21.6     136  |  99  |  9   ----------------------------<  108<H>  3.8   |  27  |  0.41<L>    Ca    7.8<L>      2019 06:37  Phos  2.5     -  Mg     2.2     -    Urinalysis Basic - ( 2019 06:24 )  Color: Light Yellow / Appearance: Clear / S.017 / pH: x  Gluc: x / Ketone: Negative  / Bili: Negative / Urobili: 3 mg/dL   Blood: x / Protein: Negative / Nitrite: Negative   Leuk Esterase: Negative / RBC: x / WBC x   Sq Epi: x / Non Sq Epi: x / Bacteria: x    MEDICATIONS   MEDICATIONS  (STANDING):  ALBUTerol/ipratropium for Nebulization 3 milliLiter(s) Nebulizer every 6 hours  chlorhexidine 4% Liquid 1 Application(s) Topical <User Schedule>  enoxaparin Injectable 40 milliGRAM(s) SubCutaneous <User Schedule>  gabapentin   Solution 300 milliGRAM(s) Oral every 8 hours  pantoprazole   Suspension 40 milliGRAM(s) Oral daily  potassium phosphate / sodium phosphate powder 1 Packet(s) Oral four times a day  sodium chloride 2 Gram(s) Oral every 6 hours    MEDICATIONS  (PRN):  acetaminophen    Suspension .. 650 milliGRAM(s) Oral every 6 hours PRN Temp greater or equal to 38.5C (101.3F), Mild Pain (1 - 3)  clonazePAM Tablet 1 milliGRAM(s) Oral every 8 hours PRN anxiety    PHYSICAL EXAM  Constitutional - NAD, Comfortable  HEENT - Right cranial incisions with staples C/D/I, Eyes intermittently open to tactile and auditory stimuli  Chest - (+) Trach on vent  Cardiovascular - Tachycardic, S1S2  Abdomen - Soft, (+) PEG  Neurologic Exam -                    Cognitive - Intermittent eye opening to auditory and tactile stimuli, Able to fixate, Follows simple commands (grasping and tongue protrusion) intermittently     Communication - No verbal output, No mouthing noted     Motor - Unable to fully exam secondary to arousal and participation but appears to have 1/5  strength bilaterally, 2/5 elbow flexion in RUE, 3/5 in bilateral ADF     Reflexes - 3+/4 in bilateral biceps and patella, Negative Sales's bilaterally, Negative Babinski's bilaterally    Psychiatric - Flat affect    ASSESSMENT/PLAN  57 y/o female with severe headache 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 Hospital course complicated with respiratory failure requiring tracheostomy, dysphagia requiring PEG, elevated intracranial pressure requiring VPS, and fevers. She is now with functional, gait, ADL, cognitive, balance, endurance, speech, swallow impairments.    Sleep/wake cycle - Recommend starting Melatonin 3mg QHS to attempt to help regulate patient's sleep/wake cycle and potentially improve arousal during the daytime hours.   Recommend COMA STIM from all rehabilitation services to improve daytime arousal in addition to passive and active ROM  Dysautonomia -   Pain control - Gabapentin, Tylenol PRN  DVT Prophylaxis - Lovenox  GI PPx - Protonix  Skin - Turn Q2hrs  Precautions - Falls, Cardiac, Aspiration  Diet - NPO on continuous 2cal HN HPI:  Ms. Cain is a 58 year old female with a PMHx noted below who was transferred from [facility] to [facility] on 18 with sudden onset of a severe headache with associated nausea and vomiting. She was found to have a cerebellar intraparenchymal hemorrhage with hydrocephalus. She was intubated for airway protection and EVD was placed. She was then transferred to [facility] for further care. 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 She then underwent posterior craniotomy for resection of AVM. Post-operative CTH showed posterior fossa edema and she returned to the OR on  for posterior fossa decompression expansion. VEEG was negative for seizure activity. EVD initially clamped on  which patient failed. Clamped once again on  however required VPS which was placed on . Hospital course complicated with respiratory failure requiring tracheostomy on  and dysphagia requiring PEG on . She was also noted to have episodes of hypotension requiring pressors and transfusion. Hospital course notable for fevers for which she was started on Vancomycin and Cefepime was later added. CSF did grow gram positive cocci in clusters however evaluated by ID and fevers believed to be central in nature. Blood cultures negative thus far.     REVIEW OF SYSTEM  Unable to obtain secondary to arousal level and non-verbal    PAST MEDICAL & SURGICAL HISTORY (from chart)  Systemic lupus erythematosus  Rheumatoid arthritis  Chronic back pain  Childhood asthma  Ovarian cyst  s/p cholecystectomy    SOCIAL HISTORY  Unknown    FAMILY HISTORY   Unknown    ALLERGIES (from chart)  No Known Allergies    VITALS  T(C): 37.3 (19 @ 08:58)  T(F): 99.1 (19 @ 08:58), Max: 102.7 (19 @ 20:00)  HR: 88 (19 @ 08:58) (87 - 106)  BP: 106/73 (19 @ 08:58) (96/60 - 157/83)  RR:  (14 - 21)  SpO2:  (94% - 100%)  Wt(kg): --    FUNCTIONAL HISTORY (from chart)  Lives alone in a private home with [address] and no stairs inside to negotiate  Independent in ambulation, ADL's, transfers prior to hospitalization    CURRENT FUNCTIONAL STATUS  Dependent    RECENT LABS/IMAGING             7.4    6.04  )-----------( 271      ( 2019 08:06 )             21.6     136  |  99  |  9   ----------------------------<  108<H>  3.8   |  27  |  0.41<L>    Ca    7.8<L>      2019 06:37  Phos  2.5     -  Mg     2.2     -    Urinalysis Basic - ( 2019 06:24 )  Color: Light Yellow / Appearance: Clear / S.017 / pH: x  Gluc: x / Ketone: Negative  / Bili: Negative / Urobili: 3 mg/dL   Blood: x / Protein: Negative / Nitrite: Negative   Leuk Esterase: Negative / RBC: x / WBC x   Sq Epi: x / Non Sq Epi: x / Bacteria: x    MEDICATIONS   MEDICATIONS  (STANDING):  ALBUTerol/ipratropium for Nebulization 3 milliLiter(s) Nebulizer every 6 hours  chlorhexidine 4% Liquid 1 Application(s) Topical <User Schedule>  enoxaparin Injectable 40 milliGRAM(s) SubCutaneous <User Schedule>  fentaNYL   Patch  12 MICROgram(s)/Hr. 1 Patch Transdermal every 48 hours  gabapentin   Solution 300 milliGRAM(s) Oral every 8 hours  pantoprazole   Suspension 40 milliGRAM(s) Oral daily  potassium phosphate / sodium phosphate powder 1 Packet(s) Oral four times a day  sodium chloride 2 Gram(s) Oral every 6 hours    MEDICATIONS  (PRN):  acetaminophen    Suspension .. 650 milliGRAM(s) Oral every 6 hours PRN Temp greater or equal to 38.5C (101.3F), Mild Pain (1 - 3)  clonazePAM Tablet 1 milliGRAM(s) Oral every 8 hours PRN anxiety    PHYSICAL EXAM  Constitutional - NAD, Comfortable  HEENT - Right cranial incisions with staples C/D/I, Eyes intermittently open to tactile and auditory stimuli  Chest - (+) Trach on vent  Cardiovascular - Tachycardic, S1S2  Abdomen - Soft, (+) PEG  Neurologic Exam -                    Cognitive - Intermittent eye opening to auditory and tactile stimuli, Able to fixate, Follows simple commands (grasping and tongue protrusion) intermittently     Communication - No verbal output, No mouthing noted     Motor - Unable to fully exam secondary to arousal and participation but appears to have 1/5  strength bilaterally, 2/5 elbow flexion in RUE, 3/5 in bilateral ADF     Reflexes - 3+/4 in bilateral biceps and patella, Negative Sales's bilaterally, Negative Babinski's bilaterally    Psychiatric - Flat affect    ASSESSMENT/PLAN  57 y/o female with severe headache 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 Hospital course complicated with respiratory failure requiring tracheostomy, dysphagia requiring PEG, elevated intracranial pressure requiring VPS, and fevers. She is now with functional, gait, ADL, cognitive, balance, endurance, speech, swallow impairments.    Sleep/wake cycle - Recommend starting Melatonin 3mg QHS to attempt to help regulate patient's sleep/wake cycle and potentially improve arousal during the daytime hours.   Recommend COMA STIM from all rehabilitation services to improve daytime arousal in addition to passive and active ROM  Central fevers/Dysautonomia - Fentanyl patch (started ), Tylenol PRN, Klonopin PRN, Gabapentin  DVT Prophylaxis - Lovenox  GI PPx - Protonix  Skin - Turn Q2hrs  Precautions - Falls, Cardiac, Aspiration  Diet - NPO on continuous 2cal HN HPI:  Ms. Cain is a 58 year old female with a PMHx noted below who was [facility] on 18 with sudden onset of a severe headache with associated nausea and vomiting. She was found to have a cerebellar intraparenchymal hemorrhage with hydrocephalus. She was intubated for airway protection and EVD was placed. She was then transferred to [facility] for further care. 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 She then underwent posterior craniotomy for resection of AVM. Post-operative CTH showed posterior fossa edema and she returned to the OR on  for posterior fossa decompression expansion. VEEG was negative for seizure activity. EVD initially clamped on  which patient failed. Clamped once again on  however required VPS which was placed on . Hospital course complicated with respiratory failure requiring tracheostomy on  and dysphagia requiring PEG on . She was also noted to have episodes of hypotension requiring pressors and transfusion. Hospital course notable for fevers for which she was started on Vancomycin and Cefepime was later added. CSF did grow gram positive cocci in clusters however evaluated by ID and fevers believed to be central in nature. Blood cultures negative thus far.     REVIEW OF SYSTEM  Unable to obtain secondary to arousal level and non-verbal    PAST MEDICAL & SURGICAL HISTORY (from chart)  Systemic lupus erythematosus  Rheumatoid arthritis  Chronic back pain  Childhood asthma  Ovarian cyst  s/p cholecystectomy    SOCIAL HISTORY  Unknown    FAMILY HISTORY   Unknown    ALLERGIES (from chart)  No Known Allergies    VITALS  T(C): 37.3 (19 @ 08:58)  T(F): 99.1 (19 @ 08:58), Max: 102.7 (19 @ 20:00)  HR: 88 (19 @ 08:58) (87 - 106)  BP: 106/73 (19 @ 08:58) (96/60 - 157/83)  RR:  (14 - 21)  SpO2:  (94% - 100%)  Wt(kg): --    FUNCTIONAL HISTORY (from chart)  Lives alone in a private home with 5 [address] and no stairs inside to negotiate  Independent in ambulation, ADL's, transfers prior to hospitalization    CURRENT FUNCTIONAL STATUS  Dependent    RECENT LABS/IMAGING             7.4    6.04  )-----------( 271      ( 2019 08:06 )             21.6     136  |  99  |  9   ----------------------------<  108<H>  3.8   |  27  |  0.41<L>    Ca    7.8<L>      2019 06:37  Phos  2.5     -  Mg     2.2     -    Urinalysis Basic - ( 2019 06:24 )  Color: Light Yellow / Appearance: Clear / S.017 / pH: x  Gluc: x / Ketone: Negative  / Bili: Negative / Urobili: 3 mg/dL   Blood: x / Protein: Negative / Nitrite: Negative   Leuk Esterase: Negative / RBC: x / WBC x   Sq Epi: x / Non Sq Epi: x / Bacteria: x    MEDICATIONS   MEDICATIONS  (STANDING):  ALBUTerol/ipratropium for Nebulization 3 milliLiter(s) Nebulizer every 6 hours  chlorhexidine 4% Liquid 1 Application(s) Topical <User Schedule>  enoxaparin Injectable 40 milliGRAM(s) SubCutaneous <User Schedule>  fentaNYL   Patch  12 MICROgram(s)/Hr. 1 Patch Transdermal every 48 hours  gabapentin   Solution 300 milliGRAM(s) Oral every 8 hours  pantoprazole   Suspension 40 milliGRAM(s) Oral daily  potassium phosphate / sodium phosphate powder 1 Packet(s) Oral four times a day  sodium chloride 2 Gram(s) Oral every 6 hours    MEDICATIONS  (PRN):  acetaminophen    Suspension .. 650 milliGRAM(s) Oral every 6 hours PRN Temp greater or equal to 38.5C (101.3F), Mild Pain (1 - 3)  clonazePAM Tablet 1 milliGRAM(s) Oral every 8 hours PRN anxiety    PHYSICAL EXAM  Constitutional - NAD, Comfortable  HEENT - Right cranial incisions with staples C/D/I, Eyes intermittently open to tactile and auditory stimuli  Chest - (+) Trach on vent  Cardiovascular - Tachycardic, S1S2  Abdomen - Soft, (+) PEG  Neurologic Exam -                    Cognitive - Intermittent eye opening to auditory and tactile stimuli, Able to fixate, Follows simple commands (grasping and tongue protrusion) intermittently     Communication - No verbal output, No mouthing noted     Motor - Unable to fully exam secondary to arousal and participation but appears to have 1/5  strength bilaterally, 2/5 elbow flexion in RUE, 3/5 in bilateral ADF     Reflexes - 3+/4 in bilateral biceps and patella, Negative Sales's bilaterally, Negative Babinski's bilaterally    Psychiatric - Flat affect    ASSESSMENT/PLAN  57 y/o female with severe headache 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 Hospital course complicated with respiratory failure requiring tracheostomy, dysphagia requiring PEG, elevated intracranial pressure requiring VPS, and fevers. She is now with functional, gait, ADL, cognitive, balance, endurance, speech, swallow impairments. HPI:  Ms. Cain is a 58 year old female with a PMHx noted below who was transferred from [facility] to [facility] on 18 with sudden onset of a severe headache with associated nausea and vomiting. She was found to have a cerebellar intraparenchymal hemorrhage with hydrocephalus. She was intubated for airway protection and EVD was placed. She was then transferred to [facility] for further care. 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 She then underwent posterior craniotomy for resection of AVM. Post-operative CTH showed posterior fossa edema and she returned to the OR on  for posterior fossa decompression expansion. VEEG was negative for seizure activity. EVD initially clamped on  which patient failed. Clamped once again on  however required VPS which was placed on . Hospital course complicated with respiratory failure requiring tracheostomy on  and dysphagia requiring PEG on . She was also noted to have episodes of hypotension requiring pressors and transfusion. Hospital course notable for fevers for which she was started on Vancomycin and Cefepime was later added. CSF did grow gram positive cocci in clusters however evaluated by ID and fevers believed to be central in nature. Blood cultures negative thus far.     REVIEW OF SYSTEM  Unable to obtain secondary to arousal level and non-verbal    PAST MEDICAL & SURGICAL HISTORY (from chart)  Systemic lupus erythematosus  Rheumatoid arthritis  Chronic back pain  Childhood asthma  Ovarian cyst  s/p cholecystectomy    SOCIAL HISTORY  Unknown    FAMILY HISTORY   Unknown    ALLERGIES (from chart)  No Known Allergies    VITALS  T(C): 37.3 (19 @ 08:58)  T(F): 99.1 (19 @ 08:58), Max: 102.7 (19 @ 20:00)  HR: 88 (19 @ 08:58) (87 - 106)  BP: 106/73 (19 @ 08:58) (96/60 - 157/83)  RR:  (14 - 21)  SpO2:  (94% - 100%)  Wt(kg): --    FUNCTIONAL HISTORY (from chart)  Lives alone in a private home with 5 F[address]DIN[address] and no stairs inside to negotiate  Independent in ambulation, ADL's, transfers prior to hospitalization    CURRENT FUNCTIONAL STATUS  Dependent    RECENT LABS/IMAGING             7.4    6.04  )-----------( 271      ( 2019 08:06 )             21.6     136  |  99  |  9   ----------------------------<  108<H>  3.8   |  27  |  0.41<L>    Ca    7.8<L>      2019 06:37  Phos  2.5     -  Mg     2.2     -    Urinalysis Basic - ( 2019 06:24 )  Color: Light Yellow / Appearance: Clear / S.017 / pH: x  Gluc: x / Ketone: Negative  / Bili: Negative / Urobili: 3 mg/dL   Blood: x / Protein: Negative / Nitrite: Negative   Leuk Esterase: Negative / RBC: x / WBC x   Sq Epi: x / Non Sq Epi: x / Bacteria: x    MEDICATIONS   MEDICATIONS  (STANDING):  ALBUTerol/ipratropium for Nebulization 3 milliLiter(s) Nebulizer every 6 hours  chlorhexidine 4% Liquid 1 Application(s) Topical <User Schedule>  enoxaparin Injectable 40 milliGRAM(s) SubCutaneous <User Schedule>  fentaNYL   Patch  12 MICROgram(s)/Hr. 1 Patch Transdermal every 48 hours  gabapentin   Solution 300 milliGRAM(s) Oral every 8 hours  pantoprazole   Suspension 40 milliGRAM(s) Oral daily  potassium phosphate / sodium phosphate powder 1 Packet(s) Oral four times a day  sodium chloride 2 Gram(s) Oral every 6 hours    MEDICATIONS  (PRN):  acetaminophen    Suspension .. 650 milliGRAM(s) Oral every 6 hours PRN Temp greater or equal to 38.5C (101.3F), Mild Pain (1 - 3)  clonazePAM Tablet 1 milliGRAM(s) Oral every 8 hours PRN anxiety    PHYSICAL EXAM  Constitutional - NAD, Comfortable  HEENT - Right cranial incisions with staples C/D/I, Eyes intermittently open to tactile and auditory stimuli  Chest - (+) Trach on vent  Cardiovascular - Tachycardic, S1S2  Abdomen - Soft, (+) PEG  Neurologic Exam -                    Cognitive - Intermittent eye opening to auditory and tactile stimuli, Able to fixate, Follows simple commands (grasping and tongue protrusion) intermittently     Communication - No verbal output, No mouthing noted     Motor - Unable to fully exam secondary to arousal and participation but appears to have 1/5  strength bilaterally, 2/5 elbow flexion in RUE, 3/5 in bilateral ADF     Reflexes - 3+/4 in bilateral biceps and patella, Negative Sales's bilaterally, Negative Babinski's bilaterally    Psychiatric - Flat affect    ASSESSMENT/PLAN  59 y/o female with severe headache 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 Hospital course complicated with respiratory failure requiring tracheostomy, dysphagia requiring PEG, elevated intracranial pressure requiring VPS, and fevers. She is now with functional, gait, ADL, cognitive, balance, endurance, speech, swallow impairments.    Cerebellar IPH s/p vermina AVM - Patient with limited eye opening and command following at this time. Patient is also still currently requiring vent support. Will continue to follow to make final rehabilitation recommendations.  Dysautonomia/central fevers - Recommend starting patient on Bromocriptine 5mg Q6AM and Q12PM to help regulate dysautonomia and potentially help arousal during AM hours. Patient currently on Fentanyl patch, Tylenol PRN, Gabapentin, Klonopin PRN  Sleep/wake cycle - Recommend starting patient on Melatonin 3mg QHS to help regulate sleep/wake cycle and potentially help improve arousal during the day  Rehabilitation - Recommend COMA STIM by PT/OT/SLP  Aggressive oral care  GI PPx - Protonix  DVT PPx - Lovenox HPI:  Ms. Cain is a 58 year old female with a PMHx noted below who was transferred from [facility] to [facility] on 18 with sudden onset of a severe headache with associated nausea and vomiting. She was found to have a cerebellar intraparenchymal hemorrhage with hydrocephalus. She was intubated for airway protection and EVD was placed. She was then transferred to [facility] for further care. 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 She then underwent posterior craniotomy for resection of AVM. Post-operative CTH showed posterior fossa edema and she returned to the OR on  for posterior fossa decompression expansion. VEEG was negative for seizure activity. EVD initially clamped on  which patient failed. Clamped once again on  however required VPS which was placed on . Hospital course complicated with respiratory failure requiring tracheostomy on  and dysphagia requiring PEG on . She was also noted to have episodes of hypotension requiring pressors and transfusion. Hospital course notable for fevers for which she was started on Vancomycin and Cefepime was later added. CSF did grow gram positive cocci in clusters however evaluated by ID and fevers believed to be central in nature. Blood cultures negative thus far.     REVIEW OF SYSTEM  Unable to obtain secondary to arousal level and non-verbal    PAST MEDICAL & SURGICAL HISTORY (from chart)  Systemic lupus erythematosus  Rheumatoid arthritis  Chronic back pain  Childhood asthma  Ovarian cyst  s/p cholecystectomy    SOCIAL HISTORY  Unknown    FAMILY HISTORY   Unknown    ALLERGIES (from chart)  No Known Allergies    VITALS  T(C): 37.3 (19 @ 08:58)  T(F): 99.1 (19 @ 08:58), Max: 102.7 (19 @ 20:00)  HR: 88 (19 @ 08:58) (87 - 106)  BP: 106/73 (19 @ 08:58) (96/60 - 157/83)  RR:  (14 - 21)  SpO2:  (94% - 100%)  Wt(kg): --    FUNCTIONAL HISTORY (from chart)  Lives alone in a private home with 5 [address] and no stairs inside to negotiate  Independent in ambulation, ADL's, transfers prior to hospitalization    CURRENT FUNCTIONAL STATUS  Dependent    RECENT LABS/IMAGING             7.4    6.04  )-----------( 271      ( 2019 08:06 )             21.6     136  |  99  |  9   ----------------------------<  108<H>  3.8   |  27  |  0.41<L>    Ca    7.8<L>      2019 06:37  Phos  2.5     -  Mg     2.2     -    Urinalysis Basic - ( 2019 06:24 )  Color: Light Yellow / Appearance: Clear / S.017 / pH: x  Gluc: x / Ketone: Negative  / Bili: Negative / Urobili: 3 mg/dL   Blood: x / Protein: Negative / Nitrite: Negative   Leuk Esterase: Negative / RBC: x / WBC x   Sq Epi: x / Non Sq Epi: x / Bacteria: x    MEDICATIONS   MEDICATIONS  (STANDING):  ALBUTerol/ipratropium for Nebulization 3 milliLiter(s) Nebulizer every 6 hours  chlorhexidine 4% Liquid 1 Application(s) Topical <User Schedule>  enoxaparin Injectable 40 milliGRAM(s) SubCutaneous <User Schedule>  fentaNYL   Patch  12 MICROgram(s)/Hr. 1 Patch Transdermal every 48 hours  gabapentin   Solution 300 milliGRAM(s) Oral every 8 hours  pantoprazole   Suspension 40 milliGRAM(s) Oral daily  potassium phosphate / sodium phosphate powder 1 Packet(s) Oral four times a day  sodium chloride 2 Gram(s) Oral every 6 hours    MEDICATIONS  (PRN):  acetaminophen    Suspension .. 650 milliGRAM(s) Oral every 6 hours PRN Temp greater or equal to 38.5C (101.3F), Mild Pain (1 - 3)  clonazePAM Tablet 1 milliGRAM(s) Oral every 8 hours PRN anxiety    PHYSICAL EXAM  Constitutional - NAD, Comfortable  HEENT - Right cranial incisions with staples C/D/I, Eyes intermittently open to tactile and auditory stimuli  Chest - (+) Trach on vent  Cardiovascular - Tachycardic, S1S2  Abdomen - Soft, (+) PEG  Neurologic Exam -                    Cognitive - Intermittent eye opening to auditory and tactile stimuli, Able to fixate, Follows simple commands (grasping and tongue protrusion) intermittently     Communication - No verbal output, No mouthing noted     Motor - Unable to fully exam secondary to arousal and participation but appears to have 1/5  strength bilaterally, 2/5 elbow flexion in RUE, 3/5 in bilateral ADF     Reflexes - 3+/4 in bilateral biceps and patella, Negative Sales's bilaterally, Negative Babinski's bilaterally    Psychiatric - Flat affect    ASSESSMENT/PLAN  59 y/o female with severe headache 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 Hospital course complicated with respiratory failure requiring tracheostomy, dysphagia requiring PEG, elevated intracranial pressure requiring VPS, and fevers. She is now with functional, gait, ADL, cognitive, balance, endurance, speech, swallow impairments.    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 Patient is also still currently requiring vent support. Will continue to follow to make final rehabilitation recommendations.  Dysautonomia/central fevers - Recommend starting patient on Bromocriptine 5mg Q6AM and Q12PM to help regulate dysautonomia and potentially help arousal during AM hours. Patient currently on Fentanyl patch, Tylenol PRN, Gabapentin, Klonopin PRN  Sleep/wake cycle - Recommend starting patient on Melatonin 3mg QHS to help regulate sleep/wake cycle and potentially help improve arousal during the day  Rehabilitation - Recommend COMA STIM by PT/OT/SLP  Aggressive oral care  GI PPx - Protonix  DVT PPx - Lovenox

## 2019-01-09 NOTE — PROGRESS NOTE ADULT - SUBJECTIVE AND OBJECTIVE BOX
Patient is a 58y old  Female who presents with a chief complaint of Cerebellar IPH (2019 16:21)      Interval Events: Patient transferred to RCU overnight, Patient remains Febrile with Central fevers     REVIEW OF SYSTEMS:  [ ] Positive  [ ] All other systems negative  [x] Unable to assess ROS because Patient is Non-Verbal     Vital Signs Last 24 Hrs  T(C): 37.3 (19 @ 08:58), Max: 39.3 (19 @ 20:00)  T(F): 99.1 (19 @ 08:58), Max: 102.7 (19 @ 20:00)  HR: 88 (19 @ 08:58) (87 - 106)  BP: 106/73 (19 @ 08:58) (96/60 - 157/83)  RR: 17 (19 @ 08:58) (14 - 21)  SpO2: 100% (19 @ 08:58) (94% - 100%)    PHYSICAL EXAM:  HEENT:   [x]Tracheostomy: # 8 Cuffed Shiley   [ ]Pupils equal  [ ]No oral lesions  [ ]Abnormal    SKIN  [x]No Rash  [ ] Abnormal  [ ] pressure    CARDIAC  [x]Regular  [ ]Abnormal    PULMONARY  [x]Bilateral Clear Breath Sounds  [ ]Normal Excursion  [ ]Abnormal    GI  [x]PEG      [x] +BS		              [x]Soft, nondistended, nontender	  [ ]Abnormal    MUSCULOSKELETAL                                   [ ]Bedbound                 [ ]Abnormal    [x]OOB to chair                           EXTREMITIES                                         [x]Normal  [ ]Edema                           NEUROLOGIC  [ ] Normal, non focal  [x] Focal findings: Not following commands, Grimacing to Noxious stimuli     PSYCHIATRIC  [ ]Alert and appropriate  [x] Sedated	 [ ]Agitated    :  Newsome: [ ] Yes, if yes: Date of Placement:                   [x] No    LINES: Central Lines [x] Yes, if yes: Date of Placement: RT UE PICC Placed                                       [  ] No    HOSPITAL MEDICATIONS:  MEDICATIONS  (STANDING):  ALBUTerol/ipratropium for Nebulization 3 milliLiter(s) Nebulizer every 6 hours  chlorhexidine 4% Liquid 1 Application(s) Topical <User Schedule>  enoxaparin Injectable 40 milliGRAM(s) SubCutaneous <User Schedule>  gabapentin   Solution 300 milliGRAM(s) Oral every 8 hours  pantoprazole   Suspension 40 milliGRAM(s) Oral daily  potassium phosphate / sodium phosphate powder 1 Packet(s) Oral four times a day  sodium chloride 2 Gram(s) Oral every 6 hours    MEDICATIONS  (PRN):  acetaminophen    Suspension .. 650 milliGRAM(s) Oral every 6 hours PRN Temp greater or equal to 38.5C (101.3F), Mild Pain (1 - 3)  clonazePAM Tablet 1 milliGRAM(s) Oral every 8 hours PRN anxiety      LABS:                        8.4    5.4   )-----------( 121      ( 2019 23:08 )             24.1     -    136  |  99  |  9   ----------------------------<  108<H>  3.8   |  27  |  0.41<L>    Ca    7.8<L>      2019 06:37  Phos  2.5       Mg     2.2             Urinalysis Basic - ( 2019 06:24 )    Color: Light Yellow / Appearance: Clear / S.017 / pH: x  Gluc: x / Ketone: Negative  / Bili: Negative / Urobili: 3 mg/dL   Blood: x / Protein: Negative / Nitrite: Negative   Leuk Esterase: Negative / RBC: x / WBC x   Sq Epi: x / Non Sq Epi: x / Bacteria: x          CAPILLARY BLOOD GLUCOSE    MICROBIOLOGY:     RADIOLOGY:  [ ] Reviewed and interpreted by me    Mode: AC/ CMV (Assist Control/ Continuous Mandatory Ventilation)  RR (machine): 16  TV (machine): 450  FiO2: 30  PEEP: 5  ITime: 1  MAP: 10  PIP: 24 Patient is a 58y old  Female who presents with a chief complaint of Cerebellar IPH (2019 16:21)      Interval Events: Patient transferred to RCU overnight, Patient remains Febrile with Central fevers     REVIEW OF SYSTEMS:  [ ] Positive  [ ] All other systems negative  [x] Unable to assess ROS because Patient is Non-Verbal     Vital Signs Last 24 Hrs  T(C): 37.3 (19 @ 08:58), Max: 39.3 (19 @ 20:00)  T(F): 99.1 (19 @ 08:58), Max: 102.7 (19 @ 20:00)  HR: 88 (19 @ 08:58) (87 - 106)  BP: 106/73 (19 @ 08:58) (96/60 - 157/83)  RR: 17 (19 @ 08:58) (14 - 21)  SpO2: 100% (19 @ 08:58) (94% - 100%)    PHYSICAL EXAM:  HEENT:   [x]Tracheostomy: # 8 Cuffed Shiley   [ ]Pupils equal  [ ]No oral lesions  [x]Abnormal: Cranial Staples Present Rt side of scalp, Incision clean dry     SKIN  [x]No Rash  [ ] Abnormal  [ ] pressure    CARDIAC  [x]Regular  [ ]Abnormal    PULMONARY  [x]Bilateral Clear Breath Sounds  [ ]Normal Excursion  [ ]Abnormal    GI  [x]PEG      [x] +BS		              [x]Soft, nondistended, nontender	  [ ]Abnormal    MUSCULOSKELETAL                                   [ ]Bedbound                 [ ]Abnormal    [x]OOB to chair                           EXTREMITIES                                         [x]Normal  [ ]Edema                           NEUROLOGIC  [ ] Normal, non focal  [x] Focal findings: Not following commands, Grimacing to Noxious stimuli     PSYCHIATRIC  [ ]Alert and appropriate  [x] Sedated	 [ ]Agitated    :  Newsome: [ ] Yes, if yes: Date of Placement:                   [x] No    LINES: Central Lines [x] Yes, if yes: Date of Placement: RT UE PICC Placed                                       [  ] No    HOSPITAL MEDICATIONS:  MEDICATIONS  (STANDING):  ALBUTerol/ipratropium for Nebulization 3 milliLiter(s) Nebulizer every 6 hours  chlorhexidine 4% Liquid 1 Application(s) Topical <User Schedule>  enoxaparin Injectable 40 milliGRAM(s) SubCutaneous <User Schedule>  gabapentin   Solution 300 milliGRAM(s) Oral every 8 hours  pantoprazole   Suspension 40 milliGRAM(s) Oral daily  potassium phosphate / sodium phosphate powder 1 Packet(s) Oral four times a day  sodium chloride 2 Gram(s) Oral every 6 hours    MEDICATIONS  (PRN):  acetaminophen    Suspension .. 650 milliGRAM(s) Oral every 6 hours PRN Temp greater or equal to 38.5C (101.3F), Mild Pain (1 - 3)  clonazePAM Tablet 1 milliGRAM(s) Oral every 8 hours PRN anxiety      LABS:                        8.4    5.4   )-----------( 121      ( 2019 23:08 )             24.1     -    136  |  99  |  9   ----------------------------<  108<H>  3.8   |  27  |  0.41<L>    Ca    7.8<L>      2019 06:37  Phos  2.5       Mg     2.2             Urinalysis Basic - ( 2019 06:24 )    Color: Light Yellow / Appearance: Clear / S.017 / pH: x  Gluc: x / Ketone: Negative  / Bili: Negative / Urobili: 3 mg/dL   Blood: x / Protein: Negative / Nitrite: Negative   Leuk Esterase: Negative / RBC: x / WBC x   Sq Epi: x / Non Sq Epi: x / Bacteria: x          CAPILLARY BLOOD GLUCOSE    MICROBIOLOGY:     RADIOLOGY:  [ ] Reviewed and interpreted by me    Mode: AC/ CMV (Assist Control/ Continuous Mandatory Ventilation)  RR (machine): 16  TV (machine): 450  FiO2: 30  PEEP: 5  ITime: 1  MAP: 10  PIP: 24

## 2019-01-09 NOTE — PROGRESS NOTE ADULT - ASSESSMENT
59 yo female with SLE admitted 12/16 with cerebellar AVM and bleed, electively intubated, EVD placed  S/P cerebellar [id number] with AVM resection, s/p return to OR on 12/18 for posterior fossa decompression.  Colonized with MRSA in sputum, no radiographic evidence of pneumonia.  Ongoing fever likely central, secondary to ICH directly  All blood cultures negative.  Prior sputum with MRSA and E coli, likely colonizers, but covered with Vanco and Cefepime- still febrile despite full course  Repeat sputum normal caleb only  CSF from 12/30 with Micrococcous in BMO is a contaminant; f/u CSF Cxs negative, Cell Count only 1 WBC- no need for further Vanco  Hemodyn stable  WBC normal  Temps have persisted but still no obvious infection, her wbc count remains normal, UA negative, CXR is clear.  CT still with residual blood.  S/P VPS  Suggest:  Fever still best explained on central basis- observe off abx for now  Follow temps and CBC/diff   Surveillance Cxs as per routine, have been negative  Supportive care per RICU  If high fevers continue we could consider a CT of A/P but will be low yield .

## 2019-01-10 LAB
ANION GAP SERPL CALC-SCNC: 10 MMOL/L — SIGNIFICANT CHANGE UP (ref 5–17)
BUN SERPL-MCNC: 8 MG/DL — SIGNIFICANT CHANGE UP (ref 7–23)
CALCIUM SERPL-MCNC: 8.1 MG/DL — LOW (ref 8.4–10.5)
CHLORIDE SERPL-SCNC: 98 MMOL/L — SIGNIFICANT CHANGE UP (ref 96–108)
CO2 SERPL-SCNC: 27 MMOL/L — SIGNIFICANT CHANGE UP (ref 22–31)
CREAT SERPL-MCNC: 0.41 MG/DL — LOW (ref 0.5–1.3)
FERRITIN SERPL-MCNC: 430 NG/ML — HIGH (ref 15–150)
FOLATE SERPL-MCNC: 17.4 NG/ML — SIGNIFICANT CHANGE UP
GLUCOSE SERPL-MCNC: 109 MG/DL — HIGH (ref 70–99)
HCT VFR BLD CALC: 24.8 % — LOW (ref 34.5–45)
HGB BLD-MCNC: 8.6 G/DL — LOW (ref 11.5–15.5)
IRON SATN MFR SERPL: 20 UG/DL — LOW (ref 30–160)
IRON SATN MFR SERPL: 8 % — LOW (ref 14–50)
MAGNESIUM SERPL-MCNC: 2.1 MG/DL — SIGNIFICANT CHANGE UP (ref 1.6–2.6)
MCHC RBC-ENTMCNC: 31.4 PG — SIGNIFICANT CHANGE UP (ref 27–34)
MCHC RBC-ENTMCNC: 34.7 GM/DL — SIGNIFICANT CHANGE UP (ref 32–36)
MCV RBC AUTO: 90.4 FL — SIGNIFICANT CHANGE UP (ref 80–100)
PHOSPHATE SERPL-MCNC: 2.5 MG/DL — SIGNIFICANT CHANGE UP (ref 2.5–4.5)
PLATELET # BLD AUTO: 292 K/UL — SIGNIFICANT CHANGE UP (ref 150–400)
POTASSIUM SERPL-MCNC: 4 MMOL/L — SIGNIFICANT CHANGE UP (ref 3.5–5.3)
POTASSIUM SERPL-SCNC: 4 MMOL/L — SIGNIFICANT CHANGE UP (ref 3.5–5.3)
RBC # BLD: 2.74 M/UL — LOW (ref 3.8–5.2)
RBC # BLD: 2.74 M/UL — LOW (ref 3.8–5.2)
RBC # FLD: 16 % — HIGH (ref 10.3–14.5)
RETICS #: 103 K/UL — SIGNIFICANT CHANGE UP (ref 25–125)
RETICS/RBC NFR: 3.8 % — HIGH (ref 0.5–2.5)
SODIUM SERPL-SCNC: 135 MMOL/L — SIGNIFICANT CHANGE UP (ref 135–145)
TIBC SERPL-MCNC: 242 UG/DL — SIGNIFICANT CHANGE UP (ref 220–430)
UIBC SERPL-MCNC: 222 UG/DL — SIGNIFICANT CHANGE UP (ref 110–370)
VIT B12 SERPL-MCNC: 893 PG/ML — SIGNIFICANT CHANGE UP (ref 232–1245)
WBC # BLD: 5.9 K/UL — SIGNIFICANT CHANGE UP (ref 3.8–10.5)
WBC # FLD AUTO: 5.9 K/UL — SIGNIFICANT CHANGE UP (ref 3.8–10.5)

## 2019-01-10 PROCEDURE — 99233 SBSQ HOSP IP/OBS HIGH 50: CPT

## 2019-01-10 RX ADMIN — PANTOPRAZOLE SODIUM 40 MILLIGRAM(S): 20 TABLET, DELAYED RELEASE ORAL at 13:01

## 2019-01-10 RX ADMIN — CHLORHEXIDINE GLUCONATE 1 APPLICATION(S): 213 SOLUTION TOPICAL at 23:05

## 2019-01-10 RX ADMIN — GABAPENTIN 300 MILLIGRAM(S): 400 CAPSULE ORAL at 05:11

## 2019-01-10 RX ADMIN — FENTANYL CITRATE 1 PATCH: 50 INJECTION INTRAVENOUS at 07:00

## 2019-01-10 RX ADMIN — Medication 650 MILLIGRAM(S): at 17:56

## 2019-01-10 RX ADMIN — FENTANYL CITRATE 1 PATCH: 50 INJECTION INTRAVENOUS at 17:48

## 2019-01-10 RX ADMIN — Medication 3 MILLILITER(S): at 11:23

## 2019-01-10 RX ADMIN — SODIUM CHLORIDE 2 GRAM(S): 9 INJECTION INTRAMUSCULAR; INTRAVENOUS; SUBCUTANEOUS at 17:49

## 2019-01-10 RX ADMIN — Medication 1 PACKET(S): at 05:10

## 2019-01-10 RX ADMIN — Medication 3 MILLILITER(S): at 23:58

## 2019-01-10 RX ADMIN — SODIUM CHLORIDE 2 GRAM(S): 9 INJECTION INTRAMUSCULAR; INTRAVENOUS; SUBCUTANEOUS at 13:01

## 2019-01-10 RX ADMIN — Medication 1 PACKET(S): at 23:04

## 2019-01-10 RX ADMIN — GABAPENTIN 300 MILLIGRAM(S): 400 CAPSULE ORAL at 23:03

## 2019-01-10 RX ADMIN — Medication 3 MILLILITER(S): at 05:11

## 2019-01-10 RX ADMIN — SODIUM CHLORIDE 2 GRAM(S): 9 INJECTION INTRAMUSCULAR; INTRAVENOUS; SUBCUTANEOUS at 23:04

## 2019-01-10 RX ADMIN — Medication 3 MILLILITER(S): at 00:18

## 2019-01-10 RX ADMIN — GABAPENTIN 300 MILLIGRAM(S): 400 CAPSULE ORAL at 14:08

## 2019-01-10 RX ADMIN — Medication 1 PACKET(S): at 13:02

## 2019-01-10 RX ADMIN — ENOXAPARIN SODIUM 40 MILLIGRAM(S): 100 INJECTION SUBCUTANEOUS at 17:49

## 2019-01-10 RX ADMIN — SODIUM CHLORIDE 2 GRAM(S): 9 INJECTION INTRAMUSCULAR; INTRAVENOUS; SUBCUTANEOUS at 05:10

## 2019-01-10 RX ADMIN — Medication 1 PACKET(S): at 17:49

## 2019-01-10 RX ADMIN — Medication 3 MILLILITER(S): at 17:16

## 2019-01-10 NOTE — PROGRESS NOTE ADULT - ATTENDING COMMENTS
Intermittently febrile but thought to be central in origin. If persistent, will consider CT a/p  Tracheostomy with ventilator dependence, weaning as tolerated  Mental status has improved somewhat. following some simple commands

## 2019-01-10 NOTE — PROGRESS NOTE ADULT - SUBJECTIVE AND OBJECTIVE BOX
CC: f/u for  fever  Patient reports  nothing not verbal  REVIEW OF SYSTEMS:  All other review of systems cannot get (Comprehensive ROS)    Antimicrobials Day #  :    Other Medications Reviewed    T(F): 100.5 (01-10-19 @ 16:27), Max: 102.9 (01-09-19 @ 20:28)  HR: 101 (01-10-19 @ 16:27)  BP: 120/77 (01-10-19 @ 16:27)  RR: 16 (01-10-19 @ 16:27)  SpO2: 100% (01-10-19 @ 16:27)  Wt(kg): --    PHYSICAL EXAM:  General: not verbal, no acute distress, head wound is dry  Eyes:  anicteric, no conjunctival injection, no discharge  Oropharynx: no lesions or injection 	  Neck: supple, without adenopathy  Lungs: course to auscultation  Heart: regular rate and rhythm; no murmur, rubs or gallops  Abdomen: soft, nondistended, nontender, without mass or organomegaly  Skin: no lesions  Extremities: no clubbing, cyanosis, or edema  Neurologic: not interactive    LAB RESULTS:                        8.6    5.9   )-----------( 292      ( 10 [place] 2019 06:49 )             24.8     01-10    135  |  98  |  8   ----------------------------<  109<H>  4.0   |  27  |  0.41<L>    Ca    8.1<L>      10 [place] 2019 06:48  Phos  2.5     01-10  Mg     2.1     01-10          MICROBIOLOGY:  RECENT CULTURES:  01-08 @ 07:18 .Blood Blood-Peripheral     No growth to date.          RADIOLOGY REVIEWED:    < from: CT Head No Cont (01.08.19 @ 08:28) >  IMPRESSION:    Interval revision of right frontal approach ventriculostomy catheter   which terminates at the septum pellucidum    Hemorrhage layering in the occipital horns decreased. Ventricular size is   decreased, hydrocephalus has essentially resolved.    Postsurgical changes in the posterior fossa as described.    < end of copied text >  < from: Xray Chest 1 View- PORTABLE-Urgent (01.08.19 @ 06:01) >  IMPRESSION:     Right-sided PICC tip terminates in the right atrium, retraction is   advised.    Otherwise, the lungs are clear.      < end of copied text >      Assessment:  Patient with cerebellar bleed, s/p embolization and avm resection then decompressive posterior fossa cranieotomy. Ongoing fever despite course of vanco and cefepime. Continues to have normal wbc, neg cultures, stable respiratory status. Doubt picc infected with negatve blood cultures  Plan:  monitor off antibiotics  follow up cultures  favor ct chest abd and pelvis to complete fuo w/u but suspect fever still from blood in brain or even drug fever.   doppler legs

## 2019-01-10 NOTE — PROGRESS NOTE ADULT - PROBLEM SELECTOR PLAN 4
Patient remains with persistent fevers, likely Central in Nature   Patient previously txd with Vanco and Cefepime for MRSA / E.Coli PNA  Bld CX 1/8: No growth , UA 1/8: Negative, CXR 1/8: Clear Lungs   If patient remains with High fevers, ID Reccomends CT Chest/ ABD / Pelvis   Continue to monitor off abx at present time

## 2019-01-10 NOTE — PROGRESS NOTE ADULT - ASSESSMENT
58 Year Old woman PMH Lupus Presented with Sudden onset of Severe Headache with associated nausea and vomiting. 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 Patient was intact on presentation however intubated for airway protection and EVD placed for hydrocephalus. Patient was Transferred to [facility] for selective IR cerebral angiography. 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 Patient returned to the OR on 12/18 for sub occipital decompressive craniectomy. Patient was initially on Vimpat for suspected Seizure like activity, but VEEG while in the NSCU, without evidence of Seizures. Vimpat was discontinued. Patient was noted to have moderate diffuse encephalopathy and dysfxn in frontal regions bilaterally on VEEG. Patient underwent Trach Placement on 12/24 ( # 8 Cuffed Shiley ), and PEG Placement on 12/26. While in the NSCU Patient with Persistent Fevers and Autonomic Storming, Patient treated with medical management and [facility] Protocol. Patient found to MRSA/ E.coli growing in the sputum and was treated with Vanco and Cefepime. 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 Patient remained with persistent fevers despite course of broad spectrum abx lD felt fevers Central in nature. Patient required blood transfusion while in NSCU. Patient failed clamping trial and required VPS, which was Placed on 1/7.    1/9: Patient transferred to RCU overnight , Patient remains Febrile Fevers likely Central in Nature. Patient noted to have tremor of RT upper extremity on morning physical exam as per d/w Neuro Sx tremors are not new and patient has had several VEEG which are negative. Patient previously on Fentanyl patch for Autonomic storming which auto-discontinued, will re-order lower dose fentanyl patch as patient appears to be waking up.   1/10:  Still with fevers. CPAP trials as tolerated. Improvement in mental status noted as patient is more alert and following commands, still overall weak.

## 2019-01-10 NOTE — PROGRESS NOTE ADULT - PROBLEM SELECTOR PLAN 1
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  Patient S/p EVD-> Failed Clamping Trial -> S/p VPS 1/7  Repeat Head CT 1/8: Resolved Hydrocephalus  Continue Sodium Chloride tabs ( Goal Sodium 135 -145)  Continue Physical therapy / OT consult placed

## 2019-01-10 NOTE — PROGRESS NOTE ADULT - SUBJECTIVE AND OBJECTIVE BOX
Patient is a 58y old  Female who presents with a chief complaint of Cerebellar IPH (09 Jan 2019 11:06)      Interval Events:    REVIEW OF SYSTEMS:  [ ] Positive  [ ] All other systems negative  [ ] Unable to assess ROS because ________    Vital Signs Last 24 Hrs  T(C): 38.2 (01-10-19 @ 04:27), Max: 39.4 (01-09-19 @ 20:28)  T(F): 100.7 (01-10-19 @ 04:27), Max: 102.9 (01-09-19 @ 20:28)  HR: 100 (01-10-19 @ 05:11) (84 - 108)  BP: 134/86 (01-10-19 @ 04:27) (103/70 - 143/74)  RR: 16 (01-10-19 @ 04:27) (16 - 17)  SpO2: 100% (01-10-19 @ 05:11) (96% - 100%)    PHYSICAL EXAM:  HEENT:   [ ]Tracheostomy:  [ ]Pupils equal  [ ]No oral lesions  [ ]Abnormal    SKIN  [ ]No Rash  [ ] Abnormal  [ ] pressure    CARDIAC  [ ]Regular  [ ]Abnormal    PULMONARY  [ ]Bilateral Clear Breath Sounds  [ ]Normal Excursion  [ ]Abnormal    GI  [ ]PEG      [ ] +BS		              [ ]Soft, nondistended, nontender	  [ ]Abnormal    MUSCULOSKELETAL                                   [ ]Bedbound                 [ ]Abnormal    [ ]Ambulatory/OOB to chair                           EXTREMITIES                                         [ ]Normal  [ ]Edema                           NEUROLOGIC  [ ] Normal, non focal  [ ] Focal findings:    PSYCHIATRIC  [ ]Alert and appropriate  [ ] Sedated	 [ ]Agitated    :  Newsome: [ ] Yes, if yes: Date of Placement:                   [  ] No    LINES: Central Lines [ ] Yes, if yes: Date of Placement                                     [  ] No    HOSPITAL MEDICATIONS:  MEDICATIONS  (STANDING):  ALBUTerol/ipratropium for Nebulization 3 milliLiter(s) Nebulizer every 6 hours  chlorhexidine 4% Liquid 1 Application(s) Topical <User Schedule>  enoxaparin Injectable 40 milliGRAM(s) SubCutaneous <User Schedule>  fentaNYL   Patch  12 MICROgram(s)/Hr. 1 Patch Transdermal every 48 hours  gabapentin   Solution 300 milliGRAM(s) Oral every 8 hours  pantoprazole   Suspension 40 milliGRAM(s) Oral daily  potassium phosphate / sodium phosphate powder 1 Packet(s) Oral four times a day  sodium chloride 2 Gram(s) Oral every 6 hours    MEDICATIONS  (PRN):  acetaminophen    Suspension .. 650 milliGRAM(s) Oral every 6 hours PRN Temp greater or equal to 38.5C (101.3F), Mild Pain (1 - 3)  clonazePAM Tablet 1 milliGRAM(s) Oral every 8 hours PRN anxiety      LABS:                        8.1    5.9   )-----------( 177      ( 09 Jan 2019 11:51 )             22.9     01-10    135  |  98  |  8   ----------------------------<  109<H>  4.0   |  27  |  0.41<L>    Ca    8.1<L>      10 Jan 2019 06:48  Phos  2.5     01-10  Mg     2.1     01-10              CAPILLARY BLOOD GLUCOSE    MICROBIOLOGY:     RADIOLOGY:  [ ] Reviewed and interpreted by me    Mode: AC/ CMV (Assist Control/ Continuous Mandatory Ventilation)  RR (machine): 16  TV (machine): 450  FiO2: 30  PEEP: 5  ITime: 1  MAP: 8  PIP: 21 Patient is a 58y old  Female who presents with a chief complaint of Cerebellar IPH......      Interval Events: Fever of 102.9F recorded. No other issues over night.    REVIEW OF SYSTEMS:  [ ] Positive  [X ] All other systems negative  [ ] Unable to assess ROS because ________    Vital Signs Last 24 Hrs  T(C): 38.2 (01-10-19 @ 04:27), Max: 39.4 (01-09-19 @ 20:28)  T(F): 100.7 (01-10-19 @ 04:27), Max: 102.9 (01-09-19 @ 20:28)  HR: 100 (01-10-19 @ 05:11) (84 - 108)  BP: 134/86 (01-10-19 @ 04:27) (103/70 - 143/74)  RR: 16 (01-10-19 @ 04:27) (16 - 17)  SpO2: 100% (01-10-19 @ 05:11) (96% - 100%)      PHYSICAL EXAM:  HEENT:   [x]Tracheostomy: # 8 Cuffed Shiley   [ ]Pupils equal  [ ]No oral lesions  [x]Abnormal: Cranial Staples Present Rt side of scalp, Incision clean dry     SKIN  [x]No Rash  [ ] Abnormal  [ ] pressure    CARDIAC  [x]Regular  [ ]Abnormal    PULMONARY  [x]Bilateral Clear Breath Sounds  [ ]Normal Excursion  [ ]Abnormal    GI  [x]PEG      [x] +BS		              [x]Soft, nondistended, nontender	  [ ]Abnormal    MUSCULOSKELETAL                                   [ ]Bedbound                 [ ]Abnormal    [x]OOB to chair                           EXTREMITIES                                         [x]Normal  [ ]Edema                           NEUROLOGIC  [ ] Normal, non focal  [x] Focal findings: Following simple commands (squeezes and raises hands on command, moves feet on command); tries to nod when asked yes/no questions; partially tracking    PSYCHIATRIC  [ ]Alert and appropriate  [x] Sedated	 [ ]Agitated    :  Jerod: [ ] Yes, if yes: Date of Placement:                   [x] No    LINES: Central Lines [x] Yes, if yes: Date of Placement: RT UE PICC Placed 1/9                                      [  ] No            HOSPITAL MEDICATIONS:  MEDICATIONS  (STANDING):  ALBUTerol/ipratropium for Nebulization 3 milliLiter(s) Nebulizer every 6 hours  chlorhexidine 4% Liquid 1 Application(s) Topical <User Schedule>  enoxaparin Injectable 40 milliGRAM(s) SubCutaneous <User Schedule>  fentaNYL   Patch  12 MICROgram(s)/Hr. 1 Patch Transdermal every 48 hours  gabapentin   Solution 300 milliGRAM(s) Oral every 8 hours  pantoprazole   Suspension 40 milliGRAM(s) Oral daily  potassium phosphate / sodium phosphate powder 1 Packet(s) Oral four times a day  sodium chloride 2 Gram(s) Oral every 6 hours    MEDICATIONS  (PRN):  acetaminophen    Suspension .. 650 milliGRAM(s) Oral every 6 hours PRN Temp greater or equal to 38.5C (101.3F), Mild Pain (1 - 3)  clonazePAM Tablet 1 milliGRAM(s) Oral every 8 hours PRN anxiety      LABS:                        8.1    5.9   )-----------( 177      ( 09 Jan 2019 11:51 )             22.9     01-10    135  |  98  |  8   ----------------------------<  109<H>  4.0   |  27  |  0.41<L>    Ca    8.1<L>      10 Humble 2019 06:48  Phos  2.5     01-10  Mg     2.1     01-10              CAPILLARY BLOOD GLUCOSE    MICROBIOLOGY:     RADIOLOGY:  [ ] Reviewed and interpreted by me    Mode: AC/ CMV (Assist Control/ Continuous Mandatory Ventilation)  RR (machine): 16  TV (machine): 450  FiO2: 30  PEEP: 5  ITime: 1  MAP: 8  PIP: 21

## 2019-01-11 LAB
ANION GAP SERPL CALC-SCNC: 9 MMOL/L — SIGNIFICANT CHANGE UP (ref 5–17)
BUN SERPL-MCNC: 8 MG/DL — SIGNIFICANT CHANGE UP (ref 7–23)
CALCIUM SERPL-MCNC: 8.1 MG/DL — LOW (ref 8.4–10.5)
CHLORIDE SERPL-SCNC: 96 MMOL/L — SIGNIFICANT CHANGE UP (ref 96–108)
CO2 SERPL-SCNC: 27 MMOL/L — SIGNIFICANT CHANGE UP (ref 22–31)
CREAT SERPL-MCNC: 0.39 MG/DL — LOW (ref 0.5–1.3)
GLUCOSE SERPL-MCNC: 110 MG/DL — HIGH (ref 70–99)
HCT VFR BLD CALC: 23 % — LOW (ref 34.5–45)
HGB BLD-MCNC: 8.1 G/DL — LOW (ref 11.5–15.5)
MAGNESIUM SERPL-MCNC: 2.1 MG/DL — SIGNIFICANT CHANGE UP (ref 1.6–2.6)
MCHC RBC-ENTMCNC: 31.7 PG — SIGNIFICANT CHANGE UP (ref 27–34)
MCHC RBC-ENTMCNC: 35.4 GM/DL — SIGNIFICANT CHANGE UP (ref 32–36)
MCV RBC AUTO: 89.7 FL — SIGNIFICANT CHANGE UP (ref 80–100)
PHOSPHATE SERPL-MCNC: 2.5 MG/DL — SIGNIFICANT CHANGE UP (ref 2.5–4.5)
PLATELET # BLD AUTO: 238 K/UL — SIGNIFICANT CHANGE UP (ref 150–400)
POTASSIUM SERPL-MCNC: 4.1 MMOL/L — SIGNIFICANT CHANGE UP (ref 3.5–5.3)
POTASSIUM SERPL-SCNC: 4.1 MMOL/L — SIGNIFICANT CHANGE UP (ref 3.5–5.3)
RBC # BLD: 2.57 M/UL — LOW (ref 3.8–5.2)
RBC # FLD: 15.6 % — HIGH (ref 10.3–14.5)
SODIUM SERPL-SCNC: 132 MMOL/L — LOW (ref 135–145)
WBC # BLD: 4.9 K/UL — SIGNIFICANT CHANGE UP (ref 3.8–10.5)
WBC # FLD AUTO: 4.9 K/UL — SIGNIFICANT CHANGE UP (ref 3.8–10.5)

## 2019-01-11 PROCEDURE — 99232 SBSQ HOSP IP/OBS MODERATE 35: CPT

## 2019-01-11 PROCEDURE — 99233 SBSQ HOSP IP/OBS HIGH 50: CPT | Mod: GC

## 2019-01-11 RX ADMIN — FENTANYL CITRATE 1 PATCH: 50 INJECTION INTRAVENOUS at 18:09

## 2019-01-11 RX ADMIN — SODIUM CHLORIDE 2 GRAM(S): 9 INJECTION INTRAMUSCULAR; INTRAVENOUS; SUBCUTANEOUS at 23:40

## 2019-01-11 RX ADMIN — SODIUM CHLORIDE 2 GRAM(S): 9 INJECTION INTRAMUSCULAR; INTRAVENOUS; SUBCUTANEOUS at 05:27

## 2019-01-11 RX ADMIN — FENTANYL CITRATE 1 PATCH: 50 INJECTION INTRAVENOUS at 08:58

## 2019-01-11 RX ADMIN — CHLORHEXIDINE GLUCONATE 1 APPLICATION(S): 213 SOLUTION TOPICAL at 22:06

## 2019-01-11 RX ADMIN — Medication 1 PACKET(S): at 23:40

## 2019-01-11 RX ADMIN — ENOXAPARIN SODIUM 40 MILLIGRAM(S): 100 INJECTION SUBCUTANEOUS at 18:03

## 2019-01-11 RX ADMIN — Medication 1 PACKET(S): at 18:03

## 2019-01-11 RX ADMIN — SODIUM CHLORIDE 2 GRAM(S): 9 INJECTION INTRAMUSCULAR; INTRAVENOUS; SUBCUTANEOUS at 18:02

## 2019-01-11 RX ADMIN — GABAPENTIN 300 MILLIGRAM(S): 400 CAPSULE ORAL at 22:06

## 2019-01-11 RX ADMIN — Medication 3 MILLILITER(S): at 11:36

## 2019-01-11 RX ADMIN — FENTANYL CITRATE 1 PATCH: 50 INJECTION INTRAVENOUS at 22:05

## 2019-01-11 RX ADMIN — Medication 3 MILLILITER(S): at 05:24

## 2019-01-11 RX ADMIN — Medication 3 MILLILITER(S): at 17:58

## 2019-01-11 RX ADMIN — GABAPENTIN 300 MILLIGRAM(S): 400 CAPSULE ORAL at 05:27

## 2019-01-11 RX ADMIN — Medication 1 PACKET(S): at 05:27

## 2019-01-11 RX ADMIN — PANTOPRAZOLE SODIUM 40 MILLIGRAM(S): 20 TABLET, DELAYED RELEASE ORAL at 14:43

## 2019-01-11 RX ADMIN — GABAPENTIN 300 MILLIGRAM(S): 400 CAPSULE ORAL at 14:44

## 2019-01-11 RX ADMIN — Medication 1 PACKET(S): at 14:42

## 2019-01-11 RX ADMIN — SODIUM CHLORIDE 2 GRAM(S): 9 INJECTION INTRAMUSCULAR; INTRAVENOUS; SUBCUTANEOUS at 14:43

## 2019-01-11 NOTE — PROGRESS NOTE ADULT - ASSESSMENT
58 Year Old woman PMH Lupus Presented with Sudden onset of Severe Headache with associated nausea and vomiting. 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 Patient was intact on presentation however intubated for airway protection and EVD placed for hydrocephalus. Patient was Transferred to [facility] for selective IR cerebral angiography. 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 Patient returned to the OR on 12/18 for sub occipital decompressive craniectomy. Patient was initially on Vimpat for suspected Seizure like activity, but VEEG while in the NSCU, without evidence of Seizures. Vimpat was discontinued. Patient was noted to have moderate diffuse encephalopathy and dysfxn in frontal regions bilaterally on VEEG. Patient underwent Trach Placement on 12/24 ( # 8 Cuffed Shiley ), and PEG Placement on 12/26. While in the NSCU Patient with Persistent Fevers and Autonomic Storming, Patient treated with medical management and [place] Protocol. Patient found to MRSA/ E.coli growing in the sputum and was treated with Vanco and Cefepime. 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 Patient remained with persistent fevers despite course of broad spectrum abx lD felt fevers Central in nature. Patient required blood transfusion while in NSCU. Patient failed clamping trial and required VPS, which was Placed on 1/7.    1/9: Patient transferred to RCU overnight , Patient remains Febrile Fevers likely Central in Nature. Patient noted to have tremor of RT upper extremity on morning physical exam as per d/w Neuro Sx tremors are not new and patient has had several VEEG which are negative. Patient previously on Fentanyl patch for Autonomic storming which auto-discontinued, will re-order lower dose fentanyl patch as patient appears to be waking up.   1/10:  Still with fevers. CPAP trials as tolerated. Improvement in mental status noted as patient is more alert and following commands, still overall weak.  1/11: No new events. CPAP 5/5 today then TC trial tomorrow.

## 2019-01-11 NOTE — PROGRESS NOTE ADULT - SUBJECTIVE AND OBJECTIVE BOX
HPI:  Tolerating therapies.  Max A with bed mob    MEDICATIONS  (STANDING):  ALBUTerol/ipratropium for Nebulization 3 milliLiter(s) Nebulizer every 6 hours  chlorhexidine 4% Liquid 1 Application(s) Topical <User Schedule>  enoxaparin Injectable 40 milliGRAM(s) SubCutaneous <User Schedule>  fentaNYL   Patch  12 MICROgram(s)/Hr. 1 Patch Transdermal every 48 hours  gabapentin   Solution 300 milliGRAM(s) Oral every 8 hours  pantoprazole   Suspension 40 milliGRAM(s) Oral daily  potassium phosphate / sodium phosphate powder 1 Packet(s) Oral four times a day  sodium chloride 2 Gram(s) Oral every 6 hours    MEDICATIONS  (PRN):  acetaminophen    Suspension .. 650 milliGRAM(s) Oral every 6 hours PRN Temp greater or equal to 38.5C (101.3F), Mild Pain (1 - 3)  clonazePAM Tablet 1 milliGRAM(s) Oral every 8 hours PRN anxiety    Vital Signs Last 24 Hrs  T(C): 38.1 (11 Jan 2019 13:24), Max: 39 (10 Humble 2019 17:57)  T(F): 100.6 (11 Jan 2019 13:24), Max: 102.2 (10 Humble 2019 17:57)  HR: 102 (11 Jan 2019 13:24) (91 - 109)  BP: 118/74 (11 Jan 2019 13:24) (118/74 - 161/96)  BP(mean): --  RR: 20 (11 Jan 2019 13:24) (16 - 20)  SpO2: 100% (11 Jan 2019 13:24) (99% - 100%)    PHYSICAL EXAM  Constitutional - NAD, Comfortable  HEENT - Right cranial incisions with staples C/D/I, Eyes intermittently open to tactile and auditory stimuli  Chest - (+) Trach on vent  Cardiovascular - Tachycardic, S1S2  Abdomen - Soft, (+) PEG  Neurologic Exam -                    Follows simple commands     Slightly more alert     Motor - Unable to fully exam secondary to arousal and participation but appears to have 1/5  strength bilaterally, 2/5 elbow flexion in RUE, 3/5 in bilateral ADF     Reflexes - 3+/4 in bilateral biceps and patella, Negative Sales's bilaterally, Negative Babinski's bilaterally    Psychiatric - Flat affect                          8.1    4.9   )-----------( 238      ( 11 Jan 2019 06:47 )             23.0     01-11    132<L>  |  96  |  8   ----------------------------<  110<H>  4.1   |  27  |  0.39<L>    Ca    8.1<L>      11 Jan 2019 06:47  Phos  2.5     01-11  Mg     2.1     01-11          ASSESSMENT/PLAN  59 y/o female with severe headache 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 Hospital course complicated with respiratory failure requiring tracheostomy, dysphagia requiring PEG, elevated intracranial pressure requiring VPS, and fevers. She is now with functional, gait, ADL, cognitive, balance, endurance, speech, swallow impairments.    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 Patient is also still currently requiring vent support. Will continue to follow to make final rehabilitation recommendations.  Continue PT/OT/SLP  When off vent will need TBI Rehab

## 2019-01-11 NOTE — PROGRESS NOTE ADULT - SUBJECTIVE AND OBJECTIVE BOX
CC: f/u for low grade fever    Patient reports: she is non verbal, stable on trach collar, tolerating enteral feeds    REVIEW OF SYSTEMS:  All other review of systems negative (Comprehensive ROS)    Antimicrobials Day #  :off    Other Medications Reviewed    T(F): 100.6 (01-11-19 @ 13:24), Max: 102.2 (01-10-19 @ 17:57)  HR: 102 (01-11-19 @ 13:24)  BP: 118/74 (01-11-19 @ 13:24)  RR: 20 (01-11-19 @ 13:24)  SpO2: 100% (01-11-19 @ 13:24)  Wt(kg): --    PHYSICAL EXAM:  General: lethargic, no acute distress, craniotomy incision is C/D/I  Eyes:  anicteric, no conjunctival injection, no discharge  Oropharynx: no lesions or injection 	  Neck: trach  Lungs: clear to auscultation  Heart: regular rate and rhythm; no murmur, rubs or gallops  Abdomen: soft, nondistended, nontender, peg in place  Skin: no lesions  Extremities: no clubbing, cyanosis, + edema  Neurologic: awake, poorly interactive    LAB RESULTS:                        8.1    4.9   )-----------( 238      ( 11 Jan 2019 06:47 )             23.0     01-11    132<L>  |  96  |  8   ----------------------------<  110<H>  4.1   |  27  |  0.39<L>    Ca    8.1<L>      11 Jan 2019 06:47  Phos  2.5     01-11  Mg     2.1     01-11          MICROBIOLOGY:  RECENT CULTURES:  01-08 @ 07:18 .Blood Blood-Peripheral     No growth to date.          RADIOLOGY REVIEWED:  < from: CT Head No Cont (01.08.19 @ 08:28) >  IMPRESSION:    Interval revision of right frontal approach ventriculostomy catheter   which terminates at the septum pellucidum    Hemorrhage layering in the occipital horns decreased. Ventricular size is   decreased, hydrocephalus has essentially resolved.    Postsurgical changes in the posterior fossa as described.    < end of copied text >

## 2019-01-11 NOTE — PROGRESS NOTE ADULT - SUBJECTIVE AND OBJECTIVE BOX
Patient is a 58y old  Female who presents with a chief complaint of Cerebellar IPH (10 Humble 2019 16:38)      Interval Events:    REVIEW OF SYSTEMS:  [ ] Positive  [ ] All other systems negative  [ ] Unable to assess ROS because ________    Vital Signs Last 24 Hrs  T(C): 37.8 (01-11-19 @ 05:38), Max: 39 (01-10-19 @ 17:57)  T(F): 100 (01-11-19 @ 05:38), Max: 102.2 (01-10-19 @ 17:57)  HR: 98 (01-11-19 @ 05:38) (94 - 109)  BP: 142/85 (01-11-19 @ 05:38) (120/77 - 161/96)  RR: 16 (01-11-19 @ 05:38) (15 - 17)  SpO2: 100% (01-11-19 @ 05:38) (99% - 100%)    PHYSICAL EXAM:  HEENT:   [ ]Tracheostomy:  [ ]Pupils equal  [ ]No oral lesions  [ ]Abnormal    SKIN  [ ]No Rash  [ ] Abnormal  [ ] pressure    CARDIAC  [ ]Regular  [ ]Abnormal    PULMONARY  [ ]Bilateral Clear Breath Sounds  [ ]Normal Excursion  [ ]Abnormal    GI  [ ]PEG      [ ] +BS		              [ ]Soft, nondistended, nontender	  [ ]Abnormal    MUSCULOSKELETAL                                   [ ]Bedbound                 [ ]Abnormal    [ ]Ambulatory/OOB to chair                           EXTREMITIES                                         [ ]Normal  [ ]Edema                           NEUROLOGIC  [ ] Normal, non focal  [ ] Focal findings:    PSYCHIATRIC  [ ]Alert and appropriate  [ ] Sedated	 [ ]Agitated    :  Newsome: [ ] Yes, if yes: Date of Placement:                   [  ] No    LINES: Central Lines [ ] Yes, if yes: Date of Placement                                     [  ] No    HOSPITAL MEDICATIONS:  MEDICATIONS  (STANDING):  ALBUTerol/ipratropium for Nebulization 3 milliLiter(s) Nebulizer every 6 hours  chlorhexidine 4% Liquid 1 Application(s) Topical <User Schedule>  enoxaparin Injectable 40 milliGRAM(s) SubCutaneous <User Schedule>  fentaNYL   Patch  12 MICROgram(s)/Hr. 1 Patch Transdermal every 48 hours  gabapentin   Solution 300 milliGRAM(s) Oral every 8 hours  pantoprazole   Suspension 40 milliGRAM(s) Oral daily  potassium phosphate / sodium phosphate powder 1 Packet(s) Oral four times a day  sodium chloride 2 Gram(s) Oral every 6 hours    MEDICATIONS  (PRN):  acetaminophen    Suspension .. 650 milliGRAM(s) Oral every 6 hours PRN Temp greater or equal to 38.5C (101.3F), Mild Pain (1 - 3)  clonazePAM Tablet 1 milliGRAM(s) Oral every 8 hours PRN anxiety      LABS:                        8.1    4.9   )-----------( 238      ( 11 Jan 2019 06:47 )             23.0     01-11    132<L>  |  96  |  8   ----------------------------<  110<H>  4.1   |  27  |  0.39<L>    Ca    8.1<L>      11 Jan 2019 06:47  Phos  2.5     01-11  Mg     2.1     01-11              CAPILLARY BLOOD GLUCOSE    MICROBIOLOGY:     RADIOLOGY:  [ ] Reviewed and interpreted by me    Mode: AC/ CMV (Assist Control/ Continuous Mandatory Ventilation)  RR (machine): 16  TV (machine): 450  FiO2: 30  PEEP: 5  ITime: 1  MAP: 11  PIP: 28 Patient is a 58y old  Female who presents with a chief complaint of Cerebellar IPH........      Interval Events: No events reported over night    REVIEW OF SYSTEMS:  [ ] Positive  [X] All other systems negative  [ ] Unable to assess ROS because ________    Vital Signs Last 24 Hrs  T(C): 37.8 (01-11-19 @ 05:38), Max: 39 (01-10-19 @ 17:57)  T(F): 100 (01-11-19 @ 05:38), Max: 102.2 (01-10-19 @ 17:57)  HR: 98 (01-11-19 @ 05:38) (94 - 109)  BP: 142/85 (01-11-19 @ 05:38) (120/77 - 161/96)  RR: 16 (01-11-19 @ 05:38) (15 - 17)  SpO2: 100% (01-11-19 @ 05:38) (99% - 100%)      PHYSICAL EXAM:  HEENT:   [x]Tracheostomy: # 8 Cuffed Shiley   [ ]Pupils equal  [ ]No oral lesions  [x]Abnormal: Cranial Staples Present Rt side of scalp, Incision clean dry     SKIN  [x]No Rash  [ ] Abnormal  [ ] pressure    CARDIAC  [x]Regular  [ ]Abnormal    PULMONARY  [x]Bilateral Clear Breath Sounds  [ ]Normal Excursion  [ ]Abnormal    GI  [x]PEG      [x] +BS		              [x]Soft, nondistended, nontender	  [ ]Abnormal    MUSCULOSKELETAL                                   [ ]Bedbound                 [ ]Abnormal    [x]OOB to chair                           EXTREMITIES                                         [x]Normal  [ ]Edema                           NEUROLOGIC  [ ] Normal, non focal  [x] Focal findings: Following simple commands (squeezes and raises hands on command, moves feet on command); tries to nod when asked yes/no questions; partially tracking    PSYCHIATRIC  [ ]Alert and appropriate  [x] Sedated	 [ ]Agitated    :  Newsome: [ ] Yes, if yes: Date of Placement:                   [x] No    LINES: Central Lines [x] Yes, if yes: Date of Placement: RT UE PICC Placed 1/9                                      [  ] No        HOSPITAL MEDICATIONS:  MEDICATIONS  (STANDING):  ALBUTerol/ipratropium for Nebulization 3 milliLiter(s) Nebulizer every 6 hours  chlorhexidine 4% Liquid 1 Application(s) Topical <User Schedule>  enoxaparin Injectable 40 milliGRAM(s) SubCutaneous <User Schedule>  fentaNYL   Patch  12 MICROgram(s)/Hr. 1 Patch Transdermal every 48 hours  gabapentin   Solution 300 milliGRAM(s) Oral every 8 hours  pantoprazole   Suspension 40 milliGRAM(s) Oral daily  potassium phosphate / sodium phosphate powder 1 Packet(s) Oral four times a day  sodium chloride 2 Gram(s) Oral every 6 hours    MEDICATIONS  (PRN):  acetaminophen    Suspension .. 650 milliGRAM(s) Oral every 6 hours PRN Temp greater or equal to 38.5C (101.3F), Mild Pain (1 - 3)  clonazePAM Tablet 1 milliGRAM(s) Oral every 8 hours PRN anxiety      LABS:                        8.1    4.9   )-----------( 238      ( 11 Jan 2019 06:47 )             23.0     01-11    132<L>  |  96  |  8   ----------------------------<  110<H>  4.1   |  27  |  0.39<L>    Ca    8.1<L>      11 Jan 2019 06:47  Phos  2.5     01-11  Mg     2.1     01-11              CAPILLARY BLOOD GLUCOSE    MICROBIOLOGY:     RADIOLOGY:  [ ] Reviewed and interpreted by me    Mode: AC/ CMV (Assist Control/ Continuous Mandatory Ventilation)  RR (machine): 16  TV (machine): 450  FiO2: 30  PEEP: 5  ITime: 1  MAP: 11  PIP: 28

## 2019-01-11 NOTE — PROGRESS NOTE ADULT - ASSESSMENT
59 yo female with SLE admitted 12/16 with cerebellar AVM and bleed, electively intubated, EVD placed  S/P cerebellar [id number] with AVM resection, s/p return to OR on 12/18 for posterior fossa decompression.  Colonized with MRSA in sputum, no radiographic evidence of pneumonia.  Ongoing fever likely central, secondary to ICH directly  All blood cultures negative.  Prior sputum with MRSA and E coli, likely colonizers, but covered with Vanco and Cefepime- still febrile despite full course  Repeat sputum normal caleb only  CSF from 12/30 with Micrococcous in BMO is a contaminant; f/u CSF Cxs negative, Cell Count only 1 WBC- no need for further Vanco  Hemodyn stable  WBC normal  Temps have persisted but still no obvious infection, her wbc count remains normal, UA negative, CXR is clear.  CT of head still with residual blood.  S/P VPS  Suggest:  Fever still best explained on central basis- observe off abx for now  Follow temps and CBC/diff   Surveillance Cxs as per routine, have been negative  Supportive care per RICU  If high fevers continue we could consider a CT of A/P but will be low yield .

## 2019-01-11 NOTE — PROGRESS NOTE ADULT - PROBLEM SELECTOR PLAN 2
Patient S/p Tracheostomy on 12/24 ( # 8 Cuffed Ember )   Continue Mechanical Ventilation, Tolerating CPAP 5/5 majority of today (relatively low tidal volumes, 200s); TC trial on 1/12   Continue Chest PT / Nebulizers and Suctioning PRN

## 2019-01-12 LAB
ANION GAP SERPL CALC-SCNC: 11 MMOL/L — SIGNIFICANT CHANGE UP (ref 5–17)
BUN SERPL-MCNC: 7 MG/DL — SIGNIFICANT CHANGE UP (ref 7–23)
CALCIUM SERPL-MCNC: 8.4 MG/DL — SIGNIFICANT CHANGE UP (ref 8.4–10.5)
CHLORIDE SERPL-SCNC: 96 MMOL/L — SIGNIFICANT CHANGE UP (ref 96–108)
CO2 SERPL-SCNC: 28 MMOL/L — SIGNIFICANT CHANGE UP (ref 22–31)
CREAT SERPL-MCNC: 0.42 MG/DL — LOW (ref 0.5–1.3)
CULTURE RESULTS: SIGNIFICANT CHANGE UP
GLUCOSE SERPL-MCNC: 130 MG/DL — HIGH (ref 70–99)
HCT VFR BLD CALC: 23.8 % — LOW (ref 34.5–45)
HGB BLD-MCNC: 8.5 G/DL — LOW (ref 11.5–15.5)
MAGNESIUM SERPL-MCNC: 2 MG/DL — SIGNIFICANT CHANGE UP (ref 1.6–2.6)
MCHC RBC-ENTMCNC: 31.8 PG — SIGNIFICANT CHANGE UP (ref 27–34)
MCHC RBC-ENTMCNC: 35.6 GM/DL — SIGNIFICANT CHANGE UP (ref 32–36)
MCV RBC AUTO: 89.4 FL — SIGNIFICANT CHANGE UP (ref 80–100)
PHOSPHATE SERPL-MCNC: 3 MG/DL — SIGNIFICANT CHANGE UP (ref 2.5–4.5)
PLATELET # BLD AUTO: 313 K/UL — SIGNIFICANT CHANGE UP (ref 150–400)
POTASSIUM SERPL-MCNC: 3.9 MMOL/L — SIGNIFICANT CHANGE UP (ref 3.5–5.3)
POTASSIUM SERPL-SCNC: 3.9 MMOL/L — SIGNIFICANT CHANGE UP (ref 3.5–5.3)
RBC # BLD: 2.66 M/UL — LOW (ref 3.8–5.2)
RBC # FLD: 15.6 % — HIGH (ref 10.3–14.5)
SODIUM SERPL-SCNC: 135 MMOL/L — SIGNIFICANT CHANGE UP (ref 135–145)
SPECIMEN SOURCE: SIGNIFICANT CHANGE UP
WBC # BLD: 5.9 K/UL — SIGNIFICANT CHANGE UP (ref 3.8–10.5)
WBC # FLD AUTO: 5.9 K/UL — SIGNIFICANT CHANGE UP (ref 3.8–10.5)

## 2019-01-12 PROCEDURE — 99233 SBSQ HOSP IP/OBS HIGH 50: CPT

## 2019-01-12 RX ADMIN — CHLORHEXIDINE GLUCONATE 1 APPLICATION(S): 213 SOLUTION TOPICAL at 23:30

## 2019-01-12 RX ADMIN — Medication 1 PACKET(S): at 18:05

## 2019-01-12 RX ADMIN — SODIUM CHLORIDE 2 GRAM(S): 9 INJECTION INTRAMUSCULAR; INTRAVENOUS; SUBCUTANEOUS at 18:05

## 2019-01-12 RX ADMIN — Medication 3 MILLILITER(S): at 23:53

## 2019-01-12 RX ADMIN — GABAPENTIN 300 MILLIGRAM(S): 400 CAPSULE ORAL at 14:44

## 2019-01-12 RX ADMIN — Medication 3 MILLILITER(S): at 11:49

## 2019-01-12 RX ADMIN — Medication 1 MILLIGRAM(S): at 11:55

## 2019-01-12 RX ADMIN — SODIUM CHLORIDE 2 GRAM(S): 9 INJECTION INTRAMUSCULAR; INTRAVENOUS; SUBCUTANEOUS at 23:30

## 2019-01-12 RX ADMIN — Medication 1 PACKET(S): at 11:53

## 2019-01-12 RX ADMIN — GABAPENTIN 300 MILLIGRAM(S): 400 CAPSULE ORAL at 06:01

## 2019-01-12 RX ADMIN — ENOXAPARIN SODIUM 40 MILLIGRAM(S): 100 INJECTION SUBCUTANEOUS at 18:05

## 2019-01-12 RX ADMIN — Medication 3 MILLILITER(S): at 00:43

## 2019-01-12 RX ADMIN — Medication 1 PACKET(S): at 06:01

## 2019-01-12 RX ADMIN — Medication 3 MILLILITER(S): at 17:36

## 2019-01-12 RX ADMIN — Medication 1 PACKET(S): at 23:30

## 2019-01-12 RX ADMIN — Medication 650 MILLIGRAM(S): at 00:04

## 2019-01-12 RX ADMIN — FENTANYL CITRATE 1 PATCH: 50 INJECTION INTRAVENOUS at 20:04

## 2019-01-12 RX ADMIN — FENTANYL CITRATE 1 PATCH: 50 INJECTION INTRAVENOUS at 06:02

## 2019-01-12 RX ADMIN — Medication 650 MILLIGRAM(S): at 04:12

## 2019-01-12 RX ADMIN — Medication 3 MILLILITER(S): at 05:34

## 2019-01-12 RX ADMIN — GABAPENTIN 300 MILLIGRAM(S): 400 CAPSULE ORAL at 23:30

## 2019-01-12 RX ADMIN — SODIUM CHLORIDE 2 GRAM(S): 9 INJECTION INTRAMUSCULAR; INTRAVENOUS; SUBCUTANEOUS at 06:01

## 2019-01-12 RX ADMIN — PANTOPRAZOLE SODIUM 40 MILLIGRAM(S): 20 TABLET, DELAYED RELEASE ORAL at 11:52

## 2019-01-12 RX ADMIN — SODIUM CHLORIDE 2 GRAM(S): 9 INJECTION INTRAMUSCULAR; INTRAVENOUS; SUBCUTANEOUS at 11:52

## 2019-01-12 NOTE — PROVIDER CONTACT NOTE (OTHER) - RECOMMENDATIONS
Tylenol
Evaluate and treat.
2mg Ativan IV push x1
Continue hyperthemia protocol including standing tylenol  Consider new blood cultures/sputum/csf
Tylenol 1 gram, reassess blood cultures when due after 48 hrs from previous
Tylenol 650 via peg,

## 2019-01-12 NOTE — PROGRESS NOTE ADULT - SUBJECTIVE AND OBJECTIVE BOX
Patient is a 58y old  Female who presents with a chief complaint of Cerebellar IPH (11 Jan 2019 15:42)      Interval Events:    REVIEW OF SYSTEMS:  [ ] Positive  [ ] All other systems negative  [ ] Unable to assess ROS because ________    Vital Signs Last 24 Hrs  T(C): 37.8 (01-12-19 @ 05:46), Max: 38.6 (01-12-19 @ 04:03)  T(F): 100 (01-12-19 @ 05:46), Max: 101.4 (01-12-19 @ 04:03)  HR: 100 (01-12-19 @ 08:42) (86 - 104)  BP: 155/83 (01-12-19 @ 04:03) (118/74 - 155/83)  RR: 23 (01-12-19 @ 04:03) (16 - 23)  SpO2: 100% (01-12-19 @ 08:42) (68% - 100%)PHYSICAL EXAM:  HEENT:   [ ]Tracheostomy:  [ ]Pupils equal  [ ]No oral lesions  [ ]Abnormal        SKIN  [ ]No Rash  [ ] Abnormal  [ ] pressure    CARDIAC  [ ]Regular  [ ]Abnormal    PULMONARY  [ ]Bilateral Clear Breath Sounds  [ ]Normal Excursion  [ ]Abnormal    GI  [ ]PEG      [ ] +BS		              [ ]Soft, nondistended, nontender	  [ ]Abnormal    MUSCULOSKELETAL                                   [ ]Bedbound                 [ ]Abnormal    [ ]Ambulatory/OOB to chair                           EXTREMITIES                                         [ ]Normal  [ ]Edema                           NEUROLOGIC  [ ] Normal, non focal  [ ] Focal findings:    PSYCHIATRIC  [ ]Alert and appropriate  [ ] Sedated	 [ ]Agitated    :  Newsome: [ ] Yes, if yes: Date of Placement:                   [  ] No    LINES: Central Lines [ ] Yes, if yes: Date of Placement                                     [  ] No    HOSPITAL MEDICATIONS:  MEDICATIONS  (STANDING):  ALBUTerol/ipratropium for Nebulization 3 milliLiter(s) Nebulizer every 6 hours  chlorhexidine 4% Liquid 1 Application(s) Topical <User Schedule>  enoxaparin Injectable 40 milliGRAM(s) SubCutaneous <User Schedule>  fentaNYL   Patch  12 MICROgram(s)/Hr. 1 Patch Transdermal every 48 hours  gabapentin   Solution 300 milliGRAM(s) Oral every 8 hours  pantoprazole   Suspension 40 milliGRAM(s) Oral daily  potassium phosphate / sodium phosphate powder 1 Packet(s) Oral four times a day  sodium chloride 2 Gram(s) Oral every 6 hours    MEDICATIONS  (PRN):  acetaminophen    Suspension .. 650 milliGRAM(s) Oral every 6 hours PRN Temp greater or equal to 38.5C (101.3F), Mild Pain (1 - 3)  clonazePAM Tablet 1 milliGRAM(s) Oral every 8 hours PRN anxiety      LABS:                        8.5    5.9   )-----------( 313      ( 12 Jan 2019 06:29 )             23.8     01-12    135  |  96  |  7   ----------------------------<  130<H>  3.9   |  28  |  0.42<L>    Ca    8.4      12 Jan 2019 06:29  Phos  3.0     01-12  Mg     2.0     01-12              CAPILLARY BLOOD GLUCOSE    MICROBIOLOGY:     RADIOLOGY:  [ ] Reviewed and interpreted by me    Mode: CPAP with PS  FiO2: 30  PEEP: 5  PS: 5  MAP: 7  PIP: 11 Patient is a 58y old  Female who presents with a chief complaint of Cerebellar IPH (11 Jan 2019 15:42)      Interval Events: No events reported overnight, patient tolerated CPAP trials yesterday     REVIEW OF SYSTEMS:  [ ] Positive  [ ] All other systems negative  [x] Unable to assess ROS because patient is Non-responsive     Vital Signs Last 24 Hrs  T(C): 37.8 (01-12-19 @ 05:46), Max: 38.6 (01-12-19 @ 04:03)  T(F): 100 (01-12-19 @ 05:46), Max: 101.4 (01-12-19 @ 04:03)  HR: 100 (01-12-19 @ 08:42) (86 - 104)  BP: 155/83 (01-12-19 @ 04:03) (118/74 - 155/83)  RR: 23 (01-12-19 @ 04:03) (16 - 23)  SpO2: 100% (01-12-19 @ 08:42) (68% - 100%)    PHYSICAL EXAM:  HEENT:   [x]Tracheostomy: # 8 Cuffed Shiley   [ ]Pupils equal  [ ]No oral lesions  [x]Abnormal: Cranial Staples Present Rt side of scalp, Incision clean dry     SKIN  [x]No Rash  [ ] Abnormal  [ ] pressure    CARDIAC  [x]Regular  [ ]Abnormal    PULMONARY  [x]Bilateral Clear Breath Sounds  [ ]Normal Excursion  [ ]Abnormal    GI  [x]PEG      [x] +BS		              [x]Soft, nondistended, nontender	  [ ]Abnormal    MUSCULOSKELETAL                                   [ ]Bedbound                 [ ]Abnormal    [x]OOB to chair                           EXTREMITIES                                         [x]Normal  [ ]Edema                           NEUROLOGIC  [ ] Normal, non focal  [x] Focal findings: Following simple commands with LUE and bilateral lower extremities     PSYCHIATRIC  [x]Alert   [ ] Sedated	 [ ]Agitated    :  Newsome: [ ] Yes, if yes: Date of Placement:                   [x] No    LINES: Central Lines [x] Yes, if yes: Date of Placement: RT UE PICC Placed 1/9                                      [  ] No    HOSPITAL MEDICATIONS:  MEDICATIONS  (STANDING):  ALBUTerol/ipratropium for Nebulization 3 milliLiter(s) Nebulizer every 6 hours  chlorhexidine 4% Liquid 1 Application(s) Topical <User Schedule>  enoxaparin Injectable 40 milliGRAM(s) SubCutaneous <User Schedule>  fentaNYL   Patch  12 MICROgram(s)/Hr. 1 Patch Transdermal every 48 hours  gabapentin   Solution 300 milliGRAM(s) Oral every 8 hours  pantoprazole   Suspension 40 milliGRAM(s) Oral daily  potassium phosphate / sodium phosphate powder 1 Packet(s) Oral four times a day  sodium chloride 2 Gram(s) Oral every 6 hours    MEDICATIONS  (PRN):  acetaminophen    Suspension .. 650 milliGRAM(s) Oral every 6 hours PRN Temp greater or equal to 38.5C (101.3F), Mild Pain (1 - 3)  clonazePAM Tablet 1 milliGRAM(s) Oral every 8 hours PRN anxiety      LABS:                        8.5    5.9   )-----------( 313      ( 12 Jan 2019 06:29 )             23.8     01-12    135  |  96  |  7   ----------------------------<  130<H>  3.9   |  28  |  0.42<L>    Ca    8.4      12 Jan 2019 06:29  Phos  3.0     01-12  Mg     2.0     01-12              CAPILLARY BLOOD GLUCOSE    MICROBIOLOGY:     RADIOLOGY:  [ ] Reviewed and interpreted by me    Mode: CPAP with PS  FiO2: 30  PEEP: 5  PS: 5  MAP: 7  PIP: 11

## 2019-01-12 NOTE — PROGRESS NOTE ADULT - ASSESSMENT
58 Year Old woman PMH Lupus Presented with Sudden onset of Severe Headache with associated nausea and vomiting. 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 Patient was intact on presentation however intubated for airway protection and EVD placed for hydrocephalus. Patient was Transferred to [facility] for selective IR cerebral angiography. 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 Patient returned to the OR on 12/18 for sub occipital decompressive craniectomy. Patient was initially on Vimpat for suspected Seizure like activity, but VEEG while in the NSCU, without evidence of Seizures. Vimpat was discontinued. Patient was noted to have moderate diffuse encephalopathy and dysfxn in frontal regions bilaterally on VEEG. Patient underwent Trach Placement on 12/24 ( # 8 Cuffed Shiley ), and PEG Placement on 12/26. While in the NSCU Patient with Persistent Fevers and Autonomic Storming, Patient treated with medical management and Artic Sun Protocol. Patient found to MRSA/ E.coli growing in the sputum and was treated with Vanco and Cefepime. 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 Patient remained with persistent fevers despite course of broad spectrum abx lD felt fevers Central in nature. Patient required blood transfusion while in NSCU. Patient failed clamping trial and required VPS, which was Placed on 1/7.    1/9: Patient transferred to RCU overnight , Patient remains Febrile Fevers likely Central in Nature. Patient noted to have tremor of RT upper extremity on morning physical exam as per d/w Neuro Sx tremors are not new and patient has had several VEEG which are negative. Patient previously on Fentanyl patch for Autonomic storming which auto-discontinued, will re-order lower dose fentanyl patch as patient appears to be waking up.   1/10:  Still with fevers. CPAP trials as tolerated. Improvement in mental status noted as patient is more alert and following commands, still overall weak.  1/11: No new events. CPAP 5/5 today then TC trial tomorrow.  1/12: Patient tolerating CPAP will attempt TC today

## 2019-01-12 NOTE — PROGRESS NOTE ADULT - PROBLEM SELECTOR PLAN 1
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  Patient S/p EVD-> Failed Clamping Trial -> S/p VPS 1/7  Repeat Head CT 1/8: Resolved Hydrocephalus  Continue Sodium Chloride tabs ( Goal Sodium 135 -145)  Continue Physical therapy / OT

## 2019-01-12 NOTE — PROGRESS NOTE ADULT - PROBLEM SELECTOR PLAN 2
Patient S/p Tracheostomy on 12/24 ( # 8 Cuffed Ember )   Patient tolerating CPAP Trials today will attempt TC during day and return to vent in evening   Continue Chest PT / Nebulizers and Suctioning PRN

## 2019-01-12 NOTE — PROGRESS NOTE ADULT - ATTENDING COMMENTS
I have seen and examined the patient. I agree with the above history, physical exam, and plan of care except for as detailed below.    Intraparenchymal hemorrhage secondary to AVM s/p embolization and resection. Acute respiratory failure s/p trach. Persistent fevers liekly central in origin.     - Trial of trach collar  - Monitor fevers off abx  - Dispo planning

## 2019-01-13 LAB
CULTURE RESULTS: SIGNIFICANT CHANGE UP
HCT VFR BLD CALC: 24.5 % — LOW (ref 34.5–45)
HGB BLD-MCNC: 8.5 G/DL — LOW (ref 11.5–15.5)
MAGNESIUM SERPL-MCNC: 2 MG/DL — SIGNIFICANT CHANGE UP (ref 1.6–2.6)
MCHC RBC-ENTMCNC: 31.1 PG — SIGNIFICANT CHANGE UP (ref 27–34)
MCHC RBC-ENTMCNC: 34.6 GM/DL — SIGNIFICANT CHANGE UP (ref 32–36)
MCV RBC AUTO: 89.7 FL — SIGNIFICANT CHANGE UP (ref 80–100)
PHOSPHATE SERPL-MCNC: 2.5 MG/DL — SIGNIFICANT CHANGE UP (ref 2.5–4.5)
PLATELET # BLD AUTO: 331 K/UL — SIGNIFICANT CHANGE UP (ref 150–400)
PROCALCITONIN SERPL-MCNC: 0.11 NG/ML — HIGH (ref 0.02–0.1)
RBC # BLD: 2.73 M/UL — LOW (ref 3.8–5.2)
RBC # FLD: 15.7 % — HIGH (ref 10.3–14.5)
SPECIMEN SOURCE: SIGNIFICANT CHANGE UP
WBC # BLD: 4.4 K/UL — SIGNIFICANT CHANGE UP (ref 3.8–10.5)
WBC # FLD AUTO: 4.4 K/UL — SIGNIFICANT CHANGE UP (ref 3.8–10.5)

## 2019-01-13 PROCEDURE — 99232 SBSQ HOSP IP/OBS MODERATE 35: CPT

## 2019-01-13 RX ORDER — CLONAZEPAM 1 MG
1 TABLET ORAL EVERY 8 HOURS
Qty: 0 | Refills: 0 | Status: DISCONTINUED | OUTPATIENT
Start: 2019-01-13 | End: 2019-01-20

## 2019-01-13 RX ORDER — DOCUSATE SODIUM 100 MG
100 CAPSULE ORAL
Qty: 0 | Refills: 0 | Status: DISCONTINUED | OUTPATIENT
Start: 2019-01-13 | End: 2019-01-25

## 2019-01-13 RX ORDER — SENNA PLUS 8.6 MG/1
1 TABLET ORAL AT BEDTIME
Qty: 0 | Refills: 0 | Status: DISCONTINUED | OUTPATIENT
Start: 2019-01-13 | End: 2019-01-25

## 2019-01-13 RX ADMIN — Medication 1 PACKET(S): at 12:04

## 2019-01-13 RX ADMIN — SODIUM CHLORIDE 2 GRAM(S): 9 INJECTION INTRAMUSCULAR; INTRAVENOUS; SUBCUTANEOUS at 05:33

## 2019-01-13 RX ADMIN — FENTANYL CITRATE 1 PATCH: 50 INJECTION INTRAVENOUS at 17:58

## 2019-01-13 RX ADMIN — Medication 3 MILLILITER(S): at 23:29

## 2019-01-13 RX ADMIN — Medication 3 MILLILITER(S): at 11:15

## 2019-01-13 RX ADMIN — SODIUM CHLORIDE 2 GRAM(S): 9 INJECTION INTRAMUSCULAR; INTRAVENOUS; SUBCUTANEOUS at 23:03

## 2019-01-13 RX ADMIN — FENTANYL CITRATE 1 PATCH: 50 INJECTION INTRAVENOUS at 19:42

## 2019-01-13 RX ADMIN — Medication 1 PACKET(S): at 05:34

## 2019-01-13 RX ADMIN — GABAPENTIN 300 MILLIGRAM(S): 400 CAPSULE ORAL at 23:05

## 2019-01-13 RX ADMIN — FENTANYL CITRATE 1 PATCH: 50 INJECTION INTRAVENOUS at 17:53

## 2019-01-13 RX ADMIN — GABAPENTIN 300 MILLIGRAM(S): 400 CAPSULE ORAL at 13:56

## 2019-01-13 RX ADMIN — GABAPENTIN 300 MILLIGRAM(S): 400 CAPSULE ORAL at 05:34

## 2019-01-13 RX ADMIN — Medication 3 MILLILITER(S): at 06:21

## 2019-01-13 RX ADMIN — FENTANYL CITRATE 1 PATCH: 50 INJECTION INTRAVENOUS at 07:00

## 2019-01-13 RX ADMIN — Medication 1 PACKET(S): at 17:54

## 2019-01-13 RX ADMIN — SENNA PLUS 1 TABLET(S): 8.6 TABLET ORAL at 23:04

## 2019-01-13 RX ADMIN — PANTOPRAZOLE SODIUM 40 MILLIGRAM(S): 20 TABLET, DELAYED RELEASE ORAL at 12:04

## 2019-01-13 RX ADMIN — Medication 1 PACKET(S): at 23:03

## 2019-01-13 RX ADMIN — Medication 3 MILLILITER(S): at 17:30

## 2019-01-13 RX ADMIN — SODIUM CHLORIDE 2 GRAM(S): 9 INJECTION INTRAMUSCULAR; INTRAVENOUS; SUBCUTANEOUS at 12:04

## 2019-01-13 RX ADMIN — SODIUM CHLORIDE 2 GRAM(S): 9 INJECTION INTRAMUSCULAR; INTRAVENOUS; SUBCUTANEOUS at 17:55

## 2019-01-13 RX ADMIN — ENOXAPARIN SODIUM 40 MILLIGRAM(S): 100 INJECTION SUBCUTANEOUS at 17:54

## 2019-01-13 RX ADMIN — Medication 100 MILLIGRAM(S): at 17:54

## 2019-01-13 RX ADMIN — CHLORHEXIDINE GLUCONATE 1 APPLICATION(S): 213 SOLUTION TOPICAL at 23:05

## 2019-01-13 NOTE — PROGRESS NOTE ADULT - SUBJECTIVE AND OBJECTIVE BOX
Patient is a 58y old  Female who presents with a chief complaint of Cerebellar IPH (12 Jan 2019 09:00)      Interval Events: No events reported overnight     REVIEW OF SYSTEMS:  [ ] Positive  [ ] All other systems negative  [x] Unable to assess ROS because patient is Non-verbal     Vital Signs Last 24 Hrs  T(C): 38.2 (01-13-19 @ 04:29), Max: 38.2 (01-13-19 @ 04:29)  T(F): 100.7 (01-13-19 @ 04:29), Max: 100.7 (01-13-19 @ 04:29)  HR: 98 (01-13-19 @ 05:43) (89 - 106)  BP: 139/79 (01-13-19 @ 04:29) (119/78 - 139/79)  RR: 20 (01-13-19 @ 04:29) (18 - 25)  SpO2: 100% (01-13-19 @ 05:43) (100% - 100%)    PHYSICAL EXAM:  HEENT:   [ ]Tracheostomy:  [ ]Pupils equal  [ ]No oral lesions  [ ]Abnormal        SKIN  [ ]No Rash  [ ] Abnormal  [ ] pressure    CARDIAC  [ ]Regular  [ ]Abnormal    PULMONARY  [ ]Bilateral Clear Breath Sounds  [ ]Normal Excursion  [ ]Abnormal    GI  [ ]PEG      [ ] +BS		              [ ]Soft, nondistended, nontender	  [ ]Abnormal    MUSCULOSKELETAL                                   [ ]Bedbound                 [ ]Abnormal    [ ]Ambulatory/OOB to chair                           EXTREMITIES                                         [ ]Normal  [ ]Edema                           NEUROLOGIC  [ ] Normal, non focal  [ ] Focal findings:    PSYCHIATRIC  [ ]Alert and appropriate  [ ] Sedated	 [ ]Agitated    :  Newsome: [ ] Yes, if yes: Date of Placement:                   [  ] No    LINES: Central Lines [ ] Yes, if yes: Date of Placement                                     [  ] No    HOSPITAL MEDICATIONS:  MEDICATIONS  (STANDING):  ALBUTerol/ipratropium for Nebulization 3 milliLiter(s) Nebulizer every 6 hours  chlorhexidine 4% Liquid 1 Application(s) Topical <User Schedule>  docusate sodium Liquid 100 milliGRAM(s) Oral two times a day  enoxaparin Injectable 40 milliGRAM(s) SubCutaneous <User Schedule>  fentaNYL   Patch  12 MICROgram(s)/Hr. 1 Patch Transdermal every 48 hours  gabapentin   Solution 300 milliGRAM(s) Oral every 8 hours  pantoprazole   Suspension 40 milliGRAM(s) Oral daily  potassium phosphate / sodium phosphate powder 1 Packet(s) Oral four times a day  senna 1 Tablet(s) Oral at bedtime  sodium chloride 2 Gram(s) Oral every 6 hours    MEDICATIONS  (PRN):  acetaminophen    Suspension .. 650 milliGRAM(s) Oral every 6 hours PRN Temp greater or equal to 38.5C (101.3F), Mild Pain (1 - 3)  clonazePAM Tablet 1 milliGRAM(s) Oral every 8 hours PRN anxiety      LABS:                        8.5    5.9   )-----------( 313      ( 12 Jan 2019 06:29 )             23.8     01-12    135  |  96  |  7   ----------------------------<  130<H>  3.9   |  28  |  0.42<L>    Ca    8.4      12 Jan 2019 06:29  Phos  2.5     01-13  Mg     2.0     01-13              CAPILLARY BLOOD GLUCOSE    MICROBIOLOGY:     RADIOLOGY:  [ ] Reviewed and interpreted by me    Mode: AC/ CMV (Assist Control/ Continuous Mandatory Ventilation)  RR (machine): 16  TV (machine): 450  FiO2: 30  PEEP: 5  ITime: 1  MAP: 9  PIP: 20 Patient is a 58y old  Female who presents with a chief complaint of Cerebellar IPH (12 Jan 2019 09:00)      Interval Events: No events reported overnight, patient tolerated tc during the day yesterday      REVIEW OF SYSTEMS:  [ ] Positive  [ ] All other systems negative  [x] Unable to assess ROS because patient is Non-verbal     Vital Signs Last 24 Hrs  T(C): 38.2 (01-13-19 @ 04:29), Max: 38.2 (01-13-19 @ 04:29)  T(F): 100.7 (01-13-19 @ 04:29), Max: 100.7 (01-13-19 @ 04:29)  HR: 98 (01-13-19 @ 05:43) (89 - 106)  BP: 139/79 (01-13-19 @ 04:29) (119/78 - 139/79)  RR: 20 (01-13-19 @ 04:29) (18 - 25)  SpO2: 100% (01-13-19 @ 05:43) (100% - 100%)    PHYSICAL EXAM:  HEENT:   [x]Tracheostomy: # 8 Cuffed Shiley   [ ]Pupils equal  [ ]No oral lesions  [x]Abnormal: Cranial Staples Present Rt side of scalp, Incision clean dry     SKIN  [x]No Rash  [ ] Abnormal  [ ] pressure    CARDIAC  [x]Regular  [ ]Abnormal    PULMONARY  [x]Bilateral Clear Breath Sounds  [ ]Normal Excursion  [ ]Abnormal    GI  [x]PEG      [x] +BS		              [x]Soft, nondistended, nontender	  [ ]Abnormal    MUSCULOSKELETAL                                   [ ]Bedbound                 [ ]Abnormal    [x]OOB to chair                           EXTREMITIES                                         [x]Normal  [ ]Edema                           NEUROLOGIC  [ ] Normal, non focal  [x] Focal findings: Following simple commands with all extremities     PSYCHIATRIC  [x]Alert   [ ] Sedated	 [ ]Agitated    :  Newsome: [ ] Yes, if yes: Date of Placement:                   [x] No    LINES: Central Lines [x] Yes, if yes: Date of Placement: RT UE PICC Placed 1/9                                      [  ] No  HOSPITAL MEDICATIONS:  MEDICATIONS  (STANDING):  ALBUTerol/ipratropium for Nebulization 3 milliLiter(s) Nebulizer every 6 hours  chlorhexidine 4% Liquid 1 Application(s) Topical <User Schedule>  docusate sodium Liquid 100 milliGRAM(s) Oral two times a day  enoxaparin Injectable 40 milliGRAM(s) SubCutaneous <User Schedule>  fentaNYL   Patch  12 MICROgram(s)/Hr. 1 Patch Transdermal every 48 hours  gabapentin   Solution 300 milliGRAM(s) Oral every 8 hours  pantoprazole   Suspension 40 milliGRAM(s) Oral daily  potassium phosphate / sodium phosphate powder 1 Packet(s) Oral four times a day  senna 1 Tablet(s) Oral at bedtime  sodium chloride 2 Gram(s) Oral every 6 hours    MEDICATIONS  (PRN):  acetaminophen    Suspension .. 650 milliGRAM(s) Oral every 6 hours PRN Temp greater or equal to 38.5C (101.3F), Mild Pain (1 - 3)  clonazePAM Tablet 1 milliGRAM(s) Oral every 8 hours PRN anxiety      LABS:                        8.5    5.9   )-----------( 313      ( 12 Jan 2019 06:29 )             23.8     01-12    135  |  96  |  7   ----------------------------<  130<H>  3.9   |  28  |  0.42<L>    Ca    8.4      12 Jan 2019 06:29  Phos  2.5     01-13  Mg     2.0     01-13              CAPILLARY BLOOD GLUCOSE    MICROBIOLOGY:     RADIOLOGY:  [ ] Reviewed and interpreted by me    Mode: AC/ CMV (Assist Control/ Continuous Mandatory Ventilation)  RR (machine): 16  TV (machine): 450  FiO2: 30  PEEP: 5  ITime: 1  MAP: 9  PIP: 20

## 2019-01-13 NOTE — PROGRESS NOTE ADULT - PROBLEM SELECTOR PLAN 2
Patient S/p Tracheostomy on 12/24 ( # 8 Cuffed Ember )   Patient tolerating CPAP Trials today will attempt TC ATC  Will obtain AM ABG if patient tolerates   Continue Chest PT / Nebulizers and Suctioning PRN

## 2019-01-13 NOTE — PROGRESS NOTE ADULT - PROBLEM SELECTOR PLAN 4
Patient remains with persistent fevers, likely Central in Nature   Patient previously txd with Vanco and Cefepime for MRSA / E.Coli PNA  Bld CX 1/8: No growth , UA 1/8: Negative, CXR 1/8: Clear Lungs   Continue to monitor off abx at present time

## 2019-01-13 NOTE — PROGRESS NOTE ADULT - ATTENDING COMMENTS
I have seen and examined the patient. I agree with the above history, physical exam, and plan of care except for as detailed below.    Intraparenchymal hemorrhage secondary to AVM s/p embolization and resection. Acute respiratory failure s/p trach. Persistent fevers liekly central in origin.     - Continue to wean to trach collar  - Monitor fevers off abx  - Dispo planning

## 2019-01-13 NOTE — PROGRESS NOTE ADULT - ASSESSMENT
58 Year Old woman PMH Lupus Presented with Sudden onset of Severe Headache with associated nausea and vomiting. 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 Patient was intact on presentation however intubated for airway protection and EVD placed for hydrocephalus. Patient was Transferred to [facility] for selective IR cerebral angiography. 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 Patient returned to the OR on 12/18 for sub occipital decompressive craniectomy. Patient was initially on Vimpat for suspected Seizure like activity, but VEEG while in the NSCU, without evidence of Seizures. Vimpat was discontinued. Patient was noted to have moderate diffuse encephalopathy and dysfxn in frontal regions bilaterally on VEEG. Patient underwent Trach Placement on 12/24 ( # 8 Cuffed Shiley ), and PEG Placement on 12/26. While in the NSCU Patient with Persistent Fevers and Autonomic Storming, Patient treated with medical management and Artic Sun Protocol. Patient found to MRSA/ E.coli growing in the sputum and was treated with Vanco and Cefepime. 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 Patient remained with persistent fevers despite course of broad spectrum abx lD felt fevers Central in nature. Patient required blood transfusion while in NSCU. Patient failed clamping trial and required VPS, which was Placed on 1/7.    1/9: Patient transferred to RCU overnight , Patient remains Febrile Fevers likely Central in Nature. Patient noted to have tremor of RT upper extremity on morning physical exam as per d/w Neuro Sx tremors are not new and patient has had several VEEG which are negative. Patient previously on Fentanyl patch for Autonomic storming which auto-discontinued, will re-order lower dose fentanyl patch as patient appears to be waking up.   1/10:  Still with fevers. CPAP trials as tolerated. Improvement in mental status noted as patient is more alert and following commands, still overall weak.  1/11: No new events. CPAP 5/5 today then TC trial tomorrow.  1/12: Patient tolerating CPAP will attempt TC today   1/13: Patient more alert following commands with all 4 extremities today, patient tolerated TC yesterday will attempt tc atc and check am abg

## 2019-01-14 LAB
ANION GAP SERPL CALC-SCNC: 10 MMOL/L — SIGNIFICANT CHANGE UP (ref 5–17)
BASE EXCESS BLDA CALC-SCNC: 7.6 MMOL/L — HIGH (ref -2–2)
BUN SERPL-MCNC: 8 MG/DL — SIGNIFICANT CHANGE UP (ref 7–23)
CALCIUM SERPL-MCNC: 8.7 MG/DL — SIGNIFICANT CHANGE UP (ref 8.4–10.5)
CHLORIDE SERPL-SCNC: 95 MMOL/L — LOW (ref 96–108)
CO2 BLDA-SCNC: 34 MMOL/L — HIGH (ref 22–30)
CO2 SERPL-SCNC: 28 MMOL/L — SIGNIFICANT CHANGE UP (ref 22–31)
CREAT SERPL-MCNC: 0.42 MG/DL — LOW (ref 0.5–1.3)
GAS PNL BLDA: SIGNIFICANT CHANGE UP
GLUCOSE SERPL-MCNC: 112 MG/DL — HIGH (ref 70–99)
HCO3 BLDA-SCNC: 32 MMOL/L — HIGH (ref 21–29)
HCT VFR BLD CALC: 25.9 % — LOW (ref 34.5–45)
HGB BLD-MCNC: 9.1 G/DL — LOW (ref 11.5–15.5)
HOROWITZ INDEX BLDA+IHG-RTO: 30 — SIGNIFICANT CHANGE UP
MAGNESIUM SERPL-MCNC: 2 MG/DL — SIGNIFICANT CHANGE UP (ref 1.6–2.6)
MCHC RBC-ENTMCNC: 31.1 PG — SIGNIFICANT CHANGE UP (ref 27–34)
MCHC RBC-ENTMCNC: 35.2 GM/DL — SIGNIFICANT CHANGE UP (ref 32–36)
MCV RBC AUTO: 88.4 FL — SIGNIFICANT CHANGE UP (ref 80–100)
PCO2 BLDA: 47 MMHG — HIGH (ref 32–46)
PH BLDA: 7.45 — SIGNIFICANT CHANGE UP (ref 7.35–7.45)
PHOSPHATE SERPL-MCNC: 3.6 MG/DL — SIGNIFICANT CHANGE UP (ref 2.5–4.5)
PLATELET # BLD AUTO: 345 K/UL — SIGNIFICANT CHANGE UP (ref 150–400)
PO2 BLDA: 89 MMHG — SIGNIFICANT CHANGE UP (ref 74–108)
POTASSIUM SERPL-MCNC: 4.1 MMOL/L — SIGNIFICANT CHANGE UP (ref 3.5–5.3)
POTASSIUM SERPL-SCNC: 4.1 MMOL/L — SIGNIFICANT CHANGE UP (ref 3.5–5.3)
RBC # BLD: 2.93 M/UL — LOW (ref 3.8–5.2)
RBC # FLD: 15.7 % — HIGH (ref 10.3–14.5)
SAO2 % BLDA: 97 % — HIGH (ref 92–96)
SODIUM SERPL-SCNC: 133 MMOL/L — LOW (ref 135–145)
WBC # BLD: 4.5 K/UL — SIGNIFICANT CHANGE UP (ref 3.8–10.5)
WBC # FLD AUTO: 4.5 K/UL — SIGNIFICANT CHANGE UP (ref 3.8–10.5)

## 2019-01-14 PROCEDURE — 99233 SBSQ HOSP IP/OBS HIGH 50: CPT

## 2019-01-14 PROCEDURE — 99232 SBSQ HOSP IP/OBS MODERATE 35: CPT

## 2019-01-14 RX ADMIN — Medication 1 PACKET(S): at 06:02

## 2019-01-14 RX ADMIN — Medication 1 PACKET(S): at 17:29

## 2019-01-14 RX ADMIN — SODIUM CHLORIDE 2 GRAM(S): 9 INJECTION INTRAMUSCULAR; INTRAVENOUS; SUBCUTANEOUS at 17:32

## 2019-01-14 RX ADMIN — Medication 3 MILLILITER(S): at 12:38

## 2019-01-14 RX ADMIN — FENTANYL CITRATE 1 PATCH: 50 INJECTION INTRAVENOUS at 08:04

## 2019-01-14 RX ADMIN — SENNA PLUS 1 TABLET(S): 8.6 TABLET ORAL at 22:01

## 2019-01-14 RX ADMIN — Medication 3 MILLILITER(S): at 17:52

## 2019-01-14 RX ADMIN — ENOXAPARIN SODIUM 40 MILLIGRAM(S): 100 INJECTION SUBCUTANEOUS at 17:29

## 2019-01-14 RX ADMIN — GABAPENTIN 300 MILLIGRAM(S): 400 CAPSULE ORAL at 06:02

## 2019-01-14 RX ADMIN — SODIUM CHLORIDE 2 GRAM(S): 9 INJECTION INTRAMUSCULAR; INTRAVENOUS; SUBCUTANEOUS at 06:02

## 2019-01-14 RX ADMIN — Medication 3 MILLILITER(S): at 05:12

## 2019-01-14 RX ADMIN — Medication 100 MILLIGRAM(S): at 06:02

## 2019-01-14 RX ADMIN — PANTOPRAZOLE SODIUM 40 MILLIGRAM(S): 20 TABLET, DELAYED RELEASE ORAL at 11:45

## 2019-01-14 RX ADMIN — GABAPENTIN 300 MILLIGRAM(S): 400 CAPSULE ORAL at 14:20

## 2019-01-14 RX ADMIN — Medication 100 MILLIGRAM(S): at 17:30

## 2019-01-14 RX ADMIN — GABAPENTIN 300 MILLIGRAM(S): 400 CAPSULE ORAL at 22:01

## 2019-01-14 RX ADMIN — Medication 1 PACKET(S): at 11:53

## 2019-01-14 RX ADMIN — Medication 3 MILLILITER(S): at 23:14

## 2019-01-14 RX ADMIN — CHLORHEXIDINE GLUCONATE 1 APPLICATION(S): 213 SOLUTION TOPICAL at 22:02

## 2019-01-14 RX ADMIN — SODIUM CHLORIDE 2 GRAM(S): 9 INJECTION INTRAMUSCULAR; INTRAVENOUS; SUBCUTANEOUS at 11:52

## 2019-01-14 NOTE — PROGRESS NOTE ADULT - PROBLEM SELECTOR PLAN 2
Patient S/p Tracheostomy on 12/24 ( # 8 Cuffed Ember )   S/P 24hrs TC ATC, will continue. F/U AM ABG if patient tolerates   Continue Chest PT / Nebulizers and Suctioning PRN

## 2019-01-14 NOTE — PROGRESS NOTE ADULT - ATTENDING COMMENTS
59 yo female with SLE admitted 12/16 with cerebellar AVM and bleed s/p embolization and AVM resection and subsequent trach/peg. Cont TC as tolerated. D/c picc line. No fevers currently but prior fevers felt to be central. If Na does not decrease further would d/c salt tabs

## 2019-01-14 NOTE — PROGRESS NOTE ADULT - ASSESSMENT
57 yo female with SLE admitted 12/16 with cerebellar AVM and bleed, electively intubated, EVD placed  S/P cerebellar [id number] with AVM resection, s/p return to OR on 12/18 for posterior fossa decompression.  Colonized with MRSA in sputum, no radiographic evidence of pneumonia.  Ongoing fever likely central, secondary to ICH directly  All blood cultures negative.  Prior sputum with MRSA and E coli, likely colonizers, but covered with Vanco and Cefepime- still febrile despite full course  Repeat sputum normal caleb only  CSF from 12/30 with Micrococcous in BMO is a contaminant; f/u CSF Cxs negative, Cell Count only 1 WBC- no need for further Vanco  Hemodyn stable  WBC normal  Temps have moderated but still no obvious infection, her wbc count remains normal, UA negative, CXR is clear.  CT of head still with residual blood.  S/P VPS  Suggest:  Fever still best explained on central basis- observe off abx for now  Follow temps and CBC/diff , fever appears to have moderated  Surveillance Cxs as per routine, have been negative  Supportive care per RICU  Discharge planning per RCU

## 2019-01-14 NOTE — PROGRESS NOTE ADULT - ASSESSMENT
58 Year Old woman PMH Lupus Presented with Sudden onset of Severe Headache with associated nausea and vomiting. 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 Patient was intact on presentation however intubated for airway protection and EVD placed for hydrocephalus. Patient was Transferred to [facility] for selective IR cerebral angiography. 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 Patient returned to the OR on 12/18 for sub occipital decompressive craniectomy. Patient was initially on Vimpat for suspected Seizure like activity, but VEEG while in the NSCU, without evidence of Seizures. Vimpat was discontinued. Patient was noted to have moderate diffuse encephalopathy and dysfxn in frontal regions bilaterally on VEEG. Patient underwent Trach Placement on 12/24 ( # 8 Cuffed Shiley ), and PEG Placement on 12/26. While in the NSCU Patient with Persistent Fevers and Autonomic Storming, Patient treated with medical management and Artic Sun Protocol. Patient found to MRSA/ E.coli growing in the sputum and was treated with Vanco and Cefepime. 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 Patient remained with persistent fevers despite course of broad spectrum abx lD felt fevers Central in nature. Patient required blood transfusion while in NSCU. Patient failed clamping trial and required VPS, which was Placed on 1/7.    1/9: Patient transferred to RCU overnight , Patient remains Febrile Fevers likely Central in Nature. Patient noted to have tremor of RT upper extremity on morning physical exam as per d/w Neuro Sx tremors are not new and patient has had several VEEG which are negative. Patient previously on Fentanyl patch for Autonomic storming which auto-discontinued, will re-order lower dose fentanyl patch as patient appears to be waking up.   1/10:  Still with fevers. CPAP trials as tolerated. Improvement in mental status noted as patient is more alert and following commands, still overall weak.  1/11: No new events. CPAP 5/5 today then TC trial tomorrow.  1/12: Patient tolerating CPAP will attempt TC today   1/13-14: Tolerated 24hrs TC well. ABG 7.45/47/89/32. Will continue day 2 TC ATC with f/u ABG in AM with plans to DC vent. Continue PT/OT.  UE duplex discontinued as right arm swelling has resolved. DC salt tabs once sodium normal

## 2019-01-14 NOTE — CHART NOTE - NSCHARTNOTEFT_GEN_A_CORE
Nutrition Follow Up Note  Patient seen for: Nutrition Follow Up in RCU    Source: Medical record reviewed, spoke with RN.    Diet : NPO with tube feeding via PEG    As per chart, pt. is a 58 year old female admitted on 18 for nontraumatic intracerebral hemorrhage, intraventricular. PMH lupus, DVT, RA, chronic pain.  S/P tracheostomy on 18, S/P PEG on 18. Pt. is nonverbal. As per last nutrition follow up chart note on 19, pt. with diarrhea and rectal tube. Today, RN reports that the diarrhea has subsided and rectal tube was removed. RN had pt. yesterday and today and not noticed any signs/symptoms of nausea/vomiting/diarrhea/constipation. Last BM documented from yesterday, 19. Pt. at tube feeding goal rate of TwoCal HN 55cc/hour x18 hours, tolerating feeds well. Pt. was sitting up in chair at time of visit on trach collar. Will continue to monitor.     PO intake : N/A    Enteral Nutrition:  NPO with tube feeding TwoCal HN 55cc/hour x18 hours, Prosource Gelatein 20 sugar free 2x/day, DanActive 2x/day via PEG    Daily Weight in k (-), 84.5 (-), 84.4 (-)    % Weight Change: Pt. presents with 6.8kg weight gain from 18 to 19. Likely due to edema/fluid shifts. Will continue to monitor.     Pertinent Medications: MEDICATIONS  (STANDING):  ALBUTerol/ipratropium for Nebulization 3 milliLiter(s) Nebulizer every 6 hours  chlorhexidine 4% Liquid 1 Application(s) Topical <User Schedule>  docusate sodium Liquid 100 milliGRAM(s) Oral two times a day  enoxaparin Injectable 40 milliGRAM(s) SubCutaneous <User Schedule>  fentaNYL   Patch  12 MICROgram(s)/Hr. 1 Patch Transdermal every 48 hours  gabapentin   Solution 300 milliGRAM(s) Oral every 8 hours  pantoprazole   Suspension 40 milliGRAM(s) Oral daily  potassium phosphate / sodium phosphate powder 1 Packet(s) Oral four times a day  senna 1 Tablet(s) Oral at bedtime  sodium chloride 2 Gram(s) Oral every 6 hours    MEDICATIONS  (PRN):  acetaminophen    Suspension .. 650 milliGRAM(s) Oral every 6 hours PRN Temp greater or equal to 38.5C (101.3F), Mild Pain (1 - 3)  clonazePAM Tablet 1 milliGRAM(s) Oral every 8 hours PRN anxiety    Pertinent Labs:  @ 07:03: Na 133<L>, BUN 8, Cr 0.42<L>, <H>, K+ 4.1, Phos 3.6, Mg 2.0, Alk Phos --, ALT/SGPT --, AST/SGOT --, HbA1c --    Skin per nursing documentation: Surgical incision VPS abdomen, surgical incision right head.  Edema: 1+ generalized, 2+ bilateral arms.    Estimated Needs:   [X] no change since previous assessment    Previous Nutrition Diagnosis: Increased nutrient needs.  Nutrition Diagnosis is: ongoing, being addressed with enteral nutrition tube feeds via PEG.    New Nutrition Diagnosis: N/A    Recommend  1) Continue with current diet order of NPO with tube feeding TwoCal HN at 55cc/hr x18 hours to provide 990cc fluid, 1980cal/day (23.5 hank/kg), based on dosing wt 84.1Kg.  [81.7gm protein/day (1.5 gm/kg) based on IBW 52.2 kg], 693 ml free water, ProSource adds an additional 15 gm protein.  2) Monitor pt's tolerance to tube feeding, weight, skin, edema, GI distress   3) RD to remain available.    Monitoring and Evaluation:     Continue to monitor Nutritional intake, Tolerance to diet prescription, weights, labs, skin integrity    RD remains available upon request and will follow up per protocol Nutrition Follow Up Note  Patient seen for: Nutrition Follow Up in RCU    Source: Medical record reviewed, spoke with RN.    Diet : NPO with tube feeding via PEG    As per chart, pt. is a 58 year old female admitted on 18 for nontraumatic intracerebral hemorrhage, intraventricular. PMH lupus, DVT, RA, chronic pain.  S/P tracheostomy on 18, S/P PEG on 18. Pt. is nonverbal. As per last nutrition follow up chart note on 19, pt. with diarrhea and rectal tube. Today, RN reports that the diarrhea has subsided and rectal tube was removed. RN had pt. yesterday and today and not noticed any signs/symptoms of nausea/vomiting/diarrhea/constipation. Last BM documented from yesterday, 19. Pt. at tube feeding goal rate of TwoCal HN 55cc/hour x18 hours, tolerating feeds well. Pt. was sitting up in chair at time of visit on trach collar. Discussed hyponatremia with RN and PA, pt. with good urine output, however not on diuretics at this time. As per PA, pt.'s edema in arms is improving. Will continue to monitor.     PO intake : N/A    Enteral Nutrition:  NPO with tube feeding TwoCal HN 55cc/hour x18 hours, Prosource Gelatein 20 sugar free 2x/day, DanActive 2x/day via PEG    Daily Weight in k (-), 84.5 (), 84.4 ()    % Weight Change: Pt. presents with 6.8kg weight gain from 18 to 19. Likely due to edema/fluid shifts. Will continue to monitor.     Pertinent Medications: MEDICATIONS  (STANDING):  ALBUTerol/ipratropium for Nebulization 3 milliLiter(s) Nebulizer every 6 hours  chlorhexidine 4% Liquid 1 Application(s) Topical <User Schedule>  docusate sodium Liquid 100 milliGRAM(s) Oral two times a day  enoxaparin Injectable 40 milliGRAM(s) SubCutaneous <User Schedule>  fentaNYL   Patch  12 MICROgram(s)/Hr. 1 Patch Transdermal every 48 hours  gabapentin   Solution 300 milliGRAM(s) Oral every 8 hours  pantoprazole   Suspension 40 milliGRAM(s) Oral daily  potassium phosphate / sodium phosphate powder 1 Packet(s) Oral four times a day  senna 1 Tablet(s) Oral at bedtime  sodium chloride 2 Gram(s) Oral every 6 hours    MEDICATIONS  (PRN):  acetaminophen    Suspension .. 650 milliGRAM(s) Oral every 6 hours PRN Temp greater or equal to 38.5C (101.3F), Mild Pain (1 - 3)  clonazePAM Tablet 1 milliGRAM(s) Oral every 8 hours PRN anxiety    Pertinent Labs:  @ 07:03: Na 133<L>, BUN 8, Cr 0.42<L>, <H>, K+ 4.1, Phos 3.6, Mg 2.0, Alk Phos --, ALT/SGPT --, AST/SGOT --, HbA1c --    Skin per nursing documentation: Surgical incision VPS abdomen, surgical incision right head.  Edema: 1+ generalized, 2+ bilateral arms.    Estimated Needs:   [X] no change since previous assessment    Previous Nutrition Diagnosis: Increased nutrient needs.  Nutrition Diagnosis is: ongoing, being addressed with enteral nutrition tube feeds via PEG.    New Nutrition Diagnosis: N/A    Recommend  1) Continue with current diet order of NPO with tube feeding TwoCal HN at 55cc/hr x18 hours to provide 990cc fluid, 1980cal/day (23.5 hank/kg), based on dosing wt 84.1Kg.  [81.7gm protein/day (1.5 gm/kg) based on IBW 52.2 kg], 693 ml free water, ProSource adds an additional 15 gm protein.  2) Monitor pt's tolerance to tube feeding, weight, skin, edema, GI distress   3) RD to remain available.    Monitoring and Evaluation:     Continue to monitor Nutritional intake, Tolerance to diet prescription, weights, labs, skin integrity    RD remains available upon request and will follow up per protocol

## 2019-01-14 NOTE — PROGRESS NOTE ADULT - SUBJECTIVE AND OBJECTIVE BOX
HISTORY OF PRESENT ILLNESS  Ms. Cain is a 58 year old female with a PMHx noted below who was transferred from [facility] to [facility] on 12/16/18 with sudden onset of a severe headache with associated nausea and vomiting. She was found to have a cerebellar intraparenchymal hemorrhage with hydrocephalus. She was intubated for airway protection and EVD was placed. She was then transferred to [facility] for further care. 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 She then underwent posterior craniotomy for resection of AVM. Post-operative CTH showed posterior fossa edema and she returned to the OR on 12/18 for posterior fossa decompression expansion. VEEG was negative for seizure activity. EVD initially clamped on 12/24 which patient failed. Clamped once again on 12/29 however required VPS which was placed on 1/7. Hospital course complicated with respiratory failure requiring tracheostomy on 12/24 and dysphagia requiring PEG on 12/26. She was also noted to have episodes of hypotension requiring pressors and transfusion. Hospital course notable for fevers for which she was started on Vancomycin and Cefepime was later added. CSF did grow gram positive cocci in clusters however evaluated by ID and fevers believed to be central in nature. Blood cultures negative thus far.     TODAY'S REVIEW OF SYMPTOMS  Unable to obtain    SUBJECTIVE  Patient seen and examined. Patient currently on trach collar and doing well. Trials ongoing for last few days. Continues to have low grade febrile episodes intermittently throughout the day with tachycardia.     VITALS  T(C): 37.4 (01-14-19 @ 08:25)  T(F): 99.3 (01-14-19 @ 08:25), Max: 99.7 (01-13-19 @ 20:08)  HR: 91 (01-14-19 @ 09:22) (90 - 105)  BP: 144/87 (01-14-19 @ 08:25) (128/83 - 158/94)  RR:  (16 - 21)  SpO2:  (98% - 100%)  Wt(kg): --    PHYSICAL EXAM  Constitutional - NAD, Comfortable  HEENT - Right cranial incisions with staples C/D/I, Eyes intermittently open to tactile and auditory stimuli  Chest - (+) Trach currently on trach collar  Cardiovascular - RRR, S1S2  Abdomen - Soft, (+) PEG, Non-distended  Neurologic Exam -                    Cognitive - Intermittent eye opening to auditory and tactile stimuli, Intermittently follows simple commands (grasping and tongue protrusion)      Communication - No verbal output, No mouthing noted     Motor - Unable to fully exam secondary to arousal and participation but appears to have 1/5  bilaterally     Reflexes - 3+/4 in bilateral biceps and patella, Negative Sales's bilaterally, (+) Left Babinski's   Psychiatric - Flat affect    CURRENT FUNCTIONAL STATUS  Bed mobility - Max A  Transfers - Max A x 2    RECENT LABS/IMAGING             9.1    4.5   )-----------( 345      ( 14 Jan 2019 07:03 )             25.9     133<L>  |  95<L>  |  8   ----------------------------<  112<H>  4.1   |  28  |  0.42<L>    Ca    8.7      14 Jan 2019 07:03  Phos  3.6     01-14  Mg     2.0     01-14    MEDICATIONS   MEDICATIONS  (STANDING):  ALBUTerol/ipratropium for Nebulization 3 milliLiter(s) Nebulizer every 6 hours  chlorhexidine 4% Liquid 1 Application(s) Topical <User Schedule>  docusate sodium Liquid 100 milliGRAM(s) Oral two times a day  enoxaparin Injectable 40 milliGRAM(s) SubCutaneous <User Schedule>  fentaNYL   Patch  12 MICROgram(s)/Hr. 1 Patch Transdermal every 48 hours  gabapentin   Solution 300 milliGRAM(s) Oral every 8 hours  pantoprazole   Suspension 40 milliGRAM(s) Oral daily  potassium phosphate / sodium phosphate powder 1 Packet(s) Oral four times a day  senna 1 Tablet(s) Oral at bedtime  sodium chloride 2 Gram(s) Oral every 6 hours    MEDICATIONS  (PRN):  acetaminophen    Suspension .. 650 milliGRAM(s) Oral every 6 hours PRN Temp greater or equal to 38.5C (101.3F), Mild Pain (1 - 3)  clonazePAM Tablet 1 milliGRAM(s) Oral every 8 hours PRN anxiety    ASSESSMENT/PLAN  57 y/o female with severe headache 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 Hospital course complicated with respiratory failure requiring tracheostomy, dysphagia requiring PEG, elevated intracranial pressure requiring VPS, and fevers. She is now with functional, gait, ADL, cognitive, balance, endurance, speech, swallow impairments.    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   Dysautonomia/central fevers - Recommend starting patient on Bromocriptine 5mg Q6AM and Q12PM to help regulate dysautonomia and potentially help arousal during AM hours. Patient currently on Fentanyl patch, Tylenol PRN, Gabapentin, Klonopin PRN  Sleep/wake cycle - Recommend starting patient on Melatonin 3mg QHS to help regulate sleep/wake cycle and potentially help improve arousal during the day  Rehabilitation - Recommend COMA STIM by PT/OT/SLP  Aggressive oral care  Hyponatremia - Currently on NaCl tabs  GI PPx - Protonix  DVT PPx - Lovenox HISTORY OF PRESENT ILLNESS  Ms. Cain is a 58 year old female with a PMHx noted below who was transferred from [facility] to [facility] on 12/16/18 with sudden onset of a severe headache with associated nausea and vomiting. She was found to have a cerebellar intraparenchymal hemorrhage with hydrocephalus. She was intubated for airway protection and EVD was placed. She was then transferred to [facility] for further care. 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 She then underwent posterior craniotomy for resection of AVM. Post-operative CTH showed posterior fossa edema and she returned to the OR on 12/18 for posterior fossa decompression expansion. VEEG was negative for seizure activity. EVD initially clamped on 12/24 which patient failed. Clamped once again on 12/29 however required VPS which was placed on 1/7. Hospital course complicated with respiratory failure requiring tracheostomy on 12/24 and dysphagia requiring PEG on 12/26. She was also noted to have episodes of hypotension requiring pressors and transfusion. Hospital course notable for fevers for which she was started on Vancomycin and Cefepime was later added. CSF did grow gram positive cocci in clusters however evaluated by ID and fevers believed to be central in nature. Blood cultures negative thus far.     TODAY'S REVIEW OF SYMPTOMS  Unable to obtain    SUBJECTIVE  Patient seen and examined. Patient currently on trach collar and doing well. Trials ongoing for last few days. Continues to have low grade febrile episodes intermittently throughout the day with tachycardia.     VITALS  T(C): 37.4 (01-14-19 @ 08:25)  T(F): 99.3 (01-14-19 @ 08:25), Max: 99.7 (01-13-19 @ 20:08)  HR: 91 (01-14-19 @ 09:22) (90 - 105)  BP: 144/87 (01-14-19 @ 08:25) (128/83 - 158/94)  RR:  (16 - 21)  SpO2:  (98% - 100%)  Wt(kg): --    PHYSICAL EXAM  Constitutional - NAD, Comfortable  HEENT - Right cranial incisions with staples C/D/I, Eyes intermittently open to tactile and auditory stimuli  Chest - (+) Trach currently on trach collar  Cardiovascular - RRR, S1S2  Abdomen - Soft, (+) PEG, Non-distended  Neurologic Exam -                    Cognitive - Intermittent eye opening to auditory and tactile stimuli, Intermittently follows simple commands (grasping and tongue protrusion)      Communication - No verbal output, No mouthing noted     Motor - Unable to fully exam secondary to arousal and participation but appears to have 1/5  bilaterally     Reflexes - 3+/4 in bilateral biceps and patella, Negative Sales's bilaterally, (+) Left Babinski's   Psychiatric - Flat affect    CURRENT FUNCTIONAL STATUS  Bed mobility - Max A  Transfers - Max A x 2    RECENT LABS/IMAGING             9.1    4.5   )-----------( 345      ( 14 Jan 2019 07:03 )             25.9     133<L>  |  95<L>  |  8   ----------------------------<  112<H>  4.1   |  28  |  0.42<L>    Ca    8.7      14 Jan 2019 07:03  Phos  3.6     01-14  Mg     2.0     01-14    MEDICATIONS   MEDICATIONS  (STANDING):  ALBUTerol/ipratropium for Nebulization 3 milliLiter(s) Nebulizer every 6 hours  chlorhexidine 4% Liquid 1 Application(s) Topical <User Schedule>  docusate sodium Liquid 100 milliGRAM(s) Oral two times a day  enoxaparin Injectable 40 milliGRAM(s) SubCutaneous <User Schedule>  fentaNYL   Patch  12 MICROgram(s)/Hr. 1 Patch Transdermal every 48 hours  gabapentin   Solution 300 milliGRAM(s) Oral every 8 hours  pantoprazole   Suspension 40 milliGRAM(s) Oral daily  potassium phosphate / sodium phosphate powder 1 Packet(s) Oral four times a day  senna 1 Tablet(s) Oral at bedtime  sodium chloride 2 Gram(s) Oral every 6 hours    MEDICATIONS  (PRN):  acetaminophen    Suspension .. 650 milliGRAM(s) Oral every 6 hours PRN Temp greater or equal to 38.5C (101.3F), Mild Pain (1 - 3)  clonazePAM Tablet 1 milliGRAM(s) Oral every 8 hours PRN anxiety    ASSESSMENT/PLAN  57 y/o female with severe headache 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 Hospital course complicated with respiratory failure requiring tracheostomy, dysphagia requiring PEG, elevated intracranial pressure requiring VPS, and fevers. She is now with functional, gait, ADL, cognitive, balance, endurance, speech, swallow impairments.    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 Patient currently on Fentanyl patch, Tylenol PRN, Gabapentin, Klonopin PRN  Sleep/wake cycle - Recommend starting patient on Melatonin 3mg QHS to help regulate sleep/wake cycle and potentially help improve arousal during the day  Rehabilitation - Recommend COMA STIM by PT/OT/SLP  Precautions - Fall, Cardiac, Aspiration  Aggressive oral care  Hyponatremia - Currently on NaCl tabs  GI PPx - Protonix  DVT PPx - Lovenox  Diet - NPO on tube feeds HISTORY OF PRESENT ILLNESS  Ms. Cain is a 58 year old female with a PMHx noted below who was transferred from [facility] to [facility] on 12/16/18 with sudden onset of a severe headache with associated nausea and vomiting. She was found to have a cerebellar intraparenchymal hemorrhage with hydrocephalus. She was intubated for airway protection and EVD was placed. She was then transferred to [facility] for further care. 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 She then underwent posterior craniotomy for resection of AVM. Post-operative CTH showed posterior fossa edema and she returned to the OR on 12/18 for posterior fossa decompression expansion. VEEG was negative for seizure activity. EVD initially clamped on 12/24 which patient failed. Clamped once again on 12/29 however required VPS which was placed on 1/7. Hospital course complicated with respiratory failure requiring tracheostomy on 12/24 and dysphagia requiring PEG on 12/26. She was also noted to have episodes of hypotension requiring pressors and transfusion. Hospital course notable for fevers for which she was started on Vancomycin and Cefepime was later added. CSF did grow gram positive cocci in clusters however evaluated by ID and fevers believed to be central in nature. Blood cultures negative thus far.     TODAY'S REVIEW OF SYMPTOMS  Unable to obtain    SUBJECTIVE  Patient seen and examined. Patient currently on trach collar and doing well. Trials ongoing for last few days. Continues to have low grade febrile episodes intermittently throughout the day with tachycardia.     VITALS  T(C): 37.4 (01-14-19 @ 08:25)  T(F): 99.3 (01-14-19 @ 08:25), Max: 99.7 (01-13-19 @ 20:08)  HR: 91 (01-14-19 @ 09:22) (90 - 105)  BP: 144/87 (01-14-19 @ 08:25) (128/83 - 158/94)  RR:  (16 - 21)  SpO2:  (98% - 100%)  Wt(kg): --    PHYSICAL EXAM  Constitutional - NAD, Comfortable  HEENT - Right cranial incisions with staples C/D/I, Eyes intermittently open to tactile and auditory stimuli  Chest - (+) Trach currently on trach collar  Cardiovascular - RRR, S1S2  Abdomen - Soft, (+) PEG, Non-distended  Neurologic Exam -                    Cognitive - Intermittent eye opening to auditory and tactile stimuli, Intermittently follows simple commands (grasping and tongue protrusion)      Communication - No verbal output, No mouthing noted     Motor - Unable to fully exam secondary to arousal and participation but appears to have 1/5  bilaterally     Reflexes - 3+/4 in bilateral biceps and patella, Negative Sales's bilaterally, (+) Left Babinski's   Psychiatric - Flat affect    CURRENT FUNCTIONAL STATUS  Bed mobility - Max A  Transfers - Max A x 2    RECENT LABS/IMAGING             9.1    4.5   )-----------( 345      ( 14 Jan 2019 07:03 )             25.9     133<L>  |  95<L>  |  8   ----------------------------<  112<H>  4.1   |  28  |  0.42<L>    Ca    8.7      14 Jan 2019 07:03  Phos  3.6     01-14  Mg     2.0     01-14    MEDICATIONS   MEDICATIONS  (STANDING):  ALBUTerol/ipratropium for Nebulization 3 milliLiter(s) Nebulizer every 6 hours  chlorhexidine 4% Liquid 1 Application(s) Topical <User Schedule>  docusate sodium Liquid 100 milliGRAM(s) Oral two times a day  enoxaparin Injectable 40 milliGRAM(s) SubCutaneous <User Schedule>  fentaNYL   Patch  12 MICROgram(s)/Hr. 1 Patch Transdermal every 48 hours  gabapentin   Solution 300 milliGRAM(s) Oral every 8 hours  pantoprazole   Suspension 40 milliGRAM(s) Oral daily  potassium phosphate / sodium phosphate powder 1 Packet(s) Oral four times a day  senna 1 Tablet(s) Oral at bedtime  sodium chloride 2 Gram(s) Oral every 6 hours    MEDICATIONS  (PRN):  acetaminophen    Suspension .. 650 milliGRAM(s) Oral every 6 hours PRN Temp greater or equal to 38.5C (101.3F), Mild Pain (1 - 3)  clonazePAM Tablet 1 milliGRAM(s) Oral every 8 hours PRN anxiety    ASSESSMENT/PLAN  57 y/o female with severe headache 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 Hospital course complicated with respiratory failure requiring tracheostomy, dysphagia requiring PEG, elevated intracranial pressure requiring VPS, and fevers. She is now with functional, gait, ADL, cognitive, balance, endurance, speech, swallow impairments.    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 Patient currently on Fentanyl patch, Tylenol PRN, Gabapentin, Klonopin PRN  Sleep/wake cycle - Recommend starting patient on Melatonin 3mg QHS to help regulate sleep/wake cycle and potentially help improve arousal during the day  Rehabilitation - Recommend COMA STIM by PT/OT/SLP  Precautions - Fall, Cardiac, Aspiration  Aggressive oral care  Hyponatremia - Currently on NaCl tabs  GI PPx - Protonix  DVT PPx - Lovenox  Diet - NPO on tube feeds

## 2019-01-14 NOTE — PROGRESS NOTE ADULT - SUBJECTIVE AND OBJECTIVE BOX
Patient is a 58y old  Female who presents with a chief complaint of Cerebellar IPH (13 Jan 2019 08:12)      Interval Events:    REVIEW OF SYSTEMS:  [ ] Positive  [ ] All other systems negative  [ ] Unable to assess ROS because ________    Vital Signs Last 24 Hrs  T(C): 37.6 (01-14-19 @ 04:46), Max: 37.6 (01-13-19 @ 20:08)  T(F): 99.7 (01-14-19 @ 04:46), Max: 99.7 (01-13-19 @ 20:08)  HR: 91 (01-14-19 @ 05:13) (91 - 106)  BP: 128/83 (01-14-19 @ 04:46) (128/83 - 158/94)  RR: 18 (01-14-19 @ 04:47) (16 - 20)  SpO2: 98% (01-14-19 @ 05:13) (98% - 100%)    PHYSICAL EXAM:  HEENT:   [ ]Tracheostomy:  [ ]Pupils equal  [ ]No oral lesions  [ ]Abnormal    SKIN  [ ]No Rash  [ ] Abnormal  [ ] pressure    CARDIAC  [ ]Regular  [ ]Abnormal    PULMONARY  [ ]Bilateral Clear Breath Sounds  [ ]Normal Excursion  [ ]Abnormal    GI  [ ]PEG      [ ] +BS		              [ ]Soft, nondistended, nontender	  [ ]Abnormal    MUSCULOSKELETAL                                   [ ]Bedbound                 [ ]Abnormal    [ ]Ambulatory/OOB to chair                           EXTREMITIES                                         [ ]Normal  [ ]Edema                           NEUROLOGIC  [ ] Normal, non focal  [ ] Focal findings:    PSYCHIATRIC  [ ]Alert and appropriate  [ ] Sedated	 [ ]Agitated    :  Newsome: [ ] Yes, if yes: Date of Placement:                   [  ] No    LINES: Central Lines [ ] Yes, if yes: Date of Placement                                     [  ] No    HOSPITAL MEDICATIONS:  MEDICATIONS  (STANDING):  ALBUTerol/ipratropium for Nebulization 3 milliLiter(s) Nebulizer every 6 hours  chlorhexidine 4% Liquid 1 Application(s) Topical <User Schedule>  docusate sodium Liquid 100 milliGRAM(s) Oral two times a day  enoxaparin Injectable 40 milliGRAM(s) SubCutaneous <User Schedule>  fentaNYL   Patch  12 MICROgram(s)/Hr. 1 Patch Transdermal every 48 hours  gabapentin   Solution 300 milliGRAM(s) Oral every 8 hours  pantoprazole   Suspension 40 milliGRAM(s) Oral daily  potassium phosphate / sodium phosphate powder 1 Packet(s) Oral four times a day  senna 1 Tablet(s) Oral at bedtime  sodium chloride 2 Gram(s) Oral every 6 hours    MEDICATIONS  (PRN):  acetaminophen    Suspension .. 650 milliGRAM(s) Oral every 6 hours PRN Temp greater or equal to 38.5C (101.3F), Mild Pain (1 - 3)  clonazePAM Tablet 1 milliGRAM(s) Oral every 8 hours PRN anxiety      LABS:                        9.1    4.5   )-----------( 345      ( 14 Jan 2019 07:03 )             25.9     01-14    133<L>  |  95<L>  |  8   ----------------------------<  112<H>  4.1   |  28  |  0.42<L>    Ca    8.7      14 Jan 2019 07:03  Phos  3.6     01-14  Mg     2.0     01-14          Arterial Blood Gas:  01-14 @ 06:12  7.45/47/89/32/97/7.6  ABG lactate: --      CAPILLARY BLOOD GLUCOSE    MICROBIOLOGY:     RADIOLOGY:  [ ] Reviewed and interpreted by me    Mode: OFF Patient is a 58y old  Female who presents with a chief complaint of Cerebellar IPH.......      Interval Events: Tolerating first 24hrs of TC ATC without issues. No over nigt events reported.    REVIEW OF SYSTEMS:  [ ] Positive  [X] All other systems negative  [ ] Unable to assess ROS because ________    Vital Signs Last 24 Hrs  T(C): 37.6 (01-14-19 @ 04:46), Max: 37.6 (01-13-19 @ 20:08)  T(F): 99.7 (01-14-19 @ 04:46), Max: 99.7 (01-13-19 @ 20:08)  HR: 91 (01-14-19 @ 05:13) (91 - 106)  BP: 128/83 (01-14-19 @ 04:46) (128/83 - 158/94)  RR: 18 (01-14-19 @ 04:47) (16 - 20)  SpO2: 98% (01-14-19 @ 05:13) (98% - 100%)      PHYSICAL EXAM:  HEENT:   [x]Tracheostomy: # 8 Cuffed Shiley   [ ]Pupils equal  [ ]No oral lesions  [x]Abnormal: Cranial Staples Present Rt side of scalp, Incision clean dry     SKIN  [x]No Rash  [ ] Abnormal  [ ] pressure    CARDIAC  [x]Regular  [ ]Abnormal    PULMONARY  [x]Bilateral Clear Breath Sounds  [ ]Normal Excursion  [ ]Abnormal    GI  [x]PEG      [x] +BS		              [x]Soft, nondistended, nontender	  [ ]Abnormal    MUSCULOSKELETAL                                   [ ]Bedbound                 [ ]Abnormal    [x]OOB to chair                           EXTREMITIES                                         [x]Normal  [ ]Edema                           NEUROLOGIC  [ ] Normal, non focal  [x] Focal findings: Following simple commands with all extremities     PSYCHIATRIC  [x]Alert   [ ] Sedated	 [ ]Agitated    :  Newsome: [ ] Yes, if yes: Date of Placement:                   [x] No    LINES: Central Lines [x] Yes, if yes: Date of Placement: RT UE PICC Placed 1/9                                      [  ] No      HOSPITAL MEDICATIONS:  MEDICATIONS  (STANDING):  ALBUTerol/ipratropium for Nebulization 3 milliLiter(s) Nebulizer every 6 hours  chlorhexidine 4% Liquid 1 Application(s) Topical <User Schedule>  docusate sodium Liquid 100 milliGRAM(s) Oral two times a day  enoxaparin Injectable 40 milliGRAM(s) SubCutaneous <User Schedule>  fentaNYL   Patch  12 MICROgram(s)/Hr. 1 Patch Transdermal every 48 hours  gabapentin   Solution 300 milliGRAM(s) Oral every 8 hours  pantoprazole   Suspension 40 milliGRAM(s) Oral daily  potassium phosphate / sodium phosphate powder 1 Packet(s) Oral four times a day  senna 1 Tablet(s) Oral at bedtime  sodium chloride 2 Gram(s) Oral every 6 hours    MEDICATIONS  (PRN):  acetaminophen    Suspension .. 650 milliGRAM(s) Oral every 6 hours PRN Temp greater or equal to 38.5C (101.3F), Mild Pain (1 - 3)  clonazePAM Tablet 1 milliGRAM(s) Oral every 8 hours PRN anxiety      LABS:                        9.1    4.5   )-----------( 345      ( 14 Jan 2019 07:03 )             25.9     01-14    133<L>  |  95<L>  |  8   ----------------------------<  112<H>  4.1   |  28  |  0.42<L>    Ca    8.7      14 Jan 2019 07:03  Phos  3.6     01-14  Mg     2.0     01-14          Arterial Blood Gas:  01-14 @ 06:12  7.45/47/89/32/97/7.6  ABG lactate: --      CAPILLARY BLOOD GLUCOSE    MICROBIOLOGY:     RADIOLOGY:  [ ] Reviewed and interpreted by me    Mode: OFF

## 2019-01-15 LAB
ANION GAP SERPL CALC-SCNC: 11 MMOL/L — SIGNIFICANT CHANGE UP (ref 5–17)
BASE EXCESS BLDA CALC-SCNC: 8.4 MMOL/L — HIGH (ref -2–2)
BUN SERPL-MCNC: 8 MG/DL — SIGNIFICANT CHANGE UP (ref 7–23)
CALCIUM SERPL-MCNC: 8.6 MG/DL — SIGNIFICANT CHANGE UP (ref 8.4–10.5)
CHLORIDE SERPL-SCNC: 94 MMOL/L — LOW (ref 96–108)
CO2 BLDA-SCNC: 34 MMOL/L — HIGH (ref 22–30)
CO2 SERPL-SCNC: 29 MMOL/L — SIGNIFICANT CHANGE UP (ref 22–31)
CREAT SERPL-MCNC: 0.4 MG/DL — LOW (ref 0.5–1.3)
CULTURE RESULTS: SIGNIFICANT CHANGE UP
GAS PNL BLDA: SIGNIFICANT CHANGE UP
GLUCOSE SERPL-MCNC: 133 MG/DL — HIGH (ref 70–99)
HCO3 BLDA-SCNC: 33 MMOL/L — HIGH (ref 21–29)
HOROWITZ INDEX BLDA+IHG-RTO: 30 — SIGNIFICANT CHANGE UP
MAGNESIUM SERPL-MCNC: 2 MG/DL — SIGNIFICANT CHANGE UP (ref 1.6–2.6)
PCO2 BLDA: 48 MMHG — HIGH (ref 32–46)
PH BLDA: 7.45 — SIGNIFICANT CHANGE UP (ref 7.35–7.45)
PHOSPHATE SERPL-MCNC: 3.3 MG/DL — SIGNIFICANT CHANGE UP (ref 2.5–4.5)
PO2 BLDA: 93 MMHG — SIGNIFICANT CHANGE UP (ref 74–108)
POTASSIUM SERPL-MCNC: 4.2 MMOL/L — SIGNIFICANT CHANGE UP (ref 3.5–5.3)
POTASSIUM SERPL-SCNC: 4.2 MMOL/L — SIGNIFICANT CHANGE UP (ref 3.5–5.3)
SAO2 % BLDA: 98 % — HIGH (ref 92–96)
SODIUM SERPL-SCNC: 134 MMOL/L — LOW (ref 135–145)
SPECIMEN SOURCE: SIGNIFICANT CHANGE UP

## 2019-01-15 PROCEDURE — 70450 CT HEAD/BRAIN W/O DYE: CPT | Mod: 26

## 2019-01-15 PROCEDURE — 99233 SBSQ HOSP IP/OBS HIGH 50: CPT

## 2019-01-15 RX ADMIN — Medication 1 PACKET(S): at 05:02

## 2019-01-15 RX ADMIN — Medication 1 PACKET(S): at 23:26

## 2019-01-15 RX ADMIN — SODIUM CHLORIDE 2 GRAM(S): 9 INJECTION INTRAMUSCULAR; INTRAVENOUS; SUBCUTANEOUS at 00:17

## 2019-01-15 RX ADMIN — GABAPENTIN 300 MILLIGRAM(S): 400 CAPSULE ORAL at 14:20

## 2019-01-15 RX ADMIN — PANTOPRAZOLE SODIUM 40 MILLIGRAM(S): 20 TABLET, DELAYED RELEASE ORAL at 12:22

## 2019-01-15 RX ADMIN — Medication 100 MILLIGRAM(S): at 05:02

## 2019-01-15 RX ADMIN — CHLORHEXIDINE GLUCONATE 1 APPLICATION(S): 213 SOLUTION TOPICAL at 21:40

## 2019-01-15 RX ADMIN — Medication 1 PACKET(S): at 17:46

## 2019-01-15 RX ADMIN — GABAPENTIN 300 MILLIGRAM(S): 400 CAPSULE ORAL at 05:02

## 2019-01-15 RX ADMIN — SODIUM CHLORIDE 2 GRAM(S): 9 INJECTION INTRAMUSCULAR; INTRAVENOUS; SUBCUTANEOUS at 05:03

## 2019-01-15 RX ADMIN — Medication 100 MILLIGRAM(S): at 17:46

## 2019-01-15 RX ADMIN — Medication 1 PACKET(S): at 12:23

## 2019-01-15 RX ADMIN — Medication 1 MILLIGRAM(S): at 22:20

## 2019-01-15 RX ADMIN — FENTANYL CITRATE 1 PATCH: 50 INJECTION INTRAVENOUS at 17:38

## 2019-01-15 RX ADMIN — FENTANYL CITRATE 1 PATCH: 50 INJECTION INTRAVENOUS at 17:46

## 2019-01-15 RX ADMIN — SENNA PLUS 1 TABLET(S): 8.6 TABLET ORAL at 21:41

## 2019-01-15 RX ADMIN — GABAPENTIN 300 MILLIGRAM(S): 400 CAPSULE ORAL at 21:41

## 2019-01-15 RX ADMIN — Medication 1 PACKET(S): at 00:17

## 2019-01-15 RX ADMIN — Medication 3 MILLILITER(S): at 05:28

## 2019-01-15 RX ADMIN — Medication 3 MILLILITER(S): at 17:35

## 2019-01-15 RX ADMIN — Medication 3 MILLILITER(S): at 12:42

## 2019-01-15 RX ADMIN — ENOXAPARIN SODIUM 40 MILLIGRAM(S): 100 INJECTION SUBCUTANEOUS at 17:46

## 2019-01-15 RX ADMIN — SODIUM CHLORIDE 2 GRAM(S): 9 INJECTION INTRAMUSCULAR; INTRAVENOUS; SUBCUTANEOUS at 23:26

## 2019-01-15 RX ADMIN — Medication 1 MILLIGRAM(S): at 14:20

## 2019-01-15 RX ADMIN — SODIUM CHLORIDE 2 GRAM(S): 9 INJECTION INTRAMUSCULAR; INTRAVENOUS; SUBCUTANEOUS at 17:47

## 2019-01-15 RX ADMIN — SODIUM CHLORIDE 2 GRAM(S): 9 INJECTION INTRAMUSCULAR; INTRAVENOUS; SUBCUTANEOUS at 12:21

## 2019-01-15 NOTE — PROGRESS NOTE ADULT - SUBJECTIVE AND OBJECTIVE BOX
Patient is a 58y old  Female who presents with a chief complaint of Cerebellar IPH (14 Jan 2019 12:40)      Interval Events:    REVIEW OF SYSTEMS:  [ ] Positive  [ ] All other systems negative  [ ] Unable to assess ROS because ________    Vital Signs Last 24 Hrs  T(C): 37.1 (01-15-19 @ 04:09), Max: 37.3 (01-14-19 @ 22:14)  T(F): 98.8 (01-15-19 @ 04:09), Max: 99.1 (01-14-19 @ 22:14)  HR: 94 (01-15-19 @ 05:28) (88 - 100)  BP: 146/83 (01-15-19 @ 04:09) (133/86 - 150/90)  RR: 18 (01-15-19 @ 04:36) (18 - 22)  SpO2: 98% (01-15-19 @ 05:28) (97% - 100%)    PHYSICAL EXAM:  HEENT:   [ ]Tracheostomy:  [ ]Pupils equal  [ ]No oral lesions  [ ]Abnormal    SKIN  [ ]No Rash  [ ] Abnormal  [ ] pressure    CARDIAC  [ ]Regular  [ ]Abnormal    PULMONARY  [ ]Bilateral Clear Breath Sounds  [ ]Normal Excursion  [ ]Abnormal    GI  [ ]PEG      [ ] +BS		              [ ]Soft, nondistended, nontender	  [ ]Abnormal    MUSCULOSKELETAL                                   [ ]Bedbound                 [ ]Abnormal    [ ]Ambulatory/OOB to chair                           EXTREMITIES                                         [ ]Normal  [ ]Edema                           NEUROLOGIC  [ ] Normal, non focal  [ ] Focal findings:    PSYCHIATRIC  [ ]Alert and appropriate  [ ] Sedated	 [ ]Agitated    :  Newsome: [ ] Yes, if yes: Date of Placement:                   [  ] No    LINES: Central Lines [ ] Yes, if yes: Date of Placement                                     [  ] No    HOSPITAL MEDICATIONS:  MEDICATIONS  (STANDING):  ALBUTerol/ipratropium for Nebulization 3 milliLiter(s) Nebulizer every 6 hours  chlorhexidine 4% Liquid 1 Application(s) Topical <User Schedule>  docusate sodium Liquid 100 milliGRAM(s) Oral two times a day  enoxaparin Injectable 40 milliGRAM(s) SubCutaneous <User Schedule>  fentaNYL   Patch  12 MICROgram(s)/Hr. 1 Patch Transdermal every 48 hours  gabapentin   Solution 300 milliGRAM(s) Oral every 8 hours  pantoprazole   Suspension 40 milliGRAM(s) Oral daily  potassium phosphate / sodium phosphate powder 1 Packet(s) Oral four times a day  senna 1 Tablet(s) Oral at bedtime  sodium chloride 2 Gram(s) Oral every 6 hours    MEDICATIONS  (PRN):  acetaminophen    Suspension .. 650 milliGRAM(s) Oral every 6 hours PRN Temp greater or equal to 38.5C (101.3F), Mild Pain (1 - 3)  clonazePAM Tablet 1 milliGRAM(s) Oral every 8 hours PRN anxiety      LABS:                        9.1    4.5   )-----------( 345      ( 14 Jan 2019 07:03 )             25.9     01-15    134<L>  |  94<L>  |  8   ----------------------------<  133<H>  4.2   |  29  |  0.40<L>    Ca    8.6      15 Humble 2019 06:40  Phos  3.3     01-15  Mg     2.0     01-15          Arterial Blood Gas:  01-14 @ 06:12  7.45/47/89/32/97/7.6  ABG lactate: --      CAPILLARY BLOOD GLUCOSE    MICROBIOLOGY:     RADIOLOGY:  [ ] Reviewed and interpreted by me    Mode: OFF Patient is a 58y old  Female who presents with a chief complaint of Cerebellar IPH.......    Interval Events: No events reported over night    REVIEW OF SYSTEMS:  [ ] Positive  [X] All other systems negative  [ ] Unable to assess ROS because ________    Vital Signs Last 24 Hrs  T(C): 37.1 (01-15-19 @ 04:09), Max: 37.3 (01-14-19 @ 22:14)  T(F): 98.8 (01-15-19 @ 04:09), Max: 99.1 (01-14-19 @ 22:14)  HR: 94 (01-15-19 @ 05:28) (88 - 100)  BP: 146/83 (01-15-19 @ 04:09) (133/86 - 150/90)  RR: 18 (01-15-19 @ 04:36) (18 - 22)  SpO2: 98% (01-15-19 @ 05:28) (97% - 100%)      PHYSICAL EXAM:  HEENT:  Staples noted on frontal are  [x]Tracheostomy: # 8 Cuffed Shiley   [ ]Pupils equal  [ ]No oral lesions  [x]Abnormal: Cranial Staples Present Rt side of scalp, Incision clean dry     SKIN  [x]No Rash  [ ] Abnormal  [ ] pressure    CARDIAC  [x]Regular  [ ]Abnormal    PULMONARY  [x]Bilateral Clear Breath Sounds  [ ]Normal Excursion  [ ]Abnormal    GI  [x]PEG      [x] +BS		              [x]Soft, nondistended, nontender	  [ ]Abnormal    MUSCULOSKELETAL                                   [ ]Bedbound                 [ ]Abnormal    [x]OOB to chair                           EXTREMITIES                                         [x]Normal  [ ]Edema                           NEUROLOGIC  [ ] Normal, non focal  [x] Focal findings: Following simple commands with all extremities     PSYCHIATRIC  [x]Alert   [ ] Sedated	 [ ]Agitated    :  Newsome: [ ] Yes, if yes: Date of Placement:                   [x] No    LINES: Central Lines [x] Yes, if yes: Date of Placement: RT UE PICC Placed 1/9                                      [  ] No        HOSPITAL MEDICATIONS:  MEDICATIONS  (STANDING):  ALBUTerol/ipratropium for Nebulization 3 milliLiter(s) Nebulizer every 6 hours  chlorhexidine 4% Liquid 1 Application(s) Topical <User Schedule>  docusate sodium Liquid 100 milliGRAM(s) Oral two times a day  enoxaparin Injectable 40 milliGRAM(s) SubCutaneous <User Schedule>  fentaNYL   Patch  12 MICROgram(s)/Hr. 1 Patch Transdermal every 48 hours  gabapentin   Solution 300 milliGRAM(s) Oral every 8 hours  pantoprazole   Suspension 40 milliGRAM(s) Oral daily  potassium phosphate / sodium phosphate powder 1 Packet(s) Oral four times a day  senna 1 Tablet(s) Oral at bedtime  sodium chloride 2 Gram(s) Oral every 6 hours    MEDICATIONS  (PRN):  acetaminophen    Suspension .. 650 milliGRAM(s) Oral every 6 hours PRN Temp greater or equal to 38.5C (101.3F), Mild Pain (1 - 3)  clonazePAM Tablet 1 milliGRAM(s) Oral every 8 hours PRN anxiety      LABS:                        9.1    4.5   )-----------( 345      ( 14 Jan 2019 07:03 )             25.9     01-15    134<L>  |  94<L>  |  8   ----------------------------<  133<H>  4.2   |  29  |  0.40<L>    Ca    8.6      15 Humble 2019 06:40  Phos  3.3     01-15  Mg     2.0     01-15          Arterial Blood Gas:  01-14 @ 06:12  7.45/47/89/32/97/7.6  ABG lactate: --      CAPILLARY BLOOD GLUCOSE    MICROBIOLOGY:     RADIOLOGY:  [ ] Reviewed and interpreted by me    Mode: OFF

## 2019-01-15 NOTE — PROGRESS NOTE ADULT - SUBJECTIVE AND OBJECTIVE BOX
CC: f/u for ICH and fever    Patient reports: she is awake, not able to vocalize words    REVIEW OF SYSTEMS:  All other review of systems negative (Comprehensive ROS): no fever, moderate trach secretions    Antimicrobials Day #  :off    Other Medications Reviewed    T(F): 98.8 (01-15-19 @ 04:09), Max: 99.1 (01-14-19 @ 22:14)  HR: 92 (01-15-19 @ 09:26)  BP: 146/83 (01-15-19 @ 04:09)  RR: 20 (01-15-19 @ 09:26)  SpO2: 98% (01-15-19 @ 09:26)  Wt(kg): --    PHYSICAL EXAM:  General: awake,, no acute distress  Eyes:  anicteric, no conjunctival injection, no discharge  Oropharynx: no lesions or injection 	  Neck: supple, trach in place  Lungs: scattered ronchi  Heart: regular rate and rhythm; no murmur, rubs or gallops  Abdomen: soft, nondistended, nontender, peg  Skin: no lesions  Extremities: no clubbing, cyanosis, + edema  Neurologic: awake,appears attentive, not following commands    LAB RESULTS:                        9.1    4.5   )-----------( 345      ( 14 Jan 2019 07:03 )             25.9     01-15    134<L>  |  94<L>  |  8   ----------------------------<  133<H>  4.2   |  29  |  0.40<L>    Ca    8.6      15 Humble 2019 06:40  Phos  3.3     01-15  Mg     2.0     01-15          MICROBIOLOGY:  RECENT CULTURES:      RADIOLOGY REVIEWED:

## 2019-01-15 NOTE — PROGRESS NOTE ADULT - ASSESSMENT
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  Colonized with MRSA in sputum, no radiographic evidence of pneumonia.  Ongoing fever likely central, secondary to ICH directly  All blood cultures negative.  Prior sputum with MRSA and E coli, likely colonizers, but covered with Vanco and Cefepime- still febrile despite full course  Repeat sputum normal caleb only  CSF from 12/30 with Micrococcous in BMO is a contaminant; f/u CSF Cxs negative, Cell Count only 1 WBC- no need for further Vanco  Hemodyn stable  WBC normal  Temps have moderated but still no obvious infection, her wbc count remains normal, UA negative, CXR is clear.  CT of head still with residual blood.  S/P VPS  Suggest:  Fever still best explained on central basis- observe off abx for now  Follow temps and CBC/diff , fever appears to have moderated  Surveillance Cxs as per routine, have been negative  No additional ID w/u planned at this point, we will stop actively following, please call if ID issues arise  Supportive care per RICU  Discharge planning per RCU

## 2019-01-15 NOTE — PROGRESS NOTE ADULT - PROBLEM SELECTOR PLAN 2
Patient S/p Tracheostomy on 12/24 ( # 8 Cuffed Ember )   Tolerating TC ATC since 1/13. Will need to be down-sized to cuffless prior to discharge.  Continue Chest PT / Nebulizers and Suctioning PRN

## 2019-01-15 NOTE — PROGRESS NOTE ADULT - ATTENDING COMMENTS
59 yo female with SLE admitted 12/16 with cerebellar AVM and bleed s/p embolization and AVM resection and subsequent trach/peg. Cont TC atc. Remains afebrile. Salt tabs cont for today while slightly hyponatremic. Pending discharge.

## 2019-01-15 NOTE — CHART NOTE - NSCHARTNOTEFT_GEN_A_CORE
MEDICINE BRIANNA RASMUSSEN  Patient is a 58 year old female who presents with a chief complaint of cerebellar IPH. (15 Uhmble 2019 11:20)       Event Summary:  Called by RN to report patient with possible seizure like activity.  Per RN, at baseline patient has a facial twitch and upper extremity tremoring, however tonight appears to be more pronounced.  Patient seen and examined at the bedside.  She is alert to verbal stimuli - opens eyes.  Unable to contribute to ROS - non-verbal at baseline.       Vital Signs Last 24 Hrs  T(C): 37.8 (15 Humble 2019 22:05), Max: 37.8 (15 Humble 2019 22:05)  T(F): 100 (15 Humble 2019 22:05), Max: 100 (15 Humble 2019 22:05)  HR: 104 (15 Humble 2019 22:05) (88 - 105)  BP: 123/75 (15 Humble 2019 22:05) (123/75 - 159/82)  RR: 20 (15 Humble 2019 22:05) (18 - 22)  SpO2: 100% (15 Humble 2019 22:05) (97% - 100%)      PHYSICAL EXAM:  GENERAL: Laying down, in no acute distress.  +facial twitching and tremoring of left upper extremity noted  HEAD:  Atraumatic, Normocephalic.  +staples noted on right scalp - clean, dry and intact  EYES: Pupils equally round and reactive to light and accommodation   HEART: +S1/S2.  Regular rate and rhythm.  No murmurs, rubs or gallops  CHEST/LUNG: Breath sounds clear to auscultation bilaterally.  No wheezes, rales, rhonchi or crackles  ABDOMEN: Bowel sounds present in all 4 quadrants.  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 (17 Dec 2018 01:18)  Now with ?seizure like activity.      PLAN: ?Seizure like activity vs. autonomic hyperactivity  - Vital signs stable  - Patient with known autonomic hyperactivity and treated with Klonopin  - PRN dose of Klonopin administered  - Discussed with neurosurgery resident, Dr. Starr in regards to repeat CT head - in agreement  - Urgent CT head ordered.  Will follow up with results  - Will continue to monitor closely  - Primary team to follow up in AM      #[id number]  Rachael Colorado PA-C  Medicine Department       ADDENDUM:    CT Head No Cont (1/15/19):******PRELIMINARY REPORT****** INTERPRETATION:  Hemorrhage along tract of  shunt catheter new from prior exam.  Decreased intraventricular hemorrhage.  No parenchymal hemorrhage.  Discussed with ordering provider.  Follow up official report in AM.      PLAN: ?Seizure like activity vs. autonomic hyperactivity  - Symptoms now resolved  - CT head report discussed with radiology resident overnight - significant for hemorrhage along tract of  shunt catheter new from prior exam  - Above results discussed with radiology resident, Dr. Starr who reviewed CT head himself.  Per his recommendations, no acute intervention at this time.  Can repeat CT head in AM  - Follow up with repeat CT head in AM  - Pulmonology fellow paged for update.  Awaiting call back  - Will endorse to day team  - Will continue to monitor closely  - Primary team to follow up in AM      #[id number]  Rachael Colorado PA-C  Medicine Department MEDICINE [facility] RASMUSS[facility]  Patient is a 58 year old female who presents with a chief complaint of cerebellar IPH. (15 Humble 2019 11:20)       Event Summary:  Called by RN to report patient with possible seizure like activity.  Per RN, at baseline patient has a facial twitch and upper extremity tremoring, however tonight appears to be more pronounced.  Patient seen and examined at the bedside.  She is alert to verbal stimuli - opens eyes.  Unable to contribute to ROS - non-verbal at baseline.       Vital Signs Last 24 Hrs  T(C): 37.8 (15 Humble 2019 22:05), Max: 37.8 (15 Humble 2019 22:05)  T(F): 100 (15 Humble 2019 22:05), Max: 100 (15 Humble 2019 22:05)  HR: 104 (15 Humble 2019 22:05) (88 - 105)  BP: 123/75 (15 Humble 2019 22:05) (123/75 - 159/82)  RR: 20 (15 Humble 2019 22:05) (18 - 22)  SpO2: 100% (15 Humble 2019 22:05) (97% - 100%)      PHYSICAL EXAM:  GENERAL: Laying down, in no acute distress.  +facial twitching and tremoring of left upper extremity noted  HEAD:  Atraumatic, Normocephalic.  +staples noted on right scalp - clean, dry and intact  EYES: Pupils equally round and reactive to light and accommodation   HEART: +S1/S2.  Regular rate and rhythm.  No murmurs, rubs or gallops  CHEST/LUNG: Breath sounds clear to auscultation bilaterally.  No wheezes, rales, rhonchi or crackles  ABDOMEN: Bowel sounds present in all 4 quadrants.  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 (17 Dec 2018 01:18)  Now with ?seizure like activity.      PLAN: ?Seizure like activity vs. autonomic hyperactivity  - Vital signs stable  - Patient with known autonomic hyperactivity and treated with Klonopin  - PRN dose of Klonopin administered  - Discussed with neurosurgery resident, Dr. Starr in regards to repeat CT head - in agreement  - Urgent CT head ordered.  Will follow up with results  - Will continue to monitor closely  - Primary team to follow up in AM      #[id number]  Rachael Colorado PA-C  Medicine Department       ADDENDUM:    CT Head No Cont (1/15/19):******PRELIMINARY REPORT****** INTERPRETATION:  Hemorrhage along tract of  shunt catheter new from prior exam.  Decreased intraventricular hemorrhage.  No parenchymal hemorrhage.  Discussed with ordering provider.  Follow up official report in AM.      PLAN: ?Seizure like activity vs. autonomic hyperactivity  - Symptoms now resolved  - CT head report discussed with radiology resident overnight - significant for hemorrhage along tract of  shunt catheter new from prior exam  - Above results discussed with neurosurgery resident, Dr. Starr who reviewed CT head himself.  Per his recommendations, no acute intervention at this time.  Can repeat CT head in AM  - Follow up with repeat CT head in AM  - Pulmonology fellow paged for update.  Awaiting call back  - Will endorse to day team  - Will continue to monitor closely  - Primary team to follow up in AM      #[id number]  Rachael Colorado PA-C  Medicine Department

## 2019-01-15 NOTE — PROGRESS NOTE ADULT - ASSESSMENT
58 Year Old woman PMH Lupus Presented with Sudden onset of Severe Headache with associated nausea and vomiting. 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 Patient was intact on presentation however intubated for airway protection and EVD placed for hydrocephalus. Patient was Transferred to [facility] for selective IR cerebral angiography. 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 Patient returned to the OR on 12/18 for sub occipital decompressive craniectomy. Patient was initially on Vimpat for suspected Seizure like activity, but VEEG while in the NSCU, without evidence of Seizures. Vimpat was discontinued. Patient was noted to have moderate diffuse encephalopathy and dysfxn in frontal regions bilaterally on VEEG. Patient underwent Trach Placement on 12/24 ( # 8 Cuffed Shiley ), and PEG Placement on 12/26. While in the NSCU Patient with Persistent Fevers and Autonomic Storming, Patient treated with medical management and [place] Protocol. Patient found to MRSA/ E.coli growing in the sputum and was treated with Vanco and Cefepime. 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 Patient remained with persistent fevers despite course of broad spectrum abx lD felt fevers Central in nature. Patient required blood transfusion while in NSCU. Patient failed clamping trial and required VPS, which was Placed on 1/7.    1/9: Patient transferred to RCU overnight , Patient remains Febrile Fevers likely Central in Nature. Patient noted to have tremor of RT upper extremity on morning physical exam as per d/w Neuro Sx tremors are not new and patient has had several VEEG which are negative. Patient previously on Fentanyl patch for Autonomic storming which auto-discontinued, will re-order lower dose fentanyl patch as patient appears to be waking up.   1/10:  Still with fevers. CPAP trials as tolerated. Improvement in mental status noted as patient is more alert and following commands, still overall weak.  1/11: No new events. CPAP 5/5 today then TC trial tomorrow.  1/12: Patient tolerating CPAP will attempt TC today   1/13-14: Tolerated 24hrs TC well. ABG 7.45/47/89/32. Will continue day 2 TC ATC with f/u ABG in AM with plans to DC vent. Continue PT/OT.  UE duplex discontinued as right arm swelling has resolved. DC salt tabs once sodium normal  1/15: ABG 7.45/48/93/33. Vent DC'd. Family given list for acute rehab facilities.

## 2019-01-15 NOTE — PROGRESS NOTE ADULT - PROBLEM SELECTOR PLAN 1
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

## 2019-01-16 DIAGNOSIS — Z02.9 ENCOUNTER FOR ADMINISTRATIVE EXAMINATIONS, UNSPECIFIED: ICD-10-CM

## 2019-01-16 LAB
ANION GAP SERPL CALC-SCNC: 10 MMOL/L — SIGNIFICANT CHANGE UP (ref 5–17)
BUN SERPL-MCNC: 12 MG/DL — SIGNIFICANT CHANGE UP (ref 7–23)
CALCIUM SERPL-MCNC: 8.7 MG/DL — SIGNIFICANT CHANGE UP (ref 8.4–10.5)
CHLORIDE SERPL-SCNC: 95 MMOL/L — LOW (ref 96–108)
CO2 SERPL-SCNC: 31 MMOL/L — SIGNIFICANT CHANGE UP (ref 22–31)
CREAT SERPL-MCNC: 0.46 MG/DL — LOW (ref 0.5–1.3)
GLUCOSE SERPL-MCNC: 118 MG/DL — HIGH (ref 70–99)
MAGNESIUM SERPL-MCNC: 2.4 MG/DL — SIGNIFICANT CHANGE UP (ref 1.6–2.6)
PHOSPHATE SERPL-MCNC: 3 MG/DL — SIGNIFICANT CHANGE UP (ref 2.5–4.5)
POTASSIUM SERPL-MCNC: 4.5 MMOL/L — SIGNIFICANT CHANGE UP (ref 3.5–5.3)
POTASSIUM SERPL-SCNC: 4.5 MMOL/L — SIGNIFICANT CHANGE UP (ref 3.5–5.3)
SODIUM SERPL-SCNC: 136 MMOL/L — SIGNIFICANT CHANGE UP (ref 135–145)

## 2019-01-16 PROCEDURE — 70450 CT HEAD/BRAIN W/O DYE: CPT | Mod: 26

## 2019-01-16 PROCEDURE — 99233 SBSQ HOSP IP/OBS HIGH 50: CPT

## 2019-01-16 RX ORDER — FENTANYL CITRATE 50 UG/ML
1 INJECTION INTRAVENOUS
Qty: 0 | Refills: 0 | Status: DISCONTINUED | OUTPATIENT
Start: 2019-01-16 | End: 2019-01-22

## 2019-01-16 RX ADMIN — Medication 100 MILLIGRAM(S): at 06:40

## 2019-01-16 RX ADMIN — Medication 100 MILLIGRAM(S): at 18:44

## 2019-01-16 RX ADMIN — CHLORHEXIDINE GLUCONATE 1 APPLICATION(S): 213 SOLUTION TOPICAL at 21:36

## 2019-01-16 RX ADMIN — Medication 3 MILLILITER(S): at 11:49

## 2019-01-16 RX ADMIN — SODIUM CHLORIDE 2 GRAM(S): 9 INJECTION INTRAMUSCULAR; INTRAVENOUS; SUBCUTANEOUS at 06:39

## 2019-01-16 RX ADMIN — GABAPENTIN 300 MILLIGRAM(S): 400 CAPSULE ORAL at 16:21

## 2019-01-16 RX ADMIN — Medication 3 MILLILITER(S): at 17:22

## 2019-01-16 RX ADMIN — Medication 1 MILLIGRAM(S): at 16:18

## 2019-01-16 RX ADMIN — GABAPENTIN 300 MILLIGRAM(S): 400 CAPSULE ORAL at 06:40

## 2019-01-16 RX ADMIN — Medication 3 MILLILITER(S): at 05:57

## 2019-01-16 RX ADMIN — SENNA PLUS 1 TABLET(S): 8.6 TABLET ORAL at 21:37

## 2019-01-16 RX ADMIN — PANTOPRAZOLE SODIUM 40 MILLIGRAM(S): 20 TABLET, DELAYED RELEASE ORAL at 12:37

## 2019-01-16 RX ADMIN — Medication 1 PACKET(S): at 06:40

## 2019-01-16 RX ADMIN — Medication 3 MILLILITER(S): at 23:59

## 2019-01-16 RX ADMIN — Medication 1 PACKET(S): at 18:44

## 2019-01-16 RX ADMIN — ENOXAPARIN SODIUM 40 MILLIGRAM(S): 100 INJECTION SUBCUTANEOUS at 18:44

## 2019-01-16 RX ADMIN — FENTANYL CITRATE 1 PATCH: 50 INJECTION INTRAVENOUS at 08:00

## 2019-01-16 RX ADMIN — FENTANYL CITRATE 1 PATCH: 50 INJECTION INTRAVENOUS at 18:46

## 2019-01-16 RX ADMIN — Medication 1 PACKET(S): at 12:38

## 2019-01-16 RX ADMIN — GABAPENTIN 300 MILLIGRAM(S): 400 CAPSULE ORAL at 21:37

## 2019-01-16 RX ADMIN — Medication 3 MILLILITER(S): at 00:32

## 2019-01-16 RX ADMIN — SODIUM CHLORIDE 2 GRAM(S): 9 INJECTION INTRAMUSCULAR; INTRAVENOUS; SUBCUTANEOUS at 12:37

## 2019-01-16 NOTE — PROGRESS NOTE ADULT - PROBLEM SELECTOR PLAN 4
Patient remains with persistent fevers, likely Central in Nature   Patient previously txd with Vanco and Cefepime for MRSA / E.Coli PNA  Bld CX 1/8: No growth , UA 1/8: Negative, CXR 1/8: Clear Lungs   Continue to monitor off abx at present time Patients fever curve has improved, Previous fevers likely central in nature   Patient previously txd with Vanco and Cefepime for MRSA / E.Coli PNA  Bld CX 1/8: No growth , UA 1/8: Negative, CXR 1/8: Clear Lungs   Continue to monitor off abx at present time

## 2019-01-16 NOTE — PROGRESS NOTE ADULT - PROBLEM SELECTOR PLAN 2
Patient S/p Tracheostomy on 12/24 ( # 8 Cuffed Ember )   Tolerating TC ATC since 1/13. Will need to be down-sized to cuffless prior to discharge.  Continue Chest PT / Nebulizers and Suctioning PRN Patient S/p Tracheostomy on 12/24 ( # 8 Cuffed Ember )   Patient tolerating TC ATC since 1/13  Trach downsized to # 7 Cuffless Portex on 1/16   Continue Chest PT / Nebulizers and Suctioning PRN

## 2019-01-16 NOTE — PROGRESS NOTE ADULT - ATTENDING COMMENTS
59 yo female with SLE admitted 12/16 with cerebellar AVM and bleed s/p embolization and AVM resection and subsequent trach/peg. Cont TC atc. Remains afebrile. Salt tabs d/c as Na normalized. Had increased tonic activity of L arm -more than usual but ahs resolved now. CTH overnight shows some edema around tract of  shunt. No intervention as per NSG. Downsize shiley today. Pending discharge.

## 2019-01-16 NOTE — PROGRESS NOTE ADULT - SUBJECTIVE AND OBJECTIVE BOX
Patient is a 58y old  Female who presents with a chief complaint of Cerebellar IPH (15 [place] 2019 11:20)      Interval Events: Patient with facial twitching reported overnight, Worsened from baseline according to RN. CT head performed per neuro sx reccomendations    REVIEW OF SYSTEMS:  [ ] Positive  [ ] All other systems negative  [x] Unable to assess ROS because patient is Non-Verbal     Vital Signs Last 24 Hrs  T(C): 37.2 (01-16-19 @ 03:24), Max: 37.8 (01-15-19 @ 22:05)  T(F): 99 (01-16-19 @ 03:24), Max: 100 (01-15-19 @ 22:05)  HR: 105 (01-16-19 @ 08:42) (88 - 105)  BP: 115/76 (01-16-19 @ 03:24) (115/76 - 159/82)  RR: 18 (01-16-19 @ 08:42) (18 - 22)  SpO2: 98% (01-16-19 @ 08:42) (97% - 100%)    PHYSICAL EXAM:  HEENT:   [x]Tracheostomy: # 8 Cuffed Shiley   [ ]Pupils equal  [ ]No oral lesions  [x]Abnormal: Cranial Staples Present Rt side of scalp, Incision clean dry     SKIN  [x]No Rash  [ ] Abnormal  [ ] pressure    CARDIAC  [x]Regular  [ ]Abnormal    PULMONARY  [x]Bilateral Clear Breath Sounds  [ ]Normal Excursion  [ ]Abnormal    GI  [x]PEG      [x] +BS		              [x]Soft, nondistended, nontender	, + Abdominal staples present   [ ]Abnormal    MUSCULOSKELETAL                                   [ ]Bedbound                 [ ]Abnormal    [x]OOB to chair                           EXTREMITIES                                         [x]Normal  [ ]Edema                           NEUROLOGIC  [ ] Normal, non focal  [x] Focal findings: Following simple commands with all extremities     PSYCHIATRIC  [x]Alert   [ ] Sedated	 [ ]Agitated    :  Newsome: [ ] Yes, if yes: Date of Placement:                   [x] No    LINES: Central Lines [x] Yes, if yes: Date of Placement: RT UE PICC Placed 1/9                                      [  ] No    HOSPITAL MEDICATIONS:  MEDICATIONS  (STANDING):  ALBUTerol/ipratropium for Nebulization 3 milliLiter(s) Nebulizer every 6 hours  chlorhexidine 4% Liquid 1 Application(s) Topical <User Schedule>  docusate sodium Liquid 100 milliGRAM(s) Oral two times a day  enoxaparin Injectable 40 milliGRAM(s) SubCutaneous <User Schedule>  fentaNYL   Patch  12 MICROgram(s)/Hr. 1 Patch Transdermal every 48 hours  gabapentin   Solution 300 milliGRAM(s) Oral every 8 hours  pantoprazole   Suspension 40 milliGRAM(s) Oral daily  potassium phosphate / sodium phosphate powder 1 Packet(s) Oral four times a day  senna 1 Tablet(s) Oral at bedtime  sodium chloride 2 Gram(s) Oral every 6 hours    MEDICATIONS  (PRN):  acetaminophen    Suspension .. 650 milliGRAM(s) Oral every 6 hours PRN Temp greater or equal to 38.5C (101.3F), Mild Pain (1 - 3)  clonazePAM Tablet 1 milliGRAM(s) Oral every 8 hours PRN anxiety      LABS:    01-16    136  |  95<L>  |  12  ----------------------------<  118<H>  4.5   |  31  |  0.46<L>    Ca    8.7      16 Jan 2019 06:49  Phos  3.0     01-16  Mg     2.4     01-16          Arterial Blood Gas:  01-15 @ 13:25  7.45/48/93/33/98/8.4  ABG lactate: --      CAPILLARY BLOOD GLUCOSE    MICROBIOLOGY:     RADIOLOGY:  [ ] Reviewed and interpreted by me

## 2019-01-16 NOTE — PROGRESS NOTE ADULT - PROBLEM SELECTOR PLAN 1
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

## 2019-01-16 NOTE — PROGRESS NOTE ADULT - ASSESSMENT
58 Year Old woman PMH Lupus Presented with Sudden onset of Severe Headache with associated nausea and vomiting. 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 Patient was intact on presentation however intubated for airway protection and EVD placed for hydrocephalus. Patient was Transferred to [facility] for selective IR cerebral angiography. 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 Patient returned to the OR on 12/18 for sub occipital decompressive craniectomy. Patient was initially on Vimpat for suspected Seizure like activity, but VEEG while in the NSCU, without evidence of Seizures. Vimpat was discontinued. Patient was noted to have moderate diffuse encephalopathy and dysfxn in frontal regions bilaterally on VEEG. Patient underwent Trach Placement on 12/24 ( # 8 Cuffed Shiley ), and PEG Placement on 12/26. While in the NSCU Patient with Persistent Fevers and Autonomic Storming, Patient treated with medical management and Artic Sun Protocol. Patient found to MRSA/ E.coli growing in the sputum and was treated with Vanco and Cefepime. 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 Patient remained with persistent fevers despite course of broad spectrum abx lD felt fevers Central in nature. Patient required blood transfusion while in NSCU. Patient failed clamping trial and required VPS, which was Placed on 1/7. Patient weaned to TC ATC.     1/13-14: Tolerated 24hrs TC well. ABG 7.45/47/89/32. Will continue day 2 TC ATC with f/u ABG in AM with plans to DC vent. Continue PT/OT.  UE duplex discontinued as right arm swelling has resolved. DC salt tabs once sodium normal  1/15: ABG 7.45/48/93/33. Vent DC'd. Family given list for acute rehab facilities. 58 Year Old woman PMH Lupus Presented with Sudden onset of Severe Headache with associated nausea and vomiting. 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 Patient was intact on presentation however intubated for airway protection and EVD placed for hydrocephalus. Patient was Transferred to [facility] for selective IR cerebral angiography. 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 Patient returned to the OR on 12/18 for sub occipital decompressive craniectomy. Patient was initially on Vimpat for suspected Seizure like activity, but VEEG while in the NSCU, without evidence of Seizures. Vimpat was discontinued. Patient was noted to have moderate diffuse encephalopathy and dysfxn in frontal regions bilaterally on VEEG. Patient underwent Trach Placement on 12/24 ( # 8 Cuffed Shiley ), and PEG Placement on 12/26. While in the NSCU Patient with Persistent Fevers and Autonomic Storming, Patient treated with medical management and Artic Sun Protocol. Patient found to MRSA/ E.coli growing in the sputum and was treated with Vanco and Cefepime. 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 Patient remained with persistent fevers despite course of broad spectrum abx lD felt fevers Central in nature. Patient required blood transfusion while in NSCU. Patient failed clamping trial and required VPS, which was Placed on 1/7. Patient weaned to TC ATC, Trach downsized to # 7 Cuffless Portex on 1/16.     1/16: Patient with facial twitching reported overnight worsened from Baseline as per RN. Head CT performed overnight preliminary report was read as Hemorrhage along tract of  shunt catheter new from prior exam, official report did not reveal hemorrhage along tract of  shunt but revealed pericatheter edema. Patient had repeat head CT performed again this morning which remained unchanged compared to prior head CT from last night. Case d/w Neuro sx no intervention based on CT findings can proceed with dc planning. Patient tolerating trach collar ATC, Trach attempted to be changed but Difficulty removing trach due some resistance met. 58 Year Old woman PMH Lupus Presented with Sudden onset of Severe Headache with associated nausea and vomiting. 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 Patient was intact on presentation however intubated for airway protection and EVD placed for hydrocephalus. Patient was Transferred to [facility] for selective IR cerebral angiography. 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 Patient returned to the OR on 12/18 for sub occipital decompressive craniectomy. Patient was initially on Vimpat for suspected Seizure like activity, but VEEG while in the NSCU, without evidence of Seizures. Vimpat was discontinued. Patient was noted to have moderate diffuse encephalopathy and dysfxn in frontal regions bilaterally on VEEG. Patient underwent Trach Placement on 12/24 ( # 8 Cuffed Shiley ), and PEG Placement on 12/26. While in the NSCU Patient with Persistent Fevers and Autonomic Storming, Patient treated with medical management and Artic Sun Protocol. Patient found to MRSA/ E.coli growing in the sputum and was treated with Vanco and Cefepime. 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 Patient remained with persistent fevers despite course of broad spectrum abx lD felt fevers Central in nature. Patient required blood transfusion while in NSCU. Patient failed clamping trial and required VPS, which was Placed on 1/7. Patient weaned to TC ATC, Trach downsized to # 7 Cuffless Portex on 1/16.     1/16: Patient with facial twitching reported overnight worsened from Baseline as per RN. Head CT performed overnight preliminary report was read as Hemorrhage along tract of  shunt catheter new from prior exam, official report did not reveal hemorrhage along tract of  shunt but revealed pericatheter edema. Patient had repeat head CT performed again this morning which remained unchanged compared to prior head CT from last night. Case d/w Neuro sx no intervention based on CT findings can proceed with dc planning. Patient tolerating trach collar ATC, Trach attempted to be changed but Difficulty removing trach due some resistance met this afternoon. ENT called to bedside to assist with trach change # 7 Cuffed Portex Placed, patient with minimal oozing s/p trach change.

## 2019-01-17 PROCEDURE — 99233 SBSQ HOSP IP/OBS HIGH 50: CPT

## 2019-01-17 RX ORDER — POLYETHYLENE GLYCOL 3350 17 G/17G
17 POWDER, FOR SOLUTION ORAL ONCE
Qty: 0 | Refills: 0 | Status: COMPLETED | OUTPATIENT
Start: 2019-01-17 | End: 2019-01-17

## 2019-01-17 RX ADMIN — Medication 1 PACKET(S): at 06:15

## 2019-01-17 RX ADMIN — Medication 1 MILLIGRAM(S): at 14:36

## 2019-01-17 RX ADMIN — FENTANYL CITRATE 1 PATCH: 50 INJECTION INTRAVENOUS at 18:00

## 2019-01-17 RX ADMIN — Medication 1 PACKET(S): at 11:21

## 2019-01-17 RX ADMIN — Medication 3 MILLILITER(S): at 11:18

## 2019-01-17 RX ADMIN — GABAPENTIN 300 MILLIGRAM(S): 400 CAPSULE ORAL at 06:15

## 2019-01-17 RX ADMIN — Medication 100 MILLIGRAM(S): at 17:59

## 2019-01-17 RX ADMIN — Medication 1 PACKET(S): at 23:33

## 2019-01-17 RX ADMIN — GABAPENTIN 300 MILLIGRAM(S): 400 CAPSULE ORAL at 22:50

## 2019-01-17 RX ADMIN — Medication 1 PACKET(S): at 18:00

## 2019-01-17 RX ADMIN — Medication 100 MILLIGRAM(S): at 06:15

## 2019-01-17 RX ADMIN — POLYETHYLENE GLYCOL 3350 17 GRAM(S): 17 POWDER, FOR SOLUTION ORAL at 11:20

## 2019-01-17 RX ADMIN — ENOXAPARIN SODIUM 40 MILLIGRAM(S): 100 INJECTION SUBCUTANEOUS at 18:00

## 2019-01-17 RX ADMIN — FENTANYL CITRATE 1 PATCH: 50 INJECTION INTRAVENOUS at 19:36

## 2019-01-17 RX ADMIN — Medication 1 PACKET(S): at 00:24

## 2019-01-17 RX ADMIN — Medication 3 MILLILITER(S): at 17:46

## 2019-01-17 RX ADMIN — CHLORHEXIDINE GLUCONATE 1 APPLICATION(S): 213 SOLUTION TOPICAL at 22:40

## 2019-01-17 RX ADMIN — GABAPENTIN 300 MILLIGRAM(S): 400 CAPSULE ORAL at 14:36

## 2019-01-17 RX ADMIN — PANTOPRAZOLE SODIUM 40 MILLIGRAM(S): 20 TABLET, DELAYED RELEASE ORAL at 11:21

## 2019-01-17 RX ADMIN — FENTANYL CITRATE 1 PATCH: 50 INJECTION INTRAVENOUS at 08:18

## 2019-01-17 RX ADMIN — Medication 3 MILLILITER(S): at 05:03

## 2019-01-17 RX ADMIN — Medication 1 MILLIGRAM(S): at 23:33

## 2019-01-17 RX ADMIN — SENNA PLUS 1 TABLET(S): 8.6 TABLET ORAL at 22:40

## 2019-01-17 NOTE — PROGRESS NOTE ADULT - PROBLEM SELECTOR PLAN 1
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

## 2019-01-17 NOTE — PROGRESS NOTE ADULT - PROBLEM SELECTOR PLAN 2
Patient S/p Tracheostomy on 12/24 ( # 8 Cuffed Ember )   Patient tolerating TC ATC since 1/13  Trach downsized to # 7 Cuffless Portex on 1/16   Continue Chest PT / Nebulizers and Suctioning PRN

## 2019-01-17 NOTE — PROGRESS NOTE ADULT - ATTENDING COMMENTS
57 yo female with SLE admitted 12/16 with cerebellar AVM and bleed s/p embolization and AVM resection and subsequent trach/peg. Cont TC atc. Remains afebrile. Salt tabs d/c as Na normalized. Had increased tonic activity of L arm -more than usual but has resolved now. CTH repeat showed some edema around tract of  shunt. No intervention as per NSG. Downsize shiley yesterday. Pending discharge.

## 2019-01-17 NOTE — PROGRESS NOTE ADULT - SUBJECTIVE AND OBJECTIVE BOX
Patient is a 58y old  Female who presents with a chief complaint of Cerebellar IPH (16 Jan 2019 09:30)      Interval Events: No events reported overnight     REVIEW OF SYSTEMS:  [ ] Positive  [ ] All other systems negative  [x] Unable to assess ROS because patient is Non- Verbal     Vital Signs Last 24 Hrs  T(C): 37.1 (01-17-19 @ 04:00), Max: 37.5 (01-16-19 @ 21:37)  T(F): 98.8 (01-17-19 @ 04:00), Max: 99.5 (01-16-19 @ 21:37)  HR: 102 (01-17-19 @ 05:03) (100 - 111)  BP: 134/77 (01-17-19 @ 04:00) (125/85 - 134/77)  RR: 20 (01-17-19 @ 04:21) (18 - 20)  SpO2: 97% (01-17-19 @ 05:03) (97% - 100%)    PHYSICAL EXAM:  HEENT:   [x]Tracheostomy: # 7 Cuffless Portex   [ ]Pupils equal  [ ]No oral lesions  [x]Abnormal: Cranial Staples Present Rt side of scalp, Incision clean dry     SKIN  [x]No Rash  [ ] Abnormal  [ ] pressure    CARDIAC  [x]Regular  [ ]Abnormal    PULMONARY  [x]Bilateral Clear Breath Sounds  [ ]Normal Excursion  [ ]Abnormal    GI  [x]PEG      [x] +BS		              [x]Soft, nondistended, nontender	, + Abdominal staples present   [ ]Abnormal    MUSCULOSKELETAL                                   [ ]Bedbound                 [ ]Abnormal    [x]OOB to chair                           EXTREMITIES                                         [x]Normal  [ ]Edema                           NEUROLOGIC  [ ] Normal, non focal  [x] Focal findings: Following simple commands with all extremities     PSYCHIATRIC  [x]Alert   [ ] Sedated	 [ ]Agitated    :  Newsome: [ ] Yes, if yes: Date of Placement:                   [x] No    LINES: Central Lines [x] Yes, if yes: Date of Placement: RT UE PICC Placed 1/9                                      [  ] No    HOSPITAL MEDICATIONS:  MEDICATIONS  (STANDING):  ALBUTerol/ipratropium for Nebulization 3 milliLiter(s) Nebulizer every 6 hours  chlorhexidine 4% Liquid 1 Application(s) Topical <User Schedule>  docusate sodium Liquid 100 milliGRAM(s) Oral two times a day  enoxaparin Injectable 40 milliGRAM(s) SubCutaneous <User Schedule>  fentaNYL   Patch  12 MICROgram(s)/Hr. 1 Patch Transdermal every 48 hours  gabapentin   Solution 300 milliGRAM(s) Oral every 8 hours  pantoprazole   Suspension 40 milliGRAM(s) Oral daily  potassium phosphate / sodium phosphate powder 1 Packet(s) Oral four times a day  senna 1 Tablet(s) Oral at bedtime    MEDICATIONS  (PRN):  acetaminophen    Suspension .. 650 milliGRAM(s) Oral every 6 hours PRN Temp greater or equal to 38.5C (101.3F), Mild Pain (1 - 3)  clonazePAM Tablet 1 milliGRAM(s) Oral every 8 hours PRN anxiety      LABS:    01-16    136  |  95<L>  |  12  ----------------------------<  118<H>  4.5   |  31  |  0.46<L>    Ca    8.7      16 Jan 2019 06:49  Phos  3.0     01-16  Mg     2.4     01-16          Arterial Blood Gas:  01-15 @ 13:25  7.45/48/93/33/98/8.4  ABG lactate: --      CAPILLARY BLOOD GLUCOSE    MICROBIOLOGY:     RADIOLOGY:  [ ] Reviewed and interpreted by me

## 2019-01-17 NOTE — PROGRESS NOTE ADULT - ASSESSMENT
58 Year Old woman PMH Lupus Presented with Sudden onset of Severe Headache with associated nausea and vomiting. 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 Patient was intact on presentation however intubated for airway protection and EVD placed for hydrocephalus. Patient was Transferred to [facility] for selective IR cerebral angiography. 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 Patient returned to the OR on 12/18 for sub occipital decompressive craniectomy. Patient was initially on Vimpat for suspected Seizure like activity, but VEEG while in the NSCU, without evidence of Seizures. Vimpat was discontinued. Patient was noted to have moderate diffuse encephalopathy and dysfxn in frontal regions bilaterally on VEEG. Patient underwent Trach Placement on 12/24 ( # 8 Cuffed Shiley ), and PEG Placement on 12/26. While in the NSCU Patient with Persistent Fevers and Autonomic Storming, Patient treated with medical management and Artic Sun Protocol. Patient found to MRSA/ E.coli growing in the sputum and was treated with Vanco and Cefepime. 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 Patient remained with persistent fevers despite course of broad spectrum abx lD felt fevers Central in nature. Patient required blood transfusion while in NSCU. Patient failed clamping trial and required VPS, which was Placed on 1/7. Patient weaned to TC ATC, Trach downsized to # 7 Cuffless Portex on 1/16.     1/17: Patient downsized yesterday to # 7 Cuffless Portex

## 2019-01-17 NOTE — PROGRESS NOTE ADULT - PROBLEM SELECTOR PLAN 4
Patients fever curve has improved, Previous fevers likely central in nature   Patient previously txd with Vanco and Cefepime for MRSA / E.Coli PNA  Bld CX 1/8: No growth , UA 1/8: Negative, CXR 1/8: Clear Lungs   Continue to monitor off abx at present time

## 2019-01-17 NOTE — PROGRESS NOTE ADULT - PROBLEM SELECTOR PLAN 5
Patient required PRBCS during admission   Continue to monitor CBC Patient required PRBCS during admission

## 2019-01-17 NOTE — PROGRESS NOTE ADULT - PROBLEM SELECTOR PLAN 7
Sisters provided with Acute Rehab Facilities List  Will Proceed with Dc planning Kay provided with Acute Rehab Facilities List  Will Proceed with Dc planning  Case d/w Kay yesterday evening they are currently researching different facilities and will provide us with choices

## 2019-01-18 LAB
CULTURE RESULTS: SIGNIFICANT CHANGE UP
SPECIMEN SOURCE: SIGNIFICANT CHANGE UP

## 2019-01-18 PROCEDURE — 99232 SBSQ HOSP IP/OBS MODERATE 35: CPT

## 2019-01-18 RX ORDER — ACETAMINOPHEN 500 MG
650 TABLET ORAL ONCE
Qty: 0 | Refills: 0 | Status: COMPLETED | OUTPATIENT
Start: 2019-01-18 | End: 2019-01-18

## 2019-01-18 RX ADMIN — Medication 650 MILLIGRAM(S): at 22:19

## 2019-01-18 RX ADMIN — Medication 100 MILLIGRAM(S): at 17:36

## 2019-01-18 RX ADMIN — GABAPENTIN 300 MILLIGRAM(S): 400 CAPSULE ORAL at 23:35

## 2019-01-18 RX ADMIN — ENOXAPARIN SODIUM 40 MILLIGRAM(S): 100 INJECTION SUBCUTANEOUS at 17:36

## 2019-01-18 RX ADMIN — Medication 3 MILLILITER(S): at 12:01

## 2019-01-18 RX ADMIN — Medication 650 MILLIGRAM(S): at 15:33

## 2019-01-18 RX ADMIN — Medication 650 MILLIGRAM(S): at 16:36

## 2019-01-18 RX ADMIN — GABAPENTIN 300 MILLIGRAM(S): 400 CAPSULE ORAL at 05:54

## 2019-01-18 RX ADMIN — Medication 100 MILLIGRAM(S): at 05:53

## 2019-01-18 RX ADMIN — Medication 1 PACKET(S): at 17:36

## 2019-01-18 RX ADMIN — SENNA PLUS 1 TABLET(S): 8.6 TABLET ORAL at 22:19

## 2019-01-18 RX ADMIN — Medication 1 MILLIGRAM(S): at 12:05

## 2019-01-18 RX ADMIN — Medication 1 PACKET(S): at 05:54

## 2019-01-18 RX ADMIN — PANTOPRAZOLE SODIUM 40 MILLIGRAM(S): 20 TABLET, DELAYED RELEASE ORAL at 12:05

## 2019-01-18 RX ADMIN — Medication 1 PACKET(S): at 12:05

## 2019-01-18 RX ADMIN — FENTANYL CITRATE 1 PATCH: 50 INJECTION INTRAVENOUS at 22:15

## 2019-01-18 RX ADMIN — CHLORHEXIDINE GLUCONATE 1 APPLICATION(S): 213 SOLUTION TOPICAL at 22:20

## 2019-01-18 RX ADMIN — Medication 3 MILLILITER(S): at 06:35

## 2019-01-18 RX ADMIN — Medication 3 MILLILITER(S): at 00:52

## 2019-01-18 RX ADMIN — FENTANYL CITRATE 1 PATCH: 50 INJECTION INTRAVENOUS at 19:15

## 2019-01-18 RX ADMIN — GABAPENTIN 300 MILLIGRAM(S): 400 CAPSULE ORAL at 14:02

## 2019-01-18 RX ADMIN — Medication 1 MILLIGRAM(S): at 20:21

## 2019-01-18 RX ADMIN — Medication 3 MILLILITER(S): at 18:49

## 2019-01-18 NOTE — PROGRESS NOTE ADULT - PROBLEM SELECTOR PLAN 7
Kay provided with Acute Rehab Facilities List  Will Proceed with Dc planning  Case d/w Kay yesterday evening they are currently researching different facilities and will provide us with choices

## 2019-01-18 NOTE — PROGRESS NOTE ADULT - ATTENDING COMMENTS
59 yo female with SLE admitted 12/16 with cerebellar AVM and bleed s/p embolization and AVM resection and subsequent trach/peg. Cont TC atc. Remains afebrile. Salt tabs d/c as Na normalized. Had increased tonic activity of L arm -more than usual but has resolved now. CTH repeat showed some edema around tract of  shunt. No intervention as per NSG. Downsize shiley yesterday. Pending discharge. 59 yo female with SLE admitted 12/16 with cerebellar AVM and bleed s/p embolization and AVM resection and subsequent trach/peg. Cont TC atc. Remains afebrile. Salt tabs d/c as Na normalized. No intervention as per NSG. Pending discharge.

## 2019-01-18 NOTE — PROVIDER CONTACT NOTE (OTHER) - BACKGROUND
s/p craniotomy, s/p EVD for IVH.
IVH
admitted for ICH,  Trach to vent,Pt is alert, follows simple commands,
cerebellar IPH with hydro s/p embolization
patient admitted with stroke
status post AVM resection.

## 2019-01-18 NOTE — PROGRESS NOTE ADULT - ASSESSMENT
58 Year Old woman PMH Lupus Presented with Sudden onset of Severe Headache with associated nausea and vomiting. 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 Patient was intact on presentation however intubated for airway protection and EVD placed for hydrocephalus. Patient was Transferred to [facility] for selective IR cerebral angiography. 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 Patient returned to the OR on 12/18 for sub occipital decompressive craniectomy. Patient was initially on Vimpat for suspected Seizure like activity, but VEEG while in the NSCU, without evidence of Seizures. Vimpat was discontinued. Patient was noted to have moderate diffuse encephalopathy and dysfxn in frontal regions bilaterally on VEEG. Patient underwent Trach Placement on 12/24 ( # 8 Cuffed Shiley ), and PEG Placement on 12/26. While in the NSCU Patient with Persistent Fevers and Autonomic Storming, Patient treated with medical management and Artic Sun Protocol. Patient found to MRSA/ E.coli growing in the sputum and was treated with Vanco and Cefepime. 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 Patient remained with persistent fevers despite course of broad spectrum abx lD felt fevers Central in nature. Patient required blood transfusion while in NSCU. Patient failed clamping trial and required VPS, which was Placed on 1/7. Patient weaned to TC ATC, Trach downsized to # 7 Cuffless Portex on 1/16.     1/17: Patient downsized yesterday to # 7 Cuffless Portex

## 2019-01-18 NOTE — PROGRESS NOTE ADULT - SUBJECTIVE AND OBJECTIVE BOX
Patient is a 58y old  Female who presents with a chief complaint of Cerebellar IPH (16 Jan 2019 09:30)      Interval Events: No events reported overnight     REVIEW OF SYSTEMS:  [ ] Positive  [ ] All other systems negative  [x] Unable to assess ROS because patient is Non- Verbal     Vital Signs Last 24 Hrs  T(C): 37.1 (01-17-19 @ 04:00), Max: 37.5 (01-16-19 @ 21:37)  T(F): 98.8 (01-17-19 @ 04:00), Max: 99.5 (01-16-19 @ 21:37)  HR: 102 (01-17-19 @ 05:03) (100 - 111)  BP: 134/77 (01-17-19 @ 04:00) (125/85 - 134/77)  RR: 20 (01-17-19 @ 04:21) (18 - 20)  SpO2: 97% (01-17-19 @ 05:03) (97% - 100%)    PHYSICAL EXAM:  HEENT:   [x]Tracheostomy: # 7 Cuffless Portex  TC  30%  [ ]Pupils equal  [ ]No oral lesions  [x]Abnormal: Cranial Staples Present Rt side of scalp, Incision clean dry . DC  1/21    SKIN  [x]No Rash  [ ] Abnormal  [ ] pressure    CARDIAC  [x]Regular  [ ]Abnormal    PULMONARY  [x]Bilateral Clear Breath Sounds  [ ]Normal Excursion  [ ]Abnormal    GI  [x]PEG      [x] +BS		              [x]Soft, nondistended, nontender	, + Abdominal staples present   [ ]Abnormal    MUSCULOSKELETAL                                   [ ]Bedbound                 [ ]Abnormal    [x]OOB to chair                           EXTREMITIES                                         [x]Normal  [ ]Edema                           NEUROLOGIC  [ ] Normal, non focal  [x] Focal findings: Following simple commands with all extremities     PSYCHIATRIC  [x]Alert   [ ] Sedated	 [ ]Agitated    :  Newsome: [ ] Yes, if yes: Date of Placement:                   [x] No    LINES: Central Lines [x] Yes, if yes: Date of Placement: RT UE PICC Placed 1/9                                      [  ] No    HOSPITAL MEDICATIONS:  MEDICATIONS  (STANDING):  ALBUTerol/ipratropium for Nebulization 3 milliLiter(s) Nebulizer every 6 hours  chlorhexidine 4% Liquid 1 Application(s) Topical <User Schedule>  docusate sodium Liquid 100 milliGRAM(s) Oral two times a day  enoxaparin Injectable 40 milliGRAM(s) SubCutaneous <User Schedule>  fentaNYL   Patch  12 MICROgram(s)/Hr. 1 Patch Transdermal every 48 hours  gabapentin   Solution 300 milliGRAM(s) Oral every 8 hours  pantoprazole   Suspension 40 milliGRAM(s) Oral daily  potassium phosphate / sodium phosphate powder 1 Packet(s) Oral four times a day  senna 1 Tablet(s) Oral at bedtime    MEDICATIONS  (PRN):  acetaminophen    Suspension .. 650 milliGRAM(s) Oral every 6 hours PRN Temp greater or equal to 38.5C (101.3F), Mild Pain (1 - 3)  clonazePAM Tablet 1 milliGRAM(s) Oral every 8 hours PRN anxiety      LABS:    01-16    136  |  95<L>  |  12  ----------------------------<  118<H>  4.5   |  31  |  0.46<L>    Ca    8.7      16 Jan 2019 06:49  Phos  3.0     01-16  Mg     2.4     01-16          Arterial Blood Gas:  01-15 @ 13:25  7.45/48/93/33/98/8.4  ABG lactate: --      CAPILLARY BLOOD GLUCOSE    MICROBIOLOGY:     RADIOLOGY:  [ ] Reviewed and interpreted by me

## 2019-01-18 NOTE — PROGRESS NOTE ADULT - PROBLEM SELECTOR PLAN 1
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

## 2019-01-19 PROCEDURE — 99233 SBSQ HOSP IP/OBS HIGH 50: CPT | Mod: GC

## 2019-01-19 RX ADMIN — GABAPENTIN 300 MILLIGRAM(S): 400 CAPSULE ORAL at 22:03

## 2019-01-19 RX ADMIN — Medication 1 PACKET(S): at 23:57

## 2019-01-19 RX ADMIN — Medication 1 PACKET(S): at 06:20

## 2019-01-19 RX ADMIN — CHLORHEXIDINE GLUCONATE 1 APPLICATION(S): 213 SOLUTION TOPICAL at 22:14

## 2019-01-19 RX ADMIN — GABAPENTIN 300 MILLIGRAM(S): 400 CAPSULE ORAL at 13:39

## 2019-01-19 RX ADMIN — Medication 3 MILLILITER(S): at 17:24

## 2019-01-19 RX ADMIN — Medication 1 PACKET(S): at 00:13

## 2019-01-19 RX ADMIN — Medication 1 PACKET(S): at 11:46

## 2019-01-19 RX ADMIN — Medication 100 MILLIGRAM(S): at 06:21

## 2019-01-19 RX ADMIN — PANTOPRAZOLE SODIUM 40 MILLIGRAM(S): 20 TABLET, DELAYED RELEASE ORAL at 11:46

## 2019-01-19 RX ADMIN — Medication 3 MILLILITER(S): at 23:49

## 2019-01-19 RX ADMIN — Medication 3 MILLILITER(S): at 00:33

## 2019-01-19 RX ADMIN — Medication 1 PACKET(S): at 17:12

## 2019-01-19 RX ADMIN — FENTANYL CITRATE 1 PATCH: 50 INJECTION INTRAVENOUS at 17:11

## 2019-01-19 RX ADMIN — SENNA PLUS 1 TABLET(S): 8.6 TABLET ORAL at 22:03

## 2019-01-19 RX ADMIN — ENOXAPARIN SODIUM 40 MILLIGRAM(S): 100 INJECTION SUBCUTANEOUS at 17:11

## 2019-01-19 RX ADMIN — Medication 3 MILLILITER(S): at 12:49

## 2019-01-19 RX ADMIN — FENTANYL CITRATE 1 PATCH: 50 INJECTION INTRAVENOUS at 06:29

## 2019-01-19 RX ADMIN — FENTANYL CITRATE 1 PATCH: 50 INJECTION INTRAVENOUS at 17:06

## 2019-01-19 RX ADMIN — Medication 3 MILLILITER(S): at 05:59

## 2019-01-19 RX ADMIN — FENTANYL CITRATE 1 PATCH: 50 INJECTION INTRAVENOUS at 19:00

## 2019-01-19 RX ADMIN — Medication 100 MILLIGRAM(S): at 17:12

## 2019-01-19 RX ADMIN — GABAPENTIN 300 MILLIGRAM(S): 400 CAPSULE ORAL at 06:21

## 2019-01-19 NOTE — PROGRESS NOTE ADULT - ASSESSMENT
58 Year Old woman PMH Lupus Presented with Sudden onset of Severe Headache with associated nausea and vomiting. 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 Patient was intact on presentation however intubated for airway protection and EVD placed for hydrocephalus. Patient was Transferred to [facility] for selective IR cerebral angiography. 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 Patient returned to the OR on 12/18 for sub occipital decompressive craniectomy. Patient was initially on Vimpat for suspected Seizure like activity, but VEEG while in the NSCU, without evidence of Seizures. Vimpat was discontinued. Patient was noted to have moderate diffuse encephalopathy and dysfxn in frontal regions bilaterally on VEEG. Patient underwent Trach Placement on 12/24 ( # 8 Cuffed Shiley ), and PEG Placement on 12/26. While in the NSCU Patient with Persistent Fevers and Autonomic Storming, Patient treated with medical management and Artic Sun Protocol. Patient found to MRSA/ E.coli growing in the sputum and was treated with Vanco and Cefepime. 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 Patient remained with persistent fevers despite course of broad spectrum abx lD felt fevers Central in nature. Patient required blood transfusion while in NSCU. Patient failed clamping trial and required VPS, which was Placed on 1/7. Patient weaned to TC ATC, Trach downsized to # 7 Cuffless Portex on 1/16.     1/17: Patient downsized yesterday to # 7 Cuffless Portex   1/19: No new events. Remains medically stable, awaiting discharge to rehab. Scalp staples to be removed prior discharge.

## 2019-01-19 NOTE — PROGRESS NOTE ADULT - SUBJECTIVE AND OBJECTIVE BOX
Patient is a 58y old  Female who presents with a chief complaint of Cerebellar IPH (18 Jan 2019 12:11)      Interval Events:    REVIEW OF SYSTEMS:  [ ] Positive  [ ] All other systems negative  [ ] Unable to assess ROS because ________    Vital Signs Last 24 Hrs  T(C): 37 (01-19-19 @ 09:15), Max: 38.1 (01-18-19 @ 21:33)  T(F): 98.6 (01-19-19 @ 09:15), Max: 100.5 (01-18-19 @ 21:33)  HR: 109 (01-19-19 @ 09:15) (94 - 117)  BP: 113/78 (01-19-19 @ 09:15) (102/96 - 141/62)  RR: 22 (01-19-19 @ 09:15) (20 - 22)  SpO2: 100% (01-19-19 @ 09:15) (97% - 100%)    PHYSICAL EXAM:  HEENT:   [ ]Tracheostomy:  [ ]Pupils equal  [ ]No oral lesions  [ ]Abnormal    SKIN  [ ]No Rash  [ ] Abnormal  [ ] pressure    CARDIAC  [ ]Regular  [ ]Abnormal    PULMONARY  [ ]Bilateral Clear Breath Sounds  [ ]Normal Excursion  [ ]Abnormal    GI  [ ]PEG      [ ] +BS		              [ ]Soft, nondistended, nontender	  [ ]Abnormal    MUSCULOSKELETAL                                   [ ]Bedbound                 [ ]Abnormal    [ ]Ambulatory/OOB to chair                           EXTREMITIES                                         [ ]Normal  [ ]Edema                           NEUROLOGIC  [ ] Normal, non focal  [ ] Focal findings:    PSYCHIATRIC  [ ]Alert and appropriate  [ ] Sedated	 [ ]Agitated    :  Newsome: [ ] Yes, if yes: Date of Placement:                   [  ] No    LINES: Central Lines [ ] Yes, if yes: Date of Placement                                     [  ] No    HOSPITAL MEDICATIONS:  MEDICATIONS  (STANDING):  ALBUTerol/ipratropium for Nebulization 3 milliLiter(s) Nebulizer every 6 hours  chlorhexidine 4% Liquid 1 Application(s) Topical <User Schedule>  docusate sodium Liquid 100 milliGRAM(s) Oral two times a day  enoxaparin Injectable 40 milliGRAM(s) SubCutaneous <User Schedule>  fentaNYL   Patch  12 MICROgram(s)/Hr. 1 Patch Transdermal every 48 hours  gabapentin   Solution 300 milliGRAM(s) Oral every 8 hours  pantoprazole   Suspension 40 milliGRAM(s) Oral daily  potassium phosphate / sodium phosphate powder 1 Packet(s) Oral four times a day  senna 1 Tablet(s) Oral at bedtime    MEDICATIONS  (PRN):  acetaminophen    Suspension .. 650 milliGRAM(s) Oral every 6 hours PRN Temp greater or equal to 38.5C (101.3F), Mild Pain (1 - 3)  clonazePAM Tablet 1 milliGRAM(s) Oral every 8 hours PRN anxiety      LABS:                  CAPILLARY BLOOD GLUCOSE    MICROBIOLOGY:     RADIOLOGY:  [ ] Reviewed and interpreted by me Patient is a 58y old  Female who presents with a chief complaint of Cerebellar IPH...........      Interval Events: No events reported over night    REVIEW OF SYSTEMS:  [ ] Positive  [X ] All other systems negative  [ ] Unable to assess ROS because ________    Vital Signs Last 24 Hrs  T(C): 37 (01-19-19 @ 09:15), Max: 38.1 (01-18-19 @ 21:33)  T(F): 98.6 (01-19-19 @ 09:15), Max: 100.5 (01-18-19 @ 21:33)  HR: 109 (01-19-19 @ 09:15) (94 - 117)  BP: 113/78 (01-19-19 @ 09:15) (102/96 - 141/62)  RR: 22 (01-19-19 @ 09:15) (20 - 22)  SpO2: 100% (01-19-19 @ 09:15) (97% - 100%)    PHYSICAL EXAM:  HEENT:   [x]Tracheostomy: # 7 Cuffless Portex  TC  30%  [ ]Pupils equal  [ ]No oral lesions  [x]Abnormal: Cranial Staples Present Rt side of scalp, Incision clean dry . DC  1/21    SKIN  [x]No Rash  [ ] Abnormal  [ ] pressure    CARDIAC  [x]Regular  [ ]Abnormal    PULMONARY  [x]Bilateral Clear Breath Sounds  [ ]Normal Excursion  [ ]Abnormal    GI  [x]PEG      [x] +BS		              [x]Soft, nondistended, nontender	, + Abdominal staples present   [ ]Abnormal    MUSCULOSKELETAL                                   [ ]Bedbound                 [ ]Abnormal    [x]OOB to chair                           EXTREMITIES                                         [x]Normal  [ ]Edema                           NEUROLOGIC  [ ] Normal, non focal  [x] Focal findings: Following simple commands with all extremities     PSYCHIATRIC  [x]Alert   [ ] Sedated	 [ ]Agitated    :  Newsome: [ ] Yes, if yes: Date of Placement:                   [x] No    LINES: Central Lines [x] Yes, if yes: Date of Placement: RT UE PICC Placed 1/9                                      [  ] No        HOSPITAL MEDICATIONS:  MEDICATIONS  (STANDING):  ALBUTerol/ipratropium for Nebulization 3 milliLiter(s) Nebulizer every 6 hours  chlorhexidine 4% Liquid 1 Application(s) Topical <User Schedule>  docusate sodium Liquid 100 milliGRAM(s) Oral two times a day  enoxaparin Injectable 40 milliGRAM(s) SubCutaneous <User Schedule>  fentaNYL   Patch  12 MICROgram(s)/Hr. 1 Patch Transdermal every 48 hours  gabapentin   Solution 300 milliGRAM(s) Oral every 8 hours  pantoprazole   Suspension 40 milliGRAM(s) Oral daily  potassium phosphate / sodium phosphate powder 1 Packet(s) Oral four times a day  senna 1 Tablet(s) Oral at bedtime    MEDICATIONS  (PRN):  acetaminophen    Suspension .. 650 milliGRAM(s) Oral every 6 hours PRN Temp greater or equal to 38.5C (101.3F), Mild Pain (1 - 3)  clonazePAM Tablet 1 milliGRAM(s) Oral every 8 hours PRN anxiety      LABS:                  CAPILLARY BLOOD GLUCOSE    MICROBIOLOGY:     RADIOLOGY:  [ ] Reviewed and interpreted by me

## 2019-01-20 LAB — GLUCOSE BLDC GLUCOMTR-MCNC: 146 MG/DL — HIGH (ref 70–99)

## 2019-01-20 PROCEDURE — 99232 SBSQ HOSP IP/OBS MODERATE 35: CPT | Mod: GC

## 2019-01-20 RX ORDER — CLONAZEPAM 1 MG
1 TABLET ORAL EVERY 8 HOURS
Qty: 0 | Refills: 0 | Status: DISCONTINUED | OUTPATIENT
Start: 2019-01-20 | End: 2019-01-23

## 2019-01-20 RX ADMIN — ENOXAPARIN SODIUM 40 MILLIGRAM(S): 100 INJECTION SUBCUTANEOUS at 17:45

## 2019-01-20 RX ADMIN — Medication 100 MILLIGRAM(S): at 17:45

## 2019-01-20 RX ADMIN — Medication 3 MILLILITER(S): at 11:54

## 2019-01-20 RX ADMIN — Medication 1 PACKET(S): at 23:32

## 2019-01-20 RX ADMIN — GABAPENTIN 300 MILLIGRAM(S): 400 CAPSULE ORAL at 05:58

## 2019-01-20 RX ADMIN — Medication 1 PACKET(S): at 17:45

## 2019-01-20 RX ADMIN — FENTANYL CITRATE 1 PATCH: 50 INJECTION INTRAVENOUS at 07:00

## 2019-01-20 RX ADMIN — CHLORHEXIDINE GLUCONATE 1 APPLICATION(S): 213 SOLUTION TOPICAL at 23:33

## 2019-01-20 RX ADMIN — Medication 1 PACKET(S): at 12:08

## 2019-01-20 RX ADMIN — Medication 1 PACKET(S): at 05:58

## 2019-01-20 RX ADMIN — Medication 3 MILLILITER(S): at 23:53

## 2019-01-20 RX ADMIN — GABAPENTIN 300 MILLIGRAM(S): 400 CAPSULE ORAL at 23:32

## 2019-01-20 RX ADMIN — Medication 100 MILLIGRAM(S): at 05:58

## 2019-01-20 RX ADMIN — FENTANYL CITRATE 1 PATCH: 50 INJECTION INTRAVENOUS at 19:30

## 2019-01-20 RX ADMIN — GABAPENTIN 300 MILLIGRAM(S): 400 CAPSULE ORAL at 14:10

## 2019-01-20 RX ADMIN — SENNA PLUS 1 TABLET(S): 8.6 TABLET ORAL at 23:32

## 2019-01-20 RX ADMIN — PANTOPRAZOLE SODIUM 40 MILLIGRAM(S): 20 TABLET, DELAYED RELEASE ORAL at 12:08

## 2019-01-20 RX ADMIN — Medication 1 MILLIGRAM(S): at 12:30

## 2019-01-20 RX ADMIN — Medication 3 MILLILITER(S): at 05:31

## 2019-01-20 RX ADMIN — Medication 3 MILLILITER(S): at 18:10

## 2019-01-20 NOTE — PROGRESS NOTE ADULT - ASSESSMENT
58 Year Old woman PMH Lupus Presented with Sudden onset of Severe Headache with associated nausea and vomiting. 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 Patient was intact on presentation however intubated for airway protection and EVD placed for hydrocephalus. Patient was Transferred to [facility] for selective IR cerebral angiography. 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 Patient returned to the OR on 12/18 for sub occipital decompressive craniectomy. Patient was initially on Vimpat for suspected Seizure like activity, but VEEG while in the NSCU, without evidence of Seizures. Vimpat was discontinued. Patient was noted to have moderate diffuse encephalopathy and dysfxn in frontal regions bilaterally on VEEG. Patient underwent Trach Placement on 12/24 ( # 8 Cuffed Shiley ), and PEG Placement on 12/26. While in the NSCU Patient with Persistent Fevers and Autonomic Storming, Patient treated with medical management and Artic Sun Protocol. Patient found to MRSA/ E.coli growing in the sputum and was treated with Vanco and Cefepime. 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 Patient remained with persistent fevers despite course of broad spectrum abx lD felt fevers Central in nature. Patient required blood transfusion while in NSCU. Patient failed clamping trial and required VPS, which was Placed on 1/7. Patient weaned to TC ATC, Trach downsized to # 7 Cuffless Portex on 1/16.     1/17: Patient downsized yesterday to # 7 Cuffless Portex   1/19-20: No new events. Remains medically stable, awaiting discharge to rehab. Scalp staples to be removed prior discharge.

## 2019-01-20 NOTE — PROGRESS NOTE ADULT - SUBJECTIVE AND OBJECTIVE BOX
Patient is a 58y old  Female who presents with a chief complaint of Cerebellar IPH (19 Jan 2019 10:03)      Interval Events:    REVIEW OF SYSTEMS:  [ ] Positive  [ ] All other systems negative  [ ] Unable to assess ROS because ________    Vital Signs Last 24 Hrs  T(C): 37.4 (01-20-19 @ 04:11), Max: 37.4 (01-20-19 @ 04:11)  T(F): 99.4 (01-20-19 @ 04:11), Max: 99.4 (01-20-19 @ 04:11)  HR: 107 (01-20-19 @ 05:32) (95 - 118)  BP: 111/73 (01-20-19 @ 04:11) (111/73 - 135/82)  RR: 20 (01-20-19 @ 04:44) (20 - 24)  SpO2: 100% (01-20-19 @ 05:32) (98% - 100%)    PHYSICAL EXAM:  HEENT:   [ ]Tracheostomy:  [ ]Pupils equal  [ ]No oral lesions  [ ]Abnormal    SKIN  [ ]No Rash  [ ] Abnormal  [ ] pressure    CARDIAC  [ ]Regular  [ ]Abnormal    PULMONARY  [ ]Bilateral Clear Breath Sounds  [ ]Normal Excursion  [ ]Abnormal    GI  [ ]PEG      [ ] +BS		              [ ]Soft, nondistended, nontender	  [ ]Abnormal    MUSCULOSKELETAL                                   [ ]Bedbound                 [ ]Abnormal    [ ]Ambulatory/OOB to chair                           EXTREMITIES                                         [ ]Normal  [ ]Edema                           NEUROLOGIC  [ ] Normal, non focal  [ ] Focal findings:    PSYCHIATRIC  [ ]Alert and appropriate  [ ] Sedated	 [ ]Agitated    :  Newsome: [ ] Yes, if yes: Date of Placement:                   [  ] No    LINES: Central Lines [ ] Yes, if yes: Date of Placement                                     [  ] No    HOSPITAL MEDICATIONS:  MEDICATIONS  (STANDING):  ALBUTerol/ipratropium for Nebulization 3 milliLiter(s) Nebulizer every 6 hours  chlorhexidine 4% Liquid 1 Application(s) Topical <User Schedule>  docusate sodium Liquid 100 milliGRAM(s) Oral two times a day  enoxaparin Injectable 40 milliGRAM(s) SubCutaneous <User Schedule>  fentaNYL   Patch  12 MICROgram(s)/Hr. 1 Patch Transdermal every 48 hours  gabapentin   Solution 300 milliGRAM(s) Oral every 8 hours  pantoprazole   Suspension 40 milliGRAM(s) Oral daily  potassium phosphate / sodium phosphate powder 1 Packet(s) Oral four times a day  senna 1 Tablet(s) Oral at bedtime    MEDICATIONS  (PRN):  acetaminophen    Suspension .. 650 milliGRAM(s) Oral every 6 hours PRN Temp greater or equal to 38.5C (101.3F), Mild Pain (1 - 3)  clonazePAM Tablet 1 milliGRAM(s) Oral every 8 hours PRN anxiety      LABS:                  CAPILLARY BLOOD GLUCOSE    MICROBIOLOGY:     RADIOLOGY:  [ ] Reviewed and interpreted by me Patient is a 58y old  Female who presents with a chief complaint of Cerebellar IPH..........      Interval Events: No events reported over night.    REVIEW OF SYSTEMS:  [ ] Positive  [X] All other systems negative  [ ] Unable to assess ROS because ________    Vital Signs Last 24 Hrs  T(C): 37.4 (01-20-19 @ 04:11), Max: 37.4 (01-20-19 @ 04:11)  T(F): 99.4 (01-20-19 @ 04:11), Max: 99.4 (01-20-19 @ 04:11)  HR: 107 (01-20-19 @ 05:32) (95 - 118)  BP: 111/73 (01-20-19 @ 04:11) (111/73 - 135/82)  RR: 20 (01-20-19 @ 04:44) (20 - 24)  SpO2: 100% (01-20-19 @ 05:32) (98% - 100%)    PHYSICAL EXAM:  HEENT:   [x]Tracheostomy: # 7 Cuffless Portex  TC  30%  [ ]Pupils equal  [ ]No oral lesions  [x]Abnormal: Cranial Staples Present Rt side of scalp, Incision clean dry . DC  1/21    SKIN  [x]No Rash  [ ] Abnormal  [ ] pressure    CARDIAC  [x]Regular  [ ]Abnormal    PULMONARY  [x]Bilateral Clear Breath Sounds  [ ]Normal Excursion  [ ]Abnormal    GI  [x]PEG      [x] +BS		              [x]Soft, nondistended, nontender	, + Abdominal staples present   [ ]Abnormal    MUSCULOSKELETAL                                   [ ]Bedbound                 [ ]Abnormal    [x]OOB to chair                           EXTREMITIES                                         [x]Normal  [ ]Edema                           NEUROLOGIC  [ ] Normal, non focal  [x] Focal findings: Following simple commands with all extremities     PSYCHIATRIC  [x]Alert   [ ] Sedated	 [ ]Agitated    :  Newsome: [ ] Yes, if yes: Date of Placement:                   [x] No    LINES: Central Lines [x] Yes, if yes: Date of Placement: RT UE PICC Placed 1/9                                      [  ] No        HOSPITAL MEDICATIONS:  MEDICATIONS  (STANDING):  ALBUTerol/ipratropium for Nebulization 3 milliLiter(s) Nebulizer every 6 hours  chlorhexidine 4% Liquid 1 Application(s) Topical <User Schedule>  docusate sodium Liquid 100 milliGRAM(s) Oral two times a day  enoxaparin Injectable 40 milliGRAM(s) SubCutaneous <User Schedule>  fentaNYL   Patch  12 MICROgram(s)/Hr. 1 Patch Transdermal every 48 hours  gabapentin   Solution 300 milliGRAM(s) Oral every 8 hours  pantoprazole   Suspension 40 milliGRAM(s) Oral daily  potassium phosphate / sodium phosphate powder 1 Packet(s) Oral four times a day  senna 1 Tablet(s) Oral at bedtime    MEDICATIONS  (PRN):  acetaminophen    Suspension .. 650 milliGRAM(s) Oral every 6 hours PRN Temp greater or equal to 38.5C (101.3F), Mild Pain (1 - 3)  clonazePAM Tablet 1 milliGRAM(s) Oral every 8 hours PRN anxiety      LABS:                  CAPILLARY BLOOD GLUCOSE    MICROBIOLOGY:     RADIOLOGY:  [ ] Reviewed and interpreted by me

## 2019-01-21 PROCEDURE — 99232 SBSQ HOSP IP/OBS MODERATE 35: CPT | Mod: GC

## 2019-01-21 RX ORDER — ALBUTEROL 90 UG/1
1 AEROSOL, METERED ORAL EVERY 4 HOURS
Qty: 0 | Refills: 0 | Status: DISCONTINUED | OUTPATIENT
Start: 2019-01-21 | End: 2019-01-22

## 2019-01-21 RX ORDER — IPRATROPIUM/ALBUTEROL SULFATE 18-103MCG
3 AEROSOL WITH ADAPTER (GRAM) INHALATION EVERY 6 HOURS
Qty: 0 | Refills: 0 | Status: DISCONTINUED | OUTPATIENT
Start: 2019-01-21 | End: 2019-01-25

## 2019-01-21 RX ADMIN — Medication 1 MILLIGRAM(S): at 21:30

## 2019-01-21 RX ADMIN — Medication 3 MILLILITER(S): at 11:11

## 2019-01-21 RX ADMIN — FENTANYL CITRATE 1 PATCH: 50 INJECTION INTRAVENOUS at 19:00

## 2019-01-21 RX ADMIN — FENTANYL CITRATE 1 PATCH: 50 INJECTION INTRAVENOUS at 17:55

## 2019-01-21 RX ADMIN — Medication 1 PACKET(S): at 17:56

## 2019-01-21 RX ADMIN — Medication 100 MILLIGRAM(S): at 06:27

## 2019-01-21 RX ADMIN — GABAPENTIN 300 MILLIGRAM(S): 400 CAPSULE ORAL at 06:28

## 2019-01-21 RX ADMIN — Medication 3 MILLILITER(S): at 05:58

## 2019-01-21 RX ADMIN — FENTANYL CITRATE 1 PATCH: 50 INJECTION INTRAVENOUS at 06:25

## 2019-01-21 RX ADMIN — ENOXAPARIN SODIUM 40 MILLIGRAM(S): 100 INJECTION SUBCUTANEOUS at 17:55

## 2019-01-21 RX ADMIN — SENNA PLUS 1 TABLET(S): 8.6 TABLET ORAL at 21:30

## 2019-01-21 RX ADMIN — Medication 1 MILLIGRAM(S): at 11:53

## 2019-01-21 RX ADMIN — CHLORHEXIDINE GLUCONATE 1 APPLICATION(S): 213 SOLUTION TOPICAL at 21:39

## 2019-01-21 RX ADMIN — Medication 1 PACKET(S): at 11:51

## 2019-01-21 RX ADMIN — Medication 1 PACKET(S): at 06:28

## 2019-01-21 RX ADMIN — PANTOPRAZOLE SODIUM 40 MILLIGRAM(S): 20 TABLET, DELAYED RELEASE ORAL at 11:51

## 2019-01-21 RX ADMIN — Medication 100 MILLIGRAM(S): at 17:55

## 2019-01-21 RX ADMIN — GABAPENTIN 300 MILLIGRAM(S): 400 CAPSULE ORAL at 21:29

## 2019-01-21 RX ADMIN — FENTANYL CITRATE 1 PATCH: 50 INJECTION INTRAVENOUS at 17:56

## 2019-01-21 RX ADMIN — GABAPENTIN 300 MILLIGRAM(S): 400 CAPSULE ORAL at 14:24

## 2019-01-21 NOTE — PROGRESS NOTE ADULT - PROBLEM SELECTOR PLAN 1
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

## 2019-01-21 NOTE — PROGRESS NOTE ADULT - ASSESSMENT
58 Year Old woman PMH Lupus Presented with Sudden onset of Severe Headache with associated nausea and vomiting. 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 Patient was intact on presentation however intubated for airway protection and EVD placed for hydrocephalus. Patient was Transferred to [facility] for selective IR cerebral angiography. 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 Patient returned to the OR on 12/18 for sub occipital decompressive craniectomy. Patient was initially on Vimpat for suspected Seizure like activity, but VEEG while in the NSCU, without evidence of Seizures. Vimpat was discontinued. Patient was noted to have moderate diffuse encephalopathy and dysfxn in frontal regions bilaterally on VEEG. Patient underwent Trach Placement on 12/24 ( # 8 Cuffed Shiley ), and PEG Placement on 12/26. While in the NSCU Patient with Persistent Fevers and Autonomic Storming, Patient treated with medical management and Artic Sun Protocol. Patient found to MRSA/ E.coli growing in the sputum and was treated with Vanco and Cefepime. 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 Patient remained with persistent fevers despite course of broad spectrum abx lD felt fevers Central in nature. Patient required blood transfusion while in NSCU. Patient failed clamping trial and required VPS, which was Placed on 1/7. Patient weaned to TC ATC, Trach downsized to # 7 Cuffless Portex on 1/16.     1/17: Patient downsized yesterday to # 7 Cuffless Portex   1/19-21: No new events. Remains medically stable, awaiting discharge to rehab. Scalp staples to be removed prior discharge. Spoke with Neurosurgery team and they plan to remove staples 1/22, aware of impending discharge.

## 2019-01-21 NOTE — PROGRESS NOTE ADULT - SUBJECTIVE AND OBJECTIVE BOX
Patient is a 58y old  Female who presents with a chief complaint of Cerebellar IPH (20 Jan 2019 07:39)      Interval Events:    REVIEW OF SYSTEMS:  [ ] Positive  [ ] All other systems negative  [ ] Unable to assess ROS because ________    Vital Signs Last 24 Hrs  T(C): 37.6 (01-21-19 @ 04:23), Max: 37.6 (01-21-19 @ 04:23)  T(F): 99.7 (01-21-19 @ 04:23), Max: 99.7 (01-21-19 @ 04:23)  HR: 108 (01-21-19 @ 05:59) (100 - 117)  BP: 117/87 (01-21-19 @ 04:23) (101/67 - 117/87)  RR: 21 (01-21-19 @ 05:04) (19 - 23)  SpO2: 98% (01-21-19 @ 05:59) (97% - 100%)    PHYSICAL EXAM:  HEENT:   [ ]Tracheostomy:  [ ]Pupils equal  [ ]No oral lesions  [ ]Abnormal    SKIN  [ ]No Rash  [ ] Abnormal  [ ] pressure    CARDIAC  [ ]Regular  [ ]Abnormal    PULMONARY  [ ]Bilateral Clear Breath Sounds  [ ]Normal Excursion  [ ]Abnormal    GI  [ ]PEG      [ ] +BS		              [ ]Soft, nondistended, nontender	  [ ]Abnormal    MUSCULOSKELETAL                                   [ ]Bedbound                 [ ]Abnormal    [ ]Ambulatory/OOB to chair                           EXTREMITIES                                         [ ]Normal  [ ]Edema                           NEUROLOGIC  [ ] Normal, non focal  [ ] Focal findings:    PSYCHIATRIC  [ ]Alert and appropriate  [ ] Sedated	 [ ]Agitated    :  Newsome: [ ] Yes, if yes: Date of Placement:                   [  ] No    LINES: Central Lines [ ] Yes, if yes: Date of Placement                                     [  ] No    HOSPITAL MEDICATIONS:  MEDICATIONS  (STANDING):  ALBUTerol/ipratropium for Nebulization 3 milliLiter(s) Nebulizer every 6 hours  chlorhexidine 4% Liquid 1 Application(s) Topical <User Schedule>  docusate sodium Liquid 100 milliGRAM(s) Oral two times a day  enoxaparin Injectable 40 milliGRAM(s) SubCutaneous <User Schedule>  fentaNYL   Patch  12 MICROgram(s)/Hr. 1 Patch Transdermal every 48 hours  gabapentin   Solution 300 milliGRAM(s) Oral every 8 hours  pantoprazole   Suspension 40 milliGRAM(s) Oral daily  potassium phosphate / sodium phosphate powder 1 Packet(s) Oral four times a day  senna 1 Tablet(s) Oral at bedtime    MEDICATIONS  (PRN):  acetaminophen    Suspension .. 650 milliGRAM(s) Oral every 6 hours PRN Temp greater or equal to 38.5C (101.3F), Mild Pain (1 - 3)  clonazePAM Tablet 1 milliGRAM(s) Oral every 8 hours PRN anxiety      LABS:                  CAPILLARY BLOOD GLUCOSE    MICROBIOLOGY:     RADIOLOGY:  [ ] Reviewed and interpreted by me Patient is a 58y old  Female who presents with a chief complaint of Cerebellar IPH......      Interval Events:  No events over night. 100.9F today.    REVIEW OF SYSTEMS:  [ ] Positive  [X ] All other systems negative  [ ] Unable to assess ROS because ________    Vital Signs Last 24 Hrs  T(C): 37.6 (01-21-19 @ 04:23), Max: 37.6 (01-21-19 @ 04:23)  T(F): 99.7 (01-21-19 @ 04:23), Max: 99.7 (01-21-19 @ 04:23)  HR: 108 (01-21-19 @ 05:59) (100 - 117)  BP: 117/87 (01-21-19 @ 04:23) (101/67 - 117/87)  RR: 21 (01-21-19 @ 05:04) (19 - 23)  SpO2: 98% (01-21-19 @ 05:59) (97% - 100%)      PHYSICAL EXAM:  HEENT:   [x]Tracheostomy: # 7 Cuffless Portex  TC  30%  [ ]Pupils equal  [ ]No oral lesions  [x]Abnormal: Cranial Staples Present Rt side of scalp, Incision clean dry .    SKIN  [x]No Rash  [ ] Abnormal  [ ] pressure    CARDIAC  [x]Regular  [ ]Abnormal    PULMONARY  [x]Bilateral Clear Breath Sounds  [ ]Normal Excursion  [ ]Abnormal    GI  [x]PEG      [x] +BS		              [x]Soft, nondistended, nontender	, + Abdominal staples present   [ ]Abnormal    MUSCULOSKELETAL                                   [ ]Bedbound                 [ ]Abnormal    [x]OOB to chair                           EXTREMITIES                                         [x]Normal  [ ]Edema                           NEUROLOGIC  [ ] Normal, non focal  [x] Focal findings: Following simple commands with all extremities     PSYCHIATRIC  [x]Alert   [ ] Sedated	 [ ]Agitated    :  Newsome: [ ] Yes, if yes: Date of Placement:                   [x] No    LINES: Central Lines [x] Yes, if yes: Date of Placement: RT UE PICC Placed 1/9                                      [  ] No      HOSPITAL MEDICATIONS:  MEDICATIONS  (STANDING):  ALBUTerol/ipratropium for Nebulization 3 milliLiter(s) Nebulizer every 6 hours  chlorhexidine 4% Liquid 1 Application(s) Topical <User Schedule>  docusate sodium Liquid 100 milliGRAM(s) Oral two times a day  enoxaparin Injectable 40 milliGRAM(s) SubCutaneous <User Schedule>  fentaNYL   Patch  12 MICROgram(s)/Hr. 1 Patch Transdermal every 48 hours  gabapentin   Solution 300 milliGRAM(s) Oral every 8 hours  pantoprazole   Suspension 40 milliGRAM(s) Oral daily  potassium phosphate / sodium phosphate powder 1 Packet(s) Oral four times a day  senna 1 Tablet(s) Oral at bedtime    MEDICATIONS  (PRN):  acetaminophen    Suspension .. 650 milliGRAM(s) Oral every 6 hours PRN Temp greater or equal to 38.5C (101.3F), Mild Pain (1 - 3)  clonazePAM Tablet 1 milliGRAM(s) Oral every 8 hours PRN anxiety      LABS:                  CAPILLARY BLOOD GLUCOSE    MICROBIOLOGY:     RADIOLOGY:  [ ] Reviewed and interpreted by me

## 2019-01-22 LAB
ANION GAP SERPL CALC-SCNC: 12 MMOL/L — SIGNIFICANT CHANGE UP (ref 5–17)
BUN SERPL-MCNC: 17 MG/DL — SIGNIFICANT CHANGE UP (ref 7–23)
CALCIUM SERPL-MCNC: 9.1 MG/DL — SIGNIFICANT CHANGE UP (ref 8.4–10.5)
CHLORIDE SERPL-SCNC: 98 MMOL/L — SIGNIFICANT CHANGE UP (ref 96–108)
CO2 SERPL-SCNC: 29 MMOL/L — SIGNIFICANT CHANGE UP (ref 22–31)
CREAT SERPL-MCNC: 0.42 MG/DL — LOW (ref 0.5–1.3)
GLUCOSE SERPL-MCNC: 144 MG/DL — HIGH (ref 70–99)
HCT VFR BLD CALC: 33 % — LOW (ref 34.5–45)
HGB BLD-MCNC: 10.8 G/DL — LOW (ref 11.5–15.5)
MAGNESIUM SERPL-MCNC: 2.1 MG/DL — SIGNIFICANT CHANGE UP (ref 1.6–2.6)
MCHC RBC-ENTMCNC: 28.9 PG — SIGNIFICANT CHANGE UP (ref 27–34)
MCHC RBC-ENTMCNC: 32.7 GM/DL — SIGNIFICANT CHANGE UP (ref 32–36)
MCV RBC AUTO: 88.2 FL — SIGNIFICANT CHANGE UP (ref 80–100)
PHOSPHATE SERPL-MCNC: 4.1 MG/DL — SIGNIFICANT CHANGE UP (ref 2.5–4.5)
PLATELET # BLD AUTO: 342 K/UL — SIGNIFICANT CHANGE UP (ref 150–400)
POTASSIUM SERPL-MCNC: 3.7 MMOL/L — SIGNIFICANT CHANGE UP (ref 3.5–5.3)
POTASSIUM SERPL-SCNC: 3.7 MMOL/L — SIGNIFICANT CHANGE UP (ref 3.5–5.3)
RBC # BLD: 3.74 M/UL — LOW (ref 3.8–5.2)
RBC # FLD: 16.7 % — HIGH (ref 10.3–14.5)
SODIUM SERPL-SCNC: 139 MMOL/L — SIGNIFICANT CHANGE UP (ref 135–145)
WBC # BLD: 4.35 K/UL — SIGNIFICANT CHANGE UP (ref 3.8–10.5)
WBC # FLD AUTO: 4.35 K/UL — SIGNIFICANT CHANGE UP (ref 3.8–10.5)

## 2019-01-22 PROCEDURE — 99233 SBSQ HOSP IP/OBS HIGH 50: CPT | Mod: GC

## 2019-01-22 RX ORDER — FENTANYL CITRATE 50 UG/ML
1 INJECTION INTRAVENOUS
Qty: 0 | Refills: 0 | Status: DISCONTINUED | OUTPATIENT
Start: 2019-01-22 | End: 2019-01-25

## 2019-01-22 RX ADMIN — Medication 1 PACKET(S): at 05:04

## 2019-01-22 RX ADMIN — Medication 1 PACKET(S): at 17:47

## 2019-01-22 RX ADMIN — CHLORHEXIDINE GLUCONATE 1 APPLICATION(S): 213 SOLUTION TOPICAL at 22:31

## 2019-01-22 RX ADMIN — Medication 100 MILLIGRAM(S): at 05:04

## 2019-01-22 RX ADMIN — SENNA PLUS 1 TABLET(S): 8.6 TABLET ORAL at 22:31

## 2019-01-22 RX ADMIN — PANTOPRAZOLE SODIUM 40 MILLIGRAM(S): 20 TABLET, DELAYED RELEASE ORAL at 13:13

## 2019-01-22 RX ADMIN — Medication 1 PACKET(S): at 00:29

## 2019-01-22 RX ADMIN — FENTANYL CITRATE 1 PATCH: 50 INJECTION INTRAVENOUS at 17:47

## 2019-01-22 RX ADMIN — FENTANYL CITRATE 1 PATCH: 50 INJECTION INTRAVENOUS at 17:46

## 2019-01-22 RX ADMIN — GABAPENTIN 300 MILLIGRAM(S): 400 CAPSULE ORAL at 13:52

## 2019-01-22 RX ADMIN — Medication 1 PACKET(S): at 13:13

## 2019-01-22 RX ADMIN — Medication 1 PACKET(S): at 23:26

## 2019-01-22 RX ADMIN — Medication 1 MILLIGRAM(S): at 23:25

## 2019-01-22 RX ADMIN — Medication 100 MILLIGRAM(S): at 17:48

## 2019-01-22 RX ADMIN — FENTANYL CITRATE 1 PATCH: 50 INJECTION INTRAVENOUS at 00:08

## 2019-01-22 RX ADMIN — GABAPENTIN 300 MILLIGRAM(S): 400 CAPSULE ORAL at 05:04

## 2019-01-22 RX ADMIN — FENTANYL CITRATE 1 PATCH: 50 INJECTION INTRAVENOUS at 20:17

## 2019-01-22 RX ADMIN — ENOXAPARIN SODIUM 40 MILLIGRAM(S): 100 INJECTION SUBCUTANEOUS at 17:48

## 2019-01-22 RX ADMIN — GABAPENTIN 300 MILLIGRAM(S): 400 CAPSULE ORAL at 22:31

## 2019-01-22 NOTE — PROGRESS NOTE ADULT - PROBLEM SELECTOR PLAN 1
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

## 2019-01-22 NOTE — PROGRESS NOTE ADULT - ASSESSMENT
58 Year Old woman PMH Lupus Presented with Sudden onset of Severe Headache with associated nausea and vomiting. 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 Patient was intact on presentation however intubated for airway protection and EVD placed for hydrocephalus. Patient was Transferred to [facility] for selective IR cerebral angiography. 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 Patient returned to the OR on 12/18 for sub occipital decompressive craniectomy. Patient was initially on Vimpat for suspected Seizure like activity, but VEEG while in the NSCU, without evidence of Seizures. Vimpat was discontinued. Patient was noted to have moderate diffuse encephalopathy and dysfxn in frontal regions bilaterally on VEEG. Patient underwent Trach Placement on 12/24 ( # 8 Cuffed Shiley ), and PEG Placement on 12/26. While in the NSCU Patient with Persistent Fevers and Autonomic Storming, Patient treated with medical management and [facility] Protocol. Patient found to MRSA/ E.coli growing in the sputum and was treated with Vanco and Cefepime. 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 Patient remained with persistent fevers despite course of broad spectrum abx lD felt fevers Central in nature. Patient required blood transfusion while in NSCU. Patient failed clamping trial and required VPS, which was Placed on 1/7. Patient weaned to TC ATC, Trach downsized to # 7 Cuffless Portex on 1/16.      1/19-21: No new events. Remains medically stable, awaiting discharge to rehab. Scalp staples to be removed prior discharge. Spoke with Neurosurgery team and they plan to remove staples 1/22, aware of impending discharge.  1/22: Patient remains medically stable insurance authorization has been received now awaiting bed availability

## 2019-01-22 NOTE — PROGRESS NOTE ADULT - SUBJECTIVE AND OBJECTIVE BOX
Patient is a 58y old  Female who presents with a chief complaint of Cerebellar IPH (21 Jan 2019 07:28)      Interval Events:    REVIEW OF SYSTEMS:  [ ] Positive  [ ] All other systems negative  [ ] Unable to assess ROS because ________    Vital Signs Last 24 Hrs  T(C): 36.7 (01-22-19 @ 03:40), Max: 38.3 (01-21-19 @ 14:23)  T(F): 98 (01-22-19 @ 03:40), Max: 100.9 (01-21-19 @ 14:23)  HR: 100 (01-22-19 @ 04:59) (98 - 127)  BP: 107/68 (01-22-19 @ 03:40) (107/68 - 116/72)  RR: 19 (01-22-19 @ 04:59) (19 - 20)  SpO2: 98% (01-22-19 @ 04:59) (95% - 100%)    PHYSICAL EXAM:  HEENT:   [ ]Tracheostomy:  [ ]Pupils equal  [ ]No oral lesions  [ ]Abnormal    SKIN  [ ]No Rash  [ ] Abnormal  [ ] pressure    CARDIAC  [ ]Regular  [ ]Abnormal    PULMONARY  [ ]Bilateral Clear Breath Sounds  [ ]Normal Excursion  [ ]Abnormal    GI  [ ]PEG      [ ] +BS		              [ ]Soft, nondistended, nontender	  [ ]Abnormal    MUSCULOSKELETAL                                   [ ]Bedbound                 [ ]Abnormal    [ ]Ambulatory/OOB to chair                           EXTREMITIES                                         [ ]Normal  [ ]Edema                           NEUROLOGIC  [ ] Normal, non focal  [ ] Focal findings:    PSYCHIATRIC  [ ]Alert and appropriate  [ ] Sedated	 [ ]Agitated    :  Newsome: [ ] Yes, if yes: Date of Placement:                   [  ] No    LINES: Central Lines [ ] Yes, if yes: Date of Placement                                     [  ] No    HOSPITAL MEDICATIONS:  MEDICATIONS  (STANDING):  ALBUTerol    90 MICROgram(s) HFA Inhaler 1 Puff(s) Inhalation every 4 hours  chlorhexidine 4% Liquid 1 Application(s) Topical <User Schedule>  docusate sodium Liquid 100 milliGRAM(s) Oral two times a day  enoxaparin Injectable 40 milliGRAM(s) SubCutaneous <User Schedule>  fentaNYL   Patch  12 MICROgram(s)/Hr. 1 Patch Transdermal every 48 hours  gabapentin   Solution 300 milliGRAM(s) Oral every 8 hours  pantoprazole   Suspension 40 milliGRAM(s) Oral daily  potassium phosphate / sodium phosphate powder 1 Packet(s) Oral four times a day  senna 1 Tablet(s) Oral at bedtime    MEDICATIONS  (PRN):  acetaminophen    Suspension .. 650 milliGRAM(s) Oral every 6 hours PRN Temp greater or equal to 38.5C (101.3F), Mild Pain (1 - 3)  ALBUTerol/ipratropium for Nebulization 3 milliLiter(s) Nebulizer every 6 hours PRN Shortness of Breath and/or Wheezing  clonazePAM Tablet 1 milliGRAM(s) Oral every 8 hours PRN anxiety      LABS:    01-22    139  |  98  |  17  ----------------------------<  144<H>  3.7   |  29  |  0.42<L>    Ca    9.1      22 Jan 2019 07:05  Phos  4.1     01-22  Mg     2.1     01-22              CAPILLARY BLOOD GLUCOSE    MICROBIOLOGY:     RADIOLOGY:  [ ] Reviewed and interpreted by me

## 2019-01-22 NOTE — CHART NOTE - NSCHARTNOTEFT_GEN_A_CORE
Nutrition Follow Up Note  Patient seen for:    Source: medical record/team     58 year old female for  nontraumatic intracerebral hemorrhage, intraventricular. PMH lupus, DVT, RA, chronic pain.  S/P tracheostomy on 18, S/P PEG on 18. Tolerating trach collar. Pt. is nonverbal.  Discussed hyponatremia with RN and PA, pt. with good urine output, however not on diuretics at this time. .            Enteral Nutrition:  TwoCal HN at 55cc/hr x18 hours to provide total  990ml fluid, 1980cal/day, 23.5 hank/kg, based on dosing wt 84.1Kg.   protein 81.7gm,1.5 gm/kg based on IBW 52.2 kg], 693 ml free water, Pro[employer] adds an additional 15 gm protein.        Daily Weight in k ()   Weight Change:  dosing 84kg, s/p diuresis , IBW= 52.2kg    Pertinent Medications: MEDICATIONS  (STANDING):  ALBUTerol    90 MICROgram(s) HFA Inhaler 1 Puff(s) Inhalation every 4 hours  chlorhexidine 4% Liquid 1 Application(s) Topical <User Schedule>  docusate sodium Liquid 100 milliGRAM(s) Oral two times a day  enoxaparin Injectable 40 milliGRAM(s) SubCutaneous <User Schedule>  fentaNYL   Patch  12 MICROgram(s)/Hr. 1 Patch Transdermal every 48 hours  gabapentin   Solution 300 milliGRAM(s) Oral every 8 hours  pantoprazole   Suspension 40 milliGRAM(s) Oral daily  potassium phosphate / sodium phosphate powder 1 Packet(s) Oral four times a day  senna 1 Tablet(s) Oral at bedtime    MEDICATIONS  (PRN):  acetaminophen    Suspension .. 650 milliGRAM(s) Oral every 6 hours PRN Temp greater or equal to 38.5C (101.3F), Mild Pain (1 - 3)  ALBUTerol/ipratropium for Nebulization 3 milliLiter(s) Nebulizer every 6 hours PRN Shortness of Breath and/or Wheezing  clonazePAM Tablet 1 milliGRAM(s) Oral every 8 hours PRN anxiety    Pertinent Labs:  @ 07:05: Na 139, BUN 17, Cr 0.42<L>, <H>, K+ 3.7, Phos 4.1, Mg 2.1, Alk Phos --, ALT/SGPT --, AST/SGOT --, HbA1c --    Finger Sticks: NA  GI Bm x1 on      previous 1/14 x1   Patient receiving bowel regimen, will discuss increase with NP    Skin per nursing documentation: no skin breakdown  Edema: none    Estimated Needs:   [ X] no change since previous assessment  [ ] recalculated:     Previous Nutrition Diagnosis: Increased nutrient needs  Nutrition Diagnosis is:    New Nutrition Diagnosis: NA      Recommend  1) continue TwoCal HN at 55cc/hr x18 hours to provide total  990ml fluid, 1980cal/day, 23.5 hank/kg, based on dosing wt 84.1Kg,   protein 81.7gm,1.5 gm/kg based on IBW 52.2 kg,, 693 ml free water, [employer] adds an additional 15 gm protein. Added free water deferred to team      Monitoring and Evaluation   Consider additional bowel regimen  Continue to monitor Nutritional intake, Tolerance to diet prescription, weights, labs, skin integrity    RD remains available upon request and will follow up per protocol Nutrition Follow Up Note  Patient seen for:    Source: medical record/team     58 year old female for  nontraumatic intracerebral hemorrhage, intraventricular. PMH lupus, DVT, RA, chronic pain.  S/P tracheostomy on 18, S/P PEG on 18. Tolerating trach collar. Pt. is nonverbal.  Discussed hyponatremia with RN and PA, pt. with good urine output, however not on diuretics at this time. .            Enteral Nutrition:  TwoCal HN at 55cc/hr x18 hours to provide total  990ml fluid, 1980cal/day, 23.5 hank/kg, based on dosing wt 84.1Kg.   protein 81.7gm,1.5 gm/kg based on IBW 52.2 kg], 693 ml free water, ProSource adds an additional 15 gm protein.        Daily Weight in k ()   Weight Change:  dosing 84kg, s/p diuresis , IBW= 52.2kg    Pertinent Medications: MEDICATIONS  (STANDING):  ALBUTerol    90 MICROgram(s) HFA Inhaler 1 Puff(s) Inhalation every 4 hours  chlorhexidine 4% Liquid 1 Application(s) Topical <User Schedule>  docusate sodium Liquid 100 milliGRAM(s) Oral two times a day  enoxaparin Injectable 40 milliGRAM(s) SubCutaneous <User Schedule>  fentaNYL   Patch  12 MICROgram(s)/Hr. 1 Patch Transdermal every 48 hours  gabapentin   Solution 300 milliGRAM(s) Oral every 8 hours  pantoprazole   Suspension 40 milliGRAM(s) Oral daily  potassium phosphate / sodium phosphate powder 1 Packet(s) Oral four times a day  senna 1 Tablet(s) Oral at bedtime    MEDICATIONS  (PRN):  acetaminophen    Suspension .. 650 milliGRAM(s) Oral every 6 hours PRN Temp greater or equal to 38.5C (101.3F), Mild Pain (1 - 3)  ALBUTerol/ipratropium for Nebulization 3 milliLiter(s) Nebulizer every 6 hours PRN Shortness of Breath and/or Wheezing  clonazePAM Tablet 1 milliGRAM(s) Oral every 8 hours PRN anxiety    Pertinent Labs:  @ 07:05: Na 139, BUN 17, Cr 0.42<L>, <H>, K+ 3.7, Phos 4.1, Mg 2.1, Alk Phos --, ALT/SGPT --, AST/SGOT --, HbA1c --    Finger Sticks: NA  GI Bm x1 on      previous 1/14 x1   Patient receiving bowel regimen, will discuss increase with NP    Skin per nursing documentation: no skin breakdown  Edema: none    Estimated Needs:   [ X] no change since previous assessment  [ ] recalculated:     Previous Nutrition Diagnosis: Increased nutrient needs  Nutrition Diagnosis is:    New Nutrition Diagnosis: NA      Recommend  1) reduce TwoCal HN at 50cc/hr x18 hours to provide total  990ml fluid, 1980cal/day, 21 hank/kg, based on dosing wt 84.1Kg.  Protein 75gm + 30 gm prosurce x2  total 105gm,1.25 gm/kg based on dosing weight 84kg,  630 ml free water, defer added free water to team    Monitoring and Evaluation   Consider additional bowel regimen  Continue to monitor Nutritional intake, Tolerance to diet prescription, weights, labs, skin integrity    RD remains available upon request and will follow up per protocol

## 2019-01-22 NOTE — PROGRESS NOTE ADULT - ATTENDING COMMENTS
Patient seen and examined with RCU team, sisters at bedside. Agree with above. Clinically stable for discharge.  Awaiting bed at acute rehab  d/w sisters and are in agreement.

## 2019-01-22 NOTE — PROGRESS NOTE ADULT - SUBJECTIVE AND OBJECTIVE BOX
Patient is a 58y old  Female who presents with a chief complaint of Cerebellar IPH (21 Jan 2019 07:28)      Interval Events: No events reported overnight     REVIEW OF SYSTEMS:  [ ] Positive  [x] All other systems negative  [ ] Unable to assess ROS because ________    Vital Signs Last 24 Hrs  T(C): 36.7 (01-22-19 @ 03:40), Max: 38.3 (01-21-19 @ 14:23)  T(F): 98 (01-22-19 @ 03:40), Max: 100.9 (01-21-19 @ 14:23)  HR: 101 (01-22-19 @ 08:44) (98 - 127)  BP: 107/68 (01-22-19 @ 03:40) (107/68 - 116/72)  RR: 18 (01-22-19 @ 08:44) (18 - 20)  SpO2: 97% (01-22-19 @ 08:44) (95% - 100%)    PHYSICAL EXAM:  HEENT:   [x]Tracheostomy: # 7 Cuffless Portex   [ ]Pupils equal  [ ]No oral lesions  [ ]Abnormal:     SKIN  [x]No Rash  [ ] Abnormal  [ ] pressure    CARDIAC  [x]Regular  [ ]Abnormal    PULMONARY  [x]Bilateral Clear Breath Sounds  [ ]Normal Excursion  [ ]Abnormal    GI  [x]PEG      [x] +BS		              [x]Soft, nondistended, nontender	, + Abdominal staples present   [ ]Abnormal    MUSCULOSKELETAL                                   [ ]Bedbound                 [ ]Abnormal    [x]OOB to chair                           EXTREMITIES                                         [x]Normal  [ ]Edema                           NEUROLOGIC  [ ] Normal, non focal  [x] Focal findings: Following simple commands with all extremities     PSYCHIATRIC  [x]Alert   [ ] Sedated	 [ ]Agitated    :  Newsome: [ ] Yes, if yes: Date of Placement:                   [x] No    LINES: Central Lines [x] Yes, if yes: Date of Placement: RT UE PICC Placed 1/9                                      [  ] No  HOSPITAL MEDICATIONS:  MEDICATIONS  (STANDING):  ALBUTerol    90 MICROgram(s) HFA Inhaler 1 Puff(s) Inhalation every 4 hours  chlorhexidine 4% Liquid 1 Application(s) Topical <User Schedule>  docusate sodium Liquid 100 milliGRAM(s) Oral two times a day  enoxaparin Injectable 40 milliGRAM(s) SubCutaneous <User Schedule>  fentaNYL   Patch  12 MICROgram(s)/Hr. 1 Patch Transdermal every 48 hours  gabapentin   Solution 300 milliGRAM(s) Oral every 8 hours  pantoprazole   Suspension 40 milliGRAM(s) Oral daily  potassium phosphate / sodium phosphate powder 1 Packet(s) Oral four times a day  senna 1 Tablet(s) Oral at bedtime    MEDICATIONS  (PRN):  acetaminophen    Suspension .. 650 milliGRAM(s) Oral every 6 hours PRN Temp greater or equal to 38.5C (101.3F), Mild Pain (1 - 3)  ALBUTerol/ipratropium for Nebulization 3 milliLiter(s) Nebulizer every 6 hours PRN Shortness of Breath and/or Wheezing  clonazePAM Tablet 1 milliGRAM(s) Oral every 8 hours PRN anxiety      LABS:                        10.8   4.35  )-----------( 342      ( 22 Jan 2019 08:15 )             33.0     01-22    139  |  98  |  17  ----------------------------<  144<H>  3.7   |  29  |  0.42<L>    Ca    9.1      22 Jan 2019 07:05  Phos  4.1     01-22  Mg     2.1     01-22              CAPILLARY BLOOD GLUCOSE    MICROBIOLOGY:     RADIOLOGY:  [ ] Reviewed and interpreted by me

## 2019-01-22 NOTE — PROGRESS NOTE ADULT - PROBLEM SELECTOR PLAN 4
Patients fever curve has improved, Previous fevers likely central in nature   Patient previously txd with Vanco and Cefepime for MRSA / E.Coli PNA  Bld CX 1/19: No growth , UA 1/8: Negative, CXR 1/8: Clear Lungs   Continue to monitor off abx at present time

## 2019-01-23 PROCEDURE — 99232 SBSQ HOSP IP/OBS MODERATE 35: CPT | Mod: GC

## 2019-01-23 PROCEDURE — 99233 SBSQ HOSP IP/OBS HIGH 50: CPT | Mod: GC

## 2019-01-23 RX ORDER — CLONAZEPAM 1 MG
0.25 TABLET ORAL EVERY 8 HOURS
Qty: 0 | Refills: 0 | Status: DISCONTINUED | OUTPATIENT
Start: 2019-01-23 | End: 2019-01-25

## 2019-01-23 RX ORDER — CLONAZEPAM 1 MG
1 TABLET ORAL EVERY 12 HOURS
Qty: 0 | Refills: 0 | Status: DISCONTINUED | OUTPATIENT
Start: 2019-01-23 | End: 2019-01-25

## 2019-01-23 RX ADMIN — Medication 1 MILLIGRAM(S): at 17:50

## 2019-01-23 RX ADMIN — Medication 10 MILLIGRAM(S): at 15:34

## 2019-01-23 RX ADMIN — Medication 1 PACKET(S): at 05:32

## 2019-01-23 RX ADMIN — ENOXAPARIN SODIUM 40 MILLIGRAM(S): 100 INJECTION SUBCUTANEOUS at 17:50

## 2019-01-23 RX ADMIN — FENTANYL CITRATE 1 PATCH: 50 INJECTION INTRAVENOUS at 06:56

## 2019-01-23 RX ADMIN — FENTANYL CITRATE 1 PATCH: 50 INJECTION INTRAVENOUS at 17:51

## 2019-01-23 RX ADMIN — GABAPENTIN 300 MILLIGRAM(S): 400 CAPSULE ORAL at 23:19

## 2019-01-23 RX ADMIN — PANTOPRAZOLE SODIUM 40 MILLIGRAM(S): 20 TABLET, DELAYED RELEASE ORAL at 12:09

## 2019-01-23 RX ADMIN — Medication 1 PACKET(S): at 23:18

## 2019-01-23 RX ADMIN — Medication 100 MILLIGRAM(S): at 05:32

## 2019-01-23 RX ADMIN — GABAPENTIN 300 MILLIGRAM(S): 400 CAPSULE ORAL at 05:32

## 2019-01-23 RX ADMIN — Medication 1 PACKET(S): at 12:09

## 2019-01-23 RX ADMIN — Medication 1 PACKET(S): at 17:50

## 2019-01-23 RX ADMIN — GABAPENTIN 300 MILLIGRAM(S): 400 CAPSULE ORAL at 15:32

## 2019-01-23 RX ADMIN — CHLORHEXIDINE GLUCONATE 1 APPLICATION(S): 213 SOLUTION TOPICAL at 23:19

## 2019-01-23 RX ADMIN — Medication 1 MILLIGRAM(S): at 09:04

## 2019-01-23 NOTE — PROGRESS NOTE ADULT - PROBLEM SELECTOR PLAN 1
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

## 2019-01-23 NOTE — PROGRESS NOTE ADULT - ATTENDING COMMENTS
Seen and examined with Fellow. Agree with note.   Patient with encephalopathy.  Patient will need acute TBI rehabilitation when stable.

## 2019-01-23 NOTE — PROGRESS NOTE ADULT - SUBJECTIVE AND OBJECTIVE BOX
HISTORY OF PRESENT ILLNESS  Ms. Cain is a 58 year old female with a PMHx noted below who was transferred from [facility] to [facility] on 12/16/18 with sudden onset of a severe headache with associated nausea and vomiting. She was found to have a cerebellar intraparenchymal hemorrhage with hydrocephalus. She was intubated for airway protection and EVD was placed. She was then transferred to [facility] for further care. 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 She then underwent posterior craniotomy for resection of AVM. Post-operative CTH showed posterior fossa edema and she returned to the OR on 12/18 for posterior fossa decompression expansion. VEEG was negative for seizure activity. EVD initially clamped on 12/24 which patient failed. Clamped once again on 12/29 however required VPS which was placed on 1/7. Hospital course complicated with respiratory failure requiring tracheostomy on 12/24 and dysphagia requiring PEG on 12/26. She was also noted to have episodes of hypotension requiring pressors and transfusion. Hospital course notable for fevers for which she was started on Vancomycin and Cefepime was later added. CSF did grow gram positive cocci in clusters however evaluated by ID and fevers believed to be central in nature. Blood cultures negative. CSF cultures without growth. Course also notable for requiring blood transfusions. Downsized to #7 cuffless portex on 1/16.     TODAY'S REVIEW OF SYMPTOMS  Unable to obtain     SUBJECTIVE  Patient seen and examined sitting in chair. Continues to have tachycardia and intermittent episodes of low grade fevers. Able to follow some simple commands. No verbalization or mouthing of words but nods head yes/no.    VITALS  T(C): 36.8 (01-23-19 @ 04:00)  T(F): 98.2 (01-23-19 @ 04:00), Max: 99.9 (01-23-19 @ 00:21)  HR: 117 (01-23-19 @ 11:25) (101 - 120)  BP: 120/84 (01-23-19 @ 04:00) (112/76 - 134/85)  RR:  (16 - 20)  SpO2:  (94% - 98%)  Wt(kg): --    PHYSICAL EXAM  Constitutional - NAD, Comfortable  HEENT - Right cranial incisions, Eyes intermittently open and tracks throughout the room  Chest - (+) Trach   Cardiovascular - RRR, S1S2  Abdomen - Soft, (+) PEG, Non-distended  Neurologic Exam -                    Cognitive - Intermittent eye opening, Follows simple commands (grasping and tongue protrusion)      Communication - No verbal output, No mouthing noted, Nods head yes and no fairly reliably to simple questions     Motor - 2/5 bilateral biceps/triceps, 1/5 bilateral , 3/5 bilateral ADF/APF     Reflexes - 3+/4 in bilateral biceps and patella, Negative Sales's bilaterally, (+) Left Babinski's   Psychiatric - Flat affect    CURRENT FUNCTIONAL STATUS  Bed mobility - Max A  Transfers - Max A x 2    RECENT LABS/IMAGING             10.8   4.35  )-----------( 342      ( 22 Jan 2019 08:15 )             33.0     139  |  98  |  17  ----------------------------<  144<H>  3.7   |  29  |  0.42<L>    Ca    9.1      22 Jan 2019 07:05  Phos  4.1     01-22  Mg     2.1     01-22    MEDICATIONS   MEDICATIONS  (STANDING):  bisacodyl Suppository 10 milliGRAM(s) Rectal once  chlorhexidine 4% Liquid 1 Application(s) Topical <User Schedule>  docusate sodium Liquid 100 milliGRAM(s) Oral two times a day  enoxaparin Injectable 40 milliGRAM(s) SubCutaneous <User Schedule>  fentaNYL   Patch  12 MICROgram(s)/Hr. 1 Patch Transdermal every 48 hours  gabapentin   Solution 300 milliGRAM(s) Oral every 8 hours  pantoprazole   Suspension 40 milliGRAM(s) Oral daily  potassium phosphate / sodium phosphate powder 1 Packet(s) Oral four times a day  senna 1 Tablet(s) Oral at bedtime    MEDICATIONS  (PRN):  acetaminophen    Suspension .. 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 Hospital course complicated with respiratory failure requiring tracheostomy, dysphagia requiring PEG, elevated intracranial pressure requiring VPS, and fevers. She is now with functional, gait, ADL, cognitive, balance, endurance, speech, swallow impairments.    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    Dysautonomia/central fevers - Continues to have tachycardia and intermittent episodes of low grade elevated temps. Recommend starting patient on Bromocriptine 2.5mg Q6AM and Q12PM to help regulate dysautonomia and potentially help arousal during AM hours. If blood pressure tolerates can trial low dose Labetalol instead of Bromocriptine. Patient currently on Fentanyl patch, Tylenol PRN, Gabapentin, Klonopin PRN. Would avoid use of Klonopin as may impede cognitive recovery.     Sleep/wake cycle - Recommend starting patient on Melatonin 3mg QHS to help regulate sleep/wake cycle and potentially help improve arousal during the day    Rehabilitation - Aggressive PROM and AROM by PT/OT. Cognitive and speech treatment with SLP.    Precautions - Fall, Cardiac, Aspiration    Aggressive oral care    GI PPx - Protonix    DVT PPx - Lovenox    Diet - NPO on tube feeds

## 2019-01-23 NOTE — PROGRESS NOTE ADULT - PROBLEM SELECTOR PLAN 7
Insurance Authorization has been obtained awaiting bed availability at acute rehab facilities  [facility] has sent out referral Globally as [facility] has still not reviewed the case and patient remains medically stable for discharge

## 2019-01-23 NOTE — PROVIDER CONTACT NOTE (OTHER) - ACTION/TREATMENT ORDERED:
PA aware and states no intervention needed. Vq4-will recheck VS at 2045. Pt placed w/ice packs. Will continue to monitor pt closely.
PA aware and will Klonipin early.
PA notified. Tylenol given at this time. Will continue to monitor patient.
Pa notified. No further recs at this time. Will continue to monitor patient.
no change in treatment
As per PA increase propofol gtt. Will continue to monitor.
blood culture x2, urinalysis, Tylenol 650 mg via peg x 1
Continue hyperthemia protocol including standing tylenol  No need to send new cultures at this time - pt already on antibiotic therapy
Tylenol 1 gram    Draw blood cultures 12/25 2130 (48 hours post last blood cultures)
Tylenol given. tepid sponging done, will continue to monitor,
given as order

## 2019-01-23 NOTE — PROGRESS NOTE ADULT - ASSESSMENT
58 Year Old woman PMH Lupus Presented with Sudden onset of Severe Headache with associated nausea and vomiting. 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 Patient was intact on presentation however intubated for airway protection and EVD placed for hydrocephalus. Patient was Transferred to [facility] for selective IR cerebral angiography. 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 Patient returned to the OR on 12/18 for sub occipital decompressive craniectomy. Patient was initially on Vimpat for suspected Seizure like activity, but VEEG while in the NSCU, without evidence of Seizures. Vimpat was discontinued. Patient was noted to have moderate diffuse encephalopathy and dysfxn in frontal regions bilaterally on VEEG. Patient underwent Trach Placement on 12/24 ( # 8 Cuffed Shiley ), and PEG Placement on 12/26. While in the NSCU Patient with Persistent Fevers and Autonomic Storming, Patient treated with medical management and Artic Sun Protocol. Patient found to MRSA/ E.coli growing in the sputum and was treated with Vanco and Cefepime. 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 Patient remained with persistent fevers despite course of broad spectrum abx lD felt fevers Central in nature. Patient required blood transfusion while in NSCU. Patient failed clamping trial and required VPS, which was Placed on 1/7. Patient weaned to TC ATC, Trach downsized to # 7 Cuffless Portex on 1/16.      1/23: Patient remains stable for discharge awaiting bed availability at acute rehab facilities, Neuro surgery called again to remove abdominal staples from  shunt. Klonopin changed to 1 gm q 12 hrs given persistent myoclonus will give 0.25 mg q 8 hrs prn breakthrough myoclonus

## 2019-01-23 NOTE — PROVIDER CONTACT NOTE (OTHER) - SITUATION
Water temp decreased, pt temp increased, mild shivering noted.
Pt has oral temp is 102.2F.
Pt noted to have increased body tremors
Pt seemingly febrile with a drop in water temperature (artic sun) greater than 10 degrees after midnight 7/28
pt has low grade temp of 100.4F.
pt's temp 101.4 orally
pt. left upper arm shaking for 120 seconds
pts weight on bed scale 1/23 79.2kg on 1/16 89kg
rectal temp-1oo.5 f. hr-117
temp 101
temp 39 C despite cooling device

## 2019-01-23 NOTE — PROVIDER CONTACT NOTE (OTHER) - ASSESSMENT
Pt able to open eyes and follow commands, pt on prop. Pt temp 37.4, water temp 10.4. Pt able to deny pain or discomfort. See vital sign flowsheet for further info.
Pt alert and following commands appropriately.
Pt noted to have increased body tremors, pt is responsive and listening to commands.
VS as documented, no distress noted.
pt on enteral feeds, no diarrhea, no residuals
No change in mental status, ?83, hr 100, RR 23, O2 sat 100%.
Trached and sedated  Following commands on all 4 extremities - pupils reactive  ICPs<20 (EVD @10)  Artic Sun/hyperthermia therapy in progress  + for MRSA in sputum from previous culture
patient awake, see flow sheet for vital signs. On trach collar 35%
pt examed by Dr. Quinn at bedside, no change in neuro exam.
temp 39 C despite cooling device, pt tachycardic

## 2019-01-23 NOTE — PROGRESS NOTE ADULT - SUBJECTIVE AND OBJECTIVE BOX
Patient is a 58y old  Female who presents with a chief complaint of Cerebellar IPH (22 Jan 2019 10:02)      Interval Events: No events reported overnight     REVIEW OF SYSTEMS:  [ ] Positive  [ ] All other systems negative  [x] Unable to assess ROS because patient is Non-verbal     Vital Signs Last 24 Hrs  T(C): 36.8 (01-23-19 @ 04:00), Max: 37.7 (01-23-19 @ 00:21)  T(F): 98.2 (01-23-19 @ 04:00), Max: 99.9 (01-23-19 @ 00:21)  HR: 111 (01-23-19 @ 08:41) (101 - 120)  BP: 120/84 (01-23-19 @ 04:00) (112/76 - 134/85)  RR: 18 (01-23-19 @ 08:41) (16 - 20)  SpO2: 96% (01-23-19 @ 08:41) (94% - 98%)    PHYSICAL EXAM:  HEENT:   [x]Tracheostomy: # 7 Cuffless Portex  TC  30%  [ ]Pupils equal  [ ]No oral lesions  [x]Abnormal: Cranial Staples Present Rt side of scalp, Incision clean dry .    SKIN  [x]No Rash  [ ] Abnormal  [ ] pressure    CARDIAC  [x]Regular  [ ]Abnormal    PULMONARY  [x]Bilateral Clear Breath Sounds  [ ]Normal Excursion  [ ]Abnormal    GI  [x]PEG      [x] +BS		              [x]Soft, nondistended, nontender	, + Abdominal staples present   [ ]Abnormal    MUSCULOSKELETAL                                   [ ]Bedbound                 [ ]Abnormal    [x]OOB to chair                           EXTREMITIES                                         [x]Normal  [ ]Edema                           NEUROLOGIC  [ ] Normal, non focal  [x] Focal findings: Following simple commands with all extremities     PSYCHIATRIC  [x]Alert   [ ] Sedated	 [ ]Agitated    :  Newsome: [ ] Yes, if yes: Date of Placement:                   [x] No    LINES: Central Lines [x] Yes, if yes: Date of Placement: RT UE PICC Placed 1/9                                      [  ] No    HOSPITAL MEDICATIONS:  MEDICATIONS  (STANDING):  chlorhexidine 4% Liquid 1 Application(s) Topical <User Schedule>  docusate sodium Liquid 100 milliGRAM(s) Oral two times a day  enoxaparin Injectable 40 milliGRAM(s) SubCutaneous <User Schedule>  fentaNYL   Patch  12 MICROgram(s)/Hr. 1 Patch Transdermal every 48 hours  gabapentin   Solution 300 milliGRAM(s) Oral every 8 hours  pantoprazole   Suspension 40 milliGRAM(s) Oral daily  potassium phosphate / sodium phosphate powder 1 Packet(s) Oral four times a day  senna 1 Tablet(s) Oral at bedtime    MEDICATIONS  (PRN):  acetaminophen    Suspension .. 650 milliGRAM(s) Oral every 6 hours PRN Temp greater or equal to 38.5C (101.3F), Mild Pain (1 - 3)  ALBUTerol/ipratropium for Nebulization 3 milliLiter(s) Nebulizer every 6 hours PRN Shortness of Breath and/or Wheezing  clonazePAM Tablet 1 milliGRAM(s) Oral every 8 hours PRN anxiety      LABS:                        10.8   4.35  )-----------( 342      ( 22 Jan 2019 08:15 )             33.0     01-22    139  |  98  |  17  ----------------------------<  144<H>  3.7   |  29  |  0.42<L>    Ca    9.1      22 Jan 2019 07:05  Phos  4.1     01-22  Mg     2.1     01-22              CAPILLARY BLOOD GLUCOSE    MICROBIOLOGY:     RADIOLOGY:  [ ] Reviewed and interpreted by me

## 2019-01-23 NOTE — PROGRESS NOTE ADULT - ATTENDING COMMENTS
Patient seen and examined with RCU team. Having episodes of monoclonus on the left side - will make Klonopin standing with PRN for breakthrough. Agree with above. Clinically stable for discharge.  Awaiting bed at acute rehab - [facility] still without beds. Will send to additional facilities in the area.

## 2019-01-24 LAB
CULTURE RESULTS: SIGNIFICANT CHANGE UP
CULTURE RESULTS: SIGNIFICANT CHANGE UP
SPECIMEN SOURCE: SIGNIFICANT CHANGE UP
SPECIMEN SOURCE: SIGNIFICANT CHANGE UP

## 2019-01-24 PROCEDURE — 99233 SBSQ HOSP IP/OBS HIGH 50: CPT | Mod: GC

## 2019-01-24 RX ORDER — LABETALOL HCL 100 MG
50 TABLET ORAL EVERY 12 HOURS
Qty: 0 | Refills: 0 | Status: DISCONTINUED | OUTPATIENT
Start: 2019-01-24 | End: 2019-01-24

## 2019-01-24 RX ORDER — PANTOPRAZOLE SODIUM 20 MG/1
40 TABLET, DELAYED RELEASE ORAL
Qty: 0 | Refills: 0 | COMMUNITY
Start: 2019-01-24

## 2019-01-24 RX ORDER — DOCUSATE SODIUM 100 MG
10 CAPSULE ORAL
Qty: 0 | Refills: 0 | COMMUNITY
Start: 2019-01-24

## 2019-01-24 RX ORDER — SODIUM,POTASSIUM PHOSPHATES 278-250MG
1 POWDER IN PACKET (EA) ORAL
Qty: 0 | Refills: 0 | COMMUNITY
Start: 2019-01-24

## 2019-01-24 RX ORDER — LABETALOL HCL 100 MG
50 TABLET ORAL
Qty: 0 | Refills: 0 | COMMUNITY
Start: 2019-01-24

## 2019-01-24 RX ORDER — LABETALOL HCL 100 MG
50 TABLET ORAL EVERY 12 HOURS
Qty: 0 | Refills: 0 | Status: DISCONTINUED | OUTPATIENT
Start: 2019-01-24 | End: 2019-01-25

## 2019-01-24 RX ORDER — ACETAMINOPHEN 500 MG
20.31 TABLET ORAL
Qty: 0 | Refills: 0 | COMMUNITY
Start: 2019-01-24

## 2019-01-24 RX ORDER — CLONAZEPAM 1 MG
0.25 TABLET ORAL
Qty: 0 | Refills: 0 | COMMUNITY

## 2019-01-24 RX ORDER — GABAPENTIN 400 MG/1
6 CAPSULE ORAL
Qty: 0 | Refills: 0 | COMMUNITY
Start: 2019-01-24

## 2019-01-24 RX ORDER — ENOXAPARIN SODIUM 100 MG/ML
40 INJECTION SUBCUTANEOUS
Qty: 0 | Refills: 0 | COMMUNITY
Start: 2019-01-24

## 2019-01-24 RX ORDER — CLONAZEPAM 1 MG
1 TABLET ORAL
Qty: 0 | Refills: 0 | COMMUNITY
Start: 2019-01-24

## 2019-01-24 RX ORDER — IPRATROPIUM/ALBUTEROL SULFATE 18-103MCG
3 AEROSOL WITH ADAPTER (GRAM) INHALATION
Qty: 0 | Refills: 0 | COMMUNITY
Start: 2019-01-24

## 2019-01-24 RX ORDER — FENTANYL CITRATE 50 UG/ML
1 INJECTION INTRAVENOUS
Qty: 0 | Refills: 0 | COMMUNITY
Start: 2019-01-24

## 2019-01-24 RX ORDER — SENNA PLUS 8.6 MG/1
1 TABLET ORAL
Qty: 0 | Refills: 0 | COMMUNITY
Start: 2019-01-24

## 2019-01-24 RX ADMIN — FENTANYL CITRATE 1 PATCH: 50 INJECTION INTRAVENOUS at 07:32

## 2019-01-24 RX ADMIN — CHLORHEXIDINE GLUCONATE 1 APPLICATION(S): 213 SOLUTION TOPICAL at 21:03

## 2019-01-24 RX ADMIN — Medication 1 PACKET(S): at 11:15

## 2019-01-24 RX ADMIN — GABAPENTIN 300 MILLIGRAM(S): 400 CAPSULE ORAL at 21:03

## 2019-01-24 RX ADMIN — Medication 0.25 MILLIGRAM(S): at 21:02

## 2019-01-24 RX ADMIN — FENTANYL CITRATE 1 PATCH: 50 INJECTION INTRAVENOUS at 16:11

## 2019-01-24 RX ADMIN — Medication 1 PACKET(S): at 05:29

## 2019-01-24 RX ADMIN — SENNA PLUS 1 TABLET(S): 8.6 TABLET ORAL at 21:02

## 2019-01-24 RX ADMIN — FENTANYL CITRATE 1 PATCH: 50 INJECTION INTRAVENOUS at 16:12

## 2019-01-24 RX ADMIN — ENOXAPARIN SODIUM 40 MILLIGRAM(S): 100 INJECTION SUBCUTANEOUS at 17:32

## 2019-01-24 RX ADMIN — Medication 1 MILLIGRAM(S): at 05:37

## 2019-01-24 RX ADMIN — Medication 100 MILLIGRAM(S): at 17:32

## 2019-01-24 RX ADMIN — GABAPENTIN 300 MILLIGRAM(S): 400 CAPSULE ORAL at 13:04

## 2019-01-24 RX ADMIN — Medication 1 PACKET(S): at 23:18

## 2019-01-24 RX ADMIN — GABAPENTIN 300 MILLIGRAM(S): 400 CAPSULE ORAL at 05:29

## 2019-01-24 RX ADMIN — PANTOPRAZOLE SODIUM 40 MILLIGRAM(S): 20 TABLET, DELAYED RELEASE ORAL at 11:15

## 2019-01-24 RX ADMIN — Medication 1 MILLIGRAM(S): at 17:43

## 2019-01-24 RX ADMIN — FENTANYL CITRATE 1 PATCH: 50 INJECTION INTRAVENOUS at 19:06

## 2019-01-24 RX ADMIN — Medication 1 PACKET(S): at 17:33

## 2019-01-24 RX ADMIN — Medication 50 MILLIGRAM(S): at 17:32

## 2019-01-24 RX ADMIN — Medication 100 MILLIGRAM(S): at 05:29

## 2019-01-24 NOTE — CHART NOTE - NSCHARTNOTEFT_GEN_A_CORE
Rt arm PICC Line removed at bedside this evening, hemostasis achieved with manual compression. Hep lock established by RN Rt arm PICC Line removed at bedside this evening, hemostasis achieved with manual compression. Hep lock established by RN. Case d/w patients sisters at bedside this evening, explained to them that [place] has denied acceptance to the patient and we need to move forward at this time with dc planning as we do have an accepting facility   [facility]. Patients sisters verbally given 24 hour dc notice, but refused to sign official paperwork. Krysten Washburn and her Boss Radha were present for bedside meeting.

## 2019-01-24 NOTE — PROGRESS NOTE ADULT - PROBLEM SELECTOR PROBLEM 3
Hydrocephalus
Autonomic hyperactivity

## 2019-01-24 NOTE — PROGRESS NOTE ADULT - PROBLEM SELECTOR PLAN 6
Continue Protonix and Lovenox
- ID followup  - continue broad spectrum antibiotics for now  - found to have MRSA in sputum - and on contact precautions  - ? Central fevers
Continue Protonix and Lovenox

## 2019-01-24 NOTE — PROGRESS NOTE ADULT - PROBLEM SELECTOR PLAN 3
- plan for VPS though exact timing not yet clear  - continue EVD as per NSx/NSCU
Patient with Autonomic Storming in NSCU and myoclonus   Continue Klonopin 1 mg q 8 hrs PRN   Continue Neurontin, Low dose Fentanyl patch
Patient with Autonomic Storming in NSCU and myoclonus   Continue Klonopin 1 mg q 8 hrs PRN   Continue Neurontin, Low dose Fentanyl patch
Patient with Autonomic Storming in NSCU and Myoclonus   Klonopin changed to 1 mg q 12 hrs with breakthrough dose of 0.25 mg q 8 hrs prn breakthrough myoclonus   Continue Neurontin, Low dose Fentanyl patch  Will add low dose Labetalol 50 mg q 12 hr
Patient with Autonomic Storming in NSCU and myoclonus   Continue Klonopin 1 mg q 8 hrs PRN   Continue Neurontin, Low dose Fentanyl patch
Patient with Autonomic Storming in NSCU   Continue Klonopin 1 mg q 8 hrs PRN   Continue Neurontin, Low dose Fentanyl patch Re-ordered
Patient with Autonomic Storming in NSCU and myoclonus   Continue Klonopin 1 mg q 8 hrs PRN   Continue Neurontin, Low dose Fentanyl patch
Patient with Autonomic Storming in NSCU and myoclonus   Continue Klonopin 1 mg q 8 hrs PRN   Continue Neurontin, Low dose Fentanyl patch
Patient with Autonomic Storming in NSCU and myoclonus   Klonopin changed to 1 mg q 12 hrs with breakthrough dose of 0.25 mg q 8 hrs prn breakthrough myoclonus   Continue Neurontin, Low dose Fentanyl patch
Patient with Autonomic Storming in NSCU and myoclonus   Continue Klonopin 1 mg q 8 hrs PRN   Continue Neurontin, Low dose Fentanyl patch
Patient with Autonomic Storming in NSCU   Continue Klonopin 1 mg q 8 hrs PRN   Continue Neurontin, Low dose Fentanyl patch Re-ordered
Patient with Autonomic Storming in NSCU and myoclonus   Continue Klonopin 1 mg q 8 hrs PRN   Continue Neurontin, Low dose Fentanyl patch

## 2019-01-24 NOTE — PROGRESS NOTE ADULT - PROBLEM SELECTOR PROBLEM 7
Discharge planning issues
History of venous thromboembolism
Discharge planning issues

## 2019-01-24 NOTE — PROGRESS NOTE ADULT - PROBLEM SELECTOR PROBLEM 6
Fever, unspecified fever cause
Prophylactic measure

## 2019-01-24 NOTE — PROGRESS NOTE ADULT - PROBLEM SELECTOR PROBLEM 1
Tracheostomy in place
AVM (arteriovenous malformation) brain
Tracheostomy in place
AVM (arteriovenous malformation) brain
Tracheostomy in place
AVM (arteriovenous malformation) brain
Intraventricular hemorrhage

## 2019-01-24 NOTE — PROGRESS NOTE ADULT - ASSESSMENT
58 Year Old woman PMH Lupus Presented with Sudden onset of Severe Headache with associated nausea and vomiting. 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 Patient was intact on presentation however intubated for airway protection and EVD placed for hydrocephalus. Patient was Transferred to [facility] for selective IR cerebral angiography. 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 Patient returned to the OR on 12/18 for sub occipital decompressive craniectomy. Patient was initially on Vimpat for suspected Seizure like activity, but VEEG while in the NSCU, without evidence of Seizures. Vimpat was discontinued. Patient was noted to have moderate diffuse encephalopathy and dysfxn in frontal regions bilaterally on VEEG. Patient underwent Trach Placement on 12/24 ( # 8 Cuffed Shiley ), and PEG Placement on 12/26. While in the NSCU Patient with Persistent Fevers and Autonomic Storming, Patient treated with medical management and Artic Sun Protocol. Patient found to MRSA/ E.coli growing in the sputum and was treated with Vanco and Cefepime. 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 Patient remained with persistent fevers despite course of broad spectrum abx lD felt fevers Central in nature. Patient required blood transfusion while in NSCU. Patient failed clamping trial and required VPS, which was Placed on 1/7. Patient weaned to TC ATC, Trach downsized to # 7 Cuffless Portex on 1/16.      1/23: Patient remains stable for discharge awaiting bed availability at acute rehab facilities, patient has been denied by [facility] facility. If Bed available at other acute facilities will have to provide family with 24 hr discharge notice. PM&R Recommendations appreciated will add low dose Labetalol for Dysautonomia

## 2019-01-24 NOTE — PROGRESS NOTE ADULT - PROBLEM SELECTOR PLAN 7
Insurance Authorization has been obtained   Patient has been denied by [facility] facility, families first choice  [facility] referral has been sent out if patient accepted to another Acute facility today will proceed with 24 hr dc notice

## 2019-01-24 NOTE — PROGRESS NOTE ADULT - PROBLEM SELECTOR PROBLEM 2
AVM (arteriovenous malformation) brain
Acute respiratory failure with hypoxia

## 2019-01-24 NOTE — PROGRESS NOTE ADULT - PROBLEM SELECTOR PROBLEM 5
Oropharyngeal dysphagia
Anemia

## 2019-01-24 NOTE — PROGRESS NOTE ADULT - PROBLEM SELECTOR PLAN 1
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  Patient S/p EVD-> Failed Clamping Trial -> S/p VPS 1/7  Head CT 1/16: Unchanged Pericatheter edema , No Hydrocephalus, No Midline shift , No new intracranial hemorrhage or infarct   Continue Physical therapy / OT

## 2019-01-24 NOTE — PROGRESS NOTE ADULT - PROBLEM SELECTOR PROBLEM 4
Acute respiratory failure with hypoxia
Fever, unspecified fever cause

## 2019-01-24 NOTE — PROGRESS NOTE ADULT - ATTENDING COMMENTS
Patient seen and examined with RCU team. Having episodes of monoclonus on the left side - appreciate PMR follow up and input. Will add low dose beta blocker.    Clinically stable for discharge.  Awaiting bed at acute rehab - [place] has not accepted the patient. Referral sent to additional facilities and has been accepted to [facility] acute rehab.     I was physically present for the key portions of the evaluation and management (E/M) service provided.  I agree with the above history, physical, and plan which I have reviewed and edited where appropriate.     Plan discussed with RCU team.

## 2019-01-25 VITALS
OXYGEN SATURATION: 94 % | TEMPERATURE: 100 F | HEART RATE: 113 BPM | RESPIRATION RATE: 18 BRPM | SYSTOLIC BLOOD PRESSURE: 102 MMHG | DIASTOLIC BLOOD PRESSURE: 69 MMHG

## 2019-01-25 PROCEDURE — 84484 ASSAY OF TROPONIN QUANT: CPT

## 2019-01-25 PROCEDURE — 82945 GLUCOSE OTHER FLUID: CPT

## 2019-01-25 PROCEDURE — 36430 TRANSFUSION BLD/BLD COMPNT: CPT

## 2019-01-25 PROCEDURE — 95819 EEG AWAKE AND ASLEEP: CPT

## 2019-01-25 PROCEDURE — 36569 INSJ PICC 5 YR+ W/O IMAGING: CPT

## 2019-01-25 PROCEDURE — 84480 ASSAY TRIIODOTHYRONINE (T3): CPT

## 2019-01-25 PROCEDURE — 85610 PROTHROMBIN TIME: CPT

## 2019-01-25 PROCEDURE — 85045 AUTOMATED RETICULOCYTE COUNT: CPT

## 2019-01-25 PROCEDURE — 80202 ASSAY OF VANCOMYCIN: CPT

## 2019-01-25 PROCEDURE — 84702 CHORIONIC GONADOTROPIN TEST: CPT

## 2019-01-25 PROCEDURE — 97110 THERAPEUTIC EXERCISES: CPT

## 2019-01-25 PROCEDURE — 84436 ASSAY OF TOTAL THYROXINE: CPT

## 2019-01-25 PROCEDURE — 82962 GLUCOSE BLOOD TEST: CPT

## 2019-01-25 PROCEDURE — L8699: CPT

## 2019-01-25 PROCEDURE — 80076 HEPATIC FUNCTION PANEL: CPT

## 2019-01-25 PROCEDURE — 36226 PLACE CATH VERTEBRAL ART: CPT

## 2019-01-25 PROCEDURE — 97530 THERAPEUTIC ACTIVITIES: CPT

## 2019-01-25 PROCEDURE — 94799 UNLISTED PULMONARY SVC/PX: CPT

## 2019-01-25 PROCEDURE — 82272 OCCULT BLD FECES 1-3 TESTS: CPT

## 2019-01-25 PROCEDURE — 82746 ASSAY OF FOLIC ACID SERUM: CPT

## 2019-01-25 PROCEDURE — 90686 IIV4 VACC NO PRSV 0.5 ML IM: CPT

## 2019-01-25 PROCEDURE — 83036 HEMOGLOBIN GLYCOSYLATED A1C: CPT

## 2019-01-25 PROCEDURE — C1889: CPT

## 2019-01-25 PROCEDURE — 93005 ELECTROCARDIOGRAM TRACING: CPT

## 2019-01-25 PROCEDURE — 84145 PROCALCITONIN (PCT): CPT

## 2019-01-25 PROCEDURE — 97166 OT EVAL MOD COMPLEX 45 MIN: CPT

## 2019-01-25 PROCEDURE — 85014 HEMATOCRIT: CPT

## 2019-01-25 PROCEDURE — 86901 BLOOD TYPING SEROLOGIC RH(D): CPT

## 2019-01-25 PROCEDURE — 84100 ASSAY OF PHOSPHORUS: CPT

## 2019-01-25 PROCEDURE — 75898 FOLLOW-UP ANGIOGRAPHY: CPT

## 2019-01-25 PROCEDURE — 85384 FIBRINOGEN ACTIVITY: CPT

## 2019-01-25 PROCEDURE — 87040 BLOOD CULTURE FOR BACTERIA: CPT

## 2019-01-25 PROCEDURE — 70551 MRI BRAIN STEM W/O DYE: CPT

## 2019-01-25 PROCEDURE — 82435 ASSAY OF BLOOD CHLORIDE: CPT

## 2019-01-25 PROCEDURE — 86850 RBC ANTIBODY SCREEN: CPT

## 2019-01-25 PROCEDURE — C1769: CPT

## 2019-01-25 PROCEDURE — 83540 ASSAY OF IRON: CPT

## 2019-01-25 PROCEDURE — 82803 BLOOD GASES ANY COMBINATION: CPT

## 2019-01-25 PROCEDURE — 82728 ASSAY OF FERRITIN: CPT

## 2019-01-25 PROCEDURE — 84157 ASSAY OF PROTEIN OTHER: CPT

## 2019-01-25 PROCEDURE — 36600 WITHDRAWAL OF ARTERIAL BLOOD: CPT

## 2019-01-25 PROCEDURE — 36227 PLACE CATH XTRNL CAROTID: CPT

## 2019-01-25 PROCEDURE — 81001 URINALYSIS AUTO W/SCOPE: CPT

## 2019-01-25 PROCEDURE — 99239 HOSP IP/OBS DSCHRG MGMT >30: CPT

## 2019-01-25 PROCEDURE — C9254: CPT

## 2019-01-25 PROCEDURE — 87070 CULTURE OTHR SPECIMN AEROBIC: CPT

## 2019-01-25 PROCEDURE — 85730 THROMBOPLASTIN TIME PARTIAL: CPT

## 2019-01-25 PROCEDURE — 94003 VENT MGMT INPAT SUBQ DAY: CPT

## 2019-01-25 PROCEDURE — 87205 SMEAR GRAM STAIN: CPT

## 2019-01-25 PROCEDURE — 36224 PLACE CATH CAROTD ART: CPT

## 2019-01-25 PROCEDURE — 88307 TISSUE EXAM BY PATHOLOGIST: CPT

## 2019-01-25 PROCEDURE — 83550 IRON BINDING TEST: CPT

## 2019-01-25 PROCEDURE — C1894: CPT

## 2019-01-25 PROCEDURE — 75894 X-RAYS TRANSCATH THERAPY: CPT

## 2019-01-25 PROCEDURE — 85379 FIBRIN DEGRADATION QUANT: CPT

## 2019-01-25 PROCEDURE — 84443 ASSAY THYROID STIM HORMONE: CPT

## 2019-01-25 PROCEDURE — 84295 ASSAY OF SERUM SODIUM: CPT

## 2019-01-25 PROCEDURE — P9016: CPT

## 2019-01-25 PROCEDURE — 76380 CAT SCAN FOLLOW-UP STUDY: CPT

## 2019-01-25 PROCEDURE — C1887: CPT

## 2019-01-25 PROCEDURE — 97162 PT EVAL MOD COMPLEX 30 MIN: CPT

## 2019-01-25 PROCEDURE — 86923 COMPATIBILITY TEST ELECTRIC: CPT

## 2019-01-25 PROCEDURE — 83735 ASSAY OF MAGNESIUM: CPT

## 2019-01-25 PROCEDURE — 85027 COMPLETE CBC AUTOMATED: CPT

## 2019-01-25 PROCEDURE — 93306 TTE W/DOPPLER COMPLETE: CPT

## 2019-01-25 PROCEDURE — 86900 BLOOD TYPING SEROLOGIC ABO: CPT

## 2019-01-25 PROCEDURE — 36228 PLACE CATH INTRACRANIAL ART: CPT

## 2019-01-25 PROCEDURE — 61624 TCAT PERM OCCLS/EMBOLJ CNS: CPT

## 2019-01-25 PROCEDURE — 80048 BASIC METABOLIC PNL TOTAL CA: CPT

## 2019-01-25 PROCEDURE — 81003 URINALYSIS AUTO W/O SCOPE: CPT

## 2019-01-25 PROCEDURE — 99285 EMERGENCY DEPT VISIT HI MDM: CPT | Mod: 25

## 2019-01-25 PROCEDURE — 80053 COMPREHEN METABOLIC PANEL: CPT

## 2019-01-25 PROCEDURE — 97760 ORTHOTIC MGMT&TRAING 1ST ENC: CPT

## 2019-01-25 PROCEDURE — 83615 LACTATE (LD) (LDH) ENZYME: CPT

## 2019-01-25 PROCEDURE — C1751: CPT

## 2019-01-25 PROCEDURE — 89051 BODY FLUID CELL COUNT: CPT

## 2019-01-25 PROCEDURE — 84132 ASSAY OF SERUM POTASSIUM: CPT

## 2019-01-25 PROCEDURE — 94002 VENT MGMT INPAT INIT DAY: CPT

## 2019-01-25 PROCEDURE — C1713: CPT

## 2019-01-25 PROCEDURE — 82607 VITAMIN B-12: CPT

## 2019-01-25 PROCEDURE — 70450 CT HEAD/BRAIN W/O DYE: CPT

## 2019-01-25 PROCEDURE — 95951: CPT

## 2019-01-25 PROCEDURE — C2628: CPT

## 2019-01-25 PROCEDURE — 80061 LIPID PANEL: CPT

## 2019-01-25 PROCEDURE — 82330 ASSAY OF CALCIUM: CPT

## 2019-01-25 PROCEDURE — 76000 FLUOROSCOPY <1 HR PHYS/QHP: CPT

## 2019-01-25 PROCEDURE — 83605 ASSAY OF LACTIC ACID: CPT

## 2019-01-25 PROCEDURE — 82947 ASSAY GLUCOSE BLOOD QUANT: CPT

## 2019-01-25 PROCEDURE — 71045 X-RAY EXAM CHEST 1 VIEW: CPT

## 2019-01-25 PROCEDURE — 87186 SC STD MICRODIL/AGAR DIL: CPT

## 2019-01-25 PROCEDURE — 94640 AIRWAY INHALATION TREATMENT: CPT

## 2019-01-25 RX ORDER — INFLUENZA VIRUS VACCINE 15; 15; 15; 15 UG/.5ML; UG/.5ML; UG/.5ML; UG/.5ML
0.5 SUSPENSION INTRAMUSCULAR ONCE
Qty: 0 | Refills: 0 | Status: COMPLETED | OUTPATIENT
Start: 2019-01-25 | End: 2019-01-25

## 2019-01-25 RX ADMIN — FENTANYL CITRATE 1 PATCH: 50 INJECTION INTRAVENOUS at 07:09

## 2019-01-25 RX ADMIN — GABAPENTIN 300 MILLIGRAM(S): 400 CAPSULE ORAL at 05:10

## 2019-01-25 RX ADMIN — Medication 1 PACKET(S): at 05:06

## 2019-01-25 RX ADMIN — PANTOPRAZOLE SODIUM 40 MILLIGRAM(S): 20 TABLET, DELAYED RELEASE ORAL at 11:09

## 2019-01-25 RX ADMIN — Medication 1 PACKET(S): at 11:09

## 2019-01-25 RX ADMIN — Medication 50 MILLIGRAM(S): at 05:06

## 2019-01-25 RX ADMIN — INFLUENZA VIRUS VACCINE 0.5 MILLILITER(S): 15; 15; 15; 15 SUSPENSION INTRAMUSCULAR at 11:52

## 2019-01-25 RX ADMIN — Medication 100 MILLIGRAM(S): at 05:06

## 2019-01-25 RX ADMIN — Medication 1 MILLIGRAM(S): at 05:06

## 2019-01-25 NOTE — PROGRESS NOTE ADULT - PROVIDER SPECIALTY LIST ADULT
Anesthesia
Anesthesia
ENT
Gastroenterology
Infectious Disease
NSICU
Neurosurgery
Pulmonology
Rehab Medicine
Neurosurgery
Infectious Disease
Infectious Disease
Neurosurgery
Pulmonology
Infectious Disease
Pulmonology
ENT
Pulmonology

## 2019-01-25 NOTE — PROGRESS NOTE ADULT - ATTENDING COMMENTS
Patient seen and examined with RCU team. Having episodes of monoclonus on the left side - appreciate PMR follow up and input. c/w low dose beta blocker.    Clinically stable for discharge.  Accepted to [facility] acute rehab - plan for discharge this afternoon.     I was physically present for the key portions of the evaluation and management (E/M) service provided.  I agree with the above history, physical, and plan which I have reviewed and edited where appropriate.     Plan discussed with RCU team and sisters at bedside..

## 2019-01-25 NOTE — PROGRESS NOTE ADULT - REASON FOR ADMISSION
Cerebellar IPH
IPH
Cerebellar IPH

## 2019-01-25 NOTE — PROGRESS NOTE ADULT - SUBJECTIVE AND OBJECTIVE BOX
Patient is a 58y old  Female who presents with a chief complaint of Cerebellar IPH (24 Jan 2019 09:26)      Interval Events:    REVIEW OF SYSTEMS:  [ ] Positive  [ ] All other systems negative  [ ] Unable to assess ROS because ________    Vital Signs Last 24 Hrs  T(C): 37.2 (01-25-19 @ 05:25), Max: 37.8 (01-24-19 @ 16:07)  T(F): 99 (01-25-19 @ 05:25), Max: 100 (01-24-19 @ 16:07)  HR: 105 (01-25-19 @ 05:25) (95 - 119)  BP: 103/72 (01-25-19 @ 05:25) (98/66 - 138/86)  RR: 18 (01-25-19 @ 05:25) (16 - 20)  SpO2: 100% (01-25-19 @ 05:25) (93% - 100%)    PHYSICAL EXAM:  HEENT:   [ ]Tracheostomy:  [ ]Pupils equal  [ ]No oral lesions  [ ]Abnormal    SKIN  [ ]No Rash  [ ] Abnormal  [ ] pressure    CARDIAC  [ ]Regular  [ ]Abnormal    PULMONARY  [ ]Bilateral Clear Breath Sounds  [ ]Normal Excursion  [ ]Abnormal    GI  [ ]PEG      [ ] +BS		              [ ]Soft, nondistended, nontender	  [ ]Abnormal    MUSCULOSKELETAL                                   [ ]Bedbound                 [ ]Abnormal    [ ]Ambulatory/OOB to chair                           EXTREMITIES                                         [ ]Normal  [ ]Edema                           NEUROLOGIC  [ ] Normal, non focal  [ ] Focal findings:    PSYCHIATRIC  [ ]Alert and appropriate  [ ] Sedated	 [ ]Agitated    :  Newsome: [ ] Yes, if yes: Date of Placement:                   [  ] No    LINES: Central Lines [ ] Yes, if yes: Date of Placement                                     [  ] No    HOSPITAL MEDICATIONS:  MEDICATIONS  (STANDING):  chlorhexidine 4% Liquid 1 Application(s) Topical <User Schedule>  clonazePAM Tablet 1 milliGRAM(s) Oral every 12 hours  docusate sodium Liquid 100 milliGRAM(s) Oral two times a day  enoxaparin Injectable 40 milliGRAM(s) SubCutaneous <User Schedule>  fentaNYL   Patch  12 MICROgram(s)/Hr. 1 Patch Transdermal every 48 hours  gabapentin   Solution 300 milliGRAM(s) Oral every 8 hours  labetalol 50 milliGRAM(s) Oral every 12 hours  pantoprazole   Suspension 40 milliGRAM(s) Oral daily  potassium phosphate / sodium phosphate powder 1 Packet(s) Oral four times a day  senna 1 Tablet(s) Oral at bedtime    MEDICATIONS  (PRN):  acetaminophen    Suspension .. 650 milliGRAM(s) Oral every 6 hours PRN Temp greater or equal to 38.5C (101.3F), Mild Pain (1 - 3)  ALBUTerol/ipratropium for Nebulization 3 milliLiter(s) Nebulizer every 6 hours PRN Shortness of Breath and/or Wheezing  clonazePAM Tablet 0.25 milliGRAM(s) Oral every 8 hours PRN Breakthrough Myoclonus not treated by standing Clonazepam      LABS:                  CAPILLARY BLOOD GLUCOSE    MICROBIOLOGY:     RADIOLOGY:  [ ] Reviewed and interpreted by me

## 2019-02-21 ENCOUNTER — OTHER (OUTPATIENT)
Age: 59
End: 2019-02-21

## 2019-02-23 ENCOUNTER — INPATIENT (INPATIENT)
Facility: HOSPITAL | Age: 59
LOS: 26 days | Discharge: NURSING, REHAB OR LONG-TERM CARE | DRG: 25 | End: 2019-03-22
Attending: INTERNAL MEDICINE | Admitting: NEUROLOGICAL SURGERY
Payer: COMMERCIAL

## 2019-02-23 VITALS
OXYGEN SATURATION: 100 % | DIASTOLIC BLOOD PRESSURE: 61 MMHG | HEART RATE: 132 BPM | RESPIRATION RATE: 23 BRPM | SYSTOLIC BLOOD PRESSURE: 129 MMHG | TEMPERATURE: 102 F

## 2019-02-23 LAB
BASE EXCESS BLDV CALC-SCNC: 5.4 MMOL/L — HIGH (ref -2–2)
BASOPHILS # BLD AUTO: 0 K/UL — SIGNIFICANT CHANGE UP (ref 0–0.2)
BASOPHILS NFR BLD AUTO: 0.2 % — SIGNIFICANT CHANGE UP (ref 0–2)
CA-I SERPL-SCNC: 1.11 MMOL/L — LOW (ref 1.12–1.3)
CHLORIDE BLDV-SCNC: 98 MMOL/L — SIGNIFICANT CHANGE UP (ref 96–108)
CO2 BLDV-SCNC: 32 MMOL/L — HIGH (ref 22–30)
EOSINOPHIL # BLD AUTO: 0 K/UL — SIGNIFICANT CHANGE UP (ref 0–0.5)
EOSINOPHIL NFR BLD AUTO: 0.2 % — SIGNIFICANT CHANGE UP (ref 0–6)
GAS PNL BLDV: 129 MMOL/L — LOW (ref 136–145)
GAS PNL BLDV: SIGNIFICANT CHANGE UP
GAS PNL BLDV: SIGNIFICANT CHANGE UP
GLUCOSE BLDV-MCNC: 150 MG/DL — HIGH (ref 70–99)
HCO3 BLDV-SCNC: 30 MMOL/L — HIGH (ref 21–29)
HCT VFR BLD CALC: 37.5 % — SIGNIFICANT CHANGE UP (ref 34.5–45)
HCT VFR BLDA CALC: 34 % — LOW (ref 39–50)
HGB BLD CALC-MCNC: 11.1 G/DL — LOW (ref 11.5–15.5)
HGB BLD-MCNC: 13 G/DL — SIGNIFICANT CHANGE UP (ref 11.5–15.5)
LACTATE BLDV-MCNC: 1.8 MMOL/L — SIGNIFICANT CHANGE UP (ref 0.7–2)
LYMPHOCYTES # BLD AUTO: 1.5 K/UL — SIGNIFICANT CHANGE UP (ref 1–3.3)
LYMPHOCYTES # BLD AUTO: 16.1 % — SIGNIFICANT CHANGE UP (ref 13–44)
MCHC RBC-ENTMCNC: 30.1 PG — SIGNIFICANT CHANGE UP (ref 27–34)
MCHC RBC-ENTMCNC: 34.8 GM/DL — SIGNIFICANT CHANGE UP (ref 32–36)
MCV RBC AUTO: 86.4 FL — SIGNIFICANT CHANGE UP (ref 80–100)
MONOCYTES # BLD AUTO: 1 K/UL — HIGH (ref 0–0.9)
MONOCYTES NFR BLD AUTO: 10.4 % — SIGNIFICANT CHANGE UP (ref 2–14)
NEUTROPHILS # BLD AUTO: 7 K/UL — SIGNIFICANT CHANGE UP (ref 1.8–7.4)
NEUTROPHILS NFR BLD AUTO: 73.1 % — SIGNIFICANT CHANGE UP (ref 43–77)
OTHER CELLS CSF MANUAL: 12 ML/DL — LOW (ref 18–22)
PCO2 BLDV: 50 MMHG — SIGNIFICANT CHANGE UP (ref 35–50)
PH BLDV: 7.4 — SIGNIFICANT CHANGE UP (ref 7.35–7.45)
PLATELET # BLD AUTO: 273 K/UL — SIGNIFICANT CHANGE UP (ref 150–400)
PO2 BLDV: 47 MMHG — HIGH (ref 25–45)
POTASSIUM BLDV-SCNC: 3.4 MMOL/L — LOW (ref 3.5–5.3)
RBC # BLD: 4.34 M/UL — SIGNIFICANT CHANGE UP (ref 3.8–5.2)
RBC # FLD: 16.4 % — HIGH (ref 10.3–14.5)
SAO2 % BLDV: 78 % — SIGNIFICANT CHANGE UP (ref 67–88)
WBC # BLD: 9.6 K/UL — SIGNIFICANT CHANGE UP (ref 3.8–10.5)
WBC # FLD AUTO: 9.6 K/UL — SIGNIFICANT CHANGE UP (ref 3.8–10.5)

## 2019-02-23 PROCEDURE — 70450 CT HEAD/BRAIN W/O DYE: CPT | Mod: 26

## 2019-02-23 PROCEDURE — 99291 CRITICAL CARE FIRST HOUR: CPT

## 2019-02-23 PROCEDURE — 71045 X-RAY EXAM CHEST 1 VIEW: CPT | Mod: 26

## 2019-02-23 RX ORDER — ACETAMINOPHEN 500 MG
1000 TABLET ORAL ONCE
Qty: 0 | Refills: 0 | Status: COMPLETED | OUTPATIENT
Start: 2019-02-23 | End: 2019-02-23

## 2019-02-23 RX ADMIN — Medication 400 MILLIGRAM(S): at 23:26

## 2019-02-23 RX ADMIN — Medication 1000 MILLIGRAM(S): at 23:56

## 2019-02-23 NOTE — ED ADULT TRIAGE NOTE - CHIEF COMPLAINT QUOTE
brought in by ems as a transfer from [facility] ICU for  Shunt complication  patient on trach colar/  r/o meningitis

## 2019-02-23 NOTE — ED PROVIDER NOTE - CRITICAL CARE PROVIDED
direct patient care (not related to procedure)/consult w/ pt's family directly relating to pts condition/consultation with other physicians/additional history taking/documentation

## 2019-02-23 NOTE — ED ADULT NURSE NOTE - PSH
History of laparoscopic cholecystectomy  1993  No significant past surgical history    No significant past surgical history    Plantar fasciitis of right foot  h/o surgery

## 2019-02-23 NOTE — ED ADULT NURSE NOTE - PMH
Back pain, chronic    Childhood asthma    DVT (deep venous thrombosis), bilateral  1993( treated with Heparin and coumadin)  Lupus    Ovarian cyst    RA (rheumatoid arthritis)    Systemic lupus erythematosus, unspecified SLE type, unspecified organ involvement status

## 2019-02-23 NOTE — ED PROVIDER NOTE - CLINICAL SUMMARY MEDICAL DECISION MAKING FREE TEXT BOX
57yo F PMH Lupus, RA presenting as transfer from [place] for concern for  shunt infection. Labs, cultures, urine, cxr. NeuroSurgery called. Request CTH non-con and will come see patient. 59yo F PMH Lupus, RA presenting as transfer from [place] for concern for  shunt infection. Labs, cultures, urine, cxr. NeuroSurgery called. Request CTH non-con and will come see patient.    VERONICA Rodriguez MD: Pt is a 59 y/o female with PMH Lupus, RA who is transferred from [place] for concern for  shunt infection. Patient was at rehab on 2/22 s/p extensive hospitalization for AVM, IPH,  shunt, PNA. Code stroke called, GTC seizures noted, admitted to [place] for further workup. Concern was for PRES vs brain infection and patient was admitted to ICU. LP noted to have 2,800 lymphocytes in the CSF and patient was transferred to [facility] for further management as patient had  shunt placed 1/7 at [facility]. Here in the ED patient arrives with trach collar satting 100%. Patient with generalized shaking, per EMS and chart review this is baseline for patient. Febrile. En route, the nicardipine drip was discontinued and fluids started as patient had BP drop. BP stable now. Per chart review noted to have labile BPs at [place]. 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 Patient returned to the OR on 12/18 for sub occipital decompressive craniectomy. Patient underwent Trach Placement on 12/24. Patient failed clamping trial and required VPS, which was Placed on 1/7. Transferred to rehab. Plan: sepsis labs, NSG consult, CT.

## 2019-02-23 NOTE — ED PROVIDER NOTE - PHYSICAL EXAMINATION
HENT: Normocephalic, atraumatic.  shunt right scalp  Eyes: EOMI  Resp: CTAB, non-labored, speaking without difficulty on room air, no wheeze  CV: rrr, no murmur  Abd: soft, NTND  MSK: moving all extremities slightly  Skin: warm and dry  Neuro: Not responding to voice, intermittent responding to pain. Frequent tremor/jerks of UEs  Psych: Unable to assess

## 2019-02-23 NOTE — ED ADULT NURSE NOTE - OBJECTIVE STATEMENT
59 y/o female, transfer via EMS from [facility] r/o  shunt infection and possible meningitis. Pt had  shunt placed 1/7/19 at [facility]. Presents nonverbal, tracheostomy collar at 6lpm, febrile, generalized rigors. Breathing even, full, unlabored, no cyanosis, no retractions. Sinus tachy on monitor. Abdomen soft, +PEG tube, no vomiting. Newsome catheter in place, draining clear yellow urine. Family at bedside. Stretcher locked in low position. Advised of plan of care. 59 y/o female, transfer via EMS from [facility] r/o  shunt infection and possible meningitis. Pt had  shunt placed 1/7/19 at [facility]. Presents nonverbal, tracheostomy collar at 6lpm, febrile, generalized tremors. Breathing even, full, unlabored, no cyanosis, no retractions. Sinus tachy on monitor. Abdomen soft, +PEG tube, no vomiting. Newsome catheter in place, draining clear yellow urine. Family at bedside. Stretcher locked in low position. Advised of plan of care.

## 2019-02-23 NOTE — ED PROVIDER NOTE - PROGRESS NOTE DETAILS
AK: Spoke to [employer]. ABG sent as requested. Speaking to ICU attending about location of patient. Floor vs ICU. AK: Admit to NeuroSx. Boards in SICU

## 2019-02-23 NOTE — ED PROVIDER NOTE - OBJECTIVE STATEMENT
59yo F PMH Lupus, RA presenting as transfer from [place] for concern for  shunt infection. Patient was at rehab on 2/22 s/p extensive hospitalization for AVM, IPH,  shunt, PNA. Code stroke called, GTC seizures noted, admitted to [place] for further workup. Concern was for PRES vs brain infection and patient was admitted to ICU. LP noted to have 2,800 lymphocytes in the CSF and patient was transferred to [facility] for further management as patient had  shunt placed 1/7 at [facility]. Here in the ED patient arrives with trach collar satting 100%. Patient with generalized shaking, per EMS and chart review this is baseline for patient. Febrile. En route, the nicardipine drip was discontinued and fluids started as patient had BP drop. BP stable now. Per chart review noted to have labile BPs at [place].   Prior [facility] stay per chart reiview: Patient found to have cerebellar IPH with hydrocephalus, EVD placed. 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 Patient returned to the OR on 12/18 for sub occipital decompressive craniectomy. Patient underwent Trach Placement on 12/24. Patient failed clamping trial and required VPS, which was Placed on 1/7. Transferred to rehab.

## 2019-02-24 ENCOUNTER — TRANSCRIPTION ENCOUNTER (OUTPATIENT)
Age: 59
End: 2019-02-24

## 2019-02-24 DIAGNOSIS — A41.9 SEPSIS, UNSPECIFIED ORGANISM: ICD-10-CM

## 2019-02-24 LAB
ALBUMIN SERPL ELPH-MCNC: 3.8 G/DL — SIGNIFICANT CHANGE UP (ref 3.3–5)
ALP SERPL-CCNC: 103 U/L — SIGNIFICANT CHANGE UP (ref 40–120)
ALT FLD-CCNC: 40 U/L — SIGNIFICANT CHANGE UP (ref 10–45)
ANION GAP SERPL CALC-SCNC: 11 MMOL/L — SIGNIFICANT CHANGE UP (ref 5–17)
ANION GAP SERPL CALC-SCNC: 12 MMOL/L — SIGNIFICANT CHANGE UP (ref 5–17)
APPEARANCE UR: ABNORMAL
APTT BLD: 25.9 SEC — LOW (ref 27.5–36.3)
AST SERPL-CCNC: 29 U/L — SIGNIFICANT CHANGE UP (ref 10–40)
BACTERIA # UR AUTO: ABNORMAL
BILIRUB SERPL-MCNC: 0.5 MG/DL — SIGNIFICANT CHANGE UP (ref 0.2–1.2)
BILIRUB UR-MCNC: NEGATIVE — SIGNIFICANT CHANGE UP
BLD GP AB SCN SERPL QL: NEGATIVE — SIGNIFICANT CHANGE UP
BUN SERPL-MCNC: 10 MG/DL — SIGNIFICANT CHANGE UP (ref 7–23)
BUN SERPL-MCNC: 11 MG/DL — SIGNIFICANT CHANGE UP (ref 7–23)
CALCIUM SERPL-MCNC: 8.7 MG/DL — SIGNIFICANT CHANGE UP (ref 8.4–10.5)
CALCIUM SERPL-MCNC: 8.9 MG/DL — SIGNIFICANT CHANGE UP (ref 8.4–10.5)
CHLORIDE SERPL-SCNC: 93 MMOL/L — LOW (ref 96–108)
CHLORIDE SERPL-SCNC: 94 MMOL/L — LOW (ref 96–108)
CO2 SERPL-SCNC: 27 MMOL/L — SIGNIFICANT CHANGE UP (ref 22–31)
CO2 SERPL-SCNC: 28 MMOL/L — SIGNIFICANT CHANGE UP (ref 22–31)
COD CRY URNS QL: ABNORMAL
COLOR SPEC: YELLOW — SIGNIFICANT CHANGE UP
CREAT SERPL-MCNC: 0.4 MG/DL — LOW (ref 0.5–1.3)
CREAT SERPL-MCNC: 0.42 MG/DL — LOW (ref 0.5–1.3)
DIFF PNL FLD: ABNORMAL
EPI CELLS # UR: 1 /HPF — SIGNIFICANT CHANGE UP
GAS PNL BLDA: SIGNIFICANT CHANGE UP
GLUCOSE CSF-MCNC: 79 MG/DL — HIGH (ref 40–70)
GLUCOSE SERPL-MCNC: 110 MG/DL — HIGH (ref 70–99)
GLUCOSE SERPL-MCNC: 153 MG/DL — HIGH (ref 70–99)
GLUCOSE UR QL: NEGATIVE — SIGNIFICANT CHANGE UP
GRAM STN FLD: SIGNIFICANT CHANGE UP
HCG SERPL-ACNC: <2 MIU/ML — SIGNIFICANT CHANGE UP
HCT VFR BLD CALC: 34.4 % — LOW (ref 34.5–45)
HGB BLD-MCNC: 12.7 G/DL — SIGNIFICANT CHANGE UP (ref 11.5–15.5)
HYALINE CASTS # UR AUTO: 10 /LPF — HIGH (ref 0–2)
INR BLD: 1.19 RATIO — HIGH (ref 0.88–1.16)
KETONES UR-MCNC: NEGATIVE — SIGNIFICANT CHANGE UP
LACTATE CSF-MCNC: 3.2 MMOL/L — HIGH (ref 1.1–2.4)
LEUKOCYTE ESTERASE UR-ACNC: ABNORMAL
MAGNESIUM SERPL-MCNC: 1.9 MG/DL — SIGNIFICANT CHANGE UP (ref 1.6–2.6)
MCHC RBC-ENTMCNC: 31.9 PG — SIGNIFICANT CHANGE UP (ref 27–34)
MCHC RBC-ENTMCNC: 37 GM/DL — HIGH (ref 32–36)
MCV RBC AUTO: 86.1 FL — SIGNIFICANT CHANGE UP (ref 80–100)
NITRITE UR-MCNC: POSITIVE
NT-PROBNP SERPL-SCNC: 488 PG/ML — HIGH (ref 0–300)
PH UR: 6 — SIGNIFICANT CHANGE UP (ref 5–8)
PHOSPHATE SERPL-MCNC: 2.4 MG/DL — LOW (ref 2.5–4.5)
PLATELET # BLD AUTO: 225 K/UL — SIGNIFICANT CHANGE UP (ref 150–400)
POTASSIUM SERPL-MCNC: 3.2 MMOL/L — LOW (ref 3.5–5.3)
POTASSIUM SERPL-MCNC: 3.3 MMOL/L — LOW (ref 3.5–5.3)
POTASSIUM SERPL-SCNC: 3.2 MMOL/L — LOW (ref 3.5–5.3)
POTASSIUM SERPL-SCNC: 3.3 MMOL/L — LOW (ref 3.5–5.3)
PROT CSF-MCNC: 162 MG/DL — HIGH (ref 15–45)
PROT SERPL-MCNC: 7.5 G/DL — SIGNIFICANT CHANGE UP (ref 6–8.3)
PROT UR-MCNC: ABNORMAL
PROTHROM AB SERPL-ACNC: 13.6 SEC — HIGH (ref 10–12.9)
RBC # BLD: 4 M/UL — SIGNIFICANT CHANGE UP (ref 3.8–5.2)
RBC # FLD: 15.7 % — HIGH (ref 10.3–14.5)
RBC CASTS # UR COMP ASSIST: 4 /HPF — SIGNIFICANT CHANGE UP (ref 0–4)
RH IG SCN BLD-IMP: POSITIVE — SIGNIFICANT CHANGE UP
SODIUM SERPL-SCNC: 132 MMOL/L — LOW (ref 135–145)
SODIUM SERPL-SCNC: 133 MMOL/L — LOW (ref 135–145)
SP GR SPEC: 1.02 — SIGNIFICANT CHANGE UP (ref 1.01–1.02)
SPECIMEN SOURCE: SIGNIFICANT CHANGE UP
TROPONIN T, HIGH SENSITIVITY RESULT: 14 NG/L — SIGNIFICANT CHANGE UP (ref 0–51)
UROBILINOGEN FLD QL: NEGATIVE — SIGNIFICANT CHANGE UP
WBC # BLD: 8.9 K/UL — SIGNIFICANT CHANGE UP (ref 3.8–10.5)
WBC # FLD AUTO: 8.9 K/UL — SIGNIFICANT CHANGE UP (ref 3.8–10.5)
WBC UR QL: 42 /HPF — HIGH (ref 0–5)

## 2019-02-24 PROCEDURE — 99292 CRITICAL CARE ADDL 30 MIN: CPT

## 2019-02-24 PROCEDURE — 95951: CPT | Mod: 26

## 2019-02-24 PROCEDURE — 99291 CRITICAL CARE FIRST HOUR: CPT

## 2019-02-24 PROCEDURE — 95819 EEG AWAKE AND ASLEEP: CPT | Mod: 26

## 2019-02-24 RX ORDER — PANTOPRAZOLE SODIUM 20 MG/1
40 TABLET, DELAYED RELEASE ORAL DAILY
Qty: 0 | Refills: 0 | Status: DISCONTINUED | OUTPATIENT
Start: 2019-02-24 | End: 2019-02-25

## 2019-02-24 RX ORDER — FENTANYL CITRATE 50 UG/ML
1 INJECTION INTRAVENOUS
Qty: 0 | Refills: 0 | Status: DISCONTINUED | OUTPATIENT
Start: 2019-02-24 | End: 2019-02-24

## 2019-02-24 RX ORDER — IPRATROPIUM BROMIDE 0.2 MG/ML
1 SOLUTION, NON-ORAL INHALATION EVERY 6 HOURS
Qty: 0 | Refills: 0 | Status: DISCONTINUED | OUTPATIENT
Start: 2019-02-24 | End: 2019-02-24

## 2019-02-24 RX ORDER — ACETAMINOPHEN 500 MG
1000 TABLET ORAL ONCE
Qty: 0 | Refills: 0 | Status: COMPLETED | OUTPATIENT
Start: 2019-02-24 | End: 2019-02-24

## 2019-02-24 RX ORDER — SODIUM CHLORIDE 9 MG/ML
1000 INJECTION, SOLUTION INTRAVENOUS
Qty: 0 | Refills: 0 | Status: DISCONTINUED | OUTPATIENT
Start: 2019-02-24 | End: 2019-02-25

## 2019-02-24 RX ORDER — LABETALOL HCL 100 MG
10 TABLET ORAL ONCE
Qty: 0 | Refills: 0 | Status: COMPLETED | OUTPATIENT
Start: 2019-02-24 | End: 2019-02-24

## 2019-02-24 RX ORDER — GABAPENTIN 400 MG/1
300 CAPSULE ORAL EVERY 8 HOURS
Qty: 0 | Refills: 0 | Status: DISCONTINUED | OUTPATIENT
Start: 2019-02-24 | End: 2019-02-25

## 2019-02-24 RX ORDER — TIOTROPIUM BROMIDE 18 UG/1
1 CAPSULE ORAL; RESPIRATORY (INHALATION) DAILY
Qty: 0 | Refills: 0 | Status: DISCONTINUED | OUTPATIENT
Start: 2019-02-24 | End: 2019-02-24

## 2019-02-24 RX ORDER — CEFEPIME 1 G/1
2000 INJECTION, POWDER, FOR SOLUTION INTRAMUSCULAR; INTRAVENOUS ONCE
Qty: 0 | Refills: 0 | Status: COMPLETED | OUTPATIENT
Start: 2019-02-24 | End: 2019-02-24

## 2019-02-24 RX ORDER — IPRATROPIUM BROMIDE 0.2 MG/ML
500 SOLUTION, NON-ORAL INHALATION EVERY 6 HOURS
Qty: 0 | Refills: 0 | Status: DISCONTINUED | OUTPATIENT
Start: 2019-02-24 | End: 2019-02-24

## 2019-02-24 RX ORDER — CLONAZEPAM 1 MG
1 TABLET ORAL EVERY 12 HOURS
Qty: 0 | Refills: 0 | Status: DISCONTINUED | OUTPATIENT
Start: 2019-02-24 | End: 2019-02-25

## 2019-02-24 RX ORDER — LEVETIRACETAM 250 MG/1
1000 TABLET, FILM COATED ORAL
Qty: 0 | Refills: 0 | Status: DISCONTINUED | OUTPATIENT
Start: 2019-02-24 | End: 2019-02-25

## 2019-02-24 RX ORDER — CHLORHEXIDINE GLUCONATE 213 G/1000ML
1 SOLUTION TOPICAL
Qty: 0 | Refills: 0 | Status: DISCONTINUED | OUTPATIENT
Start: 2019-02-24 | End: 2019-02-25

## 2019-02-24 RX ORDER — CLONAZEPAM 1 MG
0.25 TABLET ORAL EVERY 8 HOURS
Qty: 0 | Refills: 0 | Status: DISCONTINUED | OUTPATIENT
Start: 2019-02-24 | End: 2019-02-25

## 2019-02-24 RX ORDER — GABAPENTIN 400 MG/1
300 CAPSULE ORAL EVERY 8 HOURS
Qty: 0 | Refills: 0 | Status: DISCONTINUED | OUTPATIENT
Start: 2019-02-24 | End: 2019-02-24

## 2019-02-24 RX ORDER — DOCUSATE SODIUM 100 MG
100 CAPSULE ORAL
Qty: 0 | Refills: 0 | Status: DISCONTINUED | OUTPATIENT
Start: 2019-02-24 | End: 2019-02-25

## 2019-02-24 RX ORDER — IPRATROPIUM BROMIDE 0.2 MG/ML
500 SOLUTION, NON-ORAL INHALATION EVERY 6 HOURS
Qty: 0 | Refills: 0 | Status: DISCONTINUED | OUTPATIENT
Start: 2019-02-24 | End: 2019-02-25

## 2019-02-24 RX ORDER — SENNA PLUS 8.6 MG/1
1 TABLET ORAL AT BEDTIME
Qty: 0 | Refills: 0 | Status: DISCONTINUED | OUTPATIENT
Start: 2019-02-24 | End: 2019-02-25

## 2019-02-24 RX ORDER — DOCUSATE SODIUM 100 MG
10 CAPSULE ORAL
Qty: 0 | Refills: 0 | Status: DISCONTINUED | OUTPATIENT
Start: 2019-02-24 | End: 2019-02-24

## 2019-02-24 RX ORDER — ACETAMINOPHEN 500 MG
325 TABLET ORAL EVERY 4 HOURS
Qty: 0 | Refills: 0 | Status: DISCONTINUED | OUTPATIENT
Start: 2019-02-24 | End: 2019-02-25

## 2019-02-24 RX ORDER — VANCOMYCIN HCL 1 G
1000 VIAL (EA) INTRAVENOUS EVERY 12 HOURS
Qty: 0 | Refills: 0 | Status: DISCONTINUED | OUTPATIENT
Start: 2019-02-24 | End: 2019-02-25

## 2019-02-24 RX ORDER — ALBUTEROL 90 UG/1
2 AEROSOL, METERED ORAL EVERY 6 HOURS
Qty: 0 | Refills: 0 | Status: DISCONTINUED | OUTPATIENT
Start: 2019-02-24 | End: 2019-02-24

## 2019-02-24 RX ORDER — IPRATROPIUM/ALBUTEROL SULFATE 18-103MCG
3 AEROSOL WITH ADAPTER (GRAM) INHALATION EVERY 6 HOURS
Qty: 0 | Refills: 0 | Status: DISCONTINUED | OUTPATIENT
Start: 2019-02-24 | End: 2019-02-24

## 2019-02-24 RX ORDER — LEVETIRACETAM 250 MG/1
500 TABLET, FILM COATED ORAL
Qty: 0 | Refills: 0 | Status: DISCONTINUED | OUTPATIENT
Start: 2019-02-24 | End: 2019-02-24

## 2019-02-24 RX ORDER — LEVALBUTEROL 1.25 MG/.5ML
0.63 SOLUTION, CONCENTRATE RESPIRATORY (INHALATION) EVERY 6 HOURS
Qty: 0 | Refills: 0 | Status: DISCONTINUED | OUTPATIENT
Start: 2019-02-24 | End: 2019-02-25

## 2019-02-24 RX ORDER — POTASSIUM CHLORIDE 20 MEQ
40 PACKET (EA) ORAL ONCE
Qty: 0 | Refills: 0 | Status: COMPLETED | OUTPATIENT
Start: 2019-02-24 | End: 2019-02-24

## 2019-02-24 RX ORDER — LABETALOL HCL 100 MG
100 TABLET ORAL DAILY
Qty: 0 | Refills: 0 | Status: DISCONTINUED | OUTPATIENT
Start: 2019-02-24 | End: 2019-02-24

## 2019-02-24 RX ORDER — CEFEPIME 1 G/1
2000 INJECTION, POWDER, FOR SOLUTION INTRAMUSCULAR; INTRAVENOUS EVERY 8 HOURS
Qty: 0 | Refills: 0 | Status: DISCONTINUED | OUTPATIENT
Start: 2019-02-24 | End: 2019-02-25

## 2019-02-24 RX ORDER — METOPROLOL TARTRATE 50 MG
5 TABLET ORAL ONCE
Qty: 0 | Refills: 0 | Status: COMPLETED | OUTPATIENT
Start: 2019-02-24 | End: 2019-02-24

## 2019-02-24 RX ORDER — SODIUM,POTASSIUM PHOSPHATES 278-250MG
1 POWDER IN PACKET (EA) ORAL
Qty: 0 | Refills: 0 | Status: DISCONTINUED | OUTPATIENT
Start: 2019-02-24 | End: 2019-02-25

## 2019-02-24 RX ADMIN — Medication 250 MILLIGRAM(S): at 17:29

## 2019-02-24 RX ADMIN — Medication 325 MILLIGRAM(S): at 10:54

## 2019-02-24 RX ADMIN — Medication 1 TABLET(S): at 21:48

## 2019-02-24 RX ADMIN — PANTOPRAZOLE SODIUM 40 MILLIGRAM(S): 20 TABLET, DELAYED RELEASE ORAL at 11:11

## 2019-02-24 RX ADMIN — Medication 5 MILLIGRAM(S): at 04:45

## 2019-02-24 RX ADMIN — SENNA PLUS 1 TABLET(S): 8.6 TABLET ORAL at 21:48

## 2019-02-24 RX ADMIN — FENTANYL CITRATE 25 MICROGRAM(S): 50 INJECTION INTRAVENOUS at 23:00

## 2019-02-24 RX ADMIN — Medication 100 MILLIGRAM(S): at 05:02

## 2019-02-24 RX ADMIN — Medication 40 MILLIEQUIVALENT(S): at 05:02

## 2019-02-24 RX ADMIN — Medication 500 MICROGRAM(S): at 14:33

## 2019-02-24 RX ADMIN — Medication 1 MILLIGRAM(S): at 05:02

## 2019-02-24 RX ADMIN — Medication 1 MILLIGRAM(S): at 22:56

## 2019-02-24 RX ADMIN — Medication 325 MILLIGRAM(S): at 21:00

## 2019-02-24 RX ADMIN — Medication 500 MICROGRAM(S): at 17:21

## 2019-02-24 RX ADMIN — LEVALBUTEROL 0.63 MILLIGRAM(S): 1.25 SOLUTION, CONCENTRATE RESPIRATORY (INHALATION) at 17:21

## 2019-02-24 RX ADMIN — CHLORHEXIDINE GLUCONATE 1 APPLICATION(S): 213 SOLUTION TOPICAL at 22:00

## 2019-02-24 RX ADMIN — LEVALBUTEROL 0.63 MILLIGRAM(S): 1.25 SOLUTION, CONCENTRATE RESPIRATORY (INHALATION) at 14:33

## 2019-02-24 RX ADMIN — SODIUM CHLORIDE 75 MILLILITER(S): 9 INJECTION, SOLUTION INTRAVENOUS at 21:52

## 2019-02-24 RX ADMIN — Medication 1000 MILLIGRAM(S): at 15:15

## 2019-02-24 RX ADMIN — Medication 10 MILLIGRAM(S): at 09:48

## 2019-02-24 RX ADMIN — CEFEPIME 100 MILLIGRAM(S): 1 INJECTION, POWDER, FOR SOLUTION INTRAMUSCULAR; INTRAVENOUS at 21:48

## 2019-02-24 RX ADMIN — LEVETIRACETAM 1000 MILLIGRAM(S): 250 TABLET, FILM COATED ORAL at 17:29

## 2019-02-24 RX ADMIN — Medication 400 MILLIGRAM(S): at 15:00

## 2019-02-24 RX ADMIN — Medication 1 TABLET(S): at 09:48

## 2019-02-24 RX ADMIN — CEFEPIME 100 MILLIGRAM(S): 1 INJECTION, POWDER, FOR SOLUTION INTRAMUSCULAR; INTRAVENOUS at 11:11

## 2019-02-24 RX ADMIN — Medication 1 TABLET(S): at 12:55

## 2019-02-24 RX ADMIN — LEVETIRACETAM 500 MILLIGRAM(S): 250 TABLET, FILM COATED ORAL at 05:02

## 2019-02-24 RX ADMIN — GABAPENTIN 300 MILLIGRAM(S): 400 CAPSULE ORAL at 13:18

## 2019-02-24 RX ADMIN — FENTANYL CITRATE 25 MICROGRAM(S): 50 INJECTION INTRAVENOUS at 23:15

## 2019-02-24 RX ADMIN — CEFEPIME 100 MILLIGRAM(S): 1 INJECTION, POWDER, FOR SOLUTION INTRAMUSCULAR; INTRAVENOUS at 13:18

## 2019-02-24 RX ADMIN — CEFEPIME 100 MILLIGRAM(S): 1 INJECTION, POWDER, FOR SOLUTION INTRAMUSCULAR; INTRAVENOUS at 01:02

## 2019-02-24 RX ADMIN — GABAPENTIN 300 MILLIGRAM(S): 400 CAPSULE ORAL at 21:49

## 2019-02-24 RX ADMIN — Medication 1 MILLIGRAM(S): at 17:30

## 2019-02-24 RX ADMIN — GABAPENTIN 300 MILLIGRAM(S): 400 CAPSULE ORAL at 05:02

## 2019-02-24 RX ADMIN — Medication 325 MILLIGRAM(S): at 20:30

## 2019-02-24 RX ADMIN — Medication 100 MILLIGRAM(S): at 17:31

## 2019-02-24 RX ADMIN — Medication 1 TABLET(S): at 17:31

## 2019-02-24 NOTE — EEG REPORT - NS EEG TEXT BOX
[facility]  Report of Routine EEG with Video      [facility]: [address], [place] [address], Phone: [phone]  [facility]: [address], [place] [address], Phone: [phone]  Office: [address], [place] [address], Phone: [phone]    Patient Name: Sarah Cain    Age: 58 year  : 1960  Patient ID: -, MRN #: MR# [id number], Location: [place] [address]  Referring Physician: -  EEG #: [id number]    Study Date: 2019		    Technical Information:					  On Instrument: -  Placement and Labeling of Electrodes:  The EEG was performed utilizing 20 channels referential EEG connections (coronal over temporal over parasagittal montage) using all standard 10-20 electrode placements with EKG.  Recording was at a sampling rate of 256 samples per second per channel.  Time synchronized digital video recording was done simultaneously with EEG recording.  A low light infrared camera was used for low light recording.  Dakota and seizure detection algorithms were utilized.    History:  P/W; eye rolliing and tonic clonic movement  H/O; cerebellar avm,  shunt, craniotomy and seizures  R/O seizure    Medication	  -	  Klonopin	  Keppra	  Neurontin	  Fentanyl	    Study Interpretation:    Study Interpretation:    FINDINGS:  There was no clear posterior dominant rhythm.  The background was reactive to environmental stimuli.  It mainly consisted of polymorphic theta frequency activity with intermixed faster frequencies.    Sleep Background:  Stages of sleep could not be delineated.    Background Slowing:  Continuous diffuse polymorphic delta activity.    Other Paroxysmal Non-Epileptiform Findings:    None.    Epileptiform Activity:   No epileptiform discharges were present.    Events:  No seizures were recorded.    Activation Procedures:   -Hyperventilation was not performed.    -Photic stimulation was not performed.    Artifacts:  Intermittent myogenic and movement artifacts were noted.    EEG Classification:  Abnormal study  -	Moderate generalized slowing    Impression:  Findings indicate non-specific moderate diffuse or multifocal cerebral dysfunction. There were no epileptiform abnormalities recorded.

## 2019-02-24 NOTE — PROVIDER CONTACT NOTE (OTHER) - ACTION/TREATMENT ORDERED:
Provider aware, at bedside for assessment. Movements still purposeful, not seizure like activity as per provider. Will continue to monitor pt.

## 2019-02-24 NOTE — CONSULT NOTE ADULT - ASSESSMENT
58y female with SLE, cerebellar AVM and bleed in Dec, s/p embolization, AVM resection, post fossa decompression, VPS, trach/PEG, course notable for persistent, "central" fever, but ultimately, fever resolved, pt improving, sent to rehab.    She apparently developed generalized Sz, fever and sent to [facility] ICU yest, covered with Vanco and Cefepime, transferred here late last night.    She remains on TC, lethargic, without purposeful response, Tmx 103, but WBC/diff normal, CXR clear  CSF shows >500 WBCs, majority Polys, thus difficult to exclude ventriculitis, ?infected VPS- no org seen  I called [facility] client services, [phone], spoke to Vanessa, who would not give me any verbal info- faxed form to our office to fax back to them- would be helpful to obtain all Cx info from [facility]    Plan:  Pending further Cx results, agree with empiric Vanco and Cefepime for CNS coverage  [place] of VPS to be determined  Follow temps and CBC/diff   D/w NSCU Team

## 2019-02-24 NOTE — PROGRESS NOTE ADULT - SUBJECTIVE AND OBJECTIVE BOX
HPI:  HPI: 58yof initially admitted in mid Dec 2018 for ruptured cerebellar AVM s/p embolization and craniotomy for resection and  shunt. She was discharged to rehab in late Jan 2019. She has been doing well at rehab: walking 50 steps with walker. Two days prior, the patient had a generlized tonic clonic seizure witnessed by her sister as well as a fever. She was then brought to OSH CT. The patient reportedly had an LP that showed leukocytosis. Due to the suspicion of  shunt infection, the patient was transferred to [facility] for further management. In the ER, EO to pain, minimal mvmt x 4.    PMHx: as above  PSHx: as above  Medications: as above  Allergies: nkd  Social history: sister at bedside  Family History:     VS: T(C): 38.3 (02-24-19 @ 02:55)  HR: 132 (02-24-19 @ 02:55)  BP: 141/92 (02-24-19 @ 02:55)  RR: 22 (02-24-19 @ 02:55)  SpO2: 100% (02-24-19 @ 02:55)  Wt(kg): --                          13.0   9.6   )-----------( 273      ( 23 Feb 2019 23:19 )             37.5     02-23    132<L>  |  93<L>  |  11  ----------------------------<  153<H>  3.3<L>   |  27  |  0.40<L>    Ca    8.7      23 Feb 2019 23:19    TPro  7.5  /  Alb  3.8  /  TBili  0.5  /  DBili  x   /  AST  29  /  ALT  40  /  AlkPhos  103  02-23    PT/INR - ( 24 Feb 2019 01:00 )   PT: 13.6 sec;   INR: 1.19 ratio         PTT - ( 24 Feb 2019 01:00 )  PTT:25.9 sec    Exam:  EO to pain  PERRL  not FC  trached  nonverbal  SANCHEZ intermittently x 4 (24 Feb 2019 03:14)    SURGERY:   PAST MEDICAL HISTORY: Systemic lupus erythematosus, unspecified SLE type, unspecified organ involvement status  No pertinent past medical history  Lupus  Back pain, chronic  Childhood asthma  Ovarian cyst  DVT (deep venous thrombosis), bilateral  RA (rheumatoid arthritis)    PAST SURGICAL HISTORY: No significant past surgical history  No significant past surgical history  Plantar fasciitis of right foot  History of laparoscopic cholecystectomy    FAMILY HISTORY:  No pertinent family history in first degree relatives  Family history of kidney disease in father  Family history of heart disease  Family history of osteoarthritis  Family history of hypertension in mother    ALLERGIES: No Known Allergies    **************************************  **************************************    OVERNIGHT EVENTS: [] None    ROS  Unobtainable due to mental status[] Negative []  Positives:    ADMISSION SCORES: GCS: HH: MF: NIHSS: RASS: CAM-ICU: ICP:    ICU Vital Signs Last 24 Hrs  T(C): 39.4 (24 Feb 2019 07:00), Max: 39.4 (24 Feb 2019 07:00)  T(F): 103 (24 Feb 2019 07:00), Max: 103 (24 Feb 2019 07:00)  HR: 113 (24 Feb 2019 09:56) (113 - 147)  BP: 152/105 (24 Feb 2019 08:03) (129/61 - 168/105)  BP(mean): 122 (24 Feb 2019 08:03) (115 - 128)  ABP: --  ABP(mean): --  RR: 22 (24 Feb 2019 09:56) (20 - 24)  SpO2: 100% (24 Feb 2019 09:56) (97% - 100%)     02-23 @ 07:01  -  02-24 @ 07:00  --------------------------------------------------------  IN: 120 mL / OUT: 690 mL / NET: -570 mL    02-24 @ 07:01  -  02-24 @ 11:20  --------------------------------------------------------  IN: 0 mL / OUT: 185 mL / NET: -185 mL           DEVICES: [] Restraints [] ARELY/HMV []LD [] ET tube [] Trach [] Chest Tube [] A-line [] Newsome [] NGT [] Rectal Tube [] EVD [] CVL  [] ICP/LiCOx    NEUROIMAGING:     EEG REPORT:     MEDICATIONS:  acetaminophen   Tablet .. 325 milliGRAM(s) Oral every 4 hours PRN  cefepime   IVPB 2000 milliGRAM(s) IV Intermittent every 8 hours  chlorhexidine 4% Liquid 1 Application(s) Topical <User Schedule>  clonazePAM Tablet 0.25 milliGRAM(s) Oral every 8 hours PRN  clonazePAM Tablet 1 milliGRAM(s) Oral every 12 hours  docusate sodium Liquid 100 milliGRAM(s) Oral two times a day  fentaNYL   Patch  12 MICROgram(s)/Hr. 1 Patch Transdermal every 48 hours  gabapentin   Solution 300 milliGRAM(s) Oral every 8 hours  levETIRAcetam  Solution 1000 milliGRAM(s) Oral two times a day  pantoprazole   Suspension 40 milliGRAM(s) Oral daily  potassium acid phosphate/sodium acid phosphate tablet (K-PHOS No. 2) 1 Tablet(s) Oral four times a day with meals  senna 1 Tablet(s) Oral at bedtime      PHYSICAL EXAM:  trach'ed  EO to voice, PERRL, no FC, moving all extremities spont  regular, tachycardic  minimal ronchi R>L, decreased breath sounds  soft/nd abd  wwp ext      LABS:                        13.0   9.6   )-----------( 273      ( 23 Feb 2019 23:19 )             37.5    02-23    132<L>  |  93<L>  |  11  ----------------------------<  153<H>  3.3<L>   |  27  |  0.40<L>    Ca    8.7      23 Feb 2019 23:19    TPro  7.5  /  Alb  3.8  /  TBili  0.5  /  DBili  x   /  AST  29  /  ALT  40  /  AlkPhos  103  02-23   ABG - ( 24 Feb 2019 01:00 )  pH, Arterial: 7.43  pH, Blood: x     /  pCO2: 45    /  pO2: 115   / HCO3: 29    / Base Excess: 4.5   /  SaO2: 99               Lipids and LFTs 02-23 @ 23:19  --  --  --  --  --  40  3.8  103  29  --  0.5  --  7.5    CSF 02-24 @ 00:00  --  --  79  162  3.2  CSF 02-23 @ 23:57  73  6  --  --  --

## 2019-02-24 NOTE — CONSULT NOTE ADULT - SUBJECTIVE AND OBJECTIVE BOX
HPI:   Patient is a 58y female with SLE, cerebellar AVM and bleed in Dec, s/p embolization, AVM resection, post fossa decompression, VPS, trach/PEG, course notable for persistent, "central" fever, but ultimately, fever resolved, pt improving, sent to rehab.  She apparently developed altered mental status, generalized Sz, fever and sent to [facility] ICU yest.  Notes do not provide lab reports, but mention of LP and abnormal parameters, prompting Vanco and Cefepime and transfer here late last night.  She remains on TC, lethargic, without purposeful response at present.  Tmx 103 here.    REVIEW OF SYSTEMS:  not provided    PAST MEDICAL & SURGICAL HISTORY:  Systemic lupus erythematosus, unspecified SLE type, unspecified organ involvement status  Lupus  Back pain, chronic  Childhood asthma  Ovarian cyst  DVT (deep venous thrombosis), bilateral: ( treated with Heparin and coumadin)  RA (rheumatoid arthritis)  No significant past surgical history  No significant past surgical history  Plantar fasciitis of right foot: h/o surgery  History of laparoscopic cholecystectomy:     Allergies  No Known Allergies    Antimicrobials Day # 2  cefepime   IVPB 2000 milliGRAM(s) IV Intermittent every 8 hours  vancomycin  IVPB 1000 milliGRAM(s) IV Intermittent every 12 hours    Other Medications:  acetaminophen   Tablet .. 325 milliGRAM(s) Oral every 4 hours PRN  chlorhexidine 4% Liquid 1 Application(s) Topical <User Schedule>  clonazePAM Tablet 0.25 milliGRAM(s) Oral every 8 hours PRN  clonazePAM Tablet 1 milliGRAM(s) Oral every 12 hours  docusate sodium Liquid 100 milliGRAM(s) Oral two times a day  fentaNYL   Patch  12 MICROgram(s)/Hr. 1 Patch Transdermal every 48 hours  gabapentin   Solution 300 milliGRAM(s) Oral every 8 hours  ipratropium    for Nebulization 500 MICROGram(s) Nebulizer every 6 hours  levalbuterol Inhalation 0.63 milliGRAM(s) Inhalation every 6 hours  levETIRAcetam  Solution 1000 milliGRAM(s) Oral two times a day  pantoprazole   Suspension 40 milliGRAM(s) Oral daily  potassium acid phosphate/sodium acid phosphate tablet (K-PHOS No. 2) 1 Tablet(s) Oral four times a day with meals  senna 1 Tablet(s) Oral at bedtime      FAMILY HISTORY:  No pertinent family history in first degree relatives  Family history of kidney disease in father  Family history of heart disease  Family history of osteoarthritis  Family history of hypertension in mother    SOCIAL HISTORY:  not provided    T(F): 99 (19 @ 15:00), Max: 103 (19 @ 07:00)  HR: 114 (19 @ 17:36)  BP: 165/107 (19 @ 15:00)  RR: 21 (19 @ 15:00)  SpO2: 100% (19 @ 17:36)  Wt(kg): --    PHYSICAL EXAM:  General: no acute distress, on TC, head bobbing/movements  EEG in progress  Eyes:  anicteric, no conjunctival injection, no discharge  Oropharynx: no lesions or injection 	  Neck: trach  Lungs: coarse BSs  Heart: regular rate and rhythm; no murmur, rubs or gallops  Abdomen: soft, nondistended, nontender, G tube site clean  Skin: no lesions  Extremities: no edema  Neurologic: lethargic    LAB RESULTS:                        13.0   9.6   )-----------( 273      ( 2019 23:19 )             37.5         132<L>  |  93<L>  |  11  ----------------------------<  153<H>  3.3<L>   |  27  |  0.40<L>    Ca    8.7      2019 23:19    TPro  7.5  /  Alb  3.8  /  TBili  0.5  /  DBili  x   /  AST  29  /  ALT  40  /  AlkPhos  103      Urinalysis Basic - ( 2019 23:38 )    Color: Yellow / Appearance: Slightly Turbid / S.021 / pH: x  Gluc: x / Ketone: Negative  / Bili: Negative / Urobili: Negative   Blood: x / Protein: 30 mg/dL / Nitrite: Positive   Leuk Esterase: Large / RBC: 4 /hpf / WBC 42 /HPF   Sq Epi: x / Non Sq Epi: 1 /hpf / Bacteria: Many    Cerebrospinal Fluid Cell Count-1 (19 @ 23:57)    CSF Segmented Neutrophils: 73 %    Total Nucleated Cell Count, CSF: 550 /uL    CSF Color: Straw    CSF Appearance: Hazy    CSF Lymphocytes: 9 %    CSF Monocytes/Macrophages: 18 %    MICROBIOLOGY:  RECENT CULTURES:   @ 09:04 .CSF CSF   Moderate polymorphonuclear leukocytes per low power field  No organisms seen per oil power field  by cytocentrifuge    RADIOLOGY REVIEWED:  Xray Chest 1 View- PORTABLE-Urgent (19 @ 23:54) >  Clear lungs.    CT Head No Cont (19 @ 23:24) >  Right frontal approach ventricular shunt catheter with tip projected over   the left frontal horn, unchanged. Interval decrease in ventricular size   compared with prior exam; ventricles are slitlike.    New low attenuationin the bilateral occipital subcortical white matter   (left greater than right) concerning for white matter edema. MRI of the   brain is recommended for further evaluation.

## 2019-02-24 NOTE — CHART NOTE - NSCHARTNOTEFT_GEN_A_CORE
CAPRINI SCORE [CLOT] Score on Admission for     AGE RELATED RISK FACTORS                                                       MOBILITY RELATED FACTORS  [ ] Age 41-60 years                                            (1 Point)                  [ ] Bed rest                                                        (1 Point)  [ ] Age: 61-74 years                                           (2 Points)                 [ ] Plaster cast                                                   (2 Points)  [ ] Age= 75 years                                              (3 Points)                 [ ] Bed bound for more than 72 hours                 (2 Points)    DISEASE RELATED RISK FACTORS                                               GENDER SPECIFIC FACTORS  [ ] Edema in the lower extremities                       (1 Point)                  [ ] Pregnancy                                                     (1 Point)  [ ] Varicose veins                                               (1 Point)                  [ ] Post-partum < 6 weeks                                   (1 Point)             [x ] BMI > 25 Kg/m2                                            (1 Point)                  [ ] Hormonal therapy  or oral contraception          (1 Point)                 [ ] Sepsis (in the previous month)                        (1 Point)                  [ ] History of pregnancy complications                 (1 point)  [x ] Pneumonia or serious lung disease                                               [ ] Unexplained or recurrent                     (1 Point)           (in the previous month)                               (1 Point)  [ ] Abnormal pulmonary function test                     (1 Point)                 SURGERY RELATED RISK FACTORS (include planned surgeries)  [ ] Acute myocardial infarction                              (1 Point)                 [ ]  Section                                             (1 Point)  [ ] Congestive heart failure (in the previous month)  (1 Point)         [ ] Minor surgery                                                  (1 Point)   [ ] Inflammatory bowel disease                             (1 Point)                 [ ] Arthroscopic surgery                                        (2 Points)  [ ] Central venous access                                      (2 Points)                [ x] General surgery lasting more than 45 minutes   (2 Points)       [ ] Stroke (in the previous month)                          (5 Points)               [ ] Elective arthroplasty                                         (5 Points)            [ ] current or past malignancy                              (2 Points)                                                                                                       HEMATOLOGY RELATED FACTORS                                                 TRAUMA RELATED RISK FACTORS  [ ] Prior episodes of VTE                                     (3 Points)                [ ] Fracture of the hip, pelvis, or leg                       (5 Points)  [ ] Positive family history for VTE                         (3 Points)                 [ ] Acute spinal cord injury (in the previous month)  (5 Points)  [ ] Prothrombin 77323 A                                     (3 Points)                 [ ] Paralysis  (less than 1 month)                             (5 Points)  [ ] Factor V Leiden                                             (3 Points)                  [ ] Multiple Trauma within 1 month                        (5 Points)  [ ] Lupus anticoagulants                                     (3 Points)                                                           [ ] Anticardiolipin antibodies                               (3 Points)                                                       [ ] High homocysteine in the blood                      (3 Points)                                             [ ] Other congenital or acquired thrombophilia      (3 Points)                                                [ ] Heparin induced thrombocytopenia                  (3 Points)                                          Total Score [        4  ]    Risk:  Very low 0   Low 1 to 2   Moderate 3 to 4   High =5       VTE Prophylasix Recommednations:  [ x] mechanical pneumatic compression devices                                      [ ] contraindicated: _____________________  [ x] chemo prophylasix                                                                                   [ ] contraindicated _____________________    **** HIGH LIKELIHOOD DVT PRESENT ON ADMISSION  [ ] (please order LE dopplers within 24 hours of admission)

## 2019-02-24 NOTE — ED ADULT NURSE REASSESSMENT NOTE - NS ED NURSE REASSESS COMMENT FT1
Attempted to give report to NSCU, NSCU unsure which RN is to take report at this time. Will call back. Dr Rader of neurosurg aware, Charge RN Johanna aware. Pt resting on stretcher, in no acute distress, family at bedside, stretcher locked in low position, family updated with plan of care.

## 2019-02-24 NOTE — PROGRESS NOTE ADULT - ASSESSMENT
59yo woman s/p AVM resection, RTOR for posterior fossa decompression, trach/PEG, VPS in 12/2018-1/2019  now back for an episode of GTC seizure and AMS  UTI vs. VPS infection vs. hyponatremia 2/2 infection vs. PRES    Neuro:  stable CTH except for ?b/l occipital white matter edema  MRI  Keppra 1g bid  vEEG  cont home meds  CSF cx f/u, gram stain negative    Card:  -150    Pulm:  trach collar  ABG stable    GI:  tube feeding    Renal:  IVL once adequate feeding    Endo:  euglycemia    Heme:  SCDs, lovenox    ID:  UA+, cont cefepime (was on vanc/cefepime at OSH) for UTI at admission  f/u ucx, bcx, CSF cultures    full code  ICU    at risk for deterioration/death due to seizures, VPS infection, septic shock  critical care time 45min

## 2019-02-24 NOTE — ED ADULT NURSE REASSESSMENT NOTE - NS ED NURSE REASSESS COMMENT FT1
Pt continues to be nonverbal, resting on stretcher, tremors of head/neck ongoing. Continued droplet precautions r/o meningitis. Family at bedside, wearing mask as instructed. Stretcher locked in low position. Advised of plan of care.

## 2019-02-24 NOTE — PROGRESS NOTE ADULT - SUBJECTIVE AND OBJECTIVE BOX
vEEG monitoring negative for seizures thus far    Vitals/labs/meds/imaging reviewed  trach'ed  RUE WD, RLE TF  LUE LC, LLE WD + spont movements per nursing    abx broadened to vanc/cefepime for UTI +/- shunt infection, f/u cultures  MRI  cont vEEG monitoring  NPO for MRI and possible shunt externalization tomorrow    at risk for deterioration seizures, meningitis, septic shock  additional critical care time 35min vEEG monitoring negative for seizures thus far    Vitals/labs/meds/imaging reviewed  trach'ed; EO, tracks  LUE>RUE LC, LLE>>RLE WD    abx broadened to vanc/cefepime for UTI +/- shunt infection, f/u cultures  MRI  cont vEEG monitoring  NPO for MRI and possible shunt externalization tomorrow    at risk for deterioration seizures, meningitis, septic shock  additional critical care time 35min vEEG monitoring negative for seizures thus far    Vitals/labs/meds/imaging reviewed  trach'ed; EO, tracks  LUE>RUE LC, LLE>>RLE WD    abx broadened to vanc/cefepime for UTI +/- shunt infection, f/u cultures  MRI  cont vEEG monitoring  NPO for MRI and possible shunt externalization tomorrow  IVF while NPO  SCDs, holding chemoppx for procedures tomorrow  BLE dopplers for recent hospitalizations and immobility high risk VTE at admission    at risk for deterioration seizures, meningitis, septic shock  additional critical care time 35min

## 2019-02-24 NOTE — PROGRESS NOTE ADULT - SUBJECTIVE AND OBJECTIVE BOX
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 Of note patient was ambulating with assist prior to seizures.    Vitals/labs/meds/imaging reviewed  trach'ed  EO to voice, PERRL, no FC, moving all extremities spont  regular, tachycardic  minimal ronchi R>L, decreased breath sounds  soft/nd abd  wwp ext

## 2019-02-24 NOTE — PROGRESS NOTE ADULT - ASSESSMENT
57yo woman s/p AVM resection, RTOR for posterior fossa decompression, trach/PEG, VPS in 12/2018-1/2019  now back for an episode of GTC seizure and AMS  UTI vs. VPS infection vs. hyponatremia 2/2 infection vs. PRES    Neuro:  stable CTH except for ?b/l occipital white matter edema  MRI with and without MARITO-?PRES ?meningoencephalitis  Keppra 1g bid; Will get cEEG to r/o NCSE  cont home meds  CSF cx f/u, gram stain negative    Card:  -150    Pulm:  trach collar  ABG stable    GI:  tube feeding    Renal:  IVL once adequate feeding    Endo:  euglycemia    Heme:  SCDs, lovenox    ID:  UA+, cont cefepime (was on vanc/cefepime at OSH) for UTI at admission  f/u ucx, bcx, CSF cultures  cont cefepime; add vanco; ID consult  ?externalize VPS-I discussed with GERMAN    full code  ICU    at risk for deterioration/death due to seizures, VPS infection, septic shock  critical care time 45min

## 2019-02-24 NOTE — H&P ADULT - ASSESSMENT
58F s/p embo and rsxn of ruptured AVM with VPS placement. Neurologically not doing as well as several days ago.  CTH shows normal sized vents.  VPS pumps and refills well.  Shunt tap performed - GS neg  + UA    Q1 hour neurocheck  cont ceftriaxone  f/u CSF culture  consider MRI at some point

## 2019-02-24 NOTE — H&P ADULT - HISTORY OF PRESENT ILLNESS
HPI: 58yof initially admitted in mid Dec 2018 for ruptured cerebellar AVM s/p embolization and craniotomy for resection and  shunt. She was discharged to rehab in late Jan 2019. She has been doing well at rehab: walking 50 steps with walker. Two days prior, the patient had a generlized tonic clonic seizure witnessed by her sister as well as a fever. She was then brought to [facility]. The patient reportedly had an LP that showed leukocytosis. Due to the suspicion of  shunt infection, the patient was transferred to [facility] for further management. In the ER, EO to pain, minimal mvmt x 4.    PMHx: as above  PSHx: as above  Medications: as above  Allergies: nkd  Social history: sister at bedside  Family History:     VS: T(C): 38.3 (02-24-19 @ 02:55)  HR: 132 (02-24-19 @ 02:55)  BP: 141/92 (02-24-19 @ 02:55)  RR: 22 (02-24-19 @ 02:55)  SpO2: 100% (02-24-19 @ 02:55)  Wt(kg): --                          13.0   9.6   )-----------( 273      ( 23 Feb 2019 23:19 )             37.5     02-23    132<L>  |  93<L>  |  11  ----------------------------<  153<H>  3.3<L>   |  27  |  0.40<L>    Ca    8.7      23 Feb 2019 23:19    TPro  7.5  /  Alb  3.8  /  TBili  0.5  /  DBili  x   /  AST  29  /  ALT  40  /  AlkPhos  103  02-23    PT/INR - ( 24 Feb 2019 01:00 )   PT: 13.6 sec;   INR: 1.19 ratio         PTT - ( 24 Feb 2019 01:00 )  PTT:25.9 sec    Exam:  EO to pain  PERRL  not FC  trached  nonverbal  SANCHEZ intermittently x 4

## 2019-02-25 LAB
ANION GAP SERPL CALC-SCNC: 13 MMOL/L — SIGNIFICANT CHANGE UP (ref 5–17)
APPEARANCE CSF: ABNORMAL
APPEARANCE SPUN FLD: COLORLESS — SIGNIFICANT CHANGE UP
BUN SERPL-MCNC: 11 MG/DL — SIGNIFICANT CHANGE UP (ref 7–23)
CALCIUM SERPL-MCNC: 8.6 MG/DL — SIGNIFICANT CHANGE UP (ref 8.4–10.5)
CHLORIDE SERPL-SCNC: 98 MMOL/L — SIGNIFICANT CHANGE UP (ref 96–108)
CO2 SERPL-SCNC: 25 MMOL/L — SIGNIFICANT CHANGE UP (ref 22–31)
COLOR CSF: SIGNIFICANT CHANGE UP
CREAT SERPL-MCNC: 0.37 MG/DL — LOW (ref 0.5–1.3)
GAS PNL BLDA: SIGNIFICANT CHANGE UP
GLUCOSE CSF-MCNC: 61 MG/DL — SIGNIFICANT CHANGE UP (ref 40–70)
GLUCOSE SERPL-MCNC: 93 MG/DL — SIGNIFICANT CHANGE UP (ref 70–99)
GRAM STN FLD: SIGNIFICANT CHANGE UP
HCT VFR BLD CALC: 30.7 % — LOW (ref 34.5–45)
HGB BLD-MCNC: 11.1 G/DL — LOW (ref 11.5–15.5)
MAGNESIUM SERPL-MCNC: 2.1 MG/DL — SIGNIFICANT CHANGE UP (ref 1.6–2.6)
MCHC RBC-ENTMCNC: 31.4 PG — SIGNIFICANT CHANGE UP (ref 27–34)
MCHC RBC-ENTMCNC: 36.2 GM/DL — HIGH (ref 32–36)
MCV RBC AUTO: 86.9 FL — SIGNIFICANT CHANGE UP (ref 80–100)
NRBC NFR CSF: 22 /UL — HIGH (ref 0–5)
PHOSPHATE SERPL-MCNC: 2.8 MG/DL — SIGNIFICANT CHANGE UP (ref 2.5–4.5)
PLATELET # BLD AUTO: 183 K/UL — SIGNIFICANT CHANGE UP (ref 150–400)
POTASSIUM SERPL-MCNC: 3.1 MMOL/L — LOW (ref 3.5–5.3)
POTASSIUM SERPL-SCNC: 3.1 MMOL/L — LOW (ref 3.5–5.3)
PROLACTIN SERPL-MCNC: 9.5 NG/ML — SIGNIFICANT CHANGE UP (ref 3.4–24.1)
PROT CSF-MCNC: 119 MG/DL — HIGH (ref 15–45)
RBC # BLD: 3.54 M/UL — LOW (ref 3.8–5.2)
RBC # CSF: 198 /UL — HIGH (ref 0–0)
RBC # FLD: 16.1 % — HIGH (ref 10.3–14.5)
SODIUM SERPL-SCNC: 136 MMOL/L — SIGNIFICANT CHANGE UP (ref 135–145)
SPECIMEN SOURCE: SIGNIFICANT CHANGE UP
TUBE TYPE: SIGNIFICANT CHANGE UP
WBC # BLD: 7 K/UL — SIGNIFICANT CHANGE UP (ref 3.8–10.5)
WBC # FLD AUTO: 7 K/UL — SIGNIFICANT CHANGE UP (ref 3.8–10.5)

## 2019-02-25 PROCEDURE — 99291 CRITICAL CARE FIRST HOUR: CPT | Mod: 25

## 2019-02-25 PROCEDURE — 70553 MRI BRAIN STEM W/O & W/DYE: CPT | Mod: 26

## 2019-02-25 PROCEDURE — 99223 1ST HOSP IP/OBS HIGH 75: CPT | Mod: 57,25

## 2019-02-25 PROCEDURE — 99292 CRITICAL CARE ADDL 30 MIN: CPT | Mod: 25

## 2019-02-25 PROCEDURE — 61107 TDH PNXR IMPLT VENTR CATH: CPT | Mod: 79,52

## 2019-02-25 PROCEDURE — 62256 REMOVE BRAIN CAVITY SHUNT: CPT | Mod: 79

## 2019-02-25 PROCEDURE — 95951: CPT | Mod: 26

## 2019-02-25 PROCEDURE — 36620 INSERTION CATHETER ARTERY: CPT

## 2019-02-25 RX ORDER — VANCOMYCIN HCL 1 G
1000 VIAL (EA) INTRAVENOUS ONCE
Qty: 0 | Refills: 0 | Status: COMPLETED | OUTPATIENT
Start: 2019-02-25 | End: 2019-02-25

## 2019-02-25 RX ORDER — DOCUSATE SODIUM 100 MG
100 CAPSULE ORAL
Qty: 0 | Refills: 0 | Status: DISCONTINUED | OUTPATIENT
Start: 2019-02-25 | End: 2019-03-04

## 2019-02-25 RX ORDER — LABETALOL HCL 100 MG
5 TABLET ORAL ONCE
Qty: 0 | Refills: 0 | Status: COMPLETED | OUTPATIENT
Start: 2019-02-25 | End: 2019-02-25

## 2019-02-25 RX ORDER — LEVETIRACETAM 250 MG/1
1000 TABLET, FILM COATED ORAL
Qty: 0 | Refills: 0 | Status: DISCONTINUED | OUTPATIENT
Start: 2019-02-25 | End: 2019-03-11

## 2019-02-25 RX ORDER — ACETAMINOPHEN 500 MG
1000 TABLET ORAL ONCE
Qty: 0 | Refills: 0 | Status: COMPLETED | OUTPATIENT
Start: 2019-02-25 | End: 2019-02-25

## 2019-02-25 RX ORDER — POTASSIUM PHOSPHATE, MONOBASIC POTASSIUM PHOSPHATE, DIBASIC 236; 224 MG/ML; MG/ML
15 INJECTION, SOLUTION INTRAVENOUS ONCE
Qty: 0 | Refills: 0 | Status: COMPLETED | OUTPATIENT
Start: 2019-02-25 | End: 2019-02-25

## 2019-02-25 RX ORDER — MAGNESIUM SULFATE 500 MG/ML
2 VIAL (ML) INJECTION ONCE
Qty: 0 | Refills: 0 | Status: COMPLETED | OUTPATIENT
Start: 2019-02-25 | End: 2019-02-25

## 2019-02-25 RX ORDER — LABETALOL HCL 100 MG
10 TABLET ORAL ONCE
Qty: 0 | Refills: 0 | Status: DISCONTINUED | OUTPATIENT
Start: 2019-02-25 | End: 2019-02-25

## 2019-02-25 RX ORDER — CEFEPIME 1 G/1
2000 INJECTION, POWDER, FOR SOLUTION INTRAMUSCULAR; INTRAVENOUS EVERY 8 HOURS
Qty: 0 | Refills: 0 | Status: DISCONTINUED | OUTPATIENT
Start: 2019-02-25 | End: 2019-03-04

## 2019-02-25 RX ORDER — POTASSIUM CHLORIDE 20 MEQ
40 PACKET (EA) ORAL ONCE
Qty: 0 | Refills: 0 | Status: COMPLETED | OUTPATIENT
Start: 2019-02-25 | End: 2019-02-25

## 2019-02-25 RX ORDER — FOSPHENYTOIN 50 MG/ML
1600 INJECTION INTRAMUSCULAR; INTRAVENOUS ONCE
Qty: 0 | Refills: 0 | Status: DISCONTINUED | OUTPATIENT
Start: 2019-02-25 | End: 2019-02-25

## 2019-02-25 RX ORDER — FENTANYL CITRATE 50 UG/ML
25 INJECTION INTRAVENOUS ONCE
Qty: 0 | Refills: 0 | Status: DISCONTINUED | OUTPATIENT
Start: 2019-02-24 | End: 2019-02-24

## 2019-02-25 RX ORDER — PANTOPRAZOLE SODIUM 20 MG/1
40 TABLET, DELAYED RELEASE ORAL DAILY
Qty: 0 | Refills: 0 | Status: DISCONTINUED | OUTPATIENT
Start: 2019-02-25 | End: 2019-03-11

## 2019-02-25 RX ORDER — SODIUM,POTASSIUM PHOSPHATES 278-250MG
1 POWDER IN PACKET (EA) ORAL
Qty: 0 | Refills: 0 | Status: DISCONTINUED | OUTPATIENT
Start: 2019-02-25 | End: 2019-02-26

## 2019-02-25 RX ORDER — SENNA PLUS 8.6 MG/1
1 TABLET ORAL AT BEDTIME
Qty: 0 | Refills: 0 | Status: DISCONTINUED | OUTPATIENT
Start: 2019-02-25 | End: 2019-02-28

## 2019-02-25 RX ORDER — CHLORHEXIDINE GLUCONATE 213 G/1000ML
1 SOLUTION TOPICAL
Qty: 0 | Refills: 0 | Status: DISCONTINUED | OUTPATIENT
Start: 2019-02-25 | End: 2019-03-11

## 2019-02-25 RX ORDER — CLONAZEPAM 1 MG
0.25 TABLET ORAL EVERY 8 HOURS
Qty: 0 | Refills: 0 | Status: DISCONTINUED | OUTPATIENT
Start: 2019-02-25 | End: 2019-03-04

## 2019-02-25 RX ORDER — SODIUM CHLORIDE 9 MG/ML
1000 INJECTION, SOLUTION INTRAVENOUS
Qty: 0 | Refills: 0 | Status: DISCONTINUED | OUTPATIENT
Start: 2019-02-25 | End: 2019-02-27

## 2019-02-25 RX ORDER — CLONAZEPAM 1 MG
1 TABLET ORAL EVERY 12 HOURS
Qty: 0 | Refills: 0 | Status: DISCONTINUED | OUTPATIENT
Start: 2019-02-25 | End: 2019-03-04

## 2019-02-25 RX ORDER — IPRATROPIUM BROMIDE 0.2 MG/ML
500 SOLUTION, NON-ORAL INHALATION EVERY 6 HOURS
Qty: 0 | Refills: 0 | Status: DISCONTINUED | OUTPATIENT
Start: 2019-02-25 | End: 2019-03-11

## 2019-02-25 RX ORDER — VANCOMYCIN HCL 1 G
VIAL (EA) INTRAVENOUS
Qty: 0 | Refills: 0 | Status: DISCONTINUED | OUTPATIENT
Start: 2019-02-25 | End: 2019-02-26

## 2019-02-25 RX ORDER — VANCOMYCIN HCL 1 G
1000 VIAL (EA) INTRAVENOUS EVERY 12 HOURS
Qty: 0 | Refills: 0 | Status: DISCONTINUED | OUTPATIENT
Start: 2019-02-26 | End: 2019-02-26

## 2019-02-25 RX ORDER — VANCOMYCIN HCL 1 G
1000 VIAL (EA) INTRAVENOUS EVERY 12 HOURS
Qty: 0 | Refills: 0 | Status: DISCONTINUED | OUTPATIENT
Start: 2019-02-25 | End: 2019-02-25

## 2019-02-25 RX ORDER — LEVETIRACETAM 250 MG/1
1000 TABLET, FILM COATED ORAL ONCE
Qty: 0 | Refills: 0 | Status: COMPLETED | OUTPATIENT
Start: 2019-02-25 | End: 2019-02-25

## 2019-02-25 RX ORDER — POTASSIUM CHLORIDE 20 MEQ
40 PACKET (EA) ORAL EVERY 4 HOURS
Qty: 0 | Refills: 0 | Status: COMPLETED | OUTPATIENT
Start: 2019-02-25 | End: 2019-02-26

## 2019-02-25 RX ORDER — ACETAMINOPHEN 500 MG
325 TABLET ORAL EVERY 4 HOURS
Qty: 0 | Refills: 0 | Status: DISCONTINUED | OUTPATIENT
Start: 2019-02-25 | End: 2019-02-27

## 2019-02-25 RX ADMIN — Medication 100 MILLIGRAM(S): at 05:27

## 2019-02-25 RX ADMIN — LEVALBUTEROL 0.63 MILLIGRAM(S): 1.25 SOLUTION, CONCENTRATE RESPIRATORY (INHALATION) at 11:26

## 2019-02-25 RX ADMIN — Medication 100 MILLIGRAM(S): at 21:16

## 2019-02-25 RX ADMIN — Medication 250 MILLIGRAM(S): at 20:27

## 2019-02-25 RX ADMIN — GABAPENTIN 300 MILLIGRAM(S): 400 CAPSULE ORAL at 15:15

## 2019-02-25 RX ADMIN — Medication 500 MICROGRAM(S): at 00:54

## 2019-02-25 RX ADMIN — Medication 40 MILLIEQUIVALENT(S): at 23:09

## 2019-02-25 RX ADMIN — CEFEPIME 100 MILLIGRAM(S): 1 INJECTION, POWDER, FOR SOLUTION INTRAMUSCULAR; INTRAVENOUS at 05:27

## 2019-02-25 RX ADMIN — LEVETIRACETAM 400 MILLIGRAM(S): 250 TABLET, FILM COATED ORAL at 11:18

## 2019-02-25 RX ADMIN — Medication 500 MICROGRAM(S): at 11:26

## 2019-02-25 RX ADMIN — Medication 50 GRAM(S): at 03:45

## 2019-02-25 RX ADMIN — Medication 1 MILLIGRAM(S): at 21:16

## 2019-02-25 RX ADMIN — CHLORHEXIDINE GLUCONATE 1 APPLICATION(S): 213 SOLUTION TOPICAL at 21:17

## 2019-02-25 RX ADMIN — PANTOPRAZOLE SODIUM 40 MILLIGRAM(S): 20 TABLET, DELAYED RELEASE ORAL at 21:17

## 2019-02-25 RX ADMIN — Medication 1 TABLET(S): at 21:16

## 2019-02-25 RX ADMIN — Medication 500 MICROGRAM(S): at 05:56

## 2019-02-25 RX ADMIN — Medication 40 MILLIEQUIVALENT(S): at 05:27

## 2019-02-25 RX ADMIN — LEVALBUTEROL 0.63 MILLIGRAM(S): 1.25 SOLUTION, CONCENTRATE RESPIRATORY (INHALATION) at 00:55

## 2019-02-25 RX ADMIN — Medication 400 MILLIGRAM(S): at 13:04

## 2019-02-25 RX ADMIN — Medication 250 MILLIGRAM(S): at 06:16

## 2019-02-25 RX ADMIN — LEVETIRACETAM 1000 MILLIGRAM(S): 250 TABLET, FILM COATED ORAL at 23:08

## 2019-02-25 RX ADMIN — LEVETIRACETAM 1000 MILLIGRAM(S): 250 TABLET, FILM COATED ORAL at 05:27

## 2019-02-25 RX ADMIN — Medication 325 MILLIGRAM(S): at 07:57

## 2019-02-25 RX ADMIN — LEVALBUTEROL 0.63 MILLIGRAM(S): 1.25 SOLUTION, CONCENTRATE RESPIRATORY (INHALATION) at 05:56

## 2019-02-25 RX ADMIN — CEFEPIME 100 MILLIGRAM(S): 1 INJECTION, POWDER, FOR SOLUTION INTRAMUSCULAR; INTRAVENOUS at 13:32

## 2019-02-25 RX ADMIN — Medication 325 MILLIGRAM(S): at 08:57

## 2019-02-25 RX ADMIN — Medication 1 TABLET(S): at 13:32

## 2019-02-25 RX ADMIN — Medication 6 MILLIGRAM(S): at 11:15

## 2019-02-25 RX ADMIN — GABAPENTIN 300 MILLIGRAM(S): 400 CAPSULE ORAL at 05:28

## 2019-02-25 RX ADMIN — CEFEPIME 100 MILLIGRAM(S): 1 INJECTION, POWDER, FOR SOLUTION INTRAMUSCULAR; INTRAVENOUS at 23:08

## 2019-02-25 RX ADMIN — Medication 1000 MILLIGRAM(S): at 13:34

## 2019-02-25 RX ADMIN — POTASSIUM PHOSPHATE, MONOBASIC POTASSIUM PHOSPHATE, DIBASIC 62.5 MILLIMOLE(S): 236; 224 INJECTION, SOLUTION INTRAVENOUS at 03:49

## 2019-02-25 RX ADMIN — Medication 400 MILLIGRAM(S): at 00:48

## 2019-02-25 RX ADMIN — PANTOPRAZOLE SODIUM 40 MILLIGRAM(S): 20 TABLET, DELAYED RELEASE ORAL at 13:32

## 2019-02-25 RX ADMIN — Medication 1 TABLET(S): at 09:10

## 2019-02-25 RX ADMIN — Medication 1 TABLET(S): at 16:40

## 2019-02-25 RX ADMIN — Medication 1 MILLIGRAM(S): at 05:27

## 2019-02-25 RX ADMIN — SENNA PLUS 1 TABLET(S): 8.6 TABLET ORAL at 21:16

## 2019-02-25 RX ADMIN — Medication 5 MILLIGRAM(S): at 01:07

## 2019-02-25 NOTE — PROGRESS NOTE ADULT - ASSESSMENT
58y female with SLE, cerebellar AVM and bleed in Dec, s/p embolization, AVM resection, post fossa decompression, VPS, trach/PEG,   Persistent, "central" fever, but ultimately, fever resolved, pt improved, sent to rehab.    She apparently developed generalized Sz, fever and sent to [facility] ICU, received empiric Vanco and Cefepime, transferred here.    On TC, lethargic, without purposeful response, Tmx 103, but WBC/diff normal, CXR clear  CSF shows >500 WBCs, majority Polys, thus difficult to exclude ventriculitis, ?infected VPS- no org seen    Plan:  Pending further Cx results,  cont empiric Vanco and Cefepime for CNS coverage  Follow temps and CBC/diff   D/w NSCU Team  records from [facility], any cultures reports, antimicrobials remain purely empiric at present

## 2019-02-25 NOTE — PROGRESS NOTE ADULT - ASSESSMENT
Possible cerebrospinal fluid infection r/o ventriculitis  - F/u cerebrospinal fluid cultures, empiric antibiotics  - hydrocephalus: external ventricular drain in place  - EEG r/o seizures  - baseline head tremor: clonazepam  - SBP 90-160mmHg    45 minutes critical care time

## 2019-02-25 NOTE — PROGRESS NOTE ADULT - ASSESSMENT
58F s/p embo and rsxn of ruptured AVM with VPS placement. Neurologically decline from baseline  - f/u read mri  - cont supportive care per icu  - f/u CSF cx  - cont neurochecks

## 2019-02-25 NOTE — PROCEDURE NOTE - PROCEDURE
<<-----Click on this checkbox to enter Procedure Arterial line placement  02/25/2019    Active  [id number]

## 2019-02-25 NOTE — PROGRESS NOTE ADULT - SUBJECTIVE AND OBJECTIVE BOX
Taken for VPS externalization.    Eyes open, no regarding, no command following, localizes b/l UE (left>right), RLE TF< LLE w/d

## 2019-02-25 NOTE — PRE-ANESTHESIA EVALUATION ADULT - NSANTHADDINFOFT_GEN_ALL_CORE
-no HCP available per primary team  -pt is a two-physician-consent per primary team  -no individua anesthesia consent necessary -r/b/a explained to pt's HCP, Maylin Kilgore who agrees with plan  -post-op dispo: LEVY

## 2019-02-25 NOTE — PRE-ANESTHESIA EVALUATION ADULT - NSANTHPEFT_GEN_ALL_CORE
Confused.  NAD.  RRR  b/l coarse BSs. Confused.  NAD.  +head tremors.  RRR  b/l coarse BSs.  uncuffed trach  +leak.

## 2019-02-25 NOTE — PROGRESS NOTE ADULT - SUBJECTIVE AND OBJECTIVE BOX
CC: f/u for    Patient reports    REVIEW OF SYSTEMS: no fevers, no chills, no nausea, no vomiting, no abd pain, no resp symptoms  All other review of systems negative (Comprehensive ROS)    Antimicrobials Day #    cefepime   IVPB 2000 milliGRAM(s) IV Intermittent every 8 hours  vancomycin  IVPB 1000 milliGRAM(s) IV Intermittent every 12 hours    Other Medications Reviewed    T(F): 100.2 (19 @ 14:00), Max: 102.5 (19 @ 07:00)  HR: 108 (19 @ 14:00)  BP: 151/85 (19 @ 12:00)  RR: 22 (19 @ 14:00)  SpO2: 100% (19 @ 14:00)    PHYSICAL EXAM:  General: alert, no acute distress  Eyes:  anicteric, no conjunctival injection, no discharge  Oropharynx: no lesions or injection 	  Neck: supple, without adenopathy  Lungs: clear to auscultation  Heart: regular rate and rhythm; no murmur, rubs or gallops  Abdomen: soft, nondistended, nontender, without mass or organomegaly  Skin: no lesions  Extremities: no clubbing, cyanosis, or edema  Neurologic: lethargic   LAB RESULTS:                        12.7   8.9   )-----------( 225      ( 2019 21:29 )             34.4     -    133<L>  |  94<L>  |  10  ----------------------------<  110<H>  3.2<L>   |  28  |  0.42<L>    Ca    8.9      2019 21:29  Phos  2.4       Mg     1.9         TPro  7.5  /  Alb  3.8  /  TBili  0.5  /  DBili  x   /  AST  29  /  ALT  40  /  AlkPhos  103      LIVER FUNCTIONS - ( 2019 23:19 )  Alb: 3.8 g/dL / Pro: 7.5 g/dL / ALK PHOS: 103 U/L / ALT: 40 U/L / AST: 29 U/L / GGT: x           Urinalysis Basic - ( 2019 23:38 )    Color: Yellow / Appearance: Slightly Turbid / S.021 / pH: x  Gluc: x / Ketone: Negative  / Bili: Negative / Urobili: Negative   Blood: x / Protein: 30 mg/dL / Nitrite: Positive   Leuk Esterase: Large / RBC: 4 /hpf / WBC 42 /HPF   Sq Epi: x / Non Sq Epi: 1 /hpf / Bacteria: Many        MICROBIOLOGY REVIEWED:    RADIOLOGY REVIEWED: CC: f/u for ? ventriculitis    Patient remains in ICU critically ill    REVIEW OF SYSTEMS: limited  All other review of systems negative (Comprehensive ROS)    Antimicrobials Day #    cefepime   IVPB 2000 milliGRAM(s) IV Intermittent every 8 hours  vancomycin  IVPB 1000 milliGRAM(s) IV Intermittent every 12 hours    Other Medications Reviewed    T(F): 100.2 (19 @ 14:00), Max: 102.5 (19 @ 07:00)  HR: 108 (19 @ 14:00)  BP: 151/85 (19 @ 12:00)  RR: 22 (19 @ 14:00)  SpO2: 100% (19 @ 14:00)    PHYSICAL EXAM:  General: resting comfortably in bed, on trach collar  Eyes:  anicteric, no conjunctival injection, no discharge  Oropharynx: no lesions or injection 	  Neck: supple, without adenopathy  Lungs: clear to auscultation  Heart: regular rate and rhythm; no murmur, rubs or gallops  Abdomen: soft, nondistended, nontender, without mass or organomegaly  Skin: no lesions  Extremities: no clubbing, cyanosis, or edema  Neurologic: lethargic   LAB RESULTS:                        12.7   8.9   )-----------( 225      ( 2019 21:29 )             34.4         133<L>  |  94<L>  |  10  ----------------------------<  110<H>  3.2<L>   |  28  |  0.42<L>    Ca    8.9      2019 21:29  Phos  2.4       Mg     1.9         TPro  7.5  /  Alb  3.8  /  TBili  0.5  /  DBili  x   /  AST  29  /  ALT  40  /  AlkPhos  103      LIVER FUNCTIONS - ( 2019 23:19 )  Alb: 3.8 g/dL / Pro: 7.5 g/dL / ALK PHOS: 103 U/L / ALT: 40 U/L / AST: 29 U/L / GGT: x           Urinalysis Basic - ( 2019 23:38 )    Color: Yellow / Appearance: Slightly Turbid / S.021 / pH: x  Gluc: x / Ketone: Negative  / Bili: Negative / Urobili: Negative   Blood: x / Protein: 30 mg/dL / Nitrite: Positive   Leuk Esterase: Large / RBC: 4 /hpf / WBC 42 /HPF   Sq Epi: x / Non Sq Epi: 1 /hpf / Bacteria: Many        MICROBIOLOGY REVIEWED:    RADIOLOGY REVIEWED:

## 2019-02-25 NOTE — PROCEDURE NOTE - NSNUMATTEMPT_GEN_A_CORE
Attempted B/L radial arteries, able to cannulate, unable to thread wire. 3rd attempt L axillary artery under ultrasound guidance. Successful, no complications, patient tolerated procedure well./3

## 2019-02-25 NOTE — BRIEF OPERATIVE NOTE - PROCEDURE
<<-----Click on this checkbox to enter Procedure Ventriculoperitoneal shunt removal  02/25/2019    Active  [id number]

## 2019-02-25 NOTE — PRE-ANESTHESIA EVALUATION ADULT - NSANTHPMHFT_GEN_ALL_CORE
PMHx: cerebellar AVM and bleed in Dec, s/p embolization, seizure d/o  PSHx: s/p trach, s/p PEG, s/p VPS, s/p posterior fossa decompression & AVM rsxn

## 2019-02-25 NOTE — PROGRESS NOTE ADULT - ASSESSMENT
57yo woman s/p AVM resection, RTOR for posterior fossa decompression, trach/PEG, VPS in 12/2018-1/2019  now back for an episode of GTC seizure and AMS  UTI vs. VPS infection vs. hyponatremia 2/2 infection vs. PRES    Neuro:  NSGy plan re: shunt  MRI with and without MARITO-?PRES vs. meningoencephalitis  Keppra 1g bid;   get cEEG to r/o NCSE  cont home meds  CSF cx f/u, gram stain negative    Card:  -150    Pulm:  trach collar  ABG stable    GI:  tube feeding    Renal:  IVL once adequate feeding    Endo:  euglycemia    Heme:  SCDs, lovenox    ID:  UA+, cont cefepime (was on vanc/cefepime at OSH) for UTI at admission  f/u ucx, bcx, CSF cultures  cont cefepime; add + vanco ID consult     full code  ICU    at risk for deterioration/death due to seizures, VPS infection, septic shock  critical care time 45min

## 2019-02-25 NOTE — EEG REPORT - NS EEG TEXT BOX
[facility]   COMPREHENSIVE EPILEPSY CENTER   REPORT OF CONTINUOUS VIDEO EEG     [facility]: [address], [place] [address], Ph#: [phone]  [facility]: [address], [place] [address], Ph#: [phone]  Office: [address] [place] [address], [facility] [address], [place] [address] Ph#: [phone]    Patient Name: BRIANNA AGUILA  Age and : 58y (64)  MRN #: [id number]  Location: [place] [address]  Referring Physician: Delia Murdock    Study Date: 19 - 19    _____________________________________________________________  STUDY INFORMATION    EEG Recording Technique:  The patient underwent continuous Video-EEG monitoring, using Telemetry System hardware on the XLTek Digital System. EEG and video data were stored on a computer hard drive with important events saved in digital archive files. The material was reviewed by a physician (electroencephalographer / epileptologist) on a daily basis. [place] and seizure detection algorithms were utilized and reviewed. An EEG Technician attended to the patient, and was available throughout daytime work hours.  The epilepsy center neurologist was available in person or on call 24-hours per day.    EEG Placement and Labeling of Electrodes:  The EEG was performed utilizing 20 channel referential EEG connections (coronal over temporal over parasagittal montage) using all standard 10-20 electrode placements with EKG, with additional electrodes placed in the inferior temporal region using the modified 10-10 montage electrode placements for elective admissions, or if deemed necessary. Recording was at a sampling rate of 256 samples per second per channel. Time synchronized digital video recording was done simultaneously with EEG recording. A low light infrared camera was used for low light recording.     _____________________________________________________________  HISTORY  Patient is a 58y old  Female who presents with a chief complaint of seizures, h/o  shunt (2019 08:25)    PERTINENT MEDICATION:  clonazePAM Tablet 1 milliGRAM(s) Oral every 12 hours  gabapentin   Solution 300 milliGRAM(s) Oral every 8 hours  levETIRAcetam  Solution 1000 milliGRAM(s) Oral two times a day    _____________________________________________________________  INTERPRETATION    Findings: The background was continuous, spontaneously variable and reactive. Background predominantly consisted of theta, delta and faster activities. No clear posterior dominant rhythm seen.    Background Slowing:  Diffuse theta and polymorphic delta slowing.    Focal Slowing:   Higher amplitude focal polymorphic delta and theta slowing in the right hemisphere     Sleep Background:  Drowsiness was characterized by fragmentation, attenuation, and slowing of the background activity.    Sleep was characterized by the presence of asymmetric sleep spindles, likely due to breach effect.     Other Non-Epileptiform Findings:  Breach effect in right frontal region characterized by higher amplitude, sharply contoured waves and fast activities.    Interictal Epileptiform Activity:   None were present.    Events:  Clinical events: Sporadic prolonged episodes of head titubation, random left and right arm tremoring with a start and stop quality, EEG correlate of rhythmic 3-4hz artifact from motion, but no associated epileptiform abnormalities.   Seizures: None recorded.    Activation Procedures:   Hyperventilation was not performed.    Photic stimulation was not performed.   Hyperventilation was performed and did not elicit any abnormality.    Artifacts:  Intermittent myogenic and movement artifacts were noted.    ECG:  The heart rate on single channel ECG was predominantly between  BPM.    _____________________________________________________________  EEG SUMMARY/CLASSIFICATION    Abnormal EEG in the awake, drowsy and asleep states.  - Sporadic prolonged episodes of head titubation, random left and right arm tremoring with a start and stop quality and no associated epileptiform abnormalities.   - Higher amplitude focal polymorphic delta and theta slowing in the right hemisphere   - Moderate generalized slowing.  - Breach effect in right frontal region characterized by higher amplitude, sharply contoured waves and fast activities.  _____________________________________________________________  EEG IMPRESSION/CLINICAL CORRELATE    Events of tremor/head titubation without associated epileptiform abnormalities, appearing nonepileptic in etiology.  Multifocal cerebral dysfunction persistently worse in the right frontal region, with associated regional skull defect.      Inocencia Herrera MD  Epilepsy Fellow    Dillan Borja MD  Epilepsy Attending

## 2019-02-25 NOTE — PROGRESS NOTE ADULT - SUBJECTIVE AND OBJECTIVE BOX
Visit Summary: Patient seen and evaluated at bedside.    Overnight Events: none    Exam:  T(C): 38 (02-24-19 @ 19:00), Max: 39.4 (02-24-19 @ 07:00)  HR: 110 (02-24-19 @ 22:00) (91 - 147)  BP: 125/64 (02-24-19 @ 22:00) (103/90 - 169/93)  RR: 19 (02-24-19 @ 22:00) (17 - 26)  SpO2: 100% (02-24-19 @ 22:00) (97% - 100%)  Wt(kg): --    Trached, no EO, tracks,  pupils 3mm reac, Lt side spont, b/l UE loc, LLE WDs, RLE TF                         12.7   8.9   )-----------( 225      ( 24 Feb 2019 21:29 )             34.4     02-24    133<L>  |  94<L>  |  10  ----------------------------<  110<H>  3.2<L>   |  28  |  0.42<L>    Ca    8.9      24 Feb 2019 21:29  Phos  2.4     02-24  Mg     1.9     02-24    TPro  7.5  /  Alb  3.8  /  TBili  0.5  /  DBili  x   /  AST  29  /  ALT  40  /  AlkPhos  103  02-23  PT/INR - ( 24 Feb 2019 01:00 )   PT: 13.6 sec;   INR: 1.19 ratio         PTT - ( 24 Feb 2019 01:00 )  PTT:25.9 sec

## 2019-02-25 NOTE — PROGRESS NOTE ADULT - SUBJECTIVE AND OBJECTIVE BOX
HPI:  HPI: 58yof initially admitted in mid Dec 2018 for ruptured cerebellar AVM s/p embolization and craniotomy for resection and  shunt. She was discharged to rehab in late Jan 2019. She has been doing well at rehab: walking 50 steps with walker. Two days prior, the patient had a generlized tonic clonic seizure witnessed by her sister as well as a fever. She was then brought to OSH CT. The patient reportedly had an LP that showed leukocytosis. Due to the suspicion of  shunt infection, the patient was transferred to [facility] for further management. In the ER, EO to pain, minimal mvmt x 4.    PMHx: as above  PSHx: as above  Medications: as above  Allergies: nkd  Social history: sister at bedside  Family History:     VS, labs and images reviewed.    PHYSICAL EXAM:  trach'ed  EO to voice, PERRL, no FC, moving all extremities spont  regular, tachycardic  minimal ronchi R>L, decreased breath sounds  soft/nd abd  wwp ext

## 2019-02-26 LAB
-  AMIKACIN: SIGNIFICANT CHANGE UP
-  AMPICILLIN/SULBACTAM: SIGNIFICANT CHANGE UP
-  AMPICILLIN: SIGNIFICANT CHANGE UP
-  AZTREONAM: SIGNIFICANT CHANGE UP
-  CEFAZOLIN: SIGNIFICANT CHANGE UP
-  CEFEPIME: SIGNIFICANT CHANGE UP
-  CEFOXITIN: SIGNIFICANT CHANGE UP
-  CEFTRIAXONE: SIGNIFICANT CHANGE UP
-  CIPROFLOXACIN: SIGNIFICANT CHANGE UP
-  ERTAPENEM: SIGNIFICANT CHANGE UP
-  GENTAMICIN: SIGNIFICANT CHANGE UP
-  IMIPENEM: SIGNIFICANT CHANGE UP
-  LEVOFLOXACIN: SIGNIFICANT CHANGE UP
-  MEROPENEM: SIGNIFICANT CHANGE UP
-  NITROFURANTOIN: SIGNIFICANT CHANGE UP
-  PIPERACILLIN/TAZOBACTAM: SIGNIFICANT CHANGE UP
-  TIGECYCLINE: SIGNIFICANT CHANGE UP
-  TOBRAMYCIN: SIGNIFICANT CHANGE UP
-  TRIMETHOPRIM/SULFAMETHOXAZOLE: SIGNIFICANT CHANGE UP
ANION GAP SERPL CALC-SCNC: 10 MMOL/L — SIGNIFICANT CHANGE UP (ref 5–17)
ANION GAP SERPL CALC-SCNC: 8 MMOL/L — SIGNIFICANT CHANGE UP (ref 5–17)
BUN SERPL-MCNC: 6 MG/DL — LOW (ref 7–23)
BUN SERPL-MCNC: 9 MG/DL — SIGNIFICANT CHANGE UP (ref 7–23)
CALCIUM SERPL-MCNC: 8.4 MG/DL — SIGNIFICANT CHANGE UP (ref 8.4–10.5)
CALCIUM SERPL-MCNC: 8.6 MG/DL — SIGNIFICANT CHANGE UP (ref 8.4–10.5)
CHLORIDE SERPL-SCNC: 98 MMOL/L — SIGNIFICANT CHANGE UP (ref 96–108)
CHLORIDE SERPL-SCNC: 98 MMOL/L — SIGNIFICANT CHANGE UP (ref 96–108)
CO2 SERPL-SCNC: 29 MMOL/L — SIGNIFICANT CHANGE UP (ref 22–31)
CO2 SERPL-SCNC: 29 MMOL/L — SIGNIFICANT CHANGE UP (ref 22–31)
CREAT SERPL-MCNC: 0.32 MG/DL — LOW (ref 0.5–1.3)
CREAT SERPL-MCNC: 0.39 MG/DL — LOW (ref 0.5–1.3)
CULTURE RESULTS: SIGNIFICANT CHANGE UP
CULTURE RESULTS: SIGNIFICANT CHANGE UP
GAS PNL BLDA: SIGNIFICANT CHANGE UP
GAS PNL BLDA: SIGNIFICANT CHANGE UP
GLUCOSE SERPL-MCNC: 113 MG/DL — HIGH (ref 70–99)
GLUCOSE SERPL-MCNC: 134 MG/DL — HIGH (ref 70–99)
GRAM STN FLD: SIGNIFICANT CHANGE UP
HCT VFR BLD CALC: 29.6 % — LOW (ref 34.5–45)
HGB BLD-MCNC: 10.2 G/DL — LOW (ref 11.5–15.5)
LYMPHOCYTES # CSF: 56 % — SIGNIFICANT CHANGE UP (ref 40–80)
MAGNESIUM SERPL-MCNC: 1.8 MG/DL — SIGNIFICANT CHANGE UP (ref 1.6–2.6)
MCHC RBC-ENTMCNC: 30.5 PG — SIGNIFICANT CHANGE UP (ref 27–34)
MCHC RBC-ENTMCNC: 34.5 GM/DL — SIGNIFICANT CHANGE UP (ref 32–36)
MCV RBC AUTO: 88.3 FL — SIGNIFICANT CHANGE UP (ref 80–100)
METHOD TYPE: SIGNIFICANT CHANGE UP
MONOS+MACROS NFR CSF: 12 % — LOW (ref 15–45)
NEUTROPHILS # CSF: 32 % — HIGH (ref 0–6)
ORGANISM # SPEC MICROSCOPIC CNT: SIGNIFICANT CHANGE UP
ORGANISM # SPEC MICROSCOPIC CNT: SIGNIFICANT CHANGE UP
PHOSPHATE SERPL-MCNC: 2.2 MG/DL — LOW (ref 2.5–4.5)
PLATELET # BLD AUTO: 183 K/UL — SIGNIFICANT CHANGE UP (ref 150–400)
POTASSIUM SERPL-MCNC: 3.6 MMOL/L — SIGNIFICANT CHANGE UP (ref 3.5–5.3)
POTASSIUM SERPL-MCNC: 3.7 MMOL/L — SIGNIFICANT CHANGE UP (ref 3.5–5.3)
POTASSIUM SERPL-SCNC: 3.6 MMOL/L — SIGNIFICANT CHANGE UP (ref 3.5–5.3)
POTASSIUM SERPL-SCNC: 3.7 MMOL/L — SIGNIFICANT CHANGE UP (ref 3.5–5.3)
RBC # BLD: 3.35 M/UL — LOW (ref 3.8–5.2)
RBC # FLD: 16 % — HIGH (ref 10.3–14.5)
SODIUM SERPL-SCNC: 135 MMOL/L — SIGNIFICANT CHANGE UP (ref 135–145)
SODIUM SERPL-SCNC: 137 MMOL/L — SIGNIFICANT CHANGE UP (ref 135–145)
SPECIMEN SOURCE: SIGNIFICANT CHANGE UP
VANCOMYCIN TROUGH SERPL-MCNC: 7.3 UG/ML — LOW (ref 10–20)
WBC # BLD: 6.1 K/UL — SIGNIFICANT CHANGE UP (ref 3.8–10.5)
WBC # FLD AUTO: 6.1 K/UL — SIGNIFICANT CHANGE UP (ref 3.8–10.5)

## 2019-02-26 PROCEDURE — 70450 CT HEAD/BRAIN W/O DYE: CPT | Mod: 26

## 2019-02-26 PROCEDURE — 95951: CPT | Mod: 26,52

## 2019-02-26 PROCEDURE — 99292 CRITICAL CARE ADDL 30 MIN: CPT

## 2019-02-26 PROCEDURE — 99291 CRITICAL CARE FIRST HOUR: CPT

## 2019-02-26 RX ORDER — OXYCODONE HYDROCHLORIDE 5 MG/1
5 TABLET ORAL EVERY 4 HOURS
Qty: 0 | Refills: 0 | Status: DISCONTINUED | OUTPATIENT
Start: 2019-02-26 | End: 2019-03-04

## 2019-02-26 RX ORDER — VANCOMYCIN HCL 1 G
1250 VIAL (EA) INTRAVENOUS EVERY 12 HOURS
Qty: 0 | Refills: 0 | Status: DISCONTINUED | OUTPATIENT
Start: 2019-02-26 | End: 2019-02-28

## 2019-02-26 RX ORDER — SODIUM CHLORIDE 9 MG/ML
500 INJECTION INTRAMUSCULAR; INTRAVENOUS; SUBCUTANEOUS ONCE
Qty: 0 | Refills: 0 | Status: COMPLETED | OUTPATIENT
Start: 2019-02-26 | End: 2019-02-26

## 2019-02-26 RX ORDER — OXYCODONE HYDROCHLORIDE 5 MG/1
10 TABLET ORAL EVERY 6 HOURS
Qty: 0 | Refills: 0 | Status: DISCONTINUED | OUTPATIENT
Start: 2019-02-26 | End: 2019-02-28

## 2019-02-26 RX ORDER — NICARDIPINE HYDROCHLORIDE 30 MG/1
5 CAPSULE, EXTENDED RELEASE ORAL
Qty: 40 | Refills: 0 | Status: DISCONTINUED | OUTPATIENT
Start: 2019-02-26 | End: 2019-02-27

## 2019-02-26 RX ORDER — VANCOMYCIN HCL 1 G
VIAL (EA) INTRAVENOUS
Qty: 0 | Refills: 0 | Status: DISCONTINUED | OUTPATIENT
Start: 2019-02-26 | End: 2019-02-28

## 2019-02-26 RX ORDER — ACETAMINOPHEN 500 MG
1000 TABLET ORAL ONCE
Qty: 0 | Refills: 0 | Status: COMPLETED | OUTPATIENT
Start: 2019-02-26 | End: 2019-02-26

## 2019-02-26 RX ORDER — LABETALOL HCL 100 MG
10 TABLET ORAL ONCE
Qty: 0 | Refills: 0 | Status: COMPLETED | OUTPATIENT
Start: 2019-02-26 | End: 2019-02-26

## 2019-02-26 RX ORDER — VANCOMYCIN HCL 1 G
1250 VIAL (EA) INTRAVENOUS ONCE
Qty: 0 | Refills: 0 | Status: COMPLETED | OUTPATIENT
Start: 2019-02-26 | End: 2019-02-26

## 2019-02-26 RX ORDER — POTASSIUM CHLORIDE 20 MEQ
10 PACKET (EA) ORAL
Qty: 0 | Refills: 0 | Status: COMPLETED | OUTPATIENT
Start: 2019-02-26 | End: 2019-02-26

## 2019-02-26 RX ADMIN — Medication 166.67 MILLIGRAM(S): at 06:24

## 2019-02-26 RX ADMIN — SODIUM CHLORIDE 1000 MILLILITER(S): 9 INJECTION INTRAMUSCULAR; INTRAVENOUS; SUBCUTANEOUS at 17:30

## 2019-02-26 RX ADMIN — OXYCODONE HYDROCHLORIDE 5 MILLIGRAM(S): 5 TABLET ORAL at 05:57

## 2019-02-26 RX ADMIN — Medication 100 MILLIGRAM(S): at 17:22

## 2019-02-26 RX ADMIN — SODIUM CHLORIDE 75 MILLILITER(S): 9 INJECTION, SOLUTION INTRAVENOUS at 21:05

## 2019-02-26 RX ADMIN — Medication 10 MILLIGRAM(S): at 16:00

## 2019-02-26 RX ADMIN — Medication 100 MILLIEQUIVALENT(S): at 16:55

## 2019-02-26 RX ADMIN — Medication 500 MICROGRAM(S): at 17:43

## 2019-02-26 RX ADMIN — SODIUM CHLORIDE 75 MILLILITER(S): 9 INJECTION, SOLUTION INTRAVENOUS at 05:27

## 2019-02-26 RX ADMIN — SENNA PLUS 1 TABLET(S): 8.6 TABLET ORAL at 21:04

## 2019-02-26 RX ADMIN — LEVETIRACETAM 1000 MILLIGRAM(S): 250 TABLET, FILM COATED ORAL at 05:09

## 2019-02-26 RX ADMIN — Medication 100 MILLIEQUIVALENT(S): at 14:30

## 2019-02-26 RX ADMIN — OXYCODONE HYDROCHLORIDE 5 MILLIGRAM(S): 5 TABLET ORAL at 15:45

## 2019-02-26 RX ADMIN — SODIUM CHLORIDE 75 MILLILITER(S): 9 INJECTION, SOLUTION INTRAVENOUS at 19:00

## 2019-02-26 RX ADMIN — Medication 40 MILLIEQUIVALENT(S): at 01:11

## 2019-02-26 RX ADMIN — CEFEPIME 100 MILLIGRAM(S): 1 INJECTION, POWDER, FOR SOLUTION INTRAMUSCULAR; INTRAVENOUS at 05:09

## 2019-02-26 RX ADMIN — CHLORHEXIDINE GLUCONATE 1 APPLICATION(S): 213 SOLUTION TOPICAL at 21:05

## 2019-02-26 RX ADMIN — Medication 325 MILLIGRAM(S): at 03:35

## 2019-02-26 RX ADMIN — OXYCODONE HYDROCHLORIDE 5 MILLIGRAM(S): 5 TABLET ORAL at 20:33

## 2019-02-26 RX ADMIN — Medication 400 MILLIGRAM(S): at 07:15

## 2019-02-26 RX ADMIN — Medication 500 MICROGRAM(S): at 00:45

## 2019-02-26 RX ADMIN — OXYCODONE HYDROCHLORIDE 5 MILLIGRAM(S): 5 TABLET ORAL at 21:03

## 2019-02-26 RX ADMIN — LEVETIRACETAM 1000 MILLIGRAM(S): 250 TABLET, FILM COATED ORAL at 17:22

## 2019-02-26 RX ADMIN — Medication 325 MILLIGRAM(S): at 03:05

## 2019-02-26 RX ADMIN — CEFEPIME 100 MILLIGRAM(S): 1 INJECTION, POWDER, FOR SOLUTION INTRAMUSCULAR; INTRAVENOUS at 14:45

## 2019-02-26 RX ADMIN — Medication 100 MILLIGRAM(S): at 05:09

## 2019-02-26 RX ADMIN — PANTOPRAZOLE SODIUM 40 MILLIGRAM(S): 20 TABLET, DELAYED RELEASE ORAL at 12:12

## 2019-02-26 RX ADMIN — Medication 1 MILLIGRAM(S): at 06:24

## 2019-02-26 RX ADMIN — OXYCODONE HYDROCHLORIDE 5 MILLIGRAM(S): 5 TABLET ORAL at 05:27

## 2019-02-26 RX ADMIN — Medication 500 MICROGRAM(S): at 06:07

## 2019-02-26 RX ADMIN — Medication 100 MILLIEQUIVALENT(S): at 12:36

## 2019-02-26 RX ADMIN — Medication 1000 MILLIGRAM(S): at 07:45

## 2019-02-26 RX ADMIN — CEFEPIME 100 MILLIGRAM(S): 1 INJECTION, POWDER, FOR SOLUTION INTRAMUSCULAR; INTRAVENOUS at 21:04

## 2019-02-26 RX ADMIN — Medication 1 MILLIGRAM(S): at 17:22

## 2019-02-26 RX ADMIN — OXYCODONE HYDROCHLORIDE 5 MILLIGRAM(S): 5 TABLET ORAL at 15:15

## 2019-02-26 RX ADMIN — Medication 500 MICROGRAM(S): at 12:50

## 2019-02-26 RX ADMIN — Medication 325 MILLIGRAM(S): at 17:22

## 2019-02-26 RX ADMIN — Medication 166.67 MILLIGRAM(S): at 17:21

## 2019-02-26 NOTE — DIETITIAN INITIAL EVALUATION ADULT. - NS AS NUTRI INTERV ENTERAL NUTRITION
Recommend to increase Jevity 1.2 to goal rate 85ml/hr x 18 hrs. To provide: 1836kcal (21kcal/kg) and 85g protein (1.4g protein/kg IBW 59kg) + dan active twice daily/Composition/Concentration/Rate/Schedule/Volume

## 2019-02-26 NOTE — PROGRESS NOTE ADULT - SUBJECTIVE AND OBJECTIVE BOX
CC: f/u for ? ventriculitis    Patient remains in ICU critically ill, vented via trach    REVIEW OF SYSTEMS: limited  All other review of systems negative (Comprehensive ROS)    Antimicrobials Day #    cefepime   IVPB 2000 milliGRAM(s) IV Intermittent every 8 hours  vancomycin  IVPB 1000 milliGRAM(s) IV Intermittent every 12 hours    Other Medications Reviewed    ICU Vital Signs Last 24 Hrs  T(C): 37.6 (2019 09:00), Max: 38.4 (2019 07:00)  T(F): 99.7 (2019 09:00), Max: 101.1 (2019 07:00)  HR: 109 (2019 12:50) (85 - 114)  BP: 144/88 (2019 16:45) (144/88 - 144/88)  BP(mean): --  ABP: 106/66 (2019 11:00) (104/74 - 173/101)  ABP(mean): 84 (2019 11:00) (81 - 131)  RR: 16 (2019 11:00) (15 - 39)  SpO2: 100% (2019 12:50) (98% - 100%)      PHYSICAL EXAM:  General: vented via trach  Eyes:  anicteric, no conjunctival injection, no discharge  Oropharynx: no lesions or injection 	  Neck: supple, without adenopathy  Lungs: clear to auscultation  Heart: regular rate and rhythm; no murmur, rubs or gallops  Abdomen: soft, nondistended, nontender, without mass or organomegaly  Skin: no lesions  Extremities: no clubbing, cyanosis, or edema  Neurologic: lethargic   LAB RESULTS:                                     11.1   7.0   )-----------( 183      ( 2019 22:53 )             30.7   02-    135  |  98  |  9   ----------------------------<  134<H>  3.6   |  29  |  0.39<L>    Ca    8.4      2019 09:51  Phos  2.8     02-25  Mg     2.1     02-25          Urinalysis Basic - ( 2019 23:38 )    Color: Yellow / Appearance: Slightly Turbid / S.021 / pH: x  Gluc: x / Ketone: Negative  / Bili: Negative / Urobili: Negative   Blood: x / Protein: 30 mg/dL / Nitrite: Positive   Leuk Esterase: Large / RBC: 4 /hpf / WBC 42 /HPF   Sq Epi: x / Non Sq Epi: 1 /hpf / Bacteria: Many        MICROBIOLOGY REVIEWED:    RADIOLOGY REVIEWED:

## 2019-02-26 NOTE — PROGRESS NOTE ADULT - ASSESSMENT
57yo woman s/p AVM resection, RTOR for posterior fossa decompression, trach/PEG, VPS in 12/2018-1/2019  now back for an episode of GTC seizure and AMS  UTI vs. VPS infection vs. hyponatremia 2/2 infection vs. PRES  S/p externalization of VPS.    Neuro:  neurochecks q1hr  Precedex PRN  MRI with and without MARITO-?PRES vs. meningoencephalitis  Keppra 1g bid;   d/c EEG - no epileptiform abnormality   cont home meds  CSF cx f/u,      Card:  -150    Pulm:  on vent - CPAP as tolerated  ABG stable    GI:  tube feeding    Renal:  IVL once adequate feeding    Endo:  euglycemia    Heme:  SCDs, lovenox - start tonight    ID:  UA+, cont cefepime (was on vanc/cefepime at OSH) for UTI at admission  f/u ucx, bcx, CSF cultures  cont cefepime + vanco  will need PICC for long-term ABx    full code  ICU    at risk for deterioration/death due to seizures, VPS infection, septic shock  critical care time 45min

## 2019-02-26 NOTE — PROGRESS NOTE ADULT - ASSESSMENT
58F s/p embo and rsxn of ruptured AVM with VPS placement. Neurologically decline from baseline s/p VPS removal and EVD placement today    - cont supportive care per icu  - f/u CSF cx  - cont neurochecks

## 2019-02-26 NOTE — EEG REPORT - NS EEG TEXT BOX
[facility]   COMPREHENSIVE EPILEPSY CENTER   REPORT OF CONTINUOUS VIDEO EEG     [facility]: [address] , [address], [place] [address], Ph#: [phone]  [facility]: [address], [place] [address], Ph#: [phone]  Office: [address] [place] [address], [facility] [address], [place] [address] Ph#: [phone]    Patient Name: BRIANNA AGUILA  Age and : 58y (64)  MRN #: [id number]  Location: [place] [address]  Referring Physician: Delia Murdock    Study Date: 19 - 19    _____________________________________________________________  STUDY INFORMATION    EEG Recording Technique:  The patient underwent continuous Video-EEG monitoring, using Telemetry System hardware on the XLTek Digital System. EEG and video data were stored on a computer hard drive with important events saved in digital archive files. The material was reviewed by a physician (electroencephalographer / epileptologist) on a daily basis. Dakota and seizure detection algorithms were utilized and reviewed. An EEG Technician attended to the patient, and was available throughout daytime work hours.  The epilepsy center neurologist was available in person or on call 24-hours per day.    EEG Placement and Labeling of Electrodes:  The EEG was performed utilizing 20 channel referential EEG connections (coronal over temporal over parasagittal montage) using all standard 10-20 electrode placements with EKG, with additional electrodes placed in the inferior temporal region using the modified 10-10 montage electrode placements for elective admissions, or if deemed necessary. Recording was at a sampling rate of 256 samples per second per channel. Time synchronized digital video recording was done simultaneously with EEG recording. A low light infrared camera was used for low light recording.     _____________________________________________________________  HISTORY  Patient is a 58y old  Female who presents with a chief complaint of seizures, h/o  shunt (2019 08:25)    PERTINENT MEDICATION:  clonazePAM Tablet 1 milliGRAM(s) Oral every 12 hours  levETIRAcetam  Solution 1000 milliGRAM(s) Oral two times a day    _____________________________________________________________  INTERPRETATION    Findings: The background was continuous, spontaneously variable and reactive. Background predominantly consisted of theta, delta and faster activities. No clear posterior dominant rhythm seen.    Background Slowing:  Diffuse theta and polymorphic delta slowing.    Focal Slowing:   Higher amplitude focal polymorphic delta and theta slowing in the right hemisphere     Sleep Background:  Sleep was characterized by the presence of asymmetric sleep spindles, likely due to breach effect.     Other Non-Epileptiform Findings:  Breach effect in right frontal region characterized by higher amplitude, sharply contoured waves and fast activities.    Interictal Epileptiform Activity:   None were present.    Events:  Clinical events: Sporadic prolonged episodes of head titubation, random left and right arm tremoring with a start and stop quality, EEG correlate of rhythmic 3-4hz artifact from motion, but no associated epileptiform abnormalities.   Seizures: None recorded.    Activation Procedures:   Hyperventilation was not performed.    Photic stimulation was not performed.     Artifacts:  Intermittent myogenic and movement artifacts were noted.    ECG:  The heart rate on single channel ECG was predominantly between 100-120 BPM.    _____________________________________________________________  EEG SUMMARY/CLASSIFICATION    Abnormal EEG in the awake, drowsy and asleep states.  - Sporadic prolonged episodes of head titubation, random left and right arm tremoring with a start and stop quality and no associated epileptiform abnormalities.   - Higher amplitude focal polymorphic delta and theta slowing in the right hemisphere   - Moderate generalized slowing.  - Breach effect in right frontal region characterized by higher amplitude, sharply contoured waves and fast activities.  _____________________________________________________________  EEG IMPRESSION/CLINICAL CORRELATE    Events of tremor/head titubation without associated epileptiform abnormalities, appearing nonepileptic in etiology.  Multifocal cerebral dysfunction persistently worse in the right frontal region, with associated regional skull defect.      Inocencia Herrera MD  Epilepsy Fellow    Dillan Borja MD  Epilepsy Attending [facility]   COMPREHENSIVE EPILEPSY CENTER   REPORT OF CONTINUOUS VIDEO EEG     [facility]: [address] , [address], [place] [address], Ph#: [phone]  [facility]: [address], [place] [address], Ph#: [phone]  Office: [address] [place] [address], [facility] [address], [place] [address] Ph#: [phone]    Patient Name: BRIANNA AGUILA  Age and : 58y (64)  MRN #: [id number]  Location: [place] [address]  Referring Physician: Delia Murdock    Study Date: 19 - 19    _____________________________________________________________  STUDY INFORMATION    EEG Recording Technique:  The patient underwent continuous Video-EEG monitoring, using Telemetry System hardware on the XLTek Digital System. EEG and video data were stored on a computer hard drive with important events saved in digital archive files. The material was reviewed by a physician (electroencephalographer / epileptologist) on a daily basis. Dakota and seizure detection algorithms were utilized and reviewed. An EEG Technician attended to the patient, and was available throughout daytime work hours.  The epilepsy center neurologist was available in person or on call 24-hours per day.    EEG Placement and Labeling of Electrodes:  The EEG was performed utilizing 20 channel referential EEG connections (coronal over temporal over parasagittal montage) using all standard 10-20 electrode placements with EKG, with additional electrodes placed in the inferior temporal region using the modified 10-10 montage electrode placements for elective admissions, or if deemed necessary. Recording was at a sampling rate of 256 samples per second per channel. Time synchronized digital video recording was done simultaneously with EEG recording. A low light infrared camera was used for low light recording.     _____________________________________________________________  HISTORY  Patient is a 58y old  Female who presents with a chief complaint of seizures, h/o  shunt (2019 08:25)    PERTINENT MEDICATION:  clonazePAM Tablet 1 milliGRAM(s) Oral every 12 hours  levETIRAcetam  Solution 1000 milliGRAM(s) Oral two times a day    _____________________________________________________________  INTERPRETATION    Findings: The background was continuous, spontaneously variable and reactive. Background predominantly consisted of theta, delta and faster activities. No clear posterior dominant rhythm seen.    Background Slowing:  Diffuse theta and polymorphic delta slowing.    Focal Slowing:   Higher amplitude focal polymorphic delta and theta slowing in the right hemisphere     Sleep Background:  Sleep was characterized by the presence of asymmetric sleep spindles, likely due to breach effect.     Other Non-Epileptiform Findings:  Breach effect in right frontal region characterized by higher amplitude, sharply contoured waves and fast activities.    Interictal Epileptiform Activity:   None were present.    Events:  Clinical events: Sporadic prolonged episodes of head titubation, random left and right arm tremoring with a start and stop quality, EEG correlate of rhythmic 3-4hz artifact from motion, but no associated epileptiform abnormalities.  This artifact was interrupted with passive restraint of head.   Seizures: None recorded.    Activation Procedures:   Hyperventilation was not performed.    Photic stimulation was not performed.     Artifacts:  Intermittent myogenic and movement artifacts were noted.    ECG:  The heart rate on single channel ECG was predominantly between 100-120 BPM.    _____________________________________________________________  EEG SUMMARY/CLASSIFICATION    Abnormal EEG in the awake, drowsy and asleep states.  - Sporadic prolonged episodes of head titubation, random left and right arm tremoring with a start and stop quality and no associated epileptiform abnormalities.   - Higher amplitude focal polymorphic delta and theta slowing in the right hemisphere   - Moderate generalized slowing.  - Breach effect in right frontal region characterized by higher amplitude, sharply contoured waves and fast activities.  _____________________________________________________________  EEG IMPRESSION/CLINICAL CORRELATE    Events of tremor/head titubation without associated epileptiform abnormalities, appearing nonepileptic in etiology.  Multifocal cerebral dysfunction persistently worse in the right frontal region, with associated regional skull defect.      Inocencia Herrera MD  Epilepsy Fellow    Dillan Borja MD  Epilepsy Attending

## 2019-02-26 NOTE — DIETITIAN INITIAL EVALUATION ADULT. - OTHER INFO
Pt seen for NSCU length of stay. Pt with no family at bedside, non-verbal. Per review of prior admission records, UBW unknown. However, wt (12/18/18): 185.4 lbs. Current wt (2/25/19): 189.2 lbs. Appears stable, will monitor. No apparent signs of muscle mass or body fat depletion noted. No BM's recorded at this time as per review of nursing flow sheet. Pt on bowel regimen as ordered. Minimal gastric residuals noted. Recevied total of 400ml (480kcal) of TF formula/24 hrs (initiated 2/25 at 23:00). At time of RD visit, TF infusing at 50ml/hr.

## 2019-02-26 NOTE — DIETITIAN INITIAL EVALUATION ADULT. - ETIOLOGY
increased demand for nutrient in the setting of brain injury, neurosurgical intervention, active infection

## 2019-02-26 NOTE — PROGRESS NOTE ADULT - SUBJECTIVE AND OBJECTIVE BOX
Visit Summary: Patient seen and evaluated at bedside.    Overnight Events: none    Exam:  T(C): 37.5 (02-25-19 @ 23:00), Max: 39.2 (02-25-19 @ 07:00)  HR: 103 (02-25-19 @ 23:38) (85 - 125)  BP: 144/88 (02-25-19 @ 16:45) (105/72 - 165/101)  RR: 16 (02-25-19 @ 23:00) (12 - 26)  SpO2: 100% (02-25-19 @ 23:38) (98% - 100%)  Wt(kg): --    Trached, no EO, tracks,  pupils 3mm reac, no FC, Lt side spont, b/l UE loc, LLE WDs, RLE TF                         11.1   7.0   )-----------( 183      ( 25 Feb 2019 22:53 )             30.7     02-25    136  |  98  |  11  ----------------------------<  93  3.1<L>   |  25  |  0.37<L>    Ca    8.6      25 Feb 2019 22:53  Phos  2.8     02-25  Mg     2.1     02-25    PT/INR - ( 24 Feb 2019 01:00 )   PT: 13.6 sec;   INR: 1.19 ratio         PTT - ( 24 Feb 2019 01:00 )  PTT:25.9 sec

## 2019-02-26 NOTE — PROVIDER CONTACT NOTE (OTHER) - SITUATION
change in neuro exam. Patient during the day was localizing on upper extremities and following on bilateral lower extremities. 7pm exam not following on bilateral lower extremities.

## 2019-02-26 NOTE — PROGRESS NOTE ADULT - SUBJECTIVE AND OBJECTIVE BOX
HPI:  HPI: 58yof initially admitted in mid Dec 2018 for ruptured cerebellar AVM s/p embolization and craniotomy for resection and  shunt. She was discharged to rehab in late Jan 2019. She has been doing well at rehab: walking 50 steps with walker. Two days prior, the patient had a generlized tonic clonic seizure witnessed by her sister as well as a fever. She was then brought to OSH CT. The patient reportedly had an LP that showed leukocytosis. Due to the suspicion of  shunt infection, the patient was transferred to [facility] for further management. In the ER, EO to pain, minimal mvmt x 4.    PMHx: as above  PSHx: as above  Medications: as above  Allergies: nkd  Social history: sister at bedside  Family History:     VS, labs and images reviewed.    PHYSICAL EXAM:  trach'ed, on vent  EO to voice, keeps EO, PERRL, FC - wiggles toes, keeps eyes closed on command, BL UE wdrl  regular, tachycardic  minimal ronchi R>L, decreased breath sounds  soft/nd abd  wwp ext

## 2019-02-26 NOTE — DIETITIAN INITIAL EVALUATION ADULT. - ENERGY NEEDS
Pt is a 58 year old female with PMH: lupus. Admitted in 12/2018 with cerebellar AVM s/p embolization and craniotomy for resection and  shunt. Pt discharged to rehab in late 2019. Currently admitted with generalized clonic seizure, leukocytosis and AMS. Concern for UTI vs. VPS infection vs. hyponatremia 2/2 infection vs. PRES. Pt continues on antibiotics as ordered.  S/P VPS removal and EVD placement.    Ht: 66"  Wt:   BMI: 189.2 kg/m2   IBW: 130 (+/-10%)    146 % IBW  Edema: 1+ generalized  Skin: surgical incision to lumbar spine

## 2019-02-26 NOTE — PROGRESS NOTE ADULT - ASSESSMENT
58y female with SLE, cerebellar AVM and bleed in Dec, s/p embolization, AVM resection, post fossa decompression, VPS, trach/PEG,   Persistent, "central" fever, but ultimately, fever resolved, pt improved, sent to rehab.    She apparently developed generalized Sz, fever and sent to [facility] ICU, received empiric Vanco and Cefepime, transferred here.    On TC, lethargic, without purposeful response, Tmx 103, but WBC/diff normal, CXR clear  CSF shows >500 WBCs, majority Polys, thus difficult to exclude ventriculitis, ?infected VPS- no org seen  s/p  shunt removal    Plan:  cont empiric Vanco and Cefepime for CNS coverage, pending further culture results  Follow temps and CBC/diff   D/w NSICU Team  records from [facility], any cultures reports, antimicrobials remain empiric

## 2019-02-26 NOTE — DIETITIAN INITIAL EVALUATION ADULT. - ORAL INTAKE PTA
Pt with gastrostomy; prior TF formulary and infusion rate from rehab facility not provided in medical records.

## 2019-02-26 NOTE — DIETITIAN INITIAL EVALUATION ADULT. - PERTINENT MEDS FT
Vancomycin, Cefepime, Plasma-Lyte Injection, Oxycodone, KCl, Klonopin, Colace, Keppra, Protonix, Senna

## 2019-02-27 LAB
ANION GAP SERPL CALC-SCNC: 10 MMOL/L — SIGNIFICANT CHANGE UP (ref 5–17)
APPEARANCE UR: CLEAR — SIGNIFICANT CHANGE UP
BILIRUB UR-MCNC: NEGATIVE — SIGNIFICANT CHANGE UP
BUN SERPL-MCNC: 6 MG/DL — LOW (ref 7–23)
CALCIUM SERPL-MCNC: 8.7 MG/DL — SIGNIFICANT CHANGE UP (ref 8.4–10.5)
CHLORIDE SERPL-SCNC: 97 MMOL/L — SIGNIFICANT CHANGE UP (ref 96–108)
CO2 SERPL-SCNC: 31 MMOL/L — SIGNIFICANT CHANGE UP (ref 22–31)
COLOR SPEC: SIGNIFICANT CHANGE UP
CREAT SERPL-MCNC: <0.3 MG/DL — LOW (ref 0.5–1.3)
CULTURE RESULTS: NO GROWTH — SIGNIFICANT CHANGE UP
CULTURE RESULTS: NO GROWTH — SIGNIFICANT CHANGE UP
DIFF PNL FLD: ABNORMAL
GAS PNL BLDA: SIGNIFICANT CHANGE UP
GLUCOSE SERPL-MCNC: 108 MG/DL — HIGH (ref 70–99)
GLUCOSE UR QL: NEGATIVE — SIGNIFICANT CHANGE UP
GRAM STN FLD: SIGNIFICANT CHANGE UP
HCT VFR BLD CALC: 26.8 % — LOW (ref 34.5–45)
HGB BLD-MCNC: 9.5 G/DL — LOW (ref 11.5–15.5)
KETONES UR-MCNC: SIGNIFICANT CHANGE UP
LEUKOCYTE ESTERASE UR-ACNC: NEGATIVE — SIGNIFICANT CHANGE UP
MAGNESIUM SERPL-MCNC: 2 MG/DL — SIGNIFICANT CHANGE UP (ref 1.6–2.6)
MCHC RBC-ENTMCNC: 31.2 PG — SIGNIFICANT CHANGE UP (ref 27–34)
MCHC RBC-ENTMCNC: 35.5 GM/DL — SIGNIFICANT CHANGE UP (ref 32–36)
MCV RBC AUTO: 88 FL — SIGNIFICANT CHANGE UP (ref 80–100)
NITRITE UR-MCNC: NEGATIVE — SIGNIFICANT CHANGE UP
PH UR: 6 — SIGNIFICANT CHANGE UP (ref 5–8)
PHOSPHATE SERPL-MCNC: 2.4 MG/DL — LOW (ref 2.5–4.5)
PLATELET # BLD AUTO: 153 K/UL — SIGNIFICANT CHANGE UP (ref 150–400)
POTASSIUM SERPL-MCNC: 3.7 MMOL/L — SIGNIFICANT CHANGE UP (ref 3.5–5.3)
POTASSIUM SERPL-SCNC: 3.7 MMOL/L — SIGNIFICANT CHANGE UP (ref 3.5–5.3)
PROT UR-MCNC: SIGNIFICANT CHANGE UP
RBC # BLD: 3.04 M/UL — LOW (ref 3.8–5.2)
RBC # FLD: 15.6 % — HIGH (ref 10.3–14.5)
SODIUM SERPL-SCNC: 138 MMOL/L — SIGNIFICANT CHANGE UP (ref 135–145)
SP GR SPEC: 1.01 — SIGNIFICANT CHANGE UP (ref 1.01–1.02)
SPECIMEN SOURCE: SIGNIFICANT CHANGE UP
UROBILINOGEN FLD QL: NEGATIVE — SIGNIFICANT CHANGE UP
WBC # BLD: 5.2 K/UL — SIGNIFICANT CHANGE UP (ref 3.8–10.5)
WBC # FLD AUTO: 5.2 K/UL — SIGNIFICANT CHANGE UP (ref 3.8–10.5)

## 2019-02-27 PROCEDURE — 71045 X-RAY EXAM CHEST 1 VIEW: CPT | Mod: 26

## 2019-02-27 PROCEDURE — 99292 CRITICAL CARE ADDL 30 MIN: CPT

## 2019-02-27 PROCEDURE — 99291 CRITICAL CARE FIRST HOUR: CPT

## 2019-02-27 RX ORDER — POTASSIUM PHOSPHATE, MONOBASIC POTASSIUM PHOSPHATE, DIBASIC 236; 224 MG/ML; MG/ML
15 INJECTION, SOLUTION INTRAVENOUS ONCE
Qty: 0 | Refills: 0 | Status: COMPLETED | OUTPATIENT
Start: 2019-02-27 | End: 2019-02-27

## 2019-02-27 RX ORDER — ACETAMINOPHEN 500 MG
1000 TABLET ORAL ONCE
Qty: 0 | Refills: 0 | Status: COMPLETED | OUTPATIENT
Start: 2019-02-27 | End: 2019-02-27

## 2019-02-27 RX ORDER — POTASSIUM PHOSPHATE, MONOBASIC POTASSIUM PHOSPHATE, DIBASIC 236; 224 MG/ML; MG/ML
15 INJECTION, SOLUTION INTRAVENOUS ONCE
Qty: 0 | Refills: 0 | Status: COMPLETED | OUTPATIENT
Start: 2019-02-27 | End: 2019-02-28

## 2019-02-27 RX ORDER — ACETAMINOPHEN 500 MG
650 TABLET ORAL EVERY 6 HOURS
Qty: 0 | Refills: 0 | Status: DISCONTINUED | OUTPATIENT
Start: 2019-02-27 | End: 2019-03-11

## 2019-02-27 RX ORDER — MAGNESIUM SULFATE 500 MG/ML
1 VIAL (ML) INJECTION ONCE
Qty: 0 | Refills: 0 | Status: COMPLETED | OUTPATIENT
Start: 2019-02-27 | End: 2019-02-27

## 2019-02-27 RX ORDER — OXYCODONE HYDROCHLORIDE 5 MG/1
5 TABLET ORAL ONCE
Qty: 0 | Refills: 0 | Status: DISCONTINUED | OUTPATIENT
Start: 2019-02-27 | End: 2019-02-27

## 2019-02-27 RX ORDER — POTASSIUM CHLORIDE 20 MEQ
30 PACKET (EA) ORAL ONCE
Qty: 0 | Refills: 0 | Status: COMPLETED | OUTPATIENT
Start: 2019-02-27 | End: 2019-02-27

## 2019-02-27 RX ADMIN — POTASSIUM PHOSPHATE, MONOBASIC POTASSIUM PHOSPHATE, DIBASIC 62.5 MILLIMOLE(S): 236; 224 INJECTION, SOLUTION INTRAVENOUS at 04:58

## 2019-02-27 RX ADMIN — OXYCODONE HYDROCHLORIDE 5 MILLIGRAM(S): 5 TABLET ORAL at 23:42

## 2019-02-27 RX ADMIN — Medication 100 GRAM(S): at 04:55

## 2019-02-27 RX ADMIN — Medication 100 MILLIGRAM(S): at 17:52

## 2019-02-27 RX ADMIN — LEVETIRACETAM 1000 MILLIGRAM(S): 250 TABLET, FILM COATED ORAL at 17:52

## 2019-02-27 RX ADMIN — Medication 400 MILLIGRAM(S): at 01:29

## 2019-02-27 RX ADMIN — Medication 1 MILLIGRAM(S): at 17:36

## 2019-02-27 RX ADMIN — Medication 500 MICROGRAM(S): at 17:10

## 2019-02-27 RX ADMIN — CEFEPIME 100 MILLIGRAM(S): 1 INJECTION, POWDER, FOR SOLUTION INTRAMUSCULAR; INTRAVENOUS at 22:14

## 2019-02-27 RX ADMIN — CHLORHEXIDINE GLUCONATE 1 APPLICATION(S): 213 SOLUTION TOPICAL at 22:15

## 2019-02-27 RX ADMIN — Medication 1 MILLIGRAM(S): at 05:14

## 2019-02-27 RX ADMIN — Medication 500 MICROGRAM(S): at 00:34

## 2019-02-27 RX ADMIN — OXYCODONE HYDROCHLORIDE 5 MILLIGRAM(S): 5 TABLET ORAL at 23:07

## 2019-02-27 RX ADMIN — Medication 500 MICROGRAM(S): at 11:56

## 2019-02-27 RX ADMIN — Medication 30 MILLIEQUIVALENT(S): at 05:13

## 2019-02-27 RX ADMIN — Medication 650 MILLIGRAM(S): at 20:00

## 2019-02-27 RX ADMIN — CEFEPIME 100 MILLIGRAM(S): 1 INJECTION, POWDER, FOR SOLUTION INTRAMUSCULAR; INTRAVENOUS at 14:38

## 2019-02-27 RX ADMIN — CEFEPIME 100 MILLIGRAM(S): 1 INJECTION, POWDER, FOR SOLUTION INTRAMUSCULAR; INTRAVENOUS at 05:57

## 2019-02-27 RX ADMIN — Medication 166.67 MILLIGRAM(S): at 05:13

## 2019-02-27 RX ADMIN — Medication 100 MILLIGRAM(S): at 05:13

## 2019-02-27 RX ADMIN — OXYCODONE HYDROCHLORIDE 5 MILLIGRAM(S): 5 TABLET ORAL at 23:37

## 2019-02-27 RX ADMIN — PANTOPRAZOLE SODIUM 40 MILLIGRAM(S): 20 TABLET, DELAYED RELEASE ORAL at 12:47

## 2019-02-27 RX ADMIN — Medication 500 MICROGRAM(S): at 05:39

## 2019-02-27 RX ADMIN — Medication 166.67 MILLIGRAM(S): at 17:36

## 2019-02-27 RX ADMIN — SENNA PLUS 1 TABLET(S): 8.6 TABLET ORAL at 22:14

## 2019-02-27 RX ADMIN — LEVETIRACETAM 1000 MILLIGRAM(S): 250 TABLET, FILM COATED ORAL at 05:13

## 2019-02-27 NOTE — PROGRESS NOTE ADULT - ASSESSMENT
58F s/p embo and rsxn of ruptured AVM with VPS placement. Neurologically decline from baseline s/p VPS removal and EVD placement today    - cont supportive care per icu  - f/u CSF cx NGTD  - Cont EVD  - cont neurochecks

## 2019-02-27 NOTE — PROGRESS NOTE ADULT - SUBJECTIVE AND OBJECTIVE BOX
HPI:  HPI: 58yof initially admitted in mid Dec 2018 for ruptured cerebellar AVM s/p embolization and craniotomy for resection and  shunt. She was discharged to rehab in late Jan 2019. She has been doing well at rehab: walking 50 steps with walker. Two days prior, the patient had a generlized tonic clonic seizure witnessed by her sister as well as a fever. She was then brought to OSH CT. The patient reportedly had an LP that showed leukocytosis. Due to the suspicion of  shunt infection, the patient was transferred to [facility] for further management. In the ER, EO to pain, minimal mvmt x 4.    No o/n events.    VS, labs and images reviewed.    PHYSICAL EXAM:  trach'ed, on vent  EO to voice, keeps EO, PERRL, FC - wiggles toes, keeps eyes closed on command, BL UE wdrl  regular, tachycardic  minimal ronchi R>L, decreased breath sounds  soft/nd abd  wwp ext HPI:  HPI: 58yof initially admitted in mid Dec 2018 for ruptured cerebellar AVM s/p embolization and craniotomy for resection and  shunt. She was discharged to rehab in late Jan 2019. She has been doing well at rehab: walking 50 steps with walker. Two days prior, the patient had a generlized tonic clonic seizure witnessed by her sister as well as a fever. She was then brought to OSH CT. The patient reportedly had an LP that showed leukocytosis. Due to the suspicion of  shunt infection, the patient was transferred to [facility] for further management. In the ER, EO to pain, minimal mvmt x 4.    No o/n events.    VS, labs and images reviewed.    PHYSICAL EXAM:  trach'ed, on vent  EO to voice, keeps EO, PERRL, FC - opens mouth, wiggles toes, tries to show 2 fingers on LUE, LUE 3/5, BL LE HF 3/5.  regular, tachycardic  minimal ronchi R>L, decreased breath sounds  soft/nd abd  wwp ext

## 2019-02-27 NOTE — PROGRESS NOTE ADULT - ASSESSMENT
58y female with SLE, cerebellar AVM and bleed in Dec, s/p embolization, AVM resection, post fossa decompression, VPS, trach/PEG,   Persistent, "central" fever, but ultimately, fever resolved, pt improved, sent to rehab.    She apparently developed generalized Sz, fever and sent to [facility] ICU, received empiric Vanco and Cefepime, transferred here.    On TC, lethargic, without purposeful response, Tmx 103, but WBC/diff normal, CXR clear  CSF shows >500 WBCs, majority Polys, thus difficult to exclude ventriculitis, ?infected VPS- no org seen  s/p  shunt removal    Plan:  cont empiric Vanco and Cefepime for CNS coverage, pending further culture results  Follow temps and CBC/diff   D/w NSICU Team  records from [facility], any cultures reports, antimicrobials remain empiric 58y female with SLE, cerebellar AVM and bleed in Dec, s/p embolization, AVM resection, post fossa decompression, VPS, trach/PEG,   Persistent, "central" fever, but ultimately, fever resolved, pt improved, sent to rehab.    She apparently developed generalized Sz, fever and sent to [facility] ICU, received empiric Vanco and Cefepime, transferred here.    On TC, lethargic, without purposeful response, Tmx 103, but WBC/diff normal, CXR clear  CSF shows >500 WBCs, majority Polys, thus difficult to exclude ventriculitis, ?infected VPS- no org seen  s/p  shunt removal    Plan:  cont empiric Vanco and Cefepime for CNS coverage, pending further culture results  Follow temps and CBC/diff   D/w NSICU Team  records from [facility], any cultures reports, antimicrobials remain empiric  I left message with Dr Mario Handley, ID physician at [facility] [phone] to review any culture results, awaiting call back  d/w ICU team

## 2019-02-27 NOTE — PROGRESS NOTE ADULT - SUBJECTIVE AND OBJECTIVE BOX
EVD at [id number]    rhythmic head movement, Eyes open, no regarding, no command following, localizes b/l UE (left>right), RLE TF< LLE w/d Patient seen and examined during 2/26 evening rounds    EVD at [id number]    rhythmic head movement, Eyes open, no regarding, no command following, localizes b/l UE (left>right), RLE TF< LLE w/d

## 2019-02-27 NOTE — PROGRESS NOTE ADULT - SUBJECTIVE AND OBJECTIVE BOX
CC: f/u for ? ventriculitis    Patient remains in ICU critically ill, vented via trach    REVIEW OF SYSTEMS: limited  All other review of systems negative (Comprehensive ROS)    Antimicrobials Day # 4  cefepime   IVPB 2000 milliGRAM(s) IV Intermittent every 8 hours  vancomycin  IVPB 1000 milliGRAM(s) IV Intermittent every 12 hours         Other Medications Reviewed    ICU Vital Signs Last 24 Hrs  T(C): 37.7 (2019 11:00), Max: 38.3 (2019 02:00)  T(F): 99.9 (2019 11:00), Max: 100.9 (2019 02:00)  HR: 106 (:) (87 - 126)  BP: --  BP(mean): --  ABP: 140/82 (2019 11:00) (96/56 - 168/96)  ABP(mean): 107 (2019 11:) (75 - 126)  RR: 18 (2019 11:) (16 - 20)  SpO2: 100% (2019 11:00) (100% - 100%)        PHYSICAL EXAM:  General: vented via trach  Eyes:  anicteric, no conjunctival injection, no discharge  Oropharynx: no lesions or injection 	  Neck: supple, without adenopathy  Lungs: clear to auscultation  Heart: regular rate and rhythm; no murmur, rubs or gallops  Abdomen: soft, nondistended, nontender, without mass or organomegaly  Skin: no lesions  Extremities: no clubbing, cyanosis, or edema  Neurologic: lethargic   LAB RESULTS:                                                10.2   6.1   )-----------( 183      ( 2019 22:33 )             29.6       137  |  98  |  6<L>  ----------------------------<  113<H>  3.7   |  29  |  0.32<L>    Ca    8.6      2019 22:33  Phos  2.2       Mg     1.8                   Urinalysis Basic - ( 2019 23:38 )    Color: Yellow / Appearance: Slightly Turbid / S.021 / pH: x  Gluc: x / Ketone: Negative  / Bili: Negative / Urobili: Negative   Blood: x / Protein: 30 mg/dL / Nitrite: Positive   Leuk Esterase: Large / RBC: 4 /hpf / WBC 42 /HPF   Sq Epi: x / Non Sq Epi: 1 /hpf / Bacteria: Many        MICROBIOLOGY REVIEWED:    RADIOLOGY REVIEWED:

## 2019-02-27 NOTE — PROGRESS NOTE ADULT - ASSESSMENT
59yo woman s/p AVM resection, RTOR for posterior fossa decompression, trach/PEG, VPS in 12/2018-1/2019  now back for an episode of GTC seizure and AMS  UTI vs. VPS infection vs. hyponatremia 2/2 infection vs. PRES  S/p externalization of VPS.    Neuro:  neurochecks q2hr  Precedex PRN  Keppra 1g bid;   cont home meds  CSF cx f/u,      Card:  -150    Pulm:  on vent - CPAP as tolerated  ABG stable    GI:  tube feeding    Renal:  IVL once adequate feeding    Endo:  euglycemia    Heme:  SCDs, lovenox - start tonight    ID:  UA+, cont cefepime (was on vanc/cefepime at OSH) for UTI at admission  f/u ucx, bcx, CSF cultures  cont cefepime + vanco  will need PICC for long-term ABx    full code  ICU    at risk for deterioration/death due to seizures, VPS infection, septic shock  critical care time 45min 57yo woman s/p AVM resection, RTOR for posterior fossa decompression, trach/PEG, VPS in 12/2018-1/2019  now back for an episode of GTC seizure and AMS  Concern for VPS infection.  S/p externalization of VPS.    Neuro:  neurochecks q2hr  Precedex PRN  Keppra 1g bid;   cont home meds  CSF cx f/u,      Card:  -150    Pulm:  on vent - CPAP as tolerated  ABG stable    GI:  tube feeding    Renal:  IVL once adequate feeding    Endo:  euglycemia    Heme:  SCDs, lovenox - start tonight    ID:  UA+, cont cefepime (was on vanc/cefepime at OSH) for UTI at admission  f/u ucx, bcx, CSF cultures  cont cefepime + vanco  will need PICC for long-term ABx    full code  ICU    at risk for deterioration/death due to seizures, VPS infection, septic shock  critical care time 45min

## 2019-02-27 NOTE — PROGRESS NOTE ADULT - ASSESSMENT
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

## 2019-02-27 NOTE — PROGRESS NOTE ADULT - SUBJECTIVE AND OBJECTIVE BOX
Visit Summary: Patient seen and evaluated at bedside.    Overnight Events: none    Exam:  T(C): 37.7 (02-26-19 @ 22:00), Max: 38.4 (02-26-19 @ 07:00)  HR: 109 (02-27-19 @ 00:34) (99 - 126)  BP: --  RR: 16 (02-26-19 @ 22:00) (16 - 39)  SpO2: 100% (02-27-19 @ 00:34) (100% - 100%)  Wt(kg): --    EO to voice, keeps EO, PERRL, FC - wiggles toes, keeps eyes closed on command, BL UE wdrl                        10.2   6.1   )-----------( 183      ( 26 Feb 2019 22:33 )             29.6     02-26    137  |  98  |  6<L>  ----------------------------<  113<H>  3.7   |  29  |  0.32<L>    Ca    8.6      26 Feb 2019 22:33  Phos  2.2     02-26  Mg     1.8     02-26

## 2019-02-27 NOTE — PROGRESS NOTE ADULT - ASSESSMENT
Possible cerebrospinal fluid infection r/o ventriculitis  - F/u cerebrospinal fluid cultures, empiric antibiotics  - hydrocephalus: external ventricular drain in place  - EEG r/o seizures, negative thus far  - baseline head tremor: clonazepam  - SBP 90-160mmHg    35 minutes critical care time

## 2019-02-27 NOTE — PROGRESS NOTE ADULT - SUBJECTIVE AND OBJECTIVE BOX
Febrile, cultured.     Trached, eyes open, tracks, pupils reactive, follows, left side spontaneous, b/l UE localizes, BLE wiggles toes

## 2019-02-28 LAB
ANION GAP SERPL CALC-SCNC: 7 MMOL/L — SIGNIFICANT CHANGE UP (ref 5–17)
BUN SERPL-MCNC: 6 MG/DL — LOW (ref 7–23)
CALCIUM SERPL-MCNC: 8.7 MG/DL — SIGNIFICANT CHANGE UP (ref 8.4–10.5)
CHLORIDE SERPL-SCNC: 95 MMOL/L — LOW (ref 96–108)
CO2 SERPL-SCNC: 35 MMOL/L — HIGH (ref 22–31)
CREAT SERPL-MCNC: 0.31 MG/DL — LOW (ref 0.5–1.3)
GAS PNL BLDA: SIGNIFICANT CHANGE UP
GLUCOSE SERPL-MCNC: 132 MG/DL — HIGH (ref 70–99)
HCT VFR BLD CALC: 26.9 % — LOW (ref 34.5–45)
HGB BLD-MCNC: 9.4 G/DL — LOW (ref 11.5–15.5)
MAGNESIUM SERPL-MCNC: 1.9 MG/DL — SIGNIFICANT CHANGE UP (ref 1.6–2.6)
MCHC RBC-ENTMCNC: 30.8 PG — SIGNIFICANT CHANGE UP (ref 27–34)
MCHC RBC-ENTMCNC: 34.8 GM/DL — SIGNIFICANT CHANGE UP (ref 32–36)
MCV RBC AUTO: 88.6 FL — SIGNIFICANT CHANGE UP (ref 80–100)
PHOSPHATE SERPL-MCNC: 2.5 MG/DL — SIGNIFICANT CHANGE UP (ref 2.5–4.5)
PLATELET # BLD AUTO: 179 K/UL — SIGNIFICANT CHANGE UP (ref 150–400)
POTASSIUM SERPL-MCNC: 4 MMOL/L — SIGNIFICANT CHANGE UP (ref 3.5–5.3)
POTASSIUM SERPL-SCNC: 4 MMOL/L — SIGNIFICANT CHANGE UP (ref 3.5–5.3)
RBC # BLD: 3.03 M/UL — LOW (ref 3.8–5.2)
RBC # FLD: 15.9 % — HIGH (ref 10.3–14.5)
SODIUM SERPL-SCNC: 137 MMOL/L — SIGNIFICANT CHANGE UP (ref 135–145)
VANCOMYCIN TROUGH SERPL-MCNC: 5.4 UG/ML — LOW (ref 10–20)
WBC # BLD: 4.8 K/UL — SIGNIFICANT CHANGE UP (ref 3.8–10.5)
WBC # FLD AUTO: 4.8 K/UL — SIGNIFICANT CHANGE UP (ref 3.8–10.5)

## 2019-02-28 PROCEDURE — 99292 CRITICAL CARE ADDL 30 MIN: CPT

## 2019-02-28 PROCEDURE — 99291 CRITICAL CARE FIRST HOUR: CPT

## 2019-02-28 RX ORDER — VANCOMYCIN HCL 1 G
1000 VIAL (EA) INTRAVENOUS EVERY 6 HOURS
Qty: 0 | Refills: 0 | Status: DISCONTINUED | OUTPATIENT
Start: 2019-02-28 | End: 2019-03-04

## 2019-02-28 RX ORDER — VANCOMYCIN HCL 1 G
1500 VIAL (EA) INTRAVENOUS EVERY 12 HOURS
Qty: 0 | Refills: 0 | Status: DISCONTINUED | OUTPATIENT
Start: 2019-02-28 | End: 2019-02-28

## 2019-02-28 RX ORDER — VANCOMYCIN HCL 1 G
1500 VIAL (EA) INTRAVENOUS ONCE
Qty: 0 | Refills: 0 | Status: COMPLETED | OUTPATIENT
Start: 2019-02-28 | End: 2019-02-28

## 2019-02-28 RX ORDER — VANCOMYCIN HCL 1 G
VIAL (EA) INTRAVENOUS
Qty: 0 | Refills: 0 | Status: DISCONTINUED | OUTPATIENT
Start: 2019-02-28 | End: 2019-02-28

## 2019-02-28 RX ORDER — ENOXAPARIN SODIUM 100 MG/ML
40 INJECTION SUBCUTANEOUS
Qty: 0 | Refills: 0 | Status: DISCONTINUED | OUTPATIENT
Start: 2019-03-01 | End: 2019-03-04

## 2019-02-28 RX ORDER — LABETALOL HCL 100 MG
10 TABLET ORAL ONCE
Qty: 0 | Refills: 0 | Status: COMPLETED | OUTPATIENT
Start: 2019-02-28 | End: 2019-02-28

## 2019-02-28 RX ORDER — LABETALOL HCL 100 MG
200 TABLET ORAL EVERY 8 HOURS
Qty: 0 | Refills: 0 | Status: DISCONTINUED | OUTPATIENT
Start: 2019-02-28 | End: 2019-03-02

## 2019-02-28 RX ORDER — ENOXAPARIN SODIUM 100 MG/ML
40 INJECTION SUBCUTANEOUS ONCE
Qty: 0 | Refills: 0 | Status: COMPLETED | OUTPATIENT
Start: 2019-02-28 | End: 2019-02-28

## 2019-02-28 RX ADMIN — LEVETIRACETAM 1000 MILLIGRAM(S): 250 TABLET, FILM COATED ORAL at 18:39

## 2019-02-28 RX ADMIN — Medication 200 MILLIGRAM(S): at 00:00

## 2019-02-28 RX ADMIN — POTASSIUM PHOSPHATE, MONOBASIC POTASSIUM PHOSPHATE, DIBASIC 62.5 MILLIMOLE(S): 236; 224 INJECTION, SOLUTION INTRAVENOUS at 00:21

## 2019-02-28 RX ADMIN — OXYCODONE HYDROCHLORIDE 5 MILLIGRAM(S): 5 TABLET ORAL at 00:12

## 2019-02-28 RX ADMIN — OXYCODONE HYDROCHLORIDE 10 MILLIGRAM(S): 5 TABLET ORAL at 10:02

## 2019-02-28 RX ADMIN — Medication 650 MILLIGRAM(S): at 18:39

## 2019-02-28 RX ADMIN — Medication 650 MILLIGRAM(S): at 12:20

## 2019-02-28 RX ADMIN — Medication 100 MILLIGRAM(S): at 05:47

## 2019-02-28 RX ADMIN — Medication 300 MILLIGRAM(S): at 06:13

## 2019-02-28 RX ADMIN — Medication 250 MILLIGRAM(S): at 23:34

## 2019-02-28 RX ADMIN — ENOXAPARIN SODIUM 40 MILLIGRAM(S): 100 INJECTION SUBCUTANEOUS at 22:44

## 2019-02-28 RX ADMIN — CHLORHEXIDINE GLUCONATE 1 APPLICATION(S): 213 SOLUTION TOPICAL at 21:19

## 2019-02-28 RX ADMIN — Medication 500 MICROGRAM(S): at 00:41

## 2019-02-28 RX ADMIN — PANTOPRAZOLE SODIUM 40 MILLIGRAM(S): 20 TABLET, DELAYED RELEASE ORAL at 12:21

## 2019-02-28 RX ADMIN — CEFEPIME 100 MILLIGRAM(S): 1 INJECTION, POWDER, FOR SOLUTION INTRAMUSCULAR; INTRAVENOUS at 13:01

## 2019-02-28 RX ADMIN — Medication 1 MILLIGRAM(S): at 05:47

## 2019-02-28 RX ADMIN — Medication 200 MILLIGRAM(S): at 13:01

## 2019-02-28 RX ADMIN — LEVETIRACETAM 1000 MILLIGRAM(S): 250 TABLET, FILM COATED ORAL at 05:47

## 2019-02-28 RX ADMIN — OXYCODONE HYDROCHLORIDE 10 MILLIGRAM(S): 5 TABLET ORAL at 15:30

## 2019-02-28 RX ADMIN — OXYCODONE HYDROCHLORIDE 10 MILLIGRAM(S): 5 TABLET ORAL at 16:39

## 2019-02-28 RX ADMIN — Medication 500 MICROGRAM(S): at 17:15

## 2019-02-28 RX ADMIN — Medication 200 MILLIGRAM(S): at 21:00

## 2019-02-28 RX ADMIN — Medication 10 MILLIGRAM(S): at 11:44

## 2019-02-28 RX ADMIN — Medication 10 MILLIGRAM(S): at 00:20

## 2019-02-28 RX ADMIN — OXYCODONE HYDROCHLORIDE 10 MILLIGRAM(S): 5 TABLET ORAL at 11:00

## 2019-02-28 RX ADMIN — Medication 250 MILLIGRAM(S): at 16:38

## 2019-02-28 RX ADMIN — Medication 500 MICROGRAM(S): at 05:16

## 2019-02-28 RX ADMIN — Medication 500 MICROGRAM(S): at 12:08

## 2019-02-28 RX ADMIN — CEFEPIME 100 MILLIGRAM(S): 1 INJECTION, POWDER, FOR SOLUTION INTRAMUSCULAR; INTRAVENOUS at 21:00

## 2019-02-28 RX ADMIN — Medication 1 MILLIGRAM(S): at 16:43

## 2019-02-28 RX ADMIN — CEFEPIME 100 MILLIGRAM(S): 1 INJECTION, POWDER, FOR SOLUTION INTRAMUSCULAR; INTRAVENOUS at 05:00

## 2019-02-28 RX ADMIN — Medication 250 MILLIGRAM(S): at 12:20

## 2019-02-28 NOTE — PHYSICAL THERAPY INITIAL EVALUATION ADULT - PERTINENT HX OF CURRENT PROBLEM, REHAB EVAL
59yo f initially admitted in mid Dec 2018 for ruptured cerebellar AVM s/p embolization and craniotomy for resection and  shunt. She was discharged to rehab in late Jan 2019. Two days prior to admit, pt had a generalized tonic clonic seizure witnessed by her sister as well as a fever. She was then brought to OSH CT. The patient reportedly had an LP that showed leukocytosis. Due to the suspicion of  shunt infection, the patient was transferred to [facility] for further management.

## 2019-02-28 NOTE — PROGRESS NOTE ADULT - SUBJECTIVE AND OBJECTIVE BOX
HPI:  HPI: 58yof initially admitted in mid Dec 2018 for ruptured cerebellar AVM s/p embolization and craniotomy for resection and  shunt. She was discharged to rehab in late Jan 2019. She has been doing well at rehab: walking 50 steps with walker. Two days prior, the patient had a generlized tonic clonic seizure witnessed by her sister as well as a fever. She was then brought to OSH CT. The patient reportedly had an LP that showed leukocytosis. Due to the suspicion of  shunt infection, the patient was transferred to [facility] for further management. In the ER, EO to pain, minimal mvmt x 4.    No o/n events.    VS, labs and images reviewed.    PHYSICAL EXAM:  trach'ed, on vent  EO to voice, keeps EO, PERRL, FC - opens mouth, wiggles toes, tries to show 2 fingers on LUE, LUE 3/5, BL LE HF 3/5.  regular, tachycardic  minimal ronchi R>L, decreased breath sounds  soft/nd abd  wwp ext

## 2019-02-28 NOTE — PROGRESS NOTE ADULT - ASSESSMENT
Cerebellar AVM status post VPS for hydrocephalus who returned for CNS infection status post externalization of shunt  - Vanco/cefepime  - SBP 90-160mmHg, continue BP meds  - Tremors: clonazepam  - F/u cultures  - Hydrocephalus: external ventricular drain in place, consider weaning to assess need for cerebrospinal fluid diversion  - Start chemoppx     45 minutes critical care time

## 2019-02-28 NOTE — OCCUPATIONAL THERAPY INITIAL EVALUATION ADULT - ADL RETRAINING, OT EVAL
GOAL: Patient will be mod A with UB dressing within 4 weeks GOAL: Pt will be mod A with toileting in 4 weeks

## 2019-02-28 NOTE — OCCUPATIONAL THERAPY INITIAL EVALUATION ADULT - ADDITIONAL COMMENTS
CTH: Suboccipital craniectomy with associated post op changes.. Small fluid collection sits at the craniectomy site. [place] embolization of superior vermian AVM with lucency identified in the cerebellum likely sequela  of prior hemorrhage at the level of the posterior fossa.MRH: New bilateral parieto-occipital vasogenic edema  unassociated with diffusion abnormality or enhancement likely represents posterior reversible encephalopathy syndrome. Other infectious/inflammatory processes aren't completely excluded. No acute infarct. Postsurgical changes of the posterior fossa with embolization material for AVM. s/p VPS shunt removal 2/25

## 2019-02-28 NOTE — OCCUPATIONAL THERAPY INITIAL EVALUATION ADULT - IMPAIRMENTS CONTRIBUTING IMPAIRED BED MOBILITY, REHAB EVAL
decreased strength/impaired balance/impaired motor control/abnormal muscle tone/cognition/impaired coordination/impaired postural control

## 2019-02-28 NOTE — PHYSICAL THERAPY INITIAL EVALUATION ADULT - BALANCE DISTURBANCE, IDENTIFIED IMPAIRMENT CONTRIBUTE, REHAB EVAL
TBA impaired coordination/impaired motor control/decreased strength/impaired postural control/decreased ROM/abnormal muscle tone/decreased sensation

## 2019-02-28 NOTE — PHYSICAL THERAPY INITIAL EVALUATION ADULT - IMPAIRMENTS FOUND, PT EVAL
muscle strength/tone/gross motor/posture/ROM/gait, locomotion, and balance/fine motor/aerobic capacity/endurance/sensory integrity

## 2019-02-28 NOTE — PHARMACOTHERAPY INTERVENTION NOTE - COMMENTS
Trough level    , recommended to increase vancomycin 1500mg twice daily to 1000 mg every 6 hours and check levels before 4th dose to empirically cover meningitis Trough level 5.4 on 1250 mg twice daily vancomycin , recommended to increase vancomycin 1500mg twice daily to 1000 mg every 6 hours and check levels before 4th dose to empirically cover meningitis

## 2019-02-28 NOTE — PHYSICAL THERAPY INITIAL EVALUATION ADULT - STRENGTHENING, PT EVAL
GOAL: Pt will improve B UE/LE strength by 1/2 grade to improve level of independence with mobility skills and ADLs in 2 weeks.

## 2019-02-28 NOTE — PROGRESS NOTE ADULT - SUBJECTIVE AND OBJECTIVE BOX
Visit Summary: Patient seen and evaluated at bedside.    Overnight Events: none    Exam:  T(C): 37.3 (02-28-19 @ 00:00), Max: 38.4 (02-27-19 @ 19:00)  HR: 98 (02-28-19 @ 00:42) (87 - 111)  BP: --  RR: 20 (02-28-19 @ 00:00) (16 - 22)  SpO2: 100% (02-28-19 @ 00:42) (99% - 100%)  Wt(kg): --    Trached, EO, tracks,  pupils 3mm reac, FC, Lt side spont, b/l UE loc, BLE wiggles toes                         9.5    5.2   )-----------( 153      ( 27 Feb 2019 22:39 )             26.8     02-27    138  |  97  |  6<L>  ----------------------------<  108<H>  3.7   |  31  |  <0.30<L>    Ca    8.7      27 Feb 2019 22:39  Phos  2.4     02-27  Mg     2.0     02-27

## 2019-02-28 NOTE — OCCUPATIONAL THERAPY INITIAL EVALUATION ADULT - LIVES WITH, PROFILE
Pt lives alone in a private home, prior to first hospitalization pt was independent with ADLs and ambulation, since previous admission pt has been in Acute Rehab where she has required assistance for ADLs and ambulation with RW

## 2019-02-28 NOTE — PHYSICAL THERAPY INITIAL EVALUATION ADULT - IMPAIRMENTS CONTRIBUTING IMPAIRED BED MOBILITY, REHAB EVAL
impaired balance/decreased strength/cognition/impaired coordination/decreased flexibility/abnormal muscle tone/impaired motor control/impaired postural control/decreased ROM/decreased sensation

## 2019-02-28 NOTE — PHYSICAL THERAPY INITIAL EVALUATION ADULT - GENERAL OBSERVATIONS, REHAB EVAL
Pt received semi-supine in bed, (+) trach to vent, EVD, PEG, rectal tube, B wrist restraints, IV, NAD. Pt lethargic, not opening eyes to command.

## 2019-02-28 NOTE — OCCUPATIONAL THERAPY INITIAL EVALUATION ADULT - VISUAL ACUITY
Pt unable to follow commands to open eyes, pt with notable head tremor and eye flutter when asked to open eyes

## 2019-02-28 NOTE — PROGRESS NOTE ADULT - ASSESSMENT
58y female with SLE, cerebellar AVM and bleed in Dec, s/p embolization, AVM resection, post fossa decompression, VPS, trach/PEG,   Persistent, "central" fever, but ultimately, fever resolved, pt improved, sent to rehab.    She apparently developed generalized Sz, fever and sent to [facility] ICU, received empiric Vanco and Cefepime, transferred here.    On TC, lethargic, without purposeful response, Tmx 103, but WBC/diff normal, CXR clear  CSF shows >500 WBCs, majority Polys, thus difficult to exclude ventriculitis, ?infected VPS- no org seen  s/p  shunt removal    Plan:  cont empiric Vanco and Cefepime for CNS coverage, pending further culture results  Follow temps and CBC/diff   D/w NSICU Team  records from [facility], any cultures reports, antimicrobials remain empiric  I left message with Dr Mario Handley, ID physician at [facility] [phone] to review any culture results, awaiting call back  d/w ICU team 58y female with SLE, cerebellar AVM and bleed in Dec, s/p embolization, AVM resection, post fossa decompression, VPS, trach/PEG,   Persistent, "central" fever, but ultimately, fever resolved, pt improved, sent to rehab.    She apparently developed generalized Sz, fever and sent to [facility] ICU, received empiric Vanco and Cefepime, transferred here.    On TC, lethargic, without purposeful response, Tmx 103, but WBC/diff normal, CXR clear  CSF shows >500 WBCs, majority Polys, thus difficult to exclude ventriculitis, ?infected VPS- no org seen  s/p  shunt removal, EVD placement    Plan:  cont empiric Vanco and Cefepime for CNS coverage, pending further culture results  Follow temps and CBC/diff   D/w NSICU Team  d/w Dr Mario Handley, ID physician at [facility] [phone] yesterday, cultures at [facility] all finalized and negative as per Dr Handley  d/w ICU team

## 2019-02-28 NOTE — OCCUPATIONAL THERAPY INITIAL EVALUATION ADULT - DIAGNOSIS, OT EVAL
Pt demonstrated decreased strength, cognition impacting pt's ability to participate in functional mobility and ADLs.

## 2019-02-28 NOTE — PROGRESS NOTE ADULT - ASSESSMENT
58F s/p embo and rsxn of ruptured AVM with VPS placement. Neurologically decline from baseline s/p VPS removal and EVD placement today    - cont supportive care per icu  - f/u CSF cx NGTD  - Cont EVD  - cont neurochecks 170.18

## 2019-02-28 NOTE — PHYSICAL THERAPY INITIAL EVALUATION ADULT - DIAGNOSIS, PT EVAL
Decr. functional mobility 2/2 decr. strength, AROM, motor/postural control; cognitive impairment, abnormal tone

## 2019-02-28 NOTE — OCCUPATIONAL THERAPY INITIAL EVALUATION ADULT - MD ORDER
OT Evaluation   Increased as Tolerated OT Evaluation   Increased as Tolerated  OT functional evaluation 3/4

## 2019-02-28 NOTE — OCCUPATIONAL THERAPY INITIAL EVALUATION ADULT - PERTINENT HX OF CURRENT PROBLEM, REHAB EVAL
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 She was discharged to rehab in late Jan 2019. She has been doing well at rehab: walking 50 steps with walker. Two days prior, the patient had a generlized tonic clonic seizure witnessed by her sister as well as a fever. She was then brought to OSH CT. The patient reportedly had an LP that showed leukocytosis.

## 2019-02-28 NOTE — PHYSICAL THERAPY INITIAL EVALUATION ADULT - ADDITIONAL COMMENTS
Pt admit from acute rehab where she was ambulating with rolling walker and assist. Prior, pt living alone in a private residence in [place], [place]. Sister reports 5 stairs to enter and no stairs inside.

## 2019-02-28 NOTE — OCCUPATIONAL THERAPY INITIAL EVALUATION ADULT - GENERAL OBSERVATIONS, REHAB EVAL
Pt received semisupine in bed +IVL, +trach to vent, +tele, +pulse ox, +BP cuff, + A line, +B wrist restraint, +EVD

## 2019-02-28 NOTE — OCCUPATIONAL THERAPY INITIAL EVALUATION ADULT - PLANNED THERAPY INTERVENTIONS, OT EVAL
transfer training/strengthening/ADL retraining/balance training/bed mobility training/cognitive, visual perceptual/motor coordination training/neuromuscular re-education

## 2019-02-28 NOTE — PROGRESS NOTE ADULT - ASSESSMENT
57yo woman s/p AVM resection, RTOR for posterior fossa decompression, trach/PEG, VPS in 12/2018-1/2019  now back for an episode of GTC seizure and AMS  Concern for VPS infection.  S/p externalization of VPS.    Neuro:  neurochecks q2hr  Precedex PRN  Keppra 1g bid;   cont home meds  CSF cx f/u,    EVD challenge    Card:  -150    Pulm:  on vent - CPAP as tolerated, will try to wean to trach collar  ABG in 1 hour    GI:  tube feeding    Renal:  IVL    Endo:  euglycemia    Heme:  SCDs, lovenox    ID:  UA+, cont cefepime (was on vanc/cefepime at OSH) for UTI at admission  f/u ucx, bcx, CSF cultures  cont cefepime + vanco  will need PICC for long-term ABx    full code  ICU    at risk for deterioration/death due to seizures, VPS infection, septic shock  critical care time 45min

## 2019-02-28 NOTE — PROGRESS NOTE ADULT - SUBJECTIVE AND OBJECTIVE BOX
Tolerated CPAP for some time during day.    Trached, eyes open, tracks, pupils reactive, follows, left side spontaneous, b/l UE localizes, BLE wiggles toes

## 2019-02-28 NOTE — PROGRESS NOTE ADULT - SUBJECTIVE AND OBJECTIVE BOX
CC: f/u for ? ventriculitis    Patient remains in ICU critically ill, vented via trach    REVIEW OF SYSTEMS: limited  All other review of systems negative (Comprehensive ROS)    Antimicrobials Day # 4  cefepime   IVPB 2000 milliGRAM(s) IV Intermittent every 8 hours  vancomycin  IVPB 1000 milliGRAM(s) IV Intermittent every 12 hours         Other Medications Reviewed    ICU Vital Signs Last 24 Hrs  T(C): 37.7 (2019 11:00), Max: 38.3 (2019 02:00)  T(F): 99.9 (2019 11:00), Max: 100.9 (2019 02:00)  HR: 106 (:) (87 - 126)  BP: --  BP(mean): --  ABP: 140/82 (2019 11:00) (96/56 - 168/96)  ABP(mean): 107 (2019 11:) (75 - 126)  RR: 18 (2019 11:) (16 - 20)  SpO2: 100% (2019 11:00) (100% - 100%)        PHYSICAL EXAM:  General: vented via trach  Eyes:  anicteric, no conjunctival injection, no discharge  Oropharynx: no lesions or injection 	  Neck: supple, without adenopathy  Lungs: clear to auscultation  Heart: regular rate and rhythm; no murmur, rubs or gallops  Abdomen: soft, nondistended, nontender, without mass or organomegaly  Skin: no lesions  Extremities: no clubbing, cyanosis, or edema  Neurologic: lethargic   LAB RESULTS:                                                10.2   6.1   )-----------( 183      ( 2019 22:33 )             29.6       137  |  98  |  6<L>  ----------------------------<  113<H>  3.7   |  29  |  0.32<L>    Ca    8.6      2019 22:33  Phos  2.2       Mg     1.8                   Urinalysis Basic - ( 2019 23:38 )    Color: Yellow / Appearance: Slightly Turbid / S.021 / pH: x  Gluc: x / Ketone: Negative  / Bili: Negative / Urobili: Negative   Blood: x / Protein: 30 mg/dL / Nitrite: Positive   Leuk Esterase: Large / RBC: 4 /hpf / WBC 42 /HPF   Sq Epi: x / Non Sq Epi: 1 /hpf / Bacteria: Many        MICROBIOLOGY REVIEWED:    RADIOLOGY REVIEWED: CC: f/u for ? ventriculitis    Patient remains in ICU critically ill, vented via trach    REVIEW OF SYSTEMS: limited, remains febrile 101.8  All other review of systems negative (Comprehensive ROS)    Antimicrobials Day # 5  cefepime   IVPB 2000 milliGRAM(s) IV Intermittent every 8 hours  vancomycin  IVPB 1000 milliGRAM(s) IV Intermittent every 12 hours         Other Medications Reviewed    ICU Vital Signs Last 24 Hrs  T(C): 38.3 (2019 14:00), Max: 38.8 (2019 12:00)  T(F): 100.9 (2019 14:00), Max: 101.8 (2019 12:00)  HR: 103 (:00) (93 - 111)  BP: --  BP(mean): --  ABP: 122/74 (2019 14:00) (122/74 - 169/100)  ABP(mean): 95 (2019 14:00) (95 - 130)  RR: 26 (2019 14:00) (16 - 26)  SpO2: 100% (2019 14:00) (99% - 100%)          PHYSICAL EXAM:  General: vented via trach  Eyes:  anicteric, no conjunctival injection, no discharge  Oropharynx: no lesions or injection 	  Neck: supple, without adenopathy  Lungs: clear to auscultation anterior chest  Heart: regular rate and rhythm; no murmur, rubs or gallops  Abdomen: soft, nondistended, nontender, without mass or organomegaly  Skin: no lesions  Extremities: no clubbing, cyanosis, or edema  Neurologic: lethargic   LAB RESULTS:                                                         9.5    5.2   )-----------( 153      ( 2019 22:39 )             26.8       138  |  97  |  6<L>  ----------------------------<  108<H>  3.7   |  31  |  <0.30<L>    Ca    8.7      2019 22:39  Phos  2.4       Mg     2.0                       Urinalysis Basic - ( 2019 23:38 )    Color: Yellow / Appearance: Slightly Turbid / S.021 / pH: x  Gluc: x / Ketone: Negative  / Bili: Negative / Urobili: Negative   Blood: x / Protein: 30 mg/dL / Nitrite: Positive   Leuk Esterase: Large / RBC: 4 /hpf / WBC 42 /HPF   Sq Epi: x / Non Sq Epi: 1 /hpf / Bacteria: Many        MICROBIOLOGY REVIEWED:    RADIOLOGY REVIEWED:

## 2019-03-01 LAB
CULTURE RESULTS: SIGNIFICANT CHANGE UP
GAS PNL BLDA: SIGNIFICANT CHANGE UP
SPECIMEN SOURCE: SIGNIFICANT CHANGE UP
VANCOMYCIN TROUGH SERPL-MCNC: 14.1 UG/ML — SIGNIFICANT CHANGE UP (ref 10–20)

## 2019-03-01 PROCEDURE — 70450 CT HEAD/BRAIN W/O DYE: CPT | Mod: 26

## 2019-03-01 PROCEDURE — 99292 CRITICAL CARE ADDL 30 MIN: CPT

## 2019-03-01 PROCEDURE — 99291 CRITICAL CARE FIRST HOUR: CPT

## 2019-03-01 RX ADMIN — CHLORHEXIDINE GLUCONATE 1 APPLICATION(S): 213 SOLUTION TOPICAL at 21:19

## 2019-03-01 RX ADMIN — Medication 500 MICROGRAM(S): at 13:32

## 2019-03-01 RX ADMIN — Medication 100 MILLIGRAM(S): at 05:17

## 2019-03-01 RX ADMIN — PANTOPRAZOLE SODIUM 40 MILLIGRAM(S): 20 TABLET, DELAYED RELEASE ORAL at 11:42

## 2019-03-01 RX ADMIN — CEFEPIME 100 MILLIGRAM(S): 1 INJECTION, POWDER, FOR SOLUTION INTRAMUSCULAR; INTRAVENOUS at 13:00

## 2019-03-01 RX ADMIN — Medication 1 MILLIGRAM(S): at 18:48

## 2019-03-01 RX ADMIN — Medication 250 MILLIGRAM(S): at 18:48

## 2019-03-01 RX ADMIN — LEVETIRACETAM 1000 MILLIGRAM(S): 250 TABLET, FILM COATED ORAL at 19:26

## 2019-03-01 RX ADMIN — Medication 650 MILLIGRAM(S): at 16:15

## 2019-03-01 RX ADMIN — CEFEPIME 100 MILLIGRAM(S): 1 INJECTION, POWDER, FOR SOLUTION INTRAMUSCULAR; INTRAVENOUS at 05:17

## 2019-03-01 RX ADMIN — Medication 500 MICROGRAM(S): at 00:06

## 2019-03-01 RX ADMIN — Medication 200 MILLIGRAM(S): at 05:17

## 2019-03-01 RX ADMIN — CEFEPIME 100 MILLIGRAM(S): 1 INJECTION, POWDER, FOR SOLUTION INTRAMUSCULAR; INTRAVENOUS at 21:19

## 2019-03-01 RX ADMIN — Medication 650 MILLIGRAM(S): at 16:45

## 2019-03-01 RX ADMIN — LEVETIRACETAM 1000 MILLIGRAM(S): 250 TABLET, FILM COATED ORAL at 05:30

## 2019-03-01 RX ADMIN — Medication 1 MILLIGRAM(S): at 05:17

## 2019-03-01 RX ADMIN — Medication 250 MILLIGRAM(S): at 05:18

## 2019-03-01 RX ADMIN — Medication 250 MILLIGRAM(S): at 11:42

## 2019-03-01 RX ADMIN — ENOXAPARIN SODIUM 40 MILLIGRAM(S): 100 INJECTION SUBCUTANEOUS at 18:48

## 2019-03-01 RX ADMIN — Medication 250 MILLIGRAM(S): at 23:47

## 2019-03-01 RX ADMIN — Medication 200 MILLIGRAM(S): at 13:00

## 2019-03-01 RX ADMIN — Medication 500 MICROGRAM(S): at 05:38

## 2019-03-01 NOTE — PROGRESS NOTE ADULT - SUBJECTIVE AND OBJECTIVE BOX
Visit Summary: Patient seen and evaluated at bedside.    Overnight Events: none    Exam:  T(C): 37.6 (03-01-19 @ 01:00), Max: 38.8 (02-28-19 @ 12:00)  HR: 92 (03-01-19 @ 01:00) (92 - 109)  BP: --  RR: 18 (03-01-19 @ 01:00) (14 - 27)  SpO2: 100% (03-01-19 @ 01:00) (100% - 100%)  Wt(kg): --    Trached, EO, tracks,  pupils 3mm reac, FC, Lt side spont, b/l UE loc, BLE wiggles toes                         9.4    4.8   )-----------( 179      ( 28 Feb 2019 21:26 )             26.9     02-28    137  |  95<L>  |  6<L>  ----------------------------<  132<H>  4.0   |  35<H>  |  0.31<L>    Ca    8.7      28 Feb 2019 21:26  Phos  2.5     02-28  Mg     1.9     02-28

## 2019-03-01 NOTE — PROGRESS NOTE ADULT - SUBJECTIVE AND OBJECTIVE BOX
HPI:  HPI: 58yof initially admitted in mid Dec 2018 for ruptured cerebellar AVM s/p embolization and craniotomy for resection and  shunt. She was discharged to rehab in late Jan 2019. She has been doing well at rehab: walking 50 steps with walker. Two days prior, the patient had a generlized tonic clonic seizure witnessed by her sister as well as a fever. She was then brought to OSH CT. The patient reportedly had an LP that showed leukocytosis. Due to the suspicion of  shunt infection, the patient was transferred to [facility] for further management. In the ER, EO to pain, minimal mvmt x 4.    No o/n events.    VS, labs and images reviewed.    PHYSICAL EXAM:  trach'ed, on vent  EO to voice, keeps EO, PERRL, FC - opens mouth, wiggles toes, tries to show 2 fingers on LUE, LUE 3/5, BL LE HF 3/5.  regular, tachycardic  minimal ronchi R>L, decreased breath sounds  soft/nd abd  wwp ext

## 2019-03-01 NOTE — PROGRESS NOTE ADULT - SUBJECTIVE AND OBJECTIVE BOX
CC: f/u for ventriculitis    Patient reports nothing nonverbal    REVIEW OF SYSTEMS:  All other review of systems cannot get (Comprehensive ROS)    Antimicrobials Day #  :6/10  cefepime   IVPB 2000 milliGRAM(s) IV Intermittent every 8 hours  vancomycin  IVPB 1000 milliGRAM(s) IV Intermittent every 6 hours    Other Medications Reviewed    T(F): 100.4 (03-01-19 @ 16:00), Max: 101.1 (02-28-19 @ 19:00)  HR: 94 (03-01-19 @ 16:00)  BP: --  RR: 20 (03-01-19 @ 16:00)  SpO2: 100% (03-01-19 @ 16:00)  Wt(kg): --    PHYSICAL EXAM:  General: on vent, no acute distress, evd in place  Eyes:  anicteric, no conjunctival injection, no discharge  Oropharynx: no lesions or injection 	  Neck: trach without adenopathy  Lungs: clear to auscultation  Heart: regular rate and rhythm; no murmur, rubs or gallops  Abdomen: soft, nondistended, nontender, without mass or organomegaly  Skin: no lesions  Extremities: no clubbing, cyanosis, or edema  Neurologic: opens eyes to stimuli  back of head no bogginess    LAB RESULTS:                        9.4    4.8   )-----------( 179      ( 28 Feb 2019 21:26 )             26.9     02-28    137  |  95<L>  |  6<L>  ----------------------------<  132<H>  4.0   |  35<H>  |  0.31<L>    Ca    8.7      28 Feb 2019 21:26  Phos  2.5     02-28  Mg     1.9     02-28          MICROBIOLOGY:  RECENT CULTURES:  02-27 @ 10:30 .Blood Blood     No growth to date.      02-27 @ 10:19 Bronch Wash Combicath  trap     No growth at 48 hours    No polymorphonuclear cells seen per low power field  No squamous epithelial cells per low power field  No organisms seen per oil power field    02-26 @ 03:16 .Catheter     No growth      02-26 @ 01:37 Skin proximal catheter     No growth      02-26 @ 01:13 .CSF CSF     No growth    No polymorphonuclear cells seen  No organisms seen  by cytocentrifuge    02-25 @ 01:40 .Sputum Sputum     Normal Respiratory Tori present    Numerous polymorphonuclear leukocytes per low power field  Numerous Squamous epithelial cells per low power field  Rare Gram positive cocci in pairs per oil power field  Rare Gram Variable Rods per oil power field        RADIOLOGY REVIEWED:  < from: CT Head No Cont (03.01.19 @ 09:00) >  EXAM:  CT BRAIN                            PROCEDURE DATE:  03/01/2019            INTERPRETATION:  History: Seizures. Cerebellar AVM. EVD clamp trial.    Multiple axial sections were performed from base of skull to vertex   without contrast enhancement.    This exam is compared with prior noncontrast head CT performed on   3/26/2019    Postop changes compatible with suboccipital craniectomy is again seen.   There is evidence of embolic material and clips noted involving the   posterior fossa region. These findings appear unchanged.    There is extra-axial collection again seen in the postop region which   measures approximately 2.5 cm widest diameter and previously measured   approximately 2.3 cm widest diameter. 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    Right frontal shunt catheter with surrounding edema is again identified   and unchanged in position.    The size and configuration the ventricles appear unchanged.    Evaluation of the osseous structures with appropriate window aside from   postop changes.    Extracalvarial soft tissue swelling staples seen postop region.    Minimal nodular mucosal thickening seen in the left maxillary sinus.    Both mastoid and middle ear regions appear clear.    Impression: No significant change allowing for differences technique.    < end of copied text >  < from: MR Head w/wo IV Cont (02.25.19 @ 00:26) >    Impression:    New bilateral parieto-occipital vasogenic edema  unassociated with   diffusion abnormality or enhancement likely represents posterior   reversible encephalopathy syndrome. Other infectious/inflammatory   processes aren't completely excluded.  No acute infarct.  Postsurgical changes of the posterior fossa with embolization material   for AVM.    < end of copied text >      Assessment:  Patient with cerebellar avm rupture, s/p resection, s/p suboccipital craniectomy, s/p vps, peg and trach went to rehab in Jan and by report was improving but had a seizure about a week ago, went to [facility], by report LP with neutrophilic pleocytosis but cultures neg. Had vps removed, evd placed. Cultures negative to date. Pleocytosis in csf improved. has PRES from seizure and still on vent and encephalopathic. Some low grade fevers but not nearly as high as had been.   Plan:  would treat empirically for ventriculitis with vanco and cefepime for 4 more days  would doppler legs  f/u latest cultures  favor repeat mri head if fever and poor ms persist  supportive care per nscu

## 2019-03-01 NOTE — PROGRESS NOTE ADULT - SUBJECTIVE AND OBJECTIVE BOX
Tolerated CPAP for some time during day.    Trached, eyes open, tracks, pupils reactive, follows, left side spontaneous, b/l UE localizes, BLE wiggles toes Tolerated CPAP for some time during day.    Trached, eyes open, tracks, pupils reactive, follows, left side spontaneous, b/l UE localizes, intermittent BLE wiggles toes

## 2019-03-01 NOTE — PROGRESS NOTE ADULT - ASSESSMENT
57yo woman s/p AVM resection, RTOR for posterior fossa decompression, trach/PEG, VPS in 12/2018-1/2019  now back for an episode of GTC seizure and AMS  Concern for VPS infection.  S/p externalization of VPS.    Neuro:  neurochecks q2hr  EVD clamped - Cth in 48 hrs, on 03/03  Keppra 1g bid;   cont home meds    Card:  -150    Pulm:  on vent - CPAP as tolerated, will try to wean to trach collar  ABG in 1 hour    GI:  tube feeding    Renal:  IVL    Endo:  euglycemia    Heme:  SCDs, lovenox    ID:  cont cefepime + vanco  will need PICC for long-term ABx    full code  ICU    at risk for deterioration/death due to seizures, VPS infection, septic shock  critical care time 45min

## 2019-03-01 NOTE — PROGRESS NOTE ADULT - ASSESSMENT
58F s/p embo and rsxn of ruptured AVM with VPS placement. Neurologically decline from baseline s/p VPS removal and EVD placement today    - cont supportive care per icu  - f/u CSF cx NGTD  - Cont EVD, clamp trial in AM  - cont neurochecks

## 2019-03-01 NOTE — PROGRESS NOTE ADULT - ASSESSMENT
Cerebellar AVM status post VPS for hydrocephalus who returned for CNS infection status post externalization of shunt  - Vanco/cefepime  - SBP 90-160mmHg, continue BP meds  - Tremors: clonazepam  - F/u cultures  - Hydrocephalus: external ventricular drain in place, consider weaning to assess need for cerebrospinal fluid diversion  - Start chemoppx     45 minutes critical care time Cerebellar AVM status post VPS for hydrocephalus who returned for CNS infection status post externalization of shunt  - Vanco/cefepime  - SBP 90-160mmHg, continue BP meds  - Tremors: clonazepam  - F/u cultures  - Hydrocephalus: EVD clamped   - Start chemoppx     35 minutes critical care time

## 2019-03-02 LAB
ANION GAP SERPL CALC-SCNC: 13 MMOL/L — SIGNIFICANT CHANGE UP (ref 5–17)
BUN SERPL-MCNC: 7 MG/DL — SIGNIFICANT CHANGE UP (ref 7–23)
CALCIUM SERPL-MCNC: 9 MG/DL — SIGNIFICANT CHANGE UP (ref 8.4–10.5)
CHLORIDE SERPL-SCNC: 94 MMOL/L — LOW (ref 96–108)
CO2 SERPL-SCNC: 25 MMOL/L — SIGNIFICANT CHANGE UP (ref 22–31)
CREAT SERPL-MCNC: 0.3 MG/DL — LOW (ref 0.5–1.3)
GAS PNL BLDA: SIGNIFICANT CHANGE UP
GLUCOSE SERPL-MCNC: 158 MG/DL — HIGH (ref 70–99)
MAGNESIUM SERPL-MCNC: 1.9 MG/DL — SIGNIFICANT CHANGE UP (ref 1.6–2.6)
PHOSPHATE SERPL-MCNC: 2.6 MG/DL — SIGNIFICANT CHANGE UP (ref 2.5–4.5)
POTASSIUM SERPL-MCNC: 4.1 MMOL/L — SIGNIFICANT CHANGE UP (ref 3.5–5.3)
POTASSIUM SERPL-SCNC: 4.1 MMOL/L — SIGNIFICANT CHANGE UP (ref 3.5–5.3)
PROCALCITONIN SERPL-MCNC: 0.14 NG/ML — HIGH (ref 0.02–0.1)
SODIUM SERPL-SCNC: 132 MMOL/L — LOW (ref 135–145)

## 2019-03-02 PROCEDURE — 99291 CRITICAL CARE FIRST HOUR: CPT

## 2019-03-02 PROCEDURE — 71045 X-RAY EXAM CHEST 1 VIEW: CPT | Mod: 26

## 2019-03-02 PROCEDURE — 99292 CRITICAL CARE ADDL 30 MIN: CPT

## 2019-03-02 RX ORDER — ACETAMINOPHEN 500 MG
1000 TABLET ORAL ONCE
Qty: 0 | Refills: 0 | Status: COMPLETED | OUTPATIENT
Start: 2019-03-02 | End: 2019-03-02

## 2019-03-02 RX ORDER — LABETALOL HCL 100 MG
300 TABLET ORAL EVERY 8 HOURS
Qty: 0 | Refills: 0 | Status: DISCONTINUED | OUTPATIENT
Start: 2019-03-02 | End: 2019-03-02

## 2019-03-02 RX ORDER — FENTANYL CITRATE 50 UG/ML
25 INJECTION INTRAVENOUS ONCE
Qty: 0 | Refills: 0 | Status: DISCONTINUED | OUTPATIENT
Start: 2019-03-02 | End: 2019-03-02

## 2019-03-02 RX ORDER — SODIUM CHLORIDE 9 MG/ML
500 INJECTION INTRAMUSCULAR; INTRAVENOUS; SUBCUTANEOUS ONCE
Qty: 0 | Refills: 0 | Status: COMPLETED | OUTPATIENT
Start: 2019-03-02 | End: 2019-03-02

## 2019-03-02 RX ORDER — LABETALOL HCL 100 MG
5 TABLET ORAL ONCE
Qty: 0 | Refills: 0 | Status: COMPLETED | OUTPATIENT
Start: 2019-03-02 | End: 2019-03-02

## 2019-03-02 RX ORDER — SODIUM CHLORIDE 5 G/100ML
1000 INJECTION, SOLUTION INTRAVENOUS
Qty: 0 | Refills: 0 | Status: DISCONTINUED | OUTPATIENT
Start: 2019-03-02 | End: 2019-03-03

## 2019-03-02 RX ORDER — LABETALOL HCL 100 MG
10 TABLET ORAL ONCE
Qty: 0 | Refills: 0 | Status: COMPLETED | OUTPATIENT
Start: 2019-03-02 | End: 2019-03-02

## 2019-03-02 RX ORDER — LABETALOL HCL 100 MG
400 TABLET ORAL EVERY 8 HOURS
Qty: 0 | Refills: 0 | Status: DISCONTINUED | OUTPATIENT
Start: 2019-03-02 | End: 2019-03-06

## 2019-03-02 RX ORDER — HYDRALAZINE HCL 50 MG
5 TABLET ORAL ONCE
Qty: 0 | Refills: 0 | Status: COMPLETED | OUTPATIENT
Start: 2019-03-02 | End: 2019-03-02

## 2019-03-02 RX ADMIN — LEVETIRACETAM 1000 MILLIGRAM(S): 250 TABLET, FILM COATED ORAL at 05:57

## 2019-03-02 RX ADMIN — PANTOPRAZOLE SODIUM 40 MILLIGRAM(S): 20 TABLET, DELAYED RELEASE ORAL at 12:51

## 2019-03-02 RX ADMIN — LEVETIRACETAM 1000 MILLIGRAM(S): 250 TABLET, FILM COATED ORAL at 18:22

## 2019-03-02 RX ADMIN — Medication 5 MILLIGRAM(S): at 18:15

## 2019-03-02 RX ADMIN — Medication 1 MILLIGRAM(S): at 05:58

## 2019-03-02 RX ADMIN — Medication 400 MILLIGRAM(S): at 21:12

## 2019-03-02 RX ADMIN — Medication 1000 MILLIGRAM(S): at 03:00

## 2019-03-02 RX ADMIN — Medication 250 MILLIGRAM(S): at 12:52

## 2019-03-02 RX ADMIN — Medication 1000 MILLIGRAM(S): at 22:00

## 2019-03-02 RX ADMIN — CEFEPIME 100 MILLIGRAM(S): 1 INJECTION, POWDER, FOR SOLUTION INTRAMUSCULAR; INTRAVENOUS at 14:14

## 2019-03-02 RX ADMIN — Medication 200 MILLIGRAM(S): at 05:57

## 2019-03-02 RX ADMIN — Medication 500 MICROGRAM(S): at 13:49

## 2019-03-02 RX ADMIN — Medication 250 MILLIGRAM(S): at 18:22

## 2019-03-02 RX ADMIN — Medication 400 MILLIGRAM(S): at 13:01

## 2019-03-02 RX ADMIN — CEFEPIME 100 MILLIGRAM(S): 1 INJECTION, POWDER, FOR SOLUTION INTRAMUSCULAR; INTRAVENOUS at 05:56

## 2019-03-02 RX ADMIN — Medication 1 MILLIGRAM(S): at 18:25

## 2019-03-02 RX ADMIN — SODIUM CHLORIDE 500 MILLILITER(S): 9 INJECTION INTRAMUSCULAR; INTRAVENOUS; SUBCUTANEOUS at 11:00

## 2019-03-02 RX ADMIN — Medication 5 MILLIGRAM(S): at 02:15

## 2019-03-02 RX ADMIN — CHLORHEXIDINE GLUCONATE 1 APPLICATION(S): 213 SOLUTION TOPICAL at 21:13

## 2019-03-02 RX ADMIN — ENOXAPARIN SODIUM 40 MILLIGRAM(S): 100 INJECTION SUBCUTANEOUS at 18:53

## 2019-03-02 RX ADMIN — Medication 500 MICROGRAM(S): at 01:24

## 2019-03-02 RX ADMIN — Medication 500 MICROGRAM(S): at 17:04

## 2019-03-02 RX ADMIN — Medication 400 MILLIGRAM(S): at 02:30

## 2019-03-02 RX ADMIN — Medication 10 MILLIGRAM(S): at 10:45

## 2019-03-02 RX ADMIN — Medication 500 MICROGRAM(S): at 05:59

## 2019-03-02 RX ADMIN — SODIUM CHLORIDE 50 MILLILITER(S): 5 INJECTION, SOLUTION INTRAVENOUS at 22:53

## 2019-03-02 RX ADMIN — CEFEPIME 100 MILLIGRAM(S): 1 INJECTION, POWDER, FOR SOLUTION INTRAMUSCULAR; INTRAVENOUS at 21:12

## 2019-03-02 RX ADMIN — Medication 5 MILLIGRAM(S): at 11:45

## 2019-03-02 RX ADMIN — Medication 250 MILLIGRAM(S): at 05:56

## 2019-03-02 NOTE — PROGRESS NOTE ADULT - SUBJECTIVE AND OBJECTIVE BOX
Visit Summary: Patient seen and evaluated at bedside.    Overnight Events: none    Exam:  T(C): 37.2 (03-02-19 @ 03:00), Max: 38.1 (03-01-19 @ 06:00)  HR: 89 (03-02-19 @ 03:00) (85 - 99)  BP: --  RR: 18 (03-02-19 @ 03:00) (16 - 24)  SpO2: 100% (03-02-19 @ 03:00) (69% - 100%)  Wt(kg): --    Trached, EO, tracks,  pupils 3mm reac, BLE wiggles toes, Lt spont, R trace / no movement                        9.4    4.8   )-----------( 179      ( 28 Feb 2019 21:26 )             26.9     02-28    137  |  95<L>  |  6<L>  ----------------------------<  132<H>  4.0   |  35<H>  |  0.31<L>    Ca    8.7      28 Feb 2019 21:26  Phos  2.5     02-28  Mg     1.9     02-28

## 2019-03-02 NOTE — PROGRESS NOTE ADULT - SUBJECTIVE AND OBJECTIVE BOX
CC: f/u for ventriculitis    Patient reports nothing nonverbal    REVIEW OF SYSTEMS: unable to obtain  All other review of systems (Comprehensive ROS)    Antimicrobials Day #  :7/10  cefepime   IVPB 2000 milliGRAM(s) IV Intermittent every 8 hours  vancomycin  IVPB 1000 milliGRAM(s) IV Intermittent every 6 hours    Other Medications Reviewed  Vital Signs Last 24 Hrs  T(C): 37 (02 Mar 2019 07:00), Max: 38 (01 Mar 2019 16:00)  T(F): 98.6 (02 Mar 2019 07:00), Max: 100.4 (01 Mar 2019 16:00)  HR: 87 (02 Mar 2019 10:16) (7 - 97)  BP: --  BP(mean): --  RR: 15 (02 Mar 2019 10:15) (15 - 24)  SpO2: 100% (02 Mar 2019 10:16) (69% - 100%)    PHYSICAL EXAM:  General: on vent, no acute distress, evd in place  Eyes:  anicteric, no conjunctival injection, no discharge  Oropharynx: no lesions or injection 	  Neck: trach without adenopathy  Lungs: clear to auscultation  Heart: regular rate and rhythm; no murmur, rubs or gallops  Abdomen: soft, nondistended, nontender, without mass or organomegaly  Skin: no lesions  Extremities: no clubbing, cyanosis, or edema  Neurologic: opens eyes to stimuli    LAB RESULTS:                                       9.4    4.8   )-----------( 179      ( 28 Feb 2019 21:26 )             26.9   02-28    137  |  95<L>  |  6<L>  ----------------------------<  132<H>  4.0   |  35<H>  |  0.31<L>    Ca    8.7      28 Feb 2019 21:26  Phos  2.5     02-28  Mg     1.9     02-28              MICROBIOLOGY:  RECENT CULTURES:  02-27 @ 10:30 .Blood Blood     No growth to date.      02-27 @ 10:19 Bronch Wash Combicath  trap     No growth at 48 hours    No polymorphonuclear cells seen per low power field  No squamous epithelial cells per low power field  No organisms seen per oil power field    02-26 @ 03:16 .Catheter     No growth      02-26 @ 01:37 Skin proximal catheter     No growth      02-26 @ 01:13 .CSF CSF     No growth    No polymorphonuclear cells seen  No organisms seen  by cytocentrifuge    02-25 @ 01:40 .Sputum Sputum     Normal Respiratory Tori present    Numerous polymorphonuclear leukocytes per low power field  Numerous Squamous epithelial cells per low power field  Rare Gram positive cocci in pairs per oil power field  Rare Gram Variable Rods per oil power field        RADIOLOGY REVIEWED:  < from: CT Head No Cont (03.01.19 @ 09:00) >  EXAM:  CT BRAIN                            PROCEDURE DATE:  03/01/2019            INTERPRETATION:  History: Seizures. Cerebellar AVM. EVD clamp trial.    Multiple axial sections were performed from base of skull to vertex   without contrast enhancement.    This exam is compared with prior noncontrast head CT performed on   3/26/2019    Postop changes compatible with suboccipital craniectomy is again seen.   There is evidence of embolic material and clips noted involving the   posterior fossa region. These findings appear unchanged.    There is extra-axial collection again seen in the postop region which   measures approximately 2.5 cm widest diameter and previously measured   approximately 2.3 cm widest diameter. 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    Right frontal shunt catheter with surrounding edema is again identified   and unchanged in position.    The size and configuration the ventricles appear unchanged.    Evaluation of the osseous structures with appropriate window aside from   postop changes.    Extracalvarial soft tissue swelling staples seen postop region.    Minimal nodular mucosal thickening seen in the left maxillary sinus.    Both mastoid and middle ear regions appear clear.    Impression: No significant change allowing for differences technique.    < end of copied text >  < from: MR Head w/wo IV Cont (02.25.19 @ 00:26) >    Impression:    New bilateral parieto-occipital vasogenic edema  unassociated with   diffusion abnormality or enhancement likely represents posterior   reversible encephalopathy syndrome. Other infectious/inflammatory   processes aren't completely excluded.  No acute infarct.  Postsurgical changes of the posterior fossa with embolization material   for AVM.    < end of copied text >

## 2019-03-02 NOTE — PROGRESS NOTE ADULT - SUBJECTIVE AND OBJECTIVE BOX
Tolerated CPAP for some time during day.  EVD clamped    Trached, eyes open, tracks, pupils reactive, follows, left side spontaneous, b/l UE localizes, intermittent BLE wiggles toes Tolerated CPAP for some time during day.  EVD clamped    Trached, eyes open, tracks, pupils reactive, no FC, LUE distally antigravity, RUE LC, BLE wiggles toes

## 2019-03-02 NOTE — PROGRESS NOTE ADULT - SUBJECTIVE AND OBJECTIVE BOX
HPI:  HPI: 58yof initially admitted in mid Dec 2018 for ruptured cerebellar AVM s/p embolization and craniotomy for resection and  shunt. She was discharged to rehab in late Jan 2019. She has been doing well at rehab: walking 50 steps with walker. Two days prior, the patient had a generlized tonic clonic seizure witnessed by her sister as well as a fever. She was then brought to OSH CT. The patient reportedly had an LP that showed leukocytosis. Due to the suspicion of  shunt infection, the patient was transferred to [facility] for further management. In the ER, EO to pain, minimal mvmt x 4.    No o/n events.    VS, labs and images reviewed.    PHYSICAL EXAM:  trach'ed, on vent  EO to voice, keeps EO, EOMI, PERRL, FC - opens mouth, LUE antigravity, RUE antigravity distally, BL LE 3/5.  regular, tachycardic  minimal ronchi R>L, decreased breath sounds  soft/nd abd  wwp ext SUMMARY:    58 year-old woman with ruptured cerebellar AVM s/p embolization and craniotomy for resection and  shunt placement in December 2018 was taken to [facility] 2/23/19 after having seizure in rehab. The patient reportedly had an LP that showed leukocytosis. Due to the suspicion of  shunt infection, the patient was transferred to [facility] for further management. She had removal of VPS and placememtn of EVD on 2/25/19.     24 HOUR EVENTS:  Did not tolerate CPAP.     VITALS/DATA/ORDERS; [x] Reviewed    PHYSICAL EXAM:  trach'ed, on vent  EO to voice, keeps EO, EOMI, PERRL, FC - opens mouth/sticks out tongue, LUE antigravity, RUE antigravity distally, BL LE 3/5  regular, tachycardic  minimal rhonchi R>L, decreased breath sounds  soft/nd abd  wwp ext

## 2019-03-02 NOTE — PROGRESS NOTE ADULT - ASSESSMENT
58F s/p embo and rsxn of ruptured AVM with VPS placement. Neurologically decline from baseline s/p VPS removal and EVD placement today    - cont supportive care per icu  - f/u CSF cx NGTD  - Clamp trial CTH sunday  - cont neurochecks

## 2019-03-02 NOTE — PROGRESS NOTE ADULT - ASSESSMENT
Cerebellar AVM status post VPS for hydrocephalus who returned for CNS infection status post externalization of shunt  - cont Vanco/cefepime; ID following  - SBP 90-160mmHg, continue BP meds, labetalol increased today  - Tremors: clonazepam  - Hydrocephalus: EVD clamped, possible D/C tomorrow  - Start chemoppx   - Febrile f/u CXR, UA, CSF, and cultures  - hyponatremia--start 2%, f/u BMP    45 minutes critical care time

## 2019-03-02 NOTE — PROGRESS NOTE ADULT - ASSESSMENT
57yo woman s/p AVM resection, RTOR for posterior fossa decompression, trach/PEG, VPS in 12/2018-1/2019  Adm for an episode of GTC seizure and AMS  Concern for VPS infection.  S/p externalization of VPS.    Neuro:  neurochecks q2hr  EVD clamped - Cth in 48 hrs, on 03/03  Keppra 1g bid;   cont home meds    Card:  -150    Pulm:  on vent - CPAP as tolerated, will try to wean to trach collar    GI:  tube feeding    Renal:  metab alkalosis - contraction? will give 500 ml NS    Endo:  euglycemia    Heme:  SCDs, lovenox    ID:  cont cefepime + vanco, follow levels  will need PICC for long-term ABx    full code  ICU    at risk for deterioration/death due to seizures, VPS infection, septic shock  critical care time 35min ASSESSMENT/PLAN: VPS infection/ventriculitis; seizure     Neuro:  neurochecks q2hr  EVD clamped - Cth in 48 hrs on 03/03  Keppra 1g bid for seizure control    Card:  -150mmHg    Pulm:  on vent - CPAP as tolerated, will try to wean to trach collar    GI:  tube feeding    Renal:  metab alkalosis - contraction? will give 500 ml NS    Endo:  euglycemia    Heme:  SCDs, lovenox    ID:  cont cefepime + vanco, follow levels  will need PICC for long-term ABx    full code  ICU    at risk for deterioration/death due to seizures, VPS infection, septic shock, hydrocephalus   critical care time 35min

## 2019-03-02 NOTE — PROGRESS NOTE ADULT - ASSESSMENT
58y female with SLE, cerebellar AVM and bleed in Dec, s/p embolization, AVM resection, post fossa decompression, VPS, trach/PEG,   Persistent, "central" fever, but ultimately, fever resolved, pt improved, sent to rehab.    She apparently developed generalized Sz, fever and sent to [facility] ICU, received empiric Vanco and Cefepime, transferred here.    On TC, lethargic, without purposeful response, Tmx 103, but WBC/diff normal, CXR clear  CSF shows >500 WBCs, majority Polys, thus difficult to exclude ventriculitis, ?infected VPS- no org  She  has PRES from seizure and still on vent and encephalopathic. Some low grade fevers but not nearly as high as had been.   s/p  shunt removal, EVD placement  cultures at [facility] all finalized and negative as per Dr Handley    Plan:  cont ventriculitis with vanco and cefepime for 3 more days  f/u cultures  no indications for droplet isolation, can dc, dw/ NSICU RN

## 2019-03-03 LAB
ANION GAP SERPL CALC-SCNC: 11 MMOL/L — SIGNIFICANT CHANGE UP (ref 5–17)
ANION GAP SERPL CALC-SCNC: 17 MMOL/L — SIGNIFICANT CHANGE UP (ref 5–17)
ANION GAP SERPL CALC-SCNC: 8 MMOL/L — SIGNIFICANT CHANGE UP (ref 5–17)
APPEARANCE CSF: CLEAR — SIGNIFICANT CHANGE UP
APPEARANCE UR: CLEAR — SIGNIFICANT CHANGE UP
BILIRUB UR-MCNC: NEGATIVE — SIGNIFICANT CHANGE UP
BUN SERPL-MCNC: 14 MG/DL — SIGNIFICANT CHANGE UP (ref 7–23)
BUN SERPL-MCNC: 6 MG/DL — LOW (ref 7–23)
BUN SERPL-MCNC: 6 MG/DL — LOW (ref 7–23)
CALCIUM SERPL-MCNC: 8.6 MG/DL — SIGNIFICANT CHANGE UP (ref 8.4–10.5)
CALCIUM SERPL-MCNC: 8.6 MG/DL — SIGNIFICANT CHANGE UP (ref 8.4–10.5)
CALCIUM SERPL-MCNC: 8.8 MG/DL — SIGNIFICANT CHANGE UP (ref 8.4–10.5)
CHLORIDE SERPL-SCNC: 100 MMOL/L — SIGNIFICANT CHANGE UP (ref 96–108)
CHLORIDE SERPL-SCNC: 101 MMOL/L — SIGNIFICANT CHANGE UP (ref 96–108)
CHLORIDE SERPL-SCNC: 99 MMOL/L — SIGNIFICANT CHANGE UP (ref 96–108)
CO2 SERPL-SCNC: 24 MMOL/L — SIGNIFICANT CHANGE UP (ref 22–31)
CO2 SERPL-SCNC: 25 MMOL/L — SIGNIFICANT CHANGE UP (ref 22–31)
CO2 SERPL-SCNC: 29 MMOL/L — SIGNIFICANT CHANGE UP (ref 22–31)
COLOR CSF: ABNORMAL
COLOR SPEC: YELLOW — SIGNIFICANT CHANGE UP
CREAT SERPL-MCNC: 0.31 MG/DL — LOW (ref 0.5–1.3)
CREAT SERPL-MCNC: 0.47 MG/DL — LOW (ref 0.5–1.3)
CREAT SERPL-MCNC: <0.3 MG/DL — LOW (ref 0.5–1.3)
CULTURE RESULTS: SIGNIFICANT CHANGE UP
DIFF PNL FLD: NEGATIVE — SIGNIFICANT CHANGE UP
GLUCOSE CSF-MCNC: 71 MG/DL — HIGH (ref 40–70)
GLUCOSE SERPL-MCNC: 112 MG/DL — HIGH (ref 70–99)
GLUCOSE SERPL-MCNC: 152 MG/DL — HIGH (ref 70–99)
GLUCOSE SERPL-MCNC: 172 MG/DL — HIGH (ref 70–99)
GLUCOSE UR QL: NEGATIVE — SIGNIFICANT CHANGE UP
GRAM STN FLD: SIGNIFICANT CHANGE UP
KETONES UR-MCNC: NEGATIVE — SIGNIFICANT CHANGE UP
LACTATE CSF-MCNC: 2.4 MMOL/L — SIGNIFICANT CHANGE UP (ref 1.1–2.4)
LEUKOCYTE ESTERASE UR-ACNC: NEGATIVE — SIGNIFICANT CHANGE UP
LYMPHOCYTES # CSF: 67 % — SIGNIFICANT CHANGE UP (ref 40–80)
MONOS+MACROS NFR CSF: 33 % — SIGNIFICANT CHANGE UP (ref 15–45)
NEUTROPHILS # CSF: 0 % — SIGNIFICANT CHANGE UP (ref 0–6)
NITRITE UR-MCNC: NEGATIVE — SIGNIFICANT CHANGE UP
NRBC NFR CSF: 1 /UL — SIGNIFICANT CHANGE UP (ref 0–5)
PH UR: 7 — SIGNIFICANT CHANGE UP (ref 5–8)
POTASSIUM SERPL-MCNC: 3 MMOL/L — LOW (ref 3.5–5.3)
POTASSIUM SERPL-MCNC: 3.9 MMOL/L — SIGNIFICANT CHANGE UP (ref 3.5–5.3)
POTASSIUM SERPL-MCNC: 4 MMOL/L — SIGNIFICANT CHANGE UP (ref 3.5–5.3)
POTASSIUM SERPL-SCNC: 3 MMOL/L — LOW (ref 3.5–5.3)
POTASSIUM SERPL-SCNC: 3.9 MMOL/L — SIGNIFICANT CHANGE UP (ref 3.5–5.3)
POTASSIUM SERPL-SCNC: 4 MMOL/L — SIGNIFICANT CHANGE UP (ref 3.5–5.3)
PROT CSF-MCNC: 22 MG/DL — SIGNIFICANT CHANGE UP (ref 15–45)
PROT UR-MCNC: ABNORMAL
RBC # CSF: 1440 /UL — HIGH (ref 0–0)
SODIUM SERPL-SCNC: 136 MMOL/L — SIGNIFICANT CHANGE UP (ref 135–145)
SODIUM SERPL-SCNC: 138 MMOL/L — SIGNIFICANT CHANGE UP (ref 135–145)
SODIUM SERPL-SCNC: 140 MMOL/L — SIGNIFICANT CHANGE UP (ref 135–145)
SP GR SPEC: 1.02 — SIGNIFICANT CHANGE UP (ref 1.01–1.02)
SPECIMEN SOURCE: SIGNIFICANT CHANGE UP
SPECIMEN SOURCE: SIGNIFICANT CHANGE UP
TUBE TYPE: SIGNIFICANT CHANGE UP
UROBILINOGEN FLD QL: NEGATIVE — SIGNIFICANT CHANGE UP

## 2019-03-03 PROCEDURE — 99291 CRITICAL CARE FIRST HOUR: CPT

## 2019-03-03 PROCEDURE — 70450 CT HEAD/BRAIN W/O DYE: CPT | Mod: 26

## 2019-03-03 PROCEDURE — 61070 BRAIN CANAL SHUNT PROCEDURE: CPT | Mod: 78

## 2019-03-03 RX ORDER — POTASSIUM CHLORIDE 20 MEQ
40 PACKET (EA) ORAL EVERY 4 HOURS
Qty: 0 | Refills: 0 | Status: COMPLETED | OUTPATIENT
Start: 2019-03-03 | End: 2019-03-04

## 2019-03-03 RX ORDER — LABETALOL HCL 100 MG
10 TABLET ORAL EVERY 4 HOURS
Qty: 0 | Refills: 0 | Status: DISCONTINUED | OUTPATIENT
Start: 2019-03-03 | End: 2019-03-05

## 2019-03-03 RX ORDER — SODIUM CHLORIDE 9 MG/ML
2 INJECTION INTRAMUSCULAR; INTRAVENOUS; SUBCUTANEOUS EVERY 6 HOURS
Qty: 0 | Refills: 0 | Status: DISCONTINUED | OUTPATIENT
Start: 2019-03-03 | End: 2019-03-11

## 2019-03-03 RX ORDER — AMLODIPINE BESYLATE 2.5 MG/1
5 TABLET ORAL DAILY
Qty: 0 | Refills: 0 | Status: DISCONTINUED | OUTPATIENT
Start: 2019-03-03 | End: 2019-03-04

## 2019-03-03 RX ADMIN — SODIUM CHLORIDE 2 GRAM(S): 9 INJECTION INTRAMUSCULAR; INTRAVENOUS; SUBCUTANEOUS at 23:55

## 2019-03-03 RX ADMIN — Medication 500 MICROGRAM(S): at 05:09

## 2019-03-03 RX ADMIN — Medication 650 MILLIGRAM(S): at 20:00

## 2019-03-03 RX ADMIN — CEFEPIME 100 MILLIGRAM(S): 1 INJECTION, POWDER, FOR SOLUTION INTRAMUSCULAR; INTRAVENOUS at 13:42

## 2019-03-03 RX ADMIN — Medication 400 MILLIGRAM(S): at 13:41

## 2019-03-03 RX ADMIN — Medication 250 MILLIGRAM(S): at 05:20

## 2019-03-03 RX ADMIN — Medication 400 MILLIGRAM(S): at 21:14

## 2019-03-03 RX ADMIN — LEVETIRACETAM 1000 MILLIGRAM(S): 250 TABLET, FILM COATED ORAL at 18:11

## 2019-03-03 RX ADMIN — SODIUM CHLORIDE 50 MILLILITER(S): 5 INJECTION, SOLUTION INTRAVENOUS at 06:19

## 2019-03-03 RX ADMIN — Medication 650 MILLIGRAM(S): at 12:50

## 2019-03-03 RX ADMIN — Medication 500 MICROGRAM(S): at 00:37

## 2019-03-03 RX ADMIN — Medication 250 MILLIGRAM(S): at 18:11

## 2019-03-03 RX ADMIN — CEFEPIME 100 MILLIGRAM(S): 1 INJECTION, POWDER, FOR SOLUTION INTRAMUSCULAR; INTRAVENOUS at 05:20

## 2019-03-03 RX ADMIN — LEVETIRACETAM 1000 MILLIGRAM(S): 250 TABLET, FILM COATED ORAL at 05:20

## 2019-03-03 RX ADMIN — Medication 400 MILLIGRAM(S): at 05:20

## 2019-03-03 RX ADMIN — Medication 250 MILLIGRAM(S): at 12:49

## 2019-03-03 RX ADMIN — Medication 650 MILLIGRAM(S): at 13:00

## 2019-03-03 RX ADMIN — Medication 650 MILLIGRAM(S): at 20:30

## 2019-03-03 RX ADMIN — ENOXAPARIN SODIUM 40 MILLIGRAM(S): 100 INJECTION SUBCUTANEOUS at 18:12

## 2019-03-03 RX ADMIN — Medication 500 MICROGRAM(S): at 17:25

## 2019-03-03 RX ADMIN — Medication 500 MICROGRAM(S): at 23:56

## 2019-03-03 RX ADMIN — Medication 10 MILLIGRAM(S): at 10:45

## 2019-03-03 RX ADMIN — Medication 500 MICROGRAM(S): at 12:33

## 2019-03-03 RX ADMIN — CEFEPIME 100 MILLIGRAM(S): 1 INJECTION, POWDER, FOR SOLUTION INTRAMUSCULAR; INTRAVENOUS at 21:14

## 2019-03-03 RX ADMIN — Medication 250 MILLIGRAM(S): at 00:07

## 2019-03-03 RX ADMIN — Medication 10 MILLIGRAM(S): at 18:11

## 2019-03-03 RX ADMIN — Medication 1 MILLIGRAM(S): at 18:12

## 2019-03-03 RX ADMIN — SODIUM CHLORIDE 50 MILLILITER(S): 5 INJECTION, SOLUTION INTRAVENOUS at 19:00

## 2019-03-03 RX ADMIN — CHLORHEXIDINE GLUCONATE 1 APPLICATION(S): 213 SOLUTION TOPICAL at 21:14

## 2019-03-03 RX ADMIN — Medication 250 MILLIGRAM(S): at 23:55

## 2019-03-03 RX ADMIN — PANTOPRAZOLE SODIUM 40 MILLIGRAM(S): 20 TABLET, DELAYED RELEASE ORAL at 12:50

## 2019-03-03 RX ADMIN — Medication 1 MILLIGRAM(S): at 05:20

## 2019-03-03 RX ADMIN — Medication 40 MILLIEQUIVALENT(S): at 23:55

## 2019-03-03 NOTE — CHART NOTE - NSCHARTNOTEFT_GEN_A_CORE
Per physician order, 3 cc of CSF was aspirated from the EVD using sterile technique. Patient tolerated well.

## 2019-03-03 NOTE — PROGRESS NOTE ADULT - SUBJECTIVE AND OBJECTIVE BOX
Patient Seen and Examined.     Overnight Events:   None    T(C): 37.4 (03-03-19 @ 03:00), Max: 39 (03-02-19 @ 21:00)  HR: 90 (03-03-19 @ 03:00) (7 - 103)  BP: --  RR: 18 (03-03-19 @ 03:00) (15 - 27)  SpO2: 100% (03-03-19 @ 03:00) (98% - 100%)    Exam:   Trached, EO, tracks,  pupils 3mm reac, No FC, Lt side spontaneous, LUE 3/5 distally,RUE Loc, b/l LE WD    acetaminophen    Suspension .. 650 milliGRAM(s) Oral every 6 hours PRN  cefepime   IVPB 2000 milliGRAM(s) IV Intermittent every 8 hours  chlorhexidine 4% Liquid 1 Application(s) Topical <User Schedule>  clonazePAM Tablet 0.25 milliGRAM(s) Oral every 8 hours PRN  clonazePAM Tablet 1 milliGRAM(s) Oral every 12 hours  docusate sodium Liquid 100 milliGRAM(s) Oral two times a day  enoxaparin Injectable 40 milliGRAM(s) SubCutaneous <User Schedule>  hydrALAZINE Injectable 5 milliGRAM(s) IV Push once  ipratropium    for Nebulization 500 MICROGram(s) Nebulizer every 6 hours  labetalol 400 milliGRAM(s) Oral every 8 hours  levETIRAcetam  Solution 1000 milliGRAM(s) Oral two times a day  oxyCODONE    IR 5 milliGRAM(s) Oral every 4 hours PRN  oxyCODONE    IR 10 milliGRAM(s) Oral every 6 hours PRN  pantoprazole   Suspension 40 milliGRAM(s) Oral daily  sodium chloride 2% . 1000 milliLiter(s) IV Continuous <Continuous>  vancomycin  IVPB 1000 milliGRAM(s) IV Intermittent every 6 hours        03-02    132<L>  |  94<L>  |  7   ----------------------------<  158<H>  4.1   |  25  |  0.30<L>    Ca    9.0      02 Mar 2019 21:17  Phos  2.6     03-02  Mg     1.9     03-02          Imaging:

## 2019-03-03 NOTE — PROGRESS NOTE ADULT - SUBJECTIVE AND OBJECTIVE BOX
CC: f/u for ventriculitis    Patient is nonverbal, fevers overnight noted to 102.2, cultures repeated    REVIEW OF SYSTEMS: unable to obtain  All other review of systems (Comprehensive ROS)    Antimicrobials Day #  :8  cefepime   IVPB 2000 milliGRAM(s) IV Intermittent every 8 hours  vancomycin  IVPB 1000 milliGRAM(s) IV Intermittent every 6 hours      ICU Vital Signs Last 24 Hrs  T(C): 36.6 (03 Mar 2019 07:00), Max: 39 (02 Mar 2019 21:00)  T(F): 97.9 (03 Mar 2019 07:00), Max: 102.2 (02 Mar 2019 21:00)  HR: 73 (03 Mar 2019 09:00) (59 - 103)  BP: --  BP(mean): --  ABP: 156/89 (03 Mar 2019 09:00) (96/58 - 165/90)  ABP(mean): 116 (03 Mar 2019 09:00) (74 - 131)  RR: 18 (03 Mar 2019 09:00) (18 - 27)  SpO2: 100% (03 Mar 2019 09:00) (98% - 100%)      PHYSICAL EXAM:  General: on vent, no acute distress, evd in place  Eyes:  anicteric, no conjunctival injection, no discharge  Oropharynx: no lesions or injection 	  Neck: trach without adenopathy  Lungs: clear to auscultation  Heart: regular rate and rhythm; no murmur, rubs or gallops  Abdomen: soft, nondistended, nontender, without mass or organomegaly  Skin: no lesions  Extremities: no clubbing, cyanosis, or edema  Neurologic: opens eyes to stimuli    LAB RESULTS:                 03-03    136  |  100  |  6<L>  ----------------------------<  112<H>  4.0   |  25  |  <0.30<L>    Ca    8.6      03 Mar 2019 05:25  Phos  2.6     03-02  Mg     1.9     03-02                MICROBIOLOGY:  RECENT CULTURES:  02-27 @ 10:30 .Blood Blood     No growth to date.      02-27 @ 10:19 Bronch Wash Combicath  trap     No growth at 48 hours    No polymorphonuclear cells seen per low power field  No squamous epithelial cells per low power field  No organisms seen per oil power field    02-26 @ 03:16 .Catheter     No growth      02-26 @ 01:37 Skin proximal catheter     No growth      02-26 @ 01:13 .CSF CSF     No growth    No polymorphonuclear cells seen  No organisms seen  by cytocentrifuge    02-25 @ 01:40 .Sputum Sputum     Normal Respiratory Tori present    Numerous polymorphonuclear leukocytes per low power field  Numerous Squamous epithelial cells per low power field  Rare Gram positive cocci in pairs per oil power field  Rare Gram Variable Rods per oil power field        RADIOLOGY REVIEWED:  < from: CT Head No Cont (03.01.19 @ 09:00) >  EXAM:  CT BRAIN                            PROCEDURE DATE:  03/01/2019            INTERPRETATION:  History: Seizures. Cerebellar AVM. EVD clamp trial.    Multiple axial sections were performed from base of skull to vertex   without contrast enhancement.    This exam is compared with prior noncontrast head CT performed on   3/26/2019    Postop changes compatible with suboccipital craniectomy is again seen.   There is evidence of embolic material and clips noted involving the   posterior fossa region. These findings appear unchanged.    There is extra-axial collection again seen in the postop region which   measures approximately 2.5 cm widest diameter and previously measured   approximately 2.3 cm widest diameter. 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    Right frontal shunt catheter with surrounding edema is again identified   and unchanged in position.    The size and configuration the ventricles appear unchanged.    Evaluation of the osseous structures with appropriate window aside from   postop changes.    Extracalvarial soft tissue swelling staples seen postop region.    Minimal nodular mucosal thickening seen in the left maxillary sinus.    Both mastoid and middle ear regions appear clear.    Impression: No significant change allowing for differences technique.    < end of copied text >  < from: MR Head w/wo IV Cont (02.25.19 @ 00:26) >    Impression:    New bilateral parieto-occipital vasogenic edema  unassociated with   diffusion abnormality or enhancement likely represents posterior   reversible encephalopathy syndrome. Other infectious/inflammatory   processes aren't completely excluded.  No acute infarct.  Postsurgical changes of the posterior fossa with embolization material   for AVM.    < end of copied text >

## 2019-03-03 NOTE — PROGRESS NOTE ADULT - ASSESSMENT
58y female with SLE, cerebellar AVM and bleed in Dec, s/p embolization, AVM resection, post fossa decompression, VPS, trach/PEG,   Persistent, "central" fever, but ultimately, fever resolved, pt improved, sent to rehab.    She apparently developed generalized Sz, fever and sent to [facility] ICU, received empiric Vanco and Cefepime, transferred here.    On TC, lethargic, without purposeful response, Tmx 103, but WBC/diff normal, CXR clear  CSF shows >500 WBCs, majority Polys, thus difficult to exclude ventriculitis, ?infected VPS- no org  She  has PRES from seizure and still on vent and encephalopathic. Some low grade fevers but not nearly as high as had been.   s/p  shunt removal, EVD placement  cultures at [facility] all finalized and negative as per Dr Handley    Plan:  cont ventriculitis with vanco and cefepime for now  repeat cultures in view of fever despite broad empiric abx  as per neurosx, repeat CTH and possible EVD removal  d/w NSICU team

## 2019-03-03 NOTE — PROGRESS NOTE ADULT - SUBJECTIVE AND OBJECTIVE BOX
SUMMARY:    58 year-old woman with ruptured cerebellar AVM s/p embolization and craniotomy for resection and  shunt placement in December 2018 was taken to [facility] 2/23/19 after having seizure in rehab. The patient reportedly had an LP that showed leukocytosis. Due to the suspicion of  shunt infection, the patient was transferred to [facility] for further management. She had removal of VPS and placememtn of EVD on 2/25/19.     24 HOUR EVENTS:  Did not tolerate CPAP.     VITALS/DATA/ORDERS; [x] Reviewed    PHYSICAL EXAM:  trach'ed, on vent  EO to voice, keeps EO, EOMI, PERRL, FC - opens mouth/sticks out tongue, LUE antigravity, RUE antigravity distally, BL LE 3/5  regular, tachycardic  minimal rhonchi R>L, decreased breath sounds  soft/nd abd  wwp ext SUMMARY:    58 year-old woman with ruptured cerebellar AVM s/p embolization and craniotomy for resection and  shunt placement in December 2018 was taken to [facility] 2/23/19 after having seizure in rehab. The patient reportedly had an LP that showed leukocytosis. Due to the suspicion of  shunt infection, the patient was transferred to [facility] for further management. She had removal of VPS and placememtn of EVD on 2/25/19.     24 HOUR EVENTS:  Febrile overnight. Cultures sent. ICPs <12. Started on 2% for hyponatremia.     VITALS/DATA/ORDERS: [x] Reviewed    PHYSICAL EXAM:  trach'ed, on vent  EO to voice, keeps EO, EOMI, PERRL, FC - opens mouth/sticks out tongue, LUE antigravity, RUE antigravity distally, BL LE 3/5  regular, tachycardic  minimal rhonchi R>L, decreased breath sounds  soft/nd abd  wwp ext SUMMARY:    58 year-old woman with ruptured cerebellar AVM s/p embolization and craniotomy for resection and  shunt placement in December 2018 was taken to [facility] 2/23/19 after having seizure in rehab. The patient reportedly had an LP that showed leukocytosis. Due to the suspicion of  shunt infection, the patient was transferred to [facility] for further management. She had removal of VPS and placememtn of EVD on 2/25/19.     24 HOUR EVENTS:  Febrile overnight. Cultures sent. ICPs <12. Started on 2% for hyponatremia.     VITALS/DATA/ORDERS: [x] Reviewed    PHYSICAL EXAM:  trach'ed, on vent  Intermittent head bobbing  EO to voice, EOMI, PERRL, FC - opens mouth/sticks out tongue, lifts arms small amount, wiggles toes, LUE and RUE antigravity distally only, BL LE 3/5  regular, tachycardic  minimal rhonchi R>L, decreased breath sounds  soft/nd abd  wwp ext

## 2019-03-03 NOTE — PROGRESS NOTE ADULT - SUBJECTIVE AND OBJECTIVE BOX
EVD d/c'ed today    Trached, eyes open, tracks, pupils reactive, no FC, BUE purposeful L>R, BLE WD L>R

## 2019-03-03 NOTE — PROGRESS NOTE ADULT - ASSESSMENT
58F s/p embo and rsxn of ruptured AVM with VPS placement. Neurologically decline from baseline s/p VPS removal and EVD placement today    - cont supportive care per icu  - f/u CSF cx NGTD  - CTH Sunday with possible DC EVD   - cont neurochecks

## 2019-03-03 NOTE — CHART NOTE - NSCHARTNOTEFT_GEN_A_CORE
Per physician order, Rt EVD was removed using standard protocol. Patient was supine and drain was clamped. Direct pressure and staples applied to hemostasis. No leakage or bleeding observed. Patient tolerated well.

## 2019-03-03 NOTE — PROGRESS NOTE ADULT - ASSESSMENT
Cerebellar AVM status post VPS for hydrocephalus who returned for CNS infection status post externalization of shunt  - post pull CTH in am  - cont Vanco/cefepime till 3/5 per ID following  - SBP 90-160mmHg, continue BP meds, labetalol increased today  - Tremors: clonazepam  - Start chemoppx   - f/u Na, 2% d/c'ed now  - RCU consult    30 minutes critical care time

## 2019-03-03 NOTE — PROGRESS NOTE ADULT - ASSESSMENT
ASSESSMENT/PLAN: VPS infection/ventriculitis; seizure     Neuro:  neurochecks q2hr  EVD clamped - Cth in 48 hrs on 03/03  Keppra 1g bid for seizure control    Card:  -150mmHg    Pulm:  on vent - CPAP as tolerated, will try to wean to trach collar    GI:  tube feeding    Renal:  metab alkalosis - contraction? will give 500 ml NS    Endo:  euglycemia    Heme:  SCDs, lovenox    ID:  cont cefepime + vanco, follow levels  will need PICC for long-term ABx    full code  ICU    at risk for deterioration/death due to seizures, VPS infection, septic shock, hydrocephalus   critical care time 35min ASSESSMENT/PLAN: VPS infection/ventriculitis; seizure     Neuro:  neurochecks q2hr  EVD clamped - Cth in 48 hrs on 03/03; f/u CT  Keppra 1g bid for seizure control    Card:  -150mmHg    Pulm:  on vent - CPAP as tolerated, will try to wean to trach collar  RCU consult    GI:  tube feeding    Renal:  Euvolemia    Endo:  euglycemia    Heme:  SCDs, lovenox    ID:  cont cefepime + vanco, follow levels  will need PICC for long-term ABx    full code  ICU    at risk for deterioration/death due to seizures, VPS infection, septic shock, hydrocephalus   critical care time 35min ASSESSMENT/PLAN: VPS infection/ventriculitis; seizure     Neuro:  neurochecks q2hr  EVD clamped - Cth in 48 hrs on 03/03; f/u CT; likely D/C EVD  Monitor for delayed hydrocephalus   Keppra 1g bid for seizure control  Klonipin for head tremor    Card:  -150mmHg    Pulm:  on vent - CPAP as tolerated, will try to wean to trach collar  RCU consult    GI:  tube feeding    Renal:  Euvolemia  Hyponatremia: on 2%    Endo:  euglycemia    Heme:  SCDs, lovenox    ID:  cont cefepime + vanco, follow trough  will need PICC for long-term ABx  ID following    full code  ICU    at risk for deterioration/death due to seizures, VPS infection, septic shock, hydrocephalus   critical care time 35min

## 2019-03-04 LAB
ANION GAP SERPL CALC-SCNC: 13 MMOL/L — SIGNIFICANT CHANGE UP (ref 5–17)
ANION GAP SERPL CALC-SCNC: 8 MMOL/L — SIGNIFICANT CHANGE UP (ref 5–17)
ANION GAP SERPL CALC-SCNC: 9 MMOL/L — SIGNIFICANT CHANGE UP (ref 5–17)
BUN SERPL-MCNC: 7 MG/DL — SIGNIFICANT CHANGE UP (ref 7–23)
BUN SERPL-MCNC: 7 MG/DL — SIGNIFICANT CHANGE UP (ref 7–23)
BUN SERPL-MCNC: 8 MG/DL — SIGNIFICANT CHANGE UP (ref 7–23)
CALCIUM SERPL-MCNC: 8.7 MG/DL — SIGNIFICANT CHANGE UP (ref 8.4–10.5)
CALCIUM SERPL-MCNC: 8.9 MG/DL — SIGNIFICANT CHANGE UP (ref 8.4–10.5)
CALCIUM SERPL-MCNC: 9 MG/DL — SIGNIFICANT CHANGE UP (ref 8.4–10.5)
CHLORIDE SERPL-SCNC: 101 MMOL/L — SIGNIFICANT CHANGE UP (ref 96–108)
CHLORIDE SERPL-SCNC: 103 MMOL/L — SIGNIFICANT CHANGE UP (ref 96–108)
CHLORIDE SERPL-SCNC: 91 MMOL/L — LOW (ref 96–108)
CO2 SERPL-SCNC: 26 MMOL/L — SIGNIFICANT CHANGE UP (ref 22–31)
CO2 SERPL-SCNC: 26 MMOL/L — SIGNIFICANT CHANGE UP (ref 22–31)
CO2 SERPL-SCNC: 27 MMOL/L — SIGNIFICANT CHANGE UP (ref 22–31)
CREAT SERPL-MCNC: <0.3 MG/DL — LOW (ref 0.5–1.3)
CULTURE RESULTS: SIGNIFICANT CHANGE UP
CULTURE RESULTS: SIGNIFICANT CHANGE UP
GAS PNL BLDA: SIGNIFICANT CHANGE UP
GLUCOSE SERPL-MCNC: 113 MG/DL — HIGH (ref 70–99)
GLUCOSE SERPL-MCNC: 158 MG/DL — HIGH (ref 70–99)
GLUCOSE SERPL-MCNC: 198 MG/DL — HIGH (ref 70–99)
HCT VFR BLD CALC: 24.8 % — LOW (ref 34.5–45)
HGB BLD-MCNC: 9 G/DL — LOW (ref 11.5–15.5)
MAGNESIUM SERPL-MCNC: 1.8 MG/DL — SIGNIFICANT CHANGE UP (ref 1.6–2.6)
MAGNESIUM SERPL-MCNC: 2 MG/DL — SIGNIFICANT CHANGE UP (ref 1.6–2.6)
MCHC RBC-ENTMCNC: 31.2 PG — SIGNIFICANT CHANGE UP (ref 27–34)
MCHC RBC-ENTMCNC: 36.2 GM/DL — HIGH (ref 32–36)
MCV RBC AUTO: 86.3 FL — SIGNIFICANT CHANGE UP (ref 80–100)
PHOSPHATE SERPL-MCNC: 2.4 MG/DL — LOW (ref 2.5–4.5)
PHOSPHATE SERPL-MCNC: 2.5 MG/DL — SIGNIFICANT CHANGE UP (ref 2.5–4.5)
PLATELET # BLD AUTO: 221 K/UL — SIGNIFICANT CHANGE UP (ref 150–400)
POTASSIUM SERPL-MCNC: 3.8 MMOL/L — SIGNIFICANT CHANGE UP (ref 3.5–5.3)
POTASSIUM SERPL-MCNC: 4.4 MMOL/L — SIGNIFICANT CHANGE UP (ref 3.5–5.3)
POTASSIUM SERPL-MCNC: 4.5 MMOL/L — SIGNIFICANT CHANGE UP (ref 3.5–5.3)
POTASSIUM SERPL-SCNC: 3.8 MMOL/L — SIGNIFICANT CHANGE UP (ref 3.5–5.3)
POTASSIUM SERPL-SCNC: 4.4 MMOL/L — SIGNIFICANT CHANGE UP (ref 3.5–5.3)
POTASSIUM SERPL-SCNC: 4.5 MMOL/L — SIGNIFICANT CHANGE UP (ref 3.5–5.3)
RBC # BLD: 2.88 M/UL — LOW (ref 3.8–5.2)
RBC # FLD: 15.9 % — HIGH (ref 10.3–14.5)
SODIUM SERPL-SCNC: 130 MMOL/L — LOW (ref 135–145)
SODIUM SERPL-SCNC: 136 MMOL/L — SIGNIFICANT CHANGE UP (ref 135–145)
SODIUM SERPL-SCNC: 138 MMOL/L — SIGNIFICANT CHANGE UP (ref 135–145)
SPECIMEN SOURCE: SIGNIFICANT CHANGE UP
SPECIMEN SOURCE: SIGNIFICANT CHANGE UP
WBC # BLD: 5.9 K/UL — SIGNIFICANT CHANGE UP (ref 3.8–10.5)
WBC # FLD AUTO: 5.9 K/UL — SIGNIFICANT CHANGE UP (ref 3.8–10.5)

## 2019-03-04 PROCEDURE — 70450 CT HEAD/BRAIN W/O DYE: CPT | Mod: 26

## 2019-03-04 PROCEDURE — 99291 CRITICAL CARE FIRST HOUR: CPT

## 2019-03-04 RX ORDER — CLONAZEPAM 1 MG
0.25 TABLET ORAL EVERY 8 HOURS
Qty: 0 | Refills: 0 | Status: DISCONTINUED | OUTPATIENT
Start: 2019-03-04 | End: 2019-03-11

## 2019-03-04 RX ORDER — DOCUSATE SODIUM 100 MG
100 CAPSULE ORAL
Qty: 0 | Refills: 0 | Status: DISCONTINUED | OUTPATIENT
Start: 2019-03-04 | End: 2019-03-04

## 2019-03-04 RX ORDER — AMLODIPINE BESYLATE 2.5 MG/1
10 TABLET ORAL DAILY
Qty: 0 | Refills: 0 | Status: DISCONTINUED | OUTPATIENT
Start: 2019-03-04 | End: 2019-03-11

## 2019-03-04 RX ORDER — CLONAZEPAM 1 MG
1 TABLET ORAL EVERY 12 HOURS
Qty: 0 | Refills: 0 | Status: DISCONTINUED | OUTPATIENT
Start: 2019-03-04 | End: 2019-03-11

## 2019-03-04 RX ORDER — AMLODIPINE BESYLATE 2.5 MG/1
5 TABLET ORAL ONCE
Qty: 0 | Refills: 0 | Status: COMPLETED | OUTPATIENT
Start: 2019-03-04 | End: 2019-03-04

## 2019-03-04 RX ORDER — LABETALOL HCL 100 MG
10 TABLET ORAL ONCE
Qty: 0 | Refills: 0 | Status: COMPLETED | OUTPATIENT
Start: 2019-03-04 | End: 2019-03-04

## 2019-03-04 RX ORDER — LOSARTAN POTASSIUM 100 MG/1
25 TABLET, FILM COATED ORAL DAILY
Qty: 0 | Refills: 0 | Status: DISCONTINUED | OUTPATIENT
Start: 2019-03-04 | End: 2019-03-07

## 2019-03-04 RX ORDER — CEFEPIME 1 G/1
2000 INJECTION, POWDER, FOR SOLUTION INTRAMUSCULAR; INTRAVENOUS EVERY 8 HOURS
Qty: 0 | Refills: 0 | Status: DISCONTINUED | OUTPATIENT
Start: 2019-03-04 | End: 2019-03-05

## 2019-03-04 RX ORDER — VANCOMYCIN HCL 1 G
1500 VIAL (EA) INTRAVENOUS EVERY 12 HOURS
Qty: 0 | Refills: 0 | Status: DISCONTINUED | OUTPATIENT
Start: 2019-03-04 | End: 2019-03-05

## 2019-03-04 RX ADMIN — CHLORHEXIDINE GLUCONATE 1 APPLICATION(S): 213 SOLUTION TOPICAL at 21:40

## 2019-03-04 RX ADMIN — Medication 400 MILLIGRAM(S): at 13:52

## 2019-03-04 RX ADMIN — CEFEPIME 100 MILLIGRAM(S): 1 INJECTION, POWDER, FOR SOLUTION INTRAMUSCULAR; INTRAVENOUS at 05:20

## 2019-03-04 RX ADMIN — SODIUM CHLORIDE 2 GRAM(S): 9 INJECTION INTRAMUSCULAR; INTRAVENOUS; SUBCUTANEOUS at 19:03

## 2019-03-04 RX ADMIN — Medication 10 MILLIGRAM(S): at 13:00

## 2019-03-04 RX ADMIN — PANTOPRAZOLE SODIUM 40 MILLIGRAM(S): 20 TABLET, DELAYED RELEASE ORAL at 11:35

## 2019-03-04 RX ADMIN — Medication 500 MICROGRAM(S): at 19:13

## 2019-03-04 RX ADMIN — Medication 10 MILLIGRAM(S): at 19:02

## 2019-03-04 RX ADMIN — Medication 500 MICROGRAM(S): at 05:15

## 2019-03-04 RX ADMIN — SODIUM CHLORIDE 2 GRAM(S): 9 INJECTION INTRAMUSCULAR; INTRAVENOUS; SUBCUTANEOUS at 05:15

## 2019-03-04 RX ADMIN — Medication 400 MILLIGRAM(S): at 05:15

## 2019-03-04 RX ADMIN — SODIUM CHLORIDE 2 GRAM(S): 9 INJECTION INTRAMUSCULAR; INTRAVENOUS; SUBCUTANEOUS at 11:35

## 2019-03-04 RX ADMIN — Medication 62.5 MILLIMOLE(S): at 05:19

## 2019-03-04 RX ADMIN — Medication 500 MICROGRAM(S): at 23:50

## 2019-03-04 RX ADMIN — Medication 1 MILLIGRAM(S): at 19:03

## 2019-03-04 RX ADMIN — Medication 40 MILLIEQUIVALENT(S): at 03:12

## 2019-03-04 RX ADMIN — Medication 10 MILLIGRAM(S): at 12:40

## 2019-03-04 RX ADMIN — CEFEPIME 100 MILLIGRAM(S): 1 INJECTION, POWDER, FOR SOLUTION INTRAMUSCULAR; INTRAVENOUS at 21:40

## 2019-03-04 RX ADMIN — Medication 1 MILLIGRAM(S): at 05:21

## 2019-03-04 RX ADMIN — Medication 100 MILLIGRAM(S): at 05:16

## 2019-03-04 RX ADMIN — Medication 250 MILLIGRAM(S): at 06:00

## 2019-03-04 RX ADMIN — AMLODIPINE BESYLATE 5 MILLIGRAM(S): 2.5 TABLET ORAL at 11:35

## 2019-03-04 RX ADMIN — LEVETIRACETAM 1000 MILLIGRAM(S): 250 TABLET, FILM COATED ORAL at 19:03

## 2019-03-04 RX ADMIN — CEFEPIME 100 MILLIGRAM(S): 1 INJECTION, POWDER, FOR SOLUTION INTRAMUSCULAR; INTRAVENOUS at 16:01

## 2019-03-04 RX ADMIN — AMLODIPINE BESYLATE 5 MILLIGRAM(S): 2.5 TABLET ORAL at 03:11

## 2019-03-04 RX ADMIN — Medication 500 MICROGRAM(S): at 13:01

## 2019-03-04 RX ADMIN — Medication 300 MILLIGRAM(S): at 19:02

## 2019-03-04 RX ADMIN — Medication 400 MILLIGRAM(S): at 21:40

## 2019-03-04 RX ADMIN — OXYCODONE HYDROCHLORIDE 5 MILLIGRAM(S): 5 TABLET ORAL at 14:56

## 2019-03-04 RX ADMIN — Medication 0.25 MILLIGRAM(S): at 05:20

## 2019-03-04 RX ADMIN — OXYCODONE HYDROCHLORIDE 5 MILLIGRAM(S): 5 TABLET ORAL at 15:30

## 2019-03-04 RX ADMIN — LEVETIRACETAM 1000 MILLIGRAM(S): 250 TABLET, FILM COATED ORAL at 05:20

## 2019-03-04 NOTE — PROGRESS NOTE ADULT - ASSESSMENT
ASSESSMENT/PLAN: VPS infection/ventriculitis; seizure     Neuro:  neurochecks q2hr  EVD clamped - Cth in 48 hrs on 03/03; f/u CT; likely D/C EVD  Monitor for delayed hydrocephalus   Keppra 1g bid for seizure control  Klonipin for head tremor    Card:  -150mmHg    Pulm:  on vent - CPAP as tolerated, will try to wean to trach collar  RCU consult    GI:  tube feeding    Renal:  Euvolemia  Hyponatremia: on 2%    Endo:  euglycemia    Heme:  SCDs, lovenox    ID:  cont cefepime + vanco, follow trough  will need PICC for long-term ABx  ID following    full code  ICU    at risk for deterioration/death due to seizures, VPS infection, septic shock, hydrocephalus   critical care time 35min ASSESSMENT/PLAN: VPS infection/ventriculitis; seizure     Neuro:  neurochecks q2hr  Monitor for delayed hydrocephalus   Keppra 1g bid for seizure control  Klonipin for head tremor    Card:  -150mmHg  Will increase amlodipine 10 daily  Also, on labetolol 400 q8H    Pulm:  on vent - CPAP as tolerated, will try to wean to trach collar  RCU consult    GI:  tube feeding    Renal:  Euvolemia  Hyponatremia: on 2%    Endo:  euglycemia    Heme:  SCDs, lovenox    ID:  cont cefepime + vanco, follow trough  will need PICC for long-term ABx  ID following    full code  ICU    at risk for deterioration/death due to seizures, VPS infection, septic shock, hydrocephalus   critical care time 35min

## 2019-03-04 NOTE — CHART NOTE - NSCHARTNOTEFT_GEN_A_CORE
Nutrition Follow Up Note  Patient seen for: Nutrition follow up    Pt is a 58 year old female with PMH: lupus. 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 Pt discharged to rehab in late 2019. Currently admitted with generalized clonic seizure, leukocytosis and AMS. Pt continues on antibiotics as ordered.  S/P VPS removal and EVD placement, now EVD removed 3/3. Pt on vent, CPAP as tolerated. Pt remains non-verbal at this time.     Source: Comprehensive chart review, RN    Diet: NPO with GT: Jevity 1.2 at 85ml/hr x 18 hrs. To provide: 1836kcal (21kcal/kg) and 85g protein (1.4g protein/kg IBW 59kg) + sami active twice daily. GT feeds infusing at goal rate at time of RD visit; received total of         Daily Weight in k.1 (02-27), Weight in k (-26)  % Weight Change    Pertinent Medications: MEDICATIONS  (STANDING):  amLODIPine   Tablet 10 milliGRAM(s) Oral daily  cefepime   IVPB 2000 milliGRAM(s) IV Intermittent every 8 hours  chlorhexidine 4% Liquid 1 Application(s) Topical <User Schedule>  clonazePAM Tablet 1 milliGRAM(s) Oral every 12 hours  docusate sodium Liquid 100 milliGRAM(s) Oral two times a day  ipratropium    for Nebulization 500 MICROGram(s) Nebulizer every 6 hours  labetalol 400 milliGRAM(s) Oral every 8 hours  levETIRAcetam  Solution 1000 milliGRAM(s) Oral two times a day  pantoprazole   Suspension 40 milliGRAM(s) Oral daily  sodium chloride 2 Gram(s) Oral every 6 hours  vancomycin  IVPB 1500 milliGRAM(s) IV Intermittent every 12 hours    MEDICATIONS  (PRN):  acetaminophen    Suspension .. 650 milliGRAM(s) Oral every 6 hours PRN Temp greater or equal to 38C (100.4F), Mild Pain (1 - 3)  clonazePAM Tablet 0.25 milliGRAM(s) Oral every 8 hours PRN myoclonus  labetalol Injectable 10 milliGRAM(s) IV Push every 4 hours PRN sbp over 160  oxyCODONE    IR 5 milliGRAM(s) Oral every 4 hours PRN Moderate Pain (4 - 6)  oxyCODONE    IR 10 milliGRAM(s) Oral every 6 hours PRN Severe Pain (7 - 10)    Pertinent Labs:  @ 07:01: Na 136, BUN 7, Cr <0.30<L>, <H>, K+ 4.4, Phos --, Mg --, Alk Phos --, ALT/SGPT --, AST/SGOT --, HbA1c --   @ 03:07: Na 138, BUN 7, Cr <0.30<L>, <H>, K+ 4.5, Phos 2.4<L>, Mg 2.0, Alk Phos --, ALT/SGPT --, AST/SGOT --, HbA1c --   @ 18:49: Na 140, BUN 14, Cr 0.47<L>, <H>, K+ 3.0<L>, Phos --, Mg --, Alk Phos --, ALT/SGPT --, AST/SGOT --, HbA1c --    Finger Sticks:      Skin per nursing documentation:   Edema:    Estimated Needs:   [ ] no change since previous assessment  [ ] recalculated:     Previous Nutrition Diagnosis:   Nutrition Diagnosis is:    New Nutrition Diagnosis:  Related to:    As evidenced by:      Interventions:     Recommend  1)    Monitoring and Evaluation:     Continue to monitor Nutritional intake, Tolerance to diet prescription, weights, labs, skin integrity    RD remains available upon request and will follow up per protocol Nutrition Follow Up Note  Patient seen for: Nutrition follow up    Pt is a 58 year old female with PMH: lupus. 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 Pt discharged to rehab in late 2019. Currently admitted with generalized clonic seizure, leukocytosis and AMS. Pt continues on antibiotics as ordered.  S/P VPS removal and EVD placement, now EVD removed 3/3. Pt on vent, CPAP as tolerated. Pt remains non-verbal at this time.     Source: Comprehensive chart review, RN    Diet: NPO with GT: Jevity 1.2 at 85ml/hr x 18 hrs. To provide: 1530ml formula, 1836kcal (21kcal/kg) and 85g protein (1.4g protein/kg IBW 59kg) + sami active twice daily. GT feeds infusing at goal rate at time of RD visit. Per review of flow sheet, pt received 1955ml x past 24 hrs ? - Unclear as to whether EN was running at 18 vs 24 hrs. Nursing made aware, confirmed current order states EN x 18 hrs.     GI: Rectal Tube: 70ml (3/4), 20ml (3/3), 50ml (3/2)  Minimal residuals noted     Daily Weight in k.1 (02-27), Weight in k (02-26)  % Weight Change Appears stable.     Pertinent Medications: MEDICATIONS  (STANDING):  amLODIPine   Tablet 10 milliGRAM(s) Oral daily  cefepime   IVPB 2000 milliGRAM(s) IV Intermittent every 8 hours  chlorhexidine 4% Liquid 1 Application(s) Topical <User Schedule>  clonazePAM Tablet 1 milliGRAM(s) Oral every 12 hours  docusate sodium Liquid 100 milliGRAM(s) Oral two times a day  ipratropium    for Nebulization 500 MICROGram(s) Nebulizer every 6 hours  labetalol 400 milliGRAM(s) Oral every 8 hours  levETIRAcetam  Solution 1000 milliGRAM(s) Oral two times a day  pantoprazole   Suspension 40 milliGRAM(s) Oral daily  sodium chloride 2 Gram(s) Oral every 6 hours  vancomycin  IVPB 1500 milliGRAM(s) IV Intermittent every 12 hours    MEDICATIONS  (PRN):  acetaminophen    Suspension .. 650 milliGRAM(s) Oral every 6 hours PRN Temp greater or equal to 38C (100.4F), Mild Pain (1 - 3)  clonazePAM Tablet 0.25 milliGRAM(s) Oral every 8 hours PRN myoclonus  labetalol Injectable 10 milliGRAM(s) IV Push every 4 hours PRN sbp over 160  oxyCODONE    IR 5 milliGRAM(s) Oral every 4 hours PRN Moderate Pain (4 - 6)  oxyCODONE    IR 10 milliGRAM(s) Oral every 6 hours PRN Severe Pain (7 - 10)    Pertinent Labs:  @ 07:01: Na 136, BUN 7, Cr <0.30<L>, <H>, K+ 4.4, Phos --, Mg --, Alk Phos --, ALT/SGPT --, AST/SGOT --, HbA1c --   @ 03:07: Na 138, BUN 7, Cr <0.30<L>, <H>, K+ 4.5, Phos 2.4<L>, Mg 2.0, Alk Phos --, ALT/SGPT --, AST/SGOT --, HbA1c --   @ 18:49: Na 140, BUN 14, Cr 0.47<L>, <H>, K+ 3.0<L>, Phos --, Mg --, Alk Phos --, ALT/SGPT --, AST/SGOT --, HbA1c --    Finger Sticks:      Skin per nursing documentation: Surgical incision lumbar spine, right EVD   Edema: 2+ right arm;  left arm;  right hand;  left hand;  left foot;  right foot;  left leg;  right leg    Estimated Needs:   [x] no change since previous assessment  [ ] recalculated:     Previous Nutrition Diagnosis: Increase nutrient needs (protein/energy)  Nutrition Diagnosis is: remains appropriate, ongoing with EN     Interventions:     Recommend  1) Continue Jevity 1.2 to goal rate 85ml/hr x 18 hrs. To provide: 1836kcal (21kcal/kg) and 85g protein (1.4g protein/kg IBW 59kg) + dan active twice daily    Monitoring and Evaluation:     Continue to monitor Nutritional intake, Tolerance to diet prescription, weights, labs, skin integrity    RD remains available upon request and will follow up per protocol

## 2019-03-04 NOTE — PROGRESS NOTE ADULT - ASSESSMENT
58y female with SLE, cerebellar AVM and bleed in Dec, s/p embolization, AVM resection, post fossa decompression, VPS, trach/PEG,   Persistent, "central" fever, but ultimately, fever resolved, pt improved, sent to rehab.    She apparently developed generalized Sz, fever and sent to [facility] ICU, received empiric Vanco and Cefepime, transferred here.    On TC, lethargic, without purposeful response, Tmx 103, but WBC/diff normal, CXR clear  CSF shows >500 WBCs, majority Polys, thus difficult to exclude ventriculitis, ?infected VPS- no org  She  has PRES from seizure and still on vent and encephalopathic. Some low grade fevers but not nearly as high as had been.   s/p  shunt removal, EVD placement, now also removed as of 3/3  cultures at [facility] all finalized and negative as per Dr Handley  All [facility] CSF cultures have been negative as well  Fevers may be on a non infectious basis  Plan:  Limit vanco and cefepime to 10 days, 9/10, ? chemical ventriculitis  await additional culture data.  We will favor stopping antibiotics in 24 hours and approaching fever in a non empiric manor

## 2019-03-04 NOTE — PROGRESS NOTE ADULT - SUBJECTIVE AND OBJECTIVE BOX
CC: f/u for fever and ventriculitis    Patient reports: she is not verbal , sedated on vent    REVIEW OF SYSTEMS:  All other review of systems negative (Comprehensive ROS):ited by underlying condition,CSF shunt pulled 3/3    Antimicrobials Day #  :9/10  cefepime   IVPB 2000 milliGRAM(s) IV Intermittent every 8 hours  vancomycin  IVPB 1000 milliGRAM(s) IV Intermittent every 6 hours    Other Medications Reviewed    T(F): 100.8 (19 @ 07:00), Max: 100.9 (19 @ 12:00)  HR: 86 (19 @ 10:00)  BP: --  RR: 18 (19 @ 10:00)  SpO2: 100% (19 @ 10:00)  Wt(kg): --    PHYSICAL EXAM:  General: lethargic, no acute distress, craniotomy incision intact  Eyes:  anicteric, no conjunctival injection, no discharge  Oropharynx: no lesions	  Neck: trach  Lungs: clear to auscultation  Heart: regular rate and rhythm; no murmur, rubs or gallops  Abdomen: soft, nondistended, nontender, peg in place  Skin: no lesions  Extremities: no clubbing, cyanosis, or edema  Neurologic: alert, oriented, moves all extremities    LAB RESULTS:        136  |  101  |  7   ----------------------------<  158<H>  4.4   |  27  |  <0.30<L>    Ca    9.0      04 Mar 2019 07:01  Phos  2.4       Mg     2.0             Urinalysis Basic - ( 03 Mar 2019 06:19 )    Color: Yellow / Appearance: Clear / S.022 / pH: x  Gluc: x / Ketone: Negative  / Bili: Negative / Urobili: Negative   Blood: x / Protein: 30 mg/dL / Nitrite: Negative   Leuk Esterase: Negative / RBC: 4 /hpf / WBC 3 /HPF   Sq Epi: x / Non Sq Epi: 1 /hpf / Bacteria: Negative      MICROBIOLOGY:  RECENT CULTURES:   @ 11:56 .CSF CSF lumbar received     No growth    Rare polymorphonuclear leukocytes per low power field  No organisms seen per oil power field  by cytocentrifuge     @ 01:02 .Blood Blood-Venous     No growth to date.       @ 10:30 .Blood Blood     No growth to date.          RADIOLOGY REVIEWED:    < from: CT Head No Cont (19 @ 08:40) >  IMPRESSION:    Interval removal of right frontal approach ventriculostomy catheter.     Similar parenchymal hemorrhage along the right frontal catheter tract.   Similar small hemorrhage layering in the occipital horns. Stable minimal   ventricular dilatation. No midline shift.    Redemonstration of midline suboccipital craniectomy. Redemonstration of   CSF density extracalvarial fluid collection approximating the craniectomy   measuring approximately 1.3 x 4.6 cm, minimally smaller in size.    Redemonstration of extensive embolic material and aneurysm clip in the   region of the superior cerebellar vermis and tentorial leaves.    < end of copied text >  < from: Xray Chest 1 View- PORTABLE-Urgent (19 @ 22:07) >  FINDINGS:   Tracheostomy tube in appropriate position.  The heart size is normal.  The lungs are clear. No pleural effusion or pneumothorax.  No acute bony finding.    IMPRESSION:   Clear lungs.

## 2019-03-04 NOTE — PROGRESS NOTE ADULT - SUBJECTIVE AND OBJECTIVE BOX
Vital Signs Last 24 Hrs  T(C): 38 (03 Mar 2019 19:00), Max: 38.3 (03 Mar 2019 12:00)  T(F): 100.4 (03 Mar 2019 19:00), Max: 100.9 (03 Mar 2019 12:00)  HR: 87 (04 Mar 2019 01:00) (59 - 93)  BP: --  BP(mean): --  RR: 20 (04 Mar 2019 01:00) (18 - 21)  SpO2: 100% (04 Mar 2019 01:00) (100% - 100%)    EXAM  Trached, EO, tracks,  pupils 3mm reac,  int FC ,B/l UE purposeful LT>Rt. b/L WD to pain

## 2019-03-04 NOTE — PROGRESS NOTE ADULT - SUBJECTIVE AND OBJECTIVE BOX
SUMMARY:    58 year-old woman with ruptured cerebellar AVM s/p embolization and craniotomy for resection and  shunt placement in December 2018 was taken to [facility] 2/23/19 after having seizure in rehab. The patient reportedly had an LP that showed leukocytosis. Due to the suspicion of  shunt infection, the patient was transferred to [facility] for further management. She had removal of VPS and placememtn of EVD on 2/25/19.     24 HOUR EVENTS:  Febrile overnight. Cultures sent. ICPs <12. Started on 2% for hyponatremia.     VITALS/DATA/ORDERS: [x] Reviewed    PHYSICAL EXAM:  trach'ed, on vent  Intermittent head bobbing  EO to voice, EOMI, PERRL, FC - opens mouth/sticks out tongue, lifts arms small amount, wiggles toes, LUE and RUE antigravity distally only, BL LE 3/5  regular, tachycardic  minimal rhonchi R>L, decreased breath sounds  soft/nd abd  wwp ext SUMMARY:    58 year-old woman with ruptured cerebellar AVM s/p embolization and craniotomy for resection and  shunt placement in December 2018 was taken to [facility] 2/23/19 after having seizure in rehab. The patient reportedly had an LP that showed leukocytosis. Due to the suspicion of  shunt infection, the patient was transferred to [facility] for further management. She had removal of VPS and placememtn of EVD on 2/25/19.     HOSPITAL COURSE:  3/3: EVD d/c'ed    Febrile overnight. Cultures sent. ICPs <12. Started on 2% for hyponatremia.     VITALS/DATA/ORDERS: [x] Reviewed    PHYSICAL EXAM:  trach'ed, on vent  Intermittent head bobbing  EO to voice, EOMI, PERRL, FC - opens mouth/sticks out tongue, lifts arms small amount, wiggles toes, LUE and RUE antigravity distally only, BL LE 3/5  regular, tachycardic  minimal rhonchi R>L, decreased breath sounds  soft/nd abd  wwp ext SUMMARY:    58 year-old woman with ruptured cerebellar AVM s/p embolization and craniotomy for resection and  shunt placement in December 2018 was taken to [facility] 2/23/19 after having seizure in rehab. The patient reportedly had an LP that showed leukocytosis. Due to the suspicion of  shunt infection, the patient was transferred to [facility] for further management. She had removal of VPS and placememtn of EVD on 2/25/19.     HOSPITAL COURSE:  3/3: EVD d/c'ed. Febrile overnight. Cultures sent. ICPs <12. Started on 2% for hyponatremia.     24 HOUR EVENTS: Continues to spike fevers. Loses neurological exam when febrile.     VITALS/DATA/ORDERS: [x] Reviewed    PHYSICAL EXAM:  trach'ed, on vent  Intermittent head bobbing  EO to voice, EOMI, PERRL, FC - opens mouth/sticks out tongue,   intermittently follows commands on LUE (shows 2 fingers), LLE (wiggles toes).  RUE - 0/5  RLE - intermittently following commands (weakly wiggles toes).   regular, tachycardic  minimal rhonchi R>L, decreased breath sounds  soft/nd abd  wwp ext SUMMARY:    58 year-old woman with ruptured cerebellar AVM s/p embolization and craniotomy for resection and  shunt placement in December 2018 was taken to [facility] 2/23/19 after having seizure in rehab. The patient reportedly had an LP that showed leukocytosis. Due to the suspicion of  shunt infection, the patient was transferred to [facility] for further management. She had removal of VPS and placememtn of EVD on 2/25/19.     HOSPITAL COURSE:  3/3: EVD d/c'ed. Febrile overnight. Cultures sent. ICPs <12. Started on 2% for hyponatremia.     24 HOUR EVENTS: Continues to spike fevers. Loses neurological exam when febrile.     VITALS/DATA/ORDERS: [x] Reviewed    PHYSICAL EXAM:  trach'ed, on vent  Intermittent head bobbing/titubation  EO to voice, EOMI, PERRL, FC - opens mouth/sticks out tongue,   intermittently follows commands on LUE (shows 2 fingers), LLE (wiggles toes).  RUE - 0/5  RLE - intermittently following commands (weakly wiggles toes).   regular, tachycardic  minimal rhonchi R>L, decreased breath sounds  soft/nd abd  wwp ext

## 2019-03-05 LAB
ANION GAP SERPL CALC-SCNC: 11 MMOL/L — SIGNIFICANT CHANGE UP (ref 5–17)
ANION GAP SERPL CALC-SCNC: 13 MMOL/L — SIGNIFICANT CHANGE UP (ref 5–17)
BUN SERPL-MCNC: 12 MG/DL — SIGNIFICANT CHANGE UP (ref 7–23)
BUN SERPL-MCNC: 8 MG/DL — SIGNIFICANT CHANGE UP (ref 7–23)
CALCIUM SERPL-MCNC: 9 MG/DL — SIGNIFICANT CHANGE UP (ref 8.4–10.5)
CALCIUM SERPL-MCNC: 9.1 MG/DL — SIGNIFICANT CHANGE UP (ref 8.4–10.5)
CHLORIDE SERPL-SCNC: 93 MMOL/L — LOW (ref 96–108)
CHLORIDE SERPL-SCNC: 96 MMOL/L — SIGNIFICANT CHANGE UP (ref 96–108)
CO2 SERPL-SCNC: 27 MMOL/L — SIGNIFICANT CHANGE UP (ref 22–31)
CO2 SERPL-SCNC: 28 MMOL/L — SIGNIFICANT CHANGE UP (ref 22–31)
CREAT SERPL-MCNC: 0.34 MG/DL — LOW (ref 0.5–1.3)
CREAT SERPL-MCNC: 0.35 MG/DL — LOW (ref 0.5–1.3)
GLUCOSE SERPL-MCNC: 110 MG/DL — HIGH (ref 70–99)
GLUCOSE SERPL-MCNC: 112 MG/DL — HIGH (ref 70–99)
HCT VFR BLD CALC: 23.7 % — LOW (ref 34.5–45)
HGB BLD-MCNC: 8.7 G/DL — LOW (ref 11.5–15.5)
MAGNESIUM SERPL-MCNC: 2 MG/DL — SIGNIFICANT CHANGE UP (ref 1.6–2.6)
MCHC RBC-ENTMCNC: 31.4 PG — SIGNIFICANT CHANGE UP (ref 27–34)
MCHC RBC-ENTMCNC: 36.6 GM/DL — HIGH (ref 32–36)
MCV RBC AUTO: 85.7 FL — SIGNIFICANT CHANGE UP (ref 80–100)
PHOSPHATE SERPL-MCNC: 2.6 MG/DL — SIGNIFICANT CHANGE UP (ref 2.5–4.5)
PLATELET # BLD AUTO: 242 K/UL — SIGNIFICANT CHANGE UP (ref 150–400)
POTASSIUM SERPL-MCNC: 3.6 MMOL/L — SIGNIFICANT CHANGE UP (ref 3.5–5.3)
POTASSIUM SERPL-MCNC: 3.7 MMOL/L — SIGNIFICANT CHANGE UP (ref 3.5–5.3)
POTASSIUM SERPL-SCNC: 3.6 MMOL/L — SIGNIFICANT CHANGE UP (ref 3.5–5.3)
POTASSIUM SERPL-SCNC: 3.7 MMOL/L — SIGNIFICANT CHANGE UP (ref 3.5–5.3)
RBC # BLD: 2.76 M/UL — LOW (ref 3.8–5.2)
RBC # FLD: 15.7 % — HIGH (ref 10.3–14.5)
SODIUM SERPL-SCNC: 131 MMOL/L — LOW (ref 135–145)
SODIUM SERPL-SCNC: 137 MMOL/L — SIGNIFICANT CHANGE UP (ref 135–145)
WBC # BLD: 5.8 K/UL — SIGNIFICANT CHANGE UP (ref 3.8–10.5)
WBC # FLD AUTO: 5.8 K/UL — SIGNIFICANT CHANGE UP (ref 3.8–10.5)

## 2019-03-05 PROCEDURE — 99233 SBSQ HOSP IP/OBS HIGH 50: CPT

## 2019-03-05 PROCEDURE — 70450 CT HEAD/BRAIN W/O DYE: CPT | Mod: 26

## 2019-03-05 PROCEDURE — 99223 1ST HOSP IP/OBS HIGH 75: CPT

## 2019-03-05 RX ORDER — ENOXAPARIN SODIUM 100 MG/ML
40 INJECTION SUBCUTANEOUS
Qty: 0 | Refills: 0 | Status: DISCONTINUED | OUTPATIENT
Start: 2019-03-05 | End: 2019-03-08

## 2019-03-05 RX ADMIN — Medication 400 MILLIGRAM(S): at 16:06

## 2019-03-05 RX ADMIN — Medication 500 MICROGRAM(S): at 18:15

## 2019-03-05 RX ADMIN — Medication 1 MILLIGRAM(S): at 18:31

## 2019-03-05 RX ADMIN — Medication 500 MICROGRAM(S): at 05:09

## 2019-03-05 RX ADMIN — LEVETIRACETAM 1000 MILLIGRAM(S): 250 TABLET, FILM COATED ORAL at 05:49

## 2019-03-05 RX ADMIN — PANTOPRAZOLE SODIUM 40 MILLIGRAM(S): 20 TABLET, DELAYED RELEASE ORAL at 12:08

## 2019-03-05 RX ADMIN — LEVETIRACETAM 1000 MILLIGRAM(S): 250 TABLET, FILM COATED ORAL at 18:31

## 2019-03-05 RX ADMIN — SODIUM CHLORIDE 2 GRAM(S): 9 INJECTION INTRAMUSCULAR; INTRAVENOUS; SUBCUTANEOUS at 00:48

## 2019-03-05 RX ADMIN — Medication 500 MICROGRAM(S): at 12:56

## 2019-03-05 RX ADMIN — ENOXAPARIN SODIUM 40 MILLIGRAM(S): 100 INJECTION SUBCUTANEOUS at 18:30

## 2019-03-05 RX ADMIN — Medication 1 MILLIGRAM(S): at 05:50

## 2019-03-05 RX ADMIN — AMLODIPINE BESYLATE 10 MILLIGRAM(S): 2.5 TABLET ORAL at 05:50

## 2019-03-05 RX ADMIN — SODIUM CHLORIDE 2 GRAM(S): 9 INJECTION INTRAMUSCULAR; INTRAVENOUS; SUBCUTANEOUS at 05:49

## 2019-03-05 RX ADMIN — SODIUM CHLORIDE 2 GRAM(S): 9 INJECTION INTRAMUSCULAR; INTRAVENOUS; SUBCUTANEOUS at 12:08

## 2019-03-05 RX ADMIN — Medication 300 MILLIGRAM(S): at 05:50

## 2019-03-05 RX ADMIN — Medication 400 MILLIGRAM(S): at 05:50

## 2019-03-05 RX ADMIN — CHLORHEXIDINE GLUCONATE 1 APPLICATION(S): 213 SOLUTION TOPICAL at 21:16

## 2019-03-05 RX ADMIN — LOSARTAN POTASSIUM 25 MILLIGRAM(S): 100 TABLET, FILM COATED ORAL at 06:08

## 2019-03-05 RX ADMIN — SODIUM CHLORIDE 2 GRAM(S): 9 INJECTION INTRAMUSCULAR; INTRAVENOUS; SUBCUTANEOUS at 18:30

## 2019-03-05 RX ADMIN — CEFEPIME 100 MILLIGRAM(S): 1 INJECTION, POWDER, FOR SOLUTION INTRAMUSCULAR; INTRAVENOUS at 05:50

## 2019-03-05 NOTE — PROGRESS NOTE ADULT - SUBJECTIVE AND OBJECTIVE BOX
SUMMARY:    58 year-old woman with ruptured cerebellar AVM s/p embolization and craniotomy for resection and  shunt placement in December 2018 was taken to [facility] 2/23/19 after having seizure in rehab. The patient reportedly had an LP that showed leukocytosis. Due to the suspicion of  shunt infection, the patient was transferred to [facility] for further management. She had removal of VPS and placememtn of EVD on 2/25/19.     HOSPITAL COURSE:  3/3: EVD d/c'ed. Febrile overnight. Cultures sent. ICPs <12. Started on 2% for hyponatremia.   3/4: Loses neurological exam when patient spikes a fever    24 Hour Events: Afebrile.     VITALS/DATA/ORDERS: [x] Reviewed    PHYSICAL EXAM:  trach'ed, on vent  Intermittent head bobbing/titubation  EO to voice, EOMI, PERRL, FC - opens mouth/sticks out tongue,   intermittently follows commands on LUE (shows 2 fingers), LLE (wiggles toes).  RUE - 0/5  RLE - intermittently following commands (weakly wiggles toes).   regular, tachycardic  minimal rhonchi R>L, decreased breath sounds  soft/nd abd  wwp ext SUMMARY:    58 year-old woman with ruptured cerebellar AVM s/p embolization and craniotomy for resection and  shunt placement in December 2018 was taken to [facility] 2/23/19 after having seizure in rehab. The patient reportedly had an LP that showed leukocytosis. Due to the suspicion of  shunt infection, the patient was transferred to [facility] for further management. She had removal of VPS and placememtn of EVD on 2/25/19.     HOSPITAL COURSE:  3/3: EVD d/c'ed. Febrile overnight. Cultures sent. ICPs <12. Started on 2% for hyponatremia.   3/4: Loses neurological exam when patient spikes a fever    24 Hour Events: Afebrile.     VITALS/DATA/ORDERS: [x] Reviewed    PHYSICAL EXAM:  trach'ed, on vent  Intermittent head bobbing/titubation  EO to voice, EOMI, PERRL, FC - opens mouth/sticks out tongue,   follows commands on all extremities (shows thumbs on b/l UE, wiggles toes on b/l LE)  RRR  minimal rhonchi R>L, decreased breath sounds  soft/nd abd  wwp ext SUMMARY:    58 year-old woman with ruptured cerebellar AVM s/p embolization and craniotomy for resection and  shunt placement in December 2018 was taken to [facility] 2/23/19 after having seizure in rehab. The patient reportedly had an LP that showed leukocytosis. Due to the suspicion of  shunt infection, the patient was transferred to [facility] for further management. She had removal of VPS and placememt of EVD on 2/25/19.     HOSPITAL COURSE:  3/3: EVD d/c'ed. Febrile overnight. Cultures sent. ICPs <12. Started on 2% for hyponatremia.   3/4: Loses neurological exam when patient spikes a fever    24 Hour Events: Afebrile.     VITALS/DATA/ORDERS: [x] Reviewed    PHYSICAL EXAM:  trach'ed, on vent  Intermittent head bobbing/titubation  EO to voice, EOMI, PERRL, FC - opens mouth/sticks out tongue,   follows commands on all extremities (shows thumbs on b/l UE, wiggles toes on b/l LE)  Transient antigravity movement in arms, no antigravity movement in legs  RRR  minimal rhonchi R>L, decreased breath sounds  soft/nd abd  wwp ext

## 2019-03-05 NOTE — CONSULT NOTE ADULT - ASSESSMENT
58 F with SLE, RA, ruptured cerebellar AVM s/p embolization and craniotomy for resection/  shunt placement/ trach/ PEG in December 2018, transferred to NS from [facility] on 2/23 where she presented from rehab with seizures. Now s/p VPS removal, briefly required EVD which was removed on 3/3.     1. Acute on chronic resp failure with hypoxia:  - Cont daily weaning trials as tolerated  - Will attempt to wean back to TC as she was tolerating Tc ATC at rehab   - Cont  trach care and pulm toilet     2. Cerebellar AVM s/p embolization/craniotomy/ VPS placement now s/p VPS removal.   - EVD removed on 3/3   - Serial CT heads ( most recent one today) have been stable  - Cont neuro checks    3. Seizures Myoclonus  - Cont Keppra BID  - EEGs negative   - Cont clonazepam for myoclonus    4. Oropharyngeal dysphagia:  - tolerating PEG feeds    5. Fever:  - Completed a course of Vanc/ Cefepime per ID  - Cxs remain NGTD  - Temp curve improved. Now being observed off antibiotics     6. Diarrhea:  - Bowel regimen d-cd  - If diarrhea continues would check C diff     7. Gen:  - DVT Px: Lovenox  - At present there are no RCU beds available. Suggest continuing to wean to TC. Will reassess once RCU bed available 58 F with SLE, RA, ruptured cerebellar AVM s/p embolization and craniotomy for resection/  shunt placement/ trach/ PEG in December 2018, transferred to NS from [facility] on 2/23 where she presented from rehab with seizures. Now s/p VPS removal, briefly required EVD which was removed on 3/3.     1. Acute on chronic resp failure with hypoxia:  - Cont daily weaning trials as tolerated  - Will attempt to wean back to TC as she was tolerating Tc ATC at rehab   - Cont  trach care and pulm toilet     2. Cerebellar AVM s/p embolization/craniotomy/ VPS placement now s/p VPS removal.   - EVD removed on 3/3   - Serial CT heads ( most recent one today) have been stable  - Cont neuro checks    3. Seizures Myoclonus  - Cont Keppra BID  - EEGs negative   - Cont clonazepam for myoclonus    4. Oropharyngeal dysphagia:  - tolerating PEG feeds    5. Fever:  - Completed a course of Vanc/ Cefepime fro possible ventriculitis  - VPS removed  - Cxs remain NGTD  - Temp curve improved. Now being observed off antibiotics per ID team     6. Diarrhea:  - Bowel regimen d-cd  - If diarrhea continues would check C diff     7. Gen:  - DVT Px: Lovenox  - At present there are no RCU beds available. Suggest continuing to wean to TC. Will reassess once RCU bed available 58 F with SLE, RA, ruptured cerebellar AVM s/p embolization and craniotomy for resection/  shunt placement/ trach/ PEG in December 2018, transferred to [facility] from [facility] on 2/23 where she presented from rehab with seizures. Now s/p VPS removal, briefly required EVD which was removed on 3/3.     1. Acute on chronic resp failure with hypoxia:  - Cont daily weaning trials as tolerated  - Will attempt to wean back to TC as she was tolerating Tc ATC at rehab   - Cont  trach care and pulm toilet     2. Cerebellar AVM s/p embolization/craniotomy/ VPS placement now s/p VPS removal.   - EVD removed on 3/3   - Serial CT heads ( most recent one today) have been stable  - Cont neuro checks    3. Seizures Myoclonus  - Cont Keppra BID  - EEGs negative   - Cont clonazepam for myoclonus    4. Oropharyngeal dysphagia:  - tolerating PEG feeds    5. Fever:  - Completed a course of Vanc/ Cefepime for possible ventriculitis  - VPS removed  - Cxs remain NGTD  - Temp curve improved. Now being observed off antibiotics per ID team     6. Diarrhea:  - Bowel regimen d-cd  - If diarrhea continues would check C diff     7. Gen:  - DVT Px: Lovenox  - At present there are no RCU beds available. Suggest continuing to wean to TC. Will reassess once RCU bed available

## 2019-03-05 NOTE — PROGRESS NOTE ADULT - ASSESSMENT
PLAN  CT Brain in am (if stable poss start DVT ppx)  RCU when bed available  Poss d/c abx in am per ID (monitor off abx)  Poss send CDIFF loose stools continue after 24hrs of BM meds d/c'd  3/2 Pancx (Tmax 39)

## 2019-03-05 NOTE — PROGRESS NOTE ADULT - ASSESSMENT
ASSESSMENT/PLAN: VPS infection/ventriculitis; seizure     Neuro:  neurochecks q2hr  Monitor for delayed hydrocephalus   Keppra 1g bid for seizure control  Klonipin for head tremor    Card:  -150mmHg  On amlodipine 10 daily and labetolol 400 q8H    Pulm:  on vent - CPAP as tolerated, will try to wean to trach collar  RCU consult    GI:  tube feeding    Renal:  Euvolemia  Hyponatremia: on 2%    Endo:  euglycemia    Heme:  SCDs, lovenox    ID:  DC Abx, per ID  ID recommendations appreciated    full code  ICU    at risk for deterioration/death due to seizures, VPS infection, septic shock, hydrocephalus   critical care time 35min ASSESSMENT/PLAN: VPS infection/ventriculitis; seizure     Neuro:  neurochecks q4hr  CT head this able - stable track hemorrhage  Keppra 1g bid for seizure control  Klonipin for head tremor    Card:  -150mmHg  Controlled on amlodipine 10 daily, losartan 25 daily and labetolol 400 q8H    Pulm:  on vent - CPAP as tolerated, will try to wean to trach collar  RCU consult    GI:  tube feeding  Diarrhea - Bowel regimen d/c'ed  on DAnactive; monitor    Renal:  Hyponatremia, possibly SIADH  - On salt tabs; will monitor    Endo:  euglycemia    Heme:  SCDs, restart Lovenox    ID:  DC Abx, per ID  ID recommendations appreciated    full code  RCU when accepted    at risk for deterioration/death due to seizures, VPS infection, septic shock, hydrocephalus   critical care time 35min ASSESSMENT/PLAN: VPS infection/ventriculitis; seizure     Neuro:  neurochecks q4hr  CT head this able - stable track hemorrhage  Keppra 1g bid for seizure control  Klonipin for head tremor    Card:  -150mmHg  Controlled on amlodipine 10 daily, losartan 25 daily and labetolol 400 q8H    Pulm:  on vent - CPAP as tolerated, will try to wean to trach collar  RCU consult    GI:  tube feeding  Diarrhea - Bowel regimen d/c'ed  on Danactive; monitor    Renal:  Hyponatremia, possibly SIADH  - On salt tabs; will monitor    Endo:  euglycemia    Heme:  SCDs, restart Lovenox    ID:  DC Abx, per ID  ID recommendations appreciated    full code  RCU when accepted    at risk for deterioration/death due to seizures, VPS infection, septic shock, hydrocephalus   critical care time 35min ASSESSMENT/PLAN: VPS infection/ventriculitis; seizure     Neuro:  neurochecks q4hr  CT head this able - stable tract hemorrhage  Keppra 1g bid for seizure control  Klonipin for head tremor    Card:  -150mmHg  Controlled on amlodipine 10 daily, losartan 25 daily and labetolol 400 q8H    Pulm:  on vent - CPAP as tolerated, will try to wean to trach collar  RCU consult    GI:  tube feeding  Diarrhea - Bowel regimen d/c'ed  on Danactive; monitor    Renal:  Hyponatremia, possibly SIADH  - On salt tabs; will monitor    Endo:  euglycemia    Heme:  SCDs, restart Lovenox    ID:  DC Abx, per ID  ID recommendations appreciated    full code  RCU when accepted

## 2019-03-05 NOTE — PROGRESS NOTE ADULT - SUBJECTIVE AND OBJECTIVE BOX
CC: f/u for fevers    Patient reports: she is not very interactive, head bobbing movements    REVIEW OF SYSTEMS:  All other review of systems negative (Comprehensive ROS): tolerating enteral feeds, stable on vent    Antimicrobials Day #  :s/p 10 days of vanco and cefepime    Other Medications Reviewed  MEDICATIONS  (STANDING):  amLODIPine   Tablet 10 milliGRAM(s) Oral daily  chlorhexidine 4% Liquid 1 Application(s) Topical <User Schedule>  clonazePAM Tablet 1 milliGRAM(s) Oral every 12 hours  enoxaparin Injectable 40 milliGRAM(s) SubCutaneous <User Schedule>  ipratropium    for Nebulization 500 MICROGram(s) Nebulizer every 6 hours  labetalol 400 milliGRAM(s) Oral every 8 hours  levETIRAcetam  Solution 1000 milliGRAM(s) Oral two times a day  losartan 25 milliGRAM(s) Oral daily  pantoprazole   Suspension 40 milliGRAM(s) Oral daily  sodium chloride 2 Gram(s) Oral every 6 hours    T(F): 98.6 (03-05-19 @ 07:00), Max: 99.8 (03-04-19 @ 15:00)  HR: 85 (03-05-19 @ 09:46)  BP: --  RR: 18 (03-05-19 @ 09:00)  SpO2: 100% (03-05-19 @ 09:46)  Wt(kg): --    PHYSICAL EXAM:  General: poorly interactive, no acute distress  Eyes:  anicteric, no conjunctival injection, no discharge  Oropharynx: no lesions or injection 	  Neck: trach  Lungs: clear to auscultation  Heart: regular rate and rhythm; no murmur, rubs or gallops  Abdomen: soft, nondistended, nontender, peg  Skin: no lesions  Extremities: no clubbing, cyanosis, trace edema  Neurologic: not following commands    LAB RESULTS:                        9.0    5.9   )-----------( 221      ( 04 Mar 2019 21:44 )             24.8     03-05    131<L>  |  93<L>  |  8   ----------------------------<  112<H>  3.7   |  27  |  0.34<L>    Ca    9.1      05 Mar 2019 05:11  Phos  2.5     03-04  Mg     1.8     03-04          MICROBIOLOGY:  RECENT CULTURES:  03-03 @ 11:56 .CSF CSF lumbar received     No growth    Rare polymorphonuclear leukocytes per low power field  No organisms seen per oil power field  by cytocentrifuge    03-03 @ 01:02 .Blood Blood-Venous     No growth to date.          RADIOLOGY REVIEWED:  < from: CT Head No Cont (03.05.19 @ 08:48) >  INTERPRETATION:  Noncontrast CT of the brain.    CLINICAL INDICATION:  f/u intracranial hemorrhage and edema    TECHNIQUE : Axial CT scanning of the brain was obtained from the skull   base to the vertex without the administration of intravenous contrast.      COMPARISON: CT brain 3/4/2019    FINDINGS:      Redemonstration of right frontal charito hole and subjacent minimally   hemorrhagic right frontal shunt tract.    Redemonstration of midline suboccipital craniectomy. Similar appearing   CSF density extracalvarial collection approximating the cranial defect   measures 4.4 x 1.5 cm.    Redemonstration of embolic material in the region of the cerebellar   vermisand along the tentorial leaves. Redemonstration of   encephalomalacia and gliosis vomiting the cerebellar vermis and mesial   cerebellar hemispheres.    Similar small hemorrhage layering in the occipital horns. Stable mild   ventricular dilatation.    IMPRESSION:    No significant interval change from 3/4/2019.    < end of copied text >  < from: Xray Chest 1 View- PORTABLE-Urgent (03.02.19 @ 22:07) >  IMPRESSION:   Clear lungs.    < end of copied text >

## 2019-03-05 NOTE — PROGRESS NOTE ADULT - ASSESSMENT
58y female with SLE, cerebellar AVM and bleed in Dec, s/p embolization, AVM resection, post fossa decompression, VPS, trach/PEG,   Persistent, "central" fever, but ultimately, fever resolved, pt improved, sent to rehab.    She apparently developed generalized Sz, fever and sent to [facility] ICU, received empiric Vanco and Cefepime, transferred here.    On TC, lethargic, without purposeful response, Tmx 103, but WBC/diff normal, CXR clear  CSF shows >500 WBCs, majority Polys, thus difficult to exclude ventriculitis, ?infected VPS- no org  She  has PRES from seizure and still on vent and encephalopathic. Some low grade fevers but not nearly as high as had been.   s/p  shunt removal, EVD placement, now also removed as of 3/3  cultures at [facility] all finalized and negative as per Dr Handley  All [facility] CSF cultures have been negative as well  Fevers may be on a non infectious basis, ? central  Plan:  Limit vanco and cefepime to 10 days,  ? chemical ventriculitis, stopped earlier  await additional culture data.  Will approach fever in a non empiric fashion  ? if her SLE has been active.

## 2019-03-05 NOTE — PROGRESS NOTE ADULT - SUBJECTIVE AND OBJECTIVE BOX
Vital Signs Last 24 Hrs  T(C): 37.7 (04 Mar 2019 23:00), Max: 38.2 (04 Mar 2019 07:00)  T(F): 99.8 (04 Mar 2019 23:00), Max: 100.8 (04 Mar 2019 07:00)  HR: 83 (05 Mar 2019 00:00) (62 - 94)  BP: --  BP(mean): --  RR: 18 (05 Mar 2019 00:00) (16 - 21)  SpO2: 100% (05 Mar 2019 00:00) (100% - 100%)    EXAM  Trached, EO, tracks,  pupils 3mm reac,  int FC on RUE, LUE spont, inter follows lowers (wiggles toes)

## 2019-03-05 NOTE — CONSULT NOTE ADULT - SUBJECTIVE AND OBJECTIVE BOX
CHIEF COMPLAINT: Tx for seizures/ removal of VPS    HPI: 58 F with a PMH of SLE, RA, ruptured cerebellar AVM s/p embolization and craniotomy for resection/  shunt placement/ trach/ PEG in December 2018, transferred to NS from [facility] on 2/23 where she presented from rehab with seizures. An LP at [facility] reportedly showed leukocytosis. Due to the suspicion of  shunt infection, the patient was transferred to [facility] for further management. MRI brain showed possible PRESs and she required a cardene gtt for BP control. She had removal of VPS and placement of an EVD on 2/25/19. The EVD was removed on 3/3. Repeat Ct head has been stable. Her temperature curve has improved. She has completed a 10 day course of Vanc/ Cefepime and is currently being observed off antibiotics. She has remained stable on Keppra BID and EEGs (last 2/26) have been negative for seizures. Her hospital course has also been complicated by myoclonus which has improved with benzos and ongoing need for mech ventilation (pt was on TC in rehab).       PAST MEDICAL & SURGICAL HISTORY:  Systemic lupus erythematosus, unspecified SLE type, unspecified organ involvement status  Lupus  Back pain, chronic  Childhood asthma  Ovarian cyst  DVT (deep venous thrombosis), bilateral: 1993( treated with Heparin and coumadin)  RA (rheumatoid arthritis)  No significant past surgical history  No significant past surgical history  Plantar fasciitis of right foot: h/o surgery  History of laparoscopic cholecystectomy: 1993      FAMILY HISTORY:  No pertinent family history in first degree relatives  Family history of kidney disease in father  Family history of heart disease  Family history of osteoarthritis  Family history of hypertension in mother      SOCIAL HISTORY: Unable to obtain as patient is on vent   Smoking: [ ] Never Smoked [ ] Former Smoker (__ packs x ___ years) [ ] Current Smoker  (__ packs x ___ years)  Substance Use: [ ] Never Used [ ] Used ____  EtOH Use:  Marital Status: [ x] Single [ ]  [ ]  [ ]   Sexual History:   Occupation:   Recent Travel:  Advance Directives:    Allergies    No Known Allergies    Intolerances        HOME MEDICATIONS:  Reviewed     REVIEW OF SYSTEMS:  Constitutional: [ ] negative [ ] fevers [ ] chills [ ] weight loss [ ] weight gain  HEENT: [ ] negative [ ] dry eyes [ ] eye irritation [ ] postnasal drip [ ] nasal congestion  CV: [ ] negative  [ ] chest pain [ ] orthopnea [ ] palpitations [ ] murmur  Resp: [ ] negative [ ] cough [ ] shortness of breath [ ] dyspnea [ ] wheezing [ ] sputum [ ] hemoptysis  GI: [ ] negative [ ] nausea [ ] vomiting [ ] diarrhea [ ] constipation [ ] abd pain [ ] dysphagia   : [ ] negative [ ] dysuria [ ] nocturia [ ] hematuria [ ] increased urinary frequency  Musculoskeletal: [ ] negative [ ] back pain [ ] myalgias [ ] arthralgias [ ] fracture  Skin: [ ] negative [ ] rash [ ] itch  Neurological: [ ] negative [ ] headache [ ] dizziness [ ] syncope [ ] weakness [ ] numbness  Psychiatric: [ ] negative [ ] anxiety [ ] depression  Endocrine: [ ] negative [ ] diabetes [ ] thyroid problem  Hematologic/Lymphatic: [ ] negative [ ] anemia [ ] bleeding problem  Allergic/Immunologic: [ ] negative [ ] itchy eyes [ ] nasal discharge [ ] hives [ ] angioedema  [ ] All other systems negative  [x ] Unable to assess ROS because ____pt vented ____    OBJECTIVE:  ICU Vital Signs Last 24 Hrs  T(C): 37 (05 Mar 2019 11:00), Max: 37.7 (04 Mar 2019 15:00)  T(F): 98.6 (05 Mar 2019 11:00), Max: 99.8 (04 Mar 2019 15:00)  HR: 82 (05 Mar 2019 13:00) (73 - 95)  BP: --  BP(mean): --  ABP: 139/80 (05 Mar 2019 13:00) (113/69 - 184/96)  ABP(mean): 105 (05 Mar 2019 13:00) (89 - 131)  RR: 25 (05 Mar 2019 13:00) (18 - 25)  SpO2: 100% (05 Mar 2019 13:00) (100% - 100%)    Mode: CPAP with PS, FiO2: 40, PEEP: 5, PS: 5, MAP: 10    03-04 @ 07:01  -  03-05 @ 07:00  --------------------------------------------------------  IN: 3098 mL / OUT: 1575 mL / NET: 1523 mL    03-05 @ 07:01  -  03-05 @ 14:43  --------------------------------------------------------  IN: 510 mL / OUT: 0 mL / NET: 510 mL      CAPILLARY BLOOD GLUCOSE          PHYSICAL EXAM:  General: NAD  HEENT: PERRL  Lymph Nodes: No palpable cervical LNs  Neck: +trach, supple  Respiratory: Decreased Bs b/l bases, no wheezing or crackles   Cardiovascular: RRR, S1+S2+0  Abdomen: Soft, NT, ND, BS+, PEG in place   Extremities: Mild b/l Le edema, pulses + b/l LEs  Skin: No rash  Neurological: Opens eyes when called. Moves upper extremities b/l, only able to wiggle toes in b/l LEs  Psychiatry: Unable to assess    HOSPITAL MEDICATIONS:  enoxaparin Injectable 40 milliGRAM(s) SubCutaneous <User Schedule>      amLODIPine   Tablet 10 milliGRAM(s) Oral daily  labetalol 400 milliGRAM(s) Oral every 8 hours  losartan 25 milliGRAM(s) Oral daily      ipratropium    for Nebulization 500 MICROGram(s) Nebulizer every 6 hours    acetaminophen    Suspension .. 650 milliGRAM(s) Oral every 6 hours PRN  clonazePAM Tablet 1 milliGRAM(s) Oral every 12 hours  clonazePAM Tablet 0.25 milliGRAM(s) Oral every 8 hours PRN  levETIRAcetam  Solution 1000 milliGRAM(s) Oral two times a day  oxyCODONE    IR 5 milliGRAM(s) Oral every 4 hours PRN  oxyCODONE    IR 10 milliGRAM(s) Oral every 6 hours PRN    pantoprazole   Suspension 40 milliGRAM(s) Oral daily        sodium chloride 2 Gram(s) Oral every 6 hours      chlorhexidine 4% Liquid 1 Application(s) Topical <User Schedule>        LABS:                        9.0    5.9   )-----------( 221      ( 04 Mar 2019 21:44 )             24.8     Hgb Trend: 9.0<--, 9.4<--, 9.5<--, 10.2<--  03-05    131<L>  |  93<L>  |  8   ----------------------------<  112<H>  3.7   |  27  |  0.34<L>    Ca    9.1      05 Mar 2019 05:11  Phos  2.5     03-04  Mg     1.8     03-04      Creatinine Trend: 0.34<--, <0.30<--, <0.30<--, <0.30<--, 0.47<--, 0.31<--      Arterial Blood Gas:  03-04 @ 02:58  7.42/47/175/30/100/5.6  ABG lactate: --        MICROBIOLOGY:   Culture - CSF with Gram Stain . (03.03.19 @ 11:56)    Gram Stain:   Rare polymorphonuclear leukocytes per low power field  No organisms seen per oil power field  by cytocentrifuge    Specimen Source: .CSF CSF lumbar received    Culture Results:   No growth      Culture - Blood (03.03.19 @ 01:02)    Specimen Source: .Blood Blood-Peripheral    Culture Results:   No growth to date.    Culture - Sputum . (02.25.19 @ 01:40)    Gram Stain:   Numerous polymorphonuclear leukocytes per low power field  Numerous Squamous epithelial cells per low power field  Rare Gram positive cocci in pairs per oil power field  Rare Gram Variable Rods per oil power field    Specimen Source: .Sputum Sputum    Culture Results:   Normal Respiratory Tori present      RADIOLOGY:  < from: CT Head No Cont (03.05.19 @ 08:48) >  Redemonstration of right frontal charito hole and subjacent minimally   hemorrhagic right frontal shunt tract.    Redemonstration of midline suboccipital craniectomy. Similar appearing   CSF density extracalvarial collection approximating the cranial defect   measures 4.4 x 1.5 cm.    Redemonstration of embolic material in the region of the cerebellar   vermisand along the tentorial leaves. Redemonstration of   encephalomalacia and gliosis vomiting the cerebellar vermis and mesial   cerebellar hemispheres.    Similar small hemorrhage layering in the occipital horns. Stable mild   ventricular dilatation.  < from: MR Head w/wo IV Cont (02.25.19 @ 00:26) >  New bilateral parieto-occipital vasogenic edema  unassociated with   diffusion abnormality or enhancement likely represents posterior   reversible encephalopathy syndrome. Other infectious/inflammatory   processes aren't completely excluded.  No acute infarct.  Postsurgical changes of the posterior fossa with embolization material   for AVM.      < from: CT Head No Cont (02.23.19 @ 23:24) >  Right frontal approach ventricular shunt catheter with tip projected over   the left frontal horn, unchanged. Interval decrease in ventricular size   compared with prior exam; ventricles are slitlike.    New low attenuationin the bilateral occipital subcortical white matter   (left greater than right) concerning for white matter edema. MRI of the   brain is recommended for further evaluation.        [ ] Reviewed and interpreted by me    PULMONARY FUNCTION TESTS:    EKG:

## 2019-03-06 ENCOUNTER — TRANSCRIPTION ENCOUNTER (OUTPATIENT)
Age: 59
End: 2019-03-06

## 2019-03-06 LAB
ANION GAP SERPL CALC-SCNC: 9 MMOL/L — SIGNIFICANT CHANGE UP (ref 5–17)
BUN SERPL-MCNC: 11 MG/DL — SIGNIFICANT CHANGE UP (ref 7–23)
C DIFF GDH STL QL: NEGATIVE — SIGNIFICANT CHANGE UP
C DIFF GDH STL QL: SIGNIFICANT CHANGE UP
CALCIUM SERPL-MCNC: 8.9 MG/DL — SIGNIFICANT CHANGE UP (ref 8.4–10.5)
CHLORIDE SERPL-SCNC: 101 MMOL/L — SIGNIFICANT CHANGE UP (ref 96–108)
CO2 SERPL-SCNC: 28 MMOL/L — SIGNIFICANT CHANGE UP (ref 22–31)
CREAT SERPL-MCNC: 0.38 MG/DL — LOW (ref 0.5–1.3)
GLUCOSE SERPL-MCNC: 108 MG/DL — HIGH (ref 70–99)
HCT VFR BLD CALC: 22.5 % — LOW (ref 34.5–45)
HGB BLD-MCNC: 8.3 G/DL — LOW (ref 11.5–15.5)
MAGNESIUM SERPL-MCNC: 2 MG/DL — SIGNIFICANT CHANGE UP (ref 1.6–2.6)
MCHC RBC-ENTMCNC: 31.9 PG — SIGNIFICANT CHANGE UP (ref 27–34)
MCHC RBC-ENTMCNC: 37 GM/DL — HIGH (ref 32–36)
MCV RBC AUTO: 86 FL — SIGNIFICANT CHANGE UP (ref 80–100)
PHOSPHATE SERPL-MCNC: 2.8 MG/DL — SIGNIFICANT CHANGE UP (ref 2.5–4.5)
PLATELET # BLD AUTO: 253 K/UL — SIGNIFICANT CHANGE UP (ref 150–400)
POTASSIUM SERPL-MCNC: 3.6 MMOL/L — SIGNIFICANT CHANGE UP (ref 3.5–5.3)
POTASSIUM SERPL-SCNC: 3.6 MMOL/L — SIGNIFICANT CHANGE UP (ref 3.5–5.3)
RBC # BLD: 2.61 M/UL — LOW (ref 3.8–5.2)
RBC # FLD: 15.8 % — HIGH (ref 10.3–14.5)
SODIUM SERPL-SCNC: 138 MMOL/L — SIGNIFICANT CHANGE UP (ref 135–145)
TROPONIN T, HIGH SENSITIVITY RESULT: 13 NG/L — SIGNIFICANT CHANGE UP (ref 0–51)
WBC # BLD: 4.5 K/UL — SIGNIFICANT CHANGE UP (ref 3.8–10.5)
WBC # FLD AUTO: 4.5 K/UL — SIGNIFICANT CHANGE UP (ref 3.8–10.5)

## 2019-03-06 PROCEDURE — 71045 X-RAY EXAM CHEST 1 VIEW: CPT | Mod: 26

## 2019-03-06 PROCEDURE — 93010 ELECTROCARDIOGRAM REPORT: CPT

## 2019-03-06 PROCEDURE — 99233 SBSQ HOSP IP/OBS HIGH 50: CPT

## 2019-03-06 RX ORDER — POTASSIUM CHLORIDE 20 MEQ
40 PACKET (EA) ORAL ONCE
Qty: 0 | Refills: 0 | Status: COMPLETED | OUTPATIENT
Start: 2019-03-06 | End: 2019-03-06

## 2019-03-06 RX ORDER — LABETALOL HCL 100 MG
200 TABLET ORAL EVERY 8 HOURS
Qty: 0 | Refills: 0 | Status: DISCONTINUED | OUTPATIENT
Start: 2019-03-06 | End: 2019-03-07

## 2019-03-06 RX ADMIN — LEVETIRACETAM 1000 MILLIGRAM(S): 250 TABLET, FILM COATED ORAL at 05:50

## 2019-03-06 RX ADMIN — Medication 500 MICROGRAM(S): at 12:54

## 2019-03-06 RX ADMIN — Medication 40 MILLIEQUIVALENT(S): at 05:50

## 2019-03-06 RX ADMIN — AMLODIPINE BESYLATE 10 MILLIGRAM(S): 2.5 TABLET ORAL at 05:50

## 2019-03-06 RX ADMIN — SODIUM CHLORIDE 2 GRAM(S): 9 INJECTION INTRAMUSCULAR; INTRAVENOUS; SUBCUTANEOUS at 12:02

## 2019-03-06 RX ADMIN — SODIUM CHLORIDE 2 GRAM(S): 9 INJECTION INTRAMUSCULAR; INTRAVENOUS; SUBCUTANEOUS at 17:25

## 2019-03-06 RX ADMIN — SODIUM CHLORIDE 2 GRAM(S): 9 INJECTION INTRAMUSCULAR; INTRAVENOUS; SUBCUTANEOUS at 00:02

## 2019-03-06 RX ADMIN — Medication 40 MILLIEQUIVALENT(S): at 22:44

## 2019-03-06 RX ADMIN — Medication 1 MILLIGRAM(S): at 17:26

## 2019-03-06 RX ADMIN — PANTOPRAZOLE SODIUM 40 MILLIGRAM(S): 20 TABLET, DELAYED RELEASE ORAL at 12:02

## 2019-03-06 RX ADMIN — ENOXAPARIN SODIUM 40 MILLIGRAM(S): 100 INJECTION SUBCUTANEOUS at 17:25

## 2019-03-06 RX ADMIN — SODIUM CHLORIDE 2 GRAM(S): 9 INJECTION INTRAMUSCULAR; INTRAVENOUS; SUBCUTANEOUS at 05:50

## 2019-03-06 RX ADMIN — Medication 500 MICROGRAM(S): at 00:27

## 2019-03-06 RX ADMIN — CHLORHEXIDINE GLUCONATE 1 APPLICATION(S): 213 SOLUTION TOPICAL at 21:30

## 2019-03-06 RX ADMIN — Medication 500 MICROGRAM(S): at 17:19

## 2019-03-06 RX ADMIN — Medication 500 MICROGRAM(S): at 23:02

## 2019-03-06 RX ADMIN — Medication 1 MILLIGRAM(S): at 05:50

## 2019-03-06 RX ADMIN — SODIUM CHLORIDE 2 GRAM(S): 9 INJECTION INTRAMUSCULAR; INTRAVENOUS; SUBCUTANEOUS at 23:56

## 2019-03-06 RX ADMIN — LEVETIRACETAM 1000 MILLIGRAM(S): 250 TABLET, FILM COATED ORAL at 17:24

## 2019-03-06 RX ADMIN — Medication 500 MICROGRAM(S): at 05:01

## 2019-03-06 NOTE — PROGRESS NOTE ADULT - ASSESSMENT
PLAN    RCU when bed available  Poss send CDIFF loose stools continue after 24hrs of BM meds d/c'd  3/2 Pancx (Tmax 39)

## 2019-03-06 NOTE — PROGRESS NOTE ADULT - ASSESSMENT
ASSESSMENT/PLAN: VPS infection/ventriculitis; seizure     Neuro:  neurochecks q4hr  Keppra 1g bid for seizure control, cont  Klonipin for head tremor    Card:  -150mmHg  Controlled on amlodipine 10 daily, losartan 25 daily and labetolol 400 q8H    Pulm:  on vent - CPAP as tolerated, will try to wean to trach collar  RCU consult    GI:  tube feeding  Diarrhea - Bowel regimen d/c'ed  on Danactive; monitor    Renal:  Hyponatremia, possibly SIADH  - On salt tabs; will monitor    Endo:  euglycemia    Heme:  SCDs, on Lovenox    ID:  Off Abx now, completed 7 days, per ID  ID recommendations appreciated    full code  RCU - pending beds availability

## 2019-03-06 NOTE — PROGRESS NOTE ADULT - ASSESSMENT
58y female with SLE, cerebellar AVM and bleed in Dec, s/p embolization, AVM resection, post fossa decompression, VPS, trach/PEG,   Persistent, "central" fever, but ultimately, fever resolved, pt improved, sent to rehab.    She apparently developed generalized Sz, fever and sent to [facility] ICU, received empiric Vanco and Cefepime, transferred here.    On TC, lethargic, without purposeful response, Tmx 103, but WBC/diff normal, CXR clear  CSF shows >500 WBCs, majority Polys, thus difficult to exclude ventriculitis, ?infected VPS- no org  She  has PRES from seizure and still on vent and encephalopathic. Some low grade fevers but not nearly as high as had been.   s/p  shunt removal, EVD placement, now also removed as of 3/3  cultures at [facility] all finalized and negative as per Dr Handley  [facility] CSF cultures have been negative as well  Fevers may be on a non infectious basis, ? central  Her fevers have moderated, s/p 10 days of vanco/cefepime on 3/5  Now with diarrhea  Plan:  monitor off antibiotics  Favor CDT if diarrhea continues  Will approach fever in a non empiric fashion  ? if her SLE has been active.  ID issues reviewed with mother at bedside

## 2019-03-06 NOTE — PROGRESS NOTE ADULT - SUBJECTIVE AND OBJECTIVE BOX
SUMMARY:    58 year-old woman with ruptured cerebellar AVM s/p embolization and craniotomy for resection and  shunt placement in December 2018 was taken to [facility] 2/23/19 after having seizure in rehab. The patient reportedly had an LP that showed leukocytosis. Due to the suspicion of  shunt infection, the patient was transferred to [facility] for further management. She had removal of VPS and placememt of EVD on 2/25/19.     HOSPITAL COURSE:  3/3: EVD d/c'ed. Febrile overnight. Cultures sent. ICPs <12. Started on 2% for hyponatremia.   3/4: Loses neurological exam when patient spikes a fever    24 Hour Events: Afebrile. Stable.    VITALS/DATA/ORDERS: [x] Reviewed    PHYSICAL EXAM:  trach'ed, on vent  Intermittent head bobbing/titubation  EO to voice, EOMI, PERRL, FC - opens mouth/sticks out tongue,   follows commands on all extremities (shows thumbs on b/l UE, wiggles toes on b/l LE)  Transient antigravity movement in arms, no antigravity movement in legs  RRR  minimal rhonchi R>L, decreased breath sounds  soft/nd abd  wwp ext SUMMARY:    58 year-old woman with ruptured cerebellar AVM s/p embolization and craniotomy for resection and  shunt placement in December 2018 was taken to [facility] 2/23/19 after having seizure in rehab. The patient reportedly had an LP that showed leukocytosis. Due to the suspicion of  shunt infection, the patient was transferred to [facility] for further management. She had removal of VPS and placement of EVD on 2/25/19.     HOSPITAL COURSE:  3/3: EVD d/c'ed. Febrile overnight. Cultures sent. ICPs <12. Started on 2% for hyponatremia.   3/4: Loses neurological exam when patient spikes a fever    24 Hour Events: Afebrile. Stable.    VITALS/DATA/ORDERS: [x] Reviewed    PHYSICAL EXAM:  trach'ed, on vent  Intermittent head bobbing/titubation  EO to voice, EOMI, PERRL, FC - opens mouth/sticks out tongue,   follows commands on all extremities (shows thumbs on b/l UE, wiggles toes on b/l LE)  Transient antigravity movement in arms, no antigravity movement in legs  RRR  minimal rhonchi R>L, decreased breath sounds  soft/nd abd  wwp ext

## 2019-03-06 NOTE — PROGRESS NOTE ADULT - SUBJECTIVE AND OBJECTIVE BOX
CC: f/u for fevers and ventriculitis    Patient reports: she is awake, able to follow simple commands    REVIEW OF SYSTEMS:  All other review of systems negative (Comprehensive ROS)    Antimicrobials Day #  :off    Other Medications Reviewed    T(F): 99 (03-06-19 @ 11:00), Max: 99.3 (03-05-19 @ 23:00)  HR: 94 (03-06-19 @ 11:00)  BP: 114/71 (03-06-19 @ 11:00)  RR: 18 (03-06-19 @ 11:00)  SpO2: 100% (03-06-19 @ 11:00)  Wt(kg): --    PHYSICAL EXAM:  General: alert, no acute distress, myoclonic movements?  Eyes:  anicteric, no conjunctival injection, no discharge  Oropharynx: no lesions or injection 	  Neck: trach  Lungs: clear to auscultation  Heart: regular rate and rhythm; no murmur, rubs or gallops  Abdomen: soft, nondistended, nontender,pegy  Skin: no lesions  Extremities: no clubbing, cyanosis, + edema  Neurologic: awake, ? cognitive state    LAB RESULTS:                        8.7    5.8   )-----------( 242      ( 05 Mar 2019 22:51 )             23.7     03-05    137  |  96  |  12  ----------------------------<  110<H>  3.6   |  28  |  0.35<L>    Ca    9.0      05 Mar 2019 22:51  Phos  2.6     03-05  Mg     2.0     03-05          MICROBIOLOGY:  RECENT CULTURES:  03-03 @ 11:56 .CSF CSF lumbar received     No growth    Rare polymorphonuclear leukocytes per low power field  No organisms seen per oil power field  by cytocentrifuge    03-03 @ 01:02 .Blood Blood-Venous     No growth to date.          RADIOLOGY REVIEWED:    < from: Xray Chest 1 View- PORTABLE-Routine (03.06.19 @ 05:42) >    IMPRESSION:  1.  The lungsare clear.     < end of copied text >

## 2019-03-06 NOTE — PROGRESS NOTE ADULT - SUBJECTIVE AND OBJECTIVE BOX
Vital Signs Last 24 Hrs  T(C): 37.2 (05 Mar 2019 19:00), Max: 37.6 (05 Mar 2019 03:00)  T(F): 99 (05 Mar 2019 19:00), Max: 99.6 (05 Mar 2019 03:00)  HR: 94 (06 Mar 2019 00:27) (73 - 95)  BP: --  BP(mean): --  RR: 18 (05 Mar 2019 23:00) (18 - 25)  SpO2: 99% (06 Mar 2019 00:27) (99% - 100%)    EXAM  Trached, EO, tracks,  pupils 3mm reac,  int FC on RUE, LUE spont, inter follows lowers (wiggles toes)

## 2019-03-07 PROCEDURE — 99233 SBSQ HOSP IP/OBS HIGH 50: CPT

## 2019-03-07 RX ORDER — LABETALOL HCL 100 MG
100 TABLET ORAL EVERY 12 HOURS
Qty: 0 | Refills: 0 | Status: DISCONTINUED | OUTPATIENT
Start: 2019-03-07 | End: 2019-03-11

## 2019-03-07 RX ADMIN — Medication 500 MICROGRAM(S): at 12:08

## 2019-03-07 RX ADMIN — LEVETIRACETAM 1000 MILLIGRAM(S): 250 TABLET, FILM COATED ORAL at 17:52

## 2019-03-07 RX ADMIN — SODIUM CHLORIDE 2 GRAM(S): 9 INJECTION INTRAMUSCULAR; INTRAVENOUS; SUBCUTANEOUS at 23:27

## 2019-03-07 RX ADMIN — ENOXAPARIN SODIUM 40 MILLIGRAM(S): 100 INJECTION SUBCUTANEOUS at 17:25

## 2019-03-07 RX ADMIN — LOSARTAN POTASSIUM 25 MILLIGRAM(S): 100 TABLET, FILM COATED ORAL at 09:05

## 2019-03-07 RX ADMIN — CHLORHEXIDINE GLUCONATE 1 APPLICATION(S): 213 SOLUTION TOPICAL at 21:53

## 2019-03-07 RX ADMIN — SODIUM CHLORIDE 2 GRAM(S): 9 INJECTION INTRAMUSCULAR; INTRAVENOUS; SUBCUTANEOUS at 11:33

## 2019-03-07 RX ADMIN — Medication 200 MILLIGRAM(S): at 09:05

## 2019-03-07 RX ADMIN — LEVETIRACETAM 1000 MILLIGRAM(S): 250 TABLET, FILM COATED ORAL at 06:09

## 2019-03-07 RX ADMIN — Medication 500 MICROGRAM(S): at 05:22

## 2019-03-07 RX ADMIN — SODIUM CHLORIDE 2 GRAM(S): 9 INJECTION INTRAMUSCULAR; INTRAVENOUS; SUBCUTANEOUS at 06:10

## 2019-03-07 RX ADMIN — Medication 1 MILLIGRAM(S): at 17:24

## 2019-03-07 RX ADMIN — Medication 500 MICROGRAM(S): at 17:29

## 2019-03-07 RX ADMIN — AMLODIPINE BESYLATE 10 MILLIGRAM(S): 2.5 TABLET ORAL at 06:10

## 2019-03-07 RX ADMIN — SODIUM CHLORIDE 2 GRAM(S): 9 INJECTION INTRAMUSCULAR; INTRAVENOUS; SUBCUTANEOUS at 17:24

## 2019-03-07 RX ADMIN — Medication 1 MILLIGRAM(S): at 06:10

## 2019-03-07 RX ADMIN — Medication 500 MICROGRAM(S): at 23:28

## 2019-03-07 RX ADMIN — PANTOPRAZOLE SODIUM 40 MILLIGRAM(S): 20 TABLET, DELAYED RELEASE ORAL at 11:33

## 2019-03-07 NOTE — PROGRESS NOTE ADULT - ASSESSMENT
ASSESSMENT/PLAN: VPS infection/ventriculitis; seizure     Neuro:  neurochecks q4hr  Keppra 1g bid for seizure control, cont  Klonipin for head tremor    Card:  -150mmHg  on amlodipine 10 daily, losartan 25 daily and labetolol 400 q8H, decrease Labetalol as BP is soft    Pulm:  on vent - CPAP as tolerated    GI:  tube feeding  Diarrhea - improving  on Danactive; monitor    Renal:  Hyponatremia, possibly SIADH  - On salt tabs; will monitor    Endo:  euglycemia    Heme:  SCDs, on Lovenox    ID:  Off Abx now, completed 7 days, per ID  ID recommendations appreciated    full code  RCU - pending beds availability ASSESSMENT/PLAN: VPS infection/ventriculitis; seizure     Neuro:  neurochecks q4hr  Keppra 1g bid for seizure control, cont  Klonipin for head tremor  Will continue to monitor for need for VPS; d/w Dr. Murdock    Card:  -150mmHg  Adjust antihypertensives to meet goal    Pulm:  on vent - CPAP as tolerated    GI:  tube feeding  Diarrhea - improving  on Danactive; monitor    Renal:  Hyponatremia, possibly SIADH  - On salt tabs; will monitor    Endo:  euglycemia    Heme:  SCDs, on Lovenox    ID:  Off Abx now, completed 7 days, per ID  ID recommendations appreciated    full code

## 2019-03-07 NOTE — PHARMACOTHERAPY INTERVENTION NOTE - COMMENTS
Labetaolol dose due but base on holding parameter patient will not received dose per RN. Patient will need adjustments of blood pressure medications

## 2019-03-07 NOTE — DISCHARGE NOTE NURSING/CASE MANAGEMENT/SOCIAL WORK - NSDCPEWEB_GEN_ALL_CORE
[facility] for Tobacco Control website --- [email or web address] website --- [email or web address]

## 2019-03-07 NOTE — DISCHARGE NOTE NURSING/CASE MANAGEMENT/SOCIAL WORK - NSDCDPATPORTLINK_GEN_ALL_CORE
You can access the [facility] Patient Portal, offered by [facility], by registering with the following website: [email or web address]

## 2019-03-07 NOTE — DISCHARGE NOTE NURSING/CASE MANAGEMENT/SOCIAL WORK - NSDCPEEMAIL_GEN_ALL_CORE
[facility] for Tobacco Control email [email or web address]

## 2019-03-07 NOTE — PROGRESS NOTE ADULT - SUBJECTIVE AND OBJECTIVE BOX
Vital Signs Last 24 Hrs  T(C): 37.5 (06 Mar 2019 23:00), Max: 37.5 (06 Mar 2019 23:00)  T(F): 99.5 (06 Mar 2019 23:00), Max: 99.5 (06 Mar 2019 23:00)  HR: 93 (06 Mar 2019 23:38) (82 - 102)  BP: 110/87 (06 Mar 2019 23:00) (102/61 - 120/67)  BP(mean): 93 (06 Mar 2019 23:00) (73 - 93)  RR: 18 (06 Mar 2019 23:00) (16 - 19)  SpO2: 100% (06 Mar 2019 23:38) (100% - 100%)    EXAM  Trached, EO, tracks,  pupils 3mm reac,  FC on b/l UE antigav, inter follows lowers (wiggles toes)

## 2019-03-07 NOTE — PROGRESS NOTE ADULT - SUBJECTIVE AND OBJECTIVE BOX
SUMMARY:    58 year-old woman with ruptured cerebellar AVM s/p embolization and craniotomy for resection and  shunt placement in December 2018 was taken to [facility] 2/23/19 after having seizure in rehab. The patient reportedly had an LP that showed leukocytosis. Due to the suspicion of  shunt infection, the patient was transferred to [facility] for further management. She had removal of VPS and placement of EVD on 2/25/19.     HOSPITAL COURSE:  3/3: EVD d/c'ed. Febrile overnight. Cultures sent. ICPs <12. Started on 2% for hyponatremia.   3/4: Loses neurological exam when patient spikes a fever    24 Hour Events: Afebrile. Stable.    VITALS/DATA/ORDERS: [x] Reviewed    PHYSICAL EXAM:  trach'ed, on vent  Intermittent head bobbing/titubation  EO to voice, EOMI, PERRL, FC - opens mouth/sticks out tongue,   follows commands on all extremities (shows thumbs on b/l UE, wiggles toes on b/l LE)  Transient antigravity movement in arms, no antigravity movement in legs  RRR  minimal rhonchi R>L, decreased breath sounds  soft/nd abd  wwp ext

## 2019-03-07 NOTE — DISCHARGE NOTE NURSING/CASE MANAGEMENT/SOCIAL WORK - NSDCPEPTSTRK_GEN_ALL_CORE
Stroke warning signs and symptoms/Signs and symptoms of stroke/Stroke education booklet/Call 911 for stroke/Need for follow up after discharge/Prescribed medications/Risk factors for stroke/Stroke support groups for patients, families, and friends

## 2019-03-07 NOTE — PROGRESS NOTE ADULT - ASSESSMENT
58y female with SLE, cerebellar AVM and bleed in Dec, s/p embolization, AVM resection, post fossa decompression, VPS, trach/PEG,   Persistent, "central" fever, but ultimately, fever resolved, pt improved, sent to rehab.    She apparently developed generalized Sz, fever and sent to [facility] ICU, received empiric Vanco and Cefepime, transferred here.    On TC, lethargic, without purposeful response, Tmx 103, but WBC/diff normal, CXR clear  CSF shows >500 WBCs, majority Polys, thus difficult to exclude ventriculitis, ?infected VPS- no org  She  has PRES from seizure and still on vent and encephalopathic. Some low grade fevers but not nearly as high as had been.   s/p  shunt removal, EVD placement, now also removed as of 3/3  cultures at [facility] all finalized and negative as per Dr Handley  All [facility] CSF cultures have been negative as well  Fevers may be on a non infectious basis, ? central  Her fevers have moderated, s/p 10 days of vanco/cefepime on 3/5  Now with diarrhea,temps have moderated, CDT is negative  Plan:  monitor off antibiotics  Will approach fever in a non empiric fashion  ? if her SLE has been active.  supportive care per NS

## 2019-03-07 NOTE — PROGRESS NOTE ADULT - SUBJECTIVE AND OBJECTIVE BOX
CC: f/u for fevers    Patient reports: she is not very interactive at this point    REVIEW OF SYSTEMS:  All other review of systems negative (Comprehensive ROS): limited by underlying condition    Antimicrobials Day #  :off    Other Medications Reviewed    T(F): 99.5 (03-07-19 @ 15:00), Max: 99.5 (03-06-19 @ 23:00)  HR: 95 (03-07-19 @ 15:00)  BP: 136/97 (03-07-19 @ 07:00)  RR: 18 (03-07-19 @ 15:00)  SpO2: 100% (03-07-19 @ 15:00)  Wt(kg): --    PHYSICAL EXAM:  General: lethargic, no acute distress  Eyes:  anicteric, no conjunctival injection, no discharge  Oropharynx: no lesions or injection 	  Neck: trach  Lungs: clear to auscultation  Heart: regular rate and rhythm; no murmur, rubs or gallops  Abdomen: soft, nondistended, nontender,peg  Skin: no lesions  Extremities: no clubbing, cyanosis, + edema  Neurologic: poorly interactive    LAB RESULTS:                        8.3    4.5   )-----------( 253      ( 06 Mar 2019 21:05 )             22.5     03-06    138  |  101  |  11  ----------------------------<  108<H>  3.6   |  28  |  0.38<L>    Ca    8.9      06 Mar 2019 21:05  Phos  2.8     03-06  Mg     2.0     03-06          MICROBIOLOGY:  RECENT CULTURES:  03-03 @ 11:56 .CSF CSF lumbar received     No growth    Rare polymorphonuclear leukocytes per low power field  No organisms seen per oil power field  by cytocentrifuge    03-03 @ 01:02 .Blood Blood-Venous     No growth to date.          RADIOLOGY REVIEWED:  < from: Xray Chest 1 View- PORTABLE-Routine (03.06.19 @ 05:42) >  IMPRESSION:  1.  The lungsare clear.     < end of copied text >  < from: CT Head No Cont (03.05.19 @ 08:48) >  COMPARISON: CT brain 3/4/2019    FINDINGS:      Redemonstration of right frontal charito hole and subjacent minimally   hemorrhagic right frontal shunt tract.    Redemonstration of midline suboccipital craniectomy. Similar appearing   CSF density extracalvarial collection approximating the cranial defect   measures 4.4 x 1.5 cm.    Redemonstration of embolic material in the region of the cerebellar   vermisand along the tentorial leaves. Redemonstration of   encephalomalacia and gliosis vomiting the cerebellar vermis and mesial   cerebellar hemispheres.    Similar small hemorrhage layering in the occipital horns. Stable mild   ventricular dilatation.    IMPRESSION:    No significant interval change from 3/4/2019.    < end of copied text >

## 2019-03-08 LAB
ANION GAP SERPL CALC-SCNC: 12 MMOL/L — SIGNIFICANT CHANGE UP (ref 5–17)
APTT BLD: 30.1 SEC — SIGNIFICANT CHANGE UP (ref 27.5–36.3)
BLD GP AB SCN SERPL QL: NEGATIVE — SIGNIFICANT CHANGE UP
BUN SERPL-MCNC: 10 MG/DL — SIGNIFICANT CHANGE UP (ref 7–23)
CALCIUM SERPL-MCNC: 9.1 MG/DL — SIGNIFICANT CHANGE UP (ref 8.4–10.5)
CHLORIDE SERPL-SCNC: 101 MMOL/L — SIGNIFICANT CHANGE UP (ref 96–108)
CO2 SERPL-SCNC: 26 MMOL/L — SIGNIFICANT CHANGE UP (ref 22–31)
CREAT SERPL-MCNC: 0.35 MG/DL — LOW (ref 0.5–1.3)
CULTURE RESULTS: SIGNIFICANT CHANGE UP
CULTURE RESULTS: SIGNIFICANT CHANGE UP
GAS PNL BLDA: SIGNIFICANT CHANGE UP
GLUCOSE SERPL-MCNC: 131 MG/DL — HIGH (ref 70–99)
HCG SERPL-ACNC: <2 MIU/ML — SIGNIFICANT CHANGE UP
HCT VFR BLD CALC: 24.6 % — LOW (ref 34.5–45)
HGB BLD-MCNC: 8.8 G/DL — LOW (ref 11.5–15.5)
INR BLD: 1.16 RATIO — SIGNIFICANT CHANGE UP (ref 0.88–1.16)
MAGNESIUM SERPL-MCNC: 1.9 MG/DL — SIGNIFICANT CHANGE UP (ref 1.6–2.6)
MCHC RBC-ENTMCNC: 30.9 PG — SIGNIFICANT CHANGE UP (ref 27–34)
MCHC RBC-ENTMCNC: 35.6 GM/DL — SIGNIFICANT CHANGE UP (ref 32–36)
MCV RBC AUTO: 86.9 FL — SIGNIFICANT CHANGE UP (ref 80–100)
PHOSPHATE SERPL-MCNC: 2.6 MG/DL — SIGNIFICANT CHANGE UP (ref 2.5–4.5)
PLATELET # BLD AUTO: 276 K/UL — SIGNIFICANT CHANGE UP (ref 150–400)
POTASSIUM SERPL-MCNC: 3.8 MMOL/L — SIGNIFICANT CHANGE UP (ref 3.5–5.3)
POTASSIUM SERPL-SCNC: 3.8 MMOL/L — SIGNIFICANT CHANGE UP (ref 3.5–5.3)
PROTHROM AB SERPL-ACNC: 13.4 SEC — HIGH (ref 10–12.9)
RBC # BLD: 2.83 M/UL — LOW (ref 3.8–5.2)
RBC # FLD: 16.3 % — HIGH (ref 10.3–14.5)
RH IG SCN BLD-IMP: POSITIVE — SIGNIFICANT CHANGE UP
SODIUM SERPL-SCNC: 139 MMOL/L — SIGNIFICANT CHANGE UP (ref 135–145)
SPECIMEN SOURCE: SIGNIFICANT CHANGE UP
SPECIMEN SOURCE: SIGNIFICANT CHANGE UP
WBC # BLD: 4.5 K/UL — SIGNIFICANT CHANGE UP (ref 3.8–10.5)
WBC # FLD AUTO: 4.5 K/UL — SIGNIFICANT CHANGE UP (ref 3.8–10.5)

## 2019-03-08 PROCEDURE — 99233 SBSQ HOSP IP/OBS HIGH 50: CPT

## 2019-03-08 PROCEDURE — 70450 CT HEAD/BRAIN W/O DYE: CPT | Mod: 26

## 2019-03-08 RX ORDER — POTASSIUM CHLORIDE 20 MEQ
20 PACKET (EA) ORAL ONCE
Qty: 0 | Refills: 0 | Status: COMPLETED | OUTPATIENT
Start: 2019-03-08 | End: 2019-03-08

## 2019-03-08 RX ORDER — SENNA PLUS 8.6 MG/1
5 TABLET ORAL AT BEDTIME
Qty: 0 | Refills: 0 | Status: DISCONTINUED | OUTPATIENT
Start: 2019-03-08 | End: 2019-03-11

## 2019-03-08 RX ORDER — DOCUSATE SODIUM 100 MG
100 CAPSULE ORAL
Qty: 0 | Refills: 0 | Status: DISCONTINUED | OUTPATIENT
Start: 2019-03-08 | End: 2019-03-11

## 2019-03-08 RX ADMIN — ENOXAPARIN SODIUM 40 MILLIGRAM(S): 100 INJECTION SUBCUTANEOUS at 17:22

## 2019-03-08 RX ADMIN — Medication 1 MILLIGRAM(S): at 17:24

## 2019-03-08 RX ADMIN — PANTOPRAZOLE SODIUM 40 MILLIGRAM(S): 20 TABLET, DELAYED RELEASE ORAL at 11:28

## 2019-03-08 RX ADMIN — Medication 100 MILLIGRAM(S): at 17:24

## 2019-03-08 RX ADMIN — Medication 0.25 MILLIGRAM(S): at 05:04

## 2019-03-08 RX ADMIN — SODIUM CHLORIDE 2 GRAM(S): 9 INJECTION INTRAMUSCULAR; INTRAVENOUS; SUBCUTANEOUS at 23:50

## 2019-03-08 RX ADMIN — Medication 20 MILLIEQUIVALENT(S): at 23:50

## 2019-03-08 RX ADMIN — AMLODIPINE BESYLATE 10 MILLIGRAM(S): 2.5 TABLET ORAL at 05:05

## 2019-03-08 RX ADMIN — CHLORHEXIDINE GLUCONATE 1 APPLICATION(S): 213 SOLUTION TOPICAL at 21:21

## 2019-03-08 RX ADMIN — SODIUM CHLORIDE 2 GRAM(S): 9 INJECTION INTRAMUSCULAR; INTRAVENOUS; SUBCUTANEOUS at 05:04

## 2019-03-08 RX ADMIN — Medication 500 MICROGRAM(S): at 05:28

## 2019-03-08 RX ADMIN — Medication 500 MICROGRAM(S): at 11:12

## 2019-03-08 RX ADMIN — Medication 1 MILLIGRAM(S): at 05:35

## 2019-03-08 RX ADMIN — LEVETIRACETAM 1000 MILLIGRAM(S): 250 TABLET, FILM COATED ORAL at 05:04

## 2019-03-08 RX ADMIN — Medication 500 MICROGRAM(S): at 17:10

## 2019-03-08 RX ADMIN — Medication 100 MILLIGRAM(S): at 06:10

## 2019-03-08 RX ADMIN — Medication 500 MICROGRAM(S): at 23:44

## 2019-03-08 RX ADMIN — SENNA PLUS 5 MILLILITER(S): 8.6 TABLET ORAL at 21:21

## 2019-03-08 RX ADMIN — LEVETIRACETAM 1000 MILLIGRAM(S): 250 TABLET, FILM COATED ORAL at 17:23

## 2019-03-08 RX ADMIN — SODIUM CHLORIDE 2 GRAM(S): 9 INJECTION INTRAMUSCULAR; INTRAVENOUS; SUBCUTANEOUS at 11:28

## 2019-03-08 RX ADMIN — SODIUM CHLORIDE 2 GRAM(S): 9 INJECTION INTRAMUSCULAR; INTRAVENOUS; SUBCUTANEOUS at 17:22

## 2019-03-08 NOTE — PROGRESS NOTE ADULT - SUBJECTIVE AND OBJECTIVE BOX
Patient seen and examined at bedside.    OVERNIGHT EVENTS:   No acute events overnight.    VITALS:  T(C): , Max: 37.5 (03-07-19 @ 07:00)  HR:  (87 - 99)  BP:  (106/68 - 138/83)  ABP:  (102/62 - 137/83)  RR:  (16 - 21)  SpO2:  (99% - 100%)  Wt(kg): --  Device: Avea, Mode: AC/ CMV (Assist Control/ Continuous Mandatory Ventilation), RR (machine): 18, TV (machine): 450, FiO2: 40, PEEP: 5, ITime: 1, MAP: 12, PIP: 28    03-06-19 @ 07:01  -  03-07-19 @ 07:00  --------------------------------------------------------  IN: 1523 mL / OUT: 1450 mL / NET: 73 mL    03-07-19 @ 07:01  -  03-08-19 @ 01:23  --------------------------------------------------------  IN: 1726 mL / OUT: 750 mL / NET: 976 mL      LABS:  Na: 138 (03-06 @ 21:05), 137 (03-05 @ 22:51), 131 (03-05 @ 05:11)  K: 3.6 (03-06 @ 21:05), 3.6 (03-05 @ 22:51), 3.7 (03-05 @ 05:11)  Cl: 101 (03-06 @ 21:05), 96 (03-05 @ 22:51), 93 (03-05 @ 05:11)  CO2: 28 (03-06 @ 21:05), 28 (03-05 @ 22:51), 27 (03-05 @ 05:11)  BUN: 11 (03-06 @ 21:05), 12 (03-05 @ 22:51), 8 (03-05 @ 05:11)  Cr: 0.38 (03-06 @ 21:05), 0.35 (03-05 @ 22:51), 0.34 (03-05 @ 05:11)  Glu: 108(03-06 @ 21:05), 110(03-05 @ 22:51), 112(03-05 @ 05:11)    Hgb: 8.3 (03-06 @ 21:05), 8.7 (03-05 @ 22:51)  Hct: 22.5 (03-06 @ 21:05), 23.7 (03-05 @ 22:51)  WBC: 4.5 (03-06 @ 21:05), 5.8 (03-05 @ 22:51)  Plt: 253 (03-06 @ 21:05), 242 (03-05 @ 22:51)    IMAGING:   Recent imaging studies were reviewed.    EXAMINATION:  Trached, EO to voice, tracks, pupils 3mm reactive b/l, follows simple commands on b/l UE antigrav, intermittently follows commands on b/l lower extremities (wiggles toes)

## 2019-03-08 NOTE — PROGRESS NOTE ADULT - ASSESSMENT
58F s/p embo and rsxn of ruptured AVM with VPS placement. Neurologically decline from baseline s/p VPS removal and EVD placement  -continue care per icu  -transfer to RCU when bed available

## 2019-03-08 NOTE — PROGRESS NOTE ADULT - ASSESSMENT
Cerebellar AVM status post VPS for hydrocephalus who returned for CNS infection status post externalization of shunt  - VPS on Monday per neurosurgery  - completed vanc/cefepime, cleared for shunt placement per ID  - SBP 90-160mmHg, continue BP meds, labetalol increased today  - Tremors: clonazepam  - RCU consult

## 2019-03-08 NOTE — PROGRESS NOTE ADULT - SUBJECTIVE AND OBJECTIVE BOX
SUMMARY:    58 year-old woman with ruptured cerebellar AVM s/p embolization and craniotomy for resection and  shunt placement in December 2018 was taken to [facility] 2/23/19 after having seizure in rehab. The patient reportedly had an LP that showed leukocytosis. Due to the suspicion of  shunt infection, the patient was transferred to [facility] for further management. She had removal of VPS and placement of EVD on 2/25/19.     HOSPITAL COURSE:  3/3: EVD d/c'ed. Febrile overnight. Cultures sent. ICPs <12. Started on 2% for hyponatremia.   3/4: Loses neurological exam when patient spikes a fever    24 Hour Events: noted to be apneic during CPAP trial.    VITALS/DATA/ORDERS: [x] Reviewed    PHYSICAL EXAM:  trach'ed, on vent  Intermittent head bobbing/titubation  EO to voice, EOMI, PERRL, FC - opens mouth/sticks out tongue,   follows commands on all extremities (shows thumbs on b/l UE, wiggles toes on b/l LE)  antigravity movement in arms, tries to bend knees  RRR  minimal rhonchi R>L, decreased breath sounds  soft/nd abd  wwp ext SUMMARY:    58 year-old woman with ruptured cerebellar AVM s/p embolization and craniotomy for resection and  shunt placement in December 2018 was taken to [facility] 2/23/19 after having seizure in rehab. The patient reportedly had an LP that showed leukocytosis. Due to the suspicion of  shunt infection, the patient was transferred to [facility] for further management. She had removal of VPS and placement of EVD on 2/25/19.     HOSPITAL COURSE:  3/3: EVD d/c'ed. Febrile overnight. Cultures sent. ICPs <12. Started on 2% for hyponatremia.   3/4: Loses neurological exam when patient spikes a fever    24 Hour Events: noted to be apneic during CPAP trial. CT with enlarged ventricles.     VITALS/DATA/ORDERS: [x] Reviewed    PHYSICAL EXAM:  trach'ed, on vent  Intermittent head bobbing/titubation  EO to voice, EOMI, PERRL, FC - opens mouth/sticks out tongue,   follows commands on all extremities (shows thumbs on b/l UE, wiggles toes on b/l LE)  antigravity movement in arms, tries to bend knees  RRR  minimal rhonchi R>L, decreased breath sounds  soft/nd abd  wwp ext

## 2019-03-08 NOTE — PROGRESS NOTE ADULT - SUBJECTIVE AND OBJECTIVE BOX
CC: f/u for fever    Patient remains on Vent, shaking movements, but more awake    REVIEW OF SYSTEMS:  not provided on Vent    Antimicrobials off  Medications Reviewed    Vital Signs Last 24 Hrs  T(F): 99.7 (08 Mar 2019 12:00), Max: 100.2 (08 Mar 2019 11:00)  HR: 90 (08 Mar 2019 12:00) (88 - 95)  BP: 106/68 (07 Mar 2019 19:00) (106/68 - 106/68)  BP(mean): 79 (07 Mar 2019 19:00) (79 - 79)  RR: 18 (08 Mar 2019 12:00) (18 - 18)  SpO2: 100% (08 Mar 2019 12:00) (99% - 100%)    PHYSICAL EXAM:  General: lethargic, no acute distress  Eyes:  anicteric, no conjunctival injection, no discharge  Oropharynx: no lesions or injection 	  Neck: trach  Lungs: clear to auscultation  Heart: regular rate and rhythm; no murmur, rubs or gallops  Abdomen: soft, nondistended, nontender, G tube site clean  Skin: no lesions  Extremities: + edema  Neurologic: poorly interactive    LAB RESULTS:                        8.3    4.5   )-----------( 253      ( 06 Mar 2019 21:05 )             22.5   03-06    138  |  101  |  11  ----------------------------<  108<H>  3.6   |  28  |  0.38<L>    Ca    8.9      06 Mar 2019 21:05  Phos  2.8     03-06  Mg     2.0     03-06      MICROBIOLOGY:  RECENT CULTURES:  03-03 @ 11:56 .CSF CSF lumbar received     No growth    03-03 @ 01:02 .Blood Blood-Venous     No growth to date.    RADIOLOGY REVIEWED:  Xray Chest 1 View- PORTABLE-Routine (03.06.19 @ 05:42) >  lungs are clear.

## 2019-03-08 NOTE — PROGRESS NOTE ADULT - SUBJECTIVE AND OBJECTIVE BOX
pre op for VPS on Monday    Trached, eyes open, tracks, pupils reactive, interm FC wiggle toes, BUE purposeful L>R, BLE WD L>R

## 2019-03-08 NOTE — CHART NOTE - NSCHARTNOTEFT_GEN_A_CORE
Nutrition Follow Up Note    Pt is a 59 y/o F with PMH lupus. 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 Currently admitted with generalized clonic seizure, leukocytosis and AMS. S/P VPS removal and EVD placement (), EVD removed 3/3. Pt off abx. On vent, CPAP as tolerated. Pending transfer to RCU. Rectal tube dc'ed. Pt remains non-verbal at this time.     Source: RN, Interdisciplinary rounds, EMR    Diet : NPO with EN via GT  Family not present at bedside. Per RN, pt tolerating Jevity 1.2 at goal rate 85ml/hr x 18 hrs. Provides: 1530ml formula, 1836kcal (21kcal/kg) and 85g protein (1.4g protein/kg IBW 59kg) + sami active twice daily. GT feeds infusing at goal rate at time of RD visit. Per review of flow sheet, pt received 1530ml x past 24 hrs (100% of goal). No signs of GI distress at this time.    TF Residuals:  (3/8) 10 ml  (3/7) 20 ml  Minimal residuals noted    Daily Weight in k.2 (03-06)  No new weight change to report.    Pertinent Medications: MEDICATIONS  (STANDING):  amLODIPine   Tablet 10 milliGRAM(s) Oral daily  chlorhexidine 4% Liquid 1 Application(s) Topical <User Schedule>  clonazePAM Tablet 1 milliGRAM(s) Oral every 12 hours  docusate sodium Liquid 100 milliGRAM(s) Oral two times a day  enoxaparin Injectable 40 milliGRAM(s) SubCutaneous <User Schedule>  ipratropium    for Nebulization 500 MICROGram(s) Nebulizer every 6 hours  labetalol 100 milliGRAM(s) Oral every 12 hours  levETIRAcetam  Solution 1000 milliGRAM(s) Oral two times a day  pantoprazole   Suspension 40 milliGRAM(s) Oral daily  senna Syrup 5 milliLiter(s) Oral at bedtime  sodium chloride 2 Gram(s) Oral every 6 hours    MEDICATIONS  (PRN):  acetaminophen    Suspension .. 650 milliGRAM(s) Oral every 6 hours PRN Temp greater or equal to 38C (100.4F), Mild Pain (1 - 3)  clonazePAM Tablet 0.25 milliGRAM(s) Oral every 8 hours PRN myoclonus    Pertinent Labs: (3/6) Creatinine 0.38, Glu 108    Skin per nursing documentation: No pressure ulcers  Edema: 1+ generalized    Estimated Needs:   [ x ] no change since previous assessment  [ ] recalculated:     Previous Nutrition Diagnosis: Increased nutrient needs  Nutrition Diagnosis is: Ongoing, being addressed with EN     Interventions:     Recommend  1) Continue Jevity 1.2 at goal rate 85ml/hr x 18 hrs. Provides: 1836kcal (21kcal/kg) and 85g protein (1.4g protein/kg IBW 59kg)   2) Continue dan active twice daily  3) Monitor labs/electrolytes    Monitoring and Evaluation:     Continue to monitor Nutritional intake, Tolerance to diet prescription, weights, labs, skin integrity  Jenae Doshi, Dietetic Intern [id number]     RD remains available upon request and will follow up per protocol Nutrition Follow Up Note    Pt is a 59 y/o F with PMH lupus. 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 Currently admitted with generalized clonic seizure, leukocytosis and AMS. S/P VPS removal and EVD placement (), EVD removed 3/3. Pt off abx. On vent, CPAP as tolerated. Pending transfer to RCU. Rectal tube dc'ed. Pt remains non-verbal at this time.     Source: RN, Interdisciplinary rounds, EMR    Diet : NPO with EN via GT  Family not present at bedside. Per RN, pt tolerating Jevity 1.2 at goal rate 85ml/hr x 18 hrs. Provides: 1530ml formula, 1836kcal (21kcal/kg) and 85g protein (1.4g protein/kg IBW 59kg) + sami active twice daily. GT feeds infusing at goal rate at time of RD visit. Per review of flow sheet, pt received 1530ml x past 24 hrs (100% of goal). No signs of GI distress at this time.    TF Residuals:  (3/8) 10 ml  (3/7) 20 ml  Minimal residuals noted    Per rectal tube:   (3/6) 50ml   (3/5) 25ml   Now discontinued.     Daily Weight in k.2 (-06)  No new weight change to report.    Pertinent Medications: MEDICATIONS  (STANDING):  amLODIPine   Tablet 10 milliGRAM(s) Oral daily  chlorhexidine 4% Liquid 1 Application(s) Topical <User Schedule>  clonazePAM Tablet 1 milliGRAM(s) Oral every 12 hours  docusate sodium Liquid 100 milliGRAM(s) Oral two times a day  enoxaparin Injectable 40 milliGRAM(s) SubCutaneous <User Schedule>  ipratropium    for Nebulization 500 MICROGram(s) Nebulizer every 6 hours  labetalol 100 milliGRAM(s) Oral every 12 hours  levETIRAcetam  Solution 1000 milliGRAM(s) Oral two times a day  pantoprazole   Suspension 40 milliGRAM(s) Oral daily  senna Syrup 5 milliLiter(s) Oral at bedtime  sodium chloride 2 Gram(s) Oral every 6 hours    MEDICATIONS  (PRN):  acetaminophen    Suspension .. 650 milliGRAM(s) Oral every 6 hours PRN Temp greater or equal to 38C (100.4F), Mild Pain (1 - 3)  clonazePAM Tablet 0.25 milliGRAM(s) Oral every 8 hours PRN myoclonus    Pertinent Labs: (3/6) Creatinine 0.38, Glu 108    Skin per nursing documentation: No pressure ulcers  Edema: 1+ generalized    Estimated Needs:   [x] no change since previous assessment  [ ] recalculated:     Previous Nutrition Diagnosis: Increased nutrient needs  Nutrition Diagnosis is: Ongoing, being addressed with EN    Interventions:     Recommend  1) Continue Jevity 1.2 at goal rate 85ml/hr x 18 hrs. Provides: 1836kcal (21kcal/kg) and 85g protein (1.4g protein/kg IBW 59kg)   2) Continue dan active twice daily  3) Monitor labs/electrolytes    Monitoring and Evaluation:     Continue to monitor Nutritional intake, Tolerance to diet prescription, weights, labs, skin integrity  Jenae Doshi, Dietetic Intern [id number]     RD remains available upon request and will follow up per protocol

## 2019-03-09 LAB — GAS PNL BLDA: SIGNIFICANT CHANGE UP

## 2019-03-09 PROCEDURE — 99232 SBSQ HOSP IP/OBS MODERATE 35: CPT

## 2019-03-09 RX ORDER — POLYETHYLENE GLYCOL 3350 17 G/17G
17 POWDER, FOR SOLUTION ORAL
Qty: 0 | Refills: 0 | Status: DISCONTINUED | OUTPATIENT
Start: 2019-03-09 | End: 2019-03-11

## 2019-03-09 RX ORDER — ENOXAPARIN SODIUM 100 MG/ML
40 INJECTION SUBCUTANEOUS
Qty: 0 | Refills: 0 | Status: DISCONTINUED | OUTPATIENT
Start: 2019-03-10 | End: 2019-03-10

## 2019-03-09 RX ORDER — ENOXAPARIN SODIUM 100 MG/ML
40 INJECTION SUBCUTANEOUS ONCE
Qty: 0 | Refills: 0 | Status: COMPLETED | OUTPATIENT
Start: 2019-03-09 | End: 2019-03-10

## 2019-03-09 RX ORDER — ENOXAPARIN SODIUM 100 MG/ML
40 INJECTION SUBCUTANEOUS
Qty: 0 | Refills: 0 | Status: DISCONTINUED | OUTPATIENT
Start: 2019-03-09 | End: 2019-03-09

## 2019-03-09 RX ORDER — DEXTROSE MONOHYDRATE, SODIUM CHLORIDE, AND POTASSIUM CHLORIDE 50; .745; 4.5 G/1000ML; G/1000ML; G/1000ML
1000 INJECTION, SOLUTION INTRAVENOUS
Qty: 0 | Refills: 0 | Status: DISCONTINUED | OUTPATIENT
Start: 2019-03-09 | End: 2019-03-09

## 2019-03-09 RX ADMIN — Medication 100 MILLIGRAM(S): at 05:01

## 2019-03-09 RX ADMIN — Medication 500 MICROGRAM(S): at 17:46

## 2019-03-09 RX ADMIN — SODIUM CHLORIDE 2 GRAM(S): 9 INJECTION INTRAMUSCULAR; INTRAVENOUS; SUBCUTANEOUS at 12:20

## 2019-03-09 RX ADMIN — DEXTROSE MONOHYDRATE, SODIUM CHLORIDE, AND POTASSIUM CHLORIDE 75 MILLILITER(S): 50; .745; 4.5 INJECTION, SOLUTION INTRAVENOUS at 07:00

## 2019-03-09 RX ADMIN — Medication 100 MILLIGRAM(S): at 06:01

## 2019-03-09 RX ADMIN — Medication 1 MILLIGRAM(S): at 18:20

## 2019-03-09 RX ADMIN — PANTOPRAZOLE SODIUM 40 MILLIGRAM(S): 20 TABLET, DELAYED RELEASE ORAL at 12:20

## 2019-03-09 RX ADMIN — SODIUM CHLORIDE 2 GRAM(S): 9 INJECTION INTRAMUSCULAR; INTRAVENOUS; SUBCUTANEOUS at 18:20

## 2019-03-09 RX ADMIN — Medication 500 MICROGRAM(S): at 23:10

## 2019-03-09 RX ADMIN — LEVETIRACETAM 1000 MILLIGRAM(S): 250 TABLET, FILM COATED ORAL at 05:01

## 2019-03-09 RX ADMIN — Medication 100 MILLIGRAM(S): at 18:20

## 2019-03-09 RX ADMIN — Medication 500 MICROGRAM(S): at 12:33

## 2019-03-09 RX ADMIN — SENNA PLUS 5 MILLILITER(S): 8.6 TABLET ORAL at 21:53

## 2019-03-09 RX ADMIN — Medication 500 MICROGRAM(S): at 05:35

## 2019-03-09 RX ADMIN — CHLORHEXIDINE GLUCONATE 1 APPLICATION(S): 213 SOLUTION TOPICAL at 21:54

## 2019-03-09 RX ADMIN — AMLODIPINE BESYLATE 10 MILLIGRAM(S): 2.5 TABLET ORAL at 05:01

## 2019-03-09 RX ADMIN — LEVETIRACETAM 1000 MILLIGRAM(S): 250 TABLET, FILM COATED ORAL at 18:20

## 2019-03-09 RX ADMIN — Medication 1 MILLIGRAM(S): at 05:00

## 2019-03-09 RX ADMIN — SODIUM CHLORIDE 2 GRAM(S): 9 INJECTION INTRAMUSCULAR; INTRAVENOUS; SUBCUTANEOUS at 05:01

## 2019-03-09 NOTE — PROGRESS NOTE ADULT - ASSESSMENT
58y female with SLE, cerebellar AVM and bleed in Dec, s/p embolization, AVM resection, post fossa decompression, VPS, trach/PEG   Persistent, "central" fever, but ultimately, fever resolved, pt improved, sent to rehab.    She apparently developed generalized Sz, fever and sent to [facility] ICU, received empiric Vanco and Cefepime, transferred here.    Cultures at [facility] all finalized negative as per Dr Handley  On TC, lethargic, without purposeful response, Tmx 103 on arrival, but WBC/diff normal, CXR clear  CSF >500 WBCs, majority Polys, thus difficult to exclude ventriculitis, ?infected VPS- but Cx all negative  S/p  shunt removal, EVD placement- removed as of 3/3  S/p 10 days of vanco/cefepime on 3/5  She has PRES from seizure and still on vent and encephalopathic.   Fever now low grade- Tmx 100.2  WBC still normal, PCT previously low  Fevers may be on a non infectious basis, ? central, ? SLE related  CDT negative    Plan:  Monitor off antibiotics  Will approach fever in non empiric fashion  ? if her SLE has been active.  Supportive care per NSCU  No contraindication to replacement of VPS, standard periop prophylaxis  Case reviewed with RN at bedside

## 2019-03-09 NOTE — PROGRESS NOTE ADULT - ASSESSMENT
58F s/p embo and rsxn of ruptured AVM with VPS placement. Neurologically decline from baseline s/p VPS removal and EVD placement  -continue care per icu  -transfer to RCU when bed available  -Shunt Monday

## 2019-03-09 NOTE — PROGRESS NOTE ADULT - SUBJECTIVE AND OBJECTIVE BOX
Reason for Admission:  · Reason for Admission	seizures, h/o  shunt      · Subjective and Objective:   SUMMARY:    58 year-old woman with ruptured cerebellar AVM s/p embolization and craniotomy for resection and  shunt placement in December 2018 was taken to [facility] 2/23/19 after having seizure in rehab. The patient reportedly had an LP that showed leukocytosis. Due to the suspicion of  shunt infection, the patient was transferred to [facility] for further management. She had removal of VPS and placement of EVD on 2/25/19.     PAST MEDICAL & SURGICAL HISTORY:  Systemic lupus erythematosus, unspecified SLE type, unspecified organ involvement status  Lupus  Back pain, chronic  Childhood asthma  Ovarian cyst  DVT (deep venous thrombosis), bilateral: 1993( treated with Heparin and coumadin)  RA (rheumatoid arthritis)  No significant past surgical history  No significant past surgical history  Plantar fasciitis of right foot: h/o surgery  History of laparoscopic cholecystectomy: 1993    Allergies    No Known Allergies    Intolerances        HOSPITAL COURSE:  3/3: EVD d/c'ed. Febrile overnight. Cultures sent. ICPs <12. Started on 2% for hyponatremia.   3/4: Loses neurological exam when patient spikes a fever    24 Hour Events: noted to be apneic during CPAP trial. CT with enlarged ventricles.     T(C): 37.1 (03-09-19 @ 03:00), Max: 37.9 (03-08-19 @ 11:00)  HR: 85 (03-09-19 @ 05:37) (81 - 102)  BP: --  RR: 18 (03-09-19 @ 03:00) (18 - 21)  SpO2: 100% (03-09-19 @ 05:37) (100% - 100%)  03-07-19 @ 07:01  -  03-08-19 @ 07:00  --------------------------------------------------------  IN: 1956 mL / OUT: 1100 mL / NET: 856 mL    03-08-19 @ 07:01  -  03-09-19 @ 05:45  --------------------------------------------------------  IN: 1765 mL / OUT: 1650 mL / NET: 115 mL    acetaminophen    Suspension .. 650 milliGRAM(s) Oral every 6 hours PRN  amLODIPine   Tablet 10 milliGRAM(s) Oral daily  chlorhexidine 4% Liquid 1 Application(s) Topical <User Schedule>  clonazePAM Tablet 1 milliGRAM(s) Oral every 12 hours  clonazePAM Tablet 0.25 milliGRAM(s) Oral every 8 hours PRN  docusate sodium Liquid 100 milliGRAM(s) Oral two times a day  ipratropium    for Nebulization 500 MICROGram(s) Nebulizer every 6 hours  labetalol 100 milliGRAM(s) Oral every 12 hours  levETIRAcetam  Solution 1000 milliGRAM(s) Oral two times a day  pantoprazole   Suspension 40 milliGRAM(s) Oral daily  senna Syrup 5 milliLiter(s) Oral at bedtime  sodium chloride 2 Gram(s) Oral every 6 hours  sodium chloride 0.9% with potassium chloride 20 mEq/L 1000 milliLiter(s) IV Continuous <Continuous>  Mode: AC/ CMV (Assist Control/ Continuous Mandatory Ventilation), RR (machine): 18, TV (machine): 450, FiO2: 40, PEEP: 5, ITime: 1, MAP: 13, PIP: 28    PHYSICAL EXAM:  trach'ed, on vent  Intermittent head bobbing/titubation  EO to voice, EOMI, PERRL, FC - opens mouth/sticks out tongue,   follows commands on all extremities (shows thumbs on b/l UE, wiggles toes on b/l LE)  antigravity movement in arms, tries to bend knees  RRR  minimal rhonchi R>L, decreased breath sounds  soft/nd abd  wwp ext        LABS:  Na: 139 (03-08 @ 21:07), 138 (03-06 @ 21:05)  K: 3.8 (03-08 @ 21:07), 3.6 (03-06 @ 21:05)  Cl: 101 (03-08 @ 21:07), 101 (03-06 @ 21:05)  CO2: 26 (03-08 @ 21:07), 28 (03-06 @ 21:05)  BUN: 10 (03-08 @ 21:07), 11 (03-06 @ 21:05)  Cr: 0.35 (03-08 @ 21:07), 0.38 (03-06 @ 21:05)  Glu: 131(03-08 @ 21:07), 108(03-06 @ 21:05)    Hgb: 8.8 (03-08 @ 21:07), 8.3 (03-06 @ 21:05)  Hct: 24.6 (03-08 @ 21:07), 22.5 (03-06 @ 21:05)  WBC: 4.5 (03-08 @ 21:07), 4.5 (03-06 @ 21:05)  Plt: 276 (03-08 @ 21:07), 253 (03-06 @ 21:05)    INR: 1.16 03-08-19 @ 21:07  PTT: 30.1 03-08-19 @ 21:07            Assessment and Plan:   · Assessment	  ASSESSMENT/PLAN: VPS infection/ventriculitis; seizure     Neuro:  concern for hydro in view of apneic events; D/W Dr. Murdock - plan for VPS next week  neurochecks q4hr  Keppra 1g bid for seizure control, cont  d/c Klonipin for head tremor as she is having apneic episodes    Card:  -150mmHg  Adjust antihypertensives to meet goal    Pulm:  on vent - CPAP as tolerated  Apnea could also be due to benzodiazepine     GI:  tube feeding  BM restart - last BM 3/6  on Danactive; monitor    Renal:  Hyponatremia, possibly SIADH  - On salt tabs for cerebral edema; will monitor  sodium goal 135-145    Endo:  euglycemia    Heme:  SCDs, on Lovenox    ID:  Off Abx now, completed 7 days, per ID  ID recommendations appreciated    full code Reason for Admission:  · Reason for Admission	seizures, h/o  shunt      · Subjective and Objective:   SUMMARY:    58 year-old woman with ruptured cerebellar AVM s/p embolization and craniotomy for resection and  shunt placement in December 2018 was taken to [facility] 2/23/19 after having seizure in rehab. The patient reportedly had an LP that showed leukocytosis. Due to the suspicion of  shunt infection, the patient was transferred to [facility] for further management. She had removal of VPS and placement of EVD on 2/25/19.     PAST MEDICAL & SURGICAL HISTORY:  Systemic lupus erythematosus, unspecified SLE type, unspecified organ involvement status  Lupus  Back pain, chronic  Childhood asthma  Ovarian cyst  DVT (deep venous thrombosis), bilateral: 1993( treated with Heparin and coumadin)  RA (rheumatoid arthritis)  No significant past surgical history  No significant past surgical history  Plantar fasciitis of right foot: h/o surgery  History of laparoscopic cholecystectomy: 1993    Allergies    No Known Allergies    Intolerances        HOSPITAL COURSE:  3/3: EVD d/c'ed. Febrile overnight. Cultures sent. ICPs <12. Started on 2% for hyponatremia.   3/4: Loses neurological exam when patient spikes a fever    24 Hour Events: noted to be apneic during CPAP trial. CT with enlarged ventricles.     T(C): 37.1 (03-09-19 @ 03:00), Max: 37.9 (03-08-19 @ 11:00)  HR: 85 (03-09-19 @ 05:37) (81 - 102)  BP: --  RR: 18 (03-09-19 @ 03:00) (18 - 21)  SpO2: 100% (03-09-19 @ 05:37) (100% - 100%)  03-07-19 @ 07:01  -  03-08-19 @ 07:00  --------------------------------------------------------  IN: 1956 mL / OUT: 1100 mL / NET: 856 mL    03-08-19 @ 07:01  -  03-09-19 @ 05:45  --------------------------------------------------------  IN: 1765 mL / OUT: 1650 mL / NET: 115 mL    acetaminophen    Suspension .. 650 milliGRAM(s) Oral every 6 hours PRN  amLODIPine   Tablet 10 milliGRAM(s) Oral daily  chlorhexidine 4% Liquid 1 Application(s) Topical <User Schedule>  clonazePAM Tablet 1 milliGRAM(s) Oral every 12 hours  clonazePAM Tablet 0.25 milliGRAM(s) Oral every 8 hours PRN  docusate sodium Liquid 100 milliGRAM(s) Oral two times a day  ipratropium    for Nebulization 500 MICROGram(s) Nebulizer every 6 hours  labetalol 100 milliGRAM(s) Oral every 12 hours  levETIRAcetam  Solution 1000 milliGRAM(s) Oral two times a day  pantoprazole   Suspension 40 milliGRAM(s) Oral daily  senna Syrup 5 milliLiter(s) Oral at bedtime  sodium chloride 2 Gram(s) Oral every 6 hours  sodium chloride 0.9% with potassium chloride 20 mEq/L 1000 milliLiter(s) IV Continuous <Continuous>  Mode: AC/ CMV (Assist Control/ Continuous Mandatory Ventilation), RR (machine): 18, TV (machine): 450, FiO2: 40, PEEP: 5, ITime: 1, MAP: 13, PIP: 28    PHYSICAL EXAM:  trach'ed, on vent  Intermittent head bobbing/titubation  EO to voice, EOMI, PERRL, FC - opens mouth/sticks out tongue,   follows commands on all extremities (shows thumbs on b/l UE, wiggles toes on b/l LE)  antigravity movement in arms, tries to bend knees  RRR  minimal rhonchi R>L, decreased breath sounds  soft/nd abd  wwp ext        LABS:  Na: 139 (03-08 @ 21:07), 138 (03-06 @ 21:05)  K: 3.8 (03-08 @ 21:07), 3.6 (03-06 @ 21:05)  Cl: 101 (03-08 @ 21:07), 101 (03-06 @ 21:05)  CO2: 26 (03-08 @ 21:07), 28 (03-06 @ 21:05)  BUN: 10 (03-08 @ 21:07), 11 (03-06 @ 21:05)  Cr: 0.35 (03-08 @ 21:07), 0.38 (03-06 @ 21:05)  Glu: 131(03-08 @ 21:07), 108(03-06 @ 21:05)    Hgb: 8.8 (03-08 @ 21:07), 8.3 (03-06 @ 21:05)  Hct: 24.6 (03-08 @ 21:07), 22.5 (03-06 @ 21:05)  WBC: 4.5 (03-08 @ 21:07), 4.5 (03-06 @ 21:05)  Plt: 276 (03-08 @ 21:07), 253 (03-06 @ 21:05)    INR: 1.16 03-08-19 @ 21:07  PTT: 30.1 03-08-19 @ 21:07            Assessment and Plan:   · Assessment	  ASSESSMENT/PLAN: VPS infection/ventriculitis; seizure     Neuro:  concern for hydro in view of apneic events; D/W Dr. Murdock - plan for VPS next week  neurochecks q4hr  Keppra 1g bid for seizure control, cont  d/c Klonipin for head tremor as she is having apneic episodes    Card:  -150mmHg  Adjust antihypertensives to meet goal    Pulm:  on vent - CPAP as tolerated  Apnea could also be due to benzodiazepine     GI:  tube feeding  BM restart - last BM 3/6  on Danactive; monitor    Renal:  Hyponatremia, possibly SIADH  - On salt tabs for cerebral edema; will monitor  sodium goal 135-145    Endo:  euglycemia    Heme:  SCDs, on Lovenox    ID:  Off Abx now, completed 7 days, per ID  ID on consult,  no contraindication for VPS  Fever could be from lupus? per ID       full code Reason for Admission:  · Reason for Admission	seizures, h/o  shunt      · Subjective and Objective:   SUMMARY:    58 year-old woman with ruptured cerebellar AVM s/p embolization and craniotomy for resection and  shunt placement in December 2018 was taken to [facility] 2/23/19 after having seizure in rehab. The patient reportedly had an LP that showed leukocytosis. Due to the suspicion of  shunt infection, the patient was transferred to [facility] for further management. She had removal of VPS and placement of EVD on 2/25/19.     PAST MEDICAL & SURGICAL HISTORY:  Systemic lupus erythematosus, unspecified SLE type, unspecified organ involvement status  Lupus  Back pain, chronic  Childhood asthma  Ovarian cyst  DVT (deep venous thrombosis), bilateral: 1993( treated with Heparin and coumadin)  RA (rheumatoid arthritis)  No significant past surgical history  No significant past surgical history  Plantar fasciitis of right foot: h/o surgery  History of laparoscopic cholecystectomy: 1993    Allergies    No Known Allergies    Intolerances            REVIEW OF SYSTEMS: [ x] Unable to Assess due to neurologic exam   [ ] All ROS addressed below are non-contributory, except:  Neuro: [ ] Headache [ ] Back pain [ ] Numbness [ ] Weakness [ ] Ataxia [ ] Dizziness [ ] Aphasia [ ] Dysarthria [ ] Visual disturbance  Resp: [ ] Shortness of breath/dyspnea, [ ] Orthopnea [ ] Cough  CV: [ ] Chest pain [ ] Palpitation [ ] Lightheadedness [ ] Syncope  Renal: [ ] Thirst [ ] Edema  GI: [ ] Nausea [ ] Emesis [ ] Abdominal pain [ ] Constipation [ ] Diarrhea  Hem: [ ] Hematemesis [ ] bright red blood per rectum  ID: [ ] Fever [ ] Chills [ ] Dysuria  ENT: [ ] Rhinorrhea          HOSPITAL COURSE:  3/3: EVD d/c'ed. Febrile overnight. Cultures sent. ICPs <12. Started on 2% for hyponatremia.   3/4: Loses neurological exam when patient spikes a fever    24 Hour Events: noted to be apneic during CPAP trial. CT with enlarged ventricles.     T(C): 37.1 (03-09-19 @ 03:00), Max: 37.9 (03-08-19 @ 11:00)  HR: 85 (03-09-19 @ 05:37) (81 - 102)  BP: --  RR: 18 (03-09-19 @ 03:00) (18 - 21)  SpO2: 100% (03-09-19 @ 05:37) (100% - 100%)  03-07-19 @ 07:01  -  03-08-19 @ 07:00  --------------------------------------------------------  IN: 1956 mL / OUT: 1100 mL / NET: 856 mL    03-08-19 @ 07:01  -  03-09-19 @ 05:45  --------------------------------------------------------  IN: 1765 mL / OUT: 1650 mL / NET: 115 mL    acetaminophen    Suspension .. 650 milliGRAM(s) Oral every 6 hours PRN  amLODIPine   Tablet 10 milliGRAM(s) Oral daily  chlorhexidine 4% Liquid 1 Application(s) Topical <User Schedule>  clonazePAM Tablet 1 milliGRAM(s) Oral every 12 hours  clonazePAM Tablet 0.25 milliGRAM(s) Oral every 8 hours PRN  docusate sodium Liquid 100 milliGRAM(s) Oral two times a day  ipratropium    for Nebulization 500 MICROGram(s) Nebulizer every 6 hours  labetalol 100 milliGRAM(s) Oral every 12 hours  levETIRAcetam  Solution 1000 milliGRAM(s) Oral two times a day  pantoprazole   Suspension 40 milliGRAM(s) Oral daily  senna Syrup 5 milliLiter(s) Oral at bedtime  sodium chloride 2 Gram(s) Oral every 6 hours  sodium chloride 0.9% with potassium chloride 20 mEq/L 1000 milliLiter(s) IV Continuous <Continuous>  Mode: AC/ CMV (Assist Control/ Continuous Mandatory Ventilation), RR (machine): 18, TV (machine): 450, FiO2: 40, PEEP: 5, ITime: 1, MAP: 13, PIP: 28    PHYSICAL EXAM:  trach'ed, on vent  Intermittent head bobbing/titubation  EO to voice, EOMI, PERRL, FC - opens mouth/sticks out tongue,   follows commands on all extremities (shows thumbs on b/l UE, wiggles toes on b/l LE)  antigravity movement in arms, tries to bend knees  RRR  minimal rhonchi R>L, decreased breath sounds  soft/nd abd  wwp ext        LABS:  Na: 139 (03-08 @ 21:07), 138 (03-06 @ 21:05)  K: 3.8 (03-08 @ 21:07), 3.6 (03-06 @ 21:05)  Cl: 101 (03-08 @ 21:07), 101 (03-06 @ 21:05)  CO2: 26 (03-08 @ 21:07), 28 (03-06 @ 21:05)  BUN: 10 (03-08 @ 21:07), 11 (03-06 @ 21:05)  Cr: 0.35 (03-08 @ 21:07), 0.38 (03-06 @ 21:05)  Glu: 131(03-08 @ 21:07), 108(03-06 @ 21:05)    Hgb: 8.8 (03-08 @ 21:07), 8.3 (03-06 @ 21:05)  Hct: 24.6 (03-08 @ 21:07), 22.5 (03-06 @ 21:05)  WBC: 4.5 (03-08 @ 21:07), 4.5 (03-06 @ 21:05)  Plt: 276 (03-08 @ 21:07), 253 (03-06 @ 21:05)    INR: 1.16 03-08-19 @ 21:07  PTT: 30.1 03-08-19 @ 21:07      ASSESSMENT/PLAN: VPS infection/ventriculitis; seizure     Neuro:  concern for hydro in view of apneic events; D/W Dr. Murdock - plan for VPS next week  neurochecks q 4 hr  Keppra 1g bid for seizure control, cont  was on clonazepam at home, will keep it     Card:  -150 mmHg  Adjust antihypertensives to meet goal    Pulm:  on vent - CPAP as tolerated  Apnea improved     GI:  tube feeding  BM restart - last BM 3/6  on Danactive; monitor    Renal:  Hyponatremia, possibly SIADH  - On salt tabs for cerebral edema; will monitor  sodium goal 135-145    Endo:  euglycemia    Heme:  SCDs, on Lovenox    ID:  Off Abx now, completed 7 days, per ID  ID on consult,  no contraindication for VPS  Fever could be from lupus? per ID     full code Reason for Admission:  · Reason for Admission	seizures, h/o  shunt      · Subjective and Objective:   SUMMARY:    58 year-old woman with ruptured cerebellar AVM s/p embolization and craniotomy for resection and  shunt placement in December 2018 was taken to [facility] 2/23/19 after having seizure in rehab. The patient reportedly had an LP that showed leukocytosis. Due to the suspicion of  shunt infection, the patient was transferred to [facility] for further management. She had removal of VPS and placement of EVD on 2/25/19.     PAST MEDICAL & SURGICAL HISTORY:  Systemic lupus erythematosus, unspecified SLE type, unspecified organ involvement status  Lupus  Back pain, chronic  Childhood asthma  Ovarian cyst  DVT (deep venous thrombosis), bilateral: 1993( treated with Heparin and coumadin)  RA (rheumatoid arthritis)  No significant past surgical history  No significant past surgical history  Plantar fasciitis of right foot: h/o surgery  History of laparoscopic cholecystectomy: 1993    Allergies    No Known Allergies    Intolerances            REVIEW OF SYSTEMS: [ x] Unable to Assess due to neurologic exam   [ ] All ROS addressed below are non-contributory, except:  Neuro: [ ] Headache [ ] Back pain [ ] Numbness [ ] Weakness [ ] Ataxia [ ] Dizziness [ ] Aphasia [ ] Dysarthria [ ] Visual disturbance  Resp: [ ] Shortness of breath/dyspnea, [ ] Orthopnea [ ] Cough  CV: [ ] Chest pain [ ] Palpitation [ ] Lightheadedness [ ] Syncope  Renal: [ ] Thirst [ ] Edema  GI: [ ] Nausea [ ] Emesis [ ] Abdominal pain [ ] Constipation [ ] Diarrhea  Hem: [ ] Hematemesis [ ] bright red blood per rectum  ID: [ ] Fever [ ] Chills [ ] Dysuria  ENT: [ ] Rhinorrhea          HOSPITAL COURSE:  3/3: EVD d/c'ed. Febrile overnight. Cultures sent. ICPs <12. Started on 2% for hyponatremia.   3/4: Loses neurological exam when patient spikes a fever    24 Hour Events: noted to be apneic during CPAP trial. CT with enlarged ventricles.     T(C): 37.1 (03-09-19 @ 03:00), Max: 37.9 (03-08-19 @ 11:00)  HR: 85 (03-09-19 @ 05:37) (81 - 102)  BP: --  RR: 18 (03-09-19 @ 03:00) (18 - 21)  SpO2: 100% (03-09-19 @ 05:37) (100% - 100%)  03-07-19 @ 07:01  -  03-08-19 @ 07:00  --------------------------------------------------------  IN: 1956 mL / OUT: 1100 mL / NET: 856 mL    03-08-19 @ 07:01  -  03-09-19 @ 05:45  --------------------------------------------------------  IN: 1765 mL / OUT: 1650 mL / NET: 115 mL    acetaminophen    Suspension .. 650 milliGRAM(s) Oral every 6 hours PRN  amLODIPine   Tablet 10 milliGRAM(s) Oral daily  chlorhexidine 4% Liquid 1 Application(s) Topical <User Schedule>  clonazePAM Tablet 1 milliGRAM(s) Oral every 12 hours  clonazePAM Tablet 0.25 milliGRAM(s) Oral every 8 hours PRN  docusate sodium Liquid 100 milliGRAM(s) Oral two times a day  ipratropium    for Nebulization 500 MICROGram(s) Nebulizer every 6 hours  labetalol 100 milliGRAM(s) Oral every 12 hours  levETIRAcetam  Solution 1000 milliGRAM(s) Oral two times a day  pantoprazole   Suspension 40 milliGRAM(s) Oral daily  senna Syrup 5 milliLiter(s) Oral at bedtime  sodium chloride 2 Gram(s) Oral every 6 hours  sodium chloride 0.9% with potassium chloride 20 mEq/L 1000 milliLiter(s) IV Continuous <Continuous>  Mode: AC/ CMV (Assist Control/ Continuous Mandatory Ventilation), RR (machine): 18, TV (machine): 450, FiO2: 40, PEEP: 5, ITime: 1, MAP: 13, PIP: 28    PHYSICAL EXAM:  trach'ed, on vent  Intermittent head bobbing/titubation  EO to voice, EOMI, PERRL, FC - opens mouth/sticks out tongue,   follows commands on all extremities (shows thumbs on b/l UE, wiggles toes on b/l LE)  antigravity movement in arms, tries to bend knees  RRR  minimal rhonchi R>L, decreased breath sounds  soft/nd abd  wwp ext        LABS:  Na: 139 (03-08 @ 21:07), 138 (03-06 @ 21:05)  K: 3.8 (03-08 @ 21:07), 3.6 (03-06 @ 21:05)  Cl: 101 (03-08 @ 21:07), 101 (03-06 @ 21:05)  CO2: 26 (03-08 @ 21:07), 28 (03-06 @ 21:05)  BUN: 10 (03-08 @ 21:07), 11 (03-06 @ 21:05)  Cr: 0.35 (03-08 @ 21:07), 0.38 (03-06 @ 21:05)  Glu: 131(03-08 @ 21:07), 108(03-06 @ 21:05)    Hgb: 8.8 (03-08 @ 21:07), 8.3 (03-06 @ 21:05)  Hct: 24.6 (03-08 @ 21:07), 22.5 (03-06 @ 21:05)  WBC: 4.5 (03-08 @ 21:07), 4.5 (03-06 @ 21:05)  Plt: 276 (03-08 @ 21:07), 253 (03-06 @ 21:05)    INR: 1.16 03-08-19 @ 21:07  PTT: 30.1 03-08-19 @ 21:07      ASSESSMENT/PLAN: VPS infection/ventriculitis; seizure     Neuro:  concern for hydro in view of apneic events; D/W Dr. Murdock - plan for VPS next week  neurochecks q 4 hr  Keppra 1g bid for seizure control, cont  was on clonazepam at home, will keep it     Card:  -150 mmHg  Adjust antihypertensives to meet goal    Pulm:  on vent - CPAP as tolerated  Apnea improved     GI:  tube feeding  BM restart - last BM 3/6  on Danactive; monitor    Renal:  Hyponatremia, possibly SIADH  - On salt tabs for cerebral edema; will monitor  sodium goal 135-145    Endo:  euglycemia    Heme:  SCDs, on Lovenox    ID:  Off Abx now, completed 7 days, per ID  ID on consult,  no contraindication for VPS  Fever could be from lupus? per ID     full code  consult RCU  Moderate complexity

## 2019-03-09 NOTE — PROGRESS NOTE ADULT - SUBJECTIVE AND OBJECTIVE BOX
Vital Signs Last 24 Hrs  T(C): 37 (08 Mar 2019 23:00), Max: 37.9 (08 Mar 2019 11:00)  T(F): 98.6 (08 Mar 2019 23:00), Max: 100.2 (08 Mar 2019 11:00)  HR: 91 (08 Mar 2019 23:47) (87 - 102)  BP: --  BP(mean): --  RR: 18 (08 Mar 2019 23:00) (18 - 21)  SpO2: 100% (08 Mar 2019 23:47) (100% - 100%)    EXAMINATION:  Trached, EO to voice, tracks, pupils 3mm reactive b/l, follows simple commands on b/l UE antigrav, intermittently follows commands on b/l lower extremities (wiggles toes)

## 2019-03-09 NOTE — PROGRESS NOTE ADULT - SUBJECTIVE AND OBJECTIVE BOX
CC: f/u for fevers    Patient reports: she is on vent, twitching of head(?myoclonic jerks).she is able to follow simple commands    REVIEW OF SYSTEMS:  All other review of systems negative (Comprehensive ROS)    Antimicrobials Day #  :off    Other Medications Reviewed    T(F): 99 (03-09-19 @ 05:00), Max: 100.2 (03-08-19 @ 11:00)  HR: 86 (03-09-19 @ 06:00)  BP: --  RR: 18 (03-09-19 @ 05:00)  SpO2: 100% (03-09-19 @ 06:00)  Wt(kg): --    PHYSICAL EXAM:  General: alert, no acute distress, jerking motion of head  Eyes:  anicteric, no conjunctival injection, no discharge  Oropharynx: no lesions or injection 	  Neck: supple, trach  Lungs: few ronchi  Heart: regular rate and rhythm; no murmur, rubs or gallops  Abdomen: soft, nondistended, nontender, peg  Skin: no lesions  Extremities: no clubbing, cyanosis, + edema  Neurologic: alert, oriented, moves all extremities    LAB RESULTS:                        8.8    4.5   )-----------( 276      ( 08 Mar 2019 21:07 )             24.6     03-08    139  |  101  |  10  ----------------------------<  131<H>  3.8   |  26  |  0.35<L>    Ca    9.1      08 Mar 2019 21:07  Phos  2.6     03-08  Mg     1.9     03-08          MICROBIOLOGY:  RECENT CULTURES:      RADIOLOGY REVIEWED:    < from: CT Head No Cont (03.08.19 @ 08:51) >  IMPRESSION:    Lateral and third ventricles increased in size and minimally dilated.    Decreased small hemorrhage layering in the occipital horns.    Otherwise, no significant interval change.    Dr. Velazquez discussed these findings with neurosurgical JUSTICE Sol on 3/8/2019   9:21 AM with read back.    < end of copied text >

## 2019-03-10 ENCOUNTER — TRANSCRIPTION ENCOUNTER (OUTPATIENT)
Age: 59
End: 2019-03-10

## 2019-03-10 PROCEDURE — 99233 SBSQ HOSP IP/OBS HIGH 50: CPT

## 2019-03-10 PROCEDURE — 70450 CT HEAD/BRAIN W/O DYE: CPT | Mod: 26

## 2019-03-10 RX ADMIN — CHLORHEXIDINE GLUCONATE 1 APPLICATION(S): 213 SOLUTION TOPICAL at 21:24

## 2019-03-10 RX ADMIN — SODIUM CHLORIDE 2 GRAM(S): 9 INJECTION INTRAMUSCULAR; INTRAVENOUS; SUBCUTANEOUS at 05:39

## 2019-03-10 RX ADMIN — Medication 1 MILLIGRAM(S): at 18:21

## 2019-03-10 RX ADMIN — Medication 500 MICROGRAM(S): at 05:24

## 2019-03-10 RX ADMIN — SODIUM CHLORIDE 2 GRAM(S): 9 INJECTION INTRAMUSCULAR; INTRAVENOUS; SUBCUTANEOUS at 18:21

## 2019-03-10 RX ADMIN — SODIUM CHLORIDE 2 GRAM(S): 9 INJECTION INTRAMUSCULAR; INTRAVENOUS; SUBCUTANEOUS at 00:12

## 2019-03-10 RX ADMIN — POLYETHYLENE GLYCOL 3350 17 GRAM(S): 17 POWDER, FOR SOLUTION ORAL at 05:39

## 2019-03-10 RX ADMIN — Medication 100 MILLIGRAM(S): at 05:39

## 2019-03-10 RX ADMIN — LEVETIRACETAM 1000 MILLIGRAM(S): 250 TABLET, FILM COATED ORAL at 18:21

## 2019-03-10 RX ADMIN — Medication 500 MICROGRAM(S): at 17:47

## 2019-03-10 RX ADMIN — PANTOPRAZOLE SODIUM 40 MILLIGRAM(S): 20 TABLET, DELAYED RELEASE ORAL at 11:39

## 2019-03-10 RX ADMIN — Medication 500 MICROGRAM(S): at 13:21

## 2019-03-10 RX ADMIN — AMLODIPINE BESYLATE 10 MILLIGRAM(S): 2.5 TABLET ORAL at 05:39

## 2019-03-10 RX ADMIN — SODIUM CHLORIDE 2 GRAM(S): 9 INJECTION INTRAMUSCULAR; INTRAVENOUS; SUBCUTANEOUS at 11:39

## 2019-03-10 RX ADMIN — SENNA PLUS 5 MILLILITER(S): 8.6 TABLET ORAL at 21:23

## 2019-03-10 RX ADMIN — LEVETIRACETAM 1000 MILLIGRAM(S): 250 TABLET, FILM COATED ORAL at 05:38

## 2019-03-10 RX ADMIN — ENOXAPARIN SODIUM 40 MILLIGRAM(S): 100 INJECTION SUBCUTANEOUS at 00:12

## 2019-03-10 RX ADMIN — Medication 1 MILLIGRAM(S): at 05:45

## 2019-03-10 NOTE — PROGRESS NOTE ADULT - ASSESSMENT
58y female with SLE, cerebellar AVM and bleed in Dec, s/p embolization, AVM resection, post fossa decompression, VPS, trach/PEG   Persistent, "central" fever, but ultimately, fever resolved, pt improved, sent to rehab.    She apparently developed generalized Sz, fever and sent to [facility] ICU, received empiric Vanco and Cefepime, transferred here.    Cultures at [facility] all finalized negative as per Dr Handley  On TC, lethargic, without purposeful response, Tmx 103 on arrival, but WBC/diff normal, CXR clear  CSF >500 WBCs, majority Polys, thus difficult to exclude ventriculitis, ?infected VPS- but Cx all negative  S/p  shunt removal, EVD placement- removed as of 3/3  S/p 10 days of vanco/cefepime on 3/5  She has PRES from seizure and still on vent and encephalopathic.   Fever have moderated after stopping antibiotics  WBC still normal, PCT previously low  Fevers may be on a non infectious basis, ? central, ? SLE related  CDT negative    Plan:  Monitor off antibiotics  Will approach fever in non empiric fashion  ? if her SLE has been active.  Supportive care per NSCU  No contraindication to replacement of VPS, standard periop prophylaxis

## 2019-03-10 NOTE — PROGRESS NOTE ADULT - SUBJECTIVE AND OBJECTIVE BOX
Reason for Admission:  · Reason for Admission	seizures, h/o  shunt      · Subjective and Objective:   SUMMARY:    58 year-old woman with ruptured cerebellar AVM s/p embolization and craniotomy for resection and  shunt placement in December 2018 was taken to [facility] 2/23/19 after having seizure in rehab. The patient reportedly had an LP that showed leukocytosis. Due to the suspicion of  shunt infection, the patient was transferred to [facility] for further management. She had removal of VPS and placement of EVD on 2/25/19.     PAST MEDICAL & SURGICAL HISTORY:  Systemic lupus erythematosus, unspecified SLE type, unspecified organ involvement status  Lupus  Back pain, chronic  Childhood asthma  Ovarian cyst  DVT (deep venous thrombosis), bilateral: 1993( treated with Heparin and coumadin)  RA (rheumatoid arthritis)  No significant past surgical history  No significant past surgical history  Plantar fasciitis of right foot: h/o surgery  History of laparoscopic cholecystectomy: 1993    Allergies    No Known Allergies    Intolerances            REVIEW OF SYSTEMS: [ x] Unable to Assess due to neurologic exam   [ ] All ROS addressed below are non-contributory, except:  Neuro: [ ] Headache [ ] Back pain [ ] Numbness [ ] Weakness [ ] Ataxia [ ] Dizziness [ ] Aphasia [ ] Dysarthria [ ] Visual disturbance  Resp: [ ] Shortness of breath/dyspnea, [ ] Orthopnea [ ] Cough  CV: [ ] Chest pain [ ] Palpitation [ ] Lightheadedness [ ] Syncope  Renal: [ ] Thirst [ ] Edema  GI: [ ] Nausea [ ] Emesis [ ] Abdominal pain [ ] Constipation [ ] Diarrhea  Hem: [ ] Hematemesis [ ] bright red blood per rectum  ID: [ ] Fever [ ] Chills [ ] Dysuria  ENT: [ ] Rhinorrhea          HOSPITAL COURSE:  3/3: EVD d/c'ed. Febrile overnight. Cultures sent. ICPs <12. Started on 2% for hyponatremia.   3/4: Loses neurological exam when patient spikes a fever    24 Hour Events: noted to be apneic during CPAP trial. CT with enlarged ventricles.     T(C): 37.1 (03-10-19 @ 07:00), Max: 37.1 (03-09-19 @ 11:00)  HR: 85 (03-10-19 @ 07:00) (82 - 98)  BP: --  RR: 15 (03-10-19 @ 07:00) (13 - 23)  SpO2: 100% (03-10-19 @ 07:00) (98% - 100%)  03-09-19 @ 06:01  -  03-10-19 @ 07:00  --------------------------------------------------------  IN: 270 mL / OUT: 750 mL / NET: -480 mL    acetaminophen    Suspension .. 650 milliGRAM(s) Oral every 6 hours PRN  amLODIPine   Tablet 10 milliGRAM(s) Oral daily  chlorhexidine 4% Liquid 1 Application(s) Topical <User Schedule>  clonazePAM Tablet 1 milliGRAM(s) Oral every 12 hours  clonazePAM Tablet 0.25 milliGRAM(s) Oral every 8 hours PRN  docusate sodium Liquid 100 milliGRAM(s) Oral two times a day  enoxaparin Injectable 40 milliGRAM(s) SubCutaneous <User Schedule>  ipratropium    for Nebulization 500 MICROGram(s) Nebulizer every 6 hours  labetalol 100 milliGRAM(s) Oral every 12 hours  levETIRAcetam  Solution 1000 milliGRAM(s) Oral two times a day  pantoprazole   Suspension 40 milliGRAM(s) Oral daily  polyethylene glycol 3350 17 Gram(s) Oral two times a day  senna Syrup 5 milliLiter(s) Oral at bedtime  sodium chloride 2 Gram(s) Oral every 6 hours  Mode: AC/ CMV (Assist Control/ Continuous Mandatory Ventilation), RR (machine): 16, TV (machine): 450, FiO2: 30, PEEP: 5, ITime: 1, MAP: 11, PIP: 29    PHYSICAL EXAM:  trach'ed, on vent  Intermittent head bobbing/titubation  EO to voice, EOMI, PERRL, FC - opens mouth/sticks out tongue,   follows commands on all extremities (shows thumbs on b/l UE, wiggles toes on b/l LE)  antigravity movement in arms, tries to bend knees  RRR  minimal rhonchi R>L, decreased breath sounds  soft/nd abd  wwp ext          LABS:  Na: 139 (03-08 @ 21:07)  K: 3.8 (03-08 @ 21:07)  Cl: 101 (03-08 @ 21:07)  CO2: 26 (03-08 @ 21:07)  BUN: 10 (03-08 @ 21:07)  Cr: 0.35 (03-08 @ 21:07)  Glu: 131(03-08 @ 21:07)    Hgb: 8.8 (03-08 @ 21:07)  Hct: 24.6 (03-08 @ 21:07)  WBC: 4.5 (03-08 @ 21:07)  Plt: 276 (03-08 @ 21:07)    INR: 1.16 03-08-19 @ 21:07  PTT: 30.1 03-08-19 @ 21:07              ASSESSMENT/PLAN: VPS infection/ventriculitis; seizure     Neuro:  concern for hydro in view of apneic events; D/W Dr. Murdock - plan for VPS next week  neurochecks q 4 hr  Keppra 1g bid for seizure control, cont  was on clonazepam at home, will keep it     Card:  -150 mmHg  Adjust antihypertensives to meet goal    Pulm:  on vent - CPAP as tolerated  Apnea improved     GI:  tube feeding  BM restart - last BM 3/6  on Danactive; monitor    Renal:  Hyponatremia, possibly SIADH  - On salt tabs for cerebral edema; will monitor  sodium goal 135-145    Endo:  euglycemia    Heme:  SCDs, on Lovenox    ID:  Off Abx now, completed 7 days, per ID  ID on consult,  no contraindication for VPS  Fever could be from lupus? per ID     full code  consult RCU  Moderate complexity Reason for Admission:  · Reason for Admission	seizures, h/o  shunt      · Subjective and Objective:   SUMMARY:    58 year-old woman with ruptured cerebellar AVM s/p embolization and craniotomy for resection and  shunt placement in December 2018 was taken to [facility] 2/23/19 after having seizure in rehab. The patient reportedly had an LP that showed leukocytosis. Due to the suspicion of  shunt infection, the patient was transferred to [facility] for further management. She had removal of VPS and placement of EVD on 2/25/19.     overnight events:   stable, remains on vent    PAST MEDICAL & SURGICAL HISTORY:  Systemic lupus erythematosus, unspecified SLE type, unspecified organ involvement status  Lupus  Back pain, chronic  Childhood asthma  Ovarian cyst  DVT (deep venous thrombosis), bilateral: 1993( treated with Heparin and coumadin)  RA (rheumatoid arthritis)  No significant past surgical history  No significant past surgical history  Plantar fasciitis of right foot: h/o surgery  History of laparoscopic cholecystectomy: 1993    Allergies    No Known Allergies    Intolerances            REVIEW OF SYSTEMS: [ x] Unable to Assess due to neurologic exam   [ ] All ROS addressed below are non-contributory, except:  Neuro: [ ] Headache [ ] Back pain [ ] Numbness [ ] Weakness [ ] Ataxia [ ] Dizziness [ ] Aphasia [ ] Dysarthria [ ] Visual disturbance  Resp: [ ] Shortness of breath/dyspnea, [ ] Orthopnea [ ] Cough  CV: [ ] Chest pain [ ] Palpitation [ ] Lightheadedness [ ] Syncope  Renal: [ ] Thirst [ ] Edema  GI: [ ] Nausea [ ] Emesis [ ] Abdominal pain [ ] Constipation [ ] Diarrhea  Hem: [ ] Hematemesis [ ] bright red blood per rectum  ID: [ ] Fever [ ] Chills [ ] Dysuria  ENT: [ ] Rhinorrhea          HOSPITAL COURSE:  3/3: EVD d/c'ed. Febrile overnight. Cultures sent. ICPs <12. Started on 2% for hyponatremia.   3/4: Loses neurological exam when patient spikes a fever        T(C): 37.1 (03-10-19 @ 07:00), Max: 37.1 (03-09-19 @ 11:00)  HR: 85 (03-10-19 @ 07:00) (82 - 98)  BP: --  RR: 15 (03-10-19 @ 07:00) (13 - 23)  SpO2: 100% (03-10-19 @ 07:00) (98% - 100%)  03-09-19 @ 06:01  -  03-10-19 @ 07:00  --------------------------------------------------------  IN: 270 mL / OUT: 750 mL / NET: -480 mL    acetaminophen    Suspension .. 650 milliGRAM(s) Oral every 6 hours PRN  amLODIPine   Tablet 10 milliGRAM(s) Oral daily  chlorhexidine 4% Liquid 1 Application(s) Topical <User Schedule>  clonazePAM Tablet 1 milliGRAM(s) Oral every 12 hours  clonazePAM Tablet 0.25 milliGRAM(s) Oral every 8 hours PRN  docusate sodium Liquid 100 milliGRAM(s) Oral two times a day  enoxaparin Injectable 40 milliGRAM(s) SubCutaneous <User Schedule>  ipratropium    for Nebulization 500 MICROGram(s) Nebulizer every 6 hours  labetalol 100 milliGRAM(s) Oral every 12 hours  levETIRAcetam  Solution 1000 milliGRAM(s) Oral two times a day  pantoprazole   Suspension 40 milliGRAM(s) Oral daily  polyethylene glycol 3350 17 Gram(s) Oral two times a day  senna Syrup 5 milliLiter(s) Oral at bedtime  sodium chloride 2 Gram(s) Oral every 6 hours  Mode: AC/ CMV (Assist Control/ Continuous Mandatory Ventilation), RR (machine): 16, TV (machine): 450, FiO2: 30, PEEP: 5, ITime: 1, MAP: 11, PIP: 29    PHYSICAL EXAM:  trach'ed, on vent  Intermittent head bobbing/titubation  EO to voice, EOMI, PERRL, FC - opens mouth/sticks out tongue,   follows commands on all extremities (shows thumbs on b/l UE, wiggles toes on b/l LE)  antigravity movement in arms, tries to bend knees  RRR  minimal rhonchi R>L, decreased breath sounds  soft/nd abd  wwp ext          LABS:  Na: 139 (03-08 @ 21:07)  K: 3.8 (03-08 @ 21:07)  Cl: 101 (03-08 @ 21:07)  CO2: 26 (03-08 @ 21:07)  BUN: 10 (03-08 @ 21:07)  Cr: 0.35 (03-08 @ 21:07)  Glu: 131(03-08 @ 21:07)    Hgb: 8.8 (03-08 @ 21:07)  Hct: 24.6 (03-08 @ 21:07)  WBC: 4.5 (03-08 @ 21:07)  Plt: 276 (03-08 @ 21:07)    INR: 1.16 03-08-19 @ 21:07  PTT: 30.1 03-08-19 @ 21:07      ASSESSMENT/PLAN: VPS infection/ventriculitis; seizure     Neuro:  neuro exam improving  will discuss with NS VPS plan   CT head today   neurochecks q 4 hr  Keppra 1g bid for seizure control, cont  was on clonazepam at home, will keep it     Card:  -150 mmHg  Adjust antihypertensives to meet goal    Pulm:  on vent - CPAP as tolerated  still requires PS     GI:  tube feeding  BM restart - last BM 3/6  on Danactive; monitor    Renal:  Hyponatremia, possibly SIADH  - On salt tabs for cerebral edema; will monitor  sodium goal 135-145    Endo:  euglycemia    Heme:  SCDs, on Lovenox    ID:  Off Abx now, completed 7 days, per ID  ID on consult,  no contraindication for VPS  Fever could be from lupus? per ID     full code  consult RCU  Moderate complexity

## 2019-03-10 NOTE — PROGRESS NOTE ADULT - SUBJECTIVE AND OBJECTIVE BOX
Vital Signs Last 24 Hrs  T(C): 37 (09 Mar 2019 23:00), Max: 37.2 (09 Mar 2019 05:00)  T(F): 98.6 (09 Mar 2019 23:00), Max: 99 (09 Mar 2019 05:00)  HR: 85 (10 Mar 2019 01:00) (81 - 98)  BP: --  BP(mean): --  RR: 17 (10 Mar 2019 01:00) (13 - 23)  SpO2: 100% (10 Mar 2019 01:00) (99% - 100%)    EXAMINATION:  Trached, EO to voice, tracks, pupils 3mm reactive b/l, follows simple commands on b/l UE antigrav, intermittently follows commands on b/l lower extremities (wiggles toes)

## 2019-03-10 NOTE — PROGRESS NOTE ADULT - SUBJECTIVE AND OBJECTIVE BOX
Awaiting VPS.    Trached, eyes open to voice, PERRL, follows, antigravity RUE>RLE proximally, hands contracted, wiggles toes

## 2019-03-10 NOTE — PROGRESS NOTE ADULT - SUBJECTIVE AND OBJECTIVE BOX
CC: f/u for fever    Patient reports: she is sleepy on vent, VPS planned for 3/11    REVIEW OF SYSTEMS:  All other review of systems negative (Comprehensive ROS): limited, able to follow simple commands    Antimicrobials Day #  :off    Other Medications Reviewed    T(F): 98.7 (03-10-19 @ 07:00), Max: 98.8 (03-09-19 @ 11:00)  HR: 85 (03-10-19 @ 07:00)  BP: --  RR: 15 (03-10-19 @ 07:00)  SpO2: 100% (03-10-19 @ 07:00)  Wt(kg): --    PHYSICAL EXAM:  General: alert, no acute distress  Eyes:  anicteric, no conjunctival injection, no discharge  Oropharynx: no lesions or injection 	  Neck: trach  Lungs: clear to auscultation  Heart: regular rate and rhythm; no murmur, rubs or gallops  Abdomen: soft, nondistended, nontender, peg  Skin: no lesions  Extremities: no clubbing, cyanosis,trace edema  Neurologic: alert, oriented, moves all extremities    LAB RESULTS:                        8.8    4.5   )-----------( 276      ( 08 Mar 2019 21:07 )             24.6     03-08    139  |  101  |  10  ----------------------------<  131<H>  3.8   |  26  |  0.35<L>    Ca    9.1      08 Mar 2019 21:07  Phos  2.6     03-08  Mg     1.9     03-08          MICROBIOLOGY:  RECENT CULTURES:      RADIOLOGY REVIEWED:  < from: CT Head No Cont (03.08.19 @ 08:51) >    INTERPRETATION:  Noncontrast CT of the brain.    CLINICAL INDICATION:  f/u intracranial hemorrhage, status post midline   suboccipital craniectomy.    TECHNIQUE : Axial CT scanning of the brain was obtained from the skull   base to the vertex without the administration of intravenous contrast.      COMPARISON: CT brain 3/5/2018    FINDINGS:      Redemonstration of right frontal charito holes. Similar-appearing   hemorrhagic right frontal shunt tract with surrounding edema.    Redemonstration of midline suboccipital craniectomy. Small CSF density   extracalvarial collection approximates the defect and is similar in size.   Embolic material is again seen in the region of the cerebellar vermis and   tentorial leaves.    Small hemorrhage layering in the occipital horns is decreased.    The lateral and third ventricles are increased in size and minimally   dilated. Ex vacuo dilatation of the fourth ventricle is similar.    Minimal leftward midline shift is similar.    IMPRESSION:    Lateral and third ventricles increased in size and minimally dilated.    Decreased small hemorrhage layering in the occipital horns.    Otherwise, no significant interval change.    Dr. Velazquez discussed these findings with neurosurgical JUSTICE Sol on 3/8/2019   9:21 AM with read back.    < end of copied text >

## 2019-03-11 ENCOUNTER — APPOINTMENT (OUTPATIENT)
Dept: NEUROSURGERY | Facility: CLINIC | Age: 59
End: 2019-03-11

## 2019-03-11 LAB
ANION GAP SERPL CALC-SCNC: 10 MMOL/L — SIGNIFICANT CHANGE UP (ref 5–17)
ANION GAP SERPL CALC-SCNC: 14 MMOL/L — SIGNIFICANT CHANGE UP (ref 5–17)
APTT BLD: 25.1 SEC — LOW (ref 27.5–36.3)
BLD GP AB SCN SERPL QL: NEGATIVE — SIGNIFICANT CHANGE UP
BUN SERPL-MCNC: 10 MG/DL — SIGNIFICANT CHANGE UP (ref 7–23)
BUN SERPL-MCNC: 6 MG/DL — LOW (ref 7–23)
CALCIUM SERPL-MCNC: 9.1 MG/DL — SIGNIFICANT CHANGE UP (ref 8.4–10.5)
CALCIUM SERPL-MCNC: 9.1 MG/DL — SIGNIFICANT CHANGE UP (ref 8.4–10.5)
CHLORIDE SERPL-SCNC: 100 MMOL/L — SIGNIFICANT CHANGE UP (ref 96–108)
CHLORIDE SERPL-SCNC: 102 MMOL/L — SIGNIFICANT CHANGE UP (ref 96–108)
CO2 SERPL-SCNC: 23 MMOL/L — SIGNIFICANT CHANGE UP (ref 22–31)
CO2 SERPL-SCNC: 27 MMOL/L — SIGNIFICANT CHANGE UP (ref 22–31)
CREAT SERPL-MCNC: 0.34 MG/DL — LOW (ref 0.5–1.3)
CREAT SERPL-MCNC: 0.35 MG/DL — LOW (ref 0.5–1.3)
CULTURE RESULTS: SIGNIFICANT CHANGE UP
GLUCOSE SERPL-MCNC: 116 MG/DL — HIGH (ref 70–99)
GLUCOSE SERPL-MCNC: 142 MG/DL — HIGH (ref 70–99)
HCT VFR BLD CALC: 26.3 % — LOW (ref 34.5–45)
HCT VFR BLD CALC: 30.7 % — LOW (ref 34.5–45)
HGB BLD-MCNC: 10.9 G/DL — LOW (ref 11.5–15.5)
HGB BLD-MCNC: 9.2 G/DL — LOW (ref 11.5–15.5)
INR BLD: 1.14 RATIO — SIGNIFICANT CHANGE UP (ref 0.88–1.16)
MAGNESIUM SERPL-MCNC: 1.9 MG/DL — SIGNIFICANT CHANGE UP (ref 1.6–2.6)
MAGNESIUM SERPL-MCNC: 2 MG/DL — SIGNIFICANT CHANGE UP (ref 1.6–2.6)
MCHC RBC-ENTMCNC: 30.1 PG — SIGNIFICANT CHANGE UP (ref 27–34)
MCHC RBC-ENTMCNC: 30.6 PG — SIGNIFICANT CHANGE UP (ref 27–34)
MCHC RBC-ENTMCNC: 35 GM/DL — SIGNIFICANT CHANGE UP (ref 32–36)
MCHC RBC-ENTMCNC: 35.3 GM/DL — SIGNIFICANT CHANGE UP (ref 32–36)
MCV RBC AUTO: 86.1 FL — SIGNIFICANT CHANGE UP (ref 80–100)
MCV RBC AUTO: 86.7 FL — SIGNIFICANT CHANGE UP (ref 80–100)
PHOSPHATE SERPL-MCNC: 3.1 MG/DL — SIGNIFICANT CHANGE UP (ref 2.5–4.5)
PHOSPHATE SERPL-MCNC: 3.4 MG/DL — SIGNIFICANT CHANGE UP (ref 2.5–4.5)
PLATELET # BLD AUTO: 285 K/UL — SIGNIFICANT CHANGE UP (ref 150–400)
PLATELET # BLD AUTO: 302 K/UL — SIGNIFICANT CHANGE UP (ref 150–400)
POTASSIUM SERPL-MCNC: 3.6 MMOL/L — SIGNIFICANT CHANGE UP (ref 3.5–5.3)
POTASSIUM SERPL-MCNC: 3.8 MMOL/L — SIGNIFICANT CHANGE UP (ref 3.5–5.3)
POTASSIUM SERPL-SCNC: 3.6 MMOL/L — SIGNIFICANT CHANGE UP (ref 3.5–5.3)
POTASSIUM SERPL-SCNC: 3.8 MMOL/L — SIGNIFICANT CHANGE UP (ref 3.5–5.3)
PROTHROM AB SERPL-ACNC: 13.2 SEC — HIGH (ref 10–12.9)
RBC # BLD: 3.06 M/UL — LOW (ref 3.8–5.2)
RBC # BLD: 3.55 M/UL — LOW (ref 3.8–5.2)
RBC # FLD: 16.2 % — HIGH (ref 10.3–14.5)
RBC # FLD: 16.2 % — HIGH (ref 10.3–14.5)
RH IG SCN BLD-IMP: POSITIVE — SIGNIFICANT CHANGE UP
SODIUM SERPL-SCNC: 137 MMOL/L — SIGNIFICANT CHANGE UP (ref 135–145)
SODIUM SERPL-SCNC: 139 MMOL/L — SIGNIFICANT CHANGE UP (ref 135–145)
SPECIMEN SOURCE: SIGNIFICANT CHANGE UP
WBC # BLD: 4.4 K/UL — SIGNIFICANT CHANGE UP (ref 3.8–10.5)
WBC # BLD: 8.1 K/UL — SIGNIFICANT CHANGE UP (ref 3.8–10.5)
WBC # FLD AUTO: 4.4 K/UL — SIGNIFICANT CHANGE UP (ref 3.8–10.5)
WBC # FLD AUTO: 8.1 K/UL — SIGNIFICANT CHANGE UP (ref 3.8–10.5)

## 2019-03-11 PROCEDURE — 99291 CRITICAL CARE FIRST HOUR: CPT

## 2019-03-11 PROCEDURE — 99233 SBSQ HOSP IP/OBS HIGH 50: CPT | Mod: GC

## 2019-03-11 PROCEDURE — 61781 SCAN PROC CRANIAL INTRA: CPT | Mod: 79

## 2019-03-11 PROCEDURE — 62223 ESTABLISH BRAIN CAVITY SHUNT: CPT | Mod: 79

## 2019-03-11 RX ORDER — SODIUM CHLORIDE 9 MG/ML
2 INJECTION INTRAMUSCULAR; INTRAVENOUS; SUBCUTANEOUS EVERY 6 HOURS
Qty: 0 | Refills: 0 | Status: DISCONTINUED | OUTPATIENT
Start: 2019-03-11 | End: 2019-03-22

## 2019-03-11 RX ORDER — CLONAZEPAM 1 MG
0.25 TABLET ORAL EVERY 8 HOURS
Qty: 0 | Refills: 0 | Status: DISCONTINUED | OUTPATIENT
Start: 2019-03-11 | End: 2019-03-11

## 2019-03-11 RX ORDER — SENNA PLUS 8.6 MG/1
5 TABLET ORAL AT BEDTIME
Qty: 0 | Refills: 0 | Status: DISCONTINUED | OUTPATIENT
Start: 2019-03-11 | End: 2019-03-22

## 2019-03-11 RX ORDER — CLONAZEPAM 1 MG
1 TABLET ORAL EVERY 12 HOURS
Qty: 0 | Refills: 0 | Status: DISCONTINUED | OUTPATIENT
Start: 2019-03-11 | End: 2019-03-11

## 2019-03-11 RX ORDER — POLYETHYLENE GLYCOL 3350 17 G/17G
17 POWDER, FOR SOLUTION ORAL
Qty: 0 | Refills: 0 | Status: DISCONTINUED | OUTPATIENT
Start: 2019-03-11 | End: 2019-03-22

## 2019-03-11 RX ORDER — LEVETIRACETAM 250 MG/1
1000 TABLET, FILM COATED ORAL
Qty: 0 | Refills: 0 | Status: DISCONTINUED | OUTPATIENT
Start: 2019-03-11 | End: 2019-03-22

## 2019-03-11 RX ORDER — PANTOPRAZOLE SODIUM 20 MG/1
40 TABLET, DELAYED RELEASE ORAL DAILY
Qty: 0 | Refills: 0 | Status: DISCONTINUED | OUTPATIENT
Start: 2019-03-11 | End: 2019-03-22

## 2019-03-11 RX ORDER — AMLODIPINE BESYLATE 2.5 MG/1
10 TABLET ORAL DAILY
Qty: 0 | Refills: 0 | Status: DISCONTINUED | OUTPATIENT
Start: 2019-03-11 | End: 2019-03-22

## 2019-03-11 RX ORDER — CLONAZEPAM 1 MG
1 TABLET ORAL EVERY 12 HOURS
Qty: 0 | Refills: 0 | Status: DISCONTINUED | OUTPATIENT
Start: 2019-03-11 | End: 2019-03-18

## 2019-03-11 RX ORDER — ACETAMINOPHEN 500 MG
650 TABLET ORAL EVERY 6 HOURS
Qty: 0 | Refills: 0 | Status: DISCONTINUED | OUTPATIENT
Start: 2019-03-11 | End: 2019-03-22

## 2019-03-11 RX ORDER — SODIUM CHLORIDE 9 MG/ML
1000 INJECTION INTRAMUSCULAR; INTRAVENOUS; SUBCUTANEOUS
Qty: 0 | Refills: 0 | Status: DISCONTINUED | OUTPATIENT
Start: 2019-03-11 | End: 2019-03-12

## 2019-03-11 RX ORDER — LABETALOL HCL 100 MG
100 TABLET ORAL EVERY 12 HOURS
Qty: 0 | Refills: 0 | Status: DISCONTINUED | OUTPATIENT
Start: 2019-03-11 | End: 2019-03-22

## 2019-03-11 RX ORDER — POTASSIUM CHLORIDE 20 MEQ
40 PACKET (EA) ORAL ONCE
Qty: 0 | Refills: 0 | Status: COMPLETED | OUTPATIENT
Start: 2019-03-11 | End: 2019-03-11

## 2019-03-11 RX ORDER — DOCUSATE SODIUM 100 MG
100 CAPSULE ORAL
Qty: 0 | Refills: 0 | Status: DISCONTINUED | OUTPATIENT
Start: 2019-03-11 | End: 2019-03-22

## 2019-03-11 RX ORDER — SODIUM CHLORIDE 9 MG/ML
1000 INJECTION INTRAMUSCULAR; INTRAVENOUS; SUBCUTANEOUS
Qty: 0 | Refills: 0 | Status: DISCONTINUED | OUTPATIENT
Start: 2019-03-11 | End: 2019-03-11

## 2019-03-11 RX ORDER — CEFAZOLIN SODIUM 1 G
2000 VIAL (EA) INJECTION EVERY 8 HOURS
Qty: 0 | Refills: 0 | Status: COMPLETED | OUTPATIENT
Start: 2019-03-11 | End: 2019-03-12

## 2019-03-11 RX ADMIN — SODIUM CHLORIDE 2 GRAM(S): 9 INJECTION INTRAMUSCULAR; INTRAVENOUS; SUBCUTANEOUS at 00:11

## 2019-03-11 RX ADMIN — Medication 500 MICROGRAM(S): at 11:45

## 2019-03-11 RX ADMIN — Medication 1 MILLIGRAM(S): at 05:52

## 2019-03-11 RX ADMIN — SODIUM CHLORIDE 60 MILLILITER(S): 9 INJECTION INTRAMUSCULAR; INTRAVENOUS; SUBCUTANEOUS at 11:20

## 2019-03-11 RX ADMIN — Medication 500 MICROGRAM(S): at 00:13

## 2019-03-11 RX ADMIN — SODIUM CHLORIDE 2 GRAM(S): 9 INJECTION INTRAMUSCULAR; INTRAVENOUS; SUBCUTANEOUS at 11:20

## 2019-03-11 RX ADMIN — Medication 100 MILLIGRAM(S): at 05:52

## 2019-03-11 RX ADMIN — POLYETHYLENE GLYCOL 3350 17 GRAM(S): 17 POWDER, FOR SOLUTION ORAL at 05:52

## 2019-03-11 RX ADMIN — LEVETIRACETAM 1000 MILLIGRAM(S): 250 TABLET, FILM COATED ORAL at 05:52

## 2019-03-11 RX ADMIN — PANTOPRAZOLE SODIUM 40 MILLIGRAM(S): 20 TABLET, DELAYED RELEASE ORAL at 11:20

## 2019-03-11 RX ADMIN — Medication 40 MILLIEQUIVALENT(S): at 05:52

## 2019-03-11 RX ADMIN — Medication 0.25 MILLIGRAM(S): at 11:21

## 2019-03-11 RX ADMIN — AMLODIPINE BESYLATE 10 MILLIGRAM(S): 2.5 TABLET ORAL at 05:53

## 2019-03-11 RX ADMIN — Medication 0.25 MILLIGRAM(S): at 22:27

## 2019-03-11 RX ADMIN — Medication 100 MILLIGRAM(S): at 22:27

## 2019-03-11 RX ADMIN — SENNA PLUS 5 MILLILITER(S): 8.6 TABLET ORAL at 22:27

## 2019-03-11 RX ADMIN — SODIUM CHLORIDE 2 GRAM(S): 9 INJECTION INTRAMUSCULAR; INTRAVENOUS; SUBCUTANEOUS at 05:53

## 2019-03-11 RX ADMIN — Medication 100 MILLIGRAM(S): at 05:53

## 2019-03-11 RX ADMIN — Medication 500 MICROGRAM(S): at 05:23

## 2019-03-11 NOTE — BRIEF OPERATIVE NOTE - PROCEDURE
<<-----Click on this checkbox to enter Procedure Ventriculoperitoneal shunt placement  03/11/2019    Active  [id number]

## 2019-03-11 NOTE — PROGRESS NOTE ADULT - SUBJECTIVE AND OBJECTIVE BOX
SUMMARY:    58 year-old woman with ruptured cerebellar AVM s/p embolization and craniotomy for resection and  shunt placement in December 2018 was taken to [facility] 2/23/19 after having seizure in rehab. The patient reportedly had an LP that showed leukocytosis. Due to the suspicion of  shunt infection, the patient was transferred to [facility] for further management. She had removal of VPS and placement of EVD on 2/25/19.     HOSPITAL COURSE:  3/3: EVD d/c'ed. Febrile overnight. Cultures sent. ICPs <12. Started on 2% for hyponatremia.   3/4: Loses neurological exam when patient spikes a fever    pre op for shunt today    VITALS/DATA/ORDERS: [x] Reviewed    PHYSICAL EXAM:  trach'ed, on vent  Intermittent head bobbing/titubation  EO to voice, EOMI, PERRL, FC - opens mouth/sticks out tongue,   follows commands on all extremities (shows thumbs on b/l UE, wiggles toes on b/l LE)  RRR  minimal rhonchi R>L, decreased breath sounds  soft/nd abd  wwp ext

## 2019-03-11 NOTE — PROGRESS NOTE ADULT - ASSESSMENT
hydrocephalus  - status post VPS  - CT Head in AM  - Vent support; wean as tolerated  - RCU consult  - -160mmHg  - Clonazepam for head titubation    40 minutes critical care time

## 2019-03-11 NOTE — PROGRESS NOTE ADULT - SUBJECTIVE AND OBJECTIVE BOX
Underwent uncomplicated placement of VPS (Certas @ 5).    Trached, eyes open to voice, PERRL, follows, antigravity RUE>RLE proximally, hands contracted, wiggles toes Underwent uncomplicated placement of VPS (Certas @ 5).    Trached, eyes open to voice transiently, PERRL, follows, antigravity RUE>RLE proximally with pain, hands contracted, wiggles toes to command

## 2019-03-11 NOTE — BRIEF OPERATIVE NOTE - PRE-OP DX
Hydrocephalus  03/11/2019    Active  Conrad Ingram  PRES (posterior reversible encephalopathy syndrome)  02/25/2019    Active  Charissa Pringle

## 2019-03-11 NOTE — PROGRESS NOTE ADULT - ASSESSMENT
58y female with SLE, cerebellar AVM and bleed in Dec, s/p embolization, AVM resection, post fossa decompression, VPS, trach/PEG   Persistent, "central" fever, but ultimately, fever resolved, pt improved, sent to rehab.    She apparently developed generalized Sz, fever and sent to [facility] ICU, received empiric Vanco and Cefepime, transferred here.    Cultures at [facility] all finalized negative as per Dr Handley  On TC, lethargic, without purposeful response, Tmx 103 on arrival, but WBC/diff normal, CXR clear  CSF >500 WBCs, majority Polys, thus difficult to exclude ventriculitis, ?infected VPS- but Cx all negative  S/p  shunt removal, EVD placement- removed as of 3/3  S/p 10 days of vanco/cefepime on 3/5  She has PRES from seizure and still on vent and encephalopathic.   Fever have moderated after stopping antibiotics  WBC still normal, PCT previously low  Fevers may be on a non infectious basis, ? central, ? SLE related  CDT negative    Plan:  Monitor off antibiotics  Supportive care per NSCU  No absolute ID objections for VPS under standard periop prophylaxis  family updated at bedside

## 2019-03-11 NOTE — CONSULT NOTE ADULT - SUBJECTIVE AND OBJECTIVE BOX
58F s/p embo and rsxn of ruptured AVM with VPS placement. Neurologically decline from baseline (?shunt infection) s/p VPS removal and EVD placement      REVIEW OF SYSTEMS:  Constitutional: [ ] negative [ ] fevers [ ] chills [ ] weight loss [ ] weight gain  HEENT: [ ] negative [ ] dry eyes [ ] eye irritation [ ] postnasal drip [ ] nasal congestion  CV: [ ] negative  [ ] chest pain [ ] orthopnea [ ] palpitations [ ] murmur  Resp: [ ] negative [ ] cough [ ] shortness of breath [ ] dyspnea [ ] wheezing [ ] sputum [ ] hemoptysis  GI: [ ] negative [ ] nausea [ ] vomiting [ ] diarrhea [ ] constipation [ ] abd pain [ ] dysphagia   : [ ] negative [ ] dysuria [ ] nocturia [ ] hematuria [ ] increased urinary frequency  Musculoskeletal: [ ] negative [ ] back pain [ ] myalgias [ ] arthralgias [ ] fracture  Skin: [ ] negative [ ] rash [ ] itch  Neurological: [ ] negative [ ] headache [ ] dizziness [ ] syncope [ ] weakness [ ] numbness  Psychiatric: [ ] negative [ ] anxiety [ ] depression  Endocrine: [ ] negative [ ] diabetes [ ] thyroid problem  Hematologic/Lymphatic: [ ] negative [ ] anemia [ ] bleeding problem  Allergic/Immunologic: [ ] negative [ ] itchy eyes [ ] nasal discharge [ ] hives [ ] angioedema  [ ] All other systems negative  [x ] Unable to assess ROS because vented    OBJECTIVE:  ICU Vital Signs Last 24 Hrs  T(C): 37.2 (11 Mar 2019 07:00), Max: 37.5 (10 Mar 2019 23:00)  T(F): 99 (11 Mar 2019 07:00), Max: 99.5 (10 Mar 2019 23:00)  HR: 84 (11 Mar 2019 08:43) (84 - 96)  BP: --  BP(mean): --  ABP: 115/76 (11 Mar 2019 07:00) (101/63 - 120/62)  ABP(mean): 95 (11 Mar 2019 07:00) (80 - 95)  RR: 16 (11 Mar 2019 07:00) (16 - 23)  SpO2: 100% (11 Mar 2019 08:43) (100% - 100%)    Mode: AC/ CMV (Assist Control/ Continuous Mandatory Ventilation), RR (machine): 16, TV (machine): 450, FiO2: 30, PEEP: 5, ITime: 1, MAP: 11, PIP: 26    03-10 @ 07:01  -  03-11 @ 07:00  --------------------------------------------------------  IN: 1485 mL / OUT: 0 mL / NET: 1485 mL      CAPILLARY BLOOD GLUCOSE          PHYSICAL EXAM:  General: awake, follwing simple commands  HEENT: trach to vent  Lymph Nodes:   Neck: tracheostomy  Respiratory: rhonchi  Cardiovascular: RR  Abdomen: soft  Extremities: edema  Skin:   Neurological: awake, alert  Psychiatry: awake, alert    HOSPITAL MEDICATIONS:  Standing Meds:  amLODIPine   Tablet 10 milliGRAM(s) Oral daily  chlorhexidine 4% Liquid 1 Application(s) Topical <User Schedule>  clonazePAM Tablet 1 milliGRAM(s) Oral every 12 hours  docusate sodium Liquid 100 milliGRAM(s) Oral two times a day  ipratropium    for Nebulization 500 MICROGram(s) Nebulizer every 6 hours  labetalol 100 milliGRAM(s) Oral every 12 hours  levETIRAcetam  Solution 1000 milliGRAM(s) Oral two times a day  pantoprazole   Suspension 40 milliGRAM(s) Oral daily  polyethylene glycol 3350 17 Gram(s) Oral two times a day  senna Syrup 5 milliLiter(s) Oral at bedtime  sodium chloride 2 Gram(s) Oral every 6 hours  sodium chloride 0.9%. 1000 milliLiter(s) IV Continuous <Continuous>      PRN Meds:  acetaminophen    Suspension .. 650 milliGRAM(s) Oral every 6 hours PRN  clonazePAM Tablet 0.25 milliGRAM(s) Oral every 8 hours PRN      LABS:                        9.2    4.4   )-----------( 285      ( 11 Mar 2019 00:32 )             26.3     Hgb Trend: 9.2<--, 8.8<--, 8.3<--, 8.7<--, 9.0<--  03-11    137  |  100  |  10  ----------------------------<  142<H>  3.6   |  27  |  0.35<L>    Ca    9.1      11 Mar 2019 00:32  Phos  3.1     03-11  Mg     2.0     03-11      Creatinine Trend: 0.35<--, 0.35<--, 0.38<--, 0.35<--, 0.34<--, <0.30<--  PT/INR - ( 11 Mar 2019 00:32 )   PT: 13.2 sec;   INR: 1.14 ratio         PTT - ( 11 Mar 2019 00:32 )  PTT:25.1 sec          MICROBIOLOGY:     RADIOLOGY:  [ x] Reviewed and interpreted by me    PULMONARY FUNCTION TESTS:    EKG:

## 2019-03-11 NOTE — CONSULT NOTE ADULT - ASSESSMENT
58y female with SLE, cerebellar AVM and bleed in Dec, s/p embolization, AVM resection, post fossa decompression, VPS, trach/PEG   Persistent, "central" fever, but ultimately, fever resolved, pt improved, sent to rehab.    She developed generalized Sz, fever and was sent to [facility] ICU, received empiric Vanco and Cefepime, and subsequently transferred here  On TC, lethargic, without purposeful response, Tmx 103 on arrival, but WBC/diff normal, CXR clear  CSF >500 WBCs, majority Polys, thus difficult to exclude ventriculitis, ?infected VPS- but Cx all negative  S/p  shunt removal for presumed VPS infection, s/p EVD placement- removed as of 3/3  S/p 10 days of vanco/cefepime which was completed on 3/5  FEvers resolved now, no leukocytosis    SCheduled for VPS today  No RCU beds are available  Will reevaluate after VPS  Attempt vent weaning as tolerated

## 2019-03-11 NOTE — PROGRESS NOTE ADULT - ASSESSMENT
ASSESSMENT/PLAN: VPS infection/ventriculitis; seizure     Neuro:  VPS today  neurochecks q4hr  Keppra 1g bid for seizure control, cont  Klonipin for head tremor    Card:  -150mmHg  Adjust antihypertensives to meet goal    Pulm:  on vent - CPAP as tolerated    GI:  tube feeding  BM restart - last BM 3/6  on Danactive; monitor    Renal:  Hyponatremia, possibly SIADH  - On salt tabs; will monitor    Endo:  euglycemia    Heme:  SCDs, on Lovenox    ID:  Off Abx now, completed 7 days, per ID  ID recommendations appreciated    full code  RCU consult

## 2019-03-11 NOTE — PROGRESS NOTE ADULT - SUBJECTIVE AND OBJECTIVE BOX
CC: f/u for fever    Patient is seen in ICU, family at bedside, no fevers, awaiting OR today for VPS    REVIEW OF SYSTEMS:  All other review of systems negative (Comprehensive ROS): limited  Antimicrobials Day #  :off    Other Medications Reviewed    ICU Vital Signs Last 24 Hrs  T(C): 37 (11 Mar 2019 11:00), Max: 37.5 (10 Mar 2019 23:00)  T(F): 98.6 (11 Mar 2019 11:00), Max: 99.5 (10 Mar 2019 23:00)  HR: 90 (11 Mar 2019 12:00) (84 - 96)  BP: --  BP(mean): --  ABP: 118/74 (11 Mar 2019 12:00) (100/74 - 120/62)  ABP(mean): 94 (11 Mar 2019 12:00) (82 - 95)  RR: 16 (11 Mar 2019 12:00) (16 - 17)  SpO2: 100% (11 Mar 2019 12:00) (100% - 100%)      PHYSICAL EXAM:  General: alert, no acute distress  Eyes:  anicteric, no conjunctival injection, no discharge  Oropharynx: no lesions or injection 	  Neck: trach  Lungs: clear to auscultation  Heart: regular rate and rhythm; no murmur, rubs or gallops  Abdomen: soft, nondistended, nontender, peg  Skin: no lesions  Extremities: no clubbing, cyanosis,trace edema  Neurologic: alert, oriented, moves all extremities    LAB RESULTS:                                   9.2    4.4   )-----------( 285      ( 11 Mar 2019 00:32 )             26.3   03-11    137  |  100  |  10  ----------------------------<  142<H>  3.6   |  27  |  0.35<L>    Ca    9.1      11 Mar 2019 00:32  Phos  3.1     03-11  Mg     2.0     03-11            MICROBIOLOGY:  RECENT CULTURES:      RADIOLOGY REVIEWED:      < from: CT Head No Cont (03.10.19 @ 11:26) >  IMPRESSION: Stereotactic imaging for preop planning. Mildly enlarged   third and lateral ventricles. Enlarged fourth ventricle. Embolic material   along the tentorium. No change since 3/8/2019.    < end of copied text >

## 2019-03-11 NOTE — PRE-ANESTHESIA EVALUATION ADULT - NSANTHOSAYNRD_GEN_A_CORE
Yes
No. ADELFO screening performed.  STOP BANG Legend: 0-2 = LOW Risk; 3-4 = INTERMEDIATE Risk; 5-8 = HIGH Risk

## 2019-03-11 NOTE — PROGRESS NOTE ADULT - SUBJECTIVE AND OBJECTIVE BOX
Vital Signs Last 24 Hrs  T(C): 37.5 (10 Mar 2019 23:00), Max: 37.5 (10 Mar 2019 23:00)  T(F): 99.5 (10 Mar 2019 23:00), Max: 99.5 (10 Mar 2019 23:00)  HR: 89 (11 Mar 2019 00:14) (82 - 96)  BP: --  BP(mean): --  RR: 16 (10 Mar 2019 23:00) (15 - 23)  SpO2: 100% (11 Mar 2019 00:14) (98% - 100%)    EXAMINATION:  Trached, EO, tracks,  pupils 3mm reac,  FC on b/l UE antigav, inter follows lowers (wiggles toes)

## 2019-03-11 NOTE — PROGRESS NOTE ADULT - ASSESSMENT
58F s/p embo and rsxn of ruptured AVM with VPS placement. Neurologically decline from baseline s/p VPS removal and EVD placement  -continue care per icu  -Shunt Monday; failed clamp trial

## 2019-03-12 LAB
APPEARANCE UR: ABNORMAL
BACTERIA # UR AUTO: NEGATIVE — SIGNIFICANT CHANGE UP
BILIRUB UR-MCNC: NEGATIVE — SIGNIFICANT CHANGE UP
COLOR SPEC: YELLOW — SIGNIFICANT CHANGE UP
DIFF PNL FLD: ABNORMAL
EPI CELLS # UR: 0 /HPF — SIGNIFICANT CHANGE UP
GLUCOSE UR QL: NEGATIVE — SIGNIFICANT CHANGE UP
HYALINE CASTS # UR AUTO: 0 /LPF — SIGNIFICANT CHANGE UP (ref 0–2)
KETONES UR-MCNC: NEGATIVE — SIGNIFICANT CHANGE UP
LEUKOCYTE ESTERASE UR-ACNC: ABNORMAL
NITRITE UR-MCNC: POSITIVE
PH UR: 6.5 — SIGNIFICANT CHANGE UP (ref 5–8)
PROCALCITONIN SERPL-MCNC: 0.11 NG/ML — HIGH (ref 0.02–0.1)
PROT UR-MCNC: SIGNIFICANT CHANGE UP
RBC CASTS # UR COMP ASSIST: 4 /HPF — SIGNIFICANT CHANGE UP (ref 0–4)
SP GR SPEC: 1.02 — SIGNIFICANT CHANGE UP (ref 1.01–1.02)
UROBILINOGEN FLD QL: NEGATIVE — SIGNIFICANT CHANGE UP
WBC UR QL: 185 /HPF — HIGH (ref 0–5)

## 2019-03-12 PROCEDURE — 99024 POSTOP FOLLOW-UP VISIT: CPT

## 2019-03-12 PROCEDURE — 71045 X-RAY EXAM CHEST 1 VIEW: CPT | Mod: 26

## 2019-03-12 PROCEDURE — 70450 CT HEAD/BRAIN W/O DYE: CPT | Mod: 26

## 2019-03-12 RX ORDER — POTASSIUM CHLORIDE 20 MEQ
20 PACKET (EA) ORAL ONCE
Qty: 0 | Refills: 0 | Status: COMPLETED | OUTPATIENT
Start: 2019-03-12 | End: 2019-03-12

## 2019-03-12 RX ORDER — ACETAMINOPHEN 500 MG
1000 TABLET ORAL ONCE
Qty: 0 | Refills: 0 | Status: COMPLETED | OUTPATIENT
Start: 2019-03-12 | End: 2019-03-12

## 2019-03-12 RX ADMIN — LEVETIRACETAM 1000 MILLIGRAM(S): 250 TABLET, FILM COATED ORAL at 18:03

## 2019-03-12 RX ADMIN — Medication 400 MILLIGRAM(S): at 12:57

## 2019-03-12 RX ADMIN — POLYETHYLENE GLYCOL 3350 17 GRAM(S): 17 POWDER, FOR SOLUTION ORAL at 05:10

## 2019-03-12 RX ADMIN — SODIUM CHLORIDE 2 GRAM(S): 9 INJECTION INTRAMUSCULAR; INTRAVENOUS; SUBCUTANEOUS at 18:03

## 2019-03-12 RX ADMIN — Medication 100 MILLIGRAM(S): at 05:09

## 2019-03-12 RX ADMIN — SODIUM CHLORIDE 60 MILLILITER(S): 9 INJECTION INTRAMUSCULAR; INTRAVENOUS; SUBCUTANEOUS at 05:10

## 2019-03-12 RX ADMIN — Medication 20 MILLIEQUIVALENT(S): at 05:08

## 2019-03-12 RX ADMIN — PANTOPRAZOLE SODIUM 40 MILLIGRAM(S): 20 TABLET, DELAYED RELEASE ORAL at 12:00

## 2019-03-12 RX ADMIN — Medication 1000 MILLIGRAM(S): at 13:25

## 2019-03-12 RX ADMIN — Medication 100 MILLIGRAM(S): at 05:08

## 2019-03-12 RX ADMIN — SODIUM CHLORIDE 2 GRAM(S): 9 INJECTION INTRAMUSCULAR; INTRAVENOUS; SUBCUTANEOUS at 00:12

## 2019-03-12 RX ADMIN — SODIUM CHLORIDE 2 GRAM(S): 9 INJECTION INTRAMUSCULAR; INTRAVENOUS; SUBCUTANEOUS at 05:10

## 2019-03-12 RX ADMIN — SODIUM CHLORIDE 2 GRAM(S): 9 INJECTION INTRAMUSCULAR; INTRAVENOUS; SUBCUTANEOUS at 23:17

## 2019-03-12 RX ADMIN — SENNA PLUS 5 MILLILITER(S): 8.6 TABLET ORAL at 23:17

## 2019-03-12 RX ADMIN — Medication 1 MILLIGRAM(S): at 05:09

## 2019-03-12 RX ADMIN — AMLODIPINE BESYLATE 10 MILLIGRAM(S): 2.5 TABLET ORAL at 05:10

## 2019-03-12 RX ADMIN — Medication 100 MILLIGRAM(S): at 14:58

## 2019-03-12 RX ADMIN — LEVETIRACETAM 1000 MILLIGRAM(S): 250 TABLET, FILM COATED ORAL at 05:08

## 2019-03-12 RX ADMIN — SODIUM CHLORIDE 2 GRAM(S): 9 INJECTION INTRAMUSCULAR; INTRAVENOUS; SUBCUTANEOUS at 11:59

## 2019-03-12 RX ADMIN — Medication 650 MILLIGRAM(S): at 09:30

## 2019-03-12 RX ADMIN — Medication 650 MILLIGRAM(S): at 08:42

## 2019-03-12 NOTE — CHART NOTE - NSCHARTNOTEFT_GEN_A_CORE
Nutrition Follow Up Note  Patient seen for: Nutrition follow up    Pt is a 59 y/o F with PMH lupus. 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 Currently admitted with generalized clonic seizure, leukocytosis and AMS. S/P VPS removal and EVD placement (), EVD removed 3/3. Pt currently off antibiotics. On vent, CPAP as tolerated. Pt s/p placement on  shunt 3/11 2/ hydrocephalus and PRES. Continues on salt tabs for cerebral edema.     Source: Comprehensive chart review, medical rounds    Diet: Jevity 1.2 at goal rate 85ml/hr x 18 hrs. Provides: 1836kcal (21kcal/kg) and 85g protein (1.4g protein/kg IBW 59kg) + Rick Active twice daily.     Per review, EN was put on hold after midnight on 3/11 in context of VPS placement. Restarted at 20ml on 3/12, currently infusing at 50ml/hr. Prior to hold, pt was tolerating Jevity 1.2 at 85ml/hr. Additionally, EN was on hold 3/9 for CT of head. No reported GI distress noted, tolerating without difficulty.     Per nursing flow sheet:   Last BM: 1x 3/12, 2x 3/11, 1x 3/9  Minimal residuals noted (5ml 3/11, 25ml 3/10, 15ml 3/9)    Daily Weight in k.2 (03-06)  % Weight Change - Wt stable since admission.     Pertinent Medications: MEDICATIONS  (STANDING):  acetaminophen  IVPB .. 1000 milliGRAM(s) IV Intermittent once  amLODIPine   Tablet 10 milliGRAM(s) Oral daily  ceFAZolin   IVPB 2000 milliGRAM(s) IV Intermittent every 8 hours  clonazePAM Tablet 1 milliGRAM(s) Oral every 12 hours  docusate sodium Liquid 100 milliGRAM(s) Oral two times a day  labetalol 100 milliGRAM(s) Oral every 12 hours  levETIRAcetam  Solution 1000 milliGRAM(s) Oral two times a day  pantoprazole   Suspension 40 milliGRAM(s) Oral daily  polyethylene glycol 3350 17 Gram(s) Oral two times a day  senna Syrup 5 milliLiter(s) Oral at bedtime  sodium chloride 2 Gram(s) Oral every 6 hours    MEDICATIONS  (PRN):  acetaminophen    Suspension .. 650 milliGRAM(s) Oral every 6 hours PRN Temp greater or equal to 38C (100.4F), Mild Pain (1 - 3)  clonazePAM Tablet 0.25 milliGRAM(s) Oral every 8 hours PRN myoclonus    Pertinent Labs:  @ 22:42: Na 139, BUN 6<L>, Cr 0.34<L>, <H>, K+ 3.8, Phos 3.4, Mg 1.9, Alk Phos --, ALT/SGPT --, AST/SGOT --, HbA1c --    Finger Sticks:      Skin per nursing documentation: Surgical incision to lumbar spine (), Surgical incision to right head  Edema: 1+ generalized     Estimated Needs:   [x] no change since previous assessment  [ ] recalculated:     Previous Nutrition Diagnosis: Increased nutrient needs  Nutrition Diagnosis is: Ongoing, being addressed with EN    Interventions:     Recommend  1) Continue Jevity 1.2 at goal rate 85ml/hr x 18 hrs. Provides: 1836kcal (21kcal/kg) and 85g protein (1.4g protein/kg IBW 59kg)   2) Continue dan active twice daily  3) Monitor labs/electrolytes    Monitoring and Evaluation:     Continue to monitor Nutritional intake, Tolerance to diet prescription, weights, labs, skin integrity    RD remains available upon request and will follow up per protocol

## 2019-03-12 NOTE — PROGRESS NOTE ADULT - SUBJECTIVE AND OBJECTIVE BOX
Vital Signs Last 24 Hrs  T(C): 36.7 (11 Mar 2019 23:00), Max: 37.3 (11 Mar 2019 03:00)  T(F): 98.1 (11 Mar 2019 23:00), Max: 99.1 (11 Mar 2019 03:00)  HR: 94 (12 Mar 2019 00:00) (83 - 94)  BP: 151/95 (11 Mar 2019 19:45) (151/95 - 151/95)  BP(mean): 112 (11 Mar 2019 19:45) (112 - 112)  RR: 16 (12 Mar 2019 00:00) (16 - 19)  SpO2: 100% (12 Mar 2019 00:00) (96% - 100%)    EXAM  Trached, EO, tracks,  pupils 3mm reac,  interm follows

## 2019-03-12 NOTE — PROGRESS NOTE ADULT - SUBJECTIVE AND OBJECTIVE BOX
SUMMARY:    58 year-old woman with ruptured cerebellar AVM s/p embolization and craniotomy for resection and  shunt placement in December 2018 was taken to [facility] 2/23/19 after having seizure in rehab. The patient reportedly had an LP that showed leukocytosis. Due to the suspicion of  shunt infection, the patient was transferred to [facility] for further management. She had removal of VPS and placement of EVD on 2/25/19.     24-hr events:  VPS 3/11.    VITALS/DATA/ORDERS: [x] Reviewed    PHYSICAL EXAM:  trach'ed, on vent  Intermittent head bobbing/titubation  EO to voice, EOMI, PERRL, FC - opens mouth/sticks out tongue,   follows commands on all extremities (shows thumbs on b/l UE, wiggles toes on b/l LE)  RRR  minimal rhonchi R>L, decreased breath sounds  soft/nd abd  wwp ext SUMMARY:    58 year-old woman with ruptured cerebellar AVM s/p embolization and craniotomy for resection and  shunt placement in December 2018 was taken to [facility] 2/23/19 after having seizure in rehab. The patient reportedly had an LP that showed leukocytosis. Due to the suspicion of  shunt infection, the patient was transferred to [facility] for further management. She had removal of VPS and placement of EVD on 2/25/19.     24-hr events:  VPS 3/11.    unable to assess ROS, aphasic    VITALS/DATA/ORDERS: [x] Reviewed    PHYSICAL EXAM:  trach'ed, on vent  Intermittent head bobbing/titubation  EO to voice, EOMI, PERRL, FC - opens mouth/sticks out tongue,   follows commands on all extremities (shows thumbs on b/l UE, wiggles toes on b/l LE)  RRR  minimal rhonchi R>L, decreased breath sounds  soft/nd abd  wwp ext

## 2019-03-12 NOTE — PROGRESS NOTE ADULT - ASSESSMENT
ASSESSMENT/PLAN: VPS infection/ventriculitis; seizure; shunt removed.  S/p a new VPS placement 3/11.    Neuro:  follow CTh am  neurochecks q4hr  Keppra 1g bid for seizure control, cont  Klonipin for head tremor    Card:  -150mmHg  Adjust antihypertensives to meet goal    Pulm:  on vent - CPAP as tolerated    GI:  tube feeding  BM restart - last BM 3/6  on Danactive; monitor    Renal:  Hyponatremia, possibly SIADH  - On salt tabs; will monitor    Endo:  euglycemia    Heme:  SCDs, on Lovenox    ID:  Off Abx now, completed 7 days, per ID  ID recommendations appreciated    full code  RCU consult - possible transfer ASSESSMENT/PLAN: VPS infection/ventriculitis; seizure; shunt removed.  S/p a new VPS placement 3/11.    Neuro:  follow CTh am  neurochecks q4hr  Keppra 1g bid for seizure control, cont  Klonipin for head tremor    Card:  -150mmHg  Adjust antihypertensives to meet goal    Pulm:  on vent - CPAP as tolerated    GI:  tube feeding  BM restart - last BM 3/6  on Danactive; monitor    Renal:  Hyponatremia, possibly SIADH  - On salt tabs; will monitor    Endo:  euglycemia    Heme:  SCDs, on Lovenox    ID:  Off Abx now, completed 7 days, per ID (Klebs. UTI)  UA ?positive, monitor off Abx, follow Cx, procal    full code  RCU consult - possible transfer ASSESSMENT/PLAN: VPS infection/ventriculitis; seizure; shunt removed.  S/p a new VPS placement 3/11.    Neuro:  follow CTh am  neurochecks q4hr  Keppra 1g bid for seizure control, cont  Klonipin for head tremor    Card:  -150mmHg  Adjust antihypertensives to meet goal    Pulm:  on vent - CPAP as tolerated    GI:  tube feeding  BM restart - last BM 3/6  on Danactive; monitor    Renal:  Hyponatremia, possibly SIADH  - On salt tabs; will monitor    Endo:  euglycemia    Heme:  SCDs, on Lovenox    ID:  Off Abx now, completed 7 days, per ID (Klebs. UTI)  UA ?positive, monitor off Abx, follow Cx, procal    full code  RCU transfer pending bed availability

## 2019-03-12 NOTE — PROGRESS NOTE ADULT - SUBJECTIVE AND OBJECTIVE BOX
CC: f/u for  fever, ventriculitis  Patient reports nothing not verbal    REVIEW OF SYSTEMS:  All other review of systems cannot get (Comprehensive ROS)    Antimicrobials Day #  :    Other Medications Reviewed    T(F): 98.9 (19 @ 15:00), Max: 100.9 (19 @ 07:00)  HR: 88 (19 @ 17:00)  BP: 125/96 (19 @ 02:00)  RR: 18 (19 @ 17:00)  SpO2: 100% (19 @ 17:00)  Wt(kg): --    PHYSICAL EXAM:  General: awakens, on vent no acute distress  Eyes:  anicteric, no conjunctival injection, no discharge  Oropharynx: no lesions or injection 	  Neck: without adenopathy  Lungs: clear to auscultation  Heart: regular rate and rhythm; no murmur, rubs or gallops  Abdomen: soft, nondistended, nontender, without mass or organomegaly  Skin: no lesions  Extremities: no clubbing, cyanosis, or edema  Neurologic: awakens to voice    LAB RESULTS:                        10.9   8.1   )-----------( 302      ( 11 Mar 2019 22:42 )             30.7     03-11    139  |  102  |  6<L>  ----------------------------<  116<H>  3.8   |  23  |  0.34<L>    Ca    9.1      11 Mar 2019 22:42  Phos  3.4     -11  Mg     1.9     -11        Urinalysis Basic - ( 12 Mar 2019 08:56 )    Color: Yellow / Appearance: Slightly Turbid / S.018 / pH: x  Gluc: x / Ketone: Negative  / Bili: Negative / Urobili: Negative   Blood: x / Protein: Trace / Nitrite: Positive   Leuk Esterase: Large / RBC: 4 /hpf /  /HPF   Sq Epi: x / Non Sq Epi: 0 /hpf / Bacteria: Negative      MICROBIOLOGY:  RECENT CULTURES:      RADIOLOGY REVIEWED:      < from: CT Head No Cont (19 @ 08:25) >    IMPRESSION:     Interval placement of the right parietal approach ventriculostomy   catheter. Stable ventricular size..    Unchanged previous right frontal catheter tract intraparenchymal   hemorrhage with surrounding vasogenic edema and layering intraventricular   hemorrhage.      < end of copied text >          Assessment:  Patient with sle, had cerebellar bleed in December required avm resection and posterior fossa decompression and vps, came back with seizure, press, possible vps infection so had empiric tx with antibiotics and vps removed. Has intermittent low grade temps but no further infection found now pod 1 new vps  Plan:  monitor off antibiotics  reculture if fever escalates

## 2019-03-13 DIAGNOSIS — G40.909 EPILEPSY, UNSPECIFIED, NOT INTRACTABLE, WITHOUT STATUS EPILEPTICUS: ICD-10-CM

## 2019-03-13 DIAGNOSIS — I10 ESSENTIAL (PRIMARY) HYPERTENSION: ICD-10-CM

## 2019-03-13 DIAGNOSIS — R13.10 DYSPHAGIA, UNSPECIFIED: ICD-10-CM

## 2019-03-13 DIAGNOSIS — Z98.890 OTHER SPECIFIED POSTPROCEDURAL STATES: ICD-10-CM

## 2019-03-13 DIAGNOSIS — E87.1 HYPO-OSMOLALITY AND HYPONATREMIA: ICD-10-CM

## 2019-03-13 LAB
ANION GAP SERPL CALC-SCNC: 10 MMOL/L — SIGNIFICANT CHANGE UP (ref 5–17)
BUN SERPL-MCNC: 8 MG/DL — SIGNIFICANT CHANGE UP (ref 7–23)
CALCIUM SERPL-MCNC: 9.4 MG/DL — SIGNIFICANT CHANGE UP (ref 8.4–10.5)
CHLORIDE SERPL-SCNC: 100 MMOL/L — SIGNIFICANT CHANGE UP (ref 96–108)
CO2 SERPL-SCNC: 29 MMOL/L — SIGNIFICANT CHANGE UP (ref 22–31)
CREAT SERPL-MCNC: 0.37 MG/DL — LOW (ref 0.5–1.3)
CULTURE RESULTS: SIGNIFICANT CHANGE UP
GLUCOSE SERPL-MCNC: 120 MG/DL — HIGH (ref 70–99)
HCT VFR BLD CALC: 29.1 % — LOW (ref 34.5–45)
HGB BLD-MCNC: 10.2 G/DL — LOW (ref 11.5–15.5)
MCHC RBC-ENTMCNC: 30.4 PG — SIGNIFICANT CHANGE UP (ref 27–34)
MCHC RBC-ENTMCNC: 35 GM/DL — SIGNIFICANT CHANGE UP (ref 32–36)
MCV RBC AUTO: 86.9 FL — SIGNIFICANT CHANGE UP (ref 80–100)
PLATELET # BLD AUTO: 302 K/UL — SIGNIFICANT CHANGE UP (ref 150–400)
POTASSIUM SERPL-MCNC: 4.4 MMOL/L — SIGNIFICANT CHANGE UP (ref 3.5–5.3)
POTASSIUM SERPL-SCNC: 4.4 MMOL/L — SIGNIFICANT CHANGE UP (ref 3.5–5.3)
RBC # BLD: 3.35 M/UL — LOW (ref 3.8–5.2)
RBC # FLD: 16.6 % — HIGH (ref 10.3–14.5)
SODIUM SERPL-SCNC: 139 MMOL/L — SIGNIFICANT CHANGE UP (ref 135–145)
SPECIMEN SOURCE: SIGNIFICANT CHANGE UP
WBC # BLD: 5.9 K/UL — SIGNIFICANT CHANGE UP (ref 3.8–10.5)
WBC # FLD AUTO: 5.9 K/UL — SIGNIFICANT CHANGE UP (ref 3.8–10.5)

## 2019-03-13 PROCEDURE — 99233 SBSQ HOSP IP/OBS HIGH 50: CPT | Mod: GC

## 2019-03-13 RX ORDER — ENOXAPARIN SODIUM 100 MG/ML
40 INJECTION SUBCUTANEOUS
Qty: 0 | Refills: 0 | Status: DISCONTINUED | OUTPATIENT
Start: 2019-03-13 | End: 2019-03-22

## 2019-03-13 RX ORDER — PIPERACILLIN AND TAZOBACTAM 4; .5 G/20ML; G/20ML
3.38 INJECTION, POWDER, LYOPHILIZED, FOR SOLUTION INTRAVENOUS EVERY 8 HOURS
Qty: 0 | Refills: 0 | Status: DISCONTINUED | OUTPATIENT
Start: 2019-03-13 | End: 2019-03-14

## 2019-03-13 RX ADMIN — SENNA PLUS 5 MILLILITER(S): 8.6 TABLET ORAL at 21:32

## 2019-03-13 RX ADMIN — Medication 100 MILLIGRAM(S): at 17:52

## 2019-03-13 RX ADMIN — Medication 100 MILLIGRAM(S): at 06:10

## 2019-03-13 RX ADMIN — POLYETHYLENE GLYCOL 3350 17 GRAM(S): 17 POWDER, FOR SOLUTION ORAL at 17:53

## 2019-03-13 RX ADMIN — SODIUM CHLORIDE 2 GRAM(S): 9 INJECTION INTRAMUSCULAR; INTRAVENOUS; SUBCUTANEOUS at 17:51

## 2019-03-13 RX ADMIN — LEVETIRACETAM 1000 MILLIGRAM(S): 250 TABLET, FILM COATED ORAL at 06:04

## 2019-03-13 RX ADMIN — Medication 1 MILLIGRAM(S): at 06:10

## 2019-03-13 RX ADMIN — PIPERACILLIN AND TAZOBACTAM 25 GRAM(S): 4; .5 INJECTION, POWDER, LYOPHILIZED, FOR SOLUTION INTRAVENOUS at 21:35

## 2019-03-13 RX ADMIN — AMLODIPINE BESYLATE 10 MILLIGRAM(S): 2.5 TABLET ORAL at 06:10

## 2019-03-13 RX ADMIN — LEVETIRACETAM 1000 MILLIGRAM(S): 250 TABLET, FILM COATED ORAL at 17:52

## 2019-03-13 RX ADMIN — SODIUM CHLORIDE 2 GRAM(S): 9 INJECTION INTRAMUSCULAR; INTRAVENOUS; SUBCUTANEOUS at 12:54

## 2019-03-13 RX ADMIN — Medication 100 MILLIGRAM(S): at 06:15

## 2019-03-13 RX ADMIN — ENOXAPARIN SODIUM 40 MILLIGRAM(S): 100 INJECTION SUBCUTANEOUS at 18:01

## 2019-03-13 RX ADMIN — SODIUM CHLORIDE 2 GRAM(S): 9 INJECTION INTRAMUSCULAR; INTRAVENOUS; SUBCUTANEOUS at 06:11

## 2019-03-13 RX ADMIN — PIPERACILLIN AND TAZOBACTAM 25 GRAM(S): 4; .5 INJECTION, POWDER, LYOPHILIZED, FOR SOLUTION INTRAVENOUS at 12:54

## 2019-03-13 RX ADMIN — Medication 1 MILLIGRAM(S): at 17:51

## 2019-03-13 RX ADMIN — POLYETHYLENE GLYCOL 3350 17 GRAM(S): 17 POWDER, FOR SOLUTION ORAL at 06:10

## 2019-03-13 RX ADMIN — PANTOPRAZOLE SODIUM 40 MILLIGRAM(S): 20 TABLET, DELAYED RELEASE ORAL at 12:53

## 2019-03-13 NOTE — PROGRESS NOTE ADULT - ASSESSMENT
58y female with SLE, cerebellar AVM and bleed in Dec, s/p embolization, AVM resection, post fossa decompression, VPS, trach/PEG   Persistent, "central" fever, but ultimately, fever resolved, pt improved, sent to rehab.    She apparently developed generalized Sz, fever and sent to [facility] ICU, received empiric Vanco and Cefepime, transferred here.    Cultures at [facility] all finalized negative as per Dr Handley  On TC, lethargic, without purposeful response, Tmx 103 on arrival, but WBC/diff normal, CXR clear  CSF >500 WBCs, majority Polys, thus difficult to exclude ventriculitis, ?infected VPS- but Cx all negative  S/p  shunt removal, EVD placement- removed as of 3/3  S/p 10 days of vanco/cefepime on 3/5  She has PRES from seizure and still on vent and encephalopathic.   Fever have moderated after stopping antibiotics  WBC still normal, PCT previously low  Fevers may be on a non infectious basis, ? central, ? SLE related  CDT negative    Plan:  Pt now transferred to ICU, empiric Zosyn started for positive UA as per RCU  f/u culture  hopefully can limit abx to a short course 58y female with SLE, cerebellar AVM and bleed in Dec, s/p embolization, AVM resection, post fossa decompression, VPS, trach/PEG   Persistent, "central" fever, but ultimately, fever resolved, pt improved, sent to rehab.    She apparently developed generalized Sz, fever and sent to [facility] ICU, received empiric Vanco and Cefepime, transferred here.    Cultures at [facility] all finalized negative as per Dr Handley  On TC, lethargic, without purposeful response, Tmx 103 on arrival, but WBC/diff normal, CXR clear  CSF >500 WBCs, majority Polys, thus difficult to exclude ventriculitis, ?infected VPS- but Cx all negative  S/p  shunt removal, EVD placement- removed as of 3/3  S/p 10 days of vanco/cefepime on 3/5  She has PRES from seizure and still on vent and encephalopathic.   Fever have moderated after stopping antibiotics  WBC still normal, PCT previously low  Fevers may be on a non infectious basis, ? central, ? SLE related  CDT negative    Plan:  Pt now transferred to ICU,   empiric Zosyn started for positive UA as per RCU  f/u culture  hopefully can limit abx to a short course  d/w RCU PA  pt's sister updated at bedside

## 2019-03-13 NOTE — PROGRESS NOTE ADULT - ATTENDING COMMENTS
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 She was discharged to rehab in late Jan 2019. She has been doing well at rehab: walking 50 steps with walker. Two days prior, the patient had a generalized tonic clonic seizure witnessed by her sister as well as a fever. She was then brought to OSH CT. The patient reportedly had an LP that showed leukocytosis. Due to the suspicion of  shunt infection, the patient was transferred to [facility] for further management. She had removal of VPS and placement of EVD on 2/25/19.   Transferred to RCU 3/12  UA positive, will send culture and start zosyn  Tolerating TC 58yof initially admitted in mid Dec 2018 for ruptured cerebellar AVM s/p embolization and craniotomy for resection and  shunt. She was discharged to rehab in late Jan 2019. She has been doing well at rehab: walking 50 steps with walker. Two days prior, the patient had a generalized tonic clonic seizure witnessed by her sister as well as a fever. She was then brought to OSH CT. The patient reportedly had an LP that showed leukocytosis. Due to the suspicion of  shunt infection, the patient was transferred to [facility] for further management. She had removal of VPS and placement of EVD on 2/25/19.   Transferred to RCU 3/12  UA positive, will send culture and start zosyn  Ventilator dependence, will try weaning

## 2019-03-13 NOTE — PROGRESS NOTE ADULT - SUBJECTIVE AND OBJECTIVE BOX
CC: f/u for fever    Patient now transferred to RCU    REVIEW OF SYSTEMS:  All other review of systems negative (Comprehensive ROS): limited  Antimicrobials Day #  :  piperacillin/tazobactam IVPB. 3.375 Gram(s) IV Intermittent every 8 hours    Other Medications Reviewed    ICU Vital Signs Last 24 Hrs  T(C): 36.6 (13 Mar 2019 13:57), Max: 36.9 (12 Mar 2019 19:00)  T(F): 97.9 (13 Mar 2019 13:57), Max: 98.5 (12 Mar 2019 19:00)  HR: 98 (13 Mar 2019 13:57) (84 - 103)  BP: 105/66 (13 Mar 2019 13:57) (105/66 - 132/84)  BP(mean): --  ABP: 123/70 (12 Mar 2019 20:00) (92/51 - 126/70)  ABP(mean): 92 (12 Mar 2019 20:00) (67 - 94)  RR: 16 (13 Mar 2019 13:57) (16 - 25)  SpO2: 100% (13 Mar 2019 13:57) (99% - 101%)      PHYSICAL EXAM:    General: awakens, on vent no acute distress  Eyes:  anicteric, no conjunctival injection, no discharge  Oropharynx: no lesions or injection 	  Neck: without adenopathy  Lungs: clear to auscultation  Heart: regular rate and rhythm; no murmur, rubs or gallops  Abdomen: soft, nondistended, nontender, without mass or organomegaly  Skin: no lesions  Extremities: no clubbing, cyanosis, or edema  Neurologic: awakens to voice  LAB RESULTS:                                       10.2   5.9   )-----------( 302      ( 13 Mar 2019 12:29 )             29.1   03-13    139  |  100  |  8   ----------------------------<  120<H>  4.4   |  29  |  0.37<L>    Ca    9.4      13 Mar 2019 12:29  Phos  3.4     03-11  Mg     1.9     03-11            MICROBIOLOGY:  RECENT CULTURES:  Culture - Blood (03.12.19 @ 11:45)    Specimen Source: .Blood Blood-Peripheral    Culture Results:   No growth to date.        RADIOLOGY REVIEWED:  < from: Xray Chest 1 View- PORTABLE-Urgent (03.12.19 @ 08:28) >  The lungs are clear. No pleural effusions or pneumothorax. The   cardiomediastinal silhouette is unremarkable. No acute osseous findings.    < end of copied text >    < from: CT Head No Cont (03.12.19 @ 08:25) >  Interval placement of the right parietal approach ventriculostomy   catheter. Stable ventricular size..    Unchanged previous right frontal catheter tract intraparenchymal   hemorrhage with surrounding vasogenic edema and layering intraventricular   hemorrhage.    < end of copied text > CC: f/u for fever    Patient now transferred to RCU, Zosyn started for concern of pyuria    REVIEW OF SYSTEMS:  All other review of systems negative (Comprehensive ROS): limited  Antimicrobials Day #  :  piperacillin/tazobactam IVPB. 3.375 Gram(s) IV Intermittent every 8 hours    Other Medications Reviewed    ICU Vital Signs Last 24 Hrs  T(C): 36.6 (13 Mar 2019 13:57), Max: 36.9 (12 Mar 2019 19:00)  T(F): 97.9 (13 Mar 2019 13:57), Max: 98.5 (12 Mar 2019 19:00)  HR: 98 (13 Mar 2019 13:57) (84 - 103)  BP: 105/66 (13 Mar 2019 13:57) (105/66 - 132/84)  BP(mean): --  ABP: 123/70 (12 Mar 2019 20:00) (92/51 - 126/70)  ABP(mean): 92 (12 Mar 2019 20:00) (67 - 94)  RR: 16 (13 Mar 2019 13:57) (16 - 25)  SpO2: 100% (13 Mar 2019 13:57) (99% - 101%)      PHYSICAL EXAM:    General: awakens, on vent no acute distress  Eyes:  anicteric, no conjunctival injection, no discharge  Oropharynx: no lesions or injection 	  Neck: without adenopathy  Lungs: clear to auscultation  Heart: regular rate and rhythm; no murmur, rubs or gallops  Abdomen: soft, nondistended, nontender, without mass or organomegaly  Skin: no lesions  Extremities: no clubbing, cyanosis, or edema  Neurologic: awakens to voice  LAB RESULTS:                                       10.2   5.9   )-----------( 302      ( 13 Mar 2019 12:29 )             29.1   03-13    139  |  100  |  8   ----------------------------<  120<H>  4.4   |  29  |  0.37<L>    Ca    9.4      13 Mar 2019 12:29  Phos  3.4     03-11  Mg     1.9     03-11            MICROBIOLOGY:  RECENT CULTURES:  Culture - Blood (03.12.19 @ 11:45)    Specimen Source: .Blood Blood-Peripheral    Culture Results:   No growth to date.        RADIOLOGY REVIEWED:  < from: Xray Chest 1 View- PORTABLE-Urgent (03.12.19 @ 08:28) >  The lungs are clear. No pleural effusions or pneumothorax. The   cardiomediastinal silhouette is unremarkable. No acute osseous findings.    < end of copied text >    < from: CT Head No Cont (03.12.19 @ 08:25) >  Interval placement of the right parietal approach ventriculostomy   catheter. Stable ventricular size..    Unchanged previous right frontal catheter tract intraparenchymal   hemorrhage with surrounding vasogenic edema and layering intraventricular   hemorrhage.    < end of copied text >

## 2019-03-13 NOTE — PROGRESS NOTE ADULT - SUBJECTIVE AND OBJECTIVE BOX
Patient is a 59y old  Female who presents with a chief complaint of seizures, h/o  shunt (12 Mar 2019 17:13)      Interval Events:    REVIEW OF SYSTEMS:  [ ] Positive  [ ] All other systems negative  [ x] Unable to assess ROS because _nonverbal_______    Vital Signs Last 24 Hrs  T(C): 36.9 (19 @ 03:00), Max: 37.7 (19 @ 11:00)  T(F): 98.4 (19 @ 03:00), Max: 99.8 (19 @ 11:00)  HR: 90 (19 @ 05:00) (82 - 103)  BP: 132/78 (19 @ 05:00) (125/79 - 132/84)  RR: 20 (19 @ 05:00) (16 - 25)  SpO2: 100% (19 @ 05:00) (99% - 101%)    PHYSICAL EXAM:  HEENT:   [ x]Tracheostomy: 6 portex cuffed  [ ]Pupils equal  [ ]No oral lesions  [ ]Abnormal    SKIN  [ x]No Rash  [ ] Abnormal  [ ] pressure    CARDIAC  [x ]Regular  [ ]Abnormal    PULMONARY  [x ]Bilateral Clear Breath Sounds  [ ]Normal Excursion  [ ]Abnormal    GI  [x ]PEG      [ x] +BS		              [x ]Soft, nondistended, nontender	  [ ]Abnormal    MUSCULOSKELETAL                                   [ ]Bedbound                 [ ]Abnormal    [x ]Ambulatory/OOB to chair                           EXTREMITIES                                         [x ]Normal  [ ]Edema                           NEUROLOGIC  [ ] Normal, non focal  [x ] Focal findings: myoclonic tremors    PSYCHIATRIC  [x ]Alert and follows simple comands  [ ] Sedated	 [ ]Agitated    :  Newsome: [ ] Yes, if yes: Date of Placement:                   [  x] No    LINES: Central Lines [ ] Yes, if yes: Date of Placement                                     [  x] No    HOSPITAL MEDICATIONS:  MEDICATIONS  (STANDING):  amLODIPine   Tablet 10 milliGRAM(s) Oral daily  clonazePAM Tablet 1 milliGRAM(s) Oral every 12 hours  docusate sodium Liquid 100 milliGRAM(s) Oral two times a day  labetalol 100 milliGRAM(s) Oral every 12 hours  levETIRAcetam  Solution 1000 milliGRAM(s) Oral two times a day  pantoprazole   Suspension 40 milliGRAM(s) Oral daily  polyethylene glycol 3350 17 Gram(s) Oral two times a day  senna Syrup 5 milliLiter(s) Oral at bedtime  sodium chloride 2 Gram(s) Oral every 6 hours    MEDICATIONS  (PRN):  acetaminophen    Suspension .. 650 milliGRAM(s) Oral every 6 hours PRN Temp greater or equal to 38C (100.4F), Mild Pain (1 - 3)  clonazePAM Tablet 0.25 milliGRAM(s) Oral every 8 hours PRN myoclonus      LABS:                        10.9   8.1   )-----------( 302      ( 11 Mar 2019 22:42 )             30.7     03-11    139  |  102  |  6<L>  ----------------------------<  116<H>  3.8   |  23  |  0.34<L>    Ca    9.1      11 Mar 2019 22:42  Phos  3.4     03-11  Mg     1.9     -11        Urinalysis Basic - ( 12 Mar 2019 08:56 )    Color: Yellow / Appearance: Slightly Turbid / S.018 / pH: x  Gluc: x / Ketone: Negative  / Bili: Negative / Urobili: Negative   Blood: x / Protein: Trace / Nitrite: Positive   Leuk Esterase: Large / RBC: 4 /hpf /  /HPF   Sq Epi: x / Non Sq Epi: 0 /hpf / Bacteria: Negative          CAPILLARY BLOOD GLUCOSE    MICROBIOLOGY:     RADIOLOGY:  [ ] Reviewed and interpreted by me    Mode: AC/ CMV (Assist Control/ Continuous Mandatory Ventilation)  RR (machine): 16  TV (machine): 450  FiO2: 30  PEEP: 5  ITime: 1  MAP: 11  PIP: 28

## 2019-03-13 NOTE — PROGRESS NOTE ADULT - ASSESSMENT
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 She was discharged to rehab in late Jan 2019. She has been doing well at rehab: walking 50 steps with walker. Two days prior, the patient had a generlized tonic clonic seizure witnessed by her sister as well as a fever. She was then brought to H CT. The patient reportedly had an LP that showed leukocytosis. Due to the suspicion of  shunt infection, the patient was transferred to [facility] for further management. She had removal of VPS and placement of EVD on 2/25/19.   3/3: EVD d/c'ed. Febrile overnight. Cultures sent. ICPs <12. Started on 2% for hyponatremia.   3/4: Loses neurological exam when patient spikes a fever  3/13: Transferred to RCU overnight, Currently HD stable 58yof initially admitted in mid Dec 2018 for ruptured cerebellar AVM s/p embolization and craniotomy for resection and  shunt. She was discharged to rehab in late Jan 2019. She has been doing well at rehab: walking 50 steps with walker. Two days prior, the patient had a generlized tonic clonic seizure witnessed by her sister as well as a fever. She was then brought to OSH CT. The patient reportedly had an LP that showed leukocytosis. Due to the suspicion of  shunt infection, the patient was transferred to [facility] for further management. She had removal of VPS and placement of EVD on 2/25/19.   3/3: EVD d/c'ed. Febrile overnight. Cultures sent. ICPs <12. Started on 2% for hyponatremia.   3/4: Loses neurological exam when patient spikes a fever  3/13: Transferred to RCU overnight, Currently HD stable as d/w NSX-Rick ok to start lovenox dvt ppx 58yof initially admitted in mid Dec 2018 for ruptured cerebellar AVM s/p embolization and craniotomy for resection and  shunt. She was discharged to rehab in late Jan 2019. She has been doing well at rehab: walking 50 steps with walker. Two days prior, the patient had a generlized tonic clonic seizure witnessed by her sister as well as a fever. She was then brought to OSH CT. The patient reportedly had an LP that showed leukocytosis. Due to the suspicion of  shunt infection, the patient was transferred to [facility] for further management. She had removal of VPS and placement of EVD on 2/25/19.   3/3: EVD d/c'ed. Febrile overnight. Cultures sent. ICPs <12. Started on 2% for hyponatremia.   3/4: Loses neurological exam when patient spikes a fever  3/11: VPShunt placed  3/13: Transferred to RCU overnight, Currently HD stable as d/w NSX-Rick ok to start lovenox dvt ppx

## 2019-03-13 NOTE — PROGRESS NOTE ADULT - NSICDXPROBLEM_GEN_ALL_CORE_FT
PROBLEM DIAGNOSES  Problem: Seizure disorder  Assessment and Plan: Keppra 1g bid for seizure control, cont  Klonipin for head tremor	      Problem: Hyponatremia  Assessment and Plan: Hyponatremia, possibly SIADH   On salt tabs; will monitor      Problem: Dysphagia  Assessment and Plan: tolerating tube feed at goal rate  Continue Rick Active    Problem: Hypertension  Assessment and Plan: -150mmHg  Adjust antihypertensives to meet goal PROBLEM DIAGNOSES  Problem: Seizure disorder  Assessment and Plan: Keppra 1g bid for seizure control, cont  Klonipin for head tremor	      Problem: Dysphagia  Assessment and Plan: tolerating tube feed at goal rate  Continue Rick Active    Problem: Hypertension  Assessment and Plan: -150mmHg  Adjust antihypertensives to meet goal

## 2019-03-14 LAB
CULTURE RESULTS: SIGNIFICANT CHANGE UP
SPECIMEN SOURCE: SIGNIFICANT CHANGE UP

## 2019-03-14 PROCEDURE — 99233 SBSQ HOSP IP/OBS HIGH 50: CPT | Mod: GC

## 2019-03-14 RX ADMIN — LEVETIRACETAM 1000 MILLIGRAM(S): 250 TABLET, FILM COATED ORAL at 05:02

## 2019-03-14 RX ADMIN — Medication 1 MILLIGRAM(S): at 05:07

## 2019-03-14 RX ADMIN — SODIUM CHLORIDE 2 GRAM(S): 9 INJECTION INTRAMUSCULAR; INTRAVENOUS; SUBCUTANEOUS at 05:03

## 2019-03-14 RX ADMIN — Medication 1 MILLIGRAM(S): at 18:44

## 2019-03-14 RX ADMIN — POLYETHYLENE GLYCOL 3350 17 GRAM(S): 17 POWDER, FOR SOLUTION ORAL at 05:03

## 2019-03-14 RX ADMIN — Medication 100 MILLIGRAM(S): at 18:44

## 2019-03-14 RX ADMIN — PIPERACILLIN AND TAZOBACTAM 25 GRAM(S): 4; .5 INJECTION, POWDER, LYOPHILIZED, FOR SOLUTION INTRAVENOUS at 13:23

## 2019-03-14 RX ADMIN — PANTOPRAZOLE SODIUM 40 MILLIGRAM(S): 20 TABLET, DELAYED RELEASE ORAL at 13:23

## 2019-03-14 RX ADMIN — Medication 100 MILLIGRAM(S): at 05:02

## 2019-03-14 RX ADMIN — SODIUM CHLORIDE 2 GRAM(S): 9 INJECTION INTRAMUSCULAR; INTRAVENOUS; SUBCUTANEOUS at 13:23

## 2019-03-14 RX ADMIN — Medication 100 MILLIGRAM(S): at 05:01

## 2019-03-14 RX ADMIN — SODIUM CHLORIDE 2 GRAM(S): 9 INJECTION INTRAMUSCULAR; INTRAVENOUS; SUBCUTANEOUS at 00:30

## 2019-03-14 RX ADMIN — SODIUM CHLORIDE 2 GRAM(S): 9 INJECTION INTRAMUSCULAR; INTRAVENOUS; SUBCUTANEOUS at 18:44

## 2019-03-14 RX ADMIN — SODIUM CHLORIDE 2 GRAM(S): 9 INJECTION INTRAMUSCULAR; INTRAVENOUS; SUBCUTANEOUS at 23:41

## 2019-03-14 RX ADMIN — ENOXAPARIN SODIUM 40 MILLIGRAM(S): 100 INJECTION SUBCUTANEOUS at 18:43

## 2019-03-14 RX ADMIN — LEVETIRACETAM 1000 MILLIGRAM(S): 250 TABLET, FILM COATED ORAL at 18:43

## 2019-03-14 RX ADMIN — PIPERACILLIN AND TAZOBACTAM 25 GRAM(S): 4; .5 INJECTION, POWDER, LYOPHILIZED, FOR SOLUTION INTRAVENOUS at 05:03

## 2019-03-14 RX ADMIN — POLYETHYLENE GLYCOL 3350 17 GRAM(S): 17 POWDER, FOR SOLUTION ORAL at 18:44

## 2019-03-14 RX ADMIN — AMLODIPINE BESYLATE 10 MILLIGRAM(S): 2.5 TABLET ORAL at 05:01

## 2019-03-14 NOTE — PROGRESS NOTE ADULT - SUBJECTIVE AND OBJECTIVE BOX
Patient is a 59y old  Female who presents with a chief complaint of seizures, h/o  shunt (13 Mar 2019 15:46)      Interval Events:    REVIEW OF SYSTEMS:  [ ] Positive  [ ] All other systems negative  [x ] Unable to assess ROS because _non verbal_______    Vital Signs Last 24 Hrs  T(C): 37.2 (19 @ 03:00), Max: 37.8 (19 @ 19:35)  T(F): 98.9 (19 @ 03:00), Max: 100 (19 @ 19:35)  HR: 90 (19 @ 04:42) (88 - 103)  BP: 112/76 (19 @ 05:00) (96/60 - 136/74)  RR: 20 (19 @ 03:00) (16 - 20)  SpO2: 100% (19 @ 04:42) (99% - 100%)    PHYSICAL EXAM:  HEENT:   [x ]Tracheostomy: portx 6 cuffed  [ ]Pupils equal  [ ]No oral lesions  [ ]Abnormal    SKIN  [x ]No Rash  [ ] Abnormal  [ ] pressure    CARDIAC  [x ]Regular  [ ]Abnormal    PULMONARY  [ x]Bilateral Clear Breath Sounds  [ ]Normal Excursion  [ ]Abnormal    GI  [ x]PEG      [ x] +BS		              [x ]Soft, nondistended, nontender	  [ ]Abnormal    MUSCULOSKELETAL                                   [ ]Bedbound                 [ ]Abnormal    [ x]Ambulatory/OOB to chair                           EXTREMITIES                                         [x ]Normal  [ ]Edema                           NEUROLOGIC  [x ] Normal, non focal  [ ] Focal findings:    PSYCHIATRIC  [x ]Alert and appropriate  [ ] Sedated	 [ ]Agitated    :  Newsome: [ ] Yes, if yes: Date of Placement:                   [ x ] No    LINES: Central Lines [ ] Yes, if yes: Date of Placement                                     [x  ] No    HOSPITAL MEDICATIONS:  MEDICATIONS  (STANDING):  amLODIPine   Tablet 10 milliGRAM(s) Oral daily  clonazePAM Tablet 1 milliGRAM(s) Oral every 12 hours  docusate sodium Liquid 100 milliGRAM(s) Oral two times a day  enoxaparin Injectable 40 milliGRAM(s) SubCutaneous <User Schedule>  labetalol 100 milliGRAM(s) Oral every 12 hours  levETIRAcetam  Solution 1000 milliGRAM(s) Oral two times a day  pantoprazole   Suspension 40 milliGRAM(s) Oral daily  piperacillin/tazobactam IVPB. 3.375 Gram(s) IV Intermittent every 8 hours  polyethylene glycol 3350 17 Gram(s) Oral two times a day  senna Syrup 5 milliLiter(s) Oral at bedtime  sodium chloride 2 Gram(s) Oral every 6 hours    MEDICATIONS  (PRN):  acetaminophen    Suspension .. 650 milliGRAM(s) Oral every 6 hours PRN Temp greater or equal to 38C (100.4F), Mild Pain (1 - 3)  clonazePAM Tablet 0.25 milliGRAM(s) Oral every 8 hours PRN myoclonus      LABS:                        10.2   5.9   )-----------( 302      ( 13 Mar 2019 12:29 )             29.1     03-    139  |  100  |  8   ----------------------------<  120<H>  4.4   |  29  |  0.37<L>    Ca    9.4      13 Mar 2019 12:29        Urinalysis Basic - ( 12 Mar 2019 08:56 )    Color: Yellow / Appearance: Slightly Turbid / S.018 / pH: x  Gluc: x / Ketone: Negative  / Bili: Negative / Urobili: Negative   Blood: x / Protein: Trace / Nitrite: Positive   Leuk Esterase: Large / RBC: 4 /hpf /  /HPF   Sq Epi: x / Non Sq Epi: 0 /hpf / Bacteria: Negative          CAPILLARY BLOOD GLUCOSE    MICROBIOLOGY:     RADIOLOGY:  [ ] Reviewed and interpreted by me    Mode: AC/ CMV (Assist Control/ Continuous Mandatory Ventilation)  RR (machine): 16  TV (machine): 450  FiO2: 30  PEEP: 5  ITime: 1  MAP: 12  PIP: 28

## 2019-03-14 NOTE — PROGRESS NOTE ADULT - SUBJECTIVE AND OBJECTIVE BOX
CC: f/u for vps infection and fever    Patient reports  nothing not verbal  REVIEW OF SYSTEMS:  All other review of systems cannot get (Comprehensive ROS)    Antimicrobials Day #  :  piperacillin/tazobactam IVPB. 3.375 Gram(s) IV Intermittent every 8 hours    Other Medications Reviewed    T(F): 98.6 (03-14-19 @ 12:27), Max: 100 (03-13-19 @ 19:35)  HR: 98 (03-14-19 @ 16:24)  BP: 115/74 (03-14-19 @ 12:27)  RR: 20 (03-14-19 @ 16:24)  SpO2: 99% (03-14-19 @ 16:24)  Wt(kg): --    PHYSICAL EXAM:  General:  no acute distress, head wounds intact  Eyes:  anicteric, no conjunctival injection, no discharge  Oropharynx: no lesions or injection 	  Neck: trach, without adenopathy  Lungs: course to auscultation  Heart: regular rate and rhythm; no murmur, rubs or gallops  Abdomen: soft, nondistended, nontender, without mass or organomegaly  Skin: no lesions  Extremities: no clubbing, cyanosis, or edema  Neurologic: dysmetric    LAB RESULTS:                        10.2   5.9   )-----------( 302      ( 13 Mar 2019 12:29 )             29.1     03-13    139  |  100  |  8   ----------------------------<  120<H>  4.4   |  29  |  0.37<L>    Ca    9.4      13 Mar 2019 12:29          MICROBIOLOGY:  RECENT CULTURES:  03-13 @ 17:30 .Urine Catheterized     <10,000 CFU/mL Normal Urogenital Tori      03-12 @ 15:42 .Urine Catheterized     <10,000 CFU/mL Normal Urogenital Tori      03-12 @ 11:45 .Blood Blood-Peripheral     No growth to date.      RADIOLOGY REVIEWED:    < from: Xray Chest 1 View- PORTABLE-Urgent (03.12.19 @ 08:28) >  IMPRESSION:  The lungs are clear. No pleural effusions or pneumothorax. The   cardiomediastinal silhouette is unremarkable. No acute osseous findings.    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 Urine cultures negative for infection so no need for antibiotics for uti. No infection is apparent at present.   Plan:  stop zosyn and monitor off antibiotics

## 2019-03-14 NOTE — PROGRESS NOTE ADULT - ATTENDING COMMENTS
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 She was discharged to rehab in late Jan 2019. She has been doing well at rehab: walking 50 steps with walker. Two days prior, the patient had a generalized tonic clonic seizure witnessed by her sister as well as a fever. She was then brought to OSH CT. The patient reportedly had an LP that showed leukocytosis. Due to the suspicion of  shunt infection, the patient was transferred to [facility] for further management. She had removal of VPS and placement of EVD on 2/25/19.   Transferred to RCU 3/12  UA positive, on zosyn. Urine cx pending   Ventilator dependence, will try weaning  Remains responsive to simple commands

## 2019-03-14 NOTE — PROGRESS NOTE ADULT - NSICDXPROBLEM_GEN_ALL_CORE_FT
PROBLEM DIAGNOSES  Problem: Seizure disorder  Assessment and Plan: Keppra 1g bid for seizure control, cont  Klonipin for head tremor	      Problem: Dysphagia  Assessment and Plan: tolerating tube feed at goal rate  Continue Rick Active    Problem: Hypertension  Assessment and Plan: -150mmHg  Adjust antihypertensives to meet goal

## 2019-03-14 NOTE — PROGRESS NOTE ADULT - ASSESSMENT
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 She was discharged to rehab in late Jan 2019. She has been doing well at rehab: walking 50 steps with walker. Two days prior, the patient had a generlized tonic clonic seizure witnessed by her sister as well as a fever. She was then brought to OSH CT. The patient reportedly had an LP that showed leukocytosis. Due to the suspicion of  shunt infection, the patient was transferred to [facility] for further management. She had removal of VPS and placement of EVD on 2/25/19.   3/3: EVD d/c'ed. Febrile overnight. Cultures sent. ICPs <12. Started on 2% for hyponatremia.   3/4: Loses neurological exam when patient spikes a fever  3/11: VPShunt placed  3/13: Transferred to RCU overnight, Currently HD stable as d/w NSX-Rick ok to start lovenox dvt ppx  3/14: Remains stable overnight await urine cx ID U/a positive, tho asymptomatic pt has been recently instrument with hardware. On zosyn for now until urine cx results

## 2019-03-15 LAB
BASE EXCESS BLDA CALC-SCNC: 6.4 MMOL/L — HIGH (ref -2–2)
CO2 BLDA-SCNC: 32 MMOL/L — HIGH (ref 22–30)
GAS PNL BLDA: SIGNIFICANT CHANGE UP
HCO3 BLDA-SCNC: 31 MMOL/L — HIGH (ref 21–29)
HOROWITZ INDEX BLDA+IHG-RTO: 30 — SIGNIFICANT CHANGE UP
PCO2 BLDA: 47 MMHG — HIGH (ref 32–46)
PH BLDA: 7.44 — SIGNIFICANT CHANGE UP (ref 7.35–7.45)
PO2 BLDA: 115 MMHG — HIGH (ref 74–108)
SAO2 % BLDA: 99 % — HIGH (ref 92–96)

## 2019-03-15 PROCEDURE — 99233 SBSQ HOSP IP/OBS HIGH 50: CPT | Mod: GC

## 2019-03-15 RX ORDER — SODIUM CHLORIDE 9 MG/ML
4 INJECTION INTRAMUSCULAR; INTRAVENOUS; SUBCUTANEOUS EVERY 6 HOURS
Qty: 0 | Refills: 0 | Status: COMPLETED | OUTPATIENT
Start: 2019-03-15 | End: 2019-03-18

## 2019-03-15 RX ORDER — IPRATROPIUM/ALBUTEROL SULFATE 18-103MCG
3 AEROSOL WITH ADAPTER (GRAM) INHALATION EVERY 6 HOURS
Qty: 0 | Refills: 0 | Status: DISCONTINUED | OUTPATIENT
Start: 2019-03-15 | End: 2019-03-22

## 2019-03-15 RX ADMIN — Medication 1 MILLIGRAM(S): at 18:26

## 2019-03-15 RX ADMIN — SODIUM CHLORIDE 4 MILLILITER(S): 9 INJECTION INTRAMUSCULAR; INTRAVENOUS; SUBCUTANEOUS at 13:56

## 2019-03-15 RX ADMIN — Medication 100 MILLIGRAM(S): at 05:33

## 2019-03-15 RX ADMIN — LEVETIRACETAM 1000 MILLIGRAM(S): 250 TABLET, FILM COATED ORAL at 18:22

## 2019-03-15 RX ADMIN — SODIUM CHLORIDE 2 GRAM(S): 9 INJECTION INTRAMUSCULAR; INTRAVENOUS; SUBCUTANEOUS at 05:33

## 2019-03-15 RX ADMIN — Medication 3 MILLILITER(S): at 12:53

## 2019-03-15 RX ADMIN — POLYETHYLENE GLYCOL 3350 17 GRAM(S): 17 POWDER, FOR SOLUTION ORAL at 05:32

## 2019-03-15 RX ADMIN — Medication 100 MILLIGRAM(S): at 18:22

## 2019-03-15 RX ADMIN — SODIUM CHLORIDE 4 MILLILITER(S): 9 INJECTION INTRAMUSCULAR; INTRAVENOUS; SUBCUTANEOUS at 23:27

## 2019-03-15 RX ADMIN — ENOXAPARIN SODIUM 40 MILLIGRAM(S): 100 INJECTION SUBCUTANEOUS at 18:23

## 2019-03-15 RX ADMIN — Medication 1 MILLIGRAM(S): at 05:33

## 2019-03-15 RX ADMIN — SODIUM CHLORIDE 2 GRAM(S): 9 INJECTION INTRAMUSCULAR; INTRAVENOUS; SUBCUTANEOUS at 18:22

## 2019-03-15 RX ADMIN — Medication 3 MILLILITER(S): at 23:26

## 2019-03-15 RX ADMIN — Medication 3 MILLILITER(S): at 18:40

## 2019-03-15 RX ADMIN — LEVETIRACETAM 1000 MILLIGRAM(S): 250 TABLET, FILM COATED ORAL at 05:32

## 2019-03-15 RX ADMIN — SODIUM CHLORIDE 4 MILLILITER(S): 9 INJECTION INTRAMUSCULAR; INTRAVENOUS; SUBCUTANEOUS at 18:42

## 2019-03-15 RX ADMIN — SODIUM CHLORIDE 2 GRAM(S): 9 INJECTION INTRAMUSCULAR; INTRAVENOUS; SUBCUTANEOUS at 12:17

## 2019-03-15 RX ADMIN — PANTOPRAZOLE SODIUM 40 MILLIGRAM(S): 20 TABLET, DELAYED RELEASE ORAL at 12:17

## 2019-03-15 NOTE — PROGRESS NOTE ADULT - ASSESSMENT
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 She was discharged to rehab in late Jan 2019. She has been doing well at rehab: walking 50 steps with walker. Two days prior, the patient had a generlized tonic clonic seizure witnessed by her sister as well as a fever. She was then brought to OSH CT. The patient reportedly had an LP that showed leukocytosis. Due to the suspicion of  shunt infection, the patient was transferred to [facility] for further management. She had removal of VPS and placement of EVD on 2/25/19.   3/3: EVD d/c'ed. Febrile overnight. Cultures sent. ICPs <12. Started on 2% for hyponatremia.   3/4: Loses neurological exam when patient spikes a fever  3/11: VPShunt placed  3/13: Transferred to RCU overnight, Currently HD stable as d/w NSX-Rick ok to start lovenox dvt ppx  3/14: Remains stable overnight await urine cx ID U/a positive, tho asymptomatic pt has been recently instrument with hardware. On zosyn for now until urine cx results  3/15: Copious whitish secretions duonebs and hypersal ordered. Currently on Trach collar

## 2019-03-15 NOTE — PROGRESS NOTE ADULT - ATTENDING COMMENTS
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 She was discharged to rehab in late Jan 2019. She has been doing well at rehab: walking 50 steps with walker. Two days prior, the patient had a generalized tonic clonic seizure witnessed by her sister as well as a fever. She was then brought to OSH CT. The patient reportedly had an LP that showed leukocytosis. Due to the suspicion of  shunt infection, the patient was transferred to [facility] for further management. She had removal of VPS and placement of EVD on 2/25/19.   Transferred to RCU 3/12  UA positive, on zosyn. Urine cx pending   Tolerating TC since this am  Increased secretions, on hypertonic saline  Remains responsive to simple commands 58yof initially admitted in mid Dec 2018 for ruptured cerebellar AVM s/p embolization and craniotomy for resection and  shunt. She was discharged to rehab in late Jan 2019. She has been doing well at rehab: walking 50 steps with walker. Two days prior, the patient had a generalized tonic clonic seizure witnessed by her sister as well as a fever. She was then brought to OSH CT. The patient reportedly had an LP that showed leukocytosis. Due to the suspicion of  shunt infection, the patient was transferred to [facility] for further management. She had removal of VPS and placement of EVD on 2/25/19.   Transferred to RCU 3/12  UA positive, Urine cx negative, zosyn discontinued  Tolerating TC since this am  Increased secretions, on hypertonic saline  Remains responsive to simple commands

## 2019-03-15 NOTE — PROGRESS NOTE ADULT - SUBJECTIVE AND OBJECTIVE BOX
CC: f/u for  ventriculitis  Patient reports  nothing not verbal  REVIEW OF SYSTEMS:  All other review of systems cannot get (Comprehensive ROS)    Antimicrobials Day #  :    Other Medications Reviewed    T(F): 97.8 (03-15-19 @ 11:58), Max: 99.5 (03-14-19 @ 20:55)  HR: 100 (03-15-19 @ 13:07)  BP: 128/88 (03-15-19 @ 11:58)  RR: 20 (03-15-19 @ 11:58)  SpO2: 104% (03-15-19 @ 13:07)  Wt(kg): --    PHYSICAL EXAM:  General: more alert, no acute distress, on tc, head wounds intact  Eyes:  anicteric, no conjunctival injection, no discharge  Oropharynx: no lesions or injection 	  Neck: supple, without adenopathy  Lungs: clear to auscultation  Heart: regular rate and rhythm; no murmur, rubs or gallops  Abdomen: soft, nondistended, nontender, without mass or organomegaly  Skin: no lesions  Extremities: no clubbing, cyanosis, or edema  Neurologic: more alert, follow simple commands,  moves all extremities    LAB RESULTS:    Basic Metabolic Panel - STAT (03.13.19 @ 12:29)    Sodium, Serum: 139 mmol/L    Potassium, Serum: 4.4 mmol/L    Chloride, Serum: 100 mmol/L    Carbon Dioxide, Serum: 29 mmol/L    Anion Gap, Serum: 10 mmol/L    Blood Urea Nitrogen, Serum: 8 mg/dL    Creatinine, Serum: 0.37 mg/dL    Glucose, Serum: 120 mg/dL    Calcium, Total Serum: 9.4 mg/dL            MICROBIOLOGY:  RECENT CULTURES:  03-13 @ 17:30 .Urine Catheterized     <10,000 CFU/mL Normal Urogenital Tori      03-12 @ 15:42 .Urine Catheterized     <10,000 CFU/mL Normal Urogenital Tori      03-12 @ 11:45 .Blood Blood-Peripheral     No growth to date.    RADIOLOGY REVIEWED:    < from: CT Head No Cont (03.12.19 @ 08:25) >  IMPRESSION:     Interval placement of the right parietal approach ventriculostomy   catheter. Stable ventricular size..    Unchanged previous right frontal catheter tract intraparenchymal   hemorrhage with surrounding vasogenic edema and layering intraventricular   hemorrhage.    < end of copied text >      Assessment:  Patient with cerebellar bleed, avm resection, posterior craniotomy, vps, peg, trach, returned with seizure, concern raised for ventriculitis so treated empirically with antibiotic since negative cultures and vps pulled and new one placed. has some pyuria but negative urine culture and no fever so no antibiotics indicated.   Plan:  continue to monitor off antibiotics

## 2019-03-15 NOTE — PROGRESS NOTE ADULT - SUBJECTIVE AND OBJECTIVE BOX
Patient is a 59y old  Female who presents with a chief complaint of seizures, h/o  shunt (14 Mar 2019 17:12)      Interval Events:    REVIEW OF SYSTEMS:  [ ] Positive  [ ] All other systems negative  [x ] Unable to assess ROS because _non verbal_______    Vital Signs Last 24 Hrs  T(C): 37.2 (03-15-19 @ 04:40), Max: 37.5 (03-14-19 @ 20:55)  T(F): 99 (03-15-19 @ 04:40), Max: 99.5 (03-14-19 @ 20:55)  HR: 104 (03-15-19 @ 08:26) (92 - 112)  BP: 93/48 (03-15-19 @ 04:40) (93/48 - 115/74)  RR: 22 (03-15-19 @ 08:26) (20 - 22)  SpO2: 100% (03-15-19 @ 08:26) (98% - 100%)    PHYSICAL EXAM:  HEENT:   [x ]Tracheostomy: shiley 8 cuffed  [ ]Pupils equal  [ ]No oral lesions  [ ]Abnormal    SKIN  [x ]No Rash  [ ] Abnormal  [ ] pressure    CARDIAC  [x ]Regular  [ ]Abnormal    PULMONARY  [x ]Bilateral Clear Breath Sounds  [ ]Normal Excursion  [ ]Abnormal    GI  [x ]PEG      [ x] +BS		              [ x]Soft, nondistended, nontender	  [ ]Abnormal    MUSCULOSKELETAL                                   [ ]Bedbound                 [ ]Abnormal    [x ]Ambulatory/OOB to chair                           EXTREMITIES                                         [x ]Normal  [ ]Edema                           NEUROLOGIC  [ ] Normal, non focal  [x ] Focal findings: myoclonus shaking of head not r/t seizures    PSYCHIATRIC  x[ ]Alert and appropriate  [ ] Sedated	 [ ]Agitated    :  Newsome: [ ] Yes, if yes: Date of Placement:                   [ x ] No    LINES: Central Lines [ ] Yes, if yes: Date of Placement                                     [ x ] No    HOSPITAL MEDICATIONS:  MEDICATIONS  (STANDING):  amLODIPine   Tablet 10 milliGRAM(s) Oral daily  clonazePAM Tablet 1 milliGRAM(s) Oral every 12 hours  docusate sodium Liquid 100 milliGRAM(s) Oral two times a day  enoxaparin Injectable 40 milliGRAM(s) SubCutaneous <User Schedule>  labetalol 100 milliGRAM(s) Oral every 12 hours  levETIRAcetam  Solution 1000 milliGRAM(s) Oral two times a day  pantoprazole   Suspension 40 milliGRAM(s) Oral daily  polyethylene glycol 3350 17 Gram(s) Oral two times a day  senna Syrup 5 milliLiter(s) Oral at bedtime  sodium chloride 2 Gram(s) Oral every 6 hours    MEDICATIONS  (PRN):  acetaminophen    Suspension .. 650 milliGRAM(s) Oral every 6 hours PRN Temp greater or equal to 38C (100.4F), Mild Pain (1 - 3)  clonazePAM Tablet 0.25 milliGRAM(s) Oral every 8 hours PRN myoclonus      LABS:                        10.2   5.9   )-----------( 302      ( 13 Mar 2019 12:29 )             29.1     03-13    139  |  100  |  8   ----------------------------<  120<H>  4.4   |  29  |  0.37<L>    Ca    9.4      13 Mar 2019 12:29              CAPILLARY BLOOD GLUCOSE    MICROBIOLOGY:     RADIOLOGY:  [ ] Reviewed and interpreted by me    Mode: off  FiO2: 30

## 2019-03-16 DIAGNOSIS — J96.90 RESPIRATORY FAILURE, UNSPECIFIED, UNSPECIFIED WHETHER WITH HYPOXIA OR HYPERCAPNIA: ICD-10-CM

## 2019-03-16 DIAGNOSIS — G91.9 HYDROCEPHALUS, UNSPECIFIED: ICD-10-CM

## 2019-03-16 DIAGNOSIS — G25.3 MYOCLONUS: ICD-10-CM

## 2019-03-16 DIAGNOSIS — Z29.9 ENCOUNTER FOR PROPHYLACTIC MEASURES, UNSPECIFIED: ICD-10-CM

## 2019-03-16 LAB
BASE EXCESS BLDA CALC-SCNC: 6.5 MMOL/L — HIGH (ref -2–2)
CO2 BLDA-SCNC: 32 MMOL/L — HIGH (ref 22–30)
GAS PNL BLDA: SIGNIFICANT CHANGE UP
HCO3 BLDA-SCNC: 31 MMOL/L — HIGH (ref 21–29)
HOROWITZ INDEX BLDA+IHG-RTO: 30 — SIGNIFICANT CHANGE UP
PCO2 BLDA: 45 MMHG — SIGNIFICANT CHANGE UP (ref 32–46)
PH BLDA: 7.45 — SIGNIFICANT CHANGE UP (ref 7.35–7.45)
PO2 BLDA: 133 MMHG — HIGH (ref 74–108)
SAO2 % BLDA: 99 % — HIGH (ref 92–96)

## 2019-03-16 PROCEDURE — 99233 SBSQ HOSP IP/OBS HIGH 50: CPT | Mod: GC

## 2019-03-16 RX ADMIN — SODIUM CHLORIDE 4 MILLILITER(S): 9 INJECTION INTRAMUSCULAR; INTRAVENOUS; SUBCUTANEOUS at 12:29

## 2019-03-16 RX ADMIN — Medication 100 MILLIGRAM(S): at 17:24

## 2019-03-16 RX ADMIN — Medication 100 MILLIGRAM(S): at 05:21

## 2019-03-16 RX ADMIN — SODIUM CHLORIDE 2 GRAM(S): 9 INJECTION INTRAMUSCULAR; INTRAVENOUS; SUBCUTANEOUS at 23:59

## 2019-03-16 RX ADMIN — LEVETIRACETAM 1000 MILLIGRAM(S): 250 TABLET, FILM COATED ORAL at 17:24

## 2019-03-16 RX ADMIN — SODIUM CHLORIDE 4 MILLILITER(S): 9 INJECTION INTRAMUSCULAR; INTRAVENOUS; SUBCUTANEOUS at 23:32

## 2019-03-16 RX ADMIN — Medication 1 MILLIGRAM(S): at 05:24

## 2019-03-16 RX ADMIN — LEVETIRACETAM 1000 MILLIGRAM(S): 250 TABLET, FILM COATED ORAL at 05:24

## 2019-03-16 RX ADMIN — Medication 3 MILLILITER(S): at 23:31

## 2019-03-16 RX ADMIN — AMLODIPINE BESYLATE 10 MILLIGRAM(S): 2.5 TABLET ORAL at 05:21

## 2019-03-16 RX ADMIN — SODIUM CHLORIDE 2 GRAM(S): 9 INJECTION INTRAMUSCULAR; INTRAVENOUS; SUBCUTANEOUS at 00:01

## 2019-03-16 RX ADMIN — SODIUM CHLORIDE 4 MILLILITER(S): 9 INJECTION INTRAMUSCULAR; INTRAVENOUS; SUBCUTANEOUS at 05:13

## 2019-03-16 RX ADMIN — Medication 3 MILLILITER(S): at 18:18

## 2019-03-16 RX ADMIN — SODIUM CHLORIDE 2 GRAM(S): 9 INJECTION INTRAMUSCULAR; INTRAVENOUS; SUBCUTANEOUS at 12:00

## 2019-03-16 RX ADMIN — Medication 3 MILLILITER(S): at 12:29

## 2019-03-16 RX ADMIN — Medication 1 MILLIGRAM(S): at 17:27

## 2019-03-16 RX ADMIN — PANTOPRAZOLE SODIUM 40 MILLIGRAM(S): 20 TABLET, DELAYED RELEASE ORAL at 11:59

## 2019-03-16 RX ADMIN — Medication 100 MILLIGRAM(S): at 17:25

## 2019-03-16 RX ADMIN — SODIUM CHLORIDE 2 GRAM(S): 9 INJECTION INTRAMUSCULAR; INTRAVENOUS; SUBCUTANEOUS at 05:23

## 2019-03-16 RX ADMIN — ENOXAPARIN SODIUM 40 MILLIGRAM(S): 100 INJECTION SUBCUTANEOUS at 17:24

## 2019-03-16 RX ADMIN — SENNA PLUS 5 MILLILITER(S): 8.6 TABLET ORAL at 21:44

## 2019-03-16 RX ADMIN — Medication 3 MILLILITER(S): at 05:13

## 2019-03-16 RX ADMIN — SODIUM CHLORIDE 4 MILLILITER(S): 9 INJECTION INTRAMUSCULAR; INTRAVENOUS; SUBCUTANEOUS at 18:18

## 2019-03-16 RX ADMIN — SODIUM CHLORIDE 2 GRAM(S): 9 INJECTION INTRAMUSCULAR; INTRAVENOUS; SUBCUTANEOUS at 17:25

## 2019-03-16 NOTE — PROGRESS NOTE ADULT - ASSESSMENT
58 Year old Female initially admitted in mid Dec 2018 for ruptured cerebellar AVM S/p embolization and craniotomy for resection of AVM, Followed by  shunt placement. She was discharged to rehab in late Jan 2019. She had been doing well at rehab: walking 50 steps with walker. While at rehab patient was noted to have generalized tonic clonic seizure with accompanying fever. Patient was initially brought to OSH. Patient had Head CT and LP Performed. LP was concerning for leukocytosis, due to the suspicion of  shunt infection, the patient was transferred to [facility] for further management. Patient had removal of VPS and placement of temporary EVD on 2/25/19, CSF Cxs were negative and  Shunt was Replaced on 3/11. Patient was transferred to RCU on 3/13. Patient had + UA on 3/14 and was initially placed on Zosyn Given recent hardware instrumentation, Urine cx with Normal caleb, Zosyn was discontinued as per ID Recommendations.    3/16: Patient remained off TC overnight, ABG WNL will decrease FIO2 21%

## 2019-03-16 NOTE — PROGRESS NOTE ADULT - SUBJECTIVE AND OBJECTIVE BOX
Patient is a 59y old  Female who presents with a chief complaint of seizures, h/o  shunt (15 Mar 2019 13:30)      Interval Events: No events reported overnight     REVIEW OF SYSTEMS:  [ ] Positive  [ ] All other systems negative  [ ] Unable to assess ROS because ________    Vital Signs Last 24 Hrs  T(C): 37.7 (03-16-19 @ 05:14), Max: 37.7 (03-16-19 @ 05:14)  T(F): 99.9 (03-16-19 @ 05:14), Max: 99.9 (03-16-19 @ 05:14)  HR: 100 (03-16-19 @ 05:14) (86 - 105)  BP: 123/82 (03-16-19 @ 05:14) (118/81 - 132/85)  RR: 18 (03-16-19 @ 05:14) (18 - 22)  SpO2: 99% (03-16-19 @ 05:14) (97% - 104%)PHYSICAL EXAM:  HEENT:   [ ]Tracheostomy:  [ ]Pupils equal  [ ]No oral lesions  [ ]Abnormal        SKIN  [ ]No Rash  [ ] Abnormal  [ ] pressure    CARDIAC  [ ]Regular  [ ]Abnormal    PULMONARY  [ ]Bilateral Clear Breath Sounds  [ ]Normal Excursion  [ ]Abnormal    GI  [ ]PEG      [ ] +BS		              [ ]Soft, nondistended, nontender	  [ ]Abnormal    MUSCULOSKELETAL                                   [ ]Bedbound                 [ ]Abnormal    [ ]Ambulatory/OOB to chair                           EXTREMITIES                                         [ ]Normal  [ ]Edema                           NEUROLOGIC  [ ] Normal, non focal  [ ] Focal findings:    PSYCHIATRIC  [ ]Alert and appropriate  [ ] Sedated	 [ ]Agitated    :  Newsome: [ ] Yes, if yes: Date of Placement:                   [  ] No    LINES: Central Lines [ ] Yes, if yes: Date of Placement                                     [  ] No    HOSPITAL MEDICATIONS:  MEDICATIONS  (STANDING):  ALBUTerol/ipratropium for Nebulization 3 milliLiter(s) Nebulizer every 6 hours  amLODIPine   Tablet 10 milliGRAM(s) Oral daily  clonazePAM Tablet 1 milliGRAM(s) Oral every 12 hours  docusate sodium Liquid 100 milliGRAM(s) Oral two times a day  enoxaparin Injectable 40 milliGRAM(s) SubCutaneous <User Schedule>  labetalol 100 milliGRAM(s) Oral every 12 hours  levETIRAcetam  Solution 1000 milliGRAM(s) Oral two times a day  pantoprazole   Suspension 40 milliGRAM(s) Oral daily  polyethylene glycol 3350 17 Gram(s) Oral two times a day  senna Syrup 5 milliLiter(s) Oral at bedtime  sodium chloride 2 Gram(s) Oral every 6 hours  sodium chloride 7% Inhalation 4 milliLiter(s) Inhalation every 6 hours    MEDICATIONS  (PRN):  acetaminophen    Suspension .. 650 milliGRAM(s) Oral every 6 hours PRN Temp greater or equal to 38C (100.4F), Mild Pain (1 - 3)  clonazePAM Tablet 0.25 milliGRAM(s) Oral every 8 hours PRN myoclonus      LABS:              Arterial Blood Gas:  03-15 @ 13:51  7.44/47/115/31/99/6.4  ABG lactate: --      CAPILLARY BLOOD GLUCOSE    MICROBIOLOGY:     RADIOLOGY:  [ ] Reviewed and interpreted by me    Mode: off  RR (machine): 26  FiO2: 30 Patient is a 59y old  Female who presents with a chief complaint of seizures, h/o  shunt (15 Mar 2019 13:30)      Interval Events: No events reported overnight     REVIEW OF SYSTEMS:  [ ] Positive  [x] All other systems negative  [ ] Unable to assess ROS because ________    Vital Signs Last 24 Hrs  T(C): 37.7 (03-16-19 @ 05:14), Max: 37.7 (03-16-19 @ 05:14)  T(F): 99.9 (03-16-19 @ 05:14), Max: 99.9 (03-16-19 @ 05:14)  HR: 100 (03-16-19 @ 05:14) (86 - 105)  BP: 123/82 (03-16-19 @ 05:14) (118/81 - 132/85)  RR: 18 (03-16-19 @ 05:14) (18 - 22)  SpO2: 99% (03-16-19 @ 05:14) (97% - 104%)    PHYSICAL EXAM:  HEENT:   [x]Tracheostomy: # 6 Cuffed Portex   [x]Pupils equal  [ ]No oral lesions  [x]Abnormal: + Rt Sided Cranial staples     SKIN  [x]No Rash  [ ] Abnormal  [ ] pressure    CARDIAC  [x]Regular  [ ]Abnormal    PULMONARY  [x]Bilateral Clear Breath Sounds  [ ]Normal Excursion  [ ]Abnormal    GI  [x]PEG      [x] +BS		              [x]Soft, nondistended, nontender	  [x]Abnormal: + Abdominal Staples in place     MUSCULOSKELETAL                                   [ ]Bedbound                 [ ]Abnormal    [x]OOB to chair                           EXTREMITIES                                         [x]Normal  [ ]Edema                           NEUROLOGIC  [ ] Normal, non focal  [x] Focal findings: + Contracted upper extremities / + Myoclonus     PSYCHIATRIC  [x]Alert and appropriate  [ ] Sedated	 [ ]Agitated    :  Newsome: [ ] Yes, if yes: Date of Placement:                   [x] No: + External Urinary Device     LINES: Central Lines [ ] Yes, if yes: Date of Placement                                     [x] No    HOSPITAL MEDICATIONS:  MEDICATIONS  (STANDING):  ALBUTerol/ipratropium for Nebulization 3 milliLiter(s) Nebulizer every 6 hours  amLODIPine   Tablet 10 milliGRAM(s) Oral daily  clonazePAM Tablet 1 milliGRAM(s) Oral every 12 hours  docusate sodium Liquid 100 milliGRAM(s) Oral two times a day  enoxaparin Injectable 40 milliGRAM(s) SubCutaneous <User Schedule>  labetalol 100 milliGRAM(s) Oral every 12 hours  levETIRAcetam  Solution 1000 milliGRAM(s) Oral two times a day  pantoprazole   Suspension 40 milliGRAM(s) Oral daily  polyethylene glycol 3350 17 Gram(s) Oral two times a day  senna Syrup 5 milliLiter(s) Oral at bedtime  sodium chloride 2 Gram(s) Oral every 6 hours  sodium chloride 7% Inhalation 4 milliLiter(s) Inhalation every 6 hours    MEDICATIONS  (PRN):  acetaminophen    Suspension .. 650 milliGRAM(s) Oral every 6 hours PRN Temp greater or equal to 38C (100.4F), Mild Pain (1 - 3)  clonazePAM Tablet 0.25 milliGRAM(s) Oral every 8 hours PRN myoclonus      LABS:              Arterial Blood Gas:  03-15 @ 13:51  7.44/47/115/31/99/6.4  ABG lactate: --      CAPILLARY BLOOD GLUCOSE    MICROBIOLOGY:     RADIOLOGY:  [ ] Reviewed and interpreted by me    Mode: off  RR (machine): 26  FiO2: 30

## 2019-03-16 NOTE — PROGRESS NOTE ADULT - NSICDXPROBLEM_GEN_ALL_CORE_FT
PROBLEM DIAGNOSES  Problem: Seizure disorder  Assessment and Plan: Keppra 1g bid for seizure control, cont  Klonipin for head tremor	      Problem: Dysphagia  Assessment and Plan: tolerating tube feed at goal rate  Continue Rick Active    Problem: Hypertension  Assessment and Plan: -150mmHg  Adjust antihypertensives to meet goal PROBLEM DIAGNOSES  Problem: Respiratory failure  Assessment and Plan: Patient S/p Tracheostomy last admission   Patient tolerating TC ATC Since 3/15  ABG WNL, Will decrease FIO2 21%   If patient remains on TC ATC consider downsizing 3/17  Continue Chest PT / Suctioning  PRN     Problem: Hydrocephalus  Assessment and Plan: Patient admitted with Suspected  Shunt infection    shunt intitally removed and EVD Placed   CSF CX 3/3: No growth    Shunt replaced 3/11  Cranial / Abdominal staples remain intact     Problem: Seizure disorder  Assessment and Plan: Continue Keppra     Problem: Myoclonus  Assessment and Plan: Continue Clonazepam 1 mg q 12 hrs   Continue PRN Clonazepam for Breakthrough Myoclonus     Problem: Hypertension  Assessment and Plan: Continue Labetalol 100 mg q 12 hrs   Continue to monitor BP     Problem: Prophylactic measure  Assessment and Plan: Continue Lovenox

## 2019-03-17 LAB
ANION GAP SERPL CALC-SCNC: 13 MMOL/L — SIGNIFICANT CHANGE UP (ref 5–17)
BUN SERPL-MCNC: 10 MG/DL — SIGNIFICANT CHANGE UP (ref 7–23)
CALCIUM SERPL-MCNC: 10 MG/DL — SIGNIFICANT CHANGE UP (ref 8.4–10.5)
CHLORIDE SERPL-SCNC: 98 MMOL/L — SIGNIFICANT CHANGE UP (ref 96–108)
CO2 SERPL-SCNC: 28 MMOL/L — SIGNIFICANT CHANGE UP (ref 22–31)
CREAT SERPL-MCNC: 0.39 MG/DL — LOW (ref 0.5–1.3)
CULTURE RESULTS: SIGNIFICANT CHANGE UP
CULTURE RESULTS: SIGNIFICANT CHANGE UP
GLUCOSE SERPL-MCNC: 105 MG/DL — HIGH (ref 70–99)
HCT VFR BLD CALC: 32.1 % — LOW (ref 34.5–45)
HGB BLD-MCNC: 11.1 G/DL — LOW (ref 11.5–15.5)
MAGNESIUM SERPL-MCNC: 2.1 MG/DL — SIGNIFICANT CHANGE UP (ref 1.6–2.6)
MCHC RBC-ENTMCNC: 30.4 PG — SIGNIFICANT CHANGE UP (ref 27–34)
MCHC RBC-ENTMCNC: 34.6 GM/DL — SIGNIFICANT CHANGE UP (ref 32–36)
MCV RBC AUTO: 87.9 FL — SIGNIFICANT CHANGE UP (ref 80–100)
PHOSPHATE SERPL-MCNC: 3.8 MG/DL — SIGNIFICANT CHANGE UP (ref 2.5–4.5)
PLATELET # BLD AUTO: 345 K/UL — SIGNIFICANT CHANGE UP (ref 150–400)
POTASSIUM SERPL-MCNC: 4 MMOL/L — SIGNIFICANT CHANGE UP (ref 3.5–5.3)
POTASSIUM SERPL-SCNC: 4 MMOL/L — SIGNIFICANT CHANGE UP (ref 3.5–5.3)
RBC # BLD: 3.65 M/UL — LOW (ref 3.8–5.2)
RBC # FLD: 16.1 % — HIGH (ref 10.3–14.5)
SODIUM SERPL-SCNC: 139 MMOL/L — SIGNIFICANT CHANGE UP (ref 135–145)
SPECIMEN SOURCE: SIGNIFICANT CHANGE UP
SPECIMEN SOURCE: SIGNIFICANT CHANGE UP
WBC # BLD: 4.4 K/UL — SIGNIFICANT CHANGE UP (ref 3.8–10.5)
WBC # FLD AUTO: 4.4 K/UL — SIGNIFICANT CHANGE UP (ref 3.8–10.5)

## 2019-03-17 PROCEDURE — 99233 SBSQ HOSP IP/OBS HIGH 50: CPT | Mod: GC

## 2019-03-17 RX ORDER — OXYCODONE HYDROCHLORIDE 5 MG/1
5 TABLET ORAL ONCE
Qty: 0 | Refills: 0 | Status: DISCONTINUED | OUTPATIENT
Start: 2019-03-17 | End: 2019-03-17

## 2019-03-17 RX ADMIN — Medication 100 MILLIGRAM(S): at 05:32

## 2019-03-17 RX ADMIN — Medication 1 MILLIGRAM(S): at 05:37

## 2019-03-17 RX ADMIN — SODIUM CHLORIDE 4 MILLILITER(S): 9 INJECTION INTRAMUSCULAR; INTRAVENOUS; SUBCUTANEOUS at 05:10

## 2019-03-17 RX ADMIN — LEVETIRACETAM 1000 MILLIGRAM(S): 250 TABLET, FILM COATED ORAL at 18:38

## 2019-03-17 RX ADMIN — Medication 100 MILLIGRAM(S): at 05:33

## 2019-03-17 RX ADMIN — SODIUM CHLORIDE 2 GRAM(S): 9 INJECTION INTRAMUSCULAR; INTRAVENOUS; SUBCUTANEOUS at 05:34

## 2019-03-17 RX ADMIN — LEVETIRACETAM 1000 MILLIGRAM(S): 250 TABLET, FILM COATED ORAL at 05:33

## 2019-03-17 RX ADMIN — SODIUM CHLORIDE 4 MILLILITER(S): 9 INJECTION INTRAMUSCULAR; INTRAVENOUS; SUBCUTANEOUS at 18:43

## 2019-03-17 RX ADMIN — POLYETHYLENE GLYCOL 3350 17 GRAM(S): 17 POWDER, FOR SOLUTION ORAL at 05:34

## 2019-03-17 RX ADMIN — SODIUM CHLORIDE 2 GRAM(S): 9 INJECTION INTRAMUSCULAR; INTRAVENOUS; SUBCUTANEOUS at 12:35

## 2019-03-17 RX ADMIN — SENNA PLUS 5 MILLILITER(S): 8.6 TABLET ORAL at 22:11

## 2019-03-17 RX ADMIN — Medication 3 MILLILITER(S): at 05:10

## 2019-03-17 RX ADMIN — SODIUM CHLORIDE 4 MILLILITER(S): 9 INJECTION INTRAMUSCULAR; INTRAVENOUS; SUBCUTANEOUS at 23:44

## 2019-03-17 RX ADMIN — Medication 3 MILLILITER(S): at 23:45

## 2019-03-17 RX ADMIN — Medication 100 MILLIGRAM(S): at 18:38

## 2019-03-17 RX ADMIN — AMLODIPINE BESYLATE 10 MILLIGRAM(S): 2.5 TABLET ORAL at 05:32

## 2019-03-17 RX ADMIN — ENOXAPARIN SODIUM 40 MILLIGRAM(S): 100 INJECTION SUBCUTANEOUS at 18:39

## 2019-03-17 RX ADMIN — PANTOPRAZOLE SODIUM 40 MILLIGRAM(S): 20 TABLET, DELAYED RELEASE ORAL at 12:36

## 2019-03-17 RX ADMIN — Medication 3 MILLILITER(S): at 18:29

## 2019-03-17 RX ADMIN — SODIUM CHLORIDE 2 GRAM(S): 9 INJECTION INTRAMUSCULAR; INTRAVENOUS; SUBCUTANEOUS at 23:37

## 2019-03-17 RX ADMIN — Medication 1 MILLIGRAM(S): at 18:38

## 2019-03-17 RX ADMIN — Medication 3 MILLILITER(S): at 11:52

## 2019-03-17 RX ADMIN — SODIUM CHLORIDE 4 MILLILITER(S): 9 INJECTION INTRAMUSCULAR; INTRAVENOUS; SUBCUTANEOUS at 11:52

## 2019-03-17 RX ADMIN — SODIUM CHLORIDE 2 GRAM(S): 9 INJECTION INTRAMUSCULAR; INTRAVENOUS; SUBCUTANEOUS at 18:38

## 2019-03-17 NOTE — PROGRESS NOTE ADULT - SUBJECTIVE AND OBJECTIVE BOX
Patient is a 59y old  Female who presents with a chief complaint of seizures, h/o  shunt (16 Mar 2019 08:03)      Interval Events:    REVIEW OF SYSTEMS:  [ ] Positive  [ ] All other systems negative  [ ] Unable to assess ROS because ________    Vital Signs Last 24 Hrs  T(C): 37.2 (03-17-19 @ 03:00), Max: 37.4 (03-16-19 @ 14:32)  T(F): 99 (03-17-19 @ 03:00), Max: 99.4 (03-16-19 @ 14:32)  HR: 89 (03-17-19 @ 05:11) (87 - 105)  BP: 116/82 (03-17-19 @ 03:00) (96/66 - 132/73)  RR: 18 (03-17-19 @ 04:09) (18 - 21)  SpO2: 99% (03-17-19 @ 05:11) (94% - 100%)    PHYSICAL EXAM:  HEENT:   [ ]Tracheostomy:  [ ]Pupils equal  [ ]No oral lesions  [ ]Abnormal    SKIN  [ ]No Rash  [ ] Abnormal  [ ] pressure    CARDIAC  [ ]Regular  [ ]Abnormal    PULMONARY  [ ]Bilateral Clear Breath Sounds  [ ]Normal Excursion  [ ]Abnormal    GI  [ ]PEG      [ ] +BS		              [ ]Soft, nondistended, nontender	  [ ]Abnormal    MUSCULOSKELETAL                                   [ ]Bedbound                 [ ]Abnormal    [ ]Ambulatory/OOB to chair                           EXTREMITIES                                         [ ]Normal  [ ]Edema                           NEUROLOGIC  [ ] Normal, non focal  [ ] Focal findings:    PSYCHIATRIC  [ ]Alert and appropriate  [ ] Sedated	 [ ]Agitated    :  Newsome: [ ] Yes, if yes: Date of Placement:                   [  ] No    LINES: Central Lines [ ] Yes, if yes: Date of Placement                                     [  ] No    HOSPITAL MEDICATIONS:  MEDICATIONS  (STANDING):  ALBUTerol/ipratropium for Nebulization 3 milliLiter(s) Nebulizer every 6 hours  amLODIPine   Tablet 10 milliGRAM(s) Oral daily  clonazePAM Tablet 1 milliGRAM(s) Oral every 12 hours  docusate sodium Liquid 100 milliGRAM(s) Oral two times a day  enoxaparin Injectable 40 milliGRAM(s) SubCutaneous <User Schedule>  labetalol 100 milliGRAM(s) Oral every 12 hours  levETIRAcetam  Solution 1000 milliGRAM(s) Oral two times a day  pantoprazole   Suspension 40 milliGRAM(s) Oral daily  polyethylene glycol 3350 17 Gram(s) Oral two times a day  senna Syrup 5 milliLiter(s) Oral at bedtime  sodium chloride 2 Gram(s) Oral every 6 hours  sodium chloride 7% Inhalation 4 milliLiter(s) Inhalation every 6 hours    MEDICATIONS  (PRN):  acetaminophen    Suspension .. 650 milliGRAM(s) Oral every 6 hours PRN Temp greater or equal to 38C (100.4F), Mild Pain (1 - 3)  clonazePAM Tablet 0.25 milliGRAM(s) Oral every 8 hours PRN myoclonus      Arterial Blood Gas:  03-16 @ 09:52  7.45/45/133/31/99/6.5  ABG lactate: --  Arterial Blood Gas:  03-15 @ 13:51  7.44/47/115/31/99/6.4  ABG lactate: --      CAPILLARY BLOOD GLUCOSE    MICROBIOLOGY:     RADIOLOGY:  [ ] Reviewed and interpreted by me Patient is a 59y old  Female who presents with a chief complaint of seizures, h/o  shunt (16 Mar 2019 08:03)    Interval Events:    No events overnight. Prev admitted for AVM rupture s/p craniotomy on Dec 2018. Admitted now f/u for leukocytosis on LP at OSH, r/o  shunt infection.     REVIEW OF SYSTEMS:  [ ] Positive  [X ] All other systems negative  [ ] Unable to assess ROS because     Vital Signs Last 24 Hrs  T(C): 37.2 (03-17-19 @ 03:00), Max: 37.4 (03-16-19 @ 14:32)  T(F): 99 (03-17-19 @ 03:00), Max: 99.4 (03-16-19 @ 14:32)  HR: 89 (03-17-19 @ 05:11) (87 - 105)  BP: 116/82 (03-17-19 @ 03:00) (96/66 - 132/73)  RR: 18 (03-17-19 @ 04:09) (18 - 21)  SpO2: 99% (03-17-19 @ 05:11) (94% - 100%)    PHYSICAL EXAM:  HEENT:   [X ]Tracheostomy:  #6 Cuffed Portex  [ ]Pupils equal  [ ]No oral lesions  [ ]Abnormal    SKIN  [X ]No Rash  [ ] Abnormal  [ ] pressure    CARDIAC  [ X]Regular  [ ]Abnormal    PULMONARY  [ ]Bilateral Clear Breath Sounds  [ ]Normal Excursion  [ ]Abnormal    GI  [ X]PEG      [ X] +BS		              [ X]Soft, nondistended, nontender	  [ ]Abnormal    MUSCULOSKELETAL                                   [ X]Bedbound                 [ ]Abnormal    [ ]Ambulatory/OOB to chair                           EXTREMITIES                                         [ X]Normal  [ ]Edema                           NEUROLOGIC  [ ] Normal, non focal  [ ] Focal findings:    PSYCHIATRIC  [ ]Alert and appropriate  [ ] Sedated	 [ ]Agitated    :  Newsome: [ ] Yes, if yes: Date of Placement:                   [ X ] No    LINES: Central Lines [ ] Yes, if yes: Date of Placement                                     [  X] No    HOSPITAL MEDICATIONS:  MEDICATIONS  (STANDING):  ALBUTerol/ipratropium for Nebulization 3 milliLiter(s) Nebulizer every 6 hours  amLODIPine   Tablet 10 milliGRAM(s) Oral daily  clonazePAM Tablet 1 milliGRAM(s) Oral every 12 hours  docusate sodium Liquid 100 milliGRAM(s) Oral two times a day  enoxaparin Injectable 40 milliGRAM(s) SubCutaneous <User Schedule>  labetalol 100 milliGRAM(s) Oral every 12 hours  levETIRAcetam  Solution 1000 milliGRAM(s) Oral two times a day  pantoprazole   Suspension 40 milliGRAM(s) Oral daily  polyethylene glycol 3350 17 Gram(s) Oral two times a day  senna Syrup 5 milliLiter(s) Oral at bedtime  sodium chloride 2 Gram(s) Oral every 6 hours  sodium chloride 7% Inhalation 4 milliLiter(s) Inhalation every 6 hours    MEDICATIONS  (PRN):  acetaminophen    Suspension .. 650 milliGRAM(s) Oral every 6 hours PRN Temp greater or equal to 38C (100.4F), Mild Pain (1 - 3)  clonazePAM Tablet 0.25 milliGRAM(s) Oral every 8 hours PRN myoclonus    Arterial Blood Gas:  03-16 @ 09:52  7.45/45/133/31/99/6.5  ABG lactate: --  Arterial Blood Gas:  03-15 @ 13:51  7.44/47/115/31/99/6.4  ABG lactate: --

## 2019-03-17 NOTE — PROGRESS NOTE ADULT - NSICDXPROBLEM_GEN_ALL_CORE_FT
PROBLEM DIAGNOSES  Problem: Respiratory failure  Assessment and Plan: Patient s/p Tracheostomy last admission   Patient tolerating TC ATC Since 3/15  ABG WNL, Will decrease FIO2 21%   If patient remains on TC ATC consider downsizing 3/18  Continue Chest PT / Suctioning  PRN       Problem: Hydrocephalus  Assessment and Plan: Patient admitted with Suspected  Shunt infection    shunt intitally removed and EVD Placed   CSF CX 3/3: No growth    Shunt replaced 3/11  Cranial / Abdominal staples remain intact       Problem: Seizure disorder  Assessment and Plan: Continue Keppra     Problem: Myoclonus  Assessment and Plan: Continue Clonazepam 1 mg q 12 hrs   Continue PRN Clonazepam for Breakthrough Myoclonus       Problem: Dysphagia  Assessment and Plan: s/p Previous PEG, tolerating tube feeds     Problem: Hypertension  Assessment and Plan: Continue Labetalol 100 mg q 12 hrs      Problem: Prophylactic measure  Assessment and Plan: Continue Lovenox PROBLEM DIAGNOSES  Problem: Respiratory failure  Assessment and Plan: Patient s/p Tracheostomy last admission   Patient tolerating TC ATC Since 3/15  ABG WNL, Will decrease FIO2 21%   If patient remains on TC ATC consider downsizing 3/18  Continue Chest PT / Suctioning  PRN       Problem: Hydrocephalus  Assessment and Plan: Patient admitted with Suspected  Shunt infection    shunt intitally removed and EVD Placed   CSF CX 3/3: No growth    Shunt replaced 3/11  Cranial / Abdominal staples remain intact   3/17 c/o abdominal pain at incision site, hardened area at staples site, US abdomen limited r/o hematoma pending      Problem: Seizure disorder  Assessment and Plan: Continue Keppra     Problem: Myoclonus  Assessment and Plan: Continue Clonazepam 1 mg q 12 hrs   Continue PRN Clonazepam for Breakthrough Myoclonus       Problem: Dysphagia  Assessment and Plan: s/p Previous PEG, tolerating tube feeds     Problem: Hypertension  Assessment and Plan: Continue Labetalol 100 mg q 12 hrs      Problem: Prophylactic measure  Assessment and Plan: Continue Lovenox

## 2019-03-17 NOTE — PROGRESS NOTE ADULT - ASSESSMENT
58 Year old Female initially admitted in mid Dec 2018 for ruptured cerebellar AVM S/p embolization and craniotomy for resection of AVM, Followed by  shunt placement. She was discharged to rehab in late Jan 2019. She had been doing well at rehab: walking 50 steps with walker. While at rehab patient was noted to have generalized tonic clonic seizure with accompanying fever. Patient was initially brought to OSH. Patient had Head CT and LP Performed. LP was concerning for leukocytosis, due to the suspicion of  shunt infection, the patient was transferred to [facility] for further management. Patient had removal of VPS and placement of temporary EVD on 2/25/19, CSF Cxs were negative and  Shunt was Replaced on 3/11. Patient was transferred to RCU on 3/13. Patient had + UA on 3/14 and was initially placed on Zosyn Given recent hardware instrumentation, Urine cx with Normal caleb, Zosyn was discontinued as per ID Recommendations.    3/16: Patient remained off TC overnight, ABG WNL will decrease FIO2 21%   3/17 Stable, tolerating TC ATC 58 Year old Female initially admitted in mid Dec 2018 for ruptured cerebellar AVM S/p embolization and craniotomy for resection of AVM, Followed by  shunt placement. She was discharged to rehab in late Jan 2019. She had been doing well at rehab: walking 50 steps with walker. While at rehab patient was noted to have generalized tonic clonic seizure with accompanying fever. Patient was initially brought to OSH. Patient had Head CT and LP Performed. LP was concerning for leukocytosis, due to the suspicion of  shunt infection, the patient was transferred to [facility] for further management. Patient had removal of VPS and placement of temporary EVD on 2/25/19, CSF Cxs were negative and  Shunt was Replaced on 3/11. Patient was transferred to RCU on 3/13. Patient had + UA on 3/14 and was initially placed on Zosyn Given recent hardware instrumentation, Urine cx with Normal caleb, Zosyn was discontinued as per ID Recommendations.    3/16: Patient remained off TC overnight, ABG WNL will decrease FIO2 21%   3/17 Stable, tolerating TC ATC, s/p  Shunt revision 3/11, c/o abdominal pain at incision site, hardened area at staples site, r/o hematoma, US abdomen limited pending, Oxycodone 5 mg x1

## 2019-03-18 LAB
CULTURE RESULTS: SIGNIFICANT CHANGE UP
SPECIMEN SOURCE: SIGNIFICANT CHANGE UP

## 2019-03-18 PROCEDURE — 99233 SBSQ HOSP IP/OBS HIGH 50: CPT | Mod: GC

## 2019-03-18 PROCEDURE — 76705 ECHO EXAM OF ABDOMEN: CPT | Mod: 26

## 2019-03-18 RX ADMIN — SODIUM CHLORIDE 2 GRAM(S): 9 INJECTION INTRAMUSCULAR; INTRAVENOUS; SUBCUTANEOUS at 23:28

## 2019-03-18 RX ADMIN — Medication 100 MILLIGRAM(S): at 18:31

## 2019-03-18 RX ADMIN — Medication 3 MILLILITER(S): at 12:22

## 2019-03-18 RX ADMIN — POLYETHYLENE GLYCOL 3350 17 GRAM(S): 17 POWDER, FOR SOLUTION ORAL at 05:18

## 2019-03-18 RX ADMIN — SODIUM CHLORIDE 2 GRAM(S): 9 INJECTION INTRAMUSCULAR; INTRAVENOUS; SUBCUTANEOUS at 12:13

## 2019-03-18 RX ADMIN — POLYETHYLENE GLYCOL 3350 17 GRAM(S): 17 POWDER, FOR SOLUTION ORAL at 18:31

## 2019-03-18 RX ADMIN — PANTOPRAZOLE SODIUM 40 MILLIGRAM(S): 20 TABLET, DELAYED RELEASE ORAL at 12:13

## 2019-03-18 RX ADMIN — Medication 1 MILLIGRAM(S): at 18:36

## 2019-03-18 RX ADMIN — ENOXAPARIN SODIUM 40 MILLIGRAM(S): 100 INJECTION SUBCUTANEOUS at 18:32

## 2019-03-18 RX ADMIN — SODIUM CHLORIDE 4 MILLILITER(S): 9 INJECTION INTRAMUSCULAR; INTRAVENOUS; SUBCUTANEOUS at 05:40

## 2019-03-18 RX ADMIN — AMLODIPINE BESYLATE 10 MILLIGRAM(S): 2.5 TABLET ORAL at 05:17

## 2019-03-18 RX ADMIN — Medication 3 MILLILITER(S): at 05:40

## 2019-03-18 RX ADMIN — Medication 3 MILLILITER(S): at 17:43

## 2019-03-18 RX ADMIN — Medication 100 MILLIGRAM(S): at 18:32

## 2019-03-18 RX ADMIN — Medication 1 MILLIGRAM(S): at 05:22

## 2019-03-18 RX ADMIN — SENNA PLUS 5 MILLILITER(S): 8.6 TABLET ORAL at 21:29

## 2019-03-18 RX ADMIN — LEVETIRACETAM 1000 MILLIGRAM(S): 250 TABLET, FILM COATED ORAL at 18:32

## 2019-03-18 RX ADMIN — Medication 3 MILLILITER(S): at 23:24

## 2019-03-18 RX ADMIN — Medication 100 MILLIGRAM(S): at 05:18

## 2019-03-18 RX ADMIN — SODIUM CHLORIDE 2 GRAM(S): 9 INJECTION INTRAMUSCULAR; INTRAVENOUS; SUBCUTANEOUS at 18:32

## 2019-03-18 RX ADMIN — LEVETIRACETAM 1000 MILLIGRAM(S): 250 TABLET, FILM COATED ORAL at 05:19

## 2019-03-18 RX ADMIN — SODIUM CHLORIDE 2 GRAM(S): 9 INJECTION INTRAMUSCULAR; INTRAVENOUS; SUBCUTANEOUS at 05:19

## 2019-03-18 NOTE — PROGRESS NOTE ADULT - ASSESSMENT
58 Year old Female initially admitted in mid Dec 2018 for ruptured cerebellar AVM S/p embolization and craniotomy for resection of AVM, Followed by  shunt placement. She was discharged to rehab in late Jan 2019. She had been doing well at rehab: walking 50 steps with walker. While at rehab patient was noted to have generalized tonic clonic seizure with accompanying fever. Patient was initially brought to OSH. Patient had Head CT and LP Performed. LP was concerning for leukocytosis, due to the suspicion of  shunt infection, the patient was transferred to [facility] for further management. Patient had removal of VPS and placement of temporary EVD on 2/25/19, CSF Cxs were negative and  Shunt was Replaced on 3/11. Patient was transferred to RCU on 3/13. Patient had + UA on 3/14 and was initially placed on Zosyn Given recent hardware instrumentation, Urine cx with Normal caleb, Zosyn was discontinued as per ID Recommendations.    3/16: Patient remained off TC overnight, ABG WNL will decrease FIO2 21%   3/17 Stable, tolerating TC ATC, s/p  Shunt revision 3/11, c/o abdominal pain at incision site, hardened area at staples site, r/o hematoma, US abdomen limited pending, Oxycodone 5 mg x1   3/18: stable overnight no active issues

## 2019-03-18 NOTE — PROGRESS NOTE ADULT - ATTENDING COMMENTS
Pt seen and examined with RCU team on morning rounds 3/18    59F PMH lupus, RA, ruptured cerebral aneurysm in Dec 2018 s/p EVD, decompressive craniotomy, subsequent  shunt placement 1/7/19, chronic respiratory failure s/p trach 12/24/18 on trach collar presents to outside hospital from rehab for fevers and new onset tonic clonic seizures. LP performed with leukocytosis noted in CSF. Transferred to [facility] for  shunt removal for concerns of  shunt infection. Now s/p  shunt revision on 3/11. s/p course of antibiotics for presumed ventriculitis. UA + but all cultures negative including CSF cultures and antibiotics d/bay.     - noted on exam to have a hardened area around abdominal staples, no noted drainage, erythema, tenderness  - awaiting abdominal US to better evaluate for possible hematoma likely related to placement of  shunt  - incision site with staples clean dry and intact  - no fever, no leukocytosis off antibiotics  - no significant drop in Hb count   - cont keppra from seizure control  - can down size trach as pt tolerating trach collar ATC, change to cuffless trach

## 2019-03-18 NOTE — PROGRESS NOTE ADULT - NSICDXPROBLEM_GEN_ALL_CORE_FT
PROBLEM DIAGNOSES  Problem: Respiratory failure  Assessment and Plan: Patient s/p Tracheostomy last admission   Patient tolerating TC ATC Since 3/15  ABG WNL, Will decrease FIO2 21%   If patient remains on TC ATC consider downsizing 3/18  Continue Chest PT / Suctioning  PRN       Problem: Hydrocephalus  Assessment and Plan: Patient admitted with Suspected  Shunt infection    shunt intitally removed and EVD Placed   CSF CX 3/3: No growth    Shunt replaced 3/11  Cranial / Abdominal staples remain intact   3/17 c/o abdominal pain at incision site, hardened area at staples site, US abdomen limited r/o hematoma pending      Problem: Seizure disorder  Assessment and Plan: Continue Keppra     Problem: Myoclonus  Assessment and Plan: Continue Clonazepam 1 mg q 12 hrs   Continue PRN Clonazepam for Breakthrough Myoclonus       Problem: Dysphagia  Assessment and Plan: s/p Previous PEG, tolerating tube feeds     Problem: Hypertension  Assessment and Plan: Continue Labetalol 100 mg q 12 hrs      Problem: Prophylactic measure  Assessment and Plan: Continue Lovenox

## 2019-03-18 NOTE — PROGRESS NOTE ADULT - SUBJECTIVE AND OBJECTIVE BOX
Patient is a 59y old  Female who presents with a chief complaint of seizures, h/o  shunt (17 Mar 2019 06:41)      Interval Events:    REVIEW OF SYSTEMS:  [ ] Positive  [ ] All other systems negative  [x ] Unable to assess ROS because _non verbal_______    Vital Signs Last 24 Hrs  T(C): 37 (03-18-19 @ 04:55), Max: 37.2 (03-17-19 @ 20:42)  T(F): 98.6 (03-18-19 @ 04:55), Max: 99 (03-17-19 @ 20:42)  HR: 86 (03-18-19 @ 08:14) (86 - 112)  BP: 117/81 (03-18-19 @ 04:55) (111/78 - 140/85)  RR: 18 (03-18-19 @ 08:14) (18 - 22)  SpO2: 97% (03-18-19 @ 08:14) (96% - 100%)    PHYSICAL EXAM:  HEENT:   [ x]Tracheostomy: 7 cuffed  [ ]Pupils equal  [ ]No oral lesions  [ ]Abnormal    SKIN  [x ]No Rash  [ ] Abnormal  [ ] pressure    CARDIAC  [ x]Regular  [ ]Abnormal    PULMONARY  [x ]Bilateral Clear Breath Sounds  [ ]Normal Excursion  [ ]Abnormal    GI  [x ]PEG      [x ] +BS		              [x ]Soft, nondistended, nontender	  [ ]Abnormal    MUSCULOSKELETAL                                   [ ]Bedbound                 [ ]Abnormal    [ ]Ambulatory/OOB to chair                           EXTREMITIES                                         [x ]Normal  [ ]Edema                           NEUROLOGIC  [ ] Normal, non focal  [x ] Focal findings:    PSYCHIATRIC  [x ]Alert and appropriate  [ ] Sedated	 [ ]Agitated    :  Newsome: [ ] Yes, if yes: Date of Placement:                   [ x ] No    LINES: Central Lines [ ] Yes, if yes: Date of Placement                                     [ x ] No    HOSPITAL MEDICATIONS:  MEDICATIONS  (STANDING):  ALBUTerol/ipratropium for Nebulization 3 milliLiter(s) Nebulizer every 6 hours  amLODIPine   Tablet 10 milliGRAM(s) Oral daily  clonazePAM Tablet 1 milliGRAM(s) Oral every 12 hours  docusate sodium Liquid 100 milliGRAM(s) Oral two times a day  enoxaparin Injectable 40 milliGRAM(s) SubCutaneous <User Schedule>  labetalol 100 milliGRAM(s) Oral every 12 hours  levETIRAcetam  Solution 1000 milliGRAM(s) Oral two times a day  oxyCODONE    IR 5 milliGRAM(s) Oral once  pantoprazole   Suspension 40 milliGRAM(s) Oral daily  polyethylene glycol 3350 17 Gram(s) Oral two times a day  senna Syrup 5 milliLiter(s) Oral at bedtime  sodium chloride 2 Gram(s) Oral every 6 hours    MEDICATIONS  (PRN):  acetaminophen    Suspension .. 650 milliGRAM(s) Oral every 6 hours PRN Temp greater or equal to 38C (100.4F), Mild Pain (1 - 3)  clonazePAM Tablet 0.25 milliGRAM(s) Oral every 8 hours PRN myoclonus      LABS:                        11.1   4.4   )-----------( 345      ( 17 Mar 2019 06:50 )             32.1     03-17    139  |  98  |  10  ----------------------------<  105<H>  4.0   |  28  |  0.39<L>    Ca    10.0      17 Mar 2019 06:46  Phos  3.8     03-17  Mg     2.1     03-17          Arterial Blood Gas:  03-16 @ 09:52  7.45/45/133/31/99/6.5  ABG lactate: --      CAPILLARY BLOOD GLUCOSE    MICROBIOLOGY:     RADIOLOGY:  [ ] Reviewed and interpreted by me

## 2019-03-19 PROCEDURE — 99232 SBSQ HOSP IP/OBS MODERATE 35: CPT

## 2019-03-19 PROCEDURE — 99233 SBSQ HOSP IP/OBS HIGH 50: CPT | Mod: GC

## 2019-03-19 RX ORDER — CLONAZEPAM 1 MG
1 TABLET ORAL EVERY 12 HOURS
Qty: 0 | Refills: 0 | Status: DISCONTINUED | OUTPATIENT
Start: 2019-03-19 | End: 2019-03-22

## 2019-03-19 RX ADMIN — Medication 3 MILLILITER(S): at 13:43

## 2019-03-19 RX ADMIN — PANTOPRAZOLE SODIUM 40 MILLIGRAM(S): 20 TABLET, DELAYED RELEASE ORAL at 11:16

## 2019-03-19 RX ADMIN — LEVETIRACETAM 1000 MILLIGRAM(S): 250 TABLET, FILM COATED ORAL at 17:43

## 2019-03-19 RX ADMIN — Medication 100 MILLIGRAM(S): at 17:43

## 2019-03-19 RX ADMIN — Medication 3 MILLILITER(S): at 23:24

## 2019-03-19 RX ADMIN — POLYETHYLENE GLYCOL 3350 17 GRAM(S): 17 POWDER, FOR SOLUTION ORAL at 17:42

## 2019-03-19 RX ADMIN — Medication 3 MILLILITER(S): at 18:53

## 2019-03-19 RX ADMIN — Medication 100 MILLIGRAM(S): at 05:18

## 2019-03-19 RX ADMIN — SODIUM CHLORIDE 2 GRAM(S): 9 INJECTION INTRAMUSCULAR; INTRAVENOUS; SUBCUTANEOUS at 23:56

## 2019-03-19 RX ADMIN — Medication 100 MILLIGRAM(S): at 17:42

## 2019-03-19 RX ADMIN — SODIUM CHLORIDE 2 GRAM(S): 9 INJECTION INTRAMUSCULAR; INTRAVENOUS; SUBCUTANEOUS at 11:16

## 2019-03-19 RX ADMIN — SODIUM CHLORIDE 2 GRAM(S): 9 INJECTION INTRAMUSCULAR; INTRAVENOUS; SUBCUTANEOUS at 17:43

## 2019-03-19 RX ADMIN — SODIUM CHLORIDE 2 GRAM(S): 9 INJECTION INTRAMUSCULAR; INTRAVENOUS; SUBCUTANEOUS at 05:19

## 2019-03-19 RX ADMIN — LEVETIRACETAM 1000 MILLIGRAM(S): 250 TABLET, FILM COATED ORAL at 05:19

## 2019-03-19 RX ADMIN — POLYETHYLENE GLYCOL 3350 17 GRAM(S): 17 POWDER, FOR SOLUTION ORAL at 05:19

## 2019-03-19 RX ADMIN — ENOXAPARIN SODIUM 40 MILLIGRAM(S): 100 INJECTION SUBCUTANEOUS at 17:42

## 2019-03-19 RX ADMIN — AMLODIPINE BESYLATE 10 MILLIGRAM(S): 2.5 TABLET ORAL at 05:18

## 2019-03-19 RX ADMIN — Medication 1 MILLIGRAM(S): at 17:43

## 2019-03-19 RX ADMIN — Medication 3 MILLILITER(S): at 05:10

## 2019-03-19 NOTE — CHART NOTE - NSCHARTNOTEFT_GEN_A_CORE
Nutrition Follow Up Note  RCU    Patient seen per protocol    Pt is a 57 y/o F with PMH lupus. 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 Currently admitted with generalized clonic seizure, leukocytosis and AMS. S/P VPS removal and EVD placement (), EVD removed 3/3. Pt currently off antibiotics. On vent, CPAP as tolerated. Pt s/p placement on  shunt 3/11 2/2 hydrocephalus  .   S/P trach PEG  Source: medical record/team    Diet: Enteral Nutrition via PEG:  Jevity 1.2 at goal rate 85ml/hr x 18 hrs, total 1530ml, 1262ml water, 1836kcal, 21/kg, protein 85g, 1.4gm/kg IBW 59kg,   plus Rick Active twice daily, .     GI BM x2, no diarrhea per RN, continued on bowel regimen senna, colace, miralax    Daily Weight in k.5 (03-13)  Dosing Weight 86kg, IBW= 59kg- 65kg    Pertinent Medications: MEDICATIONS  (STANDING):  ALBUTerol/ipratropium for Nebulization 3 milliLiter(s) Nebulizer every 6 hours  amLODIPine   Tablet 10 milliGRAM(s) Oral daily  clonazePAM Tablet 1 milliGRAM(s) Oral every 12 hours  docusate sodium Liquid 100 milliGRAM(s) Oral two times a day  enoxaparin Injectable 40 milliGRAM(s) SubCutaneous <User Schedule>  labetalol 100 milliGRAM(s) Oral every 12 hours  levETIRAcetam  Solution 1000 milliGRAM(s) Oral two times a day  oxyCODONE    IR 5 milliGRAM(s) Oral once  pantoprazole   Suspension 40 milliGRAM(s) Oral daily  polyethylene glycol 3350 17 Gram(s) Oral two times a day  senna Syrup 5 milliLiter(s) Oral at bedtime  sodium chloride 2 Gram(s) Oral every 6 hours    MEDICATIONS  (PRN):  acetaminophen    Suspension .. 650 milliGRAM(s) Oral every 6 hours PRN Temp greater or equal to 38C (100.4F), Mild Pain (1 - 3)    Pertinent Labs: renal indices wnl. glucose 105, 120, 116mg/dL  Finger Sticks:  NA    Skin per nursing documentation: R head surgical wound wnl  edema: none    Estimated Needs:   [X ] no change since previous assessment  [ ] recalculated:     Previous Nutrition Diagnosis: Increased Nutrient needs  Nutrition Diagnosis is: ongoing addressed        Recommend  1) Continue Jevity 1.2 at goal rate 85ml/hr x 18 hrs, total 1530ml, 1262ml water, 1836kcal, 21/kg, protein 85g, 1.4gm/kg IBW 59kg,   plus Rick Active twice daily, .   2) free water added per med team  3) continue bowel regimen  Monitoring and Evaluation:     Continue to monitor Nutritional intake, Tolerance to diet prescription, weights, labs, skin integrity    RD remains available upon request and will follow up per protocol

## 2019-03-19 NOTE — PROGRESS NOTE ADULT - ATTENDING COMMENTS
Pt seen and examined with RCU team on morning rounds 3/19    59F PMH lupus, RA, ruptured cerebral aneurysm in Dec 2018 s/p EVD, decompressive craniotomy, subsequent  shunt placement 1/7/19, chronic respiratory failure s/p trach 12/24/18 on trach collar presents to outside hospital from rehab for fevers and new onset tonic clonic seizures. LP performed with leukocytosis noted in CSF. Transferred to [facility] for  shunt removal for concerns of  shunt infection. Now s/p  shunt revision on 3/11. s/p course of antibiotics for presumed ventriculitis. UA + but all cultures negative including CSF cultures and antibiotics d/bay.     - hardened area around abdominal staples, still with no noted drainage, erythema, or tenderness  - abdominal US with collection seroma vs hematoma: d/w neurosurgery, no interventions to be done  - incision site with staples clean dry and intact  - no fever, no leukocytosis off antibiotics  - Hb count remains stable  - cont keppra for seizure control  - trach downsized yesterday 3/18 to a 6 cuffless portex  - TC around the clock, doing well  - cont to monitor WBC and temperature curve, remains afebrile   - will need to start speaking to family regarding dispo planning

## 2019-03-19 NOTE — PROGRESS NOTE ADULT - SUBJECTIVE AND OBJECTIVE BOX
Patient is a 59y old  Female who presents with a chief complaint of seizures, h/o  shunt (18 Mar 2019 09:40)      Interval Events:    REVIEW OF SYSTEMS:  [ ] Positive  [ ] All other systems negative  [x ] Unable to assess ROS because _non verbal_______    Vital Signs Last 24 Hrs  T(C): 37.2 (03-19-19 @ 04:17), Max: 37.5 (03-18-19 @ 19:17)  T(F): 99 (03-19-19 @ 04:17), Max: 99.5 (03-18-19 @ 19:17)  HR: 94 (03-19-19 @ 08:52) (87 - 107)  BP: 118/77 (03-19-19 @ 04:17) (114/78 - 133/83)  RR: 18 (03-19-19 @ 08:52) (18 - 20)  SpO2: 95% (03-19-19 @ 08:52) (95% - 100%)    PHYSICAL EXAM:  HEENT:   [ x]Tracheostomy:  [ ]Pupils equal  [ ]No oral lesions  [ ]Abnormal    SKIN  [ x]No Rash  [ ] Abnormal  [ ] pressure    CARDIAC  [ x]Regular  [ ]Abnormal    PULMONARY  [ x]Bilateral Clear Breath Sounds  [ ]Normal Excursion  [ ]Abnormal    GI  [ x]PEG      [x ] +BS		              [x ]Soft, nondistended, nontender	  [ ]Abnormal    MUSCULOSKELETAL                                   [ ]Bedbound                 [ ]Abnormal    [x ]Ambulatory/OOB to chair                           EXTREMITIES                                         [ x]Normal  [ ]Edema                           NEUROLOGIC  [ ] Normal, non focal  [x ] Focal findings: myoclonic shaking    PSYCHIATRIC  [x ]Alert and appropriate  [ ] Sedated	 [ ]Agitated    :  Newsome: [ ] Yes, if yes: Date of Placement:                   [ x ] No    LINES: Central Lines [ ] Yes, if yes: Date of Placement                                     [ x ] No    HOSPITAL MEDICATIONS:  MEDICATIONS  (STANDING):  ALBUTerol/ipratropium for Nebulization 3 milliLiter(s) Nebulizer every 6 hours  amLODIPine   Tablet 10 milliGRAM(s) Oral daily  clonazePAM Tablet 1 milliGRAM(s) Oral every 12 hours  docusate sodium Liquid 100 milliGRAM(s) Oral two times a day  enoxaparin Injectable 40 milliGRAM(s) SubCutaneous <User Schedule>  labetalol 100 milliGRAM(s) Oral every 12 hours  levETIRAcetam  Solution 1000 milliGRAM(s) Oral two times a day  oxyCODONE    IR 5 milliGRAM(s) Oral once  pantoprazole   Suspension 40 milliGRAM(s) Oral daily  polyethylene glycol 3350 17 Gram(s) Oral two times a day  senna Syrup 5 milliLiter(s) Oral at bedtime  sodium chloride 2 Gram(s) Oral every 6 hours    MEDICATIONS  (PRN):  acetaminophen    Suspension .. 650 milliGRAM(s) Oral every 6 hours PRN Temp greater or equal to 38C (100.4F), Mild Pain (1 - 3)      LABS:                  CAPILLARY BLOOD GLUCOSE    MICROBIOLOGY:     RADIOLOGY:  [ ] Reviewed and interpreted by me

## 2019-03-19 NOTE — PROGRESS NOTE ADULT - ASSESSMENT
58 Year old Female initially admitted in mid Dec 2018 for ruptured cerebellar AVM S/p embolization and craniotomy for resection of AVM, Followed by  shunt placement. She was discharged to rehab in late Jan 2019. She had been doing well at rehab: walking 50 steps with walker. While at rehab patient was noted to have generalized tonic clonic seizure with accompanying fever. Patient was initially brought to OSH. Patient had Head CT and LP Performed. LP was concerning for leukocytosis, due to the suspicion of  shunt infection, the patient was transferred to [facility] for further management. Patient had removal of VPS and placement of temporary EVD on 2/25/19, CSF Cxs were negative and  Shunt was Replaced on 3/11. Patient was transferred to RCU on 3/13. Patient had + UA on 3/14 and was initially placed on Zosyn Given recent hardware instrumentation, Urine cx with Normal caleb, Zosyn was discontinued as per ID Recommendations.    3/16: Patient remained off TC overnight, ABG WNL will decrease FIO2 21%   3/17 Stable, tolerating TC ATC, s/p  Shunt revision 3/11, c/o abdominal pain at incision site, hardened area at staples site, r/o hematoma, US abdomen limited pending, Oxycodone 5 mg x1   3/18: stable overnight no active issues. Trach downsized to 6 portex uncuffed without issue.

## 2019-03-19 NOTE — CONSULT NOTE ADULT - SUBJECTIVE AND OBJECTIVE BOX
Patient is a 59y old  Female who presents with a chief complaint of seizures, h/o  shunt (18 Mar 2019 09:40)      HPI:  58 Year old Woman with PMH of SLE, AVM s/p crani, initially admitted in mid Dec 2018 for ruptured cerebellar AVM S/p embolization and craniotomy for resection of AVM, Followed by  shunt placement. She was discharged to rehab in late Jan 2019. She had been doing well at rehab: walking 50 steps with walker. While at rehab patient was noted to have generalized tonic clonic seizure with accompanying fever. Patient was initially brought to OSH. Patient had Head CT and LP Performed. LP was concerning for leukocytosis, due to the suspicion of  shunt infection, the patient was transferred to [facility] for further management. Patient had removal of VPS and placement of temporary EVD on 2/25/19, CSF Cxs were negative and  Shunt was Replaced on 3/11. Patient was transferred to RCU on 3/13. Patient had + UA on 3/14 and was initially placed on Zosyn Given recent hardware instrumentation, Urine cx with Normal caleb, Zosyn was discontinued as per ID Recommendations. Patient has been stable in RCU. US of abdomen pending to r/o hematoma.       REVIEW OF SYSTEMS  [X] Constitutional WNL       [X] HEENT   [X] Cardio WNL              [X] Resp WNL            [X] GI WNL                            [X]  WNL                               [X] MSK WNL            [X] Neuro WNL                     [X] Cognitive WNL   [X] Psych WNL  [X] Skin WNL      [X] Heme WNL              [X] Endo WNL    PAST MEDICAL & SURGICAL HISTORY  Systemic lupus erythematosus, unspecified SLE type, unspecified organ involvement status  No pertinent past medical history  Back pain, chronic  Childhood asthma  Ovarian cyst  DVT (deep venous thrombosis), bilateral  RA (rheumatoid arthritis)  No significant past surgical history  Plantar fasciitis of right foot  History of laparoscopic cholecystectomy      SOCIAL HISTORY  Smoking - Denied  EtOH - Denied   Drugs - Denied    FUNCTIONAL HISTORY  Pt admit from acute rehab where she was ambulating with rolling walker and assist. Prior, pt living alone in a private residence in [place], [place]. Sister reports 5 stairs to enter and no stairs inside.  Independent prior to initial AVM with Ambulation and ADLs.     CURRENT FUNCTIONAL STATUS  - Bed Mobility: Mod A   - Transfers: sit to stand Max A   - Gait: unable to perform   - ADLs: with OT bed mobility mod A, transfers Max A    ALLERGIES  No Known Allergies        FAMILY HISTORY   No pertinent family history in first degree relatives  Family history of kidney disease in father  Family history of heart disease  Family history of osteoarthritis  Family history of hypertension in mother      VITALS  T(C): 37.2 (03-19-19 @ 04:17), Max: 37.5 (03-18-19 @ 19:17)  HR: 87 (03-19-19 @ 05:11) (87 - 107)  BP: 118/77 (03-19-19 @ 04:17) (114/78 - 133/83)  RR: 19 (03-19-19 @ 04:23) (18 - 20)  SpO2: 98% (03-19-19 @ 05:11) (96% - 100%)  Wt(kg): --    RECENT LABS/IMAGING                MEDICATIONS   acetaminophen    Suspension .. 650 milliGRAM(s) Oral every 6 hours PRN  ALBUTerol/ipratropium for Nebulization 3 milliLiter(s) Nebulizer every 6 hours  amLODIPine   Tablet 10 milliGRAM(s) Oral daily  clonazePAM Tablet 1 milliGRAM(s) Oral every 12 hours  docusate sodium Liquid 100 milliGRAM(s) Oral two times a day  enoxaparin Injectable 40 milliGRAM(s) SubCutaneous <User Schedule>  labetalol 100 milliGRAM(s) Oral every 12 hours  levETIRAcetam  Solution 1000 milliGRAM(s) Oral two times a day  oxyCODONE    IR 5 milliGRAM(s) Oral once  pantoprazole   Suspension 40 milliGRAM(s) Oral daily  polyethylene glycol 3350 17 Gram(s) Oral two times a day  senna Syrup 5 milliLiter(s) Oral at bedtime  sodium chloride 2 Gram(s) Oral every 6 hours      ---------------------------------------------------------------------------------------- Patient is a 59y old  Female who presents with a chief complaint of seizures, h/o  shunt (18 Mar 2019 09:40)      HPI:  58 Year old Woman with PMH of SLE, AVM s/p crani, initially admitted in mid Dec 2018 for ruptured cerebellar AVM S/p embolization and craniotomy for resection of AVM, Followed by  shunt placement. She was discharged to rehab in late Jan 2019. She had been doing well at rehab: walking 50 steps with walker. While at rehab patient was noted to have generalized tonic clonic seizure with accompanying fever. Patient was initially brought to OSH. Patient had Head CT and LP Performed. LP was concerning for leukocytosis, due to the suspicion of  shunt infection, the patient was transferred to [facility] for further management. Patient had removal of VPS and placement of temporary EVD on 2/25/19, CSF Cxs were negative and  Shunt was Replaced on 3/11. Patient was transferred to RCU on 3/13. Patient had + UA on 3/14 and was initially placed on Zosyn Given recent hardware instrumentation, Urine cx with Normal caleb, Zosyn was discontinued as per ID Recommendations. Patient has been stable in RCU. US of abdomen pending to r/o hematoma.       REVIEW OF SYSTEMS limited to ability to communicate due to current clinical condition and tracheostomy     PAST MEDICAL & SURGICAL HISTORY  Systemic lupus erythematosus, unspecified SLE type, unspecified organ involvement status  No pertinent past medical history  Back pain, chronic  Childhood asthma  Ovarian cyst  DVT (deep venous thrombosis), bilateral  RA (rheumatoid arthritis)  No significant past surgical history  Plantar fasciitis of right foot  History of laparoscopic cholecystectomy      SOCIAL HISTORY    FUNCTIONAL HISTORY  Pt admit from acute rehab where she was ambulating with rolling walker and assist. Prior, pt living alone in a private residence in [place], [place]. Sister reports 5 stairs to enter and no stairs inside.  Independent prior to initial AVM with Ambulation and ADLs.     CURRENT FUNCTIONAL STATUS  - Bed Mobility: Mod A   - Transfers: sit to stand Max A   - Gait: unable to perform   - ADLs: with OT bed mobility mod A, transfers Max A    ALLERGIES  No Known Allergies        FAMILY HISTORY   No pertinent family history in first degree relatives  Family history of kidney disease in father  Family history of heart disease  Family history of osteoarthritis  Family history of hypertension in mother      VITALS  T(C): 37.2 (03-19-19 @ 04:17), Max: 37.5 (03-18-19 @ 19:17)  HR: 87 (03-19-19 @ 05:11) (87 - 107)  BP: 118/77 (03-19-19 @ 04:17) (114/78 - 133/83)  RR: 19 (03-19-19 @ 04:23) (18 - 20)  SpO2: 98% (03-19-19 @ 05:11) (96% - 100%)  Wt(kg): --    RECENT LABS/IMAGING                MEDICATIONS   acetaminophen    Suspension .. 650 milliGRAM(s) Oral every 6 hours PRN  ALBUTerol/ipratropium for Nebulization 3 milliLiter(s) Nebulizer every 6 hours  amLODIPine   Tablet 10 milliGRAM(s) Oral daily  clonazePAM Tablet 1 milliGRAM(s) Oral every 12 hours  docusate sodium Liquid 100 milliGRAM(s) Oral two times a day  enoxaparin Injectable 40 milliGRAM(s) SubCutaneous <User Schedule>  labetalol 100 milliGRAM(s) Oral every 12 hours  levETIRAcetam  Solution 1000 milliGRAM(s) Oral two times a day  oxyCODONE    IR 5 milliGRAM(s) Oral once  pantoprazole   Suspension 40 milliGRAM(s) Oral daily  polyethylene glycol 3350 17 Gram(s) Oral two times a day  senna Syrup 5 milliLiter(s) Oral at bedtime  sodium chloride 2 Gram(s) Oral every 6 hours      ----------------------------------------------------------------------------------------      ---------  PHYSICAL EXAM  Constitutional - NAD, Comfortable  Cardio: RRR, No M/G/R, no edema  Pulm: +trach CTAB, no R/R/W  GI: ND, NT  Neuro Exam:                    Cognitive - Alert   Communication - aphonia      Motor  - Difficulty to asses but deficits noted in Upper and Lower extremity bilateral - EF - 2/5, EE 2/5, DF 3/5, PF 3/5      Sensory - unable to assess   Extremities - No C/C/E, No calf tenderness  Psychiatric - Mood flat     ASSESSMENT/PLAN  59 year-old Woman with a PMH of AVM s/p craini and VPS 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     Recommend   - Will continue to follow for ongoing rehab needs and recommendations.  - c/w PT for Bed Mobility, Transfers, Ambulation with AD   - c/w OT for ADLs  - Turn Q2hrs while in bed to prevent Pressure Injury and OOB to chair when possible Patient is a 59y old  Female who presents with a chief complaint of seizures, h/o  shunt (18 Mar 2019 09:40)      HPI:  59 Year old Woman with PMH of SLE, AVM s/p crani, initially admitted in mid Dec 2018 for ruptured cerebellar AVM S/p embolization and craniotomy for resection of AVM, Followed by  shunt placement. She was discharged to rehab in late Jan 2019. She had been doing well at rehab: walking 50 steps with walker. While at rehab patient was noted to have generalized tonic clonic seizure with accompanying fever. Patient was initially brought to OSH. Patient had Head CT and LP Performed. LP was concerning for leukocytosis, due to the suspicion of  shunt infection, the patient was transferred to [facility] for further management. Patient had removal of VPS and placement of temporary EVD on 2/25/19, CSF Cxs were negative and  Shunt was Replaced on 3/11. Patient was transferred to RCU on 3/13. Patient had + UA on 3/14 and was initially placed on Zosyn Given recent hardware instrumentation, Urine cx with Normal caleb, Zosyn was discontinued as per ID Recommendations. Patient has been stable in RCU. US of abdomen pending to r/o hematoma.       REVIEW OF SYSTEMS limited to ability to communicate due to current clinical condition and tracheostomy     PAST MEDICAL & SURGICAL HISTORY  Systemic lupus erythematosus, unspecified SLE type, unspecified organ involvement status  No pertinent past medical history  Back pain, chronic  Childhood asthma  Ovarian cyst  DVT (deep venous thrombosis), bilateral  RA (rheumatoid arthritis)  No significant past surgical history  Plantar fasciitis of right foot  History of laparoscopic cholecystectomy      SOCIAL HISTORY    FUNCTIONAL HISTORY  Pt admit from acute rehab where she was ambulating with rolling walker and assist. Prior, pt living alone in a private residence in [place], [place]. Sister reports 5 stairs to enter and no stairs inside.  Independent prior to initial AVM with Ambulation and ADLs.     CURRENT FUNCTIONAL STATUS  - Bed Mobility: Mod A   - Transfers: sit to stand Max A   - Gait: unable to perform   - ADLs: with OT bed mobility mod A, transfers Max A    ALLERGIES  No Known Allergies        FAMILY HISTORY   No pertinent family history in first degree relatives  Family history of kidney disease in father  Family history of heart disease  Family history of osteoarthritis  Family history of hypertension in mother      VITALS  T(C): 37.2 (03-19-19 @ 04:17), Max: 37.5 (03-18-19 @ 19:17)  HR: 87 (03-19-19 @ 05:11) (87 - 107)  BP: 118/77 (03-19-19 @ 04:17) (114/78 - 133/83)  RR: 19 (03-19-19 @ 04:23) (18 - 20)  SpO2: 98% (03-19-19 @ 05:11) (96% - 100%)  Wt(kg): --    RECENT LABS/IMAGING                MEDICATIONS   acetaminophen    Suspension .. 650 milliGRAM(s) Oral every 6 hours PRN  ALBUTerol/ipratropium for Nebulization 3 milliLiter(s) Nebulizer every 6 hours  amLODIPine   Tablet 10 milliGRAM(s) Oral daily  clonazePAM Tablet 1 milliGRAM(s) Oral every 12 hours  docusate sodium Liquid 100 milliGRAM(s) Oral two times a day  enoxaparin Injectable 40 milliGRAM(s) SubCutaneous <User Schedule>  labetalol 100 milliGRAM(s) Oral every 12 hours  levETIRAcetam  Solution 1000 milliGRAM(s) Oral two times a day  oxyCODONE    IR 5 milliGRAM(s) Oral once  pantoprazole   Suspension 40 milliGRAM(s) Oral daily  polyethylene glycol 3350 17 Gram(s) Oral two times a day  senna Syrup 5 milliLiter(s) Oral at bedtime  sodium chloride 2 Gram(s) Oral every 6 hours      ----------------------------------------------------------------------------------------      ---------  PHYSICAL EXAM  Constitutional - NAD, Comfortable, tremulous   Cardio: RRR, No M/G/R, no edema  Pulm: +trach CTAB, no R/R/W  GI: ND, NT  Neuro Exam:                    Cognitive - Alert   Communication - aphonia      Motor  - Difficulty to asses but deficits noted in Upper and Lower extremity bilateral - EF - 2/5, EE 2/5, DF 3/5, PF 3/5      Sensory - unable to assess   Extremities - No C/C/E, No calf tenderness  Psychiatric - Mood flat     ASSESSMENT/PLAN  59 year-old Woman with a PMH of AVM s/p craini and VPS 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     Recommend   Disposition   - Recommend ANISA, patient DOES NOT meet acute inpatient rehabilitation criteria  - c/w PT for Bed Mobility, Transfers, Ambulation with AD   - c/w OT for ADLs  - Turn Q2hrs while in bed to prevent Pressure Injury and OOB to chair when possible

## 2019-03-19 NOTE — CONSULT NOTE ADULT - ATTENDING COMMENTS
Seen and examined with resident. Agree with note.   Patient with encephalopathy.  Patient will need subacute rehabilitation when stable.

## 2019-03-20 DIAGNOSIS — A41.9 SEPSIS, UNSPECIFIED ORGANISM: ICD-10-CM

## 2019-03-20 LAB
ANION GAP SERPL CALC-SCNC: 12 MMOL/L — SIGNIFICANT CHANGE UP (ref 5–17)
BUN SERPL-MCNC: 13 MG/DL — SIGNIFICANT CHANGE UP (ref 7–23)
CALCIUM SERPL-MCNC: 9.9 MG/DL — SIGNIFICANT CHANGE UP (ref 8.4–10.5)
CHLORIDE SERPL-SCNC: 103 MMOL/L — SIGNIFICANT CHANGE UP (ref 96–108)
CO2 SERPL-SCNC: 29 MMOL/L — SIGNIFICANT CHANGE UP (ref 22–31)
CREAT SERPL-MCNC: 0.38 MG/DL — LOW (ref 0.5–1.3)
GLUCOSE SERPL-MCNC: 132 MG/DL — HIGH (ref 70–99)
HCT VFR BLD CALC: 31.9 % — LOW (ref 34.5–45)
HGB BLD-MCNC: 10.9 G/DL — LOW (ref 11.5–15.5)
MCHC RBC-ENTMCNC: 30.2 PG — SIGNIFICANT CHANGE UP (ref 27–34)
MCHC RBC-ENTMCNC: 34.3 GM/DL — SIGNIFICANT CHANGE UP (ref 32–36)
MCV RBC AUTO: 88.1 FL — SIGNIFICANT CHANGE UP (ref 80–100)
PLATELET # BLD AUTO: 345 K/UL — SIGNIFICANT CHANGE UP (ref 150–400)
POTASSIUM SERPL-MCNC: 4 MMOL/L — SIGNIFICANT CHANGE UP (ref 3.5–5.3)
POTASSIUM SERPL-SCNC: 4 MMOL/L — SIGNIFICANT CHANGE UP (ref 3.5–5.3)
RBC # BLD: 3.62 M/UL — LOW (ref 3.8–5.2)
RBC # FLD: 16.2 % — HIGH (ref 10.3–14.5)
SODIUM SERPL-SCNC: 144 MMOL/L — SIGNIFICANT CHANGE UP (ref 135–145)
WBC # BLD: 6.6 K/UL — SIGNIFICANT CHANGE UP (ref 3.8–10.5)
WBC # FLD AUTO: 6.6 K/UL — SIGNIFICANT CHANGE UP (ref 3.8–10.5)

## 2019-03-20 PROCEDURE — 99233 SBSQ HOSP IP/OBS HIGH 50: CPT | Mod: GC

## 2019-03-20 RX ADMIN — Medication 100 MILLIGRAM(S): at 17:01

## 2019-03-20 RX ADMIN — SODIUM CHLORIDE 2 GRAM(S): 9 INJECTION INTRAMUSCULAR; INTRAVENOUS; SUBCUTANEOUS at 22:56

## 2019-03-20 RX ADMIN — Medication 100 MILLIGRAM(S): at 05:08

## 2019-03-20 RX ADMIN — Medication 100 MILLIGRAM(S): at 05:07

## 2019-03-20 RX ADMIN — Medication 100 MILLIGRAM(S): at 17:05

## 2019-03-20 RX ADMIN — POLYETHYLENE GLYCOL 3350 17 GRAM(S): 17 POWDER, FOR SOLUTION ORAL at 17:05

## 2019-03-20 RX ADMIN — SODIUM CHLORIDE 2 GRAM(S): 9 INJECTION INTRAMUSCULAR; INTRAVENOUS; SUBCUTANEOUS at 11:52

## 2019-03-20 RX ADMIN — POLYETHYLENE GLYCOL 3350 17 GRAM(S): 17 POWDER, FOR SOLUTION ORAL at 05:07

## 2019-03-20 RX ADMIN — Medication 3 MILLILITER(S): at 12:14

## 2019-03-20 RX ADMIN — ENOXAPARIN SODIUM 40 MILLIGRAM(S): 100 INJECTION SUBCUTANEOUS at 17:05

## 2019-03-20 RX ADMIN — LEVETIRACETAM 1000 MILLIGRAM(S): 250 TABLET, FILM COATED ORAL at 05:06

## 2019-03-20 RX ADMIN — SODIUM CHLORIDE 2 GRAM(S): 9 INJECTION INTRAMUSCULAR; INTRAVENOUS; SUBCUTANEOUS at 05:06

## 2019-03-20 RX ADMIN — Medication 1 MILLIGRAM(S): at 17:04

## 2019-03-20 RX ADMIN — PANTOPRAZOLE SODIUM 40 MILLIGRAM(S): 20 TABLET, DELAYED RELEASE ORAL at 11:52

## 2019-03-20 RX ADMIN — Medication 3 MILLILITER(S): at 23:06

## 2019-03-20 RX ADMIN — AMLODIPINE BESYLATE 10 MILLIGRAM(S): 2.5 TABLET ORAL at 05:06

## 2019-03-20 RX ADMIN — LEVETIRACETAM 1000 MILLIGRAM(S): 250 TABLET, FILM COATED ORAL at 17:05

## 2019-03-20 RX ADMIN — SODIUM CHLORIDE 2 GRAM(S): 9 INJECTION INTRAMUSCULAR; INTRAVENOUS; SUBCUTANEOUS at 17:05

## 2019-03-20 RX ADMIN — Medication 3 MILLILITER(S): at 05:13

## 2019-03-20 RX ADMIN — Medication 1 MILLIGRAM(S): at 05:06

## 2019-03-20 RX ADMIN — Medication 3 MILLILITER(S): at 17:53

## 2019-03-20 NOTE — PROGRESS NOTE ADULT - ATTENDING COMMENTS
Pt seen and examined with RCU team on morning rounds 3/20    59F PMH lupus, RA, ruptured cerebral aneurysm in Dec 2018 s/p EVD, decompressive craniotomy, subsequent  shunt placement 1/7/19, chronic respiratory failure s/p trach 12/24/18 on trach collar presents to outside hospital from rehab for fevers and new onset tonic clonic seizures. LP performed with leukocytosis noted in CSF. Transferred to [facility] for  shunt removal for concerns of  shunt infection. Now s/p  shunt revision on 3/11. s/p course of antibiotics for presumed ventriculitis. UA + but all cultures negative including CSF cultures and antibiotics d/bay.     - stable and unchanged hardened area around abdominal staples with no drainage, erythema, or tenderness  - abdominal US shows a collection consistent with seroma vs hematoma: d/w neurosurgery, no interventions to be done  - remains afebrile, no leukocytosis off antibiotics  - cont keppra for seizure control  - TC around the clock, doing well  - discharge planning in progress

## 2019-03-20 NOTE — PROGRESS NOTE ADULT - ASSESSMENT
58 Year old Female initially admitted in mid Dec 2018 for ruptured cerebellar AVM S/p embolization and craniotomy for resection of AVM, Followed by  shunt placement. She was discharged to rehab in late Jan 2019. She had been doing well at rehab: walking 50 steps with walker. While at rehab patient was noted to have generalized tonic clonic seizure with accompanying fever. Patient was initially brought to OSH. Patient had Head CT and LP Performed. LP was concerning for leukocytosis, due to the suspicion of  shunt infection, the patient was transferred to [facility] for further management. Patient had removal of VPS and placement of temporary EVD on 2/25/19, CSF Cxs were negative and  Shunt was Replaced on 3/11. Patient was transferred to RCU on 3/13. Patient had + UA on 3/14 and was initially placed on Zosyn Given recent hardware instrumentation, Urine cx with Normal caleb, Zosyn was discontinued as per ID Recommendations.    3/16: Patient remained off TC overnight, ABG WNL will decrease FIO2 21%   3/17 Stable, tolerating TC ATC, s/p  Shunt revision 3/11, c/o abdominal pain at incision site, hardened area at staples site, r/o hematoma, US abdomen limited pending, Oxycodone 5 mg x1   3/18: stable overnight no active issues. Trach downsized to 6 portex uncuffed without issue.  3/20: no issues over night d/c planning if blood work remains stable today

## 2019-03-20 NOTE — PROGRESS NOTE ADULT - SUBJECTIVE AND OBJECTIVE BOX
CC: f/u for ventriculitis    Patient remains nonverbal, tolerating TC and tube feeds    REVIEW OF SYSTEMS:  not provided    Antimicrobials off  Medications Reviewed    Vital Signs Last 24 Hrs  T(F): 98.4 (20 Mar 2019 13:00), Max: 99.1 (20 Mar 2019 04:40)  HR: 109 (20 Mar 2019 13:00) (78 - 114)  BP: 114/77 (20 Mar 2019 13:00) (114/76 - 131/74)  BP(mean): --  RR: 20 (20 Mar 2019 13:00) (18 - 22)  SpO2: 97% (20 Mar 2019 13:00) (97% - 100%)    PHYSICAL EXAM:  General: no acute distress, on TC, head rocking  Eyes:  anicteric, no conjunctival injection, no discharge  Oropharynx: no lesions or injection 	  Neck: supple, without adenopathy  Lungs: clear to auscultation  Heart: regular rate and rhythm; no murmur, rubs or gallops  Abdomen: soft, nondistended, nontender, without mass or organomegaly  Skin: no lesions  Extremities: no edema  Neurologic: follows simple commands    LAB RESULTS:                        10.9   6.6   )-----------( 345      ( 20 Mar 2019 10:53 )             31.9   03-20    144  |  103  |  13  ----------------------------<  132<H>  4.0   |  29  |  0.38<L>    Ca    9.9      20 Mar 2019 10:53    Procalcitonin  03-12-19 @ 10:29   -  0.11    MICROBIOLOGY:  RECENT CULTURES:  Culture - Urine (03.13.19 @ 17:30)    Specimen Source: .Urine Catheterized    Culture Results:   <10,000 CFU/mL Normal Urogenital Tori    03-12 @ 11:45 .Blood Blood-Peripheral     No growth to date.    RADIOLOGY REVIEWED

## 2019-03-20 NOTE — PROGRESS NOTE ADULT - ASSESSMENT
58y female with SLE, cerebellar AVM and bleed in Dec, s/p embolization, AVM resection, post fossa decompression, VPS, trach/PEG   Persistent, "central" fever, but ultimately, fever resolved, pt improved, sent to rehab.    She apparently developed generalized Sz, fever and sent to [facility] ICU, received empiric Vanco and Cefepime, transferred here.    Cultures at [facility] all finalized negative as per Dr Handley  S/p  shunt removal, EVD placement- removed as of 3/3  S/p 10 days of vanco/cefepime on 3/5  Fever resolved, tolerating TC  WBC normal, PCT low  No sign of infection    Plan:  Monitor off antibiotics  TC trials  Re consult with us if status changes

## 2019-03-20 NOTE — PROGRESS NOTE ADULT - SUBJECTIVE AND OBJECTIVE BOX
Patient is a 59y old  Female who presents with a chief complaint of seizures, h/o  shunt (19 Mar 2019 11:28)      Interval Events:    REVIEW OF SYSTEMS:  [ ] Positive  [ ] All other systems negative  [x ] Unable to assess ROS because _non verbal_______    Vital Signs Last 24 Hrs  T(C): 37.3 (03-20-19 @ 04:40), Max: 37.3 (03-20-19 @ 04:40)  T(F): 99.1 (03-20-19 @ 04:40), Max: 99.1 (03-20-19 @ 04:40)  HR: 78 (03-20-19 @ 05:13) (78 - 114)  BP: 131/74 (03-20-19 @ 04:40) (114/76 - 147/87)  RR: 20 (03-20-19 @ 04:40) (16 - 22)  SpO2: 99% (03-20-19 @ 05:13) (96% - 100%)    PHYSICAL EXAM:  HEENT:   [x ]Tracheostomy: 6 shiley uncuffed  [ ]Pupils equal  [ ]No oral lesions  [ ]Abnormal    SKIN  [ x]No Rash  [ ] Abnormal  [ ] pressure    CARDIAC  [x ]Regular  [ ]Abnormal    PULMONARY  [x ]Bilateral Clear Breath Sounds  [ ]Normal Excursion  [ ]Abnormal    GI  [x ]PEG      [x ] +BS		              [x ]Soft, nondistended, nontender	  [ ]Abnormal    MUSCULOSKELETAL                                   [ ]Bedbound                 [ ]Abnormal    [x ]Ambulatory/OOB to chair                           EXTREMITIES                                         [ x]Normal  [ ]Edema                           NEUROLOGIC  [ ] Normal, non focal  [x ] Focal findings: myoclonic tremors to head    PSYCHIATRIC  [ x]Alert and appropriate  [ ] Sedated	 [ ]Agitated    :  Newsome: [ ] Yes, if yes: Date of Placement:                   [x ] No    LINES: Central Lines [ ] Yes, if yes: Date of Placement                                     [ x ] No    HOSPITAL MEDICATIONS:  MEDICATIONS  (STANDING):  ALBUTerol/ipratropium for Nebulization 3 milliLiter(s) Nebulizer every 6 hours  amLODIPine   Tablet 10 milliGRAM(s) Oral daily  clonazePAM Tablet 1 milliGRAM(s) Oral every 12 hours  docusate sodium Liquid 100 milliGRAM(s) Oral two times a day  enoxaparin Injectable 40 milliGRAM(s) SubCutaneous <User Schedule>  labetalol 100 milliGRAM(s) Oral every 12 hours  levETIRAcetam  Solution 1000 milliGRAM(s) Oral two times a day  oxyCODONE    IR 5 milliGRAM(s) Oral once  pantoprazole   Suspension 40 milliGRAM(s) Oral daily  polyethylene glycol 3350 17 Gram(s) Oral two times a day  senna Syrup 5 milliLiter(s) Oral at bedtime  sodium chloride 2 Gram(s) Oral every 6 hours    MEDICATIONS  (PRN):  acetaminophen    Suspension .. 650 milliGRAM(s) Oral every 6 hours PRN Temp greater or equal to 38C (100.4F), Mild Pain (1 - 3)      LABS:                  CAPILLARY BLOOD GLUCOSE    MICROBIOLOGY:     RADIOLOGY:  [ ] Reviewed and interpreted by me

## 2019-03-21 ENCOUNTER — TRANSCRIPTION ENCOUNTER (OUTPATIENT)
Age: 59
End: 2019-03-21

## 2019-03-21 PROCEDURE — 99233 SBSQ HOSP IP/OBS HIGH 50: CPT | Mod: GC

## 2019-03-21 RX ORDER — AMLODIPINE BESYLATE 2.5 MG/1
1 TABLET ORAL
Qty: 0 | Refills: 0 | COMMUNITY
Start: 2019-03-21

## 2019-03-21 RX ORDER — LABETALOL HCL 100 MG
1 TABLET ORAL
Qty: 0 | Refills: 0 | COMMUNITY
Start: 2019-03-21

## 2019-03-21 RX ORDER — POLYETHYLENE GLYCOL 3350 17 G/17G
17 POWDER, FOR SOLUTION ORAL
Qty: 0 | Refills: 0 | COMMUNITY
Start: 2019-03-21

## 2019-03-21 RX ORDER — ACETAMINOPHEN 500 MG
20.31 TABLET ORAL
Qty: 0 | Refills: 0 | COMMUNITY
Start: 2019-03-21

## 2019-03-21 RX ORDER — LEVETIRACETAM 250 MG/1
10 TABLET, FILM COATED ORAL
Qty: 0 | Refills: 0 | COMMUNITY
Start: 2019-03-21

## 2019-03-21 RX ORDER — CLONAZEPAM 1 MG
0.25 TABLET ORAL
Qty: 0 | Refills: 0 | COMMUNITY

## 2019-03-21 RX ORDER — IPRATROPIUM/ALBUTEROL SULFATE 18-103MCG
3 AEROSOL WITH ADAPTER (GRAM) INHALATION
Qty: 0 | Refills: 0 | COMMUNITY
Start: 2019-03-21

## 2019-03-21 RX ORDER — CLONAZEPAM 1 MG
1 TABLET ORAL
Qty: 0 | Refills: 0 | COMMUNITY
Start: 2019-03-21

## 2019-03-21 RX ADMIN — LEVETIRACETAM 1000 MILLIGRAM(S): 250 TABLET, FILM COATED ORAL at 05:35

## 2019-03-21 RX ADMIN — Medication 3 MILLILITER(S): at 17:24

## 2019-03-21 RX ADMIN — Medication 3 MILLILITER(S): at 05:03

## 2019-03-21 RX ADMIN — SODIUM CHLORIDE 2 GRAM(S): 9 INJECTION INTRAMUSCULAR; INTRAVENOUS; SUBCUTANEOUS at 13:59

## 2019-03-21 RX ADMIN — Medication 1 MILLIGRAM(S): at 05:34

## 2019-03-21 RX ADMIN — SODIUM CHLORIDE 2 GRAM(S): 9 INJECTION INTRAMUSCULAR; INTRAVENOUS; SUBCUTANEOUS at 17:16

## 2019-03-21 RX ADMIN — SENNA PLUS 5 MILLILITER(S): 8.6 TABLET ORAL at 21:39

## 2019-03-21 RX ADMIN — POLYETHYLENE GLYCOL 3350 17 GRAM(S): 17 POWDER, FOR SOLUTION ORAL at 05:53

## 2019-03-21 RX ADMIN — Medication 3 MILLILITER(S): at 23:17

## 2019-03-21 RX ADMIN — Medication 100 MILLIGRAM(S): at 05:35

## 2019-03-21 RX ADMIN — Medication 3 MILLILITER(S): at 11:50

## 2019-03-21 RX ADMIN — AMLODIPINE BESYLATE 10 MILLIGRAM(S): 2.5 TABLET ORAL at 05:35

## 2019-03-21 RX ADMIN — PANTOPRAZOLE SODIUM 40 MILLIGRAM(S): 20 TABLET, DELAYED RELEASE ORAL at 14:00

## 2019-03-21 RX ADMIN — Medication 1 MILLIGRAM(S): at 17:16

## 2019-03-21 RX ADMIN — ENOXAPARIN SODIUM 40 MILLIGRAM(S): 100 INJECTION SUBCUTANEOUS at 17:16

## 2019-03-21 RX ADMIN — Medication 100 MILLIGRAM(S): at 17:16

## 2019-03-21 RX ADMIN — SODIUM CHLORIDE 2 GRAM(S): 9 INJECTION INTRAMUSCULAR; INTRAVENOUS; SUBCUTANEOUS at 05:53

## 2019-03-21 RX ADMIN — LEVETIRACETAM 1000 MILLIGRAM(S): 250 TABLET, FILM COATED ORAL at 17:16

## 2019-03-21 NOTE — PROGRESS NOTE ADULT - ASSESSMENT
58 Year old Female initially admitted in mid Dec 2018 for ruptured cerebellar AVM S/p embolization and craniotomy for resection of AVM, Followed by  shunt placement. She was discharged to rehab in late Jan 2019. She had been doing well at rehab: walking 50 steps with walker. While at rehab patient was noted to have generalized tonic clonic seizure with accompanying fever. Patient was initially brought to OSH. Patient had Head CT and LP Performed. LP was concerning for leukocytosis, due to the suspicion of  shunt infection, the patient was transferred to [facility] for further management. Patient had removal of VPS and placement of temporary EVD on 2/25/19, CSF Cxs were negative and  Shunt was Replaced on 3/11. Patient was transferred to RCU on 3/13. Patient had + UA on 3/14 and was initially placed on Zosyn Given recent hardware instrumentation, Urine cx with Normal caleb, Zosyn was discontinued as per ID Recommendations.    3/16: Patient remained off TC overnight, ABG WNL will decrease FIO2 21%   3/17 Stable, tolerating TC ATC, s/p  Shunt revision 3/11, c/o abdominal pain at incision site, hardened area at staples site, r/o hematoma, US abdomen limited pending, Oxycodone 5 mg x1   3/18: stable overnight no active issues. Trach downsized to 6 portex uncuffed without issue.  3/20: no issues over night d/c planning if blood work remains stable today  3/21: d/c planning to rehab

## 2019-03-21 NOTE — PROGRESS NOTE ADULT - ATTENDING COMMENTS
59F PMH lupus, RA, ruptured cerebral aneurysm in Dec 2018 s/p EVD, decompressive craniotomy, subsequent  shunt placement 1/7/19, chronic respiratory failure s/p trach 12/24/18 on trach collar presents to outside hospital from rehab for fevers and new onset tonic clonic seizures. LP performed with leukocytosis noted in CSF. Transferred to [facility] for  shunt removal for concerns of  shunt infection. Now s/p  shunt revision on 3/11. s/p course of antibiotics for presumed ventriculitis. UA + but all cultures negative including CSF cultures and antibiotics d/bay. s/p downsizing of trach to a 6 cuffless portex on 3/18.    - stable and unchanged hardened area around abdominal staples with no drainage, erythema, or tenderness identified on US as hematoma vs seroma. No interventions/drainage needed per discussion with neurosurgery, likely all related to placement of  shunt  - remains afebrile, no leukocytosis off antibiotics  - cont keppra for seizure control  - trach collar around the clock, doing well  - discharge planning for subacute rehab

## 2019-03-21 NOTE — DISCHARGE NOTE PROVIDER - PROVIDER TOKENS
PROVIDER:[TOKEN:[[id number]]],FREE:[LAST:[primary care MD],PHONE:[(   )    -],FAX:[(   )    -]] PROVIDER:[TOKEN:[[id number]],FOLLOWUP:[1 week]],FREE:[LAST:[primary care MD],PHONE:[(   )    -],FAX:[(   )    -],FOLLOWUP:[2 weeks]]

## 2019-03-21 NOTE — DISCHARGE NOTE PROVIDER - HOSPITAL COURSE
58 Year old Female initially admitted in mid Dec 2018 for ruptured cerebellar AVM S/p embolization and craniotomy for resection of AVM, Followed by  shunt placement. She was discharged to rehab in late Jan 2019. She had been doing well at rehab: walking 50 steps with walker. While at rehab patient was noted to have generalized tonic clonic seizure with accompanying fever. Patient was initially brought to OSH. Patient had Head CT and LP Performed. LP was concerning for leukocytosis, due to the suspicion of  shunt infection, the patient was transferred to [facility] for further management. Patient had removal of VPS and placement of temporary EVD on 2/25/19, CSF Cxs were negative and  Shunt was Replaced on 3/11. Patient was transferred to RCU on 3/13. Patient had + UA on 3/14 and was initially placed on Zosyn Given recent hardware instrumentation, Urine cx with Normal caleb, Zosyn was discontinued as per ID Recommendations.        3/16: Patient remained off TC overnight, ABG WNL will decrease FIO2 21%     3/17 Stable, tolerating TC ATC, s/p  Shunt revision 3/11, c/o abdominal pain at incision site, hardened area at staples site, r/o hematoma, US abdomen limited pending, Oxycodone 5 mg x1     3/18: stable overnight no active issues. Trach downsized to 6 portex uncuffed without issue.    3/20: no issues over night d/c planning if blood work remains stable today    3/21: d/c planning to rehab 58 Year old Female initially admitted in mid Dec 2018 for ruptured cerebellar AVM S/p embolization and craniotomy for resection of AVM, Followed by  shunt placement. She was discharged to rehab in late Jan 2019. She had been doing well at rehab: walking 50 steps with walker. While at rehab patient was noted to have generalized tonic clonic seizure with accompanying fever. Patient was initially brought to OSH. Patient had Head CT and LP Performed. LP was concerning for leukocytosis, due to the suspicion of  shunt infection, the patient was transferred to [facility] for further management. Patient had removal of VPS and placement of temporary EVD on 2/25/19, CSF Cxs were negative and  Shunt was Replaced on 3/11. Patient was transferred to RCU on 3/13. Patient had + UA on 3/14 and was initially placed on Zosyn Given recent hardware instrumentation, Urine cx with Normal caleb, Zosyn was discontinued as per ID Recommendations.        3/16: Patient remained off TC overnight, ABG WNL will decrease FIO2 21%     3/17 Stable, tolerating TC ATC, s/p  Shunt revision 3/11, c/o abdominal pain at incision site, hardened area at staples site, r/o hematoma, US abdomen limited pending, Oxycodone 5 mg x1     3/18: stable overnight no active issues. Trach downsized to 6 portex uncuffed without issue.    3/20: no issues over night d/c planning if blood work remains stable today    3/21: d/c planning to rehab    3/22: Please remove abdominal staples on 3/25. Medically cleared for discharge

## 2019-03-21 NOTE — DISCHARGE NOTE PROVIDER - CARE PROVIDER_API CALL
Delia Murdock (MD)  Neurosurgery  [address], [address] [place] [address]  Phone: [phone]  Fax: [phone]  Follow Up Time:     primary care MD,   Phone: (   )    -  Fax: (   )    -  Follow Up Time: Delia Murdock (MD)  Neurosurgery  [address], [address] [place] [address]  Phone: [phone]  Fax: [phone]  Follow Up Time: 1 week    primary care MD,   Phone: (   )    -  Fax: (   )    -  Follow Up Time: 2 weeks

## 2019-03-21 NOTE — PROGRESS NOTE ADULT - SUBJECTIVE AND OBJECTIVE BOX
Patient is a 59y old  Female who presents with a chief complaint of seizures, h/o  shunt (20 Mar 2019 15:59)      Interval Events:    REVIEW OF SYSTEMS:  [ ] Positive  [ ] All other systems negative  [x ] Unable to assess ROS because _non verbal_______    Vital Signs Last 24 Hrs  T(C): 36.9 (03-21-19 @ 04:30), Max: 37.2 (03-20-19 @ 17:00)  T(F): 98.5 (03-21-19 @ 04:30), Max: 98.9 (03-20-19 @ 17:00)  HR: 98 (03-21-19 @ 06:00) (87 - 110)  BP: 126/88 (03-21-19 @ 06:00) (114/77 - 161/84)  RR: 20 (03-21-19 @ 04:30) (19 - 22)  SpO2: 96% (03-21-19 @ 05:05) (95% - 100%)    PHYSICAL EXAM:  HEENT:   [x ]Tracheostomy: 6 portex uncuffed  [ ]Pupils equal  [ ]No oral lesions  [ ]Abnormal    SKIN  x[ ]No Rash  [ ] Abnormal  [ ] pressure    CARDIAC  [x ]Regular  [ ]Abnormal    PULMONARY  [x ]Bilateral Clear Breath Sounds  [ ]Normal Excursion  [ ]Abnormal    GI  [x ]PEG      [x ] +BS		              [ x]Soft, nondistended, nontender	  [ ]Abnormal    MUSCULOSKELETAL                                   [ ]Bedbound                 [ ]Abnormal    [ x]Ambulatory/OOB to chair                           EXTREMITIES                                         [x ]Normal  [ ]Edema                           NEUROLOGIC  [ ] Normal, non focal  [x ] Focal findings: myoclonal shaking    PSYCHIATRIC  [x ]Alert and appropriate  [ ] Sedated	 [ ]Agitated    :  Newsome: [ ] Yes, if yes: Date of Placement:                   [ x ] No    LINES: Central Lines [ ] Yes, if yes: Date of Placement                                     [x  ] No    HOSPITAL MEDICATIONS:  MEDICATIONS  (STANDING):  ALBUTerol/ipratropium for Nebulization 3 milliLiter(s) Nebulizer every 6 hours  amLODIPine   Tablet 10 milliGRAM(s) Oral daily  clonazePAM Tablet 1 milliGRAM(s) Oral every 12 hours  docusate sodium Liquid 100 milliGRAM(s) Oral two times a day  enoxaparin Injectable 40 milliGRAM(s) SubCutaneous <User Schedule>  labetalol 100 milliGRAM(s) Oral every 12 hours  levETIRAcetam  Solution 1000 milliGRAM(s) Oral two times a day  pantoprazole   Suspension 40 milliGRAM(s) Oral daily  polyethylene glycol 3350 17 Gram(s) Oral two times a day  senna Syrup 5 milliLiter(s) Oral at bedtime  sodium chloride 2 Gram(s) Oral every 6 hours    MEDICATIONS  (PRN):  acetaminophen    Suspension .. 650 milliGRAM(s) Oral every 6 hours PRN Temp greater or equal to 38C (100.4F), Mild Pain (1 - 3)      LABS:                        10.9   6.6   )-----------( 345      ( 20 Mar 2019 10:53 )             31.9     03-20    144  |  103  |  13  ----------------------------<  132<H>  4.0   |  29  |  0.38<L>    Ca    9.9      20 Mar 2019 10:53              CAPILLARY BLOOD GLUCOSE    MICROBIOLOGY:     RADIOLOGY:  [ ] Reviewed and interpreted by me

## 2019-03-21 NOTE — DISCHARGE NOTE PROVIDER - CARE PROVIDERS DIRECT ADDRESSES
,[email or web address],DirectAddress_Unknown

## 2019-03-21 NOTE — DISCHARGE NOTE PROVIDER - NSDCCPCAREPLAN_GEN_ALL_CORE_FT
PRINCIPAL DISCHARGE DIAGNOSIS  Diagnosis: Sepsis  Assessment and Plan of Treatment: VPS infection/ventriculitis; seizure; shunt removed.  S/p a new VPS placement 3/11.	  CSF CX 3/3: No growth    Shunt replaced 3/11  Cranial / Abdominal staples remain intact to be removed on 3/25  3/17 c/o abdominal pain at incision site, hardened area at staples site, US abdomen showed small collection at site. Spoke with Delmer from [facility] who was saw the results and was not concerned, no further interventions required.      SECONDARY DISCHARGE DIAGNOSES  Diagnosis: Myoclonus  Assessment and Plan of Treatment: Continue Clonazepam 1 mg q 12 hrs   Continue PRN Clonazepam for Breakthrough Myoclonus    Diagnosis: Dysphagia  Assessment and Plan of Treatment: s/p Previous PEG, tolerating tube feeds at goal rate    Diagnosis: Hypertension  Assessment and Plan of Treatment: continue labetalol    Diagnosis: Seizure disorder  Assessment and Plan of Treatment: Continue Keppra.

## 2019-03-22 VITALS
RESPIRATION RATE: 20 BRPM | SYSTOLIC BLOOD PRESSURE: 129 MMHG | DIASTOLIC BLOOD PRESSURE: 88 MMHG | TEMPERATURE: 98 F | OXYGEN SATURATION: 99 % | HEART RATE: 99 BPM

## 2019-03-22 PROCEDURE — 99233 SBSQ HOSP IP/OBS HIGH 50: CPT | Mod: GC

## 2019-03-22 PROCEDURE — 84295 ASSAY OF SERUM SODIUM: CPT

## 2019-03-22 PROCEDURE — 87449 NOS EACH ORGANISM AG IA: CPT

## 2019-03-22 PROCEDURE — 87205 SMEAR GRAM STAIN: CPT

## 2019-03-22 PROCEDURE — 87086 URINE CULTURE/COLONY COUNT: CPT

## 2019-03-22 PROCEDURE — 84100 ASSAY OF PHOSPHORUS: CPT

## 2019-03-22 PROCEDURE — 82947 ASSAY GLUCOSE BLOOD QUANT: CPT

## 2019-03-22 PROCEDURE — 94003 VENT MGMT INPAT SUBQ DAY: CPT

## 2019-03-22 PROCEDURE — 87070 CULTURE OTHR SPECIMN AEROBIC: CPT

## 2019-03-22 PROCEDURE — 84132 ASSAY OF SERUM POTASSIUM: CPT

## 2019-03-22 PROCEDURE — 95951: CPT

## 2019-03-22 PROCEDURE — 99285 EMERGENCY DEPT VISIT HI MDM: CPT | Mod: 25

## 2019-03-22 PROCEDURE — 36600 WITHDRAWAL OF ARTERIAL BLOOD: CPT

## 2019-03-22 PROCEDURE — 82330 ASSAY OF CALCIUM: CPT

## 2019-03-22 PROCEDURE — 94002 VENT MGMT INPAT INIT DAY: CPT

## 2019-03-22 PROCEDURE — 81001 URINALYSIS AUTO W/SCOPE: CPT

## 2019-03-22 PROCEDURE — 97163 PT EVAL HIGH COMPLEX 45 MIN: CPT

## 2019-03-22 PROCEDURE — 97166 OT EVAL MOD COMPLEX 45 MIN: CPT

## 2019-03-22 PROCEDURE — 97112 NEUROMUSCULAR REEDUCATION: CPT

## 2019-03-22 PROCEDURE — 83880 ASSAY OF NATRIURETIC PEPTIDE: CPT

## 2019-03-22 PROCEDURE — 87186 SC STD MICRODIL/AGAR DIL: CPT

## 2019-03-22 PROCEDURE — 86850 RBC ANTIBODY SCREEN: CPT

## 2019-03-22 PROCEDURE — 97760 ORTHOTIC MGMT&TRAING 1ST ENC: CPT

## 2019-03-22 PROCEDURE — 84157 ASSAY OF PROTEIN OTHER: CPT

## 2019-03-22 PROCEDURE — 71045 X-RAY EXAM CHEST 1 VIEW: CPT

## 2019-03-22 PROCEDURE — 82565 ASSAY OF CREATININE: CPT

## 2019-03-22 PROCEDURE — 70553 MRI BRAIN STEM W/O & W/DYE: CPT

## 2019-03-22 PROCEDURE — 82803 BLOOD GASES ANY COMBINATION: CPT

## 2019-03-22 PROCEDURE — 84146 ASSAY OF PROLACTIN: CPT

## 2019-03-22 PROCEDURE — 84702 CHORIONIC GONADOTROPIN TEST: CPT

## 2019-03-22 PROCEDURE — 84145 PROCALCITONIN (PCT): CPT

## 2019-03-22 PROCEDURE — 70450 CT HEAD/BRAIN W/O DYE: CPT

## 2019-03-22 PROCEDURE — 95819 EEG AWAKE AND ASLEEP: CPT

## 2019-03-22 PROCEDURE — 97110 THERAPEUTIC EXERCISES: CPT

## 2019-03-22 PROCEDURE — C1713: CPT

## 2019-03-22 PROCEDURE — 89051 BODY FLUID CELL COUNT: CPT

## 2019-03-22 PROCEDURE — 85610 PROTHROMBIN TIME: CPT

## 2019-03-22 PROCEDURE — 96365 THER/PROPH/DIAG IV INF INIT: CPT

## 2019-03-22 PROCEDURE — 84484 ASSAY OF TROPONIN QUANT: CPT

## 2019-03-22 PROCEDURE — 94640 AIRWAY INHALATION TREATMENT: CPT

## 2019-03-22 PROCEDURE — 82945 GLUCOSE OTHER FLUID: CPT

## 2019-03-22 PROCEDURE — 82435 ASSAY OF BLOOD CHLORIDE: CPT

## 2019-03-22 PROCEDURE — 80053 COMPREHEN METABOLIC PANEL: CPT

## 2019-03-22 PROCEDURE — 83605 ASSAY OF LACTIC ACID: CPT

## 2019-03-22 PROCEDURE — 85730 THROMBOPLASTIN TIME PARTIAL: CPT

## 2019-03-22 PROCEDURE — C1729: CPT

## 2019-03-22 PROCEDURE — 85027 COMPLETE CBC AUTOMATED: CPT

## 2019-03-22 PROCEDURE — C1889: CPT

## 2019-03-22 PROCEDURE — 86900 BLOOD TYPING SEROLOGIC ABO: CPT

## 2019-03-22 PROCEDURE — 83735 ASSAY OF MAGNESIUM: CPT

## 2019-03-22 PROCEDURE — A9585: CPT

## 2019-03-22 PROCEDURE — 86901 BLOOD TYPING SEROLOGIC RH(D): CPT

## 2019-03-22 PROCEDURE — 80048 BASIC METABOLIC PNL TOTAL CA: CPT

## 2019-03-22 PROCEDURE — 94799 UNLISTED PULMONARY SVC/PX: CPT

## 2019-03-22 PROCEDURE — 87040 BLOOD CULTURE FOR BACTERIA: CPT

## 2019-03-22 PROCEDURE — 87324 CLOSTRIDIUM AG IA: CPT

## 2019-03-22 PROCEDURE — 97530 THERAPEUTIC ACTIVITIES: CPT

## 2019-03-22 PROCEDURE — 85014 HEMATOCRIT: CPT

## 2019-03-22 PROCEDURE — 93005 ELECTROCARDIOGRAM TRACING: CPT

## 2019-03-22 PROCEDURE — 76705 ECHO EXAM OF ABDOMEN: CPT

## 2019-03-22 PROCEDURE — 80202 ASSAY OF VANCOMYCIN: CPT

## 2019-03-22 PROCEDURE — 96375 TX/PRO/DX INJ NEW DRUG ADDON: CPT

## 2019-03-22 RX ADMIN — Medication 3 MILLILITER(S): at 05:21

## 2019-03-22 RX ADMIN — AMLODIPINE BESYLATE 10 MILLIGRAM(S): 2.5 TABLET ORAL at 05:15

## 2019-03-22 RX ADMIN — SODIUM CHLORIDE 2 GRAM(S): 9 INJECTION INTRAMUSCULAR; INTRAVENOUS; SUBCUTANEOUS at 00:20

## 2019-03-22 RX ADMIN — SODIUM CHLORIDE 2 GRAM(S): 9 INJECTION INTRAMUSCULAR; INTRAVENOUS; SUBCUTANEOUS at 05:15

## 2019-03-22 RX ADMIN — SODIUM CHLORIDE 2 GRAM(S): 9 INJECTION INTRAMUSCULAR; INTRAVENOUS; SUBCUTANEOUS at 11:38

## 2019-03-22 RX ADMIN — Medication 100 MILLIGRAM(S): at 05:15

## 2019-03-22 RX ADMIN — Medication 3 MILLILITER(S): at 12:17

## 2019-03-22 RX ADMIN — Medication 1 MILLIGRAM(S): at 05:15

## 2019-03-22 RX ADMIN — POLYETHYLENE GLYCOL 3350 17 GRAM(S): 17 POWDER, FOR SOLUTION ORAL at 05:16

## 2019-03-22 RX ADMIN — PANTOPRAZOLE SODIUM 40 MILLIGRAM(S): 20 TABLET, DELAYED RELEASE ORAL at 11:39

## 2019-03-22 RX ADMIN — LEVETIRACETAM 1000 MILLIGRAM(S): 250 TABLET, FILM COATED ORAL at 05:15

## 2019-03-22 NOTE — PROGRESS NOTE ADULT - ASSESSMENT
58 Year old Female initially admitted in mid Dec 2018 for ruptured cerebellar AVM S/p embolization and craniotomy for resection of AVM, Followed by  shunt placement. She was discharged to rehab in late Jan 2019. She had been doing well at rehab: walking 50 steps with walker. While at rehab patient was noted to have generalized tonic clonic seizure with accompanying fever. Patient was initially brought to OSH. Patient had Head CT and LP Performed. LP was concerning for leukocytosis, due to the suspicion of  shunt infection, the patient was transferred to [facility] for further management. Patient had removal of VPS and placement of temporary EVD on 2/25/19, CSF Cxs were negative and  Shunt was Replaced on 3/11. Patient was transferred to RCU on 3/13. Patient had + UA on 3/14 and was initially placed on Zosyn Given recent hardware instrumentation, Urine cx with Normal caleb, Zosyn was discontinued as per ID Recommendations.    3/16: Patient remained off TC overnight, ABG WNL will decrease FIO2 21%   3/17 Stable, tolerating TC ATC, s/p  Shunt revision 3/11, c/o abdominal pain at incision site, hardened area at staples site, r/o hematoma, US abdomen limited pending, Oxycodone 5 mg x1   3/18: stable overnight no active issues. Trach downsized to 6 portex uncuffed without issue.  3/20: no issues over night d/c planning if blood work remains stable today  3/21: d/c planning to rehab  3/21 No events overnight d/c to rehab when bed available

## 2019-03-22 NOTE — PROGRESS NOTE ADULT - SUBJECTIVE AND OBJECTIVE BOX
Patient is a 59y old  Female who presents with a chief complaint of seizures, h/o  shunt (21 Mar 2019 12:26)      Interval Events:    REVIEW OF SYSTEMS:  [ ] Positive  [ ] All other systems negative  [x ] Unable to assess ROS because _non verbal_______    Vital Signs Last 24 Hrs  T(C): 36.8 (03-22-19 @ 04:51), Max: 37.1 (03-21-19 @ 14:02)  T(F): 98.3 (03-22-19 @ 04:51), Max: 98.8 (03-21-19 @ 14:02)  HR: 95 (03-22-19 @ 08:45) (88 - 110)  BP: 129/81 (03-22-19 @ 04:51) (126/80 - 132/86)  RR: 20 (03-22-19 @ 08:45) (18 - 20)  SpO2: 97% (03-22-19 @ 08:45) (96% - 100%)    PHYSICAL EXAM:  HEENT:   [x ]Tracheostomy: 7 portex uncuffed  [ ]Pupils equal  [ ]No oral lesions  [ ]Abnormal    SKIN  [ x]No Rash  [ ] Abnormal  [ ] pressure    CARDIAC  [x ]Regular  [ ]Abnormal    PULMONARY  [x ]Bilateral Clear Breath Sounds  [ ]Normal Excursion  [ ]Abnormal    GI  [x ]PEG      [x] +BS		              [ x]Soft, nondistended, nontender	  [ ]Abnormal    MUSCULOSKELETAL                                   [ ]Bedbound                 [ ]Abnormal    [x ]Ambulatory/OOB to chair                           EXTREMITIES                                         [ x]Normal  []Edema                           NEUROLOGIC  [ ] Normal, non focal  [ x] Focal findings: myoclonal movements of head    PSYCHIATRIC  [x ]Alert and appropriate  [ ] Sedated	 [ ]Agitated    :  Newsome: [ ] Yes, if yes: Date of Placement:                   [ x ] No    LINES: Central Lines [ ] Yes, if yes: Date of Placement                                     [  x] No    HOSPITAL MEDICATIONS:  MEDICATIONS  (STANDING):  ALBUTerol/ipratropium for Nebulization 3 milliLiter(s) Nebulizer every 6 hours  amLODIPine   Tablet 10 milliGRAM(s) Oral daily  clonazePAM Tablet 1 milliGRAM(s) Oral every 12 hours  docusate sodium Liquid 100 milliGRAM(s) Oral two times a day  enoxaparin Injectable 40 milliGRAM(s) SubCutaneous <User Schedule>  labetalol 100 milliGRAM(s) Oral every 12 hours  levETIRAcetam  Solution 1000 milliGRAM(s) Oral two times a day  pantoprazole   Suspension 40 milliGRAM(s) Oral daily  polyethylene glycol 3350 17 Gram(s) Oral two times a day  senna Syrup 5 milliLiter(s) Oral at bedtime  sodium chloride 2 Gram(s) Oral every 6 hours    MEDICATIONS  (PRN):  acetaminophen    Suspension .. 650 milliGRAM(s) Oral every 6 hours PRN Temp greater or equal to 38C (100.4F), Mild Pain (1 - 3)      LABS:                        10.9   6.6   )-----------( 345      ( 20 Mar 2019 10:53 )             31.9     03-20    144  |  103  |  13  ----------------------------<  132<H>  4.0   |  29  |  0.38<L>    Ca    9.9      20 Mar 2019 10:53              CAPILLARY BLOOD GLUCOSE    MICROBIOLOGY:     RADIOLOGY:  [ ] Reviewed and interpreted by me

## 2019-03-22 NOTE — PROGRESS NOTE ADULT - ATTENDING COMMENTS
59F PMH lupus, RA, ruptured cerebral aneurysm in Dec 2018 s/p EVD, decompressive craniotomy, subsequent  shunt placement 1/7/19, chronic respiratory failure s/p trach 12/24/18 on trach collar presents to outside hospital from rehab for fevers and new onset tonic clonic seizures. LP performed with leukocytosis noted in CSF. Transferred to [facility] for  shunt removal for concerns of  shunt infection. Now s/p  shunt revision on 3/11. s/p course of antibiotics for presumed ventriculitis, sepsis. UA + but all cultures negative including CSF cultures and antibiotics d/bay. s/p downsizing of trach to a 6 cuffless portex on 3/18.    - stable and unchanged hardened area around abdominal staples with no drainage, erythema, or tenderness identified on US as hematoma vs seroma. No interventions/drainage needed per discussion with neurosurgery, likely all related to placement of  shunt  - will need staples removed on 3/25  - remains afebrile, no leukocytosis off antibiotics  - cont keppra for seizure control  - doing well on trach collar around the clock  - BP stable on labetalol for HTN  - discharge planning for subacute rehab today

## 2019-03-22 NOTE — PROGRESS NOTE ADULT - PROVIDER SPECIALTY LIST ADULT
Infectious Disease
NSICU
Neurosurgery
Pulmonology
Infectious Disease
NSICU
Neurosurgery
Pulmonology
Pulmonology

## 2019-03-22 NOTE — PROGRESS NOTE ADULT - PROBLEM SELECTOR PLAN 5
s/p Previous PEG, tolerating tube feeds

## 2019-03-22 NOTE — PROGRESS NOTE ADULT - REASON FOR ADMISSION
seizures, h/o  shunt

## 2019-03-22 NOTE — PROGRESS NOTE ADULT - PROBLEM SELECTOR PLAN 1
s/p Tracheostomy last admission   Patient tolerating TC ATC Since 3/15  TC for several days and downsized yesterday 3/19  Continue Chest PT / Suctioning  PRN
s/p Tracheostomy last admission   Patient tolerating TC ATC Since 3/15  TC for several days and downsized yesterday 3/19  Continue Chest PT / Suctioning  PRN
s/p Tracheostomy last admission   Patient tolerating TC ATC Since 3/15  ABG WNL, Will decrease FIO2 21%   TC for several days and downsized yesterday 3/19  Continue Chest PT / Suctioning  PRN

## 2019-03-22 NOTE — PROGRESS NOTE ADULT - PROBLEM SELECTOR PLAN 2
admitted with Suspected  Shunt infection    shunt intitally removed and EVD Placed   CSF CX 3/3: No growth    Shunt replaced 3/11  Cranial / Abdominal staples remain intact   3/17 c/o abdominal pain at incision site, hardened area at staples site, US abdomen showed small collection at site. Spoke with Delmer from NSX who was saw the results and was not concerned no further interventions required

## 2019-03-22 NOTE — PROGRESS NOTE ADULT - PROBLEM SELECTOR PLAN 4
Continue Clonazepam 1 mg q 12 hrs   Continue PRN Clonazepam for Breakthrough Myoclonus

## 2019-03-26 ENCOUNTER — APPOINTMENT (OUTPATIENT)
Dept: NEUROSURGERY | Facility: CLINIC | Age: 59
End: 2019-03-26
Payer: COMMERCIAL

## 2019-03-26 VITALS
OXYGEN SATURATION: 94 % | RESPIRATION RATE: 14 BRPM | HEART RATE: 66 BPM | DIASTOLIC BLOOD PRESSURE: 90 MMHG | SYSTOLIC BLOOD PRESSURE: 132 MMHG | TEMPERATURE: 100.3 F

## 2019-03-26 DIAGNOSIS — Z87.09 PERSONAL HISTORY OF OTHER DISEASES OF THE RESPIRATORY SYSTEM: ICD-10-CM

## 2019-03-26 DIAGNOSIS — Z86.79 PERSONAL HISTORY OF OTHER DISEASES OF THE CIRCULATORY SYSTEM: ICD-10-CM

## 2019-03-26 DIAGNOSIS — Z78.9 OTHER SPECIFIED HEALTH STATUS: ICD-10-CM

## 2019-03-26 PROCEDURE — 99024 POSTOP FOLLOW-UP VISIT: CPT

## 2019-03-26 RX ORDER — IPRATROPIUM BROMIDE 0.5 MG/2.5ML
0.02 SOLUTION RESPIRATORY (INHALATION)
Refills: 0 | Status: ACTIVE | COMMUNITY

## 2019-03-26 RX ORDER — SENNOSIDES 8.6 MG TABLETS 8.6 MG/1
8.6 TABLET ORAL
Refills: 0 | Status: ACTIVE | COMMUNITY

## 2019-03-26 RX ORDER — LABETALOL HYDROCHLORIDE 100 MG/1
100 TABLET, FILM COATED ORAL
Refills: 0 | Status: ACTIVE | COMMUNITY

## 2019-03-26 RX ORDER — HYDROXYCHLOROQUINE SULFATE 200 MG/1
200 TABLET, FILM COATED ORAL
Qty: 60 | Refills: 0 | Status: DISCONTINUED | COMMUNITY
Start: 2018-10-17

## 2019-03-26 RX ORDER — LORATADINE 10 MG
17 TABLET,DISINTEGRATING ORAL
Refills: 0 | Status: ACTIVE | COMMUNITY

## 2019-03-26 RX ORDER — ACETAMINOPHEN 500 MG/1
TABLET ORAL
Refills: 0 | Status: ACTIVE | COMMUNITY

## 2019-03-26 NOTE — HISTORY OF PRESENT ILLNESS
[FreeTextEntry1] : Discharge Date	22-Mar-2019     Admission Date 24-Feb-2019 02:32 \par Reason for Admission seizures, h/o  shunt \par 58 Year old Female initially admitted in mid Dec 2018 for ruptured cerebellar AVM S/p embolization and craniotomy for resection of AVM, Followed by  shunt placement. She was discharged to rehab in late Jan 2019. She had been doing well at rehab: walking 50 steps with walker. While at rehab patient was noted to have generalized tonic clonic seizure with accompanying fever. Patient was initially brought to OSH. Patient had Head CT and LP Performed. LP was concerning for leukocytosis, due to the suspicion of  shunt infection, the patient was transferred to [facility] for further management. Patient had removal of \par VPS and placement of temporary EVD on 2/25/19, CSF Cultures were negative and right occipital  Shunt was Replaced on 3/11. Patient was transferred to RCU on 3/13. Patient had + UA on 3/14 and was initially placed on Zosyn Given recent hardware instrumentation, Urine cx with Normal caleb, Zosyn was discontinued per ID.  \par \par Today she presents for 2 weeks post op visit.  She is accompanied by rehab staff at [facility], [facility].  She has a trach intact - capped and pt is on room air.  No respiratory distress.  Pt has involuntary tremors and follows simple commands.  Her right occipital incision is well approximated without any signs of infection.  There is no evidence of redness, drainage, swelling and staples and sutures intact. Abdominal incision is well healed.  VPS checked and refills briskly setting confirmed to be at 5.  Patient has a low grade fever 100.3 which per rehab staff is not new and patient is due for Tylenol prn dose which will be administered when she returns to rehab facility.\par

## 2019-03-26 NOTE — REASON FOR VISIT
[Family Member] : family member [Other: _____] : [unfilled] [de-identified] : 3/11/19 [de-identified] : 2

## 2019-03-26 NOTE — PHYSICAL EXAM
[General Appearance - Alert] : alert [Well-Healed] : well-healed [Sutures Intact] : closed with intact sutures [Staple Intact] : closed with intact staples [No Drainage] : without drainage [Normal Skin] : normal [Sclera] : the sclera and conjunctiva were normal [PERRL With Normal Accommodation] : pupils were equal in size, round, reactive to light, with normal accommodation [Extraocular Movements] : extraocular movements were intact [Outer Ear] : the ears and nose were normal in appearance [Hearing Threshold Finger Rub Not [place]] : hearing was normal [Neck Appearance] : the appearance of the neck was normal [Respiration, Rhythm And Depth] : normal respiratory rhythm and effort [Exaggerated Use Of Accessory Muscles For Inspiration] : no accessory muscle use [Heart Rate And Rhythm] : heart rate was normal and rhythm regular [Abdomen Soft] : soft [Abdomen Tenderness] : non-tender [Skin Color & Pigmentation] : normal skin color and pigmentation [] : no rash [Erythema] : not erythematous [Tender] : not tender [Warm] : not warm [Indurated] : not indurated [FreeTextEntry5] : pt with good eye contact and follows simple commands [FreeTextEntry8] : pt came via stretcher [FreeTextEntry1] : involuntary tremors noted - not new as I saw pt while in hospital and confimed by her sister and rehab staff

## 2019-03-26 NOTE — REVIEW OF SYSTEMS
[Fever] : fever [FreeTextEntry2] : 100.3 F [de-identified] : unable to assess [de-identified] : unable to assess [FreeTextEntry3] : unable to assess [FreeTextEntry4] : unable to assess [FreeTextEntry5] : unable to assess [FreeTextEntry6] : unable to assess [FreeTextEntry7] : unable to assess [FreeTextEntry8] : unable to assess [FreeTextEntry9] : unable to assess [de-identified] : unable to assess [de-identified] : unable to assess [de-identified] : unable to assess

## 2019-03-26 NOTE — ASSESSMENT
[FreeTextEntry1] : IMPRESSION:\par 1. S/P VPS insertion 3/11/19.\par \par PLAN:\par F?U in 1 month with Dr. Murdock.\par 2. CT scan head before next visit.\par 3. Cautioned staff and family to be vigilant for patient to avoid falls/head trauma.\par 4. Advised that patient can have her hair washed with baby shampoo and incision areas patted dry.\par 5. Advised that any coloring should wait until 3 months post operative.\par 6. Staples and sutures from head incision removed without any difficulty.

## 2019-04-02 NOTE — PROGRESS NOTE ADULT - SUBJECTIVE AND OBJECTIVE BOX
SUMMARY:  59yo woman PMH lupus p/w sudden onset severe HA a/w nausea and vomiting. 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 Patient was intact on presentation however intubated for airway protection and EVD placed. Transferred to [facility] for further care.    24 HOUR EVENTS:  Decreased mental status in setting of fever - return of exam when defervesces.    HOSPITAL COURSE: Fevers, hydrocephalus, autonomic storm  12/17: s/p angio/embo of AVM, resection of AVM  12/18: RTOR for posterior fossa decompression expansion  12/24 s/p trach, EVD reopened per NSGY  12/26: MRSA in sputum  12/28:  CT head and EVD clamped.  12/29: EVD unclamped due to high ICPs.   12/30: Cefepime added   12/31: Received 1 unit PRBC overnight. Hypotensive requiring transient pressor.   1/4:  CSF growing G + cocci in clusters in broth - started on Abx     VITALS/DATA/ORDERS: [x] Reviewed    EXAMINATION:  General:  calm  HEENT:  trach'd, PERRL  Neuro:  opens eyes to command, BLE wiggles toes to command, no commands in arms, does not mouth words, LUE prox 2/5 dist 0/5, RUE 0/5, toe movement but no lifting of legs  Cards:  RRR  Respiratory:  no respiratory distress  Abdomen:  soft  Extremities:  no edema  Skin:  warm/dry caregiver/patient

## 2019-04-22 ENCOUNTER — APPOINTMENT (OUTPATIENT)
Dept: NEUROSURGERY | Facility: CLINIC | Age: 59
End: 2019-04-22

## 2019-04-25 ENCOUNTER — FORM ENCOUNTER (OUTPATIENT)
Age: 59
End: 2019-04-25

## 2019-04-26 ENCOUNTER — OUTPATIENT (OUTPATIENT)
Dept: OUTPATIENT SERVICES | Facility: HOSPITAL | Age: 59
LOS: 1 days | End: 2019-04-26
Payer: COMMERCIAL

## 2019-04-26 ENCOUNTER — APPOINTMENT (OUTPATIENT)
Dept: NEUROSURGERY | Facility: CLINIC | Age: 59
End: 2019-04-26
Payer: COMMERCIAL

## 2019-04-26 VITALS — RESPIRATION RATE: 12 BRPM | OXYGEN SATURATION: 91 % | HEART RATE: 93 BPM

## 2019-04-26 DIAGNOSIS — Q28.2 ARTERIOVENOUS MALFORMATION OF CEREBRAL VESSELS: ICD-10-CM

## 2019-04-26 PROCEDURE — 70450 CT HEAD/BRAIN W/O DYE: CPT | Mod: 26

## 2019-04-26 PROCEDURE — 99024 POSTOP FOLLOW-UP VISIT: CPT

## 2019-04-26 PROCEDURE — 70450 CT HEAD/BRAIN W/O DYE: CPT

## 2019-04-26 RX ORDER — ALBUTEROL SULFATE 2.5 MG/3ML
(2.5 MG/3ML) SOLUTION RESPIRATORY (INHALATION)
Refills: 0 | Status: ACTIVE | COMMUNITY

## 2019-04-30 NOTE — ASSESSMENT
[FreeTextEntry1] : IMPRESSION:\par 1. Pt is awake, alert, trach intact - able to follow commands on all extremities.\par 2. Her sister stated that she is pleased with patient's overall neurological improvement.\par \par \par PLAN:\par 1. F/U in December 2019 with new CT head non contrast - before coming to visit.\par 2. VPS checked and is set at 5.\par 3. Provided sister with contact information for Dr. Dave Marcial and Dr. [facility].\par

## 2019-04-30 NOTE — HISTORY OF PRESENT ILLNESS
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  On 12/18/19 she underwent a suboccipital bilateral craniectomy and resection of left cerebellar tissue.  On 12/24/18 she had a tracheostomy done #8 [place].  On 1/7/19 she underwent a right frontal VPS Certas set at 5.  On 2/22/19 VPS was admitted from Rehab at [facility] and had VPS removed due to possible meningitis on 2/25/19.  On 3/11/19 she had a new right occipital VPS Certas valve set at 5.\par \par Today she presents for 6 weeks post op visit and patient is more awake and alert and able to follow commands on all extremities.  Trach is intact.  Her sister reports that she sees patient on a daily basis and that she is definitely improving but she wishes that pt can have more hours of PT at the facility she resides at [facility] otherwise she is pleased with patient's improvement.  CT scan done 4/26/19 reviewed by Dr. Daley and Valve is in good position with good size of ventricles noted.\par \par Right occipital VPS compresses and refills briskly.  Setting noted to be 3 when checked.  VPS reprogrammed to 5. Pt noted to have head tremors which is not new. Pt referred to movement disorder specialist.

## 2019-04-30 NOTE — PHYSICAL EXAM
[General Appearance - Alert] : alert [Well-Healed] : well-healed [] : no respiratory distress [Respiration, Rhythm And Depth] : normal respiratory rhythm and effort [Exaggerated Use Of Accessory Muscles For Inspiration] : no accessory muscle use [Heart Rate And Rhythm] : heart rate was normal and rhythm regular [FreeTextEntry5] : follows commands on all extremities - head tremors [FreeTextEntry1] : trach intact

## 2019-04-30 NOTE — REVIEW OF SYSTEMS
[Negative] : Cardiovascular [Shortness Of Breath] : no shortness of breath [Cough] : no cough [FreeTextEntry6] : trach intact

## 2019-05-15 NOTE — PROGRESS NOTE ADULT - SUBJECTIVE AND OBJECTIVE BOX
Already spoke to patient about oxyfloxacin and Augmentin - no anticpated effect on warfarin. Prednisone may affect INR. Usual warfarin schedule is 2 mg po MF, 1.5 mg po TWTHSS, and next scheduled INR is 5/29/19. (Last INR was 2.4 on 5/1/19). Will decrease warfarin dose and reschedule 1 wk sooner (week of 5/20/19). Tried to call pt. No answer and voicemailbox was full. Will attempt again on 5/16/19. night attending note    12/30 CSF cultures growing GPCC--in broth only, two subsequent cultures sent for fever w/u negative to date  Febrile during the day    vitals/labs/meds reviewed  trach'ed, LLE wiggles toes to command, no movement in UE

## 2019-07-05 ENCOUNTER — APPOINTMENT (OUTPATIENT)
Dept: NEUROLOGY | Facility: CLINIC | Age: 59
End: 2019-07-05
Payer: COMMERCIAL

## 2019-07-05 VITALS — SYSTOLIC BLOOD PRESSURE: 94 MMHG | DIASTOLIC BLOOD PRESSURE: 67 MMHG | HEART RATE: 87 BPM

## 2019-07-05 DIAGNOSIS — J45.20 MILD INTERMITTENT ASTHMA, UNCOMPLICATED: ICD-10-CM

## 2019-07-05 DIAGNOSIS — Z87.39 PERSONAL HISTORY OF OTHER DISEASES OF THE MUSCULOSKELETAL SYSTEM AND CONNECTIVE TISSUE: ICD-10-CM

## 2019-07-05 DIAGNOSIS — R56.9 UNSPECIFIED CONVULSIONS: ICD-10-CM

## 2019-07-05 DIAGNOSIS — Z84.1 FAMILY HISTORY OF DISORDERS OF KIDNEY AND URETER: ICD-10-CM

## 2019-07-05 DIAGNOSIS — Z82.49 FAMILY HISTORY OF ISCHEMIC HEART DISEASE AND OTHER DISEASES OF THE CIRCULATORY SYSTEM: ICD-10-CM

## 2019-07-05 DIAGNOSIS — Z82.61 FAMILY HISTORY OF ARTHRITIS: ICD-10-CM

## 2019-07-05 DIAGNOSIS — Z82.69 FAMILY HISTORY OF OTHER DISEASES OF THE MUSCULOSKELETAL SYSTEM AND CONNECTIVE TISSUE: ICD-10-CM

## 2019-07-05 DIAGNOSIS — M32.9 SYSTEMIC LUPUS ERYTHEMATOSUS, UNSPECIFIED: ICD-10-CM

## 2019-07-05 PROCEDURE — 99204 OFFICE O/P NEW MOD 45 MIN: CPT

## 2019-07-05 RX ORDER — LEVETIRACETAM 100 MG/ML
INJECTION, SOLUTION, CONCENTRATE INTRAVENOUS
Refills: 0 | Status: DISCONTINUED | COMMUNITY
End: 2019-07-05

## 2019-07-05 RX ORDER — CLONAZEPAM 1 MG/1
1 TABLET ORAL
Refills: 0 | Status: ACTIVE | COMMUNITY

## 2019-07-05 NOTE — REVIEW OF SYSTEMS
[Feeling Poorly] : feeling poorly [Facial Weakness] : facial weakness [Arm Weakness] : arm weakness [Feeling Tired] : feeling tired [Poor Coordination] : poor coordination [Leg Weakness] : leg weakness [Abnormal Sensation] : an abnormal sensation [Seizures] : convulsions [Inability to Walk] : inability to walk [Negative] : Genitourinary

## 2019-07-08 NOTE — HISTORY OF PRESENT ILLNESS
[FreeTextEntry1] : Sarah Cain is a 59 year old F with a PMHx of Cerebellar AVM rupture in 12/2018 s/p  shunt for hydrocephalus, infection of the first  shunt with removal, which has been replaced, Seizures at the time of AVM rupture (none since), Asthma, Arthritis, and SLE who presents to the Movement disorders clinic with complaints of head tremor and L arm tremor. She is accompanied by her sisters who provide the history as the patient currently has a tracheostomy in place and is nonverbal. \par \par The sisters note that the head tremor has been constant since the hemorrhage. The L arm developed more recently, and only happens when she is doing something with the arm, it does not happen at rest. She does not have abnormal movements on the R side, though that side is also weaker.

## 2019-07-08 NOTE — PHYSICAL EXAM
[General Appearance - Alert] : alert [General Appearance - Well Developed] : well developed [General Appearance - Well Nourished] : well nourished [Person] : oriented to person [Concentration Intact] : normal concentrating ability [Comprehension] : comprehension intact [Cranial Nerves Optic (II)] : visual acuity intact bilaterally,  visual fields full to confrontation, pupils equal round and reactive to light [Cranial Nerves Trigeminal (V)] : facial sensation intact symmetrically [Cranial Nerves Oculomotor (III)] : extraocular motion intact [Cranial Nerves Vestibulocochlear (VIII)] : hearing was intact bilaterally [Cranial Nerves Accessory (XI - Cranial And Spinal)] : head turning and shoulder shrug symmetric [Cranial Nerves Hypoglossal (XII)] : there was no tongue deviation with protrusion [Hemiparesis Of Right Side] : hemiparesis was present on the right [Leg Weaker than Arm, Face] : with the leg weaker than the face and arm [Motor Strength Upper Extremities Bilaterally] : there was weakness in both upper extremities [Motor Strength Lower Extremities Bilaterally] : there was weakness in both lower extremities [Hand Weakness Right] : the hand  was weak [Hand Weakness Left] : the hand  was weak [Tremor] : a tremor present [Non-ambulatory] : Non-ambulatory [Coordination - Dysmetria Impaired Finger-to-Nose Bilateral] : present bilaterally [3+] : Biceps left 3+ [2+] : Brachioradialis left 2+ [1+] : Ankle jerk left 1+ [PERRL With Normal Accommodation] : pupils were equal in size, round, reactive to light, with normal accommodation [Extraocular Movements] : extraocular movements were intact [Plantar Reflex Right Only] : normal on the right [Plantar Reflex Left Only] : normal on the left [___] : absent on the right [___] : absent on the left [FreeTextEntry4] : Unable to assess adequately due to being nonverbal with tracheostomy in place [FreeTextEntry5] : mild R facial droop [FreeTextEntry6] : RUE 4/5, LUE 5-/5, RLE 3/5, LLE 4+/5\par Fingers are flexed and spastic bilaterally\par  [FreeTextEntry7] : Decreased sensation to light touch in the R upper and lower extremities [FreeTextEntry1] : no nystagmus

## 2019-07-08 NOTE — DATA REVIEWED
[de-identified] : Last CT head was 4/26/2019 that showed stable R  shunt, unchanged from previous CT.

## 2019-07-08 NOTE — DISCUSSION/SUMMARY
[FreeTextEntry1] : Sarah Cain is a 59 year old female with a PMHx of ruptured cerebellar AVM, Seizures in the setting of AVM rupture, s/p  shunt with revision after meningitis, Asthma and SLE who presents to the Movement disorders clinic for evaluation of head tremor and left arm tremor. Based on examination and history, the patient is likely suffering from a cerebellar head and arm tremor. She displays significant ataxia with finger to nose on the left, but examination was limited on the R due to weakness. Her head tremor is variable in frequency and direction, with both no-no and yes-yes components. The patient's sisters report that she is increasingly sedated and thus cannot participate well in physical therapy. It was relayed to the family that this type of tremor is very difficult to treat and there is a stepwise fashion in which treatment is attempted. They acknowledged their understanding. \par \par Recommendations:\par 1. Referral to Epilepsy made to consider switching Depakene to another antiepileptic as Depakene can cause sedation and worsen tremor. She was previously on Keppra. \par 2. The family was offered Primidone to attempt to control the tremors, but refused at this time as there is a chance that she may become more sedated. Propranolol was also discussed, but would be contraindicated with her history of Asthma. \par 3. Klonopin can also help with tremors, but in this patient's case it makes it too sedated\par 4. Albuterol can also cause/worsen tremors\par 5. RTC in 3 months

## 2019-08-20 ENCOUNTER — APPOINTMENT (OUTPATIENT)
Dept: NEUROLOGY | Facility: CLINIC | Age: 59
End: 2019-08-20

## 2019-08-28 ENCOUNTER — APPOINTMENT (OUTPATIENT)
Dept: NEUROLOGY | Facility: CLINIC | Age: 59
End: 2019-08-28
Payer: COMMERCIAL

## 2019-08-28 VITALS
BODY MASS INDEX: 28.35 KG/M2 | SYSTOLIC BLOOD PRESSURE: 150 MMHG | HEART RATE: 88 BPM | HEIGHT: 63 IN | DIASTOLIC BLOOD PRESSURE: 90 MMHG | WEIGHT: 160 LBS

## 2019-08-28 PROCEDURE — 99214 OFFICE O/P EST MOD 30 MIN: CPT

## 2019-08-30 NOTE — HISTORY OF PRESENT ILLNESS
[FreeTextEntry1] : 58 yo woman with a history of a left cerebellar AVM rupture in Dec 2018, s/p decompression and  shunt placement, complicated by  shunt infection with removal and replacement.  She also had a GTC seizure while in rehab and is now on seizure prophylaxis with Depakote 500mg PO TID (solution).\par \par She has a residual titubation of the head and neck related to her cerebellar injury and was seen by Dr Bhandari who felt that this tremor may also be potentially exacerbated by the use of Depakote.  Here to consider using an alternative AED.

## 2019-08-30 NOTE — ASSESSMENT
[FreeTextEntry1] : 60 yo woman with titubation due to cerebellar injury from AVM rupture.  Tremor potentially exacerbated by use of Depakote for seizure prophylaxis.\par \par Recommend:\par 1. Start trial of Vimpat 100mg PO BID\par 2. Taper off Depakote by giving 500mg PO BID x 1 week ,then 500mg once a day x 1 week, then STOP.\par 3. Follow up with Dr Bhandari to consider treatment of titubation with medical therapy\par 4. Seizure precautions, including no driving or operating heavy equipment, no unsupervised swimming, using a shower instead of a bathtub, no climbing ladders or working at elevations, no use of sharp dangerous tools or devices.\par 5. Patient agrees with plan.\par 6. Follow up in 3 months.\par \par Francisco Gibson MD\par [facility]\par \par Time Spent: 60 minutes taking history, performing examination, and counseling on diagnosis and management.\par

## 2019-08-30 NOTE — PHYSICAL EXAM
[FreeTextEntry1] : Neurologic: \par Mental Status:. Unable to assess adequately due to being nonverbal with tracheostomy in place. \par Orientation: oriented to person. \par Attention: normal concentrating ability. \par Language: comprehension intact. \par Cranial Nerves: visual acuity intact bilaterally, visual fields full to confrontation, pupils equal round and reactive to light, extraocular motion intact, facial sensation intact symmetrically, hearing was intact bilaterally, head turning and shoulder shrug symmetric and there was no tongue deviation with protrusion . mild R facial droop. \par Motor Strength:. hemiparesis was present on the right with the leg weaker than the face and arm  . there was weakness in both upper extremities. there was weakness in both lower extremities. RUE 4/5, LUE 5-/5, RLE 3/5, LLE 4+/5\par Fingers are flexed and spastic bilaterally\par \par Right upper extremity: the hand  was weak. \par Left upper extremity the hand  was weak. \par Movement Disorders Physical Exam: The patient demonstrates a cerebellar head tremor that is variable in frequency and direction (no-no and yes-yes) with a high amplitude L arm tremor and ataxia. There is also some tremor in the R arm as well, but she has overlying weakness there too. Finger to nose demonstrated significant ataxia on the L, and unable to fully complete on the R due to weakness. \par Sensory exam:. Decreased sensation to light touch in the R upper and lower extremities. \par Coordination: Non-ambulatory . a tremor present. Finger to nose dysmetria was present bilaterally. \par Deep tendon reflexes: \par Biceps right 3+. Biceps left 3+.  \par Triceps right 2+. Triceps left 2+.  \par Brachioradialis right 2+. Brachioradialis left 2+.  \par Patella right 1+. Patella left 1+.  \par Ankle jerk right 1+. Ankle jerk left 1+. \par Plantar responses normal on the right, normal on the left. \par Ankle Clonus absent on the right, absent on the left.  \par Eyes: pupils were equal in size, round, reactive to light, with normal accommodation and extraocular movements were intact . no nystagmus. \par

## 2019-10-01 ENCOUNTER — APPOINTMENT (OUTPATIENT)
Dept: NEUROLOGY | Facility: CLINIC | Age: 59
End: 2019-10-01

## 2019-10-07 ENCOUNTER — APPOINTMENT (OUTPATIENT)
Dept: NEUROLOGY | Facility: CLINIC | Age: 59
End: 2019-10-07
Payer: COMMERCIAL

## 2019-10-07 VITALS
BODY MASS INDEX: 31.01 KG/M2 | HEIGHT: 63 IN | WEIGHT: 175 LBS | SYSTOLIC BLOOD PRESSURE: 120 MMHG | HEART RATE: 89 BPM | DIASTOLIC BLOOD PRESSURE: 78 MMHG

## 2019-10-07 PROCEDURE — 99214 OFFICE O/P EST MOD 30 MIN: CPT

## 2019-10-08 NOTE — PHYSICAL EXAM
[FreeTextEntry1] : Neurologic: \par Mental Status:. Unable to assess adequately due to being nonverbal with tracheostomy in place. \par Orientation: oriented to person. \par Attention: normal concentrating ability. \par Language: comprehension intact. \par Cranial Nerves: visual acuity intact bilaterally, visual fields full to confrontation, pupils equal round and reactive to light, extraocular motion intact, facial sensation intact symmetrically, hearing was intact bilaterally, head turning and shoulder shrug symmetric and there was no tongue deviation with protrusion. mild R facial droop. \par Motor Strength:. hemiparesis was present on the right with the leg weaker than the face and arm. there was weakness in both upper extremities. there was weakness in both lower extremities. RUE 4/5, LUE 5-/5, RLE 3/5, LLE 4+/5\par Fingers are flexed and spastic bilaterally\par \par Right upper extremity: the hand  was weak. \par Left upper extremity the hand  was weak. \par Movement Disorders Physical Exam: The patient demonstrates a cerebellar head tremor that is variable in frequency and direction (no-no and yes-yes) with a high amplitude L arm tremor and ataxia. There is also some tremor in the R arm as well, but she has overlying weakness there too. Finger to nose demonstrated significant ataxia on the L, and unable to fully complete on the R due to weakness. \par Sensory exam:. Decreased sensation to light touch in the R upper and lower extremities. \par Coordination: Non-ambulatory. a tremor present. Finger to nose dysmetria was present bilaterally. \par Deep tendon reflexes: \par Biceps right 3+. Biceps left 3+. \par Triceps right 2+. Triceps left 2+. \par Brachioradialis right 2+. Brachioradialis left 2+. \par Patella right 1+. Patella left 1+. \par Ankle jerk right 1+. Ankle jerk left 1+. \par Plantar responses normal on the right, normal on the left. \par Ankle Clonus absent on the right, absent on the left. \par Eyes: pupils were equal in size, round, reactive to light, with normal accommodation and extraocular movements were intact. no nystagmus.

## 2019-10-08 NOTE — ASSESSMENT
[FreeTextEntry1] : 58 yo woman with titubation due to cerebellar injury from AVM rupture. \par \par Recommend:\par 1. Continue Vimpat 100mg PO BID\par 2. Tremor / titubation: start Primidone 25mg PO QHS.  If no improvement, after 1 week can increase dose to 50mg QHS.\par 3. Follow up with Dr Bhandari for further management of tremor\par 4. Seizure precautions, including no driving or operating heavy equipment, no unsupervised swimming, using a shower instead of a bathtub, no climbing ladders or working at elevations, no use of sharp dangerous tools or devices.\par 5. Patient agrees with plan.\par 6. Follow up in 6 months.\par \par Francisco Gibson MD\par [facility]\par \par Greater than 50% of the encounter was spent on counseling and coordination of care for monitoring for any adverse effects of medication, instructions for starting and titrating new medication, and appropriate follow up. I have spent 25 minutes of face to face time with the patient.\par

## 2019-10-08 NOTE — HISTORY OF PRESENT ILLNESS
[FreeTextEntry1] : 58 yo woman with titubation due to cerebellar injury from AVM rupture. Tremor potentially exacerbated by use of Depakote for seizure prophylaxis.\par \par She tapered off Depakote, and is now on Vimpat monotherapy.  Family reports slight improvement in head tremor, however, it is still very prominent.\par \par Patient and family are eager to try a treatment for the tremor, Dr Bhandari had considered treatment with Primidone, which they are interested in starting today.

## 2019-11-12 ENCOUNTER — APPOINTMENT (OUTPATIENT)
Dept: NEUROLOGY | Facility: CLINIC | Age: 59
End: 2019-11-12
Payer: COMMERCIAL

## 2019-11-12 VITALS
HEART RATE: 99 BPM | DIASTOLIC BLOOD PRESSURE: 79 MMHG | HEIGHT: 63 IN | SYSTOLIC BLOOD PRESSURE: 107 MMHG | BODY MASS INDEX: 30.48 KG/M2 | WEIGHT: 172 LBS

## 2019-11-12 PROCEDURE — 99214 OFFICE O/P EST MOD 30 MIN: CPT

## 2019-11-12 RX ORDER — PRIMIDONE 50 MG/1
50 TABLET ORAL
Qty: 180 | Refills: 3 | Status: ACTIVE | COMMUNITY
Start: 2019-11-12

## 2019-11-12 NOTE — DISCUSSION/SUMMARY
[FreeTextEntry1] : Sarah Cain is a 59 year old female with a PMHx of ruptured cerebellar AVM, Seizures in the setting of AVM rupture, s/p  shunt with revision after meningitis, Asthma and SLE who presents to the Movement disorders clinic for follow up of head tremor and left arm tremor. \par \par The patient's overall examination has improved. Her head tremor has improved, but is still with variable frequency and direction, with both no-no and yes-yes components. There also appears to be some component of cervical dystonia that is contributing, where the patient has significant decreased range of motion when looking to the right. She has become more alert since discontinuing Depakote. It was relayed to the family again that this type of tremor can be resistant to medications, but she seems to have some response to Primidone. They acknowledged their understanding. \par \par Recommendations:\par - Follow up with Dr. Gibson for seizures \par - Will increase Primidone to 100mg at night x 2 weeks, then increase to 150mg at night. Discussed side effects in detail with the patient's family. \par - Will consider adding Artane in the future. Also, discussed Botox as a possible treatment if medications are not sufficient. will get PAR for 200 Units\par - Propranolol was also discussed, but would be contraindicated with her history of Asthma. Klonopin can also help with tremors. Albuterol can also cause/worsen tremors\par \par RTC 6 weeks

## 2019-11-12 NOTE — DATA REVIEWED
[de-identified] : Last CT head was 4/26/2019 that showed stable R  shunt, unchanged from previous CT.

## 2019-11-12 NOTE — PHYSICAL EXAM
[General Appearance - Alert] : alert [General Appearance - Well Nourished] : well nourished [General Appearance - Well Developed] : well developed [Person] : oriented to person [Concentration Intact] : normal concentrating ability [Comprehension] : comprehension intact [Cranial Nerves Oculomotor (III)] : extraocular motion intact [Cranial Nerves Trigeminal (V)] : facial sensation intact symmetrically [Cranial Nerves Vestibulocochlear (VIII)] : hearing was intact bilaterally [Cranial Nerves Hypoglossal (XII)] : there was no tongue deviation with protrusion [Hemiparesis Of Right Side] : hemiparesis was present on the right [Leg Weaker than Arm, Face] : with the leg weaker than the face and arm [Motor Strength Upper Extremities Bilaterally] : there was weakness in both upper extremities [Motor Strength Lower Extremities Bilaterally] : there was weakness in both lower extremities [Hand Weakness Right] : the hand  was weak [Hand Weakness Left] : the hand  was weak [Non-ambulatory] : Non-ambulatory [Tremor] : a tremor present [Coordination - Dysmetria Impaired Finger-to-Nose Bilateral] : present bilaterally [3+] : Biceps left 3+ [2+] : Brachioradialis left 2+ [1+] : Ankle jerk left 1+ [PERRL With Normal Accommodation] : pupils were equal in size, round, reactive to light, with normal accommodation [Extraocular Movements] : extraocular movements were intact [Plantar Reflex Right Only] : normal on the right [Plantar Reflex Left Only] : normal on the left [___] : absent on the left [___] : absent on the right [FreeTextEntry4] : Unable to assess adequately due to being nonverbal with tracheostomy in place [FreeTextEntry5] : mild R facial droop [FreeTextEntry6] :  Fingers are in the flexed position at the MCP joints.\par  [FreeTextEntry7] : Decreased sensation to light touch in the R upper and lower extremities [FreeTextEntry1] : no nystagmus

## 2019-11-12 NOTE — REVIEW OF SYSTEMS
[Feeling Poorly] : feeling poorly [Feeling Tired] : feeling tired [Facial Weakness] : facial weakness [Arm Weakness] : arm weakness [Leg Weakness] : leg weakness [Poor Coordination] : poor coordination [Abnormal Sensation] : an abnormal sensation [Seizures] : convulsions [Inability to Walk] : inability to walk [Negative] : Genitourinary

## 2019-11-12 NOTE — HISTORY OF PRESENT ILLNESS
[FreeTextEntry1] : Sarah Cain is a 59 year old F with a PMHx of Cerebellar AVM rupture in 12/2018 s/p  shunt for hydrocephalus, infection of the first  shunt with removal, which has been replaced, Seizures at the time of AVM rupture (none since), Asthma, Arthritis, and SLE who presents for follow up of head tremors and bilateral arm tremors, worse on the left. She is accompanied by her sisters who provide some of the history, but history is also taken from the patient. \par \par Initial history:\par The sisters note that the head tremor has been constant since the hemorrhage. The L arm developed more recently, and only happens when she is doing something with the arm, it does not happen at rest. She does not have abnormal movements on the R side, though that side is also weaker.\par \par Interval history:\par Since the last visit, she was started on Primidone 50mg qhs and has had some improvement in the head tremor. She has been discontinued off of Depakote and her sedation has improved significantly. She has not had any side effects from Primidone. They report that the head tremors stop when she goes to sleep, but the patient reports that they are there all the time and has not noticed a difference in the tremors since starting Primidone.  pain on L side of neck and difficulty moving it side to side.

## 2019-12-10 ENCOUNTER — OUTPATIENT (OUTPATIENT)
Dept: OUTPATIENT SERVICES | Facility: HOSPITAL | Age: 59
LOS: 1 days | End: 2019-12-10
Payer: COMMERCIAL

## 2019-12-10 ENCOUNTER — APPOINTMENT (OUTPATIENT)
Dept: CT IMAGING | Facility: CLINIC | Age: 59
End: 2019-12-10
Payer: COMMERCIAL

## 2019-12-10 DIAGNOSIS — G91.9 HYDROCEPHALUS, UNSPECIFIED: ICD-10-CM

## 2019-12-10 PROCEDURE — 70450 CT HEAD/BRAIN W/O DYE: CPT

## 2019-12-10 PROCEDURE — 70450 CT HEAD/BRAIN W/O DYE: CPT | Mod: 26

## 2019-12-30 ENCOUNTER — APPOINTMENT (OUTPATIENT)
Dept: NEUROSURGERY | Facility: CLINIC | Age: 59
End: 2019-12-30
Payer: COMMERCIAL

## 2019-12-30 DIAGNOSIS — Q28.2 ARTERIOVENOUS MALFORMATION OF CEREBRAL VESSELS: ICD-10-CM

## 2019-12-30 DIAGNOSIS — G91.9 HYDROCEPHALUS, UNSPECIFIED: ICD-10-CM

## 2019-12-30 PROCEDURE — 99215 OFFICE O/P EST HI 40 MIN: CPT

## 2019-12-30 RX ORDER — VALPROIC ACID 250 MG/5ML
250 SOLUTION ORAL
Refills: 0 | Status: DISCONTINUED | COMMUNITY
End: 2019-12-30

## 2019-12-30 RX ORDER — MAG HYDROX/ALUMINUM HYD/SIMETH 200-200-20
200-200-20 SUSPENSION, ORAL (FINAL DOSE FORM) ORAL
Refills: 0 | Status: ACTIVE | COMMUNITY

## 2019-12-30 RX ORDER — AMLODIPINE BESYLATE 10 MG/1
10 TABLET ORAL
Refills: 0 | Status: DISCONTINUED | COMMUNITY
End: 2019-12-30

## 2019-12-30 RX ORDER — METHOCARBAMOL 750 MG/1
750 TABLET, FILM COATED ORAL
Refills: 0 | Status: ACTIVE | COMMUNITY

## 2019-12-30 RX ORDER — ENOXAPARIN SODIUM 100 MG/ML
40 INJECTION SUBCUTANEOUS
Refills: 0 | Status: DISCONTINUED | COMMUNITY
End: 2019-12-30

## 2019-12-30 RX ORDER — METHYL SALICYLATE/MENTH/CAMPH 30%-10%-4%
CREAM (GRAM) TOPICAL
Refills: 0 | Status: ACTIVE | COMMUNITY

## 2019-12-30 RX ORDER — OMEPRAZOLE MAGNESIUM 2.5 MG/1
2.5 GRANULE, DELAYED RELEASE ORAL
Refills: 0 | Status: DISCONTINUED | COMMUNITY
End: 2019-12-30

## 2019-12-30 RX ORDER — ALBUTEROL SULFATE 0.63 MG/3ML
0.63 SOLUTION RESPIRATORY (INHALATION)
Refills: 0 | Status: DISCONTINUED | COMMUNITY
End: 2019-12-30

## 2019-12-30 RX ORDER — LACOSAMIDE 100 MG/1
100 TABLET, FILM COATED ORAL
Refills: 0 | Status: ACTIVE | COMMUNITY

## 2019-12-30 RX ORDER — OMEPRAZOLE MAGNESIUM 2.5 MG/1
2.5 GRANULE, DELAYED RELEASE ORAL
Refills: 0 | Status: ACTIVE | COMMUNITY

## 2019-12-31 NOTE — PROCEDURE NOTE - ESTIMATED BLOOD LOSS
The patient complains of abdominal pain.    Symptoms began   several  weeks   ago with onset that was    intermittent  .    It is localized as   epigastric region   pain.    It is described as   moderate   in nature.   Pain quality is described as   crampy and sharp  .    It also radiates to the   .    Discomfort typically lasts   a few  seconds   and has a course which is   intermittent  .   It usually starts   gradually  .    Symptom triggers include   .  Alleviating factors include   .    Symptoms have been   progressive   since onset.    Alarm features noted:   .   The patient also reports   abdominal bloating/distension, alteration of bowel habit, diarrhea, constipation, excessive intestinal gas and heartburn  .      Symptoms have caused the patient to   see primary care physician   Similar symptoms   have not   occurred in the past, associated with    .   Noteworthy prior abdominal interventions include   .   has been performed previously to evaluate the patient's symptoms.   Remedies tried to date include     with    .    Other pertinent testing to date includes,    Additionally, recent labs ordered by PCP revealed fatty liver disease and she would like to discuss further treatment options. 
none

## 2020-01-02 NOTE — HISTORY OF PRESENT ILLNESS
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 On 12/18/19 she underwent a suboccipital bilateral craniectomy and resection of left cerebellar tissue. On 12/24/18 she had a tracheostomy done #8 Sonyaley. On 1/7/19 she underwent a right frontal VPS Certas set at 5. On 2/22/19 VPS was admitted from Rehab at [facility] and had VPS removed due to possible meningitis on 2/25/19. On 3/11/19 she had a new right occipital VPS Certas valve set at 5.\par \par Today she presents for 6 weeks post op visit and patient is more awake and alert and able to follow commands on all extremities. Trach is intact. Her sister reports that she sees patient on a daily basis and that she is definitely improving but she wishes that pt can have more hours of PT at the facility she resides at [facility] otherwise she is pleased with patient's improvement. CT scan done 4/26/19 reviewed by Dr. Daley and Valve is in good position. Ventricles are slightly enlarged when compared to prior scan.  Valve adjusted to 4\par \par Right occipital VPS compresses and refills briskly. Adjusted to 4 today based on results of. CT brain done 12/10/19.  Pt and sisters are here to review the results of recent CT brain.\par

## 2020-01-02 NOTE — PHYSICAL EXAM
[General Appearance - Alert] : alert [General Appearance - In No Acute Distress] : in no acute distress [General Appearance - Well Developed] : well developed [General Appearance - Well Nourished] : well nourished [General Appearance - Well-Appearing] : healthy appearing [Oriented To Time, Place, And Person] : oriented to person, place, and time [Impaired Insight] : insight and judgment were intact [Affect] : the affect was normal [Memory Recent] : recent memory was not impaired [Person] : oriented to person [Time] : oriented to time [Place] : oriented to place [Cranial Nerves Oculomotor (III)] : extraocular motion intact [Cranial Nerves Optic (II)] : visual acuity intact bilaterally,  pupils equal round and reactive to light [Cranial Nerves Vestibulocochlear (VIII)] : hearing was intact bilaterally [Cranial Nerves Facial (VII)] : face symmetrical [Cranial Nerves Accessory (XI - Cranial And Spinal)] : head turning and shoulder shrug symmetric [Cranial Nerves Hypoglossal (XII)] : there was no tongue deviation with protrusion [Motor Handedness Right-Handed] : the patient is right hand dominant [Sclera] : the sclera and conjunctiva were normal [PERRL With Normal Accommodation] : pupils were equal in size, round, reactive to light, with normal accommodation [Outer Ear] : the ears and nose were normal in appearance [Extraocular Movements] : extraocular movements were intact [Oropharynx] : the oropharynx was normal [Hearing Threshold Finger Rub Not [place]] : hearing was normal [] : no respiratory distress [Heart Rate And Rhythm] : heart rate was normal and rhythm regular [FreeTextEntry5] : PEG in place [FreeTextEntry8] : came via wheelchair [FreeTextEntry1] : intermittent RUE tremors

## 2020-01-02 NOTE — ASSESSMENT
[FreeTextEntry1] : IMPRESSION:\par 1. Pt has improved significantly neurologically.\par \par \par PLAN:\par 1. Continue PT/T and OT sessions.\par 2. Cautioned to avoid falls/trauma\par 3. CT scan brain w/o in 1 year\par 4. Shunt setting adjusted to 4.\par

## 2020-01-02 NOTE — DATA REVIEWED
[de-identified] : EXAM: CT BRAIN    PROCEDURE DATE: 12/10/2019     INTERPRETATION: History:  shunt check. AVM and history of bleed.   Description: A noncontrast head CT was performed. Axial images were  performed from the skull base to the vertex with coronal/sagittal  reconstructions.   Comparison is made to a prior CT from 04/26/2019.   Suboccipital craniectomy postsurgical change is again noted. Embolic  material and a surgical clip are again noted in the superior cerebellar  location. Right frontal charito holes are again noted.   A right parietal approach shunt catheter is unchanged in position. The  lateral and third ventricles have slightly increased in size compared to the  04/26/2019 head CT study.   Encephalomalacia involves the medial aspects of both cerebellar hemispheres  and cerebellar vermis, with associated ex vacuo dilatation of the fourth  ventricle. Small areas of encephalomalacia involves the high right frontal  lobe, unchanged.   There is no evidence for acute infarct or acute hemorrhage.   IMPRESSION:   The lateral and third ventricles have slightly increased in size compared to  the 04/26/2019 head CT. Otherwise stable exam.          MANUEL MONTGOMERY M.D., ATTENDING RADIOLOGIST  This document has been electronically signed. Dec 10 2019 5:55PM

## 2020-01-28 ENCOUNTER — APPOINTMENT (OUTPATIENT)
Dept: NEUROLOGY | Facility: CLINIC | Age: 60
End: 2020-01-28
Payer: COMMERCIAL

## 2020-01-28 VITALS — BODY MASS INDEX: 30.48 KG/M2 | WEIGHT: 172 LBS | HEIGHT: 63 IN

## 2020-01-28 DIAGNOSIS — G24.3 SPASMODIC TORTICOLLIS: ICD-10-CM

## 2020-01-28 DIAGNOSIS — G25.2 OTHER SPECIFIED FORMS OF TREMOR: ICD-10-CM

## 2020-01-28 PROCEDURE — 99214 OFFICE O/P EST MOD 30 MIN: CPT | Mod: 25

## 2020-01-28 PROCEDURE — 64616 CHEMODENERV MUSC NECK DYSTON: CPT

## 2020-01-28 NOTE — DISCUSSION/SUMMARY
[FreeTextEntry1] : Sarah Cain is a 59 year old female with a PMHx of ruptured cerebellar AVM, Seizures in the setting of AVM rupture, s/p  shunt with revision after meningitis, Asthma and SLE who presents to the Movement disorders clinic for follow up of head tremor and left arm tremor. \par \par The patient's overall examination has improved. Her head tremor has improved, but is still with variable frequency and direction, with both no-no and yes-yes components. There also appears to be some component of cervical dystonia that is contributing, where the patient has significant decreased range of motion when looking to the right more so than left, but both sides have limited range of motion. She has become more alert since discontinuing Depakote and speech is slightly more clear. It was relayed to the family again that this type of tremor can be resistant to medications, but she seems to have some response to Primidone. They acknowledged their understanding. Her coarse hand tremors have improved, but she continues to have significant ataxia, right worse than left.\par \par Propranolol was also discussed, but would be contraindicated with her history of Asthma. Klonopin can also help with tremors. Albuterol can also cause/worsen tremors\par \par Recommendations:\par - Follow up with Dr. Gibson for seizures and Neurosurgery for  shunt\par - Will increase Primidone to 50mg in AM x 1 week, then 50mg in AM and afternoon, keeping 150mg at night. Discussed side effects in detail with the patient's family. \par - Will consider adding Artane in the future.\par - Patient injected per procedure note with Botox. Patient tolerated the procedure well. \par \par RTC 1 month for botox check and 3 months for repeat botox injections

## 2020-01-28 NOTE — DATA REVIEWED
[de-identified] : Last CT head was 4/26/2019 that showed stable R  shunt, unchanged from previous CT.

## 2020-01-28 NOTE — HISTORY OF PRESENT ILLNESS
[FreeTextEntry1] : Sarah Cain is a 59 year old F with a PMHx of Cerebellar AVM rupture in 12/2018 s/p  shunt for hydrocephalus, infection of the first  shunt with removal, which has been replaced, Seizures at the time of AVM rupture (none since), Asthma, Arthritis, and SLE who presents for follow up of head tremors and bilateral arm tremors, worse on the left. She is accompanied by her sisters who provide some of the history, but history is also taken from the patient. \par \par Initial history:\par The sisters note that the head tremor has been constant since the hemorrhage. The L arm developed more recently, and only happens when she is doing something with the arm, it does not happen at rest. She does not have abnormal movements on the R side, though that side is also weaker. She had a tracheostomy.\par \par Interval history:\par Since the last visit, her Primidone was increased to 150mg at bedtime, but she has not had improvement in the head tremors. The hands tremors have had some improvement. No side effects from Primidone. The head tremors are most bothersome. She feels the head tremors are worse now than before. She is interested in Botox injections. She is still at the same nursing home rehab. She has started eating soft foods.\par \par Current meds:\par Neurontin 100mg TID\par Primidone 150mg at bedtime\par Clonazepam 1mg BID\par Vimpat 100mg BID

## 2020-01-28 NOTE — PROCEDURE
[FreeTextEntry1] : Patient injected with 65 Units of Botox in the splenius capitus muscles bilaterally, using anatomical targets. \par Botox was diluted at 10 units/0.1cc. The areas were prepped with alcohol.\par Patient tolerated the procedure well. No complications.\par \par R splenius capitus - 25 units/1 site\par L splenius capitus - 40 units/2 sites\par \par Total used: 65 units\par Unavoidable waste: 35 units\par \par Lot number: [id number]\par Expiry date: 06/2022

## 2020-01-28 NOTE — PHYSICAL EXAM
[General Appearance - Alert] : alert [General Appearance - Well Nourished] : well nourished [General Appearance - Well Developed] : well developed [Person] : oriented to person [Cranial Nerves Oculomotor (III)] : extraocular motion intact [Concentration Intact] : normal concentrating ability [Comprehension] : comprehension intact [Cranial Nerves Trigeminal (V)] : facial sensation intact symmetrically [Cranial Nerves Vestibulocochlear (VIII)] : hearing was intact bilaterally [Hemiparesis Of Right Side] : hemiparesis was present on the right [Cranial Nerves Hypoglossal (XII)] : there was no tongue deviation with protrusion [Motor Strength Lower Extremities Bilaterally] : there was weakness in both lower extremities [Leg Weaker than Arm, Face] : with the leg weaker than the face and arm [Motor Strength Upper Extremities Bilaterally] : there was weakness in both upper extremities [Hand Weakness Right] : the hand  was weak [Hand Weakness Left] : the hand  was weak [Tremor] : a tremor present [Non-ambulatory] : Non-ambulatory [Coordination - Dysmetria Impaired Finger-to-Nose Bilateral] : present bilaterally [3+] : Biceps left 3+ [2+] : Brachioradialis left 2+ [1+] : Ankle jerk left 1+ [Extraocular Movements] : extraocular movements were intact [PERRL With Normal Accommodation] : pupils were equal in size, round, reactive to light, with normal accommodation [Plantar Reflex Right Only] : normal on the right [Plantar Reflex Left Only] : normal on the left [___] : absent on the right [___] : absent on the left [FreeTextEntry5] : mild R facial droop [FreeTextEntry6] :  Fingers are in the flexed position at the MCP joints.\par  [FreeTextEntry7] : Decreased sensation to light touch in the R upper and lower extremities [FreeTextEntry1] : no nystagmus

## 2020-01-28 NOTE — REVIEW OF SYSTEMS
[Feeling Poorly] : feeling poorly [Facial Weakness] : facial weakness [Feeling Tired] : feeling tired [Poor Coordination] : poor coordination [Leg Weakness] : leg weakness [Arm Weakness] : arm weakness [Abnormal Sensation] : an abnormal sensation [Seizures] : convulsions [Inability to Walk] : inability to walk [Negative] : Genitourinary

## 2020-01-28 NOTE — END OF VISIT
[Fellow] : Fellow [] : Fellow [Time Spent: ___ minutes] : I have spent [unfilled] minutes of face to face time with the patient

## 2020-03-02 ENCOUNTER — APPOINTMENT (OUTPATIENT)
Dept: NEUROLOGY | Facility: CLINIC | Age: 60
End: 2020-03-02

## 2020-09-11 NOTE — PROGRESS NOTE ADULT - ATTENDING COMMENTS
Seen and examined with resident. Agree with note.   Patient with encephalopathy.  Patient will need acute TBI rehabilitation when stable. normal...

## 2020-11-18 ENCOUNTER — APPOINTMENT (OUTPATIENT)
Dept: GASTROENTEROLOGY | Facility: CLINIC | Age: 60
End: 2020-11-18

## 2021-05-05 NOTE — OCCUPATIONAL THERAPY INITIAL EVALUATION ADULT - STRENGTHENING, PT EVAL
Ongoing SW/CM Assessment/Plan of Care Note     See SW/CM flowsheets for goals and other objective data.    Patient/Family discharge goal (s):  Plan for patient to return home with resumption of home health services and family support.    PT Recommendation:  Recommendation for Discharge: PT IL: Patient doesn't need nonskilled intermittent or 24 hour assistance to perform mobility and/or ADLs safely    OT Recommendation:  Recommendations for Discharge: OT IL: Patient requires intermittent nonskilled assistance to perform mobility and/or ADLs safely    Disposition:  Patient is currently on a medical unit.    Progress note:   MD notes reviewed  Per MD-  S/P POD #1 I&D to R breast, GONSALO drain in place  Cultures pending.  On IV abx.    Plan to return home with family support and resumption of HH sxs with [facility], once medically stable  CM to follow.     Patient will increase strength in BUE/BLE by 1/2 grade in two weeks to facilitate increased ability to perform ADLs and functional mobility

## 2021-05-10 ENCOUNTER — WEB ENCOUNTER (OUTPATIENT)
Dept: URBAN - METROPOLITAN AREA CLINIC 13 | Facility: CLINIC | Age: 61
End: 2021-05-10

## 2021-05-18 ENCOUNTER — WEB ENCOUNTER (OUTPATIENT)
Dept: URBAN - NONMETROPOLITAN AREA CLINIC 4 | Facility: CLINIC | Age: 61
End: 2021-05-18

## 2021-05-18 ENCOUNTER — OFFICE VISIT (OUTPATIENT)
Dept: URBAN - NONMETROPOLITAN AREA CLINIC 4 | Facility: CLINIC | Age: 61
End: 2021-05-18
Payer: MEDICARE

## 2021-05-18 VITALS
HEIGHT: 62 IN | TEMPERATURE: 97.9 F | DIASTOLIC BLOOD PRESSURE: 79 MMHG | WEIGHT: 218 LBS | SYSTOLIC BLOOD PRESSURE: 151 MMHG | BODY MASS INDEX: 40.12 KG/M2 | HEART RATE: 100 BPM

## 2021-05-18 DIAGNOSIS — D50.0 IRON DEFICIENCY ANEMIA DUE TO CHRONIC BLOOD LOSS: ICD-10-CM

## 2021-05-18 DIAGNOSIS — Z93.1 PEG (PERCUTANEOUS ENDOSCOPIC GASTROSTOMY) STATUS: ICD-10-CM

## 2021-05-18 PROCEDURE — 99203 OFFICE O/P NEW LOW 30 MIN: CPT | Performed by: INTERNAL MEDICINE

## 2021-05-18 RX ORDER — CLONAZEPAM 1 MG/1
1 TABLET TABLET ORAL BID
Status: ACTIVE | COMMUNITY

## 2021-05-18 RX ORDER — GABAPENTIN 600 MG/1
1 TABLET TABLET, FILM COATED ORAL ONCE A DAY
Status: ACTIVE | COMMUNITY

## 2021-05-18 RX ORDER — GABAPENTIN 400 MG/1
1 CAPSULE CAPSULE ORAL QID
Status: ACTIVE | COMMUNITY

## 2021-05-18 RX ORDER — PRIMIDONE 50 MG/1
2 TABLET TABLET ORAL ONCE A DAY
Status: ACTIVE | COMMUNITY

## 2021-05-18 RX ORDER — LACOSAMIDE 100 MG/1
1 TABLET TABLET, FILM COATED ORAL TWICE A DAY
Status: ACTIVE | COMMUNITY

## 2021-05-18 NOTE — HPI-TODAY'S VISIT:
dec 2018 replaced at oct 1 2020.  Pt reports that she relocated in October.  Pt reports avm 2018.  Pt with oropharyngeal. Pt reports that she has been eating some but still requiring PEG feeds to meet her nutritional needs.  She was functional prior to her cerebrovascular accident at that time in [place].  She reports that she has been having some pain around the PEG tube site.  At the bedside the PEG tube was manipulated with improvement.  PEG site appears clean dry and intact and without purulence or significant erythema.  Pt has had a colonoscopy in the past and reports that she is up-to-date on this.  Is also here to establish care.

## 2021-05-21 ENCOUNTER — TELEPHONE ENCOUNTER (OUTPATIENT)
Dept: URBAN - METROPOLITAN AREA CLINIC 92 | Facility: CLINIC | Age: 61
End: 2021-05-21

## 2021-07-26 NOTE — PROGRESS NOTE ADULT - I WAS PHYSICALLY PRESENT FOR THE KEY PORTIONS OF THE EVALUATION AND MANAGEMENT (E/M) SERVICE PROVIDED.  I AGREE WITH THE ABOVE HISTORY, PHYSICAL, AND PLAN WHICH I HAVE REVIEWED AND EDITED WHERE APPROPRIATE
Statement Selected
done

## 2021-08-04 ENCOUNTER — TELEPHONE ENCOUNTER (OUTPATIENT)
Dept: URBAN - METROPOLITAN AREA CLINIC 54 | Facility: CLINIC | Age: 61
End: 2021-08-04

## 2021-08-17 ENCOUNTER — OFFICE VISIT (OUTPATIENT)
Dept: URBAN - NONMETROPOLITAN AREA CLINIC 4 | Facility: CLINIC | Age: 61
End: 2021-08-17
Payer: MEDICARE

## 2021-08-17 ENCOUNTER — DASHBOARD ENCOUNTERS (OUTPATIENT)
Age: 61
End: 2021-08-17

## 2021-08-17 DIAGNOSIS — Z93.1 PEG (PERCUTANEOUS ENDOSCOPIC GASTROSTOMY) STATUS: ICD-10-CM

## 2021-08-17 DIAGNOSIS — D50.0 IRON DEFICIENCY ANEMIA DUE TO CHRONIC BLOOD LOSS: ICD-10-CM

## 2021-08-17 PROCEDURE — 99214 OFFICE O/P EST MOD 30 MIN: CPT | Performed by: INTERNAL MEDICINE

## 2021-08-17 PROCEDURE — 25 SEPARATE E/M: Performed by: INTERNAL MEDICINE

## 2021-08-17 RX ORDER — GABAPENTIN 400 MG/1
1 CAPSULE CAPSULE ORAL QID
Status: ACTIVE | COMMUNITY

## 2021-08-17 RX ORDER — PRIMIDONE 50 MG/1
2 TABLET TABLET ORAL ONCE A DAY
Status: ACTIVE | COMMUNITY

## 2021-08-17 RX ORDER — CLONAZEPAM 1 MG/1
1 TABLET TABLET ORAL BID
Status: ACTIVE | COMMUNITY

## 2021-08-17 RX ORDER — LACOSAMIDE 100 MG/1
1 TABLET TABLET, FILM COATED ORAL TWICE A DAY
Status: ACTIVE | COMMUNITY

## 2021-08-17 RX ORDER — GABAPENTIN 600 MG/1
1 TABLET TABLET, FILM COATED ORAL ONCE A DAY
Status: ACTIVE | COMMUNITY

## 2021-08-17 NOTE — EXAM-PHYSICAL EXAM
in wheelchair abd with peg intact chaperone present. area prepped. balloon deflated on port and peg removed. ebl none. tolerated well

## 2021-08-17 NOTE — HPI-TODAY'S VISIT:
Patient with hx f dec 2018 replaced at oct 1 2020.  Pt reports that she relocated in October.  Pt reports avm 2018.  Pt with oropharyngeal. Pt reports that she has been eating some but still requiring PEG feeds to meet her nutritional needs.  She was functional prior to her cerebrovascular accident at that time in [place].  She reports that she has been having some pain around the PEG tube site.  At the bedside the PEG tube was manipulated with improvement.  PEG site appears clean dry and intact and without purulence or significant erythema.  Pt has had a colonoscopy in the past and reports that she is up-to-date on this.  Is also here to establish care.  8-17-21 Patient with hx of cva here for peg removal. risk and benefits were d/w patient. States only using peg for medication use.

## 2021-08-18 ENCOUNTER — OFFICE VISIT (OUTPATIENT)
Dept: URBAN - METROPOLITAN AREA TELEHEALTH 2 | Facility: TELEHEALTH | Age: 61
End: 2021-08-18
Payer: MEDICARE

## 2021-08-18 DIAGNOSIS — K94.23 PEG TUBE MALFUNCTION: ICD-10-CM

## 2021-08-18 PROCEDURE — 97803 MED NUTRITION INDIV SUBSEQ: CPT | Performed by: DIETITIAN, REGISTERED

## 2021-08-18 RX ORDER — PRIMIDONE 50 MG/1
2 TABLET TABLET ORAL ONCE A DAY
Status: ACTIVE | COMMUNITY

## 2021-08-18 RX ORDER — LACOSAMIDE 100 MG/1
1 TABLET TABLET, FILM COATED ORAL TWICE A DAY
Status: ACTIVE | COMMUNITY

## 2021-08-18 RX ORDER — CLONAZEPAM 1 MG/1
1 TABLET TABLET ORAL BID
Status: ACTIVE | COMMUNITY

## 2021-08-18 RX ORDER — GABAPENTIN 400 MG/1
1 CAPSULE CAPSULE ORAL QID
Status: ACTIVE | COMMUNITY

## 2021-08-18 RX ORDER — GABAPENTIN 600 MG/1
1 TABLET TABLET, FILM COATED ORAL ONCE A DAY
Status: ACTIVE | COMMUNITY

## 2021-08-19 PROBLEM — 724556004: Status: ACTIVE | Noted: 2021-06-08

## 2021-08-19 PROBLEM — 302109006: Status: ACTIVE | Noted: 2021-05-18

## 2021-09-07 ENCOUNTER — TELEPHONE ENCOUNTER (OUTPATIENT)
Dept: URBAN - METROPOLITAN AREA CLINIC 54 | Facility: CLINIC | Age: 61
End: 2021-09-07

## 2021-10-19 ENCOUNTER — TELEPHONE ENCOUNTER (OUTPATIENT)
Dept: URBAN - METROPOLITAN AREA CLINIC 54 | Facility: CLINIC | Age: 61
End: 2021-10-19

## 2022-12-06 NOTE — PROGRESS NOTE ADULT - SUBJECTIVE AND OBJECTIVE BOX
Patient is a 58y old  Female who presents with a chief complaint of Cerebellar IPH (23 Jan 2019 11:42)      Interval Events: No events reported overnight     REVIEW OF SYSTEMS:  [ ] Positive  [x] All other systems negative  [ ] Unable to assess ROS because ________    Vital Signs Last 24 Hrs  T(C): 37.3 (01-24-19 @ 04:21), Max: 37.3 (01-24-19 @ 04:21)  T(F): 99.1 (01-24-19 @ 04:21), Max: 99.1 (01-24-19 @ 04:21)  HR: 78 (01-24-19 @ 04:21) (78 - 117)  BP: 129/79 (01-24-19 @ 04:21) (116/81 - 153/88)  RR: 16 (01-24-19 @ 04:21) (16 - 20)  SpO2: 95% (01-24-19 @ 04:21) (94% - 98%)    PHYSICAL EXAM:  HEENT:   HEENT:   [x]Tracheostomy: # 7 Cuffless Portex   [ ]Pupils equal  [ ]No oral lesions  [ ]Abnormal:     SKIN  [x]No Rash  [ ] Abnormal  [ ] pressure    CARDIAC  [x]Regular  [ ]Abnormal    PULMONARY  [x]Bilateral Clear Breath Sounds  [ ]Normal Excursion  [ ]Abnormal    GI  [x]PEG      [x] +BS		              [x]Soft, nondistended, nontender	  [ ]Abnormal    MUSCULOSKELETAL                                   [ ]Bedbound                 [ ]Abnormal    [x]OOB to chair                           EXTREMITIES                                         [x]Normal  [ ]Edema                           NEUROLOGIC  [ ] Normal, non focal  [x] Focal findings: Following simple commands with all extremities     PSYCHIATRIC  [x]Alert   [ ] Sedated	 [ ]Agitated    :  Newsome: [ ] Yes, if yes: Date of Placement:                   [x] No    LINES: Central Lines [x] Yes, if yes: Date of Placement: RT UE PICC Placed 1/9                                      [  ] No  HOSPITAL MEDICATIONS:  MEDICATIONS  (STANDING):  chlorhexidine 4% Liquid 1 Application(s) Topical <User Schedule>  clonazePAM Tablet 1 milliGRAM(s) Oral every 12 hours  docusate sodium Liquid 100 milliGRAM(s) Oral two times a day  enoxaparin Injectable 40 milliGRAM(s) SubCutaneous <User Schedule>  fentaNYL   Patch  12 MICROgram(s)/Hr. 1 Patch Transdermal every 48 hours  gabapentin   Solution 300 milliGRAM(s) Oral every 8 hours  pantoprazole   Suspension 40 milliGRAM(s) Oral daily  potassium phosphate / sodium phosphate powder 1 Packet(s) Oral four times a day  senna 1 Tablet(s) Oral at bedtime    MEDICATIONS  (PRN):  acetaminophen    Suspension .. 650 milliGRAM(s) Oral every 6 hours PRN Temp greater or equal to 38.5C (101.3F), Mild Pain (1 - 3)  ALBUTerol/ipratropium for Nebulization 3 milliLiter(s) Nebulizer every 6 hours PRN Shortness of Breath and/or Wheezing  clonazePAM Tablet 0.25 milliGRAM(s) Oral every 8 hours PRN Breakthrough Myoclonus not treated by standing Clonazepam      LABS:                  CAPILLARY BLOOD GLUCOSE    MICROBIOLOGY:     RADIOLOGY:  [ ] Reviewed and interpreted by me Patient is a 58y old  Female who presents with a chief complaint of Cerebellar IPH (23 Jan 2019 11:42)      Interval Events: No events reported overnight     REVIEW OF SYSTEMS:  [ ] Positive  [x] All other systems negative  [ ] Unable to assess ROS because ________    Vital Signs Last 24 Hrs  T(C): 37.3 (01-24-19 @ 04:21), Max: 37.3 (01-24-19 @ 04:21)  T(F): 99.1 (01-24-19 @ 04:21), Max: 99.1 (01-24-19 @ 04:21)  HR: 78 (01-24-19 @ 04:21) (78 - 117)  BP: 129/79 (01-24-19 @ 04:21) (116/81 - 153/88)  RR: 16 (01-24-19 @ 04:21) (16 - 20)  SpO2: 95% (01-24-19 @ 04:21) (94% - 98%)    PHYSICAL EXAM:  HEENT:   [x]Tracheostomy: # 7 Cuffless Portex   [ ]Pupils equal  [ ]No oral lesions  [ ]Abnormal:     SKIN  [x]No Rash  [ ] Abnormal  [ ] pressure    CARDIAC  [x]Regular  [ ]Abnormal    PULMONARY  [x]Bilateral Clear Breath Sounds  [ ]Normal Excursion  [ ]Abnormal    GI  [x]PEG      [x] +BS		              [x]Soft, nondistended, nontender	  [ ]Abnormal    MUSCULOSKELETAL                                   [ ]Bedbound                 [ ]Abnormal    [x]OOB to chair                           EXTREMITIES                                         [x]Normal  [ ]Edema                           NEUROLOGIC  [ ] Normal, non focal  [x] Focal findings: Following simple commands with all extremities     PSYCHIATRIC  [x]Alert   [ ] Sedated	 [ ]Agitated    :  Newsome: [ ] Yes, if yes: Date of Placement:                   [x] No    LINES: Central Lines [x] Yes, if yes: Date of Placement: RT UE PICC Placed 1/9                                      [  ] No  HOSPITAL MEDICATIONS:  MEDICATIONS  (STANDING):  chlorhexidine 4% Liquid 1 Application(s) Topical <User Schedule>  clonazePAM Tablet 1 milliGRAM(s) Oral every 12 hours  docusate sodium Liquid 100 milliGRAM(s) Oral two times a day  enoxaparin Injectable 40 milliGRAM(s) SubCutaneous <User Schedule>  fentaNYL   Patch  12 MICROgram(s)/Hr. 1 Patch Transdermal every 48 hours  gabapentin   Solution 300 milliGRAM(s) Oral every 8 hours  pantoprazole   Suspension 40 milliGRAM(s) Oral daily  potassium phosphate / sodium phosphate powder 1 Packet(s) Oral four times a day  senna 1 Tablet(s) Oral at bedtime    MEDICATIONS  (PRN):  acetaminophen    Suspension .. 650 milliGRAM(s) Oral every 6 hours PRN Temp greater or equal to 38.5C (101.3F), Mild Pain (1 - 3)  ALBUTerol/ipratropium for Nebulization 3 milliLiter(s) Nebulizer every 6 hours PRN Shortness of Breath and/or Wheezing  clonazePAM Tablet 0.25 milliGRAM(s) Oral every 8 hours PRN Breakthrough Myoclonus not treated by standing Clonazepam      LABS:                  CAPILLARY BLOOD GLUCOSE    MICROBIOLOGY:     RADIOLOGY:  [ ] Reviewed and interpreted by me Hydroxyzine Counseling: Patient advised that the medication is sedating and not to drive a car after taking this medication.  Patient informed of potential adverse effects including but not limited to dry mouth, urinary retention, and blurry vision.  The patient verbalized understanding of the proper use and possible adverse effects of hydroxyzine.  All of the patient's questions and concerns were addressed.

## 2023-04-24 NOTE — PROGRESS NOTE ADULT - SUBJECTIVE AND OBJECTIVE BOX
CC: f/u for fever    Patient reports: her temps have moderated over past few days    REVIEW OF SYSTEMS:  All other review of systems negative (Comprehensive ROS): limited by underlying condition    Antimicrobials Day #  :off    Other Medications Reviewed    T(F): 98.9 (01-14-19 @ 12:02), Max: 99.7 (01-13-19 @ 20:08)  HR: 91 (01-14-19 @ 12:39)  BP: 133/86 (01-14-19 @ 12:02)  RR: 21 (01-14-19 @ 12:39)  SpO2: 98% (01-14-19 @ 12:39)  Wt(kg): --    PHYSICAL EXAM:  General: lethargic no acute distress  Eyes:  anicteric, no conjunctival injection, no discharge  Oropharynx: no lesions or injection 	  Neck: supple, trach in place, on TC  Lungs: few ronchi  Heart: regular rate and rhythm; no murmur, rubs or gallops  Abdomen: soft, nondistended, nontender, peg in place  Skin: no lesions  Extremities: no clubbing, cyanosis, + edema  Neurologic: lethargic, poorly interactive    LAB RESULTS:                        9.1    4.5   )-----------( 345      ( 14 Jan 2019 07:03 )             25.9     01-14    133<L>  |  95<L>  |  8   ----------------------------<  112<H>  4.1   |  28  |  0.42<L>    Ca    8.7      14 Jan 2019 07:03  Phos  3.6     01-14  Mg     2.0     01-14          MICROBIOLOGY:  RECENT CULTURES:      RADIOLOGY REVIEWED: 24-Apr-2023 23:32

## 2023-08-02 NOTE — OCCUPATIONAL THERAPY INITIAL EVALUATION ADULT - NAME OF CLINICIAN
YOLANDA Bentley [Has the patient reached Maximum Medical Improvement? If yes, indicate date___] : Yes, on [unfilled] [Is there permanent partial impairment?] : Yes [Left] : left [FreeTextEntry7] : shoulder [FreeTextEntry8] : int rot 35 deg, ext rot 45 deg [de-identified] : full [de-identified] : had distal clav exc [FreeTextEntry5] : 25 [de-identified] : 15% for mod defect motion rotation and 10% for distal clav exc, measured 3 times with gonio

## 2024-05-29 NOTE — PROGRESS NOTE ADULT - ATTENDING COMMENTS
Outcome for 05/29/24 1:31 PM: Per patient, will upload device before appointment  Blaire Gale MA     Intermittently febrile but thought to be central in origin. If persistent, will consider CT a/p  Tracheostomy with ventilator dependence, weaning as tolerated with PS trials  Mental status has improved somewhat. following some simple commands

## 2025-05-18 NOTE — PROGRESS NOTE ADULT - ASSESSMENT
ASSESSMENT/PLAN: VPS infection/ventriculitis; seizure     Neuro:  follow CTh this am - concern for hydro in view of apneic events  neurochecks q4hr  Keppra 1g bid for seizure control, cont  Klonipin for head tremor  Will continue to monitor for need for VPS; d/w Dr. Murdock    Card:  -150mmHg  Adjust antihypertensives to meet goal    Pulm:  on vent - CPAP as tolerated, unclear cause of apnea events - will follow CTh for hydrocephalus    GI:  tube feeding  BM restart - last BM 3/6  on Danactive; monitor    Renal:  Hyponatremia, possibly SIADH  - On salt tabs; will monitor    Endo:  euglycemia    Heme:  SCDs, on Lovenox    ID:  Off Abx now, completed 7 days, per ID  ID recommendations appreciated    full code ASSESSMENT/PLAN: VPS infection/ventriculitis; seizure     Neuro:  concern for hydro in view of apneic events; D/W Dr. Murdock - plan for VPS next week  neurochecks q4hr  Keppra 1g bid for seizure control, cont  Klonipin for head tremor    Card:  -150mmHg  Adjust antihypertensives to meet goal    Pulm:  on vent - CPAP as tolerated    GI:  tube feeding  BM restart - last BM 3/6  on Danactive; monitor    Renal:  Hyponatremia, possibly SIADH  - On salt tabs; will monitor    Endo:  euglycemia    Heme:  SCDs, on Lovenox    ID:  Off Abx now, completed 7 days, per ID  ID recommendations appreciated    full code Patient requests all Lab, Cardiology, and Radiology Results on their Discharge Instructions